# Patient Record
Sex: MALE | Race: BLACK OR AFRICAN AMERICAN | NOT HISPANIC OR LATINO | Employment: OTHER | URBAN - METROPOLITAN AREA
[De-identification: names, ages, dates, MRNs, and addresses within clinical notes are randomized per-mention and may not be internally consistent; named-entity substitution may affect disease eponyms.]

---

## 2017-04-13 ENCOUNTER — APPOINTMENT (EMERGENCY)
Dept: RADIOLOGY | Facility: HOSPITAL | Age: 32
End: 2017-04-13
Payer: COMMERCIAL

## 2017-04-13 ENCOUNTER — HOSPITAL ENCOUNTER (EMERGENCY)
Facility: HOSPITAL | Age: 32
Discharge: HOME | End: 2017-04-13
Attending: EMERGENCY MEDICINE
Payer: COMMERCIAL

## 2017-04-13 VITALS
BODY MASS INDEX: 28.25 KG/M2 | WEIGHT: 220 LBS | RESPIRATION RATE: 18 BRPM | SYSTOLIC BLOOD PRESSURE: 163 MMHG | TEMPERATURE: 98.3 F | DIASTOLIC BLOOD PRESSURE: 96 MMHG | OXYGEN SATURATION: 99 % | HEART RATE: 83 BPM

## 2017-04-13 DIAGNOSIS — M65.4 TENOSYNOVITIS, DE QUERVAIN: Primary | ICD-10-CM

## 2017-04-13 PROCEDURE — 99283 EMERGENCY DEPT VISIT LOW MDM: CPT

## 2017-04-13 PROCEDURE — 73110 X-RAY EXAM OF WRIST: CPT

## 2017-04-13 RX ORDER — FAMOTIDINE 20 MG
TABLET ORAL
COMMUNITY
End: 2018-08-20 | Stop reason: SDUPTHER

## 2017-04-13 RX ORDER — PREDNISONE 20 MG/1
40 TABLET ORAL ONCE
Status: COMPLETED | OUTPATIENT
Start: 2017-04-13 | End: 2017-04-13

## 2017-04-13 RX ORDER — PREDNISONE 20 MG/1
40 TABLET ORAL DAILY
Qty: 10 TABLET | Refills: 0 | Status: SHIPPED | OUTPATIENT
Start: 2017-04-13 | End: 2017-04-18

## 2017-04-13 RX ADMIN — PREDNISONE 40 MG: 20 TABLET ORAL at 22:38

## 2017-10-04 ENCOUNTER — HOSPITAL ENCOUNTER (EMERGENCY)
Facility: HOSPITAL | Age: 32
Discharge: HOME | End: 2017-10-04
Admitting: EMERGENCY MEDICINE
Payer: COMMERCIAL

## 2017-10-04 ENCOUNTER — APPOINTMENT (EMERGENCY)
Dept: RADIOLOGY | Facility: HOSPITAL | Age: 32
End: 2017-10-04
Payer: COMMERCIAL

## 2017-10-04 VITALS
HEART RATE: 91 BPM | RESPIRATION RATE: 18 BRPM | TEMPERATURE: 98.7 F | DIASTOLIC BLOOD PRESSURE: 91 MMHG | SYSTOLIC BLOOD PRESSURE: 177 MMHG | OXYGEN SATURATION: 99 %

## 2017-10-04 DIAGNOSIS — M54.50 CHRONIC LOW BACK PAIN: Primary | ICD-10-CM

## 2017-10-04 DIAGNOSIS — M25.562 CHRONIC PAIN OF LEFT KNEE: ICD-10-CM

## 2017-10-04 DIAGNOSIS — G89.29 CHRONIC LOW BACK PAIN: Primary | ICD-10-CM

## 2017-10-04 DIAGNOSIS — G89.29 CHRONIC PAIN OF LEFT KNEE: ICD-10-CM

## 2017-10-04 PROCEDURE — 96372 THER/PROPH/DIAG INJ SC/IM: CPT

## 2017-10-04 PROCEDURE — 72100 X-RAY EXAM L-S SPINE 2/3 VWS: CPT

## 2017-10-04 PROCEDURE — 73564 X-RAY EXAM KNEE 4 OR MORE: CPT

## 2017-10-04 PROCEDURE — 99283 EMERGENCY DEPT VISIT LOW MDM: CPT

## 2017-10-04 RX ORDER — METHOCARBAMOL 750 MG/1
750 TABLET, FILM COATED ORAL 3 TIMES DAILY
Qty: 15 TABLET | Refills: 0 | Status: SHIPPED | OUTPATIENT
Start: 2017-10-04 | End: 2019-02-11

## 2017-10-04 RX ORDER — NAPROXEN 500 MG/1
500 TABLET ORAL 2 TIMES DAILY WITH MEALS
Qty: 20 TABLET | Refills: 0 | Status: SHIPPED | OUTPATIENT
Start: 2017-10-04 | End: 2018-08-14

## 2017-10-04 RX ORDER — KETOROLAC TROMETHAMINE 30 MG/ML
15 INJECTION, SOLUTION INTRAMUSCULAR; INTRAVENOUS ONCE
Status: COMPLETED | OUTPATIENT
Start: 2017-10-04 | End: 2017-10-04

## 2017-10-04 RX ADMIN — KETOROLAC TROMETHAMINE 15 MG: 30 INJECTION, SOLUTION INTRAMUSCULAR at 21:53

## 2017-10-05 NOTE — DISCHARGE INSTRUCTIONS
Take naproxen daily with food  I recommend you take it at breakfast and dinner  Take Robaxin at night  It will make you drowsy  Do not take it during the day, especially if you need to drive anywhere  Do not drink on this medication  Apply heat to your back 20 minutes on an hour as often as possible for a goal of 5 times a day  Rest  If symptoms continue follow-up with her family doctor for severely worsened return to emergency department  Please contact a family doctor for further management for continuation may require MRIs and other medications that they will prescribe  Acute Low Back Pain   WHAT YOU NEED TO KNOW:   Acute low back pain is sudden discomfort in your lower back area that lasts for up to 6 weeks  The discomfort makes it difficult to tolerate activity  DISCHARGE INSTRUCTIONS:   Seek care immediately or call 911 if:   · You have severe pain  · You have sudden stiffness and heaviness on both buttocks down to both legs  · You have numbness or weakness in one leg, or pain in both legs  · You have numbness in your genital area or across your lower back  · You cannot control your urine or bowel movements  Contact your healthcare provider if:   · You have a fever  · You have pain at night or when you rest     · Your pain does not get better with treatment  · You have pain that worsens when you cough or sneeze  · You suddenly feel something pop or snap in your back  · You have questions or concerns about your condition or care  Medicines: The following medicines may be ordered by your healthcare provider:  · Acetaminophen  decreases pain  It is available without a doctor's order  Ask how much to take and how often to take it  Follow directions  Acetaminophen can cause liver damage if not taken correctly  · NSAIDs  help decrease swelling and pain  This medicine is available with or without a doctor's order   NSAIDs can cause stomach bleeding or kidney problems in certain people  If you take blood thinner medicine, always ask your healthcare provider if NSAIDs are safe for you  Always read the medicine label and follow directions  · Prescription pain medicine  may be given  Ask your healthcare provider how to take this medicine safely  · Muscle relaxers  decrease pain by relaxing the muscles in your lower spine  · Take your medicine as directed  Contact your healthcare provider if you think your medicine is not helping or if you have side effects  Tell him of her if you are allergic to any medicine  Keep a list of the medicines, vitamins, and herbs you take  Include the amounts, and when and why you take them  Bring the list or the pill bottles to follow-up visits  Carry your medicine list with you in case of an emergency  Self-care:   · Stay active  as much as you can without causing more pain  Bed rest could make your back pain worse  Start with some light exercises such as walking  Avoid heavy lifting until your pain is gone  Ask for more information about the activities or exercises that are right for you  · Ice  helps decrease swelling, pain, and muscle spams  Put crushed ice in a plastic bag  Cover it with a towel  Place it on your lower back for 20 to 30 minutes every 2 hours  Do this for about 2 to 3 days after your pain starts, or as directed  · Heat  helps decrease pain and muscle spasms  Start to use heat after treatment with ice has stopped  Use a small towel dampened with warm water or a heating pad, or sit in a warm bath  Apply heat on the area for 20 to 30 minutes every 2 hours for as many days as directed  Alternate heat and ice  Prevent acute low back pain:   · Use proper body mechanics  ¨ Bend at the hips and knees when you  objects  Do not bend from the waist  Use your leg muscles as you lift the load  Do not use your back  Keep the object close to your chest as you lift it   Try not to twist or lift anything above your waist     ¨ Change your position often when you stand for long periods of time  Rest one foot on a small box or footrest, and then switch to the other foot often  ¨ Try not to sit for long periods of time  When you do, sit in a straight-backed chair with your feet flat on the floor  Never reach, pull, or push while you are sitting  · Do exercises that strengthen your back muscles  Warm up before you exercise  Ask your healthcare provider the best exercises for you  · Maintain a healthy weight  Ask your healthcare provider how much you should weigh  Ask him to help you create a weight loss plan if you are overweight  Follow up with your healthcare provider as directed:  Return for a follow-up visit if you still have pain after 1 to 3 weeks of treatment  You may need to visit an orthopedist if your back pain lasts more than 6 to 12 weeks  Write down your questions so you remember to ask them during your visits  © 2017 [employer] Information is for End User's use only and may not be sold, redistributed or otherwise used for commercial purposes  All illustrations and images included in CareNotes® are the copyrighted property of [employer]  or [employer] Gunter  The above information is an  only  It is not intended as medical advice for individual conditions or treatments  Talk to your doctor, nurse or pharmacist before following any medical regimen to see if it is safe and effective for you
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
## 2017-10-05 NOTE — ED NOTES
Pt appeared to be in no acute distress upon discharge  Pt see, and assessed by Aly CHEN  Verbal understanding obtained from pt on d/c instructions as well as Rx and follow up care  Pt able to ambulate well without assistance upon exiting       Jin Myers RN  10/04/17 0453

## 2018-01-10 NOTE — PROGRESS NOTES
Patient Health Assessment    Date:            01/12/2016  Blood Pressure:  151/96  Pulse:           80  Age:             30  Weight:          225 lbs  Height/Length:   6' 2"  Body Mass Index: 28 8  Provider:        EMILE  Clinic:          [facility] 39: Diabetes    Jaw Pain    Frequent Headaches  Medications: Allergies:  Since Last Visit: Medical Alert: No Change    Medications: No Change    Allergies:        No Change  Pain Scale Type: Numeric Pain ScalePain Level: 7  Description:  Location: ()Tooth    ()Other  Quality:  ()Dull    ()Sharp    ()Throbbing    ()Unable to assess  Duration: ()< 24 hours    ()> 24 hours    ()1-3 days    ()4-6 days    ()7-14 days    ()14-21 days    ()21-30 days    ()Over 30 days     pt presents for limited oral exam due to pain on UL starting approx a month  ago  pt reports that he was seen in ED last Thursday and given penicillin and  hydrocodone for the pain  pt is 7/10     1 PA taken - large decay on #15 extending to pulp    clinical exam reveals large decay #15, no fistula or draining at this time  sens to perc and palp  pt would like to have tooth extracted  told pt that if  he does not have dental insurance that the cost is $180 and a 30% discount will   be applied and will be due day of exo  gave pt rx for percocet and  instructed to keep taking abx  pt also informed may have decay on #14 and  will need comp exam and fmx      dr stevens/dr Dana Huber    nv: exo #15 OS day preferred but regular day ok    ----- Signed on Tuesday, January 12, 2016 at 9:40:49 AM  -----  ----- Provider: [email or web address]_[email or web address] - [facility], Dentist -- Clinic: [facility]  -----

## 2018-01-13 NOTE — PROGRESS NOTES
Patient Health Assessment    Date:            01/14/2016  Blood Pressure:  139/96  Pulse:           98  Age:             30  Weight:          225 lbs  Height/Length:   6' 2"  Body Mass Index: 28 8  Provider:        [id number]  Clinic:          [facility] 39: Diabetes    Jaw Pain    Frequent Headaches  Medications: Percocet 5-325 MG    Metformin HCl  Allergies:  Since Last Visit: Medical Alert: No Change    Medications: No Change    Allergies:        No Change  Pain Scale Type: Numeric Pain ScalePain Level: 0  Description:  Ext #15    Patient presents for Ext #15  [address], patient denies any changes  Obtained consent, and Pre-Op BP WNL  The patient was given 2 00 total carpules of Septocaine, 4% with Epinephrine  1:100,000, to achieve satisfactory local anesthesia results  Adequate anesthesia obtained, reflected gingiva, elevated, and extracted #15    Socket irrigated, and   2 individual 3 0 chromic gut sutures placed  Upon dismissal, patient received POI, gauze  pt instructed to keep taking  percocet given on 1/12/16 for pain    rx for prevident given and pt instructed to return for comp exam    dr stevens/dr maguire    NV: comp exam, fmx    ----- Signed on Thursday, January 14, 2016 at 10:43:17 AM  -----  ----- Provider: [id number] - [facility], Dentist -- Clinic: [facility]  -----

## 2018-01-16 NOTE — PROGRESS NOTES
Patient Health Assessment    Date:            05/26/2016  Blood Pressure:  136/89  Pulse:           90  Age:             31  Weight:          225 lbs  Height/Length:   6' 2"  Body Mass Index: 28 8  Provider:        EMILE  Clinic:          [facility]: Diabetes    Jaw Pain    Frequent Headaches  Medications: PreviDent 5000 Booster 1 1 %    Metformin HCl    GLIPIZIDE ER 2 5 MG TABLET 2 5 MG    IMIPRAMINE HCL 25 MG TABLET 25 MG    RAMIPRIL 2 5 MG CAPSULE 2 5 MG  Allergies:  Since Last Visit: Medical Alert: No Change    Medications: No Change    Allergies:        No Change  Pain Scale Type: Numeric Pain ScalePain Level: 0  Description:  Resin #3-OL    Pt presents for restorative #3-OL    [facility],  Patient denies any changes    caries #3-OL    topical LA applied  Administered 2 carpules  of 2% Lidocaine with 1:100k epi  via infiltration  rubber dam placed  old amalgam removed  Caries excavated  left hard discolored dentin  lime lite placed  vivid bond II placed and light  cured  Restored with Beautifil flow + packable A2 composite  Occlusion  verified, and restoration polished with finishing burs  POI given  possibility of few days post op sensitivity  Pt satisfied and left in good  health      NV: more fillings    EMPERATRIZ/Dr SCHROEDER    ----- Signed on Thursday, May 26, 2016 at 2:31:16 PM  -----  ----- Provider: [id number] - [facility], Dentist -- Clinic: [facility] -----

## 2018-07-17 ENCOUNTER — OFFICE VISIT (OUTPATIENT)
Dept: FAMILY MEDICINE CLINIC | Facility: CLINIC | Age: 33
End: 2018-07-17
Payer: COMMERCIAL

## 2018-07-17 VITALS
SYSTOLIC BLOOD PRESSURE: 142 MMHG | HEIGHT: 73 IN | WEIGHT: 224 LBS | RESPIRATION RATE: 16 BRPM | HEART RATE: 84 BPM | DIASTOLIC BLOOD PRESSURE: 90 MMHG | TEMPERATURE: 96.5 F | BODY MASS INDEX: 29.69 KG/M2

## 2018-07-17 DIAGNOSIS — L65.9 HAIR LOSS: ICD-10-CM

## 2018-07-17 DIAGNOSIS — E08.40 DIABETES DUE TO UNDERLYING CONDITION W DIABETIC NEUROP, UNSP (HCC): ICD-10-CM

## 2018-07-17 DIAGNOSIS — Z23 NEED FOR PNEUMOCOCCAL VACCINATION: ICD-10-CM

## 2018-07-17 DIAGNOSIS — G89.29 CHRONIC BILATERAL THORACIC BACK PAIN: ICD-10-CM

## 2018-07-17 DIAGNOSIS — Z76.89 ENCOUNTER TO ESTABLISH CARE WITH NEW DOCTOR: Primary | ICD-10-CM

## 2018-07-17 DIAGNOSIS — Z00.00 HEALTH CARE MAINTENANCE: ICD-10-CM

## 2018-07-17 DIAGNOSIS — Z23 NEED FOR TDAP VACCINATION: ICD-10-CM

## 2018-07-17 DIAGNOSIS — M54.6 CHRONIC BILATERAL THORACIC BACK PAIN: ICD-10-CM

## 2018-07-17 DIAGNOSIS — N52.9 ERECTILE DYSFUNCTION, UNSPECIFIED ERECTILE DYSFUNCTION TYPE: ICD-10-CM

## 2018-07-17 DIAGNOSIS — E11.8 TYPE 2 DIABETES MELLITUS WITH COMPLICATION, WITHOUT LONG-TERM CURRENT USE OF INSULIN (HCC): ICD-10-CM

## 2018-07-17 PROBLEM — E11.9 TYPE 2 DIABETES MELLITUS, WITHOUT LONG-TERM CURRENT USE OF INSULIN (HCC): Status: ACTIVE | Noted: 2018-07-17

## 2018-07-17 PROCEDURE — 90732 PPSV23 VACC 2 YRS+ SUBQ/IM: CPT | Performed by: FAMILY MEDICINE

## 2018-07-17 PROCEDURE — 99213 OFFICE O/P EST LOW 20 MIN: CPT | Performed by: FAMILY MEDICINE

## 2018-07-17 PROCEDURE — 90471 IMMUNIZATION ADMIN: CPT | Performed by: FAMILY MEDICINE

## 2018-07-17 PROCEDURE — 90472 IMMUNIZATION ADMIN EACH ADD: CPT | Performed by: FAMILY MEDICINE

## 2018-07-17 PROCEDURE — 90715 TDAP VACCINE 7 YRS/> IM: CPT | Performed by: FAMILY MEDICINE

## 2018-07-17 RX ORDER — LISINOPRIL AND HYDROCHLOROTHIAZIDE 12.5; 1 MG/1; MG/1
1 TABLET ORAL DAILY
COMMUNITY
End: 2018-08-14

## 2018-07-17 RX ORDER — CYCLOBENZAPRINE HCL 5 MG
5 TABLET ORAL 3 TIMES DAILY PRN
COMMUNITY
End: 2019-04-16 | Stop reason: ALTCHOICE
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
## 2018-07-18 NOTE — ASSESSMENT & PLAN NOTE
Patient has a history of diabetes over 10 years  Would like to establish baseline values for  hemoglobing A1c, lipids, microlbumin and kidney function  Will order A1c, lipid panel, UA microalbumin and CMP  Patient demonstrated decreased sensation consistent diabetic peripheral neuropathy  with monofilament test  Will refer to podiatry

## 2018-07-18 NOTE — ASSESSMENT & PLAN NOTE
No results found for: HGBA1C    No results for input(s): POCGLU in the last 72 hours      Blood Sugar Average: Last 72 hrs:

## 2018-07-18 NOTE — ASSESSMENT & PLAN NOTE
Patient expressed he and his wife want to have children  ED has been a chronic problem for him   Will refer him to urology
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
## 2018-08-09 ENCOUNTER — APPOINTMENT (OUTPATIENT)
Dept: LAB | Facility: CLINIC | Age: 33
End: 2018-08-09
Payer: COMMERCIAL

## 2018-08-09 DIAGNOSIS — E08.40 DIABETES DUE TO UNDERLYING CONDITION W DIABETIC NEUROP, UNSP (HCC): ICD-10-CM

## 2018-08-09 DIAGNOSIS — Z00.00 HEALTH CARE MAINTENANCE: ICD-10-CM

## 2018-08-09 LAB
ALBUMIN SERPL BCP-MCNC: 3.1 G/DL (ref 3.5–5)
ALP SERPL-CCNC: 52 U/L (ref 46–116)
ALT SERPL W P-5'-P-CCNC: 20 U/L (ref 12–78)
ANION GAP SERPL CALCULATED.3IONS-SCNC: 4 MMOL/L (ref 4–13)
AST SERPL W P-5'-P-CCNC: 47 U/L (ref 5–45)
BACTERIA UR QL AUTO: ABNORMAL /HPF
BILIRUB SERPL-MCNC: 0.3 MG/DL (ref 0.2–1)
BILIRUB UR QL STRIP: NEGATIVE
BUN SERPL-MCNC: 16 MG/DL (ref 5–25)
CALCIUM SERPL-MCNC: 9.2 MG/DL (ref 8.3–10.1)
CHLORIDE SERPL-SCNC: 102 MMOL/L (ref 100–108)
CHOLEST SERPL-MCNC: 287 MG/DL (ref 50–200)
CLARITY UR: CLEAR
CO2 SERPL-SCNC: 30 MMOL/L (ref 21–32)
COLOR UR: YELLOW
CREAT SERPL-MCNC: 1.46 MG/DL (ref 0.6–1.3)
EST. AVERAGE GLUCOSE BLD GHB EST-MCNC: 283 MG/DL
GFR SERPL CREATININE-BSD FRML MDRD: 71 ML/MIN/1.73SQ M
GLUCOSE P FAST SERPL-MCNC: 272 MG/DL (ref 65–99)
GLUCOSE UR STRIP-MCNC: ABNORMAL MG/DL
HBA1C MFR BLD: 11.5 % (ref 4.2–6.3)
HDLC SERPL-MCNC: 49 MG/DL (ref 40–60)
HGB UR QL STRIP.AUTO: ABNORMAL
KETONES UR STRIP-MCNC: NEGATIVE MG/DL
LDLC SERPL CALC-MCNC: 209 MG/DL (ref 0–100)
LEUKOCYTE ESTERASE UR QL STRIP: ABNORMAL
NITRITE UR QL STRIP: NEGATIVE
NON-SQ EPI CELLS URNS QL MICRO: ABNORMAL /HPF
NONHDLC SERPL-MCNC: 238 MG/DL
PH UR STRIP.AUTO: 6 [PH] (ref 4.5–8)
POTASSIUM SERPL-SCNC: 5.1 MMOL/L (ref 3.5–5.3)
PROT SERPL-MCNC: 7.3 G/DL (ref 6.4–8.2)
PROT UR STRIP-MCNC: ABNORMAL MG/DL
RBC #/AREA URNS AUTO: ABNORMAL /HPF
SODIUM SERPL-SCNC: 136 MMOL/L (ref 136–145)
SP GR UR STRIP.AUTO: 1.02 (ref 1–1.03)
TRIGL SERPL-MCNC: 144 MG/DL
UROBILINOGEN UR QL STRIP.AUTO: 0.2 E.U./DL
WBC #/AREA URNS AUTO: ABNORMAL /HPF

## 2018-08-09 PROCEDURE — 83036 HEMOGLOBIN GLYCOSYLATED A1C: CPT

## 2018-08-09 PROCEDURE — 80053 COMPREHEN METABOLIC PANEL: CPT

## 2018-08-09 PROCEDURE — 80061 LIPID PANEL: CPT

## 2018-08-09 PROCEDURE — 81001 URINALYSIS AUTO W/SCOPE: CPT | Performed by: FAMILY MEDICINE

## 2018-08-09 PROCEDURE — 87389 HIV-1 AG W/HIV-1&-2 AB AG IA: CPT

## 2018-08-09 PROCEDURE — 36415 COLL VENOUS BLD VENIPUNCTURE: CPT

## 2018-08-10 LAB — HIV 1+2 AB+HIV1 P24 AG SERPL QL IA: NORMAL

## 2018-08-14 ENCOUNTER — HOSPITAL ENCOUNTER (EMERGENCY)
Facility: HOSPITAL | Age: 33
Discharge: HOME | End: 2018-08-14
Attending: EMERGENCY MEDICINE | Admitting: EMERGENCY MEDICINE
Payer: COMMERCIAL

## 2018-08-14 VITALS
OXYGEN SATURATION: 99 % | SYSTOLIC BLOOD PRESSURE: 188 MMHG | DIASTOLIC BLOOD PRESSURE: 91 MMHG | WEIGHT: 225 LBS | TEMPERATURE: 98.6 F | BODY MASS INDEX: 30.1 KG/M2 | HEART RATE: 93 BPM | RESPIRATION RATE: 18 BRPM

## 2018-08-14 DIAGNOSIS — R73.9 HYPERGLYCEMIA: Primary | ICD-10-CM

## 2018-08-14 LAB
ACETONE SERPL-MCNC: NEGATIVE MG/DL
ALBUMIN SERPL BCP-MCNC: 3.5 G/DL (ref 3.5–5)
ALP SERPL-CCNC: 72 U/L (ref 46–116)
ALT SERPL W P-5'-P-CCNC: 25 U/L (ref 12–78)
ANION GAP SERPL CALCULATED.3IONS-SCNC: 4 MMOL/L (ref 4–13)
AST SERPL W P-5'-P-CCNC: 54 U/L (ref 5–45)
BASOPHILS # BLD AUTO: 0.03 THOUSANDS/ΜL (ref 0–0.1)
BASOPHILS NFR BLD AUTO: 0 % (ref 0–1)
BILIRUB SERPL-MCNC: 0.3 MG/DL (ref 0.2–1)
BUN SERPL-MCNC: 21 MG/DL (ref 5–25)
CALCIUM SERPL-MCNC: 9.2 MG/DL (ref 8.3–10.1)
CHLORIDE SERPL-SCNC: 97 MMOL/L (ref 100–108)
CO2 SERPL-SCNC: 29 MMOL/L (ref 21–32)
CREAT SERPL-MCNC: 1.58 MG/DL (ref 0.6–1.3)
EOSINOPHIL # BLD AUTO: 0.14 THOUSAND/ΜL (ref 0–0.61)
EOSINOPHIL NFR BLD AUTO: 2 % (ref 0–6)
ERYTHROCYTE [DISTWIDTH] IN BLOOD BY AUTOMATED COUNT: 11.6 % (ref 11.6–15.1)
GFR SERPL CREATININE-BSD FRML MDRD: 65 ML/MIN/1.73SQ M
GLUCOSE SERPL-MCNC: 402 MG/DL (ref 65–140)
GLUCOSE SERPL-MCNC: 447 MG/DL (ref 65–140)
HCT VFR BLD AUTO: 39.5 % (ref 36.5–49.3)
HGB BLD-MCNC: 13.9 G/DL (ref 12–17)
IMM GRANULOCYTES # BLD AUTO: 0.01 THOUSAND/UL (ref 0–0.2)
IMM GRANULOCYTES NFR BLD AUTO: 0 % (ref 0–2)
LYMPHOCYTES # BLD AUTO: 2.7 THOUSANDS/ΜL (ref 0.6–4.47)
LYMPHOCYTES NFR BLD AUTO: 37 % (ref 14–44)
MCH RBC QN AUTO: 29.3 PG (ref 26.8–34.3)
MCHC RBC AUTO-ENTMCNC: 35.2 G/DL (ref 31.4–37.4)
MCV RBC AUTO: 83 FL (ref 82–98)
MONOCYTES # BLD AUTO: 0.41 THOUSAND/ΜL (ref 0.17–1.22)
MONOCYTES NFR BLD AUTO: 6 % (ref 4–12)
NEUTROPHILS # BLD AUTO: 3.94 THOUSANDS/ΜL (ref 1.85–7.62)
NEUTS SEG NFR BLD AUTO: 55 % (ref 43–75)
NRBC BLD AUTO-RTO: 0 /100 WBCS
PLATELET # BLD AUTO: 283 THOUSANDS/UL (ref 149–390)
PMV BLD AUTO: 10.9 FL (ref 8.9–12.7)
POTASSIUM SERPL-SCNC: 4.6 MMOL/L (ref 3.5–5.3)
PROT SERPL-MCNC: 7.8 G/DL (ref 6.4–8.2)
RBC # BLD AUTO: 4.75 MILLION/UL (ref 3.88–5.62)
SODIUM SERPL-SCNC: 130 MMOL/L (ref 136–145)
WBC # BLD AUTO: 7.23 THOUSAND/UL (ref 4.31–10.16)

## 2018-08-14 PROCEDURE — 96374 THER/PROPH/DIAG INJ IV PUSH: CPT

## 2018-08-14 PROCEDURE — 80053 COMPREHEN METABOLIC PANEL: CPT | Performed by: PHYSICIAN ASSISTANT

## 2018-08-14 PROCEDURE — 96361 HYDRATE IV INFUSION ADD-ON: CPT

## 2018-08-14 PROCEDURE — 85025 COMPLETE CBC W/AUTO DIFF WBC: CPT | Performed by: PHYSICIAN ASSISTANT

## 2018-08-14 PROCEDURE — 99283 EMERGENCY DEPT VISIT LOW MDM: CPT

## 2018-08-14 PROCEDURE — 82948 REAGENT STRIP/BLOOD GLUCOSE: CPT

## 2018-08-14 PROCEDURE — 82009 KETONE BODYS QUAL: CPT | Performed by: PHYSICIAN ASSISTANT

## 2018-08-14 PROCEDURE — 36415 COLL VENOUS BLD VENIPUNCTURE: CPT | Performed by: PHYSICIAN ASSISTANT

## 2018-08-14 RX ADMIN — SODIUM CHLORIDE 1000 ML: 0.9 INJECTION, SOLUTION INTRAVENOUS at 14:23

## 2018-08-14 RX ADMIN — INSULIN HUMAN 4 UNITS: 100 INJECTION, SOLUTION PARENTERAL at 15:43

## 2018-08-14 NOTE — DISCHARGE INSTRUCTIONS
Diabetic Hyperglycemia   WHAT YOU NEED TO KNOW:   Diabetic hyperglycemia is a blood glucose (sugar) level that is higher than your healthcare provider recommends  You may have increased thirst and urinate more often than usual  Over time, uncontrolled diabetes can damage your nerves, blood vessels, tissues, and organs  That is why it is important to manage diabetic hyperglycemia  Without treatment, diabetic hyperglycemia can lead to diabetic ketoacidosis (DKA) or hyperglycemic hyperosmolar state (HHS)  These are serious conditions that can become life-threatening  DISCHARGE INSTRUCTIONS:   Return to the emergency department if:   · You have shortness of breath  · Your breath smells fruity  · You have nausea and vomiting  · You have symptoms of dehydration, such as dark yellow urine, dry mouth and lips, and dry skin  Contact your healthcare provider if:   · You continue to have higher blood sugar levels than your healthcare provider recommends  · Your blood sugar level is over 240 mg/dl and  you have ketones in your urine  · You have questions or concerns about your condition or care  Medicines:   · Medicines  such as insulin and hypoglycemic medicine decrease blood sugar levels  · Take your medicine as directed  Contact your healthcare provider if you think your medicine is not helping or if you have side effects  Tell him or her if you are allergic to any medicine  Keep a list of the medicines, vitamins, and herbs you take  Include the amounts, and when and why you take them  Bring the list or the pill bottles to follow-up visits  Carry your medicine list with you in case of an emergency  Follow up with your healthcare provider or specialist as directed: Your healthcare provider may refer you to a dietitian or diabetes specialist  Write down your questions so you remember to ask them during your visits     Manage diabetic hyperglycemia:   · If you take diabetes medicine or insulin, take it as directed  Missed or wrong doses can cause your blood sugar to go up  · Tell your healthcare provider if you continue to have trouble managing your blood sugar  He may change the type, amount, or timing of your diabetes medicine or insulin  If you do not take diabetes medicine or insulin, you may need to start  · Work with your healthcare provider to develop a sick day plan  Illness can cause your blood sugar to rise  A sick day plan helps you control your blood sugar level when you are sick  Prevent diabetic hyperglycemia:   · Check your blood sugar levels regularly  Ask your healthcare provider how often to check your blood sugar and what your levels should be  · Follow your meal plan  Your blood sugar can go up if you eat a large meal or you eat more carbohydrates than recommended  Work with a dietitian to develop a meal plan that is right for you  · Exercise regularly  to help lower your blood sugar when it is high  It can also keep your blood sugar levels steady over time  Exercise for at least 30 minutes, 5 days a week  Include muscle strengthening activities 2 days each week  Do not sit for longer than 90 minutes at a time  Work with your healthcare provider to create an exercise plan  Children should get at least 60 minutes of physical activity each day  · Check your ketones before exercise  if your blood sugar level is above 240 mg/dl  Do not exercise if you have ketones in your urine,  because your blood sugar level may rise even more  Ask your healthcare provider how to lower your blood sugar when you have ketones  © 2017 [employer] Information is for End User's use only and may not be sold, redistributed or otherwise used for commercial purposes  All illustrations and images included in CareNotes® are the copyrighted property of [employer]  or [employer]ter  The above information is an  only   It is not intended as medical advice for individual conditions or treatments  Talk to your doctor, nurse or pharmacist before following any medical regimen to see if it is safe and effective for you

## 2018-08-14 NOTE — ED NOTES
Discharge instructions reviewed with patient  Patient able to ambulate out without difficulty  No questions or concerns at this time          Enoch Humphries RN  08/14/18 6800

## 2018-08-14 NOTE — ED PROVIDER NOTES
History  Chief Complaint   Patient presents with    Hyperglycemia - no symptoms     pt c/o high blood sugar, 365 at home  denies symptoms  no SOB or chest pain     68-year-old male presents to the emergency department with complaints of hyperglycemia  States that he lost his glucometer over the past several weeks and has not been testing his sugar regularly  States that prior to this he would check a sugar intermittently and usually ranged from 150-250  States that he is not fasting blood sugars because he was for work  Early in the morning  States that he is compliant with his medications which are metformin 500 twice a day and glipizide 10 mg daily  Notes that he recently changed to a new family doctor in had blood work done and is due to follow up with them in the next 2 days  Patient states that he went on a weekend trip and forgot to take his medications with him  Has not been taking either of his diabetic medications over the past 2 days  Found his glucometer at home today and checked his sugar  States that it was 365 at home  He denies any chest pain, shortness of breath, nausea, vomiting, or diarrhea  History provided by:  Patient   used: No        Prior to Admission Medications   Prescriptions Last Dose Informant Patient Reported? Taking? Vitamin D, Cholecalciferol, 1000 units CAPS  Self Yes Yes   Sig: Take by mouth   cyclobenzaprine (FLEXERIL) 5 mg tablet  Self Yes Yes   Sig: Take 5 mg by mouth 3 (three) times a day as needed for muscle spasms   glipiZIDE (GLUCOTROL) 10 mg tablet  Self Yes Yes   Sig: Take 10 mg by mouth 2 (two) times a day before meals  ibuprofen (MOTRIN) 600 mg tablet  Self No Yes   Sig: Take 1 tablet (600 mg total) by mouth every 8 (eight) hours as needed for moderate pain (pain)  imipramine (TOFRANIL) 25 mg tablet  Self Yes Yes   Sig: Take 25 mg by mouth daily at bedtime     metFORMIN (GLUCOPHAGE) 500 mg tablet  Self Yes Yes   Sig: Take 500 mg by mouth 2 (two) times a day with meals  methocarbamol (ROBAXIN) 750 mg tablet   No No   Sig: Take 1 tablet by mouth 3 (three) times a day for 5 days   ramipril (ALTACE) 1 25 mg capsule  Self Yes Yes   Sig: Take by mouth daily  Pt unsure of dose  Facility-Administered Medications: None       Past Medical History:   Diagnosis Date    Diabetes mellitus ([facility] [address] )        History reviewed  No pertinent surgical history  Family History   Problem Relation Age of Onset    Hypertension Mother     Multiple sclerosis Mother     Diabetes Father      I have reviewed and agree with the history as documented  Social History   Substance Use Topics    Smoking status: Never Smoker    Smokeless tobacco: Never Used    Alcohol use No      Comment: occasionally        Review of Systems   Constitutional: Negative for activity change, appetite change, chills and fever  HENT: Negative for congestion, dental problem, drooling, ear discharge, ear pain, mouth sores, nosebleeds, rhinorrhea, sore throat and trouble swallowing  Eyes: Negative for pain, discharge and itching  Respiratory: Negative for cough, chest tightness, shortness of breath and wheezing  Cardiovascular: Negative for chest pain and palpitations  Gastrointestinal: Negative for abdominal pain, blood in stool, constipation, diarrhea, nausea and vomiting  Endocrine: Negative for cold intolerance and heat intolerance  Genitourinary: Negative for difficulty urinating, dysuria, flank pain, frequency and urgency  Skin: Negative for rash and wound  Allergic/Immunologic: Negative for food allergies and immunocompromised state  Neurological: Negative for dizziness, seizures, syncope, weakness, numbness and headaches  Psychiatric/Behavioral: Negative for agitation, behavioral problems and confusion  Physical Exam  Physical Exam   Constitutional: He is oriented to person, place, and time  He appears well-nourished  No distress     HENT: Head: Normocephalic and atraumatic  Right Ear: External ear normal    Left Ear: External ear normal    Mouth/Throat: Oropharynx is clear and moist  No oropharyngeal exudate  Eyes: Conjunctivae are normal    Neck: No JVD present  No tracheal deviation present  Cardiovascular: Normal rate, regular rhythm and normal heart sounds  Exam reveals no gallop and no friction rub  No murmur heard  Pulmonary/Chest: Effort normal and breath sounds normal  No respiratory distress  He has no wheezes  He has no rales  He exhibits no tenderness  Abdominal: Soft  Bowel sounds are normal  He exhibits no distension  There is no tenderness  There is no guarding  Musculoskeletal: Normal range of motion  He exhibits no edema, tenderness or deformity  Lymphadenopathy:     He has no cervical adenopathy  Neurological: He is alert and oriented to person, place, and time  Skin: Skin is warm and dry  No rash noted  He is not diaphoretic  No erythema  Psychiatric: He has a normal mood and affect  His behavior is normal    Nursing note and vitals reviewed        Vital Signs  ED Triage Vitals   Temperature Pulse Respirations Blood Pressure SpO2   08/14/18 1326 08/14/18 1327 08/14/18 1327 08/14/18 1327 08/14/18 1327   98 6 °F (37 °C) 93 18 (!) 188/91 99 %      Temp Source Heart Rate Source Patient Position - Orthostatic VS BP Location FiO2 (%)   08/14/18 1326 08/14/18 1327 08/14/18 1327 08/14/18 1327 --   Oral Monitor Sitting Right arm       Pain Score       08/14/18 1327       No Pain           Vitals:    08/14/18 1327   BP: (!) 188/91   Pulse: 93   Patient Position - Orthostatic VS: Sitting       Visual Acuity      ED Medications  Medications   sodium chloride 0 9 % bolus 1,000 mL (0 mL Intravenous Stopped 8/14/18 1542)   insulin regular (HumuLIN R,NovoLIN R) injection 4 Units (4 Units Intravenous Given 8/14/18 1543)       Diagnostic Studies  Results Reviewed     Procedure Component Value Units Date/Time    Comprehensive metabolic panel [[id number]]  (Abnormal) Collected:  08/14/18 1429    Lab Status:  Final result Specimen:  Blood from Arm, Left Updated:  08/14/18 1451     Sodium 130 (L) mmol/L      Potassium 4 6 mmol/L      Chloride 97 (L) mmol/L      CO2 29 mmol/L      Anion Gap 4 mmol/L      BUN 21 mg/dL      Creatinine 1 58 (H) mg/dL      Glucose 402 (H) mg/dL      Calcium 9 2 mg/dL      AST 54 (H) U/L      ALT 25 U/L      Alkaline Phosphatase 72 U/L      Total Protein 7 8 g/dL      Albumin 3 5 g/dL      Total Bilirubin 0 30 mg/dL      eGFR 65 ml/min/1 73sq m     Narrative:         National Kidney Disease Education Program recommendations are as follows:  GFR calculation is accurate only with a steady state creatinine  Chronic Kidney disease less than 60 ml/min/1 73 sq  meters  Kidney failure less than 15 ml/min/1 73 sq  meters      Acetone [[id number]]  (Normal) Collected:  08/14/18 1429    Lab Status:  Final result Specimen:  Blood from Arm, Left Updated:  08/14/18 1446     Acetone, Bld Negative    CBC and differential [[id number]] Collected:  08/14/18 1429    Lab Status:  Final result Specimen:  Blood from Arm, Left Updated:  08/14/18 1440     WBC 7 23 Thousand/uL      RBC 4 75 Million/uL      Hemoglobin 13 9 g/dL      Hematocrit 39 5 %      MCV 83 fL      MCH 29 3 pg      MCHC 35 2 g/dL      RDW 11 6 %      MPV 10 9 fL      Platelets 423 Thousands/uL      nRBC 0 /100 WBCs      Neutrophils Relative 55 %      Immat GRANS % 0 %      Lymphocytes Relative 37 %      Monocytes Relative 6 %      Eosinophils Relative 2 %      Basophils Relative 0 %      Neutrophils Absolute 3 94 Thousands/µL      Immature Grans Absolute 0 01 Thousand/uL      Lymphocytes Absolute 2 70 Thousands/µL      Monocytes Absolute 0 41 Thousand/µL      Eosinophils Absolute 0 14 Thousand/µL      Basophils Absolute 0 03 Thousands/µL     Fingerstick Glucose (POCT) [[id number]]  (Abnormal) Collected:  08/14/18 1353    Lab Status:  Final result Updated:  08/14/18 5544 POC Glucose 447 (H) mg/dl                  No orders to display              Procedures  Procedures       Phone Contacts  ED Phone Contact    ED Course                               MDM  Number of Diagnoses or Management Options  Hyperglycemia:   Diagnosis management comments: Differential diagnosis includes but not limited to:  Hyperglycemia, HONK  Doubt DKA  Most recent hemoglobin A1c checked on 8/9/18 resulted at 11 5       Amount and/or Complexity of Data Reviewed  Clinical lab tests: ordered and reviewed  Review and summarize past medical records: yes      CritCare Time    Disposition  Final diagnoses:   Hyperglycemia     Time reflects when diagnosis was documented in both MDM as applicable and the Disposition within this note     Time User Action Codes Description Comment    8/14/2018  3:39 PM Hammad Gonzalez Add [R73 9] Hyperglycemia       ED Disposition     ED Disposition Condition Comment    Discharge  [address] Tran discharge to home/self care  Condition at discharge: Stable        Follow-up Information    None         Patient's Medications   Discharge Prescriptions    No medications on file     No discharge procedures on file      ED Provider  Electronically Signed by           Jasmina Sanchez PA-C  08/14/18 3022

## 2018-08-16 ENCOUNTER — OFFICE VISIT (OUTPATIENT)
Dept: FAMILY MEDICINE CLINIC | Facility: CLINIC | Age: 33
End: 2018-08-16
Payer: COMMERCIAL

## 2018-08-16 VITALS
SYSTOLIC BLOOD PRESSURE: 122 MMHG | RESPIRATION RATE: 18 BRPM | WEIGHT: 221 LBS | HEART RATE: 78 BPM | TEMPERATURE: 97.1 F | BODY MASS INDEX: 29.93 KG/M2 | HEIGHT: 72 IN | DIASTOLIC BLOOD PRESSURE: 80 MMHG

## 2018-08-16 DIAGNOSIS — E78.5 HYPERLIPIDEMIA, UNSPECIFIED HYPERLIPIDEMIA TYPE: ICD-10-CM

## 2018-08-16 DIAGNOSIS — E11.65 TYPE 2 DIABETES MELLITUS WITH HYPERGLYCEMIA, WITHOUT LONG-TERM CURRENT USE OF INSULIN (HCC): Primary | ICD-10-CM

## 2018-08-16 PROBLEM — E11.9 TYPE 2 DIABETES MELLITUS, WITHOUT LONG-TERM CURRENT USE OF INSULIN (HCC): Status: ACTIVE | Noted: 2018-07-17

## 2018-08-16 PROBLEM — Z79.4 TYPE 2 DIABETES MELLITUS WITH HYPERGLYCEMIA, WITH LONG-TERM CURRENT USE OF INSULIN (HCC): Status: ACTIVE | Noted: 2018-07-17

## 2018-08-16 PROCEDURE — 99213 OFFICE O/P EST LOW 20 MIN: CPT | Performed by: FAMILY MEDICINE

## 2018-08-16 RX ORDER — ATORVASTATIN CALCIUM 10 MG/1
20 TABLET, FILM COATED ORAL DAILY
Qty: 90 TABLET | Refills: 0 | Status: SHIPPED | OUTPATIENT
Start: 2018-08-16 | End: 2019-02-11

## 2018-08-16 NOTE — PROGRESS NOTES
Barry Cancer 1985 male MRN: [id number]    Family Medicine Follow-up Visit      SUBJECTIVE    CC: Follow-up (er visit and bloodwork)      HPI:  Barry Gonzalez is a 26 y o  male who presented for a follow-up of Lab results  He went to [facility] emergency room on Tuesday because he checked his blood sugar and it was 356  He was given 4 units of insulin and discharged  His blood glucose levels today at home were in the 240s  He denies chest pain, SOB, blurry vision, confusion and is in no apparent distress  Review of Systems   Constitutional: Negative for appetite change, chills and fever  HENT: Negative for ear discharge, ear pain, postnasal drip, rhinorrhea, sinus pain, sinus pressure and trouble swallowing  Eyes: Negative for pain and visual disturbance  Respiratory: Negative for cough, chest tightness and shortness of breath  Cardiovascular: Negative for chest pain and palpitations  Gastrointestinal: Positive for diarrhea  Negative for abdominal pain, blood in stool, constipation, nausea and vomiting  Neurological: Positive for dizziness  Negative for tremors, seizures, syncope, weakness, light-headedness and headaches  Psychiatric/Behavioral: Negative for confusion and decreased concentration  Historical Information     The patient history was reviewed as follows:    Past Medical History:   Diagnosis Date    [facility]      History reviewed  No pertinent surgical history    Family History   Problem Relation Age of Onset    Hypertension Mother     Multiple sclerosis Mother     Diabetes Father       Social History   History   Alcohol Use No     Comment: occasionally     History   Drug Use No     History   Smoking Status    Never Smoker   Smokeless Tobacco    Never Used       Medications:   Meds/Allergies     Current Outpatient Prescriptions:     cyclobenzaprine (FLEXERIL) 5 mg tablet, Take 5 mg by mouth 3 (three) times a day as needed for muscle spasms, Disp: , Rfl:     ibuprofen (MOTRIN) 600 mg tablet, Take 1 tablet (600 mg total) by mouth every 8 (eight) hours as needed for moderate pain (pain)  , Disp: 15 tablet, Rfl: 0    imipramine (TOFRANIL) 25 mg tablet, Take 25 mg by mouth daily at bedtime  , Disp: , Rfl:     metFORMIN (GLUCOPHAGE) 500 mg tablet, Take 500 mg by mouth 2 (two) times a day with meals  , Disp: , Rfl:     ramipril (ALTACE) 1 25 mg capsule, Take by mouth daily  Pt unsure of dose , Disp: , Rfl:     Vitamin D, Cholecalciferol, 1000 units CAPS, Take by mouth, Disp: , Rfl:     atorvastatin (LIPITOR) 10 mg tablet, Take 2 tablets (20 mg total) by mouth daily, Disp: 90 tablet, Rfl: 0    insulin glargine (LANTUS SOLOSTAR) 100 units/mL injection pen, Inject 20 Units under the skin daily at bedtime, Disp: 5 pen, Rfl: 0    methocarbamol (ROBAXIN) 750 mg tablet, Take 1 tablet by mouth 3 (three) times a day for 5 days (Patient not taking: Reported on 8/16/2018 ), Disp: 15 tablet, Rfl: 0  No Known Allergies    OBJECTIVE    Vitals:   Vitals:    08/16/18 1529   BP: 122/80   Pulse: 78   Resp: 18   Temp: (!) 97 1 °F (36 2 °C)   Weight: 100 kg (221 lb)   Height: 6' (1 829 m)     Wt Readings from Last 3 Encounters:   08/16/18 100 kg (221 lb)   08/14/18 102 kg (225 lb)   07/17/18 102 kg (224 lb)     Body mass index is 29 97 kg/m²  Temp Readings from Last 3 Encounters:   08/16/18 (!) 97 1 °F (36 2 °C)   08/14/18 98 6 °F (37 °C) (Oral)   07/17/18 (!) 96 5 °F (35 8 °C) (Tympanic)     BP Readings from Last 3 Encounters:   08/16/18 122/80   08/14/18 (!) 188/91   07/17/18 142/90     Pulse Readings from Last 3 Encounters:   08/16/18 78   08/14/18 93   07/17/18 84     No LMP for male patient  Physical Exam:    Physical Exam   Constitutional: He is oriented to person, place, and time  He appears well-developed and well-nourished  No distress  Cardiovascular: Normal rate, regular rhythm and normal heart sounds      Pulmonary/Chest: Effort normal and breath sounds normal  No respiratory distress  Abdominal: Soft  Bowel sounds are normal  There is no tenderness  Neurological: He is alert and oriented to person, place, and time  Skin: He is not diaphoretic  Psychiatric: He has a normal mood and affect  His behavior is normal    Nursing note and vitals reviewed  Labs: I have personally reviewed all pertinent results     Admission on 08/14/2018, Discharged on 08/14/2018   Component Date Value Ref Range Status    POC Glucose 08/14/2018 447* 65 - 140 mg/dl Final    WBC 08/14/2018 7 23  4 31 - 10 16 Thousand/uL Final    RBC 08/14/2018 4 75  3 88 - 5 62 Million/uL Final    Hemoglobin 08/14/2018 13 9  12 0 - 17 0 g/dL Final    Hematocrit 08/14/2018 39 5  36 5 - 49 3 % Final    MCV 08/14/2018 83  82 - 98 fL Final    MCH 08/14/2018 29 3  26 8 - 34 3 pg Final    MCHC 08/14/2018 35 2  31 4 - 37 4 g/dL Final    RDW 08/14/2018 11 6  11 6 - 15 1 % Final    MPV 08/14/2018 10 9  8 9 - 12 7 fL Final    Platelets 06/85/4372 283  149 - 390 Thousands/uL Final    nRBC 08/14/2018 0  /100 WBCs Final    Neutrophils Relative 08/14/2018 55  43 - 75 % Final    Immat GRANS % 08/14/2018 0  0 - 2 % Final    Lymphocytes Relative 08/14/2018 37  14 - 44 % Final    Monocytes Relative 08/14/2018 6  4 - 12 % Final    Eosinophils Relative 08/14/2018 2  0 - 6 % Final    Basophils Relative 08/14/2018 0  0 - 1 % Final    Neutrophils Absolute 08/14/2018 3 94  1 85 - 7 62 Thousands/µL Final    Immature Grans Absolute 08/14/2018 0 01  0 00 - 0 20 Thousand/uL Final    Lymphocytes Absolute 08/14/2018 2 70  0 60 - 4 47 Thousands/µL Final    Monocytes Absolute 08/14/2018 0 41  0 17 - 1 22 Thousand/µL Final    Eosinophils Absolute 08/14/2018 0 14  0 00 - 0 61 Thousand/µL Final    Basophils Absolute 08/14/2018 0 03  0 00 - 0 10 Thousands/µL Final    Sodium 08/14/2018 130* 136 - 145 mmol/L Final    Potassium 08/14/2018 4 6  3 5 - 5 3 mmol/L Final    Chloride 08/14/2018 97* 100 - 108 mmol/L Final  CO2 08/14/2018 29  21 - 32 mmol/L Final    Anion Gap 08/14/2018 4  4 - 13 mmol/L Final    BUN 08/14/2018 21  5 - 25 mg/dL Final    Creatinine 08/14/2018 1 58* 0 60 - 1 30 mg/dL Final    Standardized to IDMS reference method    Glucose 08/14/2018 402* 65 - 140 mg/dL Final      If the patient is fasting, the ADA then defines impaired fasting glucose as > 100 mg/dL and diabetes as > or equal to 123 mg/dL  Specimen collection should occur prior to Sulfasalazine administration due to the potential for falsely depressed results  Specimen collection should occur prior to Sulfapyridine administration due to the potential for falsely elevated results   Calcium 08/14/2018 9 2  8 3 - 10 1 mg/dL Final    AST 08/14/2018 54* 5 - 45 U/L Final      Specimen collection should occur prior to Sulfasalazine administration due to the potential for falsely depressed results   ALT 08/14/2018 25  12 - 78 U/L Final      Specimen collection should occur prior to Sulfasalazine administration due to the potential for falsely depressed results       Alkaline Phosphatase 08/14/2018 72  46 - 116 U/L Final    Total Protein 08/14/2018 7 8  6 4 - 8 2 g/dL Final    Albumin 08/14/2018 3 5  3 5 - 5 0 g/dL Final    Total Bilirubin 08/14/2018 0 30  0 20 - 1 00 mg/dL Final    eGFR 08/14/2018 65  ml/min/1 73sq m Final    Acetone, Bld 08/14/2018 Negative  Negative Final   Appointment on 08/09/2018   Component Date Value Ref Range Status    HIV-1/HIV-2 Ab 08/09/2018 Non-Reactive  Non-Reactive Final    Hemoglobin A1C 08/09/2018 11 5* 4 2 - 6 3 % Final    EAG 08/09/2018 283  mg/dl Final    Cholesterol 08/09/2018 287* 50 - 200 mg/dL Final      Cholesterol:       Desirable         <200 mg/dl       Borderline         200-239 mg/dl       High              >239           Triglycerides 08/09/2018 144  <=150 mg/dL Final      Triglyceride:     Normal          <150 mg/dl     Borderline High 150-199 mg/dl     High            200-499 mg/dl Very High       >499 mg/dl    Specimen collection should occur prior to N-Acetylcysteine or Metamizole administration due to the potential for falsely depressed results   HDL, Direct 08/09/2018 49  40 - 60 mg/dL Final      HDL Cholesterol:       High    >60 mg/dL       Low     <41 mg/dL  Specimen collection should occur prior to Metamizole administration due to the potential for falsley depressed results   LDL Calculated 08/09/2018 209* 0 - 100 mg/dL Final      LDL Cholesterol:     Optimal           <100 mg/dl     Near Optimal      100-129 mg/dl     Above Optimal       Borderline High 130-159 mg/dl       High            160-189 mg/dl       Very High       >189 mg/dl         This screening LDL is a calculated result  It does not have the accuracy of the Direct Measured LDL in the monitoring of patients with hyperlipidemia and/or statin therapy  Direct Measure LDL (BYL689) must be ordered separately in these patients   Non-HDL-Chol (CHOL-HDL) 08/09/2018 238  mg/dl Final    Sodium 08/09/2018 136  136 - 145 mmol/L Final    Potassium 08/09/2018 5 1  3 5 - 5 3 mmol/L Final    Chloride 08/09/2018 102  100 - 108 mmol/L Final    CO2 08/09/2018 30  21 - 32 mmol/L Final    Anion Gap 08/09/2018 4  4 - 13 mmol/L Final    BUN 08/09/2018 16  5 - 25 mg/dL Final    Creatinine 08/09/2018 1 46* 0 60 - 1 30 mg/dL Final    Standardized to IDMS reference method    Glucose, Fasting 08/09/2018 272* 65 - 99 mg/dL Final      Specimen collection should occur prior to Sulfasalazine administration due to the potential for falsely depressed results  Specimen collection should occur prior to Sulfapyridine administration due to the potential for falsely elevated results   Calcium 08/09/2018 9 2  8 3 - 10 1 mg/dL Final    AST 08/09/2018 47* 5 - 45 U/L Final      Specimen collection should occur prior to Sulfasalazine administration due to the potential for falsely depressed results       ALT 08/09/2018 20  12 - 78 U/L Final      Specimen collection should occur prior to Sulfasalazine administration due to the potential for falsely depressed results   Alkaline Phosphatase 08/09/2018 52  46 - 116 U/L Final    Total Protein 08/09/2018 7 3  6 4 - 8 2 g/dL Final    Albumin 08/09/2018 3 1* 3 5 - 5 0 g/dL Final    Total Bilirubin 08/09/2018 0 30  0 20 - 1 00 mg/dL Final    eGFR 08/09/2018 71  ml/min/1 73sq m Final   Office Visit on 07/17/2018   Component Date Value Ref Range Status    Color, UA 08/09/2018 Yellow   Final    Clarity, UA 08/09/2018 Clear   Final    Specific Gravity, UA 08/09/2018 1 025  1 003 - 1 030 Final    pH, UA 08/09/2018 6 0  4 5 - 8 0 Final    Leukocytes, UA 08/09/2018 Elevated glucose may cause decreased leukocyte values   See urine microscopic for [facility] result/* Negative Final    Nitrite, UA 08/09/2018 Negative  Negative Final    Protein, UA 08/09/2018 100 (2+)* Negative mg/dl Final    Glucose, UA 08/09/2018 >=1000 (1%)* Negative mg/dl Final    Ketones, UA 08/09/2018 Negative  Negative mg/dl Final    Urobilinogen, UA 08/09/2018 0 2  0 2, 1 0 E U /dl E U /dl Final    Bilirubin, UA 08/09/2018 Negative  Negative Final    Blood, UA 08/09/2018 Moderate* Negative Final    RBC, UA 08/09/2018 2-4* None Seen, 0-5 /hpf Final    WBC, UA 08/09/2018 0-1* None Seen, 0-5, 5-55, 5-65 /hpf Final    Epithelial Cells 08/09/2018 Occasional  None Seen, Occasional /hpf Final    Bacteria, UA 08/09/2018 Occasional  None Seen, Occasional /hpf Final       Admission on 08/14/2018, Discharged on 08/14/2018   Component Date Value Ref Range Status    POC Glucose 08/14/2018 447* 65 - 140 mg/dl Final    WBC 08/14/2018 7 23  4 31 - 10 16 Thousand/uL Final    RBC 08/14/2018 4 75  3 88 - 5 62 Million/uL Final    Hemoglobin 08/14/2018 13 9  12 0 - 17 0 g/dL Final    Hematocrit 08/14/2018 39 5  36 5 - 49 3 % Final    MCV 08/14/2018 83  82 - 98 fL Final    MCH 08/14/2018 29 3  26 8 - 34 3 pg Final    MCHC 08/14/2018 35 2 31 4 - 37 4 g/dL Final    RDW 08/14/2018 11 6  11 6 - 15 1 % Final    MPV 08/14/2018 10 9  8 9 - 12 7 fL Final    Platelets 31/18/8254 283  149 - 390 Thousands/uL Final    nRBC 08/14/2018 0  /100 WBCs Final    Neutrophils Relative 08/14/2018 55  43 - 75 % Final    Immat GRANS % 08/14/2018 0  0 - 2 % Final    Lymphocytes Relative 08/14/2018 37  14 - 44 % Final    Monocytes Relative 08/14/2018 6  4 - 12 % Final    Eosinophils Relative 08/14/2018 2  0 - 6 % Final    Basophils Relative 08/14/2018 0  0 - 1 % Final    Neutrophils Absolute 08/14/2018 3 94  1 85 - 7 62 Thousands/µL Final    Immature Grans Absolute 08/14/2018 0 01  0 00 - 0 20 Thousand/uL Final    Lymphocytes Absolute 08/14/2018 2 70  0 60 - 4 47 Thousands/µL Final    Monocytes Absolute 08/14/2018 0 41  0 17 - 1 22 Thousand/µL Final    Eosinophils Absolute 08/14/2018 0 14  0 00 - 0 61 Thousand/µL Final    Basophils Absolute 08/14/2018 0 03  0 00 - 0 10 Thousands/µL Final    Sodium 08/14/2018 130* 136 - 145 mmol/L Final    Potassium 08/14/2018 4 6  3 5 - 5 3 mmol/L Final    Chloride 08/14/2018 97* 100 - 108 mmol/L Final    CO2 08/14/2018 29  21 - 32 mmol/L Final    Anion Gap 08/14/2018 4  4 - 13 mmol/L Final    BUN 08/14/2018 21  5 - 25 mg/dL Final    Creatinine 08/14/2018 1 58* 0 60 - 1 30 mg/dL Final    Standardized to IDMS reference method    Glucose 08/14/2018 402* 65 - 140 mg/dL Final      If the patient is fasting, the ADA then defines impaired fasting glucose as > 100 mg/dL and diabetes as > or equal to 123 mg/dL  Specimen collection should occur prior to Sulfasalazine administration due to the potential for falsely depressed results  Specimen collection should occur prior to Sulfapyridine administration due to the potential for falsely elevated results      Calcium 08/14/2018 9 2  8 3 - 10 1 mg/dL Final    AST 08/14/2018 54* 5 - 45 U/L Final      Specimen collection should occur prior to Sulfasalazine administration due to the potential for falsely depressed results   ALT 08/14/2018 25  12 - 78 U/L Final      Specimen collection should occur prior to Sulfasalazine administration due to the potential for falsely depressed results   Alkaline Phosphatase 08/14/2018 72  46 - 116 U/L Final    Total Protein 08/14/2018 7 8  6 4 - 8 2 g/dL Final    Albumin 08/14/2018 3 5  3 5 - 5 0 g/dL Final    Total Bilirubin 08/14/2018 0 30  0 20 - 1 00 mg/dL Final    eGFR 08/14/2018 65  ml/min/1 73sq m Final    Acetone, Bld 08/14/2018 Negative  Negative Final       Imaging:  I have personally reviewed all pertinent results  Assessment/Plan   Type 2 diabetes mellitus, without long-term current use of insulin ([facility])    Patient HGBA1C 11 5 and has uncontrolled hyperglycemia  Will start Lantus 20 units at bed time and have patient adjust by 2 units every 2 days if glucose not at 130  Explained to patient and both he and his wife understand what to do  Taking patient OFF glipizide but keeping on Metformin to help with glycemic control  Will have patient return in 2 weeks   Instructed patient to bring back blood sugar log and medications so we can update his medications list      Will also refer patient to opthalmology for diabetic retinopathy exam  Will follow up   Patient needed new glucometer  Ordered Accu-check Asniya plus with strips for patient  Patient has appointment with podiatry in November  Referred on initial visit for diabetic neuropathy in left foot  Hyperlipidemia  Cholesterol 287 and   Given patients comorbidity of diabetes LDL less than 70 is goal  Will start patient on 20 mg atorvastatin with ultimate goal of increasing it to high intensity 80 mg atorvastatin  Also educated patient on healthy eating and exercise  Mariela Carlisle was seen today for follow-up      Diagnoses and all orders for this visit:    Type 2 diabetes mellitus with hyperglycemia, without long-term current use of insulin ([facility] )  -     Ambulatory referral to Ophthalmology; Future  -     insulin glargine (LANTUS SOLOSTAR) 100 units/mL injection pen; Inject 20 Units under the skin daily at bedtime  -     Glucometer  -     Glucometer test strips    Hyperlipidemia, unspecified hyperlipidemia type  -     atorvastatin (LIPITOR) 10 mg tablet; Take 2 tablets (20 mg total) by mouth daily    Other orders  -     Cancel: insulin regular (HumuLIN R,NovoLIN R) injection 20 Units; Inject 0 2 mL (20 Units total) under the skin once         - PCP: Aspen Mendoza MD  - Follow-up appointments:     Future Appointments  Date Time Provider Makenzie Curiel   8/17/2018 2:30 PM Eun Brunson PA-C [facility]   8/29/2018 2:00 PM Aspen Mendoza MD [facility]        _____________________________________________________________________   The attending physician, Dr Simeon Chris, agreed with the plan      Aspen Mendoza MD, PGY-1  [facility]   8/16/2018

## 2018-08-17 ENCOUNTER — OFFICE VISIT (OUTPATIENT)
Dept: UROLOGY | Facility: CLINIC | Age: 33
End: 2018-08-17
Payer: COMMERCIAL

## 2018-08-17 ENCOUNTER — TELEPHONE (OUTPATIENT)
Dept: FAMILY MEDICINE CLINIC | Facility: CLINIC | Age: 33
End: 2018-08-17

## 2018-08-17 VITALS
HEIGHT: 72 IN | DIASTOLIC BLOOD PRESSURE: 82 MMHG | SYSTOLIC BLOOD PRESSURE: 120 MMHG | HEART RATE: 103 BPM | BODY MASS INDEX: 29.53 KG/M2 | WEIGHT: 218 LBS

## 2018-08-17 DIAGNOSIS — E11.65 TYPE 2 DIABETES MELLITUS WITH HYPERGLYCEMIA, WITHOUT LONG-TERM CURRENT USE OF INSULIN (HCC): Primary | ICD-10-CM

## 2018-08-17 DIAGNOSIS — N52.9 ERECTILE DYSFUNCTION, UNSPECIFIED ERECTILE DYSFUNCTION TYPE: Primary | ICD-10-CM

## 2018-08-17 DIAGNOSIS — E11.8 TYPE 2 DIABETES MELLITUS WITH COMPLICATION, UNSPECIFIED WHETHER LONG TERM INSULIN USE: Primary | ICD-10-CM

## 2018-08-17 PROCEDURE — 99203 OFFICE O/P NEW LOW 30 MIN: CPT | Performed by: PHYSICIAN ASSISTANT

## 2018-08-17 RX ORDER — BLOOD-GLUCOSE METER
EACH MISCELLANEOUS 3 TIMES DAILY
Qty: 1 KIT | Refills: 0 | Status: CANCELLED | OUTPATIENT
Start: 2018-08-17

## 2018-08-17 RX ORDER — BLOOD-GLUCOSE METER
EACH MISCELLANEOUS 2 TIMES DAILY
Qty: 1 KIT | Refills: 0 | Status: SHIPPED | OUTPATIENT
Start: 2018-08-17 | End: 2018-09-20

## 2018-08-17 RX ORDER — SILDENAFIL 50 MG/1
50 TABLET, FILM COATED ORAL DAILY PRN
Qty: 6 TABLET | Refills: 4 | Status: SHIPPED | OUTPATIENT
Start: 2018-08-17 | End: 2018-09-11

## 2018-08-17 NOTE — ASSESSMENT & PLAN NOTE
Cholesterol 287 and   Given patients comorbidity of diabetes LDL less than 70 is goal  Will start patient on 20 mg atorvastatin with ultimate goal of increasing it to high intensity 80 mg atorvastatin  Also educated patient on healthy eating and exercise

## 2018-08-17 NOTE — PROGRESS NOTES
Received a triage message from pt regarding his insulin  Pt was prescribed insulin and pen but not the needle  I will order the needle for him and call pt back to inform him that the order is in  I called pt's phone and left a message informing that the order for the needles are placed and he can pick them up at [address] [place] [address]

## 2018-08-17 NOTE — ASSESSMENT & PLAN NOTE
Lab Results   Component Value Date    HGBA1C 11 5 (H) 08/09/2018       Recent Labs      08/14/18   1353   POCGLU  447*       Patient

## 2018-08-17 NOTE — ASSESSMENT & PLAN NOTE
Patient HGBA1C 11 5 and has uncontrolled hyperglycemia  Will start Lantus 20 units at bed time and have patient adjust by 2 units every 2 days if glucose not at 130  Explained to patient and both he and his wife understand what to do  Taking patient OFF glipizide but keeping on Metformin to help with glycemic control  Will have patient return in 2 weeks   Instructed patient to bring back blood sugar log and medications so we can update his medications list      Will also refer patient to opthalmology for diabetic retinopathy exam  Will follow up   Patient needed new glucometer  Ordered Accu-check Saniya plus with strips for patient  Patient has appointment with podiatry in November  Referred on initial visit for diabetic neuropathy in left foot

## 2018-08-17 NOTE — PROGRESS NOTES
UROLOGY  NEW PATIENT  NOTE     Patient Identifiers: Jacklyn Aguilar (MRN: [id number])    Service Providing Consultation:  Urology, Brionna Ortiz PA-C  Consults  Date of Service: 8/17/2018      History of Present Illness:     Jacklyn Aguilar is a 26 y o   male with long history poorly controlled diabetes mellitus  He was referred from primary care for evaluation of longstanding erectile dysfunction  He does not get a sufficient erection for penetration  He does not get erections during sleep  He is able to climax and also has retrograde ejaculation  His recent hemoglobin A1c was 11 5 with a blood sugar of over 300  He was started on insulin just yesterday  He has chronic kidney disease from diabetic nephropathy as well as numbness and tingling in his feet  He has chronic low back pain  He is  and would like to have children  He try daily Cialis in the past which gave him a bad headache  Past Medical, Past Surgical History:     Past Medical History:   Diagnosis Date    Diabetes mellitus ([facility] )    :    History reviewed  No pertinent surgical history :    Medications, Allergies:     Current Outpatient Prescriptions:     atorvastatin (LIPITOR) 10 mg tablet, Take 2 tablets (20 mg total) by mouth daily, Disp: 90 tablet, Rfl: 0    cyclobenzaprine (FLEXERIL) 5 mg tablet, Take 5 mg by mouth 3 (three) times a day as needed for muscle spasms, Disp: , Rfl:     ibuprofen (MOTRIN) 600 mg tablet, Take 1 tablet (600 mg total) by mouth every 8 (eight) hours as needed for moderate pain (pain)  , Disp: 15 tablet, Rfl: 0    imipramine (TOFRANIL) 25 mg tablet, Take 25 mg by mouth daily at bedtime  , Disp: , Rfl:     metFORMIN (GLUCOPHAGE) 500 mg tablet, Take 500 mg by mouth 2 (two) times a day with meals  , Disp: , Rfl:     ramipril (ALTACE) 1 25 mg capsule, Take by mouth daily   Pt unsure of dose , Disp: , Rfl:     Vitamin D, Cholecalciferol, 1000 units CAPS, Take by mouth, Disp: , Rfl:    insulin glargine (LANTUS SOLOSTAR) 100 units/mL injection pen, Inject 20 Units under the skin daily at bedtime (Patient not taking: Reported on 8/17/2018 ), Disp: 5 pen, Rfl: 0    methocarbamol (ROBAXIN) 750 mg tablet, Take 1 tablet by mouth 3 (three) times a day for 5 days (Patient not taking: Reported on 8/16/2018 ), Disp: 15 tablet, Rfl: 0    sildenafil (VIAGRA) 50 MG tablet, Take 1 tablet (50 mg total) by mouth daily as needed for erectile dysfunction, Disp: 6 tablet, Rfl: 4    Allergies:  No Known Allergies:    Social and Family History:   Social History:   Social History   Substance Use Topics    Smoking status: Never Smoker    Smokeless tobacco: Never Used    Alcohol use No      Comment: occasionally     History   Smoking Status    Never Smoker   Smokeless Tobacco    Never Used       Family History:  Family History   Problem Relation Age of Onset    Hypertension Mother     Multiple sclerosis Mother     Diabetes Father    :     Review of Systems:     General: Fever, chills, or night sweats: negative  Cardiac: Negative for chest pain  Pulmonary: Negative for shortness of breath  Gastrointestinal: Abdominal pain negative  Nausea, vomiting, or diarrhea negative,  Genitourinary: See HPI above  Patient does not have hematuria  All other systems queried were negative  Physical Exam:   General: Patient is pleasant and in NAD  Awake and alert  /82 (BP Location: Left arm, Patient Position: Sitting, Cuff Size: Adult)   Pulse 103   Ht 6' (1 829 m)   Wt 98 9 kg (218 lb)   BMI 29 57 kg/m²   Cardiac: Peripheral edema: negative  Pulmonary: Non-labored breathing  Abdomen: Soft, non-tender, non-distended  No surgical scars  No masses, tenderness, hernias noted  Genitourinary: Negative CVA tenderness, negative suprapubic tenderness  (Male): Penis circumcised, phallus normal, meatus patent  Testicles descended into scrotum and are atrophic bilaterally  No inguinal hernias bilaterally  Labs:     Lab Results   Component Value Date    HGB 13 9 08/14/2018    HCT 39 5 08/14/2018    WBC 7 23 08/14/2018     08/14/2018   ]    Lab Results   Component Value Date     (L) 08/14/2018    K 4 6 08/14/2018    CL 97 (L) 08/14/2018    CO2 29 08/14/2018    BUN 21 08/14/2018    CREATININE 1 58 (H) 08/14/2018    CALCIUM 9 2 08/14/2018    GLUCOSE 402 (H) 08/14/2018   ]    Imaging:   I personally reviewed the images and report of the following studies, and reviewed them with the patient:   none    ASSESSMENT:      1  Erectile dysfunction   2  Under told diabetes- hemoglobin A1c 11 5   3  Diabetic nephropathy/ chronic kidney disease   4  Diabetic neuropathy      PLAN:   - recommend tighter blood sugar control which he is aware of  -  Will check testosterone, FSH, LH and prolactin  -  Prescription for sildenafil 50 mg  -  Follow-up in 6 weeks      Thank you for allowing me to participate in this patients care  Please do not hesitate to call with any additional questions    Monse Lemon PA-C

## 2018-08-17 NOTE — TELEPHONE ENCOUNTER
Patient states,  [facility] told him Insulin was not covered the way it was ordered  So possibly a Prior Auth is necessary   He was not clear on the way it should be written but said something was sent to us from [facility]

## 2018-08-17 NOTE — TELEPHONE ENCOUNTER
Pt is in need of a new order for replacement of the lantus to be basaglar which is covered by insurance  Also the glucometer is ordered under meds and sent to the pharmacy and the test strips and lancets also need to be ordered thru the pharmacy and you need to include the frequency of testing please  If you have any questions let me know   Thank you

## 2018-08-20 DIAGNOSIS — I10 HYPERTENSION, UNSPECIFIED TYPE: Primary | ICD-10-CM

## 2018-08-20 DIAGNOSIS — E11.8 TYPE 2 DIABETES MELLITUS WITH COMPLICATION, WITHOUT LONG-TERM CURRENT USE OF INSULIN (HCC): ICD-10-CM

## 2018-08-20 DIAGNOSIS — E55.9 VITAMIN D DEFICIENCY: ICD-10-CM

## 2018-08-21 PROCEDURE — 4010F ACE/ARB THERAPY RXD/TAKEN: CPT | Performed by: FAMILY MEDICINE

## 2018-08-21 RX ORDER — FAMOTIDINE 20 MG
1 TABLET ORAL DAILY
Qty: 90 TABLET | Refills: 1 | Status: SHIPPED | OUTPATIENT
Start: 2018-08-21 | End: 2019-03-05 | Stop reason: SDUPTHER

## 2018-08-21 RX ORDER — RAMIPRIL 1.25 MG/1
1.25 CAPSULE ORAL DAILY
Qty: 90 CAPSULE | Refills: 0 | Status: SHIPPED | OUTPATIENT
Start: 2018-08-21 | End: 2019-02-06 | Stop reason: SDUPTHER

## 2018-08-21 RX ORDER — IMIPRAMINE HCL 25 MG
25 TABLET ORAL
Qty: 90 TABLET | Refills: 0 | Status: SHIPPED | OUTPATIENT
Start: 2018-08-21 | End: 2019-04-16 | Stop reason: ALTCHOICE

## 2018-08-26 ENCOUNTER — HOSPITAL ENCOUNTER (EMERGENCY)
Facility: HOSPITAL | Age: 33
Discharge: HOME | End: 2018-08-26
Attending: EMERGENCY MEDICINE
Payer: COMMERCIAL

## 2018-08-26 ENCOUNTER — APPOINTMENT (EMERGENCY)
Dept: RADIOLOGY | Facility: HOSPITAL | Age: 33
End: 2018-08-26
Payer: COMMERCIAL

## 2018-08-26 VITALS
DIASTOLIC BLOOD PRESSURE: 85 MMHG | RESPIRATION RATE: 16 BRPM | SYSTOLIC BLOOD PRESSURE: 135 MMHG | OXYGEN SATURATION: 99 % | TEMPERATURE: 98.6 F | HEART RATE: 98 BPM

## 2018-08-26 DIAGNOSIS — M25.561 ACUTE PAIN OF RIGHT KNEE: Primary | ICD-10-CM

## 2018-08-26 PROCEDURE — 99283 EMERGENCY DEPT VISIT LOW MDM: CPT

## 2018-08-26 PROCEDURE — 96372 THER/PROPH/DIAG INJ SC/IM: CPT

## 2018-08-26 PROCEDURE — 73564 X-RAY EXAM KNEE 4 OR MORE: CPT

## 2018-08-26 RX ORDER — KETOROLAC TROMETHAMINE 30 MG/ML
15 INJECTION, SOLUTION INTRAMUSCULAR; INTRAVENOUS ONCE
Status: COMPLETED | OUTPATIENT
Start: 2018-08-26 | End: 2018-08-26

## 2018-08-26 RX ADMIN — KETOROLAC TROMETHAMINE 15 MG: 30 INJECTION, SOLUTION INTRAMUSCULAR at 22:45

## 2018-08-27 NOTE — DISCHARGE INSTRUCTIONS
- Do not bear weight on your right knee for the next 3-4 days and then slowly ease into bearing weight ago  - get over the counter tylenol 500mg and take 2 every 6 hours for pain  - Follow up with orthopedics if pain persists    Knee Pain   WHAT YOU NEED TO KNOW:   Knee pain may start suddenly, or it may be a long-term problem  You may have pain on the side, front, or back of your knee  You may have knee stiffness and swelling  You may hear popping sounds or feel like your knee is giving way or locking up as you walk  You may feel pain when you sit, stand, walk, or climb up and down stairs  Knee pain can be caused by conditions such as obesity, inflammation, or strains or tears in ligaments or tendons  DISCHARGE INSTRUCTIONS:   Follow up with your healthcare provider within 24 hours or as directed: You may need follow-up treatments, such as steroid injections to decrease pain  Write down your questions so you remember to ask them during your visits  Self-care:   · Rest  your knee so it can heal  Limit activities that increase your pain  · Ice  can help reduce swelling  Wrap ice in a towel and put it on your knee for as long and as often as directed  · Compression  with a brace or bandage can help reduce swelling  Use a brace or bandage only as directed  · Elevation  helps decrease pain and swelling  Elevate your knee while you are sitting or lying down  Prop your leg on pillows to keep your knee above the level of your heart  Medicines:   · NSAIDs  help decrease swelling and pain or fever  This medicine is available with or without a doctor's order  NSAIDs can cause stomach bleeding or kidney problems in certain people  If you take blood thinner medicine, always ask your healthcare provider if NSAIDs are safe for you  Always read the medicine label and follow directions  · Acetaminophen  decreases pain and fever  It is available without a doctor's order  Ask how much to take and when to take it  Follow directions  Acetaminophen can cause liver damage if not taken correctly  · Take your medicine as directed  Contact your healthcare provider if you think your medicine is not helping or if you have side effects  Tell him or her if you are allergic to any medicine  Keep a list of the medicines, vitamins, and herbs you take  Include the amounts, and when and why you take them  Bring the list or the pill bottles to follow-up visits  Carry your medicine list with you in case of an emergency  Exercise as directed: You may need to see a physical therapist or do recommended exercises to improve movement and decrease your pain  You may be directed to walk, swim, or ride a bike  Follow your exercise plan exactly as directed to avoid further injury  Contact your healthcare provider if:   · You have questions or concerns about your condition or care  Return to the emergency department if:   · Your pain is worse, even after treatment  · You cannot bend or straighten your leg completely  · The swelling around your knee does not go down even with treatment  · Your knee is painful and hot to the touch  © 2017 [facility] Information is for End User's use only and may not be sold, redistributed or otherwise used for commercial purposes  All illustrations and images included in CareNotes® are the copyrighted property of A D A M , Inc  or Dennis Gunter  The above information is an  only  It is not intended as medical advice for individual conditions or treatments  Talk to your doctor, nurse or pharmacist before following any medical regimen to see if it is safe and effective for you

## 2018-08-27 NOTE — ED PROVIDER NOTES
History  Chief Complaint   Patient presents with    Knee Injury     Pt reports to ED with c/o R knee pain  Pt was at [address] [place] [address] and squatted down and heard a "pop" when standing up  Pt reports in R knee       History provided by:  Patient  Knee Pain   Location:  Knee  Time since incident:  3 hours  Injury: no    Knee location:  R knee  Pain details:     Quality:  Aching    Radiates to:  Does not radiate    Severity:  Moderate    Onset quality:  Gradual    Duration:  3 hours    Timing:  Constant    Progression:  Worsening  Chronicity:  New  Dislocation: no    Prior injury to area:  No  Relieved by:  Nothing  Worsened by:  Bearing weight and activity  Ineffective treatments:  None tried  Associated symptoms: decreased ROM    Associated symptoms: no back pain, no fatigue, no fever, no itching, no muscle weakness, no neck pain, no numbness, no stiffness, no swelling and no tingling        Prior to Admission Medications   Prescriptions Last Dose Informant Patient Reported? Taking? Blood Glucose Monitoring Suppl (ACCU-CHEK HARVEY PLUS) w/Device KIT   No No   Sig: by Does not apply route 2 (two) times a day   Insulin Pen Needle 31G X 5 MM MISC   No No   Sig: by Does not apply route as needed (as needed)   Lancets (ACCU-CHEK SOFT TOUCH) lancets   No No   Sig: Use as instructed   Vitamin D, Cholecalciferol, 1000 units CAPS   No No   Sig: Take 1 tablet (1,000 Units total) by mouth daily   atorvastatin (LIPITOR) 10 mg tablet  Self No No   Sig: Take 2 tablets (20 mg total) by mouth daily   cyclobenzaprine (FLEXERIL) 5 mg tablet  Self Yes No   Sig: Take 5 mg by mouth 3 (three) times a day as needed for muscle spasms   glucose blood (ACCU-CHEK HARVEY PLUS) test strip   No No   Sig: Use as instructed   ibuprofen (MOTRIN) 600 mg tablet  Self No No   Sig: Take 1 tablet (600 mg total) by mouth every 8 (eight) hours as needed for moderate pain (pain)     imipramine (TOFRANIL) 25 mg tablet   No No   Sig: Take 1 tablet (25 mg total) by mouth daily at bedtime   insulin glargine (BASAGLAR KWIKPEN) 100 units/mL injection pen   No No   Sig: Inject 20 Units under the skin daily at bedtime   metFORMIN (GLUCOPHAGE) 500 mg tablet   No No   Sig: Take 1 tablet (500 mg total) by mouth 2 (two) times a day with meals   methocarbamol (ROBAXIN) 750 mg tablet   No No   Sig: Take 1 tablet by mouth 3 (three) times a day for 5 days   Patient not taking: Reported on 8/16/2018    ramipril (ALTACE) 1 25 mg capsule   No No   Sig: Take 1 capsule (1 25 mg total) by mouth daily Pt unsure of dose  sildenafil (VIAGRA) 50 MG tablet   No No   Sig: Take 1 tablet (50 mg total) by mouth daily as needed for erectile dysfunction      Facility-Administered Medications: None       Past Medical History:   Diagnosis Date    [facility] )        History reviewed  No pertinent surgical history  Family History   Problem Relation Age of Onset    Hypertension Mother     Multiple sclerosis Mother     Diabetes Father      I have reviewed and agree with the history as documented  Social History   Substance Use Topics    Smoking status: Never Smoker    Smokeless tobacco: Never Used    Alcohol use No      Comment: occasionally        Review of Systems   Constitutional: Negative for activity change, appetite change, chills, fatigue and fever  Respiratory: Negative for cough and shortness of breath  Cardiovascular: Negative for chest pain and palpitations  Gastrointestinal: Negative for abdominal pain, blood in stool, constipation, diarrhea, nausea and vomiting  Genitourinary: Negative for dysuria and hematuria  Musculoskeletal: Positive for arthralgias and gait problem  Negative for back pain, neck pain, neck stiffness and stiffness  Skin: Negative for itching, rash and wound  Neurological: Negative for dizziness, weakness, light-headedness, numbness and headaches  All other systems reviewed and are negative        Physical Exam  Physical Exam Constitutional: He is oriented to person, place, and time  He appears well-developed and well-nourished  No distress  HENT:   Head: Normocephalic and atraumatic  Nose: Nose normal    Eyes: Conjunctivae and EOM are normal  Pupils are equal, round, and reactive to light  Neck: Normal range of motion  Neck supple  Cardiovascular: Normal rate, regular rhythm, normal heart sounds and intact distal pulses  Exam reveals no gallop and no friction rub  No murmur heard  Pulmonary/Chest: Effort normal and breath sounds normal  No respiratory distress  He has no wheezes  He has no rales  Abdominal: Soft  He exhibits no distension  There is no tenderness  Musculoskeletal: He exhibits tenderness  He exhibits no edema or deformity  Right hip: Normal         Left hip: Normal         Right knee: He exhibits decreased range of motion  He exhibits no swelling, no effusion, no ecchymosis, no deformity, no laceration, no erythema and normal patellar mobility  Tenderness found  Left knee: Normal         Right ankle: Normal         Left ankle: Normal    PT with tenderness to medial aspect of right knee over pes anserinus bursa areas as well as suprapatellar area  Limited ROM  No ligamentous laxity  No limited ROM of right ankle and hip  No calf tenderness  Neurological: He is alert and oriented to person, place, and time  Skin: Skin is warm and dry  Capillary refill takes less than 2 seconds  He is not diaphoretic  No erythema  No pallor  Nursing note and vitals reviewed        Vital Signs  ED Triage Vitals [08/26/18 2029]   Temperature Pulse Respirations Blood Pressure SpO2   98 6 °F (37 °C) 98 16 135/85 99 %      Temp Source Heart Rate Source Patient Position - Orthostatic VS BP Location FiO2 (%)   Oral Monitor Sitting Left arm --      Pain Score       9           Vitals:    08/26/18 2029   BP: 135/85   Pulse: 98   Patient Position - Orthostatic VS: Sitting       Visual Acuity      ED Medications  Medications   ketorolac (TORADOL) injection 15 mg (15 mg Intramuscular Given 8/26/18 2245)       Diagnostic Studies  Results Reviewed     None                 XR knee 4+ views RIGHT   ED Interpretation by Behzad Hassan PA-C (08/26 2232)   No acute fractures                 Procedures  Procedures       Phone Contacts  ED Phone Contact    ED Course                               MDM  Number of Diagnoses or Management Options  Acute pain of right knee: new and requires workup  Diagnosis management comments: Patient is a 57-year-old male with no significant past medical history presents to the emergency department for evaluation of knee injury  Patient states he was in [employer] bent down to  his guitar states that while he was standing up from a squatting position he felt a pop in his right knee  He states that since then he feels pain with trying to straighten his right knee  Difficulty bearing weight secondary to pain and pressure  No limited range of motion of right ankle or hip  On exam patient does have tenderness to palpation over the right medial pes anserine bursa as well as suprapatellar area  No ligamentous laxity  No bruising or swelling noted  Plan will be to get x-ray rule out fracture  Otherwise I do suspect possible meniscal injury  Plan will be to Ace wrap leg in flexion for symptomatic support and crutches to help with inflammation and pain  Patient is a diabetic with baseline elevated creatinine will not be discharged on NSAIDs but will give 1 time dose of Toradol here  his GFR is still over 60  Will have patient follow up with Orthopedics if pain persists  Nonweightbearing to then weight-bearing as tolerated  Tylenol for pain         Amount and/or Complexity of Data Reviewed  Tests in the radiology section of CPT®: ordered and reviewed  Independent visualization of images, tracings, or specimens: yes    Risk of Complications, Morbidity, and/or Mortality  Presenting problems: low  Diagnostic procedures: low  Management options: low    Patient Progress  Patient progress: stable    CritCare Time    Disposition  Final diagnoses:   Acute pain of right knee     Time reflects when diagnosis was documented in both MDM as applicable and the Disposition within this note     Time User Action Codes Description Comment    8/26/2018 10:44 PM Keiry Torres Add [M25 561] Acute pain of right knee       ED Disposition     ED Disposition Condition Comment    Discharge  [address] Tran discharge to home/self care  Condition at discharge: Stable        Follow-up Information     Follow up With Specialties Details Why Contact Info Additional [address] [facility] Schedule an appointment as soon as possible for a visit in 2 weeks if pain persists [address] [facility] [id number] [address] [facility] Emergency Medicine  If symptoms worsen [address]  [phone] [facility], [address], [place], [place], [address]          Discharge Medication List as of 8/26/2018 10:48 PM      CONTINUE these medications which have NOT CHANGED    Details   atorvastatin (LIPITOR) 10 mg tablet Take 2 tablets (20 mg total) by mouth daily, Starting Thu 8/16/2018, Normal      Blood Glucose Monitoring Suppl (ACCU-CHEK HARVEY PLUS) w/Device KIT by Does not apply route 2 (two) times a day, Starting Fri 8/17/2018, Normal      cyclobenzaprine (FLEXERIL) 5 mg tablet Take 5 mg by mouth 3 (three) times a day as needed for muscle spasms, Historical Med      glucose blood (ACCU-CHEK HARVEY PLUS) test strip Use as instructed, Normal      ibuprofen (MOTRIN) 600 mg tablet Take 1 tablet (600 mg total) by mouth every 8 (eight) hours as needed for moderate pain (pain)  , Starting Fri 5/27/2016, Print      imipramine (TOFRANIL) 25 mg tablet Take 1 tablet (25 mg total) by mouth daily at bedtime, Starting Tue 8/21/2018, Normal      insulin glargine (BASAGLAR KWIKPEN) 100 units/mL injection pen Inject 20 Units under the skin daily at bedtime, Starting Fri 8/17/2018, Normal      Insulin Pen Needle 31G X 5 MM MISC by Does not apply route as needed (as needed), Starting Fri 8/17/2018, Normal      Lancets (ACCU-CHEK SOFT TOUCH) lancets Use as instructed, Normal      metFORMIN (GLUCOPHAGE) 500 mg tablet Take 1 tablet (500 mg total) by mouth 2 (two) times a day with meals, Starting Tue 8/21/2018, Normal      methocarbamol (ROBAXIN) 750 mg tablet Take 1 tablet by mouth 3 (three) times a day for 5 days, Starting Wed 10/4/2017, Until Mon 10/9/2017, Print      ramipril (ALTACE) 1 25 mg capsule Take 1 capsule (1 25 mg total) by mouth daily Pt unsure of dose , Starting Tue 8/21/2018, Normal      sildenafil (VIAGRA) 50 MG tablet Take 1 tablet (50 mg total) by mouth daily as needed for erectile dysfunction, Starting Fri 8/17/2018, Print      Vitamin D, Cholecalciferol, 1000 units CAPS Take 1 tablet (1,000 Units total) by mouth daily, Starting Tue 8/21/2018, Normal           No discharge procedures on file      ED Provider  Electronically Signed by           Enma Morris PA-C  08/27/18 0011

## 2018-08-29 ENCOUNTER — OFFICE VISIT (OUTPATIENT)
Dept: OBGYN CLINIC | Facility: CLINIC | Age: 33
End: 2018-08-29
Payer: COMMERCIAL

## 2018-08-29 VITALS
BODY MASS INDEX: 30.48 KG/M2 | HEART RATE: 99 BPM | DIASTOLIC BLOOD PRESSURE: 96 MMHG | WEIGHT: 225 LBS | SYSTOLIC BLOOD PRESSURE: 147 MMHG | HEIGHT: 72 IN

## 2018-08-29 DIAGNOSIS — M25.561 ACUTE PAIN OF RIGHT KNEE: Primary | ICD-10-CM

## 2018-08-29 PROBLEM — M65.4 DE QUERVAIN'S DISEASE (RADIAL STYLOID TENOSYNOVITIS): Status: ACTIVE | Noted: 2017-10-19

## 2018-08-29 PROBLEM — I10 ESSENTIAL HYPERTENSION: Status: ACTIVE | Noted: 2017-10-31

## 2018-08-29 PROCEDURE — 99204 OFFICE O/P NEW MOD 45 MIN: CPT | Performed by: ORTHOPAEDIC SURGERY

## 2018-08-29 NOTE — PROGRESS NOTES
Patient Name:  Guicho Marroquin  MRN:  [id number]    Assessment & Plan    Right knee pain, possible medial meniscus tear  1  MRI right knee for further evaluation  2  Continue ice and anti-inflammatories as needed  3  Crutches as needed to ambulate  4  Work note provided  5  Follow-up after MRI      Chief Complaint    Right knee pain      History of the Present Illness    49-year-old male reports to the office today for evaluation of his right knee  Patient states he was kneeling down on 8/26/18  He went to stand up and felt a pop in his right knee  He noted an onset of severe pain and swelling  Initially pain was 10/10  He reported to the emergency department where x-rays were performed  Currently he notes significant pain localized to the medial aspect of the knee with associated swelling  Pain is worse with ambulating and bearing weight  He notes stiffness and weakness secondary to pain  He also notes instability  No numbness or tingling  He has been utilizing ice and has been taking Tylenol with minimal relief  No numbness or tingling  No fevers or chills      Physical Exam    /96   Pulse 99   Ht 6' (1 829 m)   Wt 102 kg (225 lb)   BMI 30 52 kg/m²     Right knee:   No gross deformity  Skin intact  No erythema ecchymosis or swelling  Moderate effusion  Significant tenderness to palpation medial joint line  No lateral joint line tenderness  Range of motion includes 90° of flexion  He lacks approximately 3° of extension  Range of motion limited by pain and guarding  Stable to varus and valgus stress  Positive Sherwin's test   Sensation intact right lower extremity  Constitutional:  Well-developed and well-nourished  Eyes:  Anicteric sclerae  Neck:  Supple  Lungs:  Unlabored breathing  Cardiovascular:  Capillary refill is less than 2 seconds  Skin:  Intact without erythema  Neurologic:  Sensation intact to light touch  Psychiatric:  Mood and affect are appropriate        Data Review    I have personally reviewed pertinent films in PACS, and my interpretation follows  X-rays performed previously of the right knee reveals no osseous abnormality  No fracture noted  Past Medical History:   Diagnosis Date    Diabetes mellitus ([facility] )        History reviewed  No pertinent surgical history  No Known Allergies    Current Outpatient Prescriptions on File Prior to Visit   Medication Sig Dispense Refill    Blood Glucose Monitoring Suppl (ACCU-CHEK HARVEY PLUS) w/Device KIT by Does not apply route 2 (two) times a day 1 kit 0    cyclobenzaprine (FLEXERIL) 5 mg tablet Take 5 mg by mouth 3 (three) times a day as needed for muscle spasms      glucose blood (ACCU-CHEK HARVEY PLUS) test strip Use as instructed 100 each 0    insulin glargine (BASAGLAR KWIKPEN) 100 units/mL injection pen Inject 20 Units under the skin daily at bedtime 5 pen 3    Insulin Pen Needle 31G X 5 MM MISC by Does not apply route as needed (as needed) 30 each 0    Lancets (ACCU-CHEK SOFT TOUCH) lancets Use as instructed 100 each 0    metFORMIN (GLUCOPHAGE) 500 mg tablet Take 1 tablet (500 mg total) by mouth 2 (two) times a day with meals 90 tablet 3    ramipril (ALTACE) 1 25 mg capsule Take 1 capsule (1 25 mg total) by mouth daily Pt unsure of dose  90 capsule 0    Vitamin D, Cholecalciferol, 1000 units CAPS Take 1 tablet (1,000 Units total) by mouth daily 90 tablet 1    atorvastatin (LIPITOR) 10 mg tablet Take 2 tablets (20 mg total) by mouth daily (Patient not taking: Reported on 8/29/2018 ) 90 tablet 0    ibuprofen (MOTRIN) 600 mg tablet Take 1 tablet (600 mg total) by mouth every 8 (eight) hours as needed for moderate pain (pain)   (Patient not taking: Reported on 8/29/2018 ) 15 tablet 0    imipramine (TOFRANIL) 25 mg tablet Take 1 tablet (25 mg total) by mouth daily at bedtime (Patient not taking: Reported on 8/29/2018 ) 90 tablet 0    methocarbamol (ROBAXIN) 750 mg tablet Take 1 tablet by mouth 3 (three) times a day for 5 days (Patient not taking: Reported on 8/16/2018 ) 15 tablet 0    sildenafil (VIAGRA) 50 MG tablet Take 1 tablet (50 mg total) by mouth daily as needed for erectile dysfunction (Patient not taking: Reported on 8/29/2018 ) 6 tablet 4     No current facility-administered medications on file prior to visit  Social History   Substance Use Topics    Smoking status: Never Smoker    Smokeless tobacco: Never Used    Alcohol use No      Comment: occasionally       Family History   Problem Relation Age of Onset    Hypertension Mother     Multiple sclerosis Mother     Diabetes Father          Review of Systems    General:  Negative for fever, lethargy/malaise, or night sweats  Eyes:  Negative for blurry vision or double vision  ENT:  Negative for hearing change, nasal discharge, or sore throat  Hematological:  Negative for bleeding problems or blood clots  Endocrine:  Negative for excessive thirst or temperature intolerance  Respiratory:  Negative for cough or wheezing  Cardiovascular:  Negative for chest pain, dyspnea on exertion, or palpitations  Gastrointestinal:  Negative for abdominal pain, diarrhea, or nausea/vomiting  Musculoskeletal:  As stated in the HPI and otherwise negative  Neurological:  Negative for confusion, headaches, or seizures  Psychological:  Negative for hallucinations or mood swings  Dermatological:  Negative for itching or rash        Scribe Attestation    I,:   Selma Hrisch PA-C am acting as a scribe while in the presence of the attending physician :        I,:   Linda Claros MD personally performed the services described in this documentation    as scribed in my presence :

## 2018-08-29 NOTE — LETTER
August 29, 2018     Patient: Ayse Meredith   YOB: 1985   Date of Visit: 8/29/2018       To Whom it May Concern:    Rudi Lennox is under my professional care  He was seen in my office on 8/29/2018  He is out of work until next evaluation  If you have any questions or concerns, please don't hesitate to call           Sincerely,          Aissatou Wolfe PA-C

## 2018-08-31 ENCOUNTER — APPOINTMENT (OUTPATIENT)
Dept: LAB | Facility: CLINIC | Age: 33
End: 2018-08-31
Payer: COMMERCIAL

## 2018-08-31 DIAGNOSIS — N52.9 ERECTILE DYSFUNCTION, UNSPECIFIED ERECTILE DYSFUNCTION TYPE: ICD-10-CM

## 2018-08-31 LAB
FSH SERPL-ACNC: 4 MIU/ML (ref 0.7–10.8)
LH SERPL-ACNC: 7.6 MIU/ML (ref 1.2–10.6)
PROLACTIN SERPL-MCNC: 7.1 NG/ML (ref 2.5–17.4)

## 2018-08-31 PROCEDURE — 36415 COLL VENOUS BLD VENIPUNCTURE: CPT

## 2018-08-31 PROCEDURE — 84403 ASSAY OF TOTAL TESTOSTERONE: CPT

## 2018-08-31 PROCEDURE — 83002 ASSAY OF GONADOTROPIN (LH): CPT

## 2018-08-31 PROCEDURE — 83001 ASSAY OF GONADOTROPIN (FSH): CPT

## 2018-08-31 PROCEDURE — 84402 ASSAY OF FREE TESTOSTERONE: CPT

## 2018-08-31 PROCEDURE — 84146 ASSAY OF PROLACTIN: CPT

## 2018-09-01 LAB
TESTOST FREE SERPL-MCNC: 12.8 PG/ML (ref 8.7–25.1)
TESTOST SERPL-MCNC: 406 NG/DL (ref 264–916)

## 2018-09-07 ENCOUNTER — HOSPITAL ENCOUNTER (OUTPATIENT)
Dept: RADIOLOGY | Age: 33
Discharge: HOME | End: 2018-09-07
Payer: COMMERCIAL

## 2018-09-07 ENCOUNTER — OFFICE VISIT (OUTPATIENT)
Dept: OBGYN CLINIC | Facility: CLINIC | Age: 33
End: 2018-09-07
Payer: COMMERCIAL

## 2018-09-07 VITALS
DIASTOLIC BLOOD PRESSURE: 101 MMHG | HEIGHT: 72 IN | HEART RATE: 101 BPM | BODY MASS INDEX: 30.48 KG/M2 | WEIGHT: 225 LBS | SYSTOLIC BLOOD PRESSURE: 154 MMHG

## 2018-09-07 DIAGNOSIS — S83.211A BUCKET-HANDLE TEAR OF MEDIAL MENISCUS OF RIGHT KNEE AS CURRENT INJURY, INITIAL ENCOUNTER: Primary | ICD-10-CM

## 2018-09-07 DIAGNOSIS — M25.561 ACUTE PAIN OF RIGHT KNEE: ICD-10-CM

## 2018-09-07 PROCEDURE — 73721 MRI JNT OF LWR EXTRE W/O DYE: CPT

## 2018-09-07 PROCEDURE — 99214 OFFICE O/P EST MOD 30 MIN: CPT | Performed by: ORTHOPAEDIC SURGERY

## 2018-09-07 RX ORDER — CHLORHEXIDINE GLUCONATE 4 G/100ML
SOLUTION TOPICAL DAILY PRN
Status: CANCELLED | OUTPATIENT
Start: 2018-09-07

## 2018-09-07 NOTE — PATIENT INSTRUCTIONS
Meniscus Tear   WHAT YOU NEED TO KNOW:   What is a meniscus tear? A meniscus tear is a tear in the cartilage of your knee  The meniscus is a piece of cartilage (strong tissue) between your thighbone and shinbone  The meniscus helps to cushion your knee joint and keep it stable  What causes a meniscus tear? A meniscus tear can occur if you twist your knee  A meniscus tear can happen during sports that involve squatting and twisting the knee, such as football or basketball  Weak and thinned meniscus cartilage in older people can increase the risk for a meniscus tear  What are the signs and symptoms of a meniscus tear? · A pop or tear when the injury happens    · Pain and swelling    · Tenderness    · Stiffness    · Popping, catching, or locking of your knee    · Not being able to extend your knee fully  How is a meniscus tear diagnosed? Your healthcare provider will examine your knee  He may bend your knee and then straighten and rotate it  He may also order x-rays and an MRI of your knee  An MRI takes pictures of your knee to show the meniscus tear  You may be given contrast liquid to help the tear show up better  Tell the healthcare provider if you have ever had an allergic reaction to contrast liquid  Do not enter the MRI room with anything metal  Metal can cause serious injury  Tell the healthcare provider if you have any metal in or on your body  How is a meniscus tear treated? Treatment depends on the type of tear you have  Some types of meniscus tears can heal on their own  You may need any of the following:  · NSAIDs , such as ibuprofen, help decrease swelling, pain, and fever  This medicine is available with or without a doctor's order  NSAIDs can cause stomach bleeding or kidney problems in certain people  If you take blood thinner medicine, always ask if NSAIDs are safe for you  Always read the medicine label and follow directions   Do not give these medicines to children under 10months of age without direction from your child's healthcare provider  · Rest  your knee  Avoid activities that make the swelling or pain worse  You may need to avoid putting weight on your leg while you have pain  Your healthcare provider may recommend that you use crutches  · Apply ice  on your knee for 15 to 20 minutes every hour or as directed  Use an ice pack, or put crushed ice in a plastic bag  Cover it with a towel  Ice helps prevent tissue damage and decreases swelling and pain  · Compress  your knee with an elastic bandage, air cast, medical boot, or splint to reduce swelling  Ask your healthcare provider which compression device to use, and how tight it should be  · Elevate  your knee above the level of your heart as often as you can  This will help decrease swelling and pain  Prop your knee on pillows or blankets to keep it elevated comfortably  · Surgery  may be needed if your symptoms do not improve  Your healthcare provider may trim away or repair damaged tissue  Call 911 for any of the following:   · Your arm or leg feels warm, tender, and painful  It may look swollen and red  When should I seek immediate care? · You cannot move your knee at all  When should I contact my healthcare provider? · Your symptoms do not improve with treatment  · You have questions or concerns about your condition or care  CARE AGREEMENT:   You have the right to help plan your care  Learn about your health condition and how it may be treated  Discuss treatment options with your caregivers to decide what care you want to receive  You always have the right to refuse treatment  The above information is an  only  It is not intended as medical advice for individual conditions or treatments  Talk to your doctor, nurse or pharmacist before following any medical regimen to see if it is safe and effective for you    © 2017 [employer] Information is for End User's use only and may not be sold, redistributed or otherwise used for commercial purposes  All illustrations and images included in CareNotes® are the copyrighted property of A D A M [employer] Inc  or [employer] Gunter

## 2018-09-07 NOTE — PROGRESS NOTES
Patient Name:  Dario Espino  MR#:  [id number]    Assessment & Plan    Right knee bucket-handle medial meniscus tear  1  Reviewed mri right knee results with patient  2  Discussed treatment options with patient conservative vs  Surgical intervention  3  Patient would like surgical intervention  Right knee arthroscopic partial medial meniscectomy  4  Discussed risk and benefits of surgery with patient  5  Will see patient in the office post operative  Subjective    Patient returns today to review the study results and discuss treatment options  He had mri of his right knee  He presents in the office in a wheelchair  His right knee is wrapped with an ace bandage  He states his pain is constant  Objective    BP (!) 154/101   Pulse 101   Ht 6' (1 829 m)   Wt 102 kg (225 lb)   BMI 30 52 kg/m²     Right knee:  · Moderate effusion  · Tenderness to palpation medial joint line  · Range of motion is limited by pain  · Stable with varus and valgus stress  · Sherwin's test is positive  · Patellar grind test is negative    Constitutional:  Well-developed and well-nourished  Eyes:  Anicteric sclerae  Lungs:  Unlabored breathing  Cardiovascular:  Capillary refill is less than 2 seconds  Skin:  Intact without erythema  Neurologic:  Sensation intact to light touch  Psychiatric:  Mood and affect are appropriate  Data Review    I have personally reviewed pertinent films in PACS, and my interpretation follows  MRI Right Knee: Bucket-handle tear of the medial meniscus with displaced fragment in the intercondylar notch             Scribe Attestation    I,:   Madonna Dougherty am acting as a scribe while in the presence of the attending physician :        I,:   Anamika Ferris MD personally performed the services described in this documentation    as scribed in my presence :

## 2018-09-08 ENCOUNTER — APPOINTMENT (OUTPATIENT)
Dept: LAB | Facility: CLINIC | Age: 33
End: 2018-09-08
Payer: COMMERCIAL

## 2018-09-08 DIAGNOSIS — S83.211A BUCKET-HANDLE TEAR OF MEDIAL MENISCUS OF RIGHT KNEE AS CURRENT INJURY, INITIAL ENCOUNTER: ICD-10-CM

## 2018-09-08 LAB
ANION GAP SERPL CALCULATED.3IONS-SCNC: 5 MMOL/L (ref 4–13)
BUN SERPL-MCNC: 22 MG/DL (ref 5–25)
CALCIUM SERPL-MCNC: 9.5 MG/DL (ref 8.3–10.1)
CHLORIDE SERPL-SCNC: 104 MMOL/L (ref 100–108)
CO2 SERPL-SCNC: 31 MMOL/L (ref 21–32)
CREAT SERPL-MCNC: 1.36 MG/DL (ref 0.6–1.3)
GFR SERPL CREATININE-BSD FRML MDRD: 77 ML/MIN/1.73SQ M
GLUCOSE P FAST SERPL-MCNC: 112 MG/DL (ref 65–99)
POTASSIUM SERPL-SCNC: 4.3 MMOL/L (ref 3.5–5.3)
SODIUM SERPL-SCNC: 140 MMOL/L (ref 136–145)

## 2018-09-08 PROCEDURE — 36415 COLL VENOUS BLD VENIPUNCTURE: CPT

## 2018-09-08 PROCEDURE — 80048 BASIC METABOLIC PNL TOTAL CA: CPT

## 2018-09-11 ENCOUNTER — ANESTHESIA EVENT (OUTPATIENT)
Dept: PERIOP | Facility: AMBULARY SURGERY CENTER | Age: 33
End: 2018-09-11
Payer: COMMERCIAL

## 2018-09-11 NOTE — PRE-PROCEDURE INSTRUCTIONS
Pre-Surgery Instructions:   Medication Instructions    atorvastatin (LIPITOR) 10 mg tablet Instructed patient per Anesthesia Guidelines   cyclobenzaprine (FLEXERIL) 5 mg tablet Instructed patient per Anesthesia Guidelines   ibuprofen (MOTRIN) 600 mg tablet Patient was instructed by Physician and understands   imipramine (TOFRANIL) 25 mg tablet Instructed patient per Anesthesia Guidelines   insulin glargine (BASAGLAR KWIKPEN) 100 units/mL injection pen Patient was instructed by Physician and understands   metFORMIN (GLUCOPHAGE) 500 mg tablet Patient was instructed by Physician and understands   methocarbamol (ROBAXIN) 750 mg tablet Instructed patient per Anesthesia Guidelines   ramipril (ALTACE) 1 25 mg capsule Instructed patient per Anesthesia Guidelines   Vitamin D, Cholecalciferol, 1000 units CAPS Patient was instructed by Physician and understands  Pre op and bathing instructions reviewed   Pt has hibiclens

## 2018-09-12 NOTE — ANESTHESIA PREPROCEDURE EVALUATION
Review of Systems/Medical History  Patient summary reviewed  Chart reviewed  No history of anesthetic complications     Cardiovascular  Hyperlipidemia, Hypertension controlled,    Pulmonary  Negative pulmonary ROS        GI/Hepatic  Negative GI/hepatic ROS          Negative  ROS        Endo/Other  Diabetes poorly controlled type 2 ,      GYN       Hematology  Negative hematology ROS      Musculoskeletal    Comment: Right knee meniscus tear      Neurology  Negative neurology ROS      Psychology   Negative psychology ROS              Physical Exam    Airway    Mallampati score: II  TM Distance: >3 FB  Neck ROM: full     Dental   No notable dental hx     Cardiovascular  Rhythm: regular, Rate: normal, Cardiovascular exam normal    Pulmonary  Pulmonary exam normal Breath sounds clear to auscultation,     Other Findings        Anesthesia Plan  ASA Score- 3     Anesthesia Type- general with ASA Monitors  Additional Monitors:   Airway Plan: LMA  Plan Factors-    Induction- intravenous  Postoperative Plan- Plan for postoperative opioid use  Informed Consent- Anesthetic plan and risks discussed with patient  I personally reviewed this patient with the CRNA  Discussed and agreed on the Anesthesia Plan with the CRNA  Pete Morris Recent labs personally reviewed:  Lab Results   Component Value Date    WBC 7 23 08/14/2018    HGB 13 9 08/14/2018     08/14/2018     Lab Results   Component Value Date     09/08/2018    K 4 3 09/08/2018    BUN 22 09/08/2018    CREATININE 1 36 (H) 09/08/2018     Lab Results   Component Value Date    HGBA1C 11 5 (H) 08/09/2018       I, Denia Rodriguez MD, have personally seen and evaluated the patient prior to anesthetic care  I have reviewed the pre-anesthetic record, and other medical records if appropriate to the anesthetic care  If a CRNA is involved in the case, I have reviewed the CRNA assessment, if present, and agree   Risks/benefits and alternatives discussed with patient including possible PONV, sore throat, and possibility of rare anesthetic and surgical emergencies

## 2018-09-13 ENCOUNTER — ANESTHESIA (OUTPATIENT)
Dept: PERIOP | Facility: AMBULARY SURGERY CENTER | Age: 33
End: 2018-09-13
Payer: COMMERCIAL

## 2018-09-13 ENCOUNTER — HOSPITAL ENCOUNTER (OUTPATIENT)
Facility: AMBULARY SURGERY CENTER | Age: 33
Setting detail: OUTPATIENT SURGERY
Discharge: HOME | End: 2018-09-13
Attending: ORTHOPAEDIC SURGERY | Admitting: ORTHOPAEDIC SURGERY
Payer: COMMERCIAL

## 2018-09-13 VITALS
RESPIRATION RATE: 18 BRPM | BODY MASS INDEX: 30.75 KG/M2 | HEART RATE: 84 BPM | SYSTOLIC BLOOD PRESSURE: 171 MMHG | HEIGHT: 72 IN | DIASTOLIC BLOOD PRESSURE: 90 MMHG | OXYGEN SATURATION: 96 % | TEMPERATURE: 97 F | WEIGHT: 227 LBS

## 2018-09-13 DIAGNOSIS — S83.211A BUCKET-HANDLE TEAR OF MEDIAL MENISCUS OF RIGHT KNEE AS CURRENT INJURY, INITIAL ENCOUNTER: Primary | ICD-10-CM

## 2018-09-13 LAB — GLUCOSE SERPL-MCNC: 127 MG/DL (ref 65–140)

## 2018-09-13 PROCEDURE — 29881 ARTHRS KNE SRG MNISECTMY M/L: CPT | Performed by: ORTHOPAEDIC SURGERY

## 2018-09-13 PROCEDURE — 82948 REAGENT STRIP/BLOOD GLUCOSE: CPT

## 2018-09-13 RX ORDER — ACETAMINOPHEN 325 MG/1
650 TABLET ORAL EVERY 4 HOURS PRN
Status: DISCONTINUED | OUTPATIENT
Start: 2018-09-13 | End: 2018-09-13 | Stop reason: HOSPADM

## 2018-09-13 RX ORDER — CHLORHEXIDINE GLUCONATE 4 G/100ML
SOLUTION TOPICAL DAILY PRN
Status: DISCONTINUED | OUTPATIENT
Start: 2018-09-13 | End: 2018-09-13 | Stop reason: HOSPADM

## 2018-09-13 RX ORDER — ONDANSETRON 2 MG/ML
INJECTION INTRAMUSCULAR; INTRAVENOUS AS NEEDED
Status: DISCONTINUED | OUTPATIENT
Start: 2018-09-13 | End: 2018-09-13 | Stop reason: SURG

## 2018-09-13 RX ORDER — MORPHINE SULFATE 2 MG/ML
2 INJECTION, SOLUTION INTRAMUSCULAR; INTRAVENOUS EVERY 2 HOUR PRN
Status: DISCONTINUED | OUTPATIENT
Start: 2018-09-13 | End: 2018-09-13 | Stop reason: HOSPADM

## 2018-09-13 RX ORDER — LIDOCAINE HYDROCHLORIDE 10 MG/ML
INJECTION, SOLUTION INFILTRATION; PERINEURAL AS NEEDED
Status: DISCONTINUED | OUTPATIENT
Start: 2018-09-13 | End: 2018-09-13 | Stop reason: SURG

## 2018-09-13 RX ORDER — FENTANYL CITRATE/PF 50 MCG/ML
25 SYRINGE (ML) INJECTION
Status: DISCONTINUED | OUTPATIENT
Start: 2018-09-13 | End: 2018-09-13 | Stop reason: HOSPADM

## 2018-09-13 RX ORDER — ONDANSETRON 2 MG/ML
4 INJECTION INTRAMUSCULAR; INTRAVENOUS EVERY 6 HOURS PRN
Status: DISCONTINUED | OUTPATIENT
Start: 2018-09-13 | End: 2018-09-13 | Stop reason: HOSPADM

## 2018-09-13 RX ORDER — FENTANYL CITRATE 50 UG/ML
INJECTION, SOLUTION INTRAMUSCULAR; INTRAVENOUS AS NEEDED
Status: DISCONTINUED | OUTPATIENT
Start: 2018-09-13 | End: 2018-09-13 | Stop reason: SURG

## 2018-09-13 RX ORDER — OXYCODONE HYDROCHLORIDE AND ACETAMINOPHEN 5; 325 MG/1; MG/1
2 TABLET ORAL EVERY 4 HOURS PRN
Status: DISCONTINUED | OUTPATIENT
Start: 2018-09-13 | End: 2018-09-13 | Stop reason: HOSPADM

## 2018-09-13 RX ORDER — PROPOFOL 10 MG/ML
INJECTION, EMULSION INTRAVENOUS AS NEEDED
Status: DISCONTINUED | OUTPATIENT
Start: 2018-09-13 | End: 2018-09-13 | Stop reason: SURG

## 2018-09-13 RX ORDER — ONDANSETRON 2 MG/ML
4 INJECTION INTRAMUSCULAR; INTRAVENOUS ONCE AS NEEDED
Status: DISCONTINUED | OUTPATIENT
Start: 2018-09-13 | End: 2018-09-13 | Stop reason: HOSPADM

## 2018-09-13 RX ORDER — SODIUM CHLORIDE 9 MG/ML
100 INJECTION, SOLUTION INTRAVENOUS CONTINUOUS
Status: DISCONTINUED | OUTPATIENT
Start: 2018-09-13 | End: 2018-09-13 | Stop reason: HOSPADM

## 2018-09-13 RX ORDER — SODIUM CHLORIDE 9 MG/ML
INJECTION, SOLUTION INTRAVENOUS CONTINUOUS PRN
Status: DISCONTINUED | OUTPATIENT
Start: 2018-09-13 | End: 2018-09-13

## 2018-09-13 RX ORDER — OXYCODONE HYDROCHLORIDE AND ACETAMINOPHEN 5; 325 MG/1; MG/1
TABLET ORAL
Qty: 30 TABLET | Refills: 0 | Status: SHIPPED | OUTPATIENT
Start: 2018-09-13 | End: 2018-10-23

## 2018-09-13 RX ORDER — MIDAZOLAM HYDROCHLORIDE 1 MG/ML
INJECTION INTRAMUSCULAR; INTRAVENOUS AS NEEDED
Status: DISCONTINUED | OUTPATIENT
Start: 2018-09-13 | End: 2018-09-13 | Stop reason: SURG

## 2018-09-13 RX ADMIN — PROPOFOL 200 MG: 10 INJECTION, EMULSION INTRAVENOUS at 07:30

## 2018-09-13 RX ADMIN — ONDANSETRON 4 MG: 2 INJECTION INTRAMUSCULAR; INTRAVENOUS at 07:53

## 2018-09-13 RX ADMIN — OXYCODONE AND ACETAMINOPHEN 2 TABLET: 5; 325 TABLET ORAL at 09:37

## 2018-09-13 RX ADMIN — FENTANYL CITRATE 25 MCG: 50 INJECTION INTRAMUSCULAR; INTRAVENOUS at 08:58

## 2018-09-13 RX ADMIN — SODIUM CHLORIDE: 0.9 INJECTION, SOLUTION INTRAVENOUS at 07:28

## 2018-09-13 RX ADMIN — FENTANYL CITRATE 25 MCG: 50 INJECTION INTRAMUSCULAR; INTRAVENOUS at 08:53

## 2018-09-13 RX ADMIN — DEXAMETHASONE SODIUM PHOSPHATE 10 MG: 10 INJECTION INTRAMUSCULAR; INTRAVENOUS at 07:30

## 2018-09-13 RX ADMIN — FENTANYL CITRATE 25 MCG: 50 INJECTION INTRAMUSCULAR; INTRAVENOUS at 09:10

## 2018-09-13 RX ADMIN — MIDAZOLAM HYDROCHLORIDE 2 MG: 1 INJECTION, SOLUTION INTRAMUSCULAR; INTRAVENOUS at 07:28

## 2018-09-13 RX ADMIN — FENTANYL CITRATE 50 MCG: 50 INJECTION, SOLUTION INTRAMUSCULAR; INTRAVENOUS at 07:30

## 2018-09-13 RX ADMIN — CEFAZOLIN SODIUM 2000 MG: 2 SOLUTION INTRAVENOUS at 07:33

## 2018-09-13 RX ADMIN — LIDOCAINE HYDROCHLORIDE 50 MG: 10 INJECTION, SOLUTION INFILTRATION; PERINEURAL at 07:30

## 2018-09-13 NOTE — OP NOTE
OPERATIVE REPORT  PATIENT NAME: Barrera Mcintyre    :  1985  MRN: [id number]  Pt Location: [facility]  OR ROOM 06    SURGERY DATE: 2018    Surgeon(s) and Role:     * Derick Jaramillo MD - Primary    Pre-Op Diagnosis Codes:     * Bucket-handle tear of medial meniscus of right knee as current injury [S83 211A]    Post-Op Diagnosis Codes:     * Bucket-handle tear of medial meniscus of right knee as current injury [S83 211A]    Procedure(s) (LRB):  RIGHT KNEE ARTHROSCOPIC PARTIAL MEDIAL MENISCECTOMY    Specimen(s):  * No specimens in log *    Estimated Blood Loss:   Minimal    Anesthesia Type:   General    Complications:   None    Procedure and Technique:  The patient was identified in the pre-operative holding area, and the surgical site was marked by the surgeon  The patient was transported to the operating room and placed in the supine position on the OR table  General anesthesia was administered  The right knee was prepped and draped in the usual sterile fashion  Prophylactic antibiotics were given within 1 hour of incision  Following a surgical timeout, the portal sites were injected with 10 mL of a 1:1 mixture of 0 25% bupivacaine and 1% lidocaine with epinephrine  An anterolateral portal was established with the 15-blade scalpel  The 4 mm 30-degree arthroscopic camera was placed in the medial compartment  An anteromedial portal was established under direct visualization  A large bucket-handle tear of the medial meniscus involving the white-white zone was present with displacement of the torn meniscus into the intercondylar notch  The articular surfaces of the medial compartment demonstrated minimal chondromalacia on the medial tibial plateau  Partial medial meniscectomy was performed with the basket forceps and arthroscopic shaver  The medial meniscus was debrided back to a stable margin  The anterior cruciate ligament was intact  The lateral meniscus was intact    The articular surfaces of the lateral compartment were intact  The articular surfaces of the patellofemoral compartment were intact  The knee was irrigated extensively with the arthroscopic irrigation fluid  The portal sites were closed with simple buried 4-0 Monocryl suture  Steri-Strips were placed to reinforce the closure  The knee was injected with 20 mL of the local anesthetic mixture  The wounds were dressed with sterile 4 x 4 gauze, Webril, and an Ace bandage  Anesthesia was reversed, and the patient was extubated      Patient Disposition:  PACU  and extubated and stable    SIGNATURE: Bela Rodriguez MD  DATE: September 13, 2018  TIME: 8:34 AM

## 2018-09-13 NOTE — ANESTHESIA POSTPROCEDURE EVALUATION
Post-Op Assessment Note      CV Status:  Stable    Mental Status:  Alert and awake    Hydration Status:  Euvolemic    PONV Controlled:  Controlled    Airway Patency:  Patent    Post Op Vitals Reviewed: Yes          Staff: Anesthesiologist, CRNA           BP   139/80   Temp   97 6   Pulse  86   Resp   16   SpO2   100

## 2018-09-13 NOTE — H&P (VIEW-ONLY)
Patient Name:  Kizzy Marroquin  MR#:  [id number]    Assessment & Plan    Right knee bucket-handle medial meniscus tear  1  Reviewed mri right knee results with patient  2  Discussed treatment options with patient conservative vs  Surgical intervention  3  Patient would like surgical intervention  Right knee arthroscopic partial medial meniscectomy  4  Discussed risk and benefits of surgery with patient  5  Will see patient in the office post operative  Subjective    Patient returns today to review the study results and discuss treatment options  He had mri of his right knee  He presents in the office in a wheelchair  His right knee is wrapped with an ace bandage  He states his pain is constant  Objective    BP (!) 154/101   Pulse 101   Ht 6' (1 829 m)   Wt 102 kg (225 lb)   BMI 30 52 kg/m²     Right knee:  · Moderate effusion  · Tenderness to palpation medial joint line  · Range of motion is limited by pain  · Stable with varus and valgus stress  · Sherwin's test is positive  · Patellar grind test is negative    Constitutional:  Well-developed and well-nourished  Eyes:  Anicteric sclerae  Lungs:  Unlabored breathing  Cardiovascular:  Capillary refill is less than 2 seconds  Skin:  Intact without erythema  Neurologic:  Sensation intact to light touch  Psychiatric:  Mood and affect are appropriate  Data Review    I have personally reviewed pertinent films in PACS, and my interpretation follows  MRI Right Knee: Bucket-handle tear of the medial meniscus with displaced fragment in the intercondylar notch             Scribe Attestation    I,:   Allyson Figueroa am acting as a scribe while in the presence of the attending physician :        I,:   Ramone Trinh MD personally performed the services described in this documentation    as scribed in my presence :

## 2018-09-19 ENCOUNTER — TELEPHONE (OUTPATIENT)
Dept: FAMILY MEDICINE CLINIC | Facility: CLINIC | Age: 33
End: 2018-09-19

## 2018-09-19 NOTE — TELEPHONE ENCOUNTER
PATIENT CALLED NEEDS DR Jose Harrison TO WRITE A NEW SCRIPT FOR A GLUCOSE MONITOR (FREESTYLE) FREESTYLE STRIPS AND FREESTYLE LANCETS BECAUSE HIS INSURANCE COVERS THAT BRAND

## 2018-09-20 DIAGNOSIS — E11.8 TYPE 2 DIABETES MELLITUS WITH COMPLICATION, WITHOUT LONG-TERM CURRENT USE OF INSULIN (HCC): Primary | ICD-10-CM

## 2018-09-20 RX ORDER — LANCETS 28 GAUGE
EACH MISCELLANEOUS
Qty: 100 EACH | Refills: 0 | Status: SHIPPED | OUTPATIENT
Start: 2018-09-20 | End: 2018-09-21 | Stop reason: SDUPTHER

## 2018-09-20 RX ORDER — BLOOD-GLUCOSE METER
1 KIT MISCELLANEOUS AS NEEDED
Qty: 1 EACH | Refills: 0 | Status: SHIPPED | OUTPATIENT
Start: 2018-09-20 | End: 2018-09-21 | Stop reason: SDUPTHER

## 2018-09-20 NOTE — TELEPHONE ENCOUNTER
There is no order for test strips  Also, please reorder lancets  Ins requires directions of how often to check, daily or BID

## 2018-09-21 DIAGNOSIS — E11.8 TYPE 2 DIABETES MELLITUS WITH COMPLICATION, WITHOUT LONG-TERM CURRENT USE OF INSULIN (HCC): ICD-10-CM

## 2018-09-21 DIAGNOSIS — E11.9 TYPE 2 DIABETES MELLITUS WITHOUT COMPLICATION, WITH LONG-TERM CURRENT USE OF INSULIN (HCC): Primary | ICD-10-CM

## 2018-09-21 DIAGNOSIS — Z79.4 TYPE 2 DIABETES MELLITUS WITHOUT COMPLICATION, WITH LONG-TERM CURRENT USE OF INSULIN (HCC): Primary | ICD-10-CM

## 2018-09-21 RX ORDER — LANCETS 28 GAUGE
EACH MISCELLANEOUS
Qty: 100 EACH | Refills: 3 | Status: SHIPPED | OUTPATIENT
Start: 2018-09-21 | End: 2019-05-21

## 2018-09-21 RX ORDER — BLOOD-GLUCOSE METER
1 KIT MISCELLANEOUS
Qty: 1 EACH | Refills: 1 | Status: SHIPPED | OUTPATIENT
Start: 2018-09-21 | End: 2019-05-21

## 2018-09-25 ENCOUNTER — OFFICE VISIT (OUTPATIENT)
Dept: OBGYN CLINIC | Facility: CLINIC | Age: 33
End: 2018-09-25

## 2018-09-25 VITALS — DIASTOLIC BLOOD PRESSURE: 95 MMHG | HEIGHT: 73 IN | SYSTOLIC BLOOD PRESSURE: 145 MMHG | HEART RATE: 105 BPM

## 2018-09-25 DIAGNOSIS — S83.211D BUCKET-HANDLE TEAR OF MEDIAL MENISCUS OF RIGHT KNEE AS CURRENT INJURY, SUBSEQUENT ENCOUNTER: Primary | ICD-10-CM

## 2018-09-25 PROCEDURE — 99024 POSTOP FOLLOW-UP VISIT: CPT | Performed by: ORTHOPAEDIC SURGERY

## 2018-09-25 NOTE — PROGRESS NOTES
Patient Name:  Kavitha Ceja  MRN:  [id number]    Assessment & Plan    Right knee arthroscopic partial medial meniscectomy 9/13/18  1  Gradually return to normal activities as tolerated  2  Work note provided  3  Follow-up in four weeks for repeat evaluation  Subjective    25-year-old male returns to the office today status post right knee arthroscopic partial medial meniscectomy 9/13/18  Today he notes overall improvement  He still notes generalized pain about the right knee with associated swelling and stiffness  He denies incisional erythema or drainage  He also notes weakness  No fevers or chills  No numbness or tingling  Objective    Ht 6' 1" (1 854 m)     Right knee:   No gross deformity  Skin intact  Well-healed incisions without erythema or drainage  Trace effusion  Tenderness to palpation medial aspect of the knee  No tenderness to palpation lateral joint line  Range of motion includes full extension and flexion to 120°  Stable to varus and valgus stress  Sensation intact right lower extremity        Scribe Attestation    I,:   Brigido Cage PA-C am acting as a scribe while in the presence of the attending physician :        I,:   Davey Castaneda MD personally performed the services described in this documentation    as scribed in my presence :

## 2018-09-25 NOTE — LETTER
September 25, 2018     Patient: Margarito Barbosa   YOB: 1985   Date of Visit: 9/25/2018       To Whom it May Concern:    Gordo Bailey is under my professional care  He was seen in my office on 9/25/2018  He is out of work until his next evaluation of a in four weeks  If you have any questions or concerns, please don't hesitate to call           Sincerely,        Sherwin Rodriguez PA-C

## 2018-09-28 ENCOUNTER — OFFICE VISIT (OUTPATIENT)
Dept: UROLOGY | Facility: CLINIC | Age: 33
End: 2018-09-28
Payer: COMMERCIAL

## 2018-09-28 VITALS
BODY MASS INDEX: 31.01 KG/M2 | WEIGHT: 234 LBS | SYSTOLIC BLOOD PRESSURE: 128 MMHG | HEIGHT: 73 IN | DIASTOLIC BLOOD PRESSURE: 88 MMHG

## 2018-09-28 DIAGNOSIS — N52.9 ERECTILE DYSFUNCTION, UNSPECIFIED ERECTILE DYSFUNCTION TYPE: Primary | ICD-10-CM

## 2018-09-28 PROCEDURE — 99213 OFFICE O/P EST LOW 20 MIN: CPT | Performed by: UROLOGY

## 2018-09-28 NOTE — LETTER
September 28, 2018     Faustina Lamb MD  [place] [address]    Patient: Magdalena Shukla   YOB: 1985   Date of Visit: 9/28/2018       Dear Dr Kathleen Rodriguez: Thank you for referring Myra Hernandez to me for evaluation  Below are my notes for this consultation  If you have questions, please do not hesitate to call me  I look forward to following your patient along with you  Sincerely,        Elias Hicks MD        CC: No Recipients  Elias Hicks MD  9/28/2018 12:49 PM  Sign at close encounter  Progress Note - Urology  Magdalena Shukla 26 y o  male MRN: [id number]  Encounter: [id number]      Chief Complaint:   Chief Complaint   Patient presents with    Erectile Dysfunction     6 Week FU       HPI:  75-year-old male with poorly controlled diabetes  Recent laboratory testing shows normal testosterone levels as well as FSH LH and prolactin  He is unable to attain an erection  When he does have the ability to ejaculate he does not have any significant ejaculate fluid  He has been  12 years  They have no children  His 2nd objective is to impregnate his wife  The primary objective is to have the ability to have sexual activity  He has tried various PD 5 inhibitors with no success  He does use a vacuum pump with limited success  We discussed the use of intracorporeal injections  He did try intraurethral Muse  We also briefly discussed a penile prosthesis  I did explain that any of these modalities would not provide for retrograde ejaculation  We did discuss the use of Sudafed  We did discuss the use of artificial insemination        MEDS:    Current Outpatient Prescriptions:     atorvastatin (LIPITOR) 10 mg tablet, Take 2 tablets (20 mg total) by mouth daily, Disp: 90 tablet, Rfl: 0    cyclobenzaprine (FLEXERIL) 5 mg tablet, Take 5 mg by mouth 3 (three) times a day as needed for muscle spasms, Disp: , Rfl:     glucose blood (ACCU-CHEK HARVEY PLUS) test strip, Use as instructed, Disp: 100 each, Rfl: 0    glucose blood test strip, 1 each by Other route 3 (three) times a day Dispense FreeStyle test strips, Disp: 100 each, Rfl: 5    glucose monitoring kit (FREESTYLE) monitoring kit, Inject 1 each as directed 3 (three) times a day before meals, Disp: 1 each, Rfl: 1    ibuprofen (MOTRIN) 600 mg tablet, Take 1 tablet (600 mg total) by mouth every 8 (eight) hours as needed for moderate pain (pain)  , Disp: 15 tablet, Rfl: 0    imipramine (TOFRANIL) 25 mg tablet, Take 1 tablet (25 mg total) by mouth daily at bedtime, Disp: 90 tablet, Rfl: 0    insulin glargine (BASAGLAR KWIKPEN) 100 units/mL injection pen, Inject 20 Units under the skin daily at bedtime, Disp: 5 pen, Rfl: 3    Insulin Pen Needle 31G X 5 MM MISC, by Does not apply route as needed (as needed), Disp: 30 each, Rfl: 0    Lancets (FREESTYLE) lancets, by Other route 3 (three) times a day before meals Use as instructed, Disp: 100 each, Rfl: 3    metFORMIN (GLUCOPHAGE) 500 mg tablet, Take 1 tablet (500 mg total) by mouth 2 (two) times a day with meals, Disp: 90 tablet, Rfl: 3    oxyCODONE-acetaminophen (PERCOCET) 5-325 mg per tablet, Earliest Fill Date: 9/13/18 Take 1-2 tablets by mouth every 4-6 hours as needed for pain , Disp: 30 tablet, Rfl: 0    ramipril (ALTACE) 1 25 mg capsule, Take 1 capsule (1 25 mg total) by mouth daily Pt unsure of dose , Disp: 90 capsule, Rfl: 0    Vitamin D, Cholecalciferol, 1000 units CAPS, Take 1 tablet (1,000 Units total) by mouth daily, Disp: 90 tablet, Rfl: 1    methocarbamol (ROBAXIN) 750 mg tablet, Take 1 tablet by mouth 3 (three) times a day for 5 days (Patient taking differently: Take 750 mg by mouth as needed  ), Disp: 15 tablet, Rfl: 0      PMH:  Past Medical History:   Diagnosis Date    Diabetes mellitus ([address] )     Essential hypertension 10/31/2017    Hyperlipidemia          PSH  Past Surgical History:   Procedure Laterality Date    ABCESS DRAINAGE      tooth  OH KNEE SCOPE,MED/LAT MENISECTOMY Right 9/13/2018    Procedure: RIGHT KNEE ARTHROSCOPIC PARTIAL MEDIAL MENISCECTOMY;  Surgeon: Anjel Delong MD;  Location: [facility];  Service: Orthopedics    WRIST ARTHROPLASTY Right 2017         ROS:  Review of Systems      Vitals:  Blood pressure 128/88, height 6' 1" (1 854 m), weight 106 kg (234 lb)  Physical Exam:     Exam shows a 15-year-old in no acute distress   genital structures show a circumcised penis which is unremarkable  Both testes are palpated with some diminished volume  The epididymis and vas are palpated bilaterally  Lab, Imaging and other studies:  Recent Results (from the past 672 hour(s))   Basic metabolic panel    Collection Time: 09/08/18  9:33 AM   Result Value Ref Range    Sodium 140 136 - 145 mmol/L    Potassium 4 3 3 5 - 5 3 mmol/L    Chloride 104 100 - 108 mmol/L    CO2 31 21 - 32 mmol/L    ANION GAP 5 4 - 13 mmol/L    BUN 22 5 - 25 mg/dL    Creatinine 1 36 (H) 0 60 - 1 30 mg/dL    Glucose, Fasting 112 (H) 65 - 99 mg/dL    Calcium 9 5 8 3 - 10 1 mg/dL    eGFR 77 ml/min/1 73sq m   Fingerstick Glucose (POCT)    Collection Time: 09/13/18  9:11 AM   Result Value Ref Range    POC Glucose 127 65 - 140 mg/dl           IMPRESSION:    1  Erectile dysfunction   2  Ejaculatory dysfunction   3  Poorly controlled diabetes with neuropathy    PLAN:   he will give a trial of 60 mcg sildenafil trudy   He will then return for further discussion

## 2018-09-28 NOTE — PROGRESS NOTES
Progress Note - Urology  Mar Sanchez 26 y o  male MRN: [id number]  Encounter: [id number]      Chief Complaint:   Chief Complaint   Patient presents with    Erectile Dysfunction     6 Week FU       HPI:  78-year-old male with poorly controlled diabetes  Recent laboratory testing shows normal testosterone levels as well as FSH LH and prolactin  He is unable to attain an erection  When he does have the ability to ejaculate he does not have any significant ejaculate fluid  He has been  12 years  They have no children  His 2nd objective is to impregnate his wife  The primary objective is to have the ability to have sexual activity  He has tried various PD 5 inhibitors with no success  He does use a vacuum pump with limited success  We discussed the use of intracorporeal injections  He did try intraurethral Muse  We also briefly discussed a penile prosthesis  I did explain that any of these modalities would not provide for retrograde ejaculation  We did discuss the use of Sudafed  We did discuss the use of artificial insemination  MEDS:    Current Outpatient Prescriptions:     atorvastatin (LIPITOR) 10 mg tablet, Take 2 tablets (20 mg total) by mouth daily, Disp: 90 tablet, Rfl: 0    cyclobenzaprine (FLEXERIL) 5 mg tablet, Take 5 mg by mouth 3 (three) times a day as needed for muscle spasms, Disp: , Rfl:     glucose blood (ACCU-CHEK HARVEY PLUS) test strip, Use as instructed, Disp: 100 each, Rfl: 0    glucose blood test strip, 1 each by Other route 3 (three) times a day Dispense FreeStyle test strips, Disp: 100 each, Rfl: 5    glucose monitoring kit (FREESTYLE) monitoring kit, Inject 1 each as directed 3 (three) times a day before meals, Disp: 1 each, Rfl: 1    ibuprofen (MOTRIN) 600 mg tablet, Take 1 tablet (600 mg total) by mouth every 8 (eight) hours as needed for moderate pain (pain)  , Disp: 15 tablet, Rfl: 0    imipramine (TOFRANIL) 25 mg tablet, Take 1 tablet (25 mg total) by mouth daily at bedtime, Disp: 90 tablet, Rfl: 0    insulin glargine (BASAGLAR KWIKPEN) 100 units/mL injection pen, Inject 20 Units under the skin daily at bedtime, Disp: 5 pen, Rfl: 3    Insulin Pen Needle 31G X 5 MM MISC, by Does not apply route as needed (as needed), Disp: 30 each, Rfl: 0    Lancets (FREESTYLE) lancets, by Other route 3 (three) times a day before meals Use as instructed, Disp: 100 each, Rfl: 3    metFORMIN (GLUCOPHAGE) 500 mg tablet, Take 1 tablet (500 mg total) by mouth 2 (two) times a day with meals, Disp: 90 tablet, Rfl: 3    oxyCODONE-acetaminophen (PERCOCET) 5-325 mg per tablet, Earliest Fill Date: 9/13/18 Take 1-2 tablets by mouth every 4-6 hours as needed for pain , Disp: 30 tablet, Rfl: 0    ramipril (ALTACE) 1 25 mg capsule, Take 1 capsule (1 25 mg total) by mouth daily Pt unsure of dose , Disp: 90 capsule, Rfl: 0    Vitamin D, Cholecalciferol, 1000 units CAPS, Take 1 tablet (1,000 Units total) by mouth daily, Disp: 90 tablet, Rfl: 1    methocarbamol (ROBAXIN) 750 mg tablet, Take 1 tablet by mouth 3 (three) times a day for 5 days (Patient taking differently: Take 750 mg by mouth as needed  ), Disp: 15 tablet, Rfl: 0      PMH:  Past Medical History:   Diagnosis Date    Diabetes mellitus ([facility] [address] )     Essential hypertension 10/31/2017    Hyperlipidemia          PSH  Past Surgical History:   Procedure Laterality Date    ABCESS DRAINAGE      tooth    RI KNEE SCOPE,MED/LAT MENISECTOMY Right 9/13/2018    Procedure: RIGHT KNEE ARTHROSCOPIC PARTIAL MEDIAL MENISCECTOMY;  Surgeon: Gregory Bosch MD;  [facility]: [facility];  Service: Orthopedics    WRIST ARTHROPLASTY Right 2017         ROS:  Review of Systems      Vitals:  Blood pressure 128/88, height 6' 1" (1 854 m), weight 106 kg (234 lb)  Physical Exam:     Exam shows a 49-year-old in no acute distress   genital structures show a circumcised penis which is unremarkable  Both testes are palpated with some diminished volume    The epididymis and vas are palpated bilaterally  Lab, Imaging and other studies:  Recent Results (from the past 672 hour(s))   Basic metabolic panel    Collection Time: 09/08/18  9:33 AM   Result Value Ref Range    Sodium 140 136 - 145 mmol/L    Potassium 4 3 3 5 - 5 3 mmol/L    Chloride 104 100 - 108 mmol/L    CO2 31 21 - 32 mmol/L    ANION GAP 5 4 - 13 mmol/L    BUN 22 5 - 25 mg/dL    Creatinine 1 36 (H) 0 60 - 1 30 mg/dL    Glucose, Fasting 112 (H) 65 - 99 mg/dL    Calcium 9 5 8 3 - 10 1 mg/dL    eGFR 77 ml/min/1 73sq m   Fingerstick Glucose (POCT)    Collection Time: 09/13/18  9:11 AM   Result Value Ref Range    POC Glucose 127 65 - 140 mg/dl           IMPRESSION:    1  Erectile dysfunction   2  Ejaculatory dysfunction   3  Poorly controlled diabetes with neuropathy    PLAN:   he will give a trial of 60 mcg sildenafil trudy   He will then return for further discussion

## 2018-10-02 ENCOUNTER — OFFICE VISIT (OUTPATIENT)
Dept: FAMILY MEDICINE CLINIC | Facility: CLINIC | Age: 33
End: 2018-10-02
Payer: COMMERCIAL

## 2018-10-02 VITALS
TEMPERATURE: 98.3 F | WEIGHT: 230 LBS | SYSTOLIC BLOOD PRESSURE: 132 MMHG | HEIGHT: 73 IN | BODY MASS INDEX: 30.48 KG/M2 | RESPIRATION RATE: 18 BRPM | DIASTOLIC BLOOD PRESSURE: 80 MMHG | HEART RATE: 78 BPM

## 2018-10-02 DIAGNOSIS — E11.8 TYPE 2 DIABETES MELLITUS WITH COMPLICATION, WITHOUT LONG-TERM CURRENT USE OF INSULIN (HCC): Primary | ICD-10-CM

## 2018-10-02 PROBLEM — Z00.00 HEALTHCARE MAINTENANCE: Status: ACTIVE | Noted: 2018-07-17

## 2018-10-02 PROBLEM — S83.249A ACUTE MEDIAL MENISCUS TEAR: Status: ACTIVE | Noted: 2018-10-02

## 2018-10-02 PROCEDURE — 99214 OFFICE O/P EST MOD 30 MIN: CPT | Performed by: FAMILY MEDICINE

## 2018-10-02 NOTE — PROGRESS NOTES
Assessment/Plan:    Type 2 diabetes mellitus, without long-term current use of insulin ([facility])  -Currently on Insulin 24 units at bedtime and metformin 500 mg BID  Daily blood sugars between 110-125  Will keep current regime for now but will consider increasing metformin to 1000 mg bid based on next Hemoglobin A1C  The lab order for A1c is active  Patient knows to get it done before next visit  - Has appointment with Podiatry in November   -Patient has not made an appointment with ophthalmologist for diabetic retinopathy screening  encouraged to do so  - Healthy eating habits and exercise were stressed and the patient Is aware of the importance of them given his uncontrolled diabetes  Hyperlipidemia  Patient did not tolerate Atorvastatin well and has stopped taking it  Will consider switching to pravastatin based on its low side effect profile  I have discussed this with patient and asked him to think about it for next visit  Healthcare maintenance  Already received pneumovax at last visit  Patient encouraged to get flu shot at pharmacy since we don't have vaccine currently in office  Acute medial meniscus tear  S/P surgery on meniscus tear  Ambulating well  Follows up with orthopedist at [facility]  Problem List Items Addressed This Visit     Type 2 diabetes mellitus, without long-term current use of insulin ([facility] [address] ) - Primary     -Currently on Insulin 24 units at bedtime and metformin 500 mg BID  Daily blood sugars between 110-125  Will keep current regime for now but will consider increasing metformin to 1000 mg bid based on next Hemoglobin A1C  The lab order for A1c is active  Patient knows to get it done before next visit  - Has appointment with Podiatry in November   -Patient has not made an appointment with ophthalmologist for diabetic retinopathy screening  encouraged to do so     - Healthy eating habits and exercise were stressed and the patient Is aware of the importance of them given his uncontrolled diabetes  Relevant Orders    HEMOGLOBIN A1C W/ EAG ESTIMATION            Subjective:      Patient ID: Lauren Anderson is a 26 y o  male  24 units    change to prava  Alc   weitiffanieh- healthy eating    Davon Abdi is a 26year old male who presented to the clinic for a follow up after being started on insulin  He was started on 20 units at bedtime and instructed to titrate it up by 2 units every 2 days until blood sugars under control  He has fund a stable dose of 24 units of insulin  His blood sugars in the are between 110-125  He has been eating healthier  He has cut out soda and only drinks water  He notes feeling fatigued and having trouble with his vision after starting atorvastatin  He has stopped taking it  He tore his mensicus 2 weeks ago and had surgery  The following portions of the patient's history were reviewed and updated as appropriate: allergies, current medications, past family history, past medical history, past social history, past surgical history and problem list     Review of Systems   Constitutional: Negative for appetite change, chills, fatigue and fever  Respiratory: Negative for cough and shortness of breath  Cardiovascular: Negative for chest pain, palpitations and leg swelling  Gastrointestinal: Negative for abdominal pain, constipation, diarrhea, nausea and vomiting  Endocrine: Negative for polydipsia, polyphagia and polyuria  Genitourinary: Negative for difficulty urinating, dysuria, frequency and urgency  Neurological: Negative for dizziness, tremors, seizures, syncope, weakness, numbness and headaches  Objective:      /80   Pulse 78   Temp 98 3 °F (36 8 °C)   Resp 18   Ht 6' 1" (1 854 m)   Wt 104 kg (230 lb)   BMI 30 34 kg/m²          Physical Exam   Constitutional: He is oriented to person, place, and time  He appears well-developed and well-nourished  No distress  HENT:   Head: Normocephalic and atraumatic  Mouth/Throat: Oropharynx is clear and moist    Eyes: Conjunctivae and EOM are normal    Cardiovascular: Normal rate, regular rhythm and normal heart sounds  Pulmonary/Chest: Effort normal and breath sounds normal    Abdominal: Soft  There is no tenderness  Musculoskeletal: Normal range of motion  Recent mensicus surgery  Ambulating well but still not 100%   Neurological: He is alert and oriented to person, place, and time  Skin: Skin is warm and dry  Psychiatric: He has a normal mood and affect   His behavior is normal

## 2018-10-03 NOTE — ASSESSMENT & PLAN NOTE
Patient did not tolerate Atorvastatin well and has stopped taking it  Will consider switching to pravastatin based on its low side effect profile  I have discussed this with patient and asked him to think about it for next visit

## 2018-10-03 NOTE — ASSESSMENT & PLAN NOTE
S/P surgery on meniscus tear  Ambulating well  [facility]

## 2018-10-03 NOTE — ASSESSMENT & PLAN NOTE
-Currently on Insulin 24 units at bedtime and metformin 500 mg BID  Daily blood sugars between 110-125  Will keep current regime for now but will consider increasing metformin to 1000 mg bid based on next Hemoglobin A1C  The lab order for A1c is active  Patient knows to get it done before next visit  - Has appointment with Podiatry in November   -Patient has not made an appointment with ophthalmologist for diabetic retinopathy screening  encouraged to do so  - Healthy eating habits and exercise were stressed and the patient Is aware of the importance of them given his uncontrolled diabetes

## 2018-10-03 NOTE — ASSESSMENT & PLAN NOTE
Already received pneumovax at last visit  Patient encouraged to get flu shot at pharmacy since we don't have vaccine currently in office

## 2018-10-08 ENCOUNTER — TELEPHONE (OUTPATIENT)
Dept: FAMILY MEDICINE CLINIC | Facility: CLINIC | Age: 33
End: 2018-10-08

## 2018-10-08 DIAGNOSIS — E11.65 TYPE 2 DIABETES MELLITUS WITH HYPERGLYCEMIA, WITHOUT LONG-TERM CURRENT USE OF INSULIN (HCC): ICD-10-CM

## 2018-10-08 NOTE — TELEPHONE ENCOUNTER
Patient calling again about diabetic medication directions changes are the same  Insurance is saying it's to soon with old directions  Apparently Dr Gibson Gonzalez gave new orders are restricting it from going through  Patient ststes he was to increase by 2 units until better and he's currently at 28 units    Please clarify

## 2018-10-08 NOTE — TELEPHONE ENCOUNTER
Pt is running out of [facility] he has increase his dose 2 units as instructed he is currently on 28 units so he is running short  Can you please enter new orders if this is correct and you are agreeable as pt is going out of town and would like to pick his meds up today if possible   Thank you

## 2018-10-09 ENCOUNTER — TELEPHONE (OUTPATIENT)
Dept: FAMILY MEDICINE CLINIC | Facility: CLINIC | Age: 33
End: 2018-10-09

## 2018-10-10 NOTE — TELEPHONE ENCOUNTER
Pt states he is taking 26-28 units per day due to the increasing titration please call pharmacy to verify this is correct dosing or send a new script to reflect this   Thank you

## 2018-10-11 DIAGNOSIS — E11.65 TYPE 2 DIABETES MELLITUS WITH HYPERGLYCEMIA, WITHOUT LONG-TERM CURRENT USE OF INSULIN (HCC): ICD-10-CM

## 2018-10-23 ENCOUNTER — OFFICE VISIT (OUTPATIENT)
Dept: OBGYN CLINIC | Facility: CLINIC | Age: 33
End: 2018-10-23

## 2018-10-23 VITALS
DIASTOLIC BLOOD PRESSURE: 100 MMHG | HEART RATE: 101 BPM | HEIGHT: 73 IN | SYSTOLIC BLOOD PRESSURE: 152 MMHG | WEIGHT: 230 LBS | BODY MASS INDEX: 30.48 KG/M2

## 2018-10-23 DIAGNOSIS — S83.211D BUCKET-HANDLE TEAR OF MEDIAL MENISCUS OF RIGHT KNEE AS CURRENT INJURY, SUBSEQUENT ENCOUNTER: Primary | ICD-10-CM

## 2018-10-23 PROCEDURE — 99024 POSTOP FOLLOW-UP VISIT: CPT | Performed by: ORTHOPAEDIC SURGERY

## 2018-10-23 NOTE — PROGRESS NOTES
Assessment:  1  Bucket-handle tear of medial meniscus of right knee as current injury, subsequent encounter  Ambulatory referral to Physical Therapy       Plan:  6 weeks status post right knee arthroscopic partial medial meniscectomy performed on 09/13/2018  Patient continues to make improvement  He may gradually return to normal activities as tolerated  He will return to work on 1st or 5th of November without restrictions  Provided patient with prescription for formal physical therapy for continued strengthening  He may transition into a home exercise program as tolerated  To do next visit:  Return if symptoms worsen or fail to improve  Scribe Attestation    I,:   Veronica Guzman am acting as a scribe while in the presence of the attending physician :        I,:   Abdias Bruce MD personally performed the services described in this documentation    as scribed in my presence :              Subjective:   Dickie Bence is a 26 y o  male who presents today for a post operative visit for his RIGHT knee  Patient is status post right knee arthroscopic partial medial meniscectomy performed on 09/13/2018  Patient continues to note overall improvement since surgery  He continues with a home exercise program as instructed  He continues to note a generalized pain globally in the right knee associated with stiffness  But feels that he is gradually making improvements with this as well  He stated that he would like a prescription for continued formal physical therapy for strengthening of bilateral lower extremities  Denies numbness and tingling          Review of systems negative unless otherwise specified in HPI    Past Medical History:   Diagnosis Date    Diabetes mellitus ([address] )     Essential hypertension 10/31/2017    Hyperlipidemia        Past Surgical History:   Procedure Laterality Date    ABCESS DRAINAGE      tooth    WI KNEE SCOPE,MED/LAT MENISECTOMY Right 9/13/2018    Procedure: RIGHT KNEE ARTHROSCOPIC PARTIAL MEDIAL MENISCECTOMY;  Surgeon: Nahid Salgado MD;  [facility];  Service: Orthopedics    WRIST ARTHROPLASTY Right 2017       Family History   Problem Relation Age of Onset    Hypertension Mother     Multiple sclerosis Mother     Diabetes Father        Social History     Occupational History    Not on file  Social History Main Topics    Smoking status: Never Smoker    Smokeless tobacco: Never Used    Alcohol use Yes      Comment: occasionally    Drug use: No    Sexual activity: Not on file         Current Outpatient Prescriptions:     atorvastatin (LIPITOR) 10 mg tablet, Take 2 tablets (20 mg total) by mouth daily, Disp: 90 tablet, Rfl: 0    cyclobenzaprine (FLEXERIL) 5 mg tablet, Take 5 mg by mouth 3 (three) times a day as needed for muscle spasms, Disp: , Rfl:     glucose blood (ACCU-CHEK HARVEY PLUS) test strip, Use as instructed, Disp: 100 each, Rfl: 0    glucose blood test strip, 1 each by Other route 3 (three) times a day Dispense FreeStyle test strips, Disp: 100 each, Rfl: 5    glucose monitoring kit (FREESTYLE) monitoring kit, Inject 1 each as directed 3 (three) times a day before meals, Disp: 1 each, Rfl: 1    ibuprofen (MOTRIN) 600 mg tablet, Take 1 tablet (600 mg total) by mouth every 8 (eight) hours as needed for moderate pain (pain)  , Disp: 15 tablet, Rfl: 0    imipramine (TOFRANIL) 25 mg tablet, Take 1 tablet (25 mg total) by mouth daily at bedtime, Disp: 90 tablet, Rfl: 0    insulin glargine (BASAGLAR KWIKPEN) 100 units/mL injection pen, Inject 26-28 Units under the skin daily at bedtime, Disp: 5 pen, Rfl: 0    Insulin Pen Needle 31G X 5 MM MISC, by Does not apply route as needed (as needed), Disp: 30 each, Rfl: 0    Lancets (FREESTYLE) lancets, by Other route 3 (three) times a day before meals Use as instructed, Disp: 100 each, Rfl: 3    metFORMIN (GLUCOPHAGE) 500 mg tablet, Take 1 tablet (500 mg total) by mouth 2 (two) times a day with meals, Disp: 90 tablet, Rfl: 3    methocarbamol (ROBAXIN) 750 mg tablet, Take 1 tablet by mouth 3 (three) times a day for 5 days (Patient taking differently: Take 750 mg by mouth as needed  ), Disp: 15 tablet, Rfl: 0    ramipril (ALTACE) 1 25 mg capsule, Take 1 capsule (1 25 mg total) by mouth daily Pt unsure of dose , Disp: 90 capsule, Rfl: 0    Vitamin D, Cholecalciferol, 1000 units CAPS, Take 1 tablet (1,000 Units total) by mouth daily, Disp: 90 tablet, Rfl: 1    No Known Allergies         Vitals:    10/23/18 1540   BP: 152/100   Pulse: 101       Objective:          Physical Exam                    Right Knee Exam     Tenderness   The patient is experiencing no tenderness  Range of Motion   Extension: 0   Flexion: 130     Tests   Sherwin:  Medial - negative Lateral - negative    Other   Erythema: absent  Sensation: normal  Pulse: present  Swelling: none  Other tests: effusion (Trace) present    Comments:  Patient is neurovascular intact distally  Portal sites remain C/D/I, no signs of infection            Diagnostics, reviewed and taken today if performed as documented:    None performed      Procedures, if performed today:  Procedures    None performed      Portions of the record may have been created with voice recognition software   Occasional wrong word or "sound a like" substitutions may have occurred due to the inherent limitations of voice recognition software   Read the chart carefully and recognize, using context, where substitutions have occurred

## 2018-10-23 NOTE — LETTER
October 23, 2018     Patient: Fede Noel   YOB: 1985   Date of Visit: 10/23/2018       To Whom it May Concern:    Sue Damon is under my professional care  He was seen in my office on 10/23/2018  He may return to work on 11/1/2018 without restrictions  If you have any questions or concerns, please don't hesitate to call           Sincerely,          Anita Lynch MD        CC: No Recipients

## 2018-10-30 ENCOUNTER — EVALUATION (OUTPATIENT)
Dept: PHYSICAL THERAPY | Facility: CLINIC | Age: 33
End: 2018-10-30
Payer: COMMERCIAL

## 2018-10-30 DIAGNOSIS — S83.211D BUCKET-HANDLE TEAR OF MEDIAL MENISCUS OF RIGHT KNEE AS CURRENT INJURY, SUBSEQUENT ENCOUNTER: ICD-10-CM

## 2018-10-30 PROCEDURE — 97162 PT EVAL MOD COMPLEX 30 MIN: CPT | Performed by: PHYSICAL THERAPIST

## 2018-10-30 PROCEDURE — G8990 OTHER PT/OT CURRENT STATUS: HCPCS | Performed by: PHYSICAL THERAPIST

## 2018-10-30 PROCEDURE — G8991 OTHER PT/OT GOAL STATUS: HCPCS | Performed by: PHYSICAL THERAPIST

## 2018-10-30 NOTE — PROGRESS NOTES
PT Evaluation     Today's date: 10/30/2018  Patient name: Sasha Bell  : 1985  MRN: [id number]  Referring provider: Clover Paredes MD  Dx:   Encounter Diagnosis     ICD-10-CM    1  Bucket-handle tear of medial meniscus of right knee as current injury, subsequent encounter [id number] [id number] Ambulatory referral to Physical Therapy                  Assessment  Impairments: abnormal coordination, abnormal gait, abnormal muscle firing, abnormal muscle tone, abnormal or restricted ROM, abnormal movement, activity intolerance, impaired balance, impaired physical strength, lacks appropriate home exercise program, pain with function and weight-bearing intolerance    Assessment details: Sasha Bell is a 26 y o  male presents with signs and symptoms consistent with:   Bucket-handle tear of medial meniscus of right knee as current injury, subsequent encounter    Dwayne Last has the above listed impairments and will benefit from skilled PT to address deficits to return to prior level of function       Understanding of Dx/Px/POC: good   Prognosis: good    Goals  Impairment Goals 4-6 weeks  - Decrease pain to <5/10  - Improve knee AROM to equal to the unaffected lower extremity  - Increase knee strength to 5/5 throughout  - Increase hip strength to 4/5 throughout    Functional Goals 6-8 weeks  - Return to Prior Level of Function  - Increase Functional Status Measure (FOTO) to: 58  - Patient will be independent with HEP  - Patient will be able to squat without increased pain/compensation/difficulty  - Patient will be able to perform sit to stand without increased pain/compensation/difficulty   - Patient will be able to ascend and descend stairs without increased pain/compensation/difficulty   - Patient will be able to walk for >30 mins without increased pain    Plan  Patient would benefit from: skilled PT  Referral necessary: No  Planned modality interventions: cryotherapy, electrical stimulation/Russian stimulation, H-Wave, TENS, thermotherapy: hydrocollator packs and unattended electrical stimulation  Planned therapy interventions: abdominal trunk stabilization, activity modification, ADL retraining, balance/weight bearing training, breathing training, body mechanics training, coordination, flexibility, functional ROM exercises, graded exercise, home exercise program, work reintegration, therapeutic training, therapeutic exercise, therapeutic activities, stretching, strengthening, self care, postural training, patient education, neuromuscular re-education, muscle pump exercises, motor coordination training, Connelly taping, manual therapy, joint mobilization, IADL retraining, balance and gait training  Frequency: 2x week  Duration in visits: 16  Duration in weeks: 8  Treatment plan discussed with: patient, PTA and referring physician        Subjective Evaluation    Pain  Current pain ratin  At best pain ratin  At worst pain ratin    Patient Goals  Patient goal: He wants to be as mobile as he used to be  WORK:  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), 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  HOME LIFE:  He lives with wife and son  His son is 13 y o  He does help with everything except doing dishes  Cooking/cleaning/trash/etc      HOBBIES/EXERCISE:  He reports that he likes going to the gym  He typically works out on the treadmill, and does lifting of arms/chest/legs    PAIN LOCATION/DESCRIPTORS:  Medial knee pain, pain is dull, sharp, intermittent, varies, deep and surface  AGGRAVATING FACTORS:  If walking too much or not walking enough  Pain with stairs, reciprocal up, step to down  Avoids: squatting, kneeling  Watching football games from the stands    EASES:  Moving knee, has been avoiding taking the meds,   LATENCY: 6-7 steps  DAY PATTERN:  No day pattern noted    IMAGING:  MRI- torn meniscus and then flipped in between the joint     PLOF:  Has had knee pain prior to surgery, but since, has been slightly more pain  HISTORY OF CURRENT INJURY:  Patient reports he was at [employer] playing with the band, bent down to grab something, and felt a pop and pain  He sat right down after the pop  He went home and then went to the ER  He has been out of work since  He reports surgery was 9/13/18, Had a menisectomy  He reports that he needed to rest because he is a diabetic  He reports that the MD recommended PT  Objective     Observations     Right Knee   Negative for edema and effusion  Additional Observation Details  Incisions healed well  Tenderness     Additional Tenderness Details  Medial joint line: 4/4  Pes bursa: 0/4  Femoral condyle: 2/4    Neurological Testing     Additional Neurological Details  Not necessary to test    Active Range of Motion   Left Knee   Flexion: 135 degrees   Extension: -1 degrees     Right Knee   Flexion: 126 degrees   Extension: 4 degrees with pain    Mobility   Patellar Mobility:     Right Knee   Hypomobile: medial, lateral, superior and inferior     Additional Mobility Details  Painful mobility with patella    Patellar Static Positioning   Left Knee: WFL  Right Knee: baja    Strength/Myotome Testing     Additional Strength Details  NT secondary to painful motion      Tests     Additional Tests Details  Not necessary to test             Diagnosis: Right knee menisectomy DOS 9/13/18   Precautions: Type 2 diabetes   Manual Therapy 10/30/18       Post tib mobs        Medial knee STM        Prone quad stretch                        Exercise Diary         QS        Heel slides        Wobble board balance        TKE        Mini squats        Sit to stands        Tandem walking        [facility]                                                                                                Modalities        CP PRN

## 2018-11-01 ENCOUNTER — OFFICE VISIT (OUTPATIENT)
Dept: PHYSICAL THERAPY | Facility: CLINIC | Age: 33
End: 2018-11-01
Payer: COMMERCIAL

## 2018-11-01 DIAGNOSIS — S83.211D BUCKET-HANDLE TEAR OF MEDIAL MENISCUS OF RIGHT KNEE AS CURRENT INJURY, SUBSEQUENT ENCOUNTER: Primary | ICD-10-CM

## 2018-11-01 PROCEDURE — 97112 NEUROMUSCULAR REEDUCATION: CPT

## 2018-11-01 PROCEDURE — 97140 MANUAL THERAPY 1/> REGIONS: CPT

## 2018-11-01 NOTE — PROGRESS NOTES
Daily Note     Today's date: 2018  Patient name: Ilana Olivas  : 1985  MRN: [id number]  Referring provider: Joshua Lopez MD  Dx:   Encounter Diagnosis     ICD-10-CM    1  Bucket-handle tear of medial meniscus of right knee as current injury, subsequent encounter Y66 [id number]                   Subjective: Patient reports that his right knee pain is a "6-7/10"  He reports that he walked to the store, which aggravated his pain  Objective: See treatment diary below  Diagnosis: Right knee menisectomy DOS 18   Precautions: Type 2 diabetes   Manual Therapy 10/30/18 11/1/18       Post tib mobs        Medial knee STM  RO, PTA       Prone quad stretch  RO, PTA                       Exercise Diary         QS  20x 3 sec hold       Heel slides  20x 3 sec hold       Wobble board balance  2 mins ea      TKE  NV      Mini squats  15x       Sit to stands  NV      Tandem walking  NV       Bridges  20x       Clams  20x ea                                                                                              Modalities        CP PRN  Defer                           Assessment: Tolerated treatment well  Patient exhibited good technique with therapeutic exercises  Initiated outlined program today without increased pain  Decreased knee pain with manuals; pain rated as "5/10" when leaving the clinic  Cuing was utilized throughout the session to maintain proper form with exercises  Plan: Continue per plan of care

## 2018-11-05 ENCOUNTER — APPOINTMENT (OUTPATIENT)
Dept: LAB | Facility: CLINIC | Age: 33
End: 2018-11-05
Payer: COMMERCIAL

## 2018-11-05 DIAGNOSIS — E11.8 TYPE 2 DIABETES MELLITUS WITH COMPLICATION, WITHOUT LONG-TERM CURRENT USE OF INSULIN (HCC): ICD-10-CM

## 2018-11-05 LAB
EST. AVERAGE GLUCOSE BLD GHB EST-MCNC: 183 MG/DL
HBA1C MFR BLD: 8 % (ref 4.2–6.3)

## 2018-11-05 PROCEDURE — 36415 COLL VENOUS BLD VENIPUNCTURE: CPT

## 2018-11-05 PROCEDURE — 83036 HEMOGLOBIN GLYCOSYLATED A1C: CPT

## 2018-11-06 ENCOUNTER — APPOINTMENT (OUTPATIENT)
Dept: PHYSICAL THERAPY | Facility: CLINIC | Age: 33
End: 2018-11-06
Payer: COMMERCIAL

## 2018-11-07 ENCOUNTER — OFFICE VISIT (OUTPATIENT)
Dept: FAMILY MEDICINE CLINIC | Facility: CLINIC | Age: 33
End: 2018-11-07
Payer: COMMERCIAL

## 2018-11-07 VITALS
TEMPERATURE: 96.9 F | DIASTOLIC BLOOD PRESSURE: 78 MMHG | HEART RATE: 78 BPM | SYSTOLIC BLOOD PRESSURE: 128 MMHG | WEIGHT: 229.4 LBS | HEIGHT: 73 IN | BODY MASS INDEX: 30.4 KG/M2 | RESPIRATION RATE: 16 BRPM

## 2018-11-07 DIAGNOSIS — Z79.4 TYPE 2 DIABETES MELLITUS WITHOUT COMPLICATION, WITH LONG-TERM CURRENT USE OF INSULIN (HCC): Primary | ICD-10-CM

## 2018-11-07 DIAGNOSIS — E11.9 TYPE 2 DIABETES MELLITUS WITHOUT COMPLICATION, WITH LONG-TERM CURRENT USE OF INSULIN (HCC): Primary | ICD-10-CM

## 2018-11-07 PROCEDURE — 99213 OFFICE O/P EST LOW 20 MIN: CPT | Performed by: FAMILY MEDICINE

## 2018-11-07 PROCEDURE — 3008F BODY MASS INDEX DOCD: CPT | Performed by: FAMILY MEDICINE

## 2018-11-07 NOTE — ASSESSMENT & PLAN NOTE
Lab Results   Component Value Date    HGBA1C 8 0 (H) 11/05/2018     -Continue basaglar 24 units at bed time  HGB A1C and microalbumin/creatine in 3 months before next appointment  If Microalbumin/cr elevated will think of starting ACEI

## 2018-11-07 NOTE — ASSESSMENT & PLAN NOTE
-Declined Flu vaccine    -Still not willing  to going back on statin  Will discuss again on next visit

## 2018-11-07 NOTE — PROGRESS NOTES
Tyler Willson 1985 male MRN: [id number]    Family Medicine Follow-up Visit      SUBJECTIVE    CC: Follow-up      HPI:  Tyler Willson is a 26 y o  male who presented for a follow-up of Bushra Salinas a 64-year-old with diabetes  He is here today for his 3 month follow-up  His hemoglobin A1c has decreased from 11 5 to 8  He is currently at 24 units of basaglar  insulin every day  He has made strides in lifestyle modification by decreasing the amount of soda he drinks  He says he feels well  He denies visual disturbances, chest pain, tingling or numbness in his feet  Review of Systems   Constitutional: Negative for chills, fatigue and fever  Eyes: Negative for visual disturbance  Respiratory: Negative for cough and shortness of breath  Cardiovascular: Negative for chest pain, palpitations and leg swelling  Gastrointestinal: Negative for abdominal pain, constipation, diarrhea, nausea and vomiting  Endocrine: Negative for polydipsia, polyphagia and polyuria  Genitourinary: Negative for decreased urine volume, difficulty urinating and dysuria  Skin: Negative for color change  Neurological: Negative for dizziness, light-headedness, numbness and headaches  All other systems reviewed and are negative        Historical Information     The patient history was reviewed as follows:    Past Medical History:   Diagnosis Date    Diabetes mellitus ([facility] [address] )     Essential hypertension 10/31/2017    Hyperlipidemia      Past Surgical History:   Procedure Laterality Date    ABCESS DRAINAGE      tooth    MD KNEE SCOPE,MED/LAT MENISECTOMY Right 9/13/2018    Procedure: RIGHT KNEE ARTHROSCOPIC PARTIAL MEDIAL MENISCECTOMY;  Surgeon: Cesar Shen MD;  [facility]: [facility];  Service: Orthopedics    WRIST ARTHROPLASTY Right 2017     Family History   Problem Relation Age of Onset    Hypertension Mother     Multiple sclerosis Mother     Diabetes Father       Social History   History   Alcohol Use    Yes Comment: occasionally     History   Drug Use No     History   Smoking Status    Never Smoker   Smokeless Tobacco    Never Used       Medications:   Meds/Allergies     Current Outpatient Prescriptions:     atorvastatin (LIPITOR) 10 mg tablet, Take 2 tablets (20 mg total) by mouth daily, Disp: 90 tablet, Rfl: 0    cyclobenzaprine (FLEXERIL) 5 mg tablet, Take 5 mg by mouth 3 (three) times a day as needed for muscle spasms, Disp: , Rfl:     glucose blood (ACCU-CHEK HARVEY PLUS) test strip, Use as instructed, Disp: 100 each, Rfl: 0    glucose blood test strip, 1 each by Other route 3 (three) times a day Dispense FreeStyle test strips, Disp: 100 each, Rfl: 5    glucose monitoring kit (FREESTYLE) monitoring kit, Inject 1 each as directed 3 (three) times a day before meals, Disp: 1 each, Rfl: 1    ibuprofen (MOTRIN) 600 mg tablet, Take 1 tablet (600 mg total) by mouth every 8 (eight) hours as needed for moderate pain (pain)  , Disp: 15 tablet, Rfl: 0    imipramine (TOFRANIL) 25 mg tablet, Take 1 tablet (25 mg total) by mouth daily at bedtime, Disp: 90 tablet, Rfl: 0    insulin glargine (BASAGLAR KWIKPEN) 100 units/mL injection pen, Inject 26-28 Units under the skin daily at bedtime, Disp: 5 pen, Rfl: 0    Insulin Pen Needle 31G X 5 MM MISC, by Does not apply route as needed (as needed), Disp: 30 each, Rfl: 0    Lancets (FREESTYLE) lancets, by Other route 3 (three) times a day before meals Use as instructed, Disp: 100 each, Rfl: 3    metFORMIN (GLUCOPHAGE) 500 mg tablet, Take 1 tablet (500 mg total) by mouth 2 (two) times a day with meals, Disp: 90 tablet, Rfl: 3    ramipril (ALTACE) 1 25 mg capsule, Take 1 capsule (1 25 mg total) by mouth daily Pt unsure of dose , Disp: 90 capsule, Rfl: 0    Vitamin D, Cholecalciferol, 1000 units CAPS, Take 1 tablet (1,000 Units total) by mouth daily, Disp: 90 tablet, Rfl: 1    methocarbamol (ROBAXIN) 750 mg tablet, Take 1 tablet by mouth 3 (three) times a day for 5 days (Patient taking differently: Take 750 mg by mouth as needed  ), Disp: 15 tablet, Rfl: 0  No Known Allergies    OBJECTIVE    Vitals:   Vitals:    11/07/18 1424   BP: 128/78   BP Location: Left arm   Patient Position: Sitting   Cuff Size: Large   Pulse: 78   Resp: 16   Temp: (!) 96 9 °F (36 1 °C)   TempSrc: Tympanic   Weight: 104 kg (229 lb 6 4 oz)   Height: 6' 1" (1 854 m)     Wt Readings from Last 3 Encounters:   11/07/18 104 kg (229 lb 6 4 oz)   10/23/18 104 kg (230 lb)   10/02/18 104 kg (230 lb)     Body mass index is 30 27 kg/m²  Temp Readings from Last 3 Encounters:   11/07/18 (!) 96 9 °F (36 1 °C) (Tympanic)   10/02/18 98 3 °F (36 8 °C)   09/13/18 (!) 97 °F (36 1 °C) (Temporal)     BP Readings from Last 3 Encounters:   11/07/18 128/78   10/23/18 152/100   10/02/18 132/80     Pulse Readings from Last 3 Encounters:   11/07/18 78   10/23/18 101   10/02/18 78     No LMP for male patient  Physical Exam:    Physical Exam   Constitutional: He is oriented to person, place, and time  He appears well-developed and well-nourished  No distress  Eyes: Conjunctivae are normal    Neck: Neck supple  Cardiovascular: Normal rate, regular rhythm and normal heart sounds  Pulmonary/Chest: Effort normal and breath sounds normal    Abdominal: Soft  Bowel sounds are normal  There is no tenderness  Neurological: He is alert and oriented to person, place, and time  Skin: Skin is warm and dry  He is not diaphoretic  Labs: I have personally reviewed all pertinent results     Appointment on 11/05/2018   Component Date Value Ref Range Status    Hemoglobin A1C 11/05/2018 8 0* 4 2 - 6 3 % Final    EAG 11/05/2018 183  mg/dl Final   Admission on 09/13/2018, Discharged on 09/13/2018   Component Date Value Ref Range Status    POC Glucose 09/13/2018 127  65 - 140 mg/dl Final   Appointment on 09/08/2018   Component Date Value Ref Range Status    Sodium 09/08/2018 140  136 - 145 mmol/L Final    Potassium 09/08/2018 4 3 3 5 - 5 3 mmol/L Final    Chloride 09/08/2018 104  100 - 108 mmol/L Final    CO2 09/08/2018 31  21 - 32 mmol/L Final    ANION GAP 09/08/2018 5  4 - 13 mmol/L Final    BUN 09/08/2018 22  5 - 25 mg/dL Final    Creatinine 09/08/2018 1 36* 0 60 - 1 30 mg/dL Final    Standardized to IDMS reference method    Glucose, Fasting 09/08/2018 112* 65 - 99 mg/dL Final      Specimen collection should occur prior to Sulfasalazine administration due to the potential for falsely depressed results  Specimen collection should occur prior to Sulfapyridine administration due to the potential for falsely elevated results   Calcium 09/08/2018 9 5  8 3 - 10 1 mg/dL Final    eGFR 09/08/2018 77  ml/min/1 73sq m Final   Appointment on 08/31/2018   Component Date Value Ref Range Status    Testosterone, Free 08/31/2018 12 8  8 7 - 25 1 pg/mL Final    TESTOSTERONE TOTAL 08/31/2018 406  264 - 916 ng/dL Final    Adult male reference interval is based on a population of  healthy nonobese males (BMI <30) between 23and 44years old  [address] [place] [address], [address] [place] [address] [phone]  PMID: [id number]      LH 08/31/2018 7 6  1 2 - 10 6 mIU/mL Final    [facility] 08/31/2018 4 0  0 7 - 10 8 mIU/mL Final    Prolactin 08/31/2018 7 1  2 5 - 17 4 ng/mL Final   Admission on 08/14/2018, Discharged on 08/14/2018   Component Date Value Ref Range Status    POC Glucose 08/14/2018 447* 65 - 140 mg/dl Final    WBC 08/14/2018 7 23  4 31 - 10 16 Thousand/uL Final    RBC 08/14/2018 4 75  3 88 - 5 62 Million/uL Final    Hemoglobin 08/14/2018 13 9  12 0 - 17 0 g/dL Final    Hematocrit 08/14/2018 39 5  36 5 - 49 3 % Final    MCV 08/14/2018 83  82 - 98 fL Final    MCH 08/14/2018 29 3  26 8 - 34 3 pg Final    MCHC 08/14/2018 35 2  31 4 - 37 4 g/dL Final    RDW 08/14/2018 11 6  11 6 - 15 1 % Final    MPV 08/14/2018 10 9  8 9 - 12 7 fL Final    Platelets 84/07/0686 283  149 - 390 Thousands/uL Final    nRBC 08/14/2018 0  /100 WBCs Final    Neutrophils Relative 08/14/2018 55  43 - 75 % Final    Immat GRANS % 08/14/2018 0  0 - 2 % Final    Lymphocytes Relative 08/14/2018 37  14 - 44 % Final    Monocytes Relative 08/14/2018 6  4 - 12 % Final    Eosinophils Relative 08/14/2018 2  0 - 6 % Final    Basophils Relative 08/14/2018 0  0 - 1 % Final    Neutrophils Absolute 08/14/2018 3 94  1 85 - 7 62 Thousands/µL Final    Immature Grans Absolute 08/14/2018 0 01  0 00 - 0 20 Thousand/uL Final    Lymphocytes Absolute 08/14/2018 2 70  0 60 - 4 47 Thousands/µL Final    Monocytes Absolute 08/14/2018 0 41  0 17 - 1 22 Thousand/µL Final    Eosinophils Absolute 08/14/2018 0 14  0 00 - 0 61 Thousand/µL Final    Basophils Absolute 08/14/2018 0 03  0 00 - 0 10 Thousands/µL Final    Sodium 08/14/2018 130* 136 - 145 mmol/L Final    Potassium 08/14/2018 4 6  3 5 - 5 3 mmol/L Final    Chloride 08/14/2018 97* 100 - 108 mmol/L Final    CO2 08/14/2018 29  21 - 32 mmol/L Final    ANION GAP 08/14/2018 4  4 - 13 mmol/L Final    BUN 08/14/2018 21  5 - 25 mg/dL Final    Creatinine 08/14/2018 1 58* 0 60 - 1 30 mg/dL Final    Standardized to IDMS reference method    Glucose 08/14/2018 402* 65 - 140 mg/dL Final      If the patient is fasting, the ADA then defines impaired fasting glucose as > 100 mg/dL and diabetes as > or equal to 123 mg/dL  Specimen collection should occur prior to Sulfasalazine administration due to the potential for falsely depressed results  Specimen collection should occur prior to Sulfapyridine administration due to the potential for falsely elevated results   Calcium 08/14/2018 9 2  8 3 - 10 1 mg/dL Final    AST 08/14/2018 54* 5 - 45 U/L Final      Specimen collection should occur prior to Sulfasalazine administration due to the potential for falsely depressed results   ALT 08/14/2018 25  12 - 78 U/L Final      Specimen collection should occur prior to Sulfasalazine administration due to the potential for falsely depressed results       Alkaline Phosphatase 08/14/2018 72  46 - 116 U/L Final    Total Protein 08/14/2018 7 8  6 4 - 8 2 g/dL Final    Albumin 08/14/2018 3 5  3 5 - 5 0 g/dL Final    Total Bilirubin 08/14/2018 0 30  0 20 - 1 00 mg/dL Final    eGFR 08/14/2018 65  ml/min/1 73sq m Final    Acetone, Bld 08/14/2018 Negative  Negative Final   Appointment on 08/09/2018   Component Date Value Ref Range Status    HIV-1/HIV-2 Ab 08/09/2018 Non-Reactive  Non-Reactive Final    Hemoglobin A1C 08/09/2018 11 5* 4 2 - 6 3 % Final    EAG 08/09/2018 283  mg/dl Final    Cholesterol 08/09/2018 287* 50 - 200 mg/dL Final      Cholesterol:       Desirable         <200 mg/dl       Borderline         200-239 mg/dl       High              >239           Triglycerides 08/09/2018 144  <=150 mg/dL Final      Triglyceride:     Normal          <150 mg/dl     Borderline High 150-199 mg/dl     High            200-499 mg/dl        Very High       >499 mg/dl    Specimen collection should occur prior to N-Acetylcysteine or Metamizole administration due to the potential for falsely depressed results   HDL, Direct 08/09/2018 49  40 - 60 mg/dL Final      HDL Cholesterol:       High    >60 mg/dL       Low     <41 mg/dL  Specimen collection should occur prior to Metamizole administration due to the potential for falsley depressed results   LDL Calculated 08/09/2018 209* 0 - 100 mg/dL Final      LDL Cholesterol:     Optimal           <100 mg/dl     Near Optimal      100-129 mg/dl     Above Optimal       Borderline High 130-159 mg/dl       High            160-189 mg/dl       Very High       >189 mg/dl         This screening LDL is a calculated result  It does not have the accuracy of the Direct Measured LDL in the monitoring of patients with hyperlipidemia and/or statin therapy  Direct Measure LDL (TDS297) must be ordered separately in these patients      Non-HDL-Chol (CHOL-HDL) 08/09/2018 238  mg/dl Final    Sodium 08/09/2018 136  136 - 145 mmol/L Final    Potassium 08/09/2018 5 1  3 5 - 5 3 mmol/L Final    Chloride 08/09/2018 102  100 - 108 mmol/L Final    CO2 08/09/2018 30  21 - 32 mmol/L Final    ANION GAP 08/09/2018 4  4 - 13 mmol/L Final    BUN 08/09/2018 16  5 - 25 mg/dL Final    Creatinine 08/09/2018 1 46* 0 60 - 1 30 mg/dL Final    Standardized to IDMS reference method    Glucose, Fasting 08/09/2018 272* 65 - 99 mg/dL Final      Specimen collection should occur prior to Sulfasalazine administration due to the potential for falsely depressed results  Specimen collection should occur prior to Sulfapyridine administration due to the potential for falsely elevated results   Calcium 08/09/2018 9 2  8 3 - 10 1 mg/dL Final    AST 08/09/2018 47* 5 - 45 U/L Final      Specimen collection should occur prior to Sulfasalazine administration due to the potential for falsely depressed results   ALT 08/09/2018 20  12 - 78 U/L Final      Specimen collection should occur prior to Sulfasalazine administration due to the potential for falsely depressed results   Alkaline Phosphatase 08/09/2018 52  46 - 116 U/L Final    Total Protein 08/09/2018 7 3  6 4 - 8 2 g/dL Final    Albumin 08/09/2018 3 1* 3 5 - 5 0 g/dL Final    Total Bilirubin 08/09/2018 0 30  0 20 - 1 00 mg/dL Final    eGFR 08/09/2018 71  ml/min/1 73sq m Final       Appointment on 11/05/2018   Component Date Value Ref Range Status    Hemoglobin A1C 11/05/2018 8 0* 4 2 - 6 3 % Final    EAG 11/05/2018 183  mg/dl Final       Imaging:  I have personally reviewed all pertinent results  Assessment/Plan   Type 2 diabetes mellitus, without long-term current use of insulin (HCC)  Lab Results   Component Value Date    HGBA1C 8 0 (H) 11/05/2018     -Continue basaglar 24 units at bed time  HGB A1C and microalbumin/creatine in 3 months before next appointment  If Microalbumin/cr elevated will think of starting ACEI  Healthcare maintenance  -Declined Flu vaccine    -Still not willing  to going back on statin  Will discuss again on next visit  Hannah Valentin was seen today for follow-up  Diagnoses and all orders for this visit:    Type 2 diabetes mellitus without complication, with long-term current use of insulin (HCC)  -     HEMOGLOBIN A1C W/ EAG ESTIMATION; Future  -     Microalbumin / creatinine urine ratio        - PCP: Olivier Douglas MD  - Follow-up appointments:     Future Appointments  Date Time Provider Makenzie Mayfieldi   11/8/2018 4:30 PM Chary Copping, PT [facility]   11/13/2018 3:30 PM Chary Copping, PT [facility]   11/15/2018 3:00 PM Chary Copping, PT [facility] [address]   11/19/2018 3:30 PM Chary Copping, PT [facility]   11/21/2018 3:00 PM Chary Copping, PT [facility]   11/27/2018 3:30 PM Chary Copping, PT [facility]   11/29/2018 3:00 PM Chary Copping, PT [facility]   1/3/2019 3:30 PM Kaya Curtis PA-C [facility]   2/6/2019 3:00 PM Olivier Douglas MD [facility]        _____________________________________________________________________   The attending physician, Dr Rickie Escobedo, agreed with the plan      Olivier Douglas MD, PGY-1  [address] Medicine   11/8/2018

## 2018-11-08 ENCOUNTER — OFFICE VISIT (OUTPATIENT)
Dept: PHYSICAL THERAPY | Facility: CLINIC | Age: 33
End: 2018-11-08
Payer: COMMERCIAL

## 2018-11-08 DIAGNOSIS — S83.211D BUCKET-HANDLE TEAR OF MEDIAL MENISCUS OF RIGHT KNEE AS CURRENT INJURY, SUBSEQUENT ENCOUNTER: Primary | ICD-10-CM

## 2018-11-08 PROCEDURE — 97112 NEUROMUSCULAR REEDUCATION: CPT

## 2018-11-08 PROCEDURE — 97110 THERAPEUTIC EXERCISES: CPT

## 2018-11-08 PROCEDURE — 97140 MANUAL THERAPY 1/> REGIONS: CPT

## 2018-11-08 NOTE — PROGRESS NOTES
Daily Note     Today's date: 2018  Patient name: Nicolasa Grullon  : 1985  MRN: [id number]  Referring provider: Kyle Magallon MD  Dx:   Encounter Diagnosis     ICD-10-CM    1  Bucket-handle tear of medial meniscus of right knee as current injury, subsequent encounter [id number]                   Subjective: Patient reports knee pain as "4/10"  He reports that he recently returned to work  Objective: See treatment diary below  Diagnosis: Right knee menisectomy DOS 18   Precautions: Type 2 diabetes   Manual Therapy 10/30/18 11/1/18  11/8/18      Post tib mobs        Medial knee STM  RO, PTA  HJS, PT      Prone quad stretch  RO, PTA       Knee flex/ ext   HJS, PT              Exercise Diary         QS  20x 3 sec hold  20x 3 sec hold      Heel slides  20x 3 sec hold  20x 3 sec hold      Wobble board balance  2 mins ea 2 mins ea     TKE  NV [place], 15x      Mini squats  15x  20x      Sit to stands  NV      Tandem walking  NV  2 laps      Bridges  20x  20x      Clams  20x ea 20x ea                                                                                             Modalities        CP PRN  Defer  Defer                          Assessment: Tolerated treatment well  Patient exhibited good technique with therapeutic exercises  Patient performed all progressions today without increased pain  Pain was decreased with manual therapy  He was challenged by tandem walking  Plan: Continue per plan of care

## 2018-11-13 ENCOUNTER — APPOINTMENT (OUTPATIENT)
Dept: PHYSICAL THERAPY | Facility: CLINIC | Age: 33
End: 2018-11-13
Payer: COMMERCIAL

## 2018-11-15 ENCOUNTER — APPOINTMENT (OUTPATIENT)
Dept: PHYSICAL THERAPY | Facility: CLINIC | Age: 33
End: 2018-11-15
Payer: COMMERCIAL

## 2018-11-16 ENCOUNTER — APPOINTMENT (OUTPATIENT)
Dept: PHYSICAL THERAPY | Facility: CLINIC | Age: 33
End: 2018-11-16
Payer: COMMERCIAL

## 2018-11-19 ENCOUNTER — OFFICE VISIT (OUTPATIENT)
Dept: PHYSICAL THERAPY | Facility: CLINIC | Age: 33
End: 2018-11-19
Payer: COMMERCIAL

## 2018-11-19 DIAGNOSIS — S83.211D BUCKET-HANDLE TEAR OF MEDIAL MENISCUS OF RIGHT KNEE AS CURRENT INJURY, SUBSEQUENT ENCOUNTER: Primary | ICD-10-CM

## 2018-11-19 PROCEDURE — 97110 THERAPEUTIC EXERCISES: CPT | Performed by: PHYSICAL THERAPIST

## 2018-11-19 PROCEDURE — 97112 NEUROMUSCULAR REEDUCATION: CPT | Performed by: PHYSICAL THERAPIST

## 2018-11-19 PROCEDURE — 97140 MANUAL THERAPY 1/> REGIONS: CPT | Performed by: PHYSICAL THERAPIST

## 2018-11-19 NOTE — PROGRESS NOTES
Daily Note     Today's date: 2018  Patient name: Carine Terrell  : 1985  MRN: [id number]  Referring provider: Crystal Bui MD  Dx:   Encounter Diagnosis     ICD-10-CM    1  Bucket-handle tear of medial meniscus of right knee as current injury, subsequent encounter S82 [id number]                 Patient was 15 mins late and still accommodated  Subjective: patient reports pain is "achy" about a 5/10 pain level with walking especially  Objective: See treatment diary below    Diagnosis: Right knee menisectomy DOS 18   Precautions: Type 2 diabetes   Manual Therapy 10/30/18 11/1/18  11/8/18  11/19/18    Post tib mobs        Medial knee STM  RO, PTA  HJS, PT  Quad tendon STM: HJS, PT    Prone quad stretch  RO, PTA       Knee flex/ ext   HJS, PT              Exercise Diary         QS  20x 3 sec hold  20x 3 sec hold  [id number] Quad ladders:    Heel slides  20x 3 sec hold  20x 3 sec hold  Prone quad stretch: [id number]    Wobble board balance  2 mins ea 2 mins ea Hold today secondary to time constraints    TKE  NV [place], 15x  RTB 20x     Mini squats  15x  20x  20x     Sit to stands  NV  20x    Tandem walking  NV  2 laps  2 laps    Bridges  20x  20x  SB Bridges: 20x     Clams  20x ea 20x ea 20x ea X-walks:    Lunges static    15x ea                                                                                    Modalities        CP PRN  Defer  Defer                        Assessment: Tolerated treatment well  Patient exhibited good technique with therapeutic exercises  Patient was able to perform all exercises today without increased pain  He is progressing slowly toward long term goals  Plan: Continue per plan of care

## 2018-11-21 ENCOUNTER — APPOINTMENT (OUTPATIENT)
Dept: PHYSICAL THERAPY | Facility: CLINIC | Age: 33
End: 2018-11-21
Payer: COMMERCIAL

## 2018-11-27 ENCOUNTER — OFFICE VISIT (OUTPATIENT)
Dept: PHYSICAL THERAPY | Facility: CLINIC | Age: 33
End: 2018-11-27
Payer: COMMERCIAL

## 2018-11-27 DIAGNOSIS — S83.211D BUCKET-HANDLE TEAR OF MEDIAL MENISCUS OF RIGHT KNEE AS CURRENT INJURY, SUBSEQUENT ENCOUNTER: Primary | ICD-10-CM

## 2018-11-27 PROCEDURE — 97112 NEUROMUSCULAR REEDUCATION: CPT | Performed by: PHYSICAL THERAPIST

## 2018-11-27 PROCEDURE — 97110 THERAPEUTIC EXERCISES: CPT | Performed by: PHYSICAL THERAPIST

## 2018-11-27 NOTE — PROGRESS NOTES
Daily Note     Today's date: 2018  Patient name: Fede Noel  : 1985  MRN: [id number]  Referring provider: Myla Che MD  Dx:   Encounter Diagnosis     ICD-10-CM    1  Bucket-handle tear of medial meniscus of right knee as current injury, subsequent encounter S83 220Q                   Subjective: Patient reports that he is only having trouble with squatting down low (deep squats)  He reports that he only has pain every now and then  Objective: See treatment diary below    Diagnosis: Right knee menisectomy DOS 18   Precautions: Type 2 diabetes   Manual Therapy 10/30/18 11/1/18  11/8/18  11/19/18 11/27/18   Post tib mobs        Medial knee STM  RO, PTA  HJS, PT  Quad tendon STM: HJS, PT Hold secondary to no pain   Prone quad stretch  RO, PTA       Knee flex/ ext   HJS, PT              Exercise Diary         QS  20x 3 sec hold  20x 3 sec hold  [id number] Quad ladders: 10x   Heel slides  20x 3 sec hold  20x 3 sec hold  Prone quad stretch: [id number] Standing quad stretch: 3x30 sec   Wobble board balance  2 mins ea 2 mins ea Hold today secondary to time constraints D/c    TKE  NV [place], 15x  RTB 20x  Cone taps in chair:  5x      Mini squats  15x  20x  20x  Leg Press:  90# 20x    Sit to stands  NV  20x 20x   Tandem walking  NV  2 laps  2 laps 2 laps   Bridges  20x  20x  SB Bridges: 20x  20x   Clams  20x ea 20x ea 20x ea X-walks: YTB 1x    Lunges static    15x ea 15x ea                                                                                   Modalities        CP PRN  Defer  Defer                          Assessment: Tolerated treatment well  Patient exhibited good technique with therapeutic exercises  He was able to tolerate all progressions this visit  He is progressing well toward long term goals  Will RE next visit to gauge for further need of PT  Plan: Continue per plan of care

## 2018-11-29 ENCOUNTER — EVALUATION (OUTPATIENT)
Dept: PHYSICAL THERAPY | Facility: CLINIC | Age: 33
End: 2018-11-29
Payer: COMMERCIAL

## 2018-11-29 DIAGNOSIS — S83.211D BUCKET-HANDLE TEAR OF MEDIAL MENISCUS OF RIGHT KNEE AS CURRENT INJURY, SUBSEQUENT ENCOUNTER: Primary | ICD-10-CM

## 2018-11-29 PROCEDURE — 97112 NEUROMUSCULAR REEDUCATION: CPT | Performed by: PHYSICAL THERAPIST

## 2018-11-29 PROCEDURE — G8991 OTHER PT/OT GOAL STATUS: HCPCS | Performed by: PHYSICAL THERAPIST

## 2018-11-29 PROCEDURE — G8992 OTHER PT/OT  D/C STATUS: HCPCS | Performed by: PHYSICAL THERAPIST

## 2018-11-29 NOTE — PROGRESS NOTES
PT Discharge    Today's date: 2018  Patient name: Grant Isbell  : 1985  MRN: [id number]  Referring provider: Christine Knight MD  Dx:   Encounter Diagnosis     ICD-10-CM    1  Bucket-handle tear of medial meniscus of right knee as current injury, subsequent encounter S83 211D                   Assessment  Assessment details: Grant Isbell has been partially compliant with attending PT and home exercise program since initial eval   Diandra Woodward  has made improvements in objective data since initial evalulation and has achieved all goals  Patient reports having returned to their prior level of function  Patient provided with updated Home Exercise Program, all questions answered, and verbalized understanding, agreeing to plan of care   Thus it was mutually decided to discontinue this episode of care and transition to Home Exercise Program       Understanding of Dx/Px/POC: good   Prognosis: good    Goals  Impairment Goals 4-6 weeks  - Decrease pain to <5/10 -MET  - Improve knee AROM to equal to the unaffected lower extremity -MET  - Increase knee strength to 5/5 throughout -MET  - Increase hip strength to 4/5 throughout -MET    Functional Goals 6-8 weeks  - Return to Prior Level of Function -MET  - Increase Functional Status Measure (FOTO) to: 62 -MET  - Patient will be independent with HEP -MET  - Patient will be able to squat without increased pain/compensation/difficulty -MET  - Patient will be able to perform sit to stand without increased pain/compensation/difficulty -MET  - Patient will be able to ascend and descend stairs without increased pain/compensation/difficulty -MET  - Patient will be able to walk for >30 mins without increased pain -MET    Plan  Referral necessary: No  Planned therapy interventions: home exercise program and neuromuscular re-education  Treatment plan discussed with: patient        Subjective Evaluation    Pain  Current pain ratin  At best pain ratin  At worst pain ratin    Patient Goals  Patient goal: He wants to be as mobile as he used to be  -MET      Patient reports that he is about 90% improved since starting PT  PAIN LOCATION/DESCRIPTORS:  Medial knee pain, achy, intermittent, non-varies, deep and surface  AGGRAVATING FACTORS:     Patient reports he no longer has issues with the following: If walking too much or not walking enough  Stairs  No longer avoids: squatting, kneeling  And no longer has issues with watching football games from the stands  Objective     Observations     Right Knee   Negative for edema and effusion  Additional Observation Details  Incisions healed well        Tenderness     Additional Tenderness Details  Medial joint line: 0/4  Pes bursa: 0/4  Femoral condyle: 0/4    Neurological Testing     Additional Neurological Details  Not necessary to test    Active Range of Motion   Left Knee   Flexion: 135 degrees   Extension: -1 degrees     Right Knee   Flexion: 130 degrees   Extension: -2 degrees     Mobility   Patellar Mobility:     Right Knee   WFL: medial, lateral, superior and inferior    Patellar Static Positioning   Left Knee: WFL  Right Knee: [facility]    Strength/Myotome Testing     Left Knee   Prone flexion: 4+  Extension: 5    Right Knee   Prone flexion: 4+  Extension: 5    Additional Strength Details  Hip MMT:   SLR: 4/5 bilaterally  Glut Med: 4/5 bilaterally  Glut Max: 4/5 bilaterally    Tests     Additional Tests Details  Not necessary to test       Diagnosis: Right knee menisectomy DOS 18   Precautions: Type 2 diabetes   Manual Therapy 18   Post tib mobs hold       Medial knee STM   HJS, PT  Quad tendon STM: HJS, PT Hold secondary to no pain   Prone quad stretch        Knee flex/ ext   HJS, PT              Exercise Diary         QS   20x 3 sec hold  [id number] Quad ladders: 10x   Heel slides   20x 3 sec hold  Prone quad stretch: [id number] Standing quad stretch: 3x30 sec           TKE Camelia, 15x  RTB 20x  Cone taps in chair:  5x      Mini squats   20x  20x  Leg Press:  90# 20x    Sit to stands    20x 20x   Tandem walking   2 laps  2 laps 2 laps   Bridges   20x  SB Bridges: 20x  20x   Clams   20x ea 20x ea X-walks: YTB 1x    Lunges static    15x ea 15x ea                                                   Pt education HEP discussed at length                                   Modalities        CP PRN  Defer  Defer

## 2018-12-07 LAB
LEFT EYE DIABETIC RETINOPATHY: NORMAL
RIGHT EYE DIABETIC RETINOPATHY: NORMAL
SEVERITY (EYE EXAM): NORMAL

## 2018-12-07 PROCEDURE — 3072F LOW RISK FOR RETINOPATHY: CPT | Performed by: FAMILY MEDICINE

## 2019-01-07 ENCOUNTER — OFFICE VISIT (OUTPATIENT)
Dept: UROLOGY | Facility: CLINIC | Age: 34
End: 2019-01-07
Payer: COMMERCIAL

## 2019-01-07 VITALS
BODY MASS INDEX: 31.28 KG/M2 | DIASTOLIC BLOOD PRESSURE: 70 MMHG | HEART RATE: 88 BPM | SYSTOLIC BLOOD PRESSURE: 160 MMHG | WEIGHT: 236 LBS | HEIGHT: 73 IN

## 2019-01-07 DIAGNOSIS — E11.65 TYPE 2 DIABETES MELLITUS WITH HYPERGLYCEMIA, WITHOUT LONG-TERM CURRENT USE OF INSULIN (HCC): ICD-10-CM

## 2019-01-07 DIAGNOSIS — N52.9 ERECTILE DYSFUNCTION, UNSPECIFIED ERECTILE DYSFUNCTION TYPE: Primary | ICD-10-CM

## 2019-01-07 PROCEDURE — 99213 OFFICE O/P EST LOW 20 MIN: CPT | Performed by: PHYSICIAN ASSISTANT

## 2019-01-07 RX ORDER — SILDENAFIL CITRATE 20 MG/1
TABLET ORAL
Qty: 30 TABLET | Refills: 3 | Status: SHIPPED | OUTPATIENT
Start: 2019-01-07 | End: 2019-09-20

## 2019-01-07 NOTE — PROGRESS NOTES
UROLOGY PROGRESS NOTE   Patient Identifiers: Devendra Mckeon (MRN [id number])  Date of Service: 1/7/2019    Subjective:    25-year-old man with diabetes history of erectile dysfunction  His hormone levels were all normal   He is having some success using a penile ring  He also has retrograde ejaculation and been using imipramine  He was unable to afford sildenafil  Hemoglobin A1c has come down from 11 to 8      Patient has   see above      Objective:     VITALS:    Vitals:    01/07/19 1454   BP: 160/70   Pulse: 88           LABS:  Lab Results   Component Value Date    HGB 13 9 08/14/2018    HCT 39 5 08/14/2018    WBC 7 23 08/14/2018     08/14/2018   ]    Lab Results   Component Value Date    K 4 3 09/08/2018     09/08/2018    CO2 31 09/08/2018    BUN 22 09/08/2018    CREATININE 1 36 (H) 09/08/2018    CALCIUM 9 5 09/08/2018   ]        INPATIENT MEDS:    Current Outpatient Prescriptions:     cyclobenzaprine (FLEXERIL) 5 mg tablet, Take 5 mg by mouth 3 (three) times a day as needed for muscle spasms, Disp: , Rfl:     glucose blood (ACCU-CHEK HARVEY PLUS) test strip, Use as instructed, Disp: 100 each, Rfl: 0    glucose blood test strip, 1 each by Other route 3 (three) times a day Dispense FreeStyle test strips, Disp: 100 each, Rfl: 5    glucose monitoring kit (FREESTYLE) monitoring kit, Inject 1 each as directed 3 (three) times a day before meals, Disp: 1 each, Rfl: 1    imipramine (TOFRANIL) 25 mg tablet, Take 1 tablet (25 mg total) by mouth daily at bedtime, Disp: 90 tablet, Rfl: 0    insulin glargine (BASAGLAR KWIKPEN) 100 units/mL injection pen, Inject 26-28 Units under the skin daily at bedtime, Disp: 5 pen, Rfl: 0    Insulin Pen Needle 31G X 5 MM MISC, by Does not apply route as needed (as needed), Disp: 30 each, Rfl: 0    Lancets (FREESTYLE) lancets, by Other route 3 (three) times a day before meals Use as instructed, Disp: 100 each, Rfl: 3    metFORMIN (GLUCOPHAGE) 500 mg tablet, Take 1 tablet (500 mg total) by mouth 2 (two) times a day with meals, Disp: 90 tablet, Rfl: 3    ramipril (ALTACE) 1 25 mg capsule, Take 1 capsule (1 25 mg total) by mouth daily Pt unsure of dose , Disp: 90 capsule, Rfl: 0    Vitamin D, Cholecalciferol, 1000 units CAPS, Take 1 tablet (1,000 Units total) by mouth daily, Disp: 90 tablet, Rfl: 1    atorvastatin (LIPITOR) 10 mg tablet, Take 2 tablets (20 mg total) by mouth daily (Patient not taking: Reported on 1/7/2019 ), Disp: 90 tablet, Rfl: 0    ibuprofen (MOTRIN) 600 mg tablet, Take 1 tablet (600 mg total) by mouth every 8 (eight) hours as needed for moderate pain (pain)  (Patient not taking: Reported on 1/7/2019 ), Disp: 15 tablet, Rfl: 0    methocarbamol (ROBAXIN) 750 mg tablet, Take 1 tablet by mouth 3 (three) times a day for 5 days (Patient taking differently: Take 750 mg by mouth as needed  ), Disp: 15 tablet, Rfl: 0    sildenafil (REVATIO) 20 mg tablet, 2-5 tablets as needed for sexual activity, Disp: 30 tablet, Rfl: 3      Physical Exam:   /70 (BP Location: Left arm, Patient Position: Sitting, Cuff Size: Adult)   Pulse 88   Ht 6' 1" (1 854 m)   Wt 107 kg (236 lb)   BMI 31 14 kg/m²   GEN: no acute distress    RESP: breathing comfortably with no accessory muscle use    ABD: soft, non-tender, non-distended   INCISION:    EXT: no significant peripheral edema       RADIOLOGY:    none     Assessment:    1  Erectile dysfunction   2    Retrograde ejaculation     Plan:   - he will try imipramine alternating with Allegra + D to see which is more effective  - I asked him to call his insurance see if he had any coverage for sildenafil or any other PDE 5 medication-  - he will call if he needs a prescription  - if he is able to manage without a prescription then he may follow up on as-needed basis

## 2019-01-08 DIAGNOSIS — E11.65 TYPE 2 DIABETES MELLITUS WITH HYPERGLYCEMIA, WITHOUT LONG-TERM CURRENT USE OF INSULIN (HCC): ICD-10-CM

## 2019-01-08 NOTE — TELEPHONE ENCOUNTER
Patient requesting refill on the needles for his insulin pens  Please refill appropriately  Thank you Bonita Kemp

## 2019-01-08 NOTE — TELEPHONE ENCOUNTER
Please refill the pt basaglar and if the front office will schedule pt an appt in February   Thank you

## 2019-01-13 ENCOUNTER — HOSPITAL ENCOUNTER (EMERGENCY)
Facility: HOSPITAL | Age: 34
Discharge: HOME | End: 2019-01-13
Attending: EMERGENCY MEDICINE
Payer: COMMERCIAL

## 2019-01-13 ENCOUNTER — APPOINTMENT (EMERGENCY)
Dept: CT IMAGING | Facility: HOSPITAL | Age: 34
End: 2019-01-13
Payer: COMMERCIAL

## 2019-01-13 VITALS
RESPIRATION RATE: 18 BRPM | HEART RATE: 95 BPM | TEMPERATURE: 98.4 F | DIASTOLIC BLOOD PRESSURE: 95 MMHG | BODY MASS INDEX: 30.08 KG/M2 | WEIGHT: 227.96 LBS | OXYGEN SATURATION: 99 % | SYSTOLIC BLOOD PRESSURE: 180 MMHG

## 2019-01-13 DIAGNOSIS — M54.9 CHRONIC BACK PAIN: ICD-10-CM

## 2019-01-13 DIAGNOSIS — G44.209 MUSCLE TENSION HEADACHE: Primary | ICD-10-CM

## 2019-01-13 DIAGNOSIS — G89.29 CHRONIC BACK PAIN: ICD-10-CM

## 2019-01-13 DIAGNOSIS — M50.20 CERVICAL DISC HERNIATION: ICD-10-CM

## 2019-01-13 DIAGNOSIS — M54.2 CHRONIC NECK PAIN: ICD-10-CM

## 2019-01-13 DIAGNOSIS — G89.29 CHRONIC NECK PAIN: ICD-10-CM

## 2019-01-13 LAB
ANION GAP SERPL CALCULATED.3IONS-SCNC: 5 MMOL/L (ref 4–13)
BASOPHILS # BLD AUTO: 0.03 THOUSANDS/ΜL (ref 0–0.1)
BASOPHILS NFR BLD AUTO: 0 % (ref 0–1)
BUN SERPL-MCNC: 19 MG/DL (ref 5–25)
CALCIUM SERPL-MCNC: 8.7 MG/DL (ref 8.3–10.1)
CHLORIDE SERPL-SCNC: 107 MMOL/L (ref 100–108)
CO2 SERPL-SCNC: 29 MMOL/L (ref 21–32)
CREAT SERPL-MCNC: 1.68 MG/DL (ref 0.6–1.3)
EOSINOPHIL # BLD AUTO: 0.2 THOUSAND/ΜL (ref 0–0.61)
EOSINOPHIL NFR BLD AUTO: 2 % (ref 0–6)
ERYTHROCYTE [DISTWIDTH] IN BLOOD BY AUTOMATED COUNT: 12.5 % (ref 11.6–15.1)
GFR SERPL CREATININE-BSD FRML MDRD: 60 ML/MIN/1.73SQ M
GLUCOSE SERPL-MCNC: 149 MG/DL (ref 65–140)
HCT VFR BLD AUTO: 38.1 % (ref 36.5–49.3)
HGB BLD-MCNC: 12.8 G/DL (ref 12–17)
IMM GRANULOCYTES # BLD AUTO: 0.02 THOUSAND/UL (ref 0–0.2)
IMM GRANULOCYTES NFR BLD AUTO: 0 % (ref 0–2)
LYMPHOCYTES # BLD AUTO: 2.14 THOUSANDS/ΜL (ref 0.6–4.47)
LYMPHOCYTES NFR BLD AUTO: 22 % (ref 14–44)
MCH RBC QN AUTO: 28.6 PG (ref 26.8–34.3)
MCHC RBC AUTO-ENTMCNC: 33.6 G/DL (ref 31.4–37.4)
MCV RBC AUTO: 85 FL (ref 82–98)
MONOCYTES # BLD AUTO: 0.49 THOUSAND/ΜL (ref 0.17–1.22)
MONOCYTES NFR BLD AUTO: 5 % (ref 4–12)
NEUTROPHILS # BLD AUTO: 6.77 THOUSANDS/ΜL (ref 1.85–7.62)
NEUTS SEG NFR BLD AUTO: 71 % (ref 43–75)
NRBC BLD AUTO-RTO: 0 /100 WBCS
PLATELET # BLD AUTO: 306 THOUSANDS/UL (ref 149–390)
PMV BLD AUTO: 10.2 FL (ref 8.9–12.7)
POTASSIUM SERPL-SCNC: 4.2 MMOL/L (ref 3.5–5.3)
RBC # BLD AUTO: 4.47 MILLION/UL (ref 3.88–5.62)
SODIUM SERPL-SCNC: 141 MMOL/L (ref 136–145)
WBC # BLD AUTO: 9.65 THOUSAND/UL (ref 4.31–10.16)

## 2019-01-13 PROCEDURE — 96374 THER/PROPH/DIAG INJ IV PUSH: CPT

## 2019-01-13 PROCEDURE — 96361 HYDRATE IV INFUSION ADD-ON: CPT

## 2019-01-13 PROCEDURE — 70450 CT HEAD/BRAIN W/O DYE: CPT

## 2019-01-13 PROCEDURE — 80048 BASIC METABOLIC PNL TOTAL CA: CPT | Performed by: EMERGENCY MEDICINE

## 2019-01-13 PROCEDURE — 99284 EMERGENCY DEPT VISIT MOD MDM: CPT

## 2019-01-13 PROCEDURE — 72125 CT NECK SPINE W/O DYE: CPT

## 2019-01-13 PROCEDURE — 85025 COMPLETE CBC W/AUTO DIFF WBC: CPT | Performed by: EMERGENCY MEDICINE

## 2019-01-13 PROCEDURE — 96375 TX/PRO/DX INJ NEW DRUG ADDON: CPT

## 2019-01-13 PROCEDURE — 36415 COLL VENOUS BLD VENIPUNCTURE: CPT | Performed by: EMERGENCY MEDICINE

## 2019-01-13 RX ORDER — TRAMADOL HYDROCHLORIDE 50 MG/1
50 TABLET ORAL EVERY 6 HOURS PRN
Qty: 15 TABLET | Refills: 0 | Status: SHIPPED | OUTPATIENT
Start: 2019-01-13 | End: 2019-01-23

## 2019-01-13 RX ORDER — CYCLOBENZAPRINE HCL 10 MG
10 TABLET ORAL 3 TIMES DAILY PRN
Qty: 20 TABLET | Refills: 0 | Status: SHIPPED | OUTPATIENT
Start: 2019-01-13 | End: 2019-02-11

## 2019-01-13 RX ORDER — DIPHENHYDRAMINE HYDROCHLORIDE 50 MG/ML
25 INJECTION INTRAMUSCULAR; INTRAVENOUS ONCE
Status: COMPLETED | OUTPATIENT
Start: 2019-01-13 | End: 2019-01-13

## 2019-01-13 RX ORDER — METOCLOPRAMIDE HYDROCHLORIDE 5 MG/ML
10 INJECTION INTRAMUSCULAR; INTRAVENOUS ONCE
Status: COMPLETED | OUTPATIENT
Start: 2019-01-13 | End: 2019-01-13

## 2019-01-13 RX ORDER — KETOROLAC TROMETHAMINE 30 MG/ML
10 INJECTION, SOLUTION INTRAMUSCULAR; INTRAVENOUS ONCE
Status: COMPLETED | OUTPATIENT
Start: 2019-01-13 | End: 2019-01-13

## 2019-01-13 RX ORDER — ONDANSETRON 2 MG/ML
4 INJECTION INTRAMUSCULAR; INTRAVENOUS ONCE
Status: DISCONTINUED | OUTPATIENT
Start: 2019-01-13 | End: 2019-01-13

## 2019-01-13 RX ADMIN — SODIUM CHLORIDE 1000 ML: 0.9 INJECTION, SOLUTION INTRAVENOUS at 09:54

## 2019-01-13 RX ADMIN — KETOROLAC TROMETHAMINE 9.9 MG: 30 INJECTION, SOLUTION INTRAMUSCULAR at 09:47

## 2019-01-13 RX ADMIN — METOCLOPRAMIDE 10 MG: 5 INJECTION, SOLUTION INTRAMUSCULAR; INTRAVENOUS at 09:50

## 2019-01-13 RX ADMIN — DIPHENHYDRAMINE HYDROCHLORIDE 25 MG: 50 INJECTION, SOLUTION INTRAMUSCULAR; INTRAVENOUS at 09:46
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
## 2019-01-13 NOTE — DISCHARGE INSTRUCTIONS
Tension Headache, Ambulatory Care   GENERAL INFORMATION:   A tension headache  is often caused by tense muscles in your head or neck and can last anywhere from 30 minutes to several days  Although they are uncomfortable, tension headaches usually do not cause any serious problems  The following can cause muscle tension and trigger a tension headache:  · Eye strain or poor posture    · Jaw or dental problems such as temporomandibular joint (TMJ), clenching your jaw, or grinding your teeth    · Activities that cause your head to be held in one position for too long    · Skipping a meal    · Not enough sleep or sleep apnea (brief periods of not breathing during sleep)    · Food sensitivities, such as to gluten  Common symptoms include the following:   · Dull, constant pain above your eyes and across the back of your head    · Head pain that gets worse as the day goes on    · Pain that may spread over your entire head and to your neck and shoulders    · Tight neck or shoulder muscles    · Head pain that is made worse by bright lights or loud noises  Seek immediate care for the following symptoms:   · A sudden headache that seems different or much worse    · Trouble seeing, speaking, or moving    · Confusion or feeling faint    · A headache, fever, and a stiff neck  Treatment for a tension headache  depends on what is causing your headache  You may need medicines to decrease pain  Take them as directed  You may also need to see healthcare providers that specialize in back, muscle, or jaw problems  Manage my symptoms:   · Keep a record of your headaches  Write down when your headaches start and stop  Include your symptoms and what you were doing when the headache began  Record what you ate or drank for 24 hours before the headache started  Describe the pain and where it hurts  Keep track of what you did to treat your headache and whether it worked       · Apply heat  on your head for 20 to 30 minutes every 2 hours for as many days as directed  Heat helps decrease pain and muscle spasms  You may alternate heat and ice  · Apply ice  on your head for 15 to 20 minutes every hour or as directed  Use an ice pack, or put crushed ice in a plastic bag  Cover it with a towel  Ice helps decrease pain  Prevent a tension headache:   · Avoid muscle tension  Do not stay in one position for long periods of time  Use a different pillow if you wake up with sore neck and shoulder muscles  Find ways to relax your muscles, such as massage or resting in a quiet, dark room  · Avoid eye strain  Make sure you have good lighting when you read, sew, or other similar activities  Get yearly eye exams and wear glasses as directed  · Eat a variety of healthy foods  Healthy foods include fruits, vegetables, whole-grain breads, low-fat dairy products, beans, lean meats, and fish  Avoid foods that trigger your headaches  · Exercise regularly  Exercise helps decrease stress and headaches  Ask about the best exercise plan for you  · Do not drink alcohol  Alcohol can trigger a headache  It can also interfere with the medicines used to treat your headache  · Do not smoke  If you smoke, it is never too late to quit  Tobacco smoke can trigger a headache  Ask for information if you need help quitting  Follow up with your healthcare provider as directed:  Bring your headache log with you when you see your healthcare provider  Write down your questions so you remember to ask them during your visits  CARE AGREEMENT:   You have the right to help plan your care  Learn about your health condition and how it may be treated  Discuss treatment options with your caregivers to decide what care you want to receive  You always have the right to refuse treatment  The above information is an  only  It is not intended as medical advice for individual conditions or treatments   Talk to your doctor, nurse or pharmacist before following any medical regimen to see if it is safe and effective for you  © 2014 [address] is for End User's use only and may not be sold, redistributed or otherwise used for commercial purposes  All illustrations and images included in CareNotes® are the copyrighted property of A D A M , Inc  or Dennis Gunter

## 2019-01-21 ENCOUNTER — TELEPHONE (OUTPATIENT)
Dept: OBGYN CLINIC | Facility: HOSPITAL | Age: 34
End: 2019-01-21

## 2019-01-21 NOTE — TELEPHONE ENCOUNTER
Patient called to find out if Dr Gretta Prajapati reviewed his records yet  He said they were dropped off  Intake was completed on 1/16/19  Please advise

## 2019-01-21 NOTE — TELEPHONE ENCOUNTER
Good afternoon just want to verf that you got these records? Pt stated they were dropped off    Also are we accepting them since they were dropped off? Please advise  Than you

## 2019-01-21 NOTE — TELEPHONE ENCOUNTER
S/w pt would like a status of record review, pt believes [facility] faxed records to correct # for Dr Purnima Cohen   Pt also states that he can't take the medication from the ED because he can not function, nothing else helps the pain yet he cant sit, stand, walk during the day; pain is severe    Per pt, in referral 1/16/19  Have you seen a pain specialist in the past? yes  Who : [facility] - pt does not recall doctor's name  When : 2018    Pt will have faxed to [phone] for review w/Dr Purnima Cohen (pt was given wrong fax # yesterday 1/15/19 [phone])

## 2019-01-22 NOTE — TELEPHONE ENCOUNTER
lmom for pt to cb  Please ask why patients is switching per below message from Dr Talamantes Favors  Route info to Dr Talamantes Favors so he can move fwd decision

## 2019-01-23 NOTE — TELEPHONE ENCOUNTER
Called patient he stated that the [facility] is closer for him  Please let me know if you need any other info  Not sure if you have all info you need  Thank you

## 2019-02-06 ENCOUNTER — OFFICE VISIT (OUTPATIENT)
Dept: FAMILY MEDICINE CLINIC | Facility: CLINIC | Age: 34
End: 2019-02-06

## 2019-02-06 VITALS
DIASTOLIC BLOOD PRESSURE: 100 MMHG | HEART RATE: 82 BPM | WEIGHT: 240.8 LBS | RESPIRATION RATE: 16 BRPM | BODY MASS INDEX: 31.91 KG/M2 | TEMPERATURE: 97.8 F | SYSTOLIC BLOOD PRESSURE: 190 MMHG | HEIGHT: 73 IN

## 2019-02-06 DIAGNOSIS — G44.89 OTHER HEADACHE SYNDROME: Primary | ICD-10-CM

## 2019-02-06 DIAGNOSIS — I10 HYPERTENSION, UNSPECIFIED TYPE: ICD-10-CM

## 2019-02-06 PROCEDURE — 99213 OFFICE O/P EST LOW 20 MIN: CPT | Performed by: FAMILY MEDICINE

## 2019-02-06 RX ORDER — RAMIPRIL 1.25 MG/1
2.5 CAPSULE ORAL DAILY
Qty: 90 CAPSULE | Refills: 0 | Status: SHIPPED | OUTPATIENT
Start: 2019-02-06 | End: 2019-02-21 | Stop reason: SDUPTHER

## 2019-02-06 NOTE — ASSESSMENT & PLAN NOTE
Status:   Worsening  - Patient now presents with headaches  - Increased dose of ramipril to 2 5 mg daily  - Counseled patient to go to [facility] for daily blood pressure checks and keep a journal   - Follow-up with PCP in 1 week  - if blood pressure does not respond and remains above 428 systolic consider increasing dose to 5 mg   - patient does have kidney dysfunction with most recent creatinine 1 6

## 2019-02-06 NOTE — PATIENT INSTRUCTIONS
Please check blood pressures daily at the same time each day at [facility] aid  Start taking the ramapril 2 5mg daily  If your Blood pressure remains above 150/90, give us a call and we will increase your dose further  Acute Headache   AMBULATORY CARE:   An acute headache  is pain or discomfort that starts suddenly and gets worse quickly  You may have an acute headache only when you feel stress or eat certain foods  Other acute headache pain can happen every day, and sometimes several times a day  The cause of an acute headache may not be known  It may be triggered by stress, fatigue, hormones, food, or trauma  Common types of acute headache:   · Tension headache  is the most common type of headache  These headaches typically occur in the late afternoon and go away by evening  The pain is usually mild or moderate  You may have problems tolerating bright light or loud noise  The pain is usually across the forehead or in the back of the head, often only on one side  These headaches may occur every day  · Migraine headaches  cause moderate or severe pain  The headache generally lasts from 1 to 3 days and tends to come back  Pain is usually on only one side, but it may change sides  Migraines often occur in the temple, the back of the head, or behind the eye  The pain may throb or be sharp and steady  · A migraine with aura  means you see or feel something before a migraine  You may see a small spot surrounded by bright zigzag lines  Other signs or symptoms may follow the aura  · Cluster headache  pain is usually only on one side  It often causes severe pain, and can last for 30 minutes to 2 hours  These headaches may occur 1 or 2 times each day, more often at night  The pain may wake you  Seek care immediately if:   · You have severe pain  · You have numbness or weakness on one side of your face or body  · You have a headache that occurs after a blow to the head, a fall, or other trauma       · You have a headache, are forgetful or confused, or have trouble speaking  · You have a headache, stiff neck, and a fever  Contact your healthcare provider if:   · You have a constant headache and are vomiting  · You have a headache each day that does not get better, even after treatment  · You have changes in your headaches, or new symptoms that occur when you have a headache  · You have questions or concerns about your condition or care  Treatment:   · Medicine  may be given to decrease pain  The medicine your healthcare provider recommends will depend on the kind of headaches you have  You will need to take prescription headache medicines as directed to prevent a problem called rebound headache  These headaches happen with regular use of pain relievers for headache disorders  NSAIDs or acetaminophen may help some kinds of headaches  · Biofeedback  may help you learn how to change stress reactions  For example, you learn to slow your heart rate when you become upset  You may also learn to prevent certain headaches by combining heat with relaxation  · Cognitive behavior therapy,  or stress management, may be used with other therapies to prevent headaches  Manage your symptoms:   · Apply heat or ice  on the headache area  Use a heat or ice pack  For an ice pack, you can also put crushed ice in a plastic bag  Cover the pack or bag with a towel before you apply it to your skin  Ice and heat both help decrease pain, and heat helps decrease muscle spasms  Apply heat for 20 to 30 minutes every 2 hours  Apply ice for 15 to 20 minutes every hour  Apply heat or ice for as long and for as many days as directed  You may alternate heat and ice  · Relax your muscles  Lie down in a comfortable position and close your eyes  Relax your muscles slowly  Start at your toes and work your way up your body  · Keep a record of your headaches  Write down when your headaches start and stop   Include your symptoms and what you were doing when the headache began  Record what you ate or drank for 24 hours before the headache started  Describe the pain and where it hurts  Keep track of what you did to treat your headache and if it worked  Prevent an acute headache:   · Avoid anything that triggers an acute headache  Examples include exposure to chemicals, going to high altitude, or not getting enough sleep  Create a regular sleep routine  Go to sleep at the same time and wake up at the same time each day  Do not use electronic devices before bedtime  These may trigger a headache or prevent you from sleeping well  · Do not smoke  Nicotine and other chemicals in cigarettes and cigars can trigger an acute headache or make it worse  Ask your healthcare provider for information if you currently smoke and need help to quit  E-cigarettes or smokeless tobacco still contain nicotine  Talk to your healthcare provider before you use these products  · Limit alcohol as directed  Alcohol can trigger an acute headache or make it worse  If you have cluster headaches, do not drink alcohol during an episode  For other types of headaches, ask your healthcare provider if it is safe for you to drink alcohol  Ask how much is safe for you to drink, and how often  · Exercise as directed  Exercise can reduce tension and help with headache pain  Aim for 30 minutes of physical activity on most days of the week  Your healthcare provider can help you create an exercise plan  · Eat a variety of healthy foods  Healthy foods include fruits, vegetables, low-fat dairy products, lean meats, fish, whole grains, and cooked beans  Your healthcare provider or dietitian can help you create meals plans if you need to avoid foods that trigger headaches  Follow up with your healthcare provider as directed:  Bring your headache record with you when you see your healthcare provider   Write down your questions so you remember to ask them during your visits  © 2017 [address] [facility] Information is for End User's use only and may not be sold, redistributed or otherwise used for commercial purposes  All illustrations and images included in CareNotes® are the copyrighted property of A D A M , Inc  or Dennis Gunter  The above information is an  only  It is not intended as medical advice for individual conditions or treatments  Talk to your doctor, nurse or pharmacist before following any medical regimen to see if it is safe and effective for you

## 2019-02-06 NOTE — ASSESSMENT & PLAN NOTE
Worsening  2/2 migraine versus hypertensive urgency  - Started 1 month ago  - Throbbing in nature with photophobia, nausea, and vomiting  - relieved by Excedrin  - occur at most twice a day, in getting more frequent  - blood pressure in clinic:  190/100  - will treat hypertensive urgency today  - if symptoms fail to improve consider treatment for migraines  - Counseled patient on appropriate emergency department return precautions  Handout given    Follow-up with PCP in 1 week
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
## 2019-02-10 DIAGNOSIS — E11.8 TYPE 2 DIABETES MELLITUS WITH COMPLICATION, WITHOUT LONG-TERM CURRENT USE OF INSULIN (HCC): ICD-10-CM

## 2019-02-11 ENCOUNTER — CONSULT (OUTPATIENT)
Dept: PAIN MEDICINE | Facility: CLINIC | Age: 34
End: 2019-02-11
Payer: COMMERCIAL

## 2019-02-11 VITALS
WEIGHT: 242 LBS | DIASTOLIC BLOOD PRESSURE: 90 MMHG | BODY MASS INDEX: 32.07 KG/M2 | SYSTOLIC BLOOD PRESSURE: 154 MMHG | HEIGHT: 73 IN | RESPIRATION RATE: 18 BRPM | HEART RATE: 91 BPM | TEMPERATURE: 98.5 F

## 2019-02-11 DIAGNOSIS — M79.18 MYOFASCIAL PAIN SYNDROME: Primary | ICD-10-CM

## 2019-02-11 PROCEDURE — 99244 OFF/OP CNSLTJ NEW/EST MOD 40: CPT | Performed by: ANESTHESIOLOGY

## 2019-02-11 RX ORDER — TIZANIDINE 4 MG/1
4 TABLET ORAL
Qty: 30 TABLET | Refills: 0 | Status: SHIPPED | OUTPATIENT
Start: 2019-02-11 | End: 2019-04-16 | Stop reason: ALTCHOICE
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
## 2019-02-13 ENCOUNTER — APPOINTMENT (OUTPATIENT)
Dept: LAB | Facility: CLINIC | Age: 34
End: 2019-02-13
Payer: COMMERCIAL

## 2019-02-13 ENCOUNTER — OFFICE VISIT (OUTPATIENT)
Dept: FAMILY MEDICINE CLINIC | Facility: CLINIC | Age: 34
End: 2019-02-13

## 2019-02-13 DIAGNOSIS — E11.8 TYPE 2 DIABETES MELLITUS WITH COMPLICATION, WITHOUT LONG-TERM CURRENT USE OF INSULIN (HCC): ICD-10-CM

## 2019-02-13 DIAGNOSIS — Z79.4 TYPE 2 DIABETES MELLITUS WITHOUT COMPLICATION, WITH LONG-TERM CURRENT USE OF INSULIN (HCC): ICD-10-CM

## 2019-02-13 DIAGNOSIS — Z11.1 PPD SCREENING TEST: Primary | ICD-10-CM

## 2019-02-13 DIAGNOSIS — E11.9 TYPE 2 DIABETES MELLITUS WITHOUT COMPLICATION, WITH LONG-TERM CURRENT USE OF INSULIN (HCC): ICD-10-CM

## 2019-02-13 DIAGNOSIS — I10 ESSENTIAL HYPERTENSION: ICD-10-CM

## 2019-02-13 LAB
CREAT UR-MCNC: 228 MG/DL
EST. AVERAGE GLUCOSE BLD GHB EST-MCNC: 169 MG/DL
HBA1C MFR BLD: 7.5 % (ref 4.2–6.3)
MICROALBUMIN UR-MCNC: 4530 MG/L (ref 0–20)
MICROALBUMIN/CREAT 24H UR: 1987 MG/G CREATININE (ref 0–30)

## 2019-02-13 PROCEDURE — 99213 OFFICE O/P EST LOW 20 MIN: CPT | Performed by: FAMILY MEDICINE

## 2019-02-13 PROCEDURE — 3062F POS MACROALBUMINURIA REV: CPT | Performed by: FAMILY MEDICINE

## 2019-02-13 PROCEDURE — 82043 UR ALBUMIN QUANTITATIVE: CPT | Performed by: FAMILY MEDICINE

## 2019-02-13 PROCEDURE — 83036 HEMOGLOBIN GLYCOSYLATED A1C: CPT

## 2019-02-13 PROCEDURE — 82570 ASSAY OF URINE CREATININE: CPT | Performed by: FAMILY MEDICINE

## 2019-02-13 PROCEDURE — 86580 TB INTRADERMAL TEST: CPT | Performed by: FAMILY MEDICINE

## 2019-02-13 PROCEDURE — 36415 COLL VENOUS BLD VENIPUNCTURE: CPT

## 2019-02-13 RX ORDER — AMILORIDE HYDROCHLORIDE 5 MG/1
10 TABLET ORAL DAILY
Qty: 90 TABLET | Refills: 3 | Status: SHIPPED | OUTPATIENT
Start: 2019-02-13 | End: 2019-04-21 | Stop reason: HOSPADM

## 2019-02-15 ENCOUNTER — TELEPHONE (OUTPATIENT)
Dept: FAMILY MEDICINE CLINIC | Facility: CLINIC | Age: 34
End: 2019-02-15

## 2019-02-15 ENCOUNTER — CLINICAL SUPPORT (OUTPATIENT)
Dept: FAMILY MEDICINE CLINIC | Facility: CLINIC | Age: 34
End: 2019-02-15

## 2019-02-15 DIAGNOSIS — Z11.1 PPD SCREENING TEST: Primary | ICD-10-CM

## 2019-02-15 LAB
INDURATION: 0 MM
TB SKIN TEST: NEGATIVE

## 2019-02-15 NOTE — ASSESSMENT & PLAN NOTE
Lab Results   Component Value Date    HGBA1C 7 5 (H) 02/13/2019       Diabetes improving  A1C down to 7 5 from 8 three months ago  Continue current medications  Microalbumin/creatine ratio is 1,987  Patient already on ACEi  Will refer to nephrology

## 2019-02-15 NOTE — ASSESSMENT & PLAN NOTE
Uncontrolled  Patient currently on 2 5 mg ramipril  BP still elevated today  160/84  Will start amlodipine 10 mg daily  Follow up in 1 week

## 2019-02-15 NOTE — PROGRESS NOTES
Assessment/Plan:    Type 2 diabetes mellitus, without long-term current use of insulin ([facility])  Lab Results   Component Value Date    HGBA1C 7 5 (H) 02/13/2019       Diabetes improving  A1C down to 7 5 from 8 three months ago  Continue current medications  Microalbumin/creatine ratio is 1,987  Patient already on ACEi  Will refer to nephrology  Hypertension  Uncontrolled  Patient currently on 2 5 mg ramipril  BP still elevated today  160/84  Will start amlodipine 10 mg daily  Follow up in 1 week   Problem List Items Addressed This Visit        Endocrine    Type 2 diabetes mellitus, without long-term current use of insulin ([facility] [address] )     Lab Results   Component Value Date    HGBA1C 7 5 (H) 02/13/2019       Diabetes improving  A1C down to 7 5 from 8 three months ago  Continue current medications  Microalbumin/creatine ratio is 1,987  Patient already on ACEi  Will refer to nephrology  Relevant Orders    Ambulatory referral to Nephrology       Cardiovascular and Mediastinum    Hypertension     Uncontrolled  Patient currently on 2 5 mg ramipril  BP still elevated today  160/84  Will start amlodipine 10 mg daily  Follow up in 1 week   Relevant Medications    AMILoride 5 mg tablet      Other Visit Diagnoses     PPD screening test    -  Primary    Relevant Orders    TB Skin Test (Completed)            Subjective:      Patient ID: Michelle Patrick is a 26 y o  male  Don Gonzales is a 26year old who presents to the clinic for 3 month diabetic follow up  Today his hemoglobin A1C is 7 5  Microalbumin/creatine ratio is 1,987  He currently takes basaglar 24 unitsw at bedtime   The following portions of the patient's history were reviewed and updated as appropriate: allergies, current medications, past family history, past medical history, past social history, past surgical history and problem list     Review of Systems   Constitutional: Negative for appetite change and unexpected weight change  Respiratory: Negative for cough and shortness of breath  Cardiovascular: Negative for chest pain and palpitations  Gastrointestinal: Negative for abdominal pain, constipation, diarrhea, nausea and vomiting  Endocrine: Negative for polydipsia, polyphagia and polyuria  Skin: Negative for wound  Neurological: Negative for dizziness and headaches  Objective: There were no vitals taken for this visit  Physical Exam   Constitutional: He is oriented to person, place, and time  No distress  Eyes: Conjunctivae and EOM are normal    Cardiovascular: Normal rate, regular rhythm and normal heart sounds  Pulmonary/Chest: Effort normal and breath sounds normal    Abdominal: Soft  There is no tenderness  Neurological: He is alert and oriented to person, place, and time  Skin: Skin is warm and dry  He is not diaphoretic

## 2019-02-18 ENCOUNTER — EVALUATION (OUTPATIENT)
Dept: PHYSICAL THERAPY | Facility: REHABILITATION | Age: 34
End: 2019-02-18
Payer: COMMERCIAL

## 2019-02-18 VITALS — SYSTOLIC BLOOD PRESSURE: 170 MMHG | HEART RATE: 80 BPM | DIASTOLIC BLOOD PRESSURE: 110 MMHG

## 2019-02-18 DIAGNOSIS — M79.18 MYOFASCIAL PAIN SYNDROME: ICD-10-CM

## 2019-02-18 DIAGNOSIS — M54.2 NECK PAIN: Primary | ICD-10-CM

## 2019-02-18 PROCEDURE — 97161 PT EVAL LOW COMPLEX 20 MIN: CPT | Performed by: PHYSICAL THERAPIST

## 2019-02-18 NOTE — PROGRESS NOTES
PT Evaluation     Today's date: 2019  Patient name: Aron Fraga  : 1985  MRN: [id number]  Referring provider: CHARLOTTE Guerra  Dx:   Encounter Diagnosis     ICD-10-CM    1  Neck pain M54 2    2  Myofascial pain syndrome M79 18 Ambulatory referral to Physical Therapy       Start Time: 75  Stop Time: 1535  Total time in clinic (min): 40 minutes    Assessment  Assessment details: Aron Fraga is a 26 y o  male present with:   Neck pain  (primary encounter diagnosis)  Myofascial pain syndrome    Restricted range of motion evident due to soft tissue restirction within suboccipital musculature resulting in pressure upon suboccipital nerves and consequential headaches  Tiffany Foster has the above listed impairments and will benefit from skilled PT to improve deficits to return to prior level of function     Impairments: abnormal muscle firing, abnormal or restricted ROM, activity intolerance, impaired physical strength, lacks appropriate home exercise program and pain with function  Understanding of Dx/Px/POC: good   Prognosis: good    Goals  Impairment Goals  - Decrease pain 0/10  - Improve ROM to 70 degrees flexion, 50* extension of cervical spine   - Increase strength to 5/5 throughout    Functional Goals  - Return to Prior Level of Function  - Increase Functional Status Measure to: 59  - Patient will be independent with HEP    Plan  Patient would benefit from: skilled PT  Planned therapy interventions: joint mobilization, manual therapy, patient education, postural training, activity modification, abdominal trunk stabilization, body mechanics training, flexibility, functional ROM exercises, graded exercise, home exercise program, neuromuscular re-education, strengthening, stretching, therapeutic activities and therapeutic exercise  Frequency: 2x week  Duration in weeks: 8  Plan of Care beginning date: 2019  Plan of Care expiration date: 4/15/2019  Treatment plan discussed with: patient        Subjective Evaluation    History of Present Illness  Mechanism of injury: Lyudmila Aceves reports his cervical spine pain started about a month and a half a go, no LINDSAY  Reports cervicogenic headache, making him unable to remain in one position  Reports light sensitivity  Reports having difficulty with cervical spine extension and prolonged flexion  Reports pain into upper trap and spine  Pain  Current pain ratin  At best pain ratin  At worst pain rating: 10  Location: posterior CS, Head  Quality: dull ache, pressure, squeezing and radiating  Relieving factors: medications and change in position  Aggravating factors: lifting and nothing  Progression: no change    Social Support    Employment status: working ([employer])  Patient Goals  Patient goals for therapy: decreased pain, increased motion, increased strength, return to work and return to [employer] activities  Patient goal: Returning to being able to sleep, playing electric base        Objective     Concurrent Complaints  Positive for headaches  Active Range of Motion   Cervical/Thoracic Spine       Cervical    Flexion: 20 degrees  with pain  Extension: 30 degrees     with pain  Left rotation: 35 degrees with pain  Right rotation: 45 degrees    with pain    Additional Active Range of Motion Details  Extension range of motion from C7-T1 junction    Joint Play     Hypomobile: T1, T2, T3, T4, T5, T6 and T7     Strength/Myotome Testing     Left Shoulder     Planes of Motion   Extension: WFL   Abduction: WFL   External rotation at 0°: WFL     Right Shoulder     Planes of Motion   Extension: WFL   Abduction: WFL   External [facility] at 0°: [facility]     Tests   Cervical   Positive lumbar distraction test   Negative alar ligament test, Sharp-Briseida test and transverse ligament test    Neuro Exam:     Headaches   Patient reports headaches: Yes          Precautions: HTN** ASSESS BP Each Visit    Daily Treatment Diary       Manuals 02/18/19             SUB OCC release 15'            STM UT don            CS passive Rom done                         Exercise Diary                           Chin tucks* 5x5"x2            Prone bilateral I's* 5x5"x2            Prone ^'s  nv                         CS rotations nv                                                                                                                    Modalities

## 2019-02-21 ENCOUNTER — OFFICE VISIT (OUTPATIENT)
Dept: PHYSICAL THERAPY | Facility: REHABILITATION | Age: 34
End: 2019-02-21
Payer: COMMERCIAL

## 2019-02-21 ENCOUNTER — CONSULT (OUTPATIENT)
Dept: NEPHROLOGY | Facility: CLINIC | Age: 34
End: 2019-02-21
Payer: COMMERCIAL

## 2019-02-21 VITALS
BODY MASS INDEX: 31.68 KG/M2 | WEIGHT: 239 LBS | HEIGHT: 73 IN | HEART RATE: 80 BPM | SYSTOLIC BLOOD PRESSURE: 142 MMHG | DIASTOLIC BLOOD PRESSURE: 90 MMHG

## 2019-02-21 DIAGNOSIS — Z00.00 HEALTHCARE MAINTENANCE: ICD-10-CM

## 2019-02-21 DIAGNOSIS — N18.9 CHRONIC KIDNEY DISEASE, UNSPECIFIED CKD STAGE: Primary | ICD-10-CM

## 2019-02-21 DIAGNOSIS — M54.2 NECK PAIN: Primary | ICD-10-CM

## 2019-02-21 DIAGNOSIS — I10 HYPERTENSION, UNSPECIFIED TYPE: ICD-10-CM

## 2019-02-21 DIAGNOSIS — I10 ESSENTIAL HYPERTENSION: ICD-10-CM

## 2019-02-21 DIAGNOSIS — M79.18 MYOFASCIAL PAIN SYNDROME: ICD-10-CM

## 2019-02-21 DIAGNOSIS — R80.8 OTHER PROTEINURIA: ICD-10-CM

## 2019-02-21 DIAGNOSIS — E11.8 TYPE 2 DIABETES MELLITUS WITH COMPLICATION, WITHOUT LONG-TERM CURRENT USE OF INSULIN (HCC): ICD-10-CM

## 2019-02-21 DIAGNOSIS — R80.9 PROTEINURIA, UNSPECIFIED TYPE: Primary | ICD-10-CM

## 2019-02-21 DIAGNOSIS — N18.9 CHRONIC KIDNEY DISEASE, UNSPECIFIED CKD STAGE: ICD-10-CM

## 2019-02-21 PROBLEM — E11.9 TYPE 2 DIABETES MELLITUS, WITHOUT LONG-TERM CURRENT USE OF INSULIN (HCC): Status: RESOLVED | Noted: 2018-07-17 | Resolved: 2019-02-21

## 2019-02-21 LAB
SL AMB  POCT GLUCOSE, UA: ABNORMAL
SL AMB LEUKOCYTE ESTERASE,UA: ABNORMAL
SL AMB POCT BILIRUBIN,UA: ABNORMAL
SL AMB POCT BLOOD,UA: ABNORMAL
SL AMB POCT KETONES,UA: ABNORMAL
SL AMB POCT NITRITE,UA: ABNORMAL
SL AMB POCT PH,UA: 5
SL AMB POCT SPECIFIC GRAVITY,UA: 1.03
SL AMB POCT URINE PROTEIN: 2000>
SL AMB POCT UROBILINOGEN: 0.2

## 2019-02-21 PROCEDURE — 99244 OFF/OP CNSLTJ NEW/EST MOD 40: CPT | Performed by: INTERNAL MEDICINE

## 2019-02-21 PROCEDURE — 97110 THERAPEUTIC EXERCISES: CPT | Performed by: PHYSICAL THERAPIST

## 2019-02-21 PROCEDURE — 97112 NEUROMUSCULAR REEDUCATION: CPT | Performed by: PHYSICAL THERAPIST

## 2019-02-21 PROCEDURE — 97140 MANUAL THERAPY 1/> REGIONS: CPT | Performed by: PHYSICAL THERAPIST

## 2019-02-21 PROCEDURE — 81002 URINALYSIS NONAUTO W/O SCOPE: CPT | Performed by: INTERNAL MEDICINE

## 2019-02-21 RX ORDER — RAMIPRIL 5 MG/1
5 CAPSULE ORAL DAILY
Qty: 90 CAPSULE | Refills: 3 | Status: SHIPPED | OUTPATIENT
Start: 2019-02-21 | End: 2019-04-21 | Stop reason: HOSPADM

## 2019-02-21 NOTE — LETTER
February 21, 2019     Israel Camacho MD  [place] [id number]    Patient: Candis Anderson   YOB: 1985   Date of Visit: 2/21/2019       Dear Dr Richie Manuel: Thank you for referring Skylar Mcmahon to me for evaluation  Below are my notes for this consultation  If you have questions, please do not hesitate to call me  I look forward to following your patient along with you  Sincerely,        Myriam Medina MD        CC: No Recipients  Myriam Medina MD  2/21/2019  2:29 PM  Sign at close encounter  Consultation - Nephrology   Candis Anderson 26 y o  male MRN: [id number]  Unit/Bed#:  Encounter: [id number]      Assessment/Plan     Assessment / Plan:  1  Renal  Patient is chronic kidney disease likely due to diabetic nephropathy  He has been a diabetic for about 16 years and he really did not doctor much at all and had very poorly controlled blood sugars  He was found to have protein in the urine and he is here for evaluation  Historically it fits with diabetic nephropathy as he has had it for a long enough period of time his sugars were not well controlled in the past   Protein estimation is about 2 g creatinine is 1 4-1 6 which is likely his baseline  At this point his blood sugars have improved dramatically now these taking more seriously his blood pressures come down with the medication and there is about 2 g of protein in the urine  I am going to increase his ramipril to 5 mg a day we must monitor the potassium carefully given his diuretic and ACE-inhibitor  If there is a problem with increased potassium we will stop his diuretic and change to another antihypertensive  In the meantime I have given him slip to increase the medication and check labs in 2 weeks I will contact her with those results then repeat urine protein estimation BMP prior to visit in 3 months      Also I will give him a renal ultrasound slip for him to check this and he was scheduled himself  History of Present Illness   Physician Requesting Consult: No att  providers found  Reason for Consult / Principal Problem:  Chronic kidney disease and proteinuria  Hx and PE limited by:   HPI: Franco Raymond is a 26y o  year old male who presents for evaluation of proteinuria  The patient has a longstanding diabetic as he states he was diagnosed at the age of 21 prior to this past August he really was not taking care of himself treating his sugars are seeing a physician  He was found to have proteinuria and hypertension and was started on some medical therapy and is now here for evaluation and ongoing treatment  History obtained from chart review and the patient  Consults    Review of Systems   Constitutional: Negative  HENT: Negative  Eyes: Negative  Respiratory: Negative  Cardiovascular: Negative  Negative for chest pain and leg swelling  Gastrointestinal: Negative  Negative for abdominal distention, abdominal pain, diarrhea and vomiting  Genitourinary: Negative  Negative for difficulty urinating, dysuria and hematuria  Musculoskeletal: Negative  Skin: Negative for rash  Neurological: Negative  Historical Information   Patient Active Problem List   Diagnosis    Hair loss    Erectile dysfunction    Chronic bilateral thoracic back pain    Hyperlipidemia    De Quervain's disease (radial styloid tenosynovitis)    Essential hypertension    Bucket-handle tear of medial meniscus of right knee as current injury    Other headache syndrome    Other proteinuria    CKD (chronic kidney disease)     Past Medical History:   Diagnosis Date    CKD (chronic kidney disease) 2/21/2019    [facility] ([facility] [address] [facility]     Essential hypertension 10/31/2017    Hyperlipidemia     No history of heart disease, cancer, stroke, liver disease, lung disease, hepatitis  He did have 1 kidney stone in his life      Past Surgical History:   Procedure Laterality Date    ABCESS DRAINAGE      tooth    MT KNEE SCOPE,MED/LAT MENISECTOMY Right 9/13/2018    Procedure: RIGHT KNEE ARTHROSCOPIC PARTIAL MEDIAL MENISCECTOMY;  Surgeon: Abdias Bruce MD;  [facility];  Service: Orthopedics    WRIST ARTHROPLASTY Right 2017     Social History   Social History     Substance and Sexual Activity   Alcohol Use Yes    Comment: occasionally     Social History     Substance and Sexual Activity   Drug Use No     Social History     Tobacco Use   Smoking Status Never Smoker   Smokeless Tobacco Never Used     Family History   Problem Relation Age of Onset    Hypertension Mother     Multiple sclerosis Mother     Diabetes Father        Meds/Allergies   current meds:   No current facility-administered medications for this visit  No Known Allergies    Objective   [unfilled]  Body mass index is 31 53 kg/m²  Invasive Devices:        PHYSICAL EXAM:  /90 (BP Location: Right arm, Patient Position: Sitting, Cuff Size: Standard)   Pulse 80   Ht 6' 1" (1 854 m)   Wt 108 kg (239 lb)   BMI 31 53 kg/m²      Physical Exam   Constitutional: He is oriented to person, place, and time  He appears well-nourished  No distress  HENT:   Head: Atraumatic  Mouth/Throat: No oropharyngeal exudate  Eyes: No scleral icterus  Neck: Neck supple  No JVD present  Cardiovascular: Normal rate and regular rhythm  Exam reveals no friction rub  Pulmonary/Chest: Effort normal and breath sounds normal  No respiratory distress  He has no wheezes  He has no rales  Abdominal: Soft  Bowel sounds are normal  He exhibits no distension  There is no tenderness  Neurological: He is alert and oriented to person, place, and time           Current Weight: Weight - Scale: 108 kg (239 lb)  First Weight: Weight - Scale: 108 kg (239 lb)    Lab Results:              Invalid input(s): LABGLOM        Invalid input(s): LABALBU

## 2019-02-21 NOTE — PATIENT INSTRUCTIONS
You are here for your initial visit to evaluate protein in the urine  You are diabetic for many years and recently you been doing very well with her blood sugars in taking medications and her blood pressures down  More than likely the protein in the urine is due to diabetic involvement of the kidney  The creatinine which is the blood test for the kidney function ranges from around 1 4-1 6 over the few labs I have seen  This is still pretty well preserved kidney function but what our goals will be will be to delay damage with blood pressure control, sugar control, cholesterol control and trying to lower protein in the urine  We will do this by controlling her blood pressure and also increase your ramipril to 5 mg once a day  Hopefully this medication will lower the blood pressure little bit more but more importantly reduce protein in the urine  Increase ramipril to 5 mg a day  Obtain lab work 2 weeks after the higher dose  I will call you with these results    Office follow up with labs as scheduled

## 2019-02-21 NOTE — PROGRESS NOTES
Consultation - Nephrology   Dickie Bence 26 y o  male MRN: [id number]  Unit/Bed#:  Encounter: [id number]      Assessment/Plan     Assessment / Plan:  1  Renal  Patient is chronic kidney disease likely due to diabetic nephropathy  He has been a diabetic for about 16 years and he really did not doctor much at all and had very poorly controlled blood sugars  He was found to have protein in the urine and he is here for evaluation  Historically it fits with diabetic nephropathy as he has had it for a long enough period of time his sugars were not well controlled in the past   Protein estimation is about 2 g creatinine is 1 4-1 6 which is likely his baseline  At this point his blood sugars have improved dramatically now these taking more seriously his blood pressures come down with the medication and there is about 2 g of protein in the urine  I am going to increase his ramipril to 5 mg a day we must monitor the potassium carefully given his diuretic and ACE-inhibitor  If there is a problem with increased potassium we will stop his diuretic and change to another antihypertensive  In the meantime I have given him slip to increase the medication and check labs in 2 weeks I will contact her with those results then repeat urine protein estimation BMP prior to visit in 3 months  Also I will give him a renal ultrasound slip for him to check this and he was scheduled himself  History of Present Illness   Physician Requesting Consult: No att  providers found  Reason for Consult / Principal Problem:  Chronic kidney disease and proteinuria  Hx and PE limited by:   HPI: Dickie Bence is a 26y o  year old male who presents for evaluation of proteinuria  The patient has a longstanding diabetic as he states he was diagnosed at the age of 21 prior to this past August he really was not taking care of himself treating his sugars are seeing a physician    He was found to have proteinuria and hypertension and was started on some medical therapy and is now here for evaluation and ongoing treatment  History obtained from chart review and the patient  Consults    Review of Systems   Constitutional: Negative  HENT: Negative  Eyes: Negative  Respiratory: Negative  Cardiovascular: Negative  Negative for chest pain and leg swelling  Gastrointestinal: Negative  Negative for abdominal distention, abdominal pain, diarrhea and vomiting  Genitourinary: Negative  Negative for difficulty urinating, dysuria and hematuria  Musculoskeletal: Negative  Skin: Negative for rash  Neurological: Negative  Historical Information   Patient Active Problem List   Diagnosis    Hair loss    Erectile dysfunction    Chronic bilateral thoracic back pain    Hyperlipidemia    De Quervain's disease (radial styloid tenosynovitis)    Essential hypertension    Bucket-handle tear of medial meniscus of right knee as current injury    Other headache syndrome    Other proteinuria    CKD (chronic kidney disease)     Past Medical History:   Diagnosis Date    CKD (chronic kidney disease) 2/21/2019    [facility] mellitus ([facility] [address] )     Essential hypertension 10/31/2017    Hyperlipidemia     No history of heart disease, cancer, stroke, liver disease, lung disease, hepatitis  He did have 1 kidney stone in his life      Past Surgical History:   Procedure Laterality Date    ABCESS DRAINAGE      tooth    PA KNEE SCOPE,MED/LAT MENISECTOMY Right 9/13/2018    Procedure: RIGHT KNEE ARTHROSCOPIC PARTIAL MEDIAL MENISCECTOMY;  Surgeon: Cesar Shen MD;  [facility];  Service: Orthopedics    WRIST ARTHROPLASTY Right 2017     Social History   Social History     Substance and Sexual Activity   Alcohol Use Yes    Comment: occasionally     Social History     Substance and Sexual Activity   Drug Use No     Social History     Tobacco Use   Smoking Status Never Smoker   Smokeless Tobacco Never Used     Family History   Problem Relation Age of Onset    Hypertension Mother     Multiple sclerosis Mother     Diabetes Father        Meds/Allergies   current meds:   No current facility-administered medications for this visit  No Known Allergies    Objective   [unfilled]  Body mass index is 31 53 kg/m²  Invasive Devices:        PHYSICAL EXAM:  /90 (BP Location: Right arm, Patient Position: Sitting, Cuff Size: Standard)   Pulse 80   Ht 6' 1" (1 854 m)   Wt 108 kg (239 lb)   BMI 31 53 kg/m²     Physical Exam   Constitutional: He is oriented to person, place, and time  He appears well-nourished  No distress  HENT:   Head: Atraumatic  Mouth/Throat: No oropharyngeal exudate  Eyes: No scleral icterus  Neck: Neck supple  No JVD present  Cardiovascular: Normal rate and regular rhythm  Exam reveals no friction rub  Pulmonary/Chest: Effort normal and breath sounds normal  No respiratory distress  He has no wheezes  He has no rales  Abdominal: Soft  Bowel sounds are normal  He exhibits no distension  There is no tenderness  Neurological: He is alert and oriented to person, place, and time           Current Weight: Weight - Scale: 108 kg (239 lb)  First Weight: Weight - Scale: 108 kg (239 lb)    Lab Results:              Invalid input(s): LABGLOM        Invalid input(s): LABALBU

## 2019-02-21 NOTE — PROGRESS NOTES
Daily Note     Today's date: 2019  Patient name: Kayley Triplett  : 1985  MRN: [id number]  Referring provider: CHARLOTTE Helms  Dx:   Encounter Diagnosis     ICD-10-CM    1  Neck pain M54 2    2  Myofascial pain syndrome M79 18        Start Time: 1135  Stop Time: 1205  Total time in clinic (min): 30 minutes    Subjective: Serena Pimentel reports that his neck feels about the same  Continued photosensitivity, headaches  Objective: See treatment diary below      Assessment: Tolerated treatment well  Patient demonstrated fatigue post treatment, exhibited good technique with therapeutic exercises and would benefit from continued PT  Patient experienced relief at end of session  BP seated 160/100mmhg    Plan: Continue per plan of care             Precautions: HTN** ASSESS BP Each Visit    Daily Treatment Diary       Manuals 19           SUB OCC release 15' 10'           STM UT don done           CS passive Rom done done                        Exercise Diary                           Chin tucks* 5x5"x2 5x5"x2           Prone bilateral I's* 5x5"x2 5x5"           Prone ^'s  nv 5x5"x2                        CS rotations nv 5x5"x2 ea           Seated TB low row  nv                                                                                                      Modalities

## 2019-02-24 ENCOUNTER — APPOINTMENT (EMERGENCY)
Dept: CT IMAGING | Facility: HOSPITAL | Age: 34
End: 2019-02-24
Payer: COMMERCIAL

## 2019-02-24 ENCOUNTER — HOSPITAL ENCOUNTER (EMERGENCY)
Facility: HOSPITAL | Age: 34
Discharge: HOME | End: 2019-02-24
Attending: EMERGENCY MEDICINE | Admitting: EMERGENCY MEDICINE
Payer: COMMERCIAL

## 2019-02-24 VITALS
OXYGEN SATURATION: 98 % | DIASTOLIC BLOOD PRESSURE: 93 MMHG | TEMPERATURE: 98.4 F | HEIGHT: 72 IN | WEIGHT: 239.86 LBS | SYSTOLIC BLOOD PRESSURE: 169 MMHG | HEART RATE: 87 BPM | BODY MASS INDEX: 32.49 KG/M2 | RESPIRATION RATE: 18 BRPM

## 2019-02-24 DIAGNOSIS — R51.9 HEADACHE: Primary | ICD-10-CM

## 2019-02-24 LAB
ANION GAP BLD CALC-SCNC: 16 MMOL/L (ref 4–13)
BUN BLD-MCNC: 32 MG/DL (ref 5–25)
CA-I BLD-SCNC: 1.28 MMOL/L (ref 1.12–1.32)
CHLORIDE BLD-SCNC: 108 MMOL/L (ref 100–108)
CREAT BLD-MCNC: 1.9 MG/DL (ref 0.6–1.3)
GFR SERPL CREATININE-BSD FRML MDRD: 52 ML/MIN/1.73SQ M
GLUCOSE SERPL-MCNC: 182 MG/DL (ref 65–140)
HCT VFR BLD CALC: 36 % (ref 36.5–49.3)
HGB BLDA-MCNC: 12.2 G/DL (ref 12–17)
PCO2 BLD: 23 MMOL/L (ref 21–32)
POTASSIUM BLD-SCNC: 4.8 MMOL/L (ref 3.5–5.3)
SODIUM BLD-SCNC: 141 MMOL/L (ref 136–145)
SPECIMEN SOURCE: ABNORMAL

## 2019-02-24 PROCEDURE — 99284 EMERGENCY DEPT VISIT MOD MDM: CPT

## 2019-02-24 PROCEDURE — 70496 CT ANGIOGRAPHY HEAD: CPT

## 2019-02-24 PROCEDURE — 96372 THER/PROPH/DIAG INJ SC/IM: CPT

## 2019-02-24 PROCEDURE — 96375 TX/PRO/DX INJ NEW DRUG ADDON: CPT

## 2019-02-24 PROCEDURE — 85014 HEMATOCRIT: CPT

## 2019-02-24 PROCEDURE — 80047 BASIC METABLC PNL IONIZED CA: CPT

## 2019-02-24 PROCEDURE — 96361 HYDRATE IV INFUSION ADD-ON: CPT

## 2019-02-24 PROCEDURE — 70498 CT ANGIOGRAPHY NECK: CPT

## 2019-02-24 PROCEDURE — 96374 THER/PROPH/DIAG INJ IV PUSH: CPT

## 2019-02-24 RX ORDER — KETOROLAC TROMETHAMINE 30 MG/ML
15 INJECTION, SOLUTION INTRAMUSCULAR; INTRAVENOUS ONCE
Status: COMPLETED | OUTPATIENT
Start: 2019-02-24 | End: 2019-02-24

## 2019-02-24 RX ORDER — METOCLOPRAMIDE HYDROCHLORIDE 5 MG/ML
10 INJECTION INTRAMUSCULAR; INTRAVENOUS ONCE
Status: COMPLETED | OUTPATIENT
Start: 2019-02-24 | End: 2019-02-24

## 2019-02-24 RX ORDER — OLANZAPINE 10 MG/1
5 INJECTION, POWDER, LYOPHILIZED, FOR SOLUTION INTRAMUSCULAR ONCE
Status: COMPLETED | OUTPATIENT
Start: 2019-02-24 | End: 2019-02-24

## 2019-02-24 RX ORDER — ACETAMINOPHEN 325 MG/1
650 TABLET ORAL ONCE
Status: COMPLETED | OUTPATIENT
Start: 2019-02-24 | End: 2019-02-24

## 2019-02-24 RX ADMIN — ACETAMINOPHEN 650 MG: 325 TABLET, FILM COATED ORAL at 03:08

## 2019-02-24 RX ADMIN — KETOROLAC TROMETHAMINE 15 MG: 30 INJECTION, SOLUTION INTRAMUSCULAR at 05:08

## 2019-02-24 RX ADMIN — METOCLOPRAMIDE 10 MG: 5 INJECTION, SOLUTION INTRAMUSCULAR; INTRAVENOUS at 03:08

## 2019-02-24 RX ADMIN — OLANZAPINE 5 MG: 10 INJECTION, POWDER, FOR SOLUTION INTRAMUSCULAR at 03:07

## 2019-02-24 RX ADMIN — WATER 10 ML: 1 INJECTION INTRAMUSCULAR; INTRAVENOUS; SUBCUTANEOUS at 03:10

## 2019-02-24 RX ADMIN — SODIUM CHLORIDE 1000 ML: 0.9 INJECTION, SOLUTION INTRAVENOUS at 05:11

## 2019-02-24 RX ADMIN — IOHEXOL 85 ML: 350 INJECTION, SOLUTION INTRAVENOUS at 03:52

## 2019-02-26 ENCOUNTER — APPOINTMENT (OUTPATIENT)
Dept: PHYSICAL THERAPY | Facility: REHABILITATION | Age: 34
End: 2019-02-26
Payer: COMMERCIAL

## 2019-02-28 ENCOUNTER — OFFICE VISIT (OUTPATIENT)
Dept: PHYSICAL THERAPY | Facility: REHABILITATION | Age: 34
End: 2019-02-28
Payer: COMMERCIAL

## 2019-02-28 DIAGNOSIS — M79.18 MYOFASCIAL PAIN SYNDROME: ICD-10-CM

## 2019-02-28 DIAGNOSIS — M54.2 NECK PAIN: Primary | ICD-10-CM

## 2019-02-28 PROCEDURE — 97110 THERAPEUTIC EXERCISES: CPT

## 2019-02-28 PROCEDURE — 97140 MANUAL THERAPY 1/> REGIONS: CPT

## 2019-02-28 PROCEDURE — 97112 NEUROMUSCULAR REEDUCATION: CPT

## 2019-02-28 NOTE — PROGRESS NOTES
Daily Note     Today's date: 2019  Patient name: Dickie Bence  : 1985  MRN: [id number]  Referring provider: CHARLOTTE Eugene  Dx:   Encounter Diagnosis     ICD-10-CM    1  Neck pain M54 2    2  Myofascial pain syndrome M79 18                   Subjective: Patient reports moderate neck discomfort/soreness prior to session today stating "I just need to take a steady flow of pain medicine and I can usually get by, but it still hurts " He continues to report headaches  He reports compliance with HEP and no complaints after previous session  Objective: See treatment diary below    Precautions: HTN** ASSESS BP Each Visit    Daily Treatment Diary       Manuals 19          SUB OCC release 15' 10' 10'          STM UT don done done          CS passive Rom done done done                       Exercise Diary                           Chin tucks* 5x5"x2 5x5"x2 5x5''x2          Prone bilateral I's* 5x5"x2 5x5" 10x5''          Prone ^'s  nv 5x5"x2 5x5''x2                       CS rotations nv 5x5"x2 ea 5x5''x2 ea          Seated TB low row  nv PTB 10                                                                                                      Modalities                                             Assessment: Tolerated treatment fair  Patient demonstrated fatigue post treatment, exhibited good technique with therapeutic exercises and would benefit from continued PT Patient reports some relief at end of session today  BP taken at end of session, 152/90  Plan: Continue per plan of care  Progress treatment as tolerated

## 2019-02-28 NOTE — ED PROVIDER NOTES
History  Chief Complaint   Patient presents with    Headache     Pt presents to ED for evaluation and treatment of severe frontal headache  Headaches began in January  Diagnosed with herniated disc  [facility] for pain meds and muscle relaxers  Pt reports not taking meds as prescribed because he doesn't like how he feels when taking     HPI     Patient is a 26year old male who presents with frontal headache  He has a history of prior similar episodes  No exac or releaving factors  He notes achi left sided pain in the neck   HA was gradual onset  No f/c/s  No neck stiffness  No focal neurological symptoms  No temporal artery pain/tenderness  No vision changes  Headaches are not increasing in severity or frequency  Not worse in the AM  No head trauma  MDM 26year old male, given neck pain I suspect that this is msk pain or a dissection, will rule out dissection with ct head  Otherwise treat as headache  Prior to Admission Medications   Prescriptions Last Dose Informant Patient Reported? Taking?    AMILoride 5 mg tablet  Self No No   Sig: Take 2 tablets (10 mg total) by mouth daily   Insulin Pen Needle 31G X 5 MM MISC  Self No No   Sig: by Does not apply route as needed (as needed)   Lancets (FREESTYLE) lancets  Self No No   Sig: by Other route 3 (three) times a day before meals Use as instructed   Vitamin D, Cholecalciferol, 1000 units CAPS  Self No No   Sig: Take 1 tablet (1,000 Units total) by mouth daily   cyclobenzaprine (FLEXERIL) 5 mg tablet  Self Yes No   Sig: Take 5 mg by mouth 3 (three) times a day as needed for muscle spasms   glucose blood (ACCU-CHEK HARVEY PLUS) test strip  Self No No   Sig: Use as instructed   glucose blood test strip  Self No No   Si each by Other route 3 (three) times a day Dispense FreeStyle test strips   glucose monitoring kit (FREESTYLE) monitoring kit  Self No No   Sig: Inject 1 each as directed 3 (three) times a day before meals   imipramine (TOFRANIL) 25 mg tablet  Self No No   Sig: Take 1 tablet (25 mg total) by mouth daily at bedtime   Patient not taking: Reported on 2019    insulin glargine (BASAGLAR KWIKPEN) 100 units/mL injection pen  Self No No   Sig: Inject 26-28 Units under the skin daily at bedtime   metFORMIN (GLUCOPHAGE) 500 mg tablet   No No   Sig: TAKE 1 TABLET BY MOUTH TWICE A DAY WITH MEALS   ramipril (ALTACE) 5 mg capsule   No No   Sig: Take 1 capsule (5 mg total) by mouth daily Pt unsure of dose  sildenafil (REVATIO) 20 mg tablet  Self No No   Si-5 tablets as needed for sexual activity   tiZANidine (ZANAFLEX) 4 mg tablet  Self No No   Sig: Take 1 tablet (4 mg total) by mouth daily at bedtime   Patient not taking: Reported on 2019      Facility-Administered Medications: None       Past Medical History:   Diagnosis Date    CKD (chronic kidney disease) 2019    Diabetes mellitus ([facility] [address] )     Essential hypertension 10/31/2017    Hyperlipidemia        Past Surgical History:   Procedure Laterality Date    ABCESS DRAINAGE      tooth    RI KNEE SCOPE,MED/LAT MENISECTOMY Right 2018    Procedure: RIGHT KNEE ARTHROSCOPIC PARTIAL MEDIAL MENISCECTOMY;  Surgeon: Milton Ricci MD;  Location: [facility];  Service: Orthopedics    WRIST ARTHROPLASTY Right 2017       Family History   Problem Relation Age of Onset    Hypertension Mother     Multiple sclerosis Mother     Diabetes Father      I have reviewed and agree with the history as documented  Social History     Tobacco Use    Smoking status: Never Smoker    Smokeless tobacco: Never Used   Substance Use Topics    Alcohol use: Yes     Comment: occasionally    Drug use: No        Review of Systems   Neurological: Positive for headaches  All other systems reviewed and are negative  Physical Exam  Physical Exam   Constitutional: He is oriented to person, place, and time  He appears well-developed and well-nourished     HENT: Head: Normocephalic and atraumatic  Eyes: Pupils are equal, round, and reactive to light  EOM are normal    Neck: Normal range of motion  Neck supple  Cardiovascular: Normal rate, regular rhythm and normal heart sounds  No murmur heard  Pulmonary/Chest: Effort normal and breath sounds normal  No respiratory distress  He has no wheezes  Abdominal: Soft  Bowel sounds are normal  He exhibits no distension  There is no tenderness  Musculoskeletal: Normal range of motion  He exhibits no edema or tenderness  Neurological: He is alert and oriented to person, place, and time  No cranial nerve deficit  Coordination normal    Skin: Skin is warm and dry  He is not diaphoretic  No erythema  Psychiatric: He has a normal mood and affect  His behavior is normal    Nursing note and vitals reviewed        Vital Signs  ED Triage Vitals [02/24/19 0230]   Temperature Pulse Respirations Blood Pressure SpO2   98 4 °F (36 9 °C) 99 18 (!) 187/110 99 %      Temp Source Heart Rate Source Patient Position - Orthostatic VS BP Location FiO2 (%)   Oral Monitor Lying Left arm --      Pain Score       Worst Possible Pain           Vitals:    02/24/19 0230 02/24/19 0318 02/24/19 0459   BP: (!) 187/110 164/90 169/93   Pulse: 99  87   Patient Position - Orthostatic VS: Lying Lying Lying       Visual Acuity      ED Medications  Medications   acetaminophen (TYLENOL) tablet 650 mg (650 mg Oral Given 2/24/19 0308)   metoclopramide (REGLAN) injection 10 mg (10 mg Intravenous Given 2/24/19 0308)   OLANZapine (ZyPREXA) IM injection 5 mg (5 mg Intramuscular Given 2/24/19 0307)   sterile water injection **ADS Override Pull** (10 mL  Given 2/24/19 0310)   iohexol (OMNIPAQUE) 350 MG/ML injection (MULTI-DOSE) 85 mL (85 mL Intravenous Given 2/24/19 0352)   ketorolac (TORADOL) injection 15 mg (15 mg Intravenous Given 2/24/19 0508)   sodium chloride 0 9 % bolus 1,000 mL (0 mL Intravenous Stopped 2/24/19 2672)       Diagnostic Studies  Results Reviewed     Procedure Component Value Units Date/Time    POCT Chem 8+ [[id number]]  (Abnormal) Collected:  02/24/19 0313    Lab Status:  Final result Specimen:  Venous Updated:  02/24/19 0317     SODIUM, I-STAT 141 mmol/l      Potassium, i-STAT 4 8 mmol/L      Chloride, istat 108 mmol/L      CO2, i-STAT 23 mmol/L      Anion Gap, i-STAT 16 mmol/L      Calcium, Ionized i-STAT 1 28 mmol/L      BUN, I-STAT 32 mg/dl      Creatinine, i-STAT 1 9 mg/dl      eGFR 52 ml/min/1 73sq m      Glucose, i-STAT 182 mg/dl      Hct, i-STAT 36 %      Hgb, i-STAT 12 2 g/dl      Specimen Type VENOUS                 CTA head and neck with and without contrast   Final Result by Daniel Hairston MD (02/24 3716)      No evidence of acute intracranial abnormality  No evidence of acute vascular abnormality  No evidence of carotid artery dissection or vertebral artery dissection  No evidence of vessel cut off sign  Workstation performed: [id number]                    Procedures  Procedures       Phone Contacts  ED Phone Contact    ED Course                               MDM    Disposition  Final diagnoses:   Headache     Time reflects when diagnosis was documented in both MDM as applicable and the Disposition within this note     Time User Action Codes Description Comment    2/24/2019  5:13 AM Alejandro Corea Add [R51] Headache       ED Disposition     ED Disposition Condition Date/Time Comment    Discharge Stable Sun Feb 24, 2019  5:12 AM 2360 Lizbet Tran discharge to home/self care              Follow-up Information     Follow up With Specialties Details Why [address] [facility] [address] Headache Specialist    Address: [address] [place] [address] [facility] [address] [place] [address]  Phone: [phone]          Discharge Medication List as of 2/24/2019  5:13 AM      CONTINUE these medications which have NOT CHANGED    Details   AMILoride 5 mg tablet Take 2 tablets (10 mg total) by mouth daily, Starting Wed 2/13/2019, Normal      cyclobenzaprine (FLEXERIL) 5 mg tablet Take 5 mg by mouth 3 (three) times a day as needed for muscle spasms, Historical Med      !! glucose blood (ACCU-CHEK HARVEY PLUS) test strip Use as instructed, Normal      !! glucose blood test strip 1 each by Other route 3 (three) times a day Dispense FreeStyle test strips, Starting Fri 9/21/2018, Normal      glucose monitoring kit (FREESTYLE) monitoring kit Inject 1 each as directed 3 (three) times a day before meals, Starting Fri 9/21/2018, Normal      imipramine (TOFRANIL) 25 mg tablet Take 1 tablet (25 mg total) by mouth daily at bedtime, Starting Tue 8/21/2018, Normal      insulin glargine (BASAGLAR KWIKPEN) 100 units/mL injection pen Inject 26-28 Units under the skin daily at bedtime, Starting Tue 1/8/2019, Normal      Insulin Pen Needle 31G X 5 MM MISC by Does not apply route as needed (as needed), Starting Wed 1/9/2019, Normal      Lancets (FREESTYLE) lancets by Other route 3 (three) times a day before meals Use as instructed, Starting Fri 9/21/2018, Normal      metFORMIN (GLUCOPHAGE) 500 mg tablet TAKE 1 TABLET BY MOUTH TWICE A DAY WITH MEALS, Normal      ramipril (ALTACE) 5 mg capsule Take 1 capsule (5 mg total) by mouth daily Pt unsure of dose , Starting Thu 2/21/2019, Normal      sildenafil (REVATIO) 20 mg tablet 2-5 tablets as needed for sexual activity, Print      tiZANidine (ZANAFLEX) 4 mg tablet Take 1 tablet (4 mg total) by mouth daily at bedtime, Starting Mon 2/11/2019, Normal      Vitamin D, Cholecalciferol, 1000 units CAPS Take 1 tablet (1,000 Units total) by mouth daily, Starting Tue 8/21/2018, Normal       !! - Potential duplicate medications found  Please discuss with provider  No discharge procedures on file      ED Provider  Electronically Signed by           Spnecer Duran MD  02/28/19 8517

## 2019-03-05 ENCOUNTER — OFFICE VISIT (OUTPATIENT)
Dept: PHYSICAL THERAPY | Facility: REHABILITATION | Age: 34
End: 2019-03-05
Payer: COMMERCIAL

## 2019-03-05 DIAGNOSIS — M54.2 NECK PAIN: Primary | ICD-10-CM

## 2019-03-05 DIAGNOSIS — E11.9 TYPE 2 DIABETES MELLITUS WITHOUT COMPLICATION, WITH LONG-TERM CURRENT USE OF INSULIN (HCC): ICD-10-CM

## 2019-03-05 DIAGNOSIS — M79.18 MYOFASCIAL PAIN SYNDROME: ICD-10-CM

## 2019-03-05 DIAGNOSIS — Z79.4 TYPE 2 DIABETES MELLITUS WITHOUT COMPLICATION, WITH LONG-TERM CURRENT USE OF INSULIN (HCC): ICD-10-CM

## 2019-03-05 DIAGNOSIS — E55.9 VITAMIN D DEFICIENCY: ICD-10-CM

## 2019-03-05 DIAGNOSIS — E11.65 TYPE 2 DIABETES MELLITUS WITH HYPERGLYCEMIA, WITHOUT LONG-TERM CURRENT USE OF INSULIN (HCC): ICD-10-CM

## 2019-03-05 PROCEDURE — 97112 NEUROMUSCULAR REEDUCATION: CPT

## 2019-03-05 PROCEDURE — 97140 MANUAL THERAPY 1/> REGIONS: CPT

## 2019-03-05 PROCEDURE — 97110 THERAPEUTIC EXERCISES: CPT

## 2019-03-05 RX ORDER — AMPICILLIN TRIHYDRATE 500 MG
CAPSULE ORAL
Qty: 90 CAPSULE | Refills: 1 | Status: SHIPPED | OUTPATIENT
Start: 2019-03-05 | End: 2020-09-30

## 2019-03-05 RX ORDER — INSULIN GLARGINE 100 [IU]/ML
INJECTION, SOLUTION SUBCUTANEOUS
Qty: 5 PEN | Refills: 1 | Status: ON HOLD | OUTPATIENT
Start: 2019-03-05 | End: 2019-04-21 | Stop reason: SDUPTHER

## 2019-03-05 NOTE — PROGRESS NOTES
Daily Note     Today's date: 3/5/2019  Patient name: Aron Fraga  : 1985  MRN: [id number]  Referring provider: CHALROTTE Guerra  Dx:   Encounter Diagnosis     ICD-10-CM    1  Neck pain M54 2    2  Myofascial pain syndrome M79 18                   Subjective: Patient reports neck pain prior to session as "the same" and continues to report headaches stating "if I don't take the medicine, it's bad " Patient denies any complaints after previous session  He reports semi-compliance with HEP  Objective: See treatment diary below  Precautions: HTN** ASSESS BP Each Visit    Daily Treatment Diary       Manuals 02/18/19  2/21/19 2/28 3/5         SUB OCC release 15' 10' 10' 10'         STM UT don done done Done         CS passive Rom done done done Done                       Exercise Diary                           Chin tucks* 5x5"x2 5x5"x2 5x5''x2 10x5''x2         Prone bilateral I's* 5x5"x2 5x5" 10x5'' 10x5''x2         Prone ^'s  nv 5x5"x2 5x5''x2 10x5''x2                      CS rotations nv 5x5"x2 ea 5x5''x2 ea 10x5''x2         Seated TB low row  nv PTB 10  OTB 2x10          Seated TB horizontal abduction    OTB 2x10                                                                                       Modalities                                         Assessment: Tolerated treatment fair  Patient demonstrated fatigue post treatment, exhibited good technique with therapeutic exercises and would benefit from continued PT Significant fatigue noted with completion of prone TE, cues required for UT compensation  Trialed TB horizontal abduction with fair tolerance  BP taken at end of session 160/90  Plan: Continue per plan of care  Progress treatment as tolerated

## 2019-03-07 ENCOUNTER — APPOINTMENT (OUTPATIENT)
Dept: PHYSICAL THERAPY | Facility: REHABILITATION | Age: 34
End: 2019-03-07
Payer: COMMERCIAL

## 2019-03-07 RX ORDER — BLOOD-GLUCOSE METER
KIT MISCELLANEOUS
Qty: 100 EACH | Refills: 5 | Status: SHIPPED | OUTPATIENT
Start: 2019-03-07 | End: 2019-09-13 | Stop reason: SDUPTHER

## 2019-03-12 ENCOUNTER — OFFICE VISIT (OUTPATIENT)
Dept: PHYSICAL THERAPY | Facility: REHABILITATION | Age: 34
End: 2019-03-12
Payer: COMMERCIAL

## 2019-03-12 DIAGNOSIS — M79.18 MYOFASCIAL PAIN SYNDROME: ICD-10-CM

## 2019-03-12 DIAGNOSIS — M54.2 NECK PAIN: Primary | ICD-10-CM

## 2019-03-12 PROCEDURE — 97110 THERAPEUTIC EXERCISES: CPT

## 2019-03-12 PROCEDURE — 97140 MANUAL THERAPY 1/> REGIONS: CPT

## 2019-03-12 PROCEDURE — 97112 NEUROMUSCULAR REEDUCATION: CPT

## 2019-03-12 RX ORDER — TIZANIDINE 4 MG/1
4 TABLET ORAL
Qty: 30 TABLET | Refills: 0 | OUTPATIENT
Start: 2019-03-12

## 2019-03-12 NOTE — PROGRESS NOTES
Daily Note     Today's date: 3/12/2019  Patient name: Garrett Ruiz  : 1985  MRN: [id number]  Referring provider: CHARLOTTE Butler  Dx:   Encounter Diagnosis     ICD-10-CM    1  Neck pain M54 2    2  Myofascial pain syndrome M79 18        Start Time: 1507  Stop Time: 6201  Total time in clinic (min): 48 minutes    Subjective: Pt reports no pain in c/s prior to start of session today  Pt states he hasn't had any HA's recently, but has been taking Excedrin on a regular basis to avoid them  Objective: See treatment diary below  Precautions: HTN** ASSESS BP Each Visit    Daily Treatment Diary       Manuals 02/18/19  2/21/19 2/28 3/5 3/7        SUB OCC release 15' 10' 10' 10' 10'        STM UT don done done Done Done        CS passive Rom done done done Done  Done                     Exercise Diary                           Chin tucks* 5x5"x2 5x5"x2 5x5''x2 10x5''x2 10x5''x2        Prone bilateral I's* 5x5"x2 5x5" 10x5'' 10x5''x2 10x5''x2        Prone ^'s  nv 5x5"x2 5x5''x2 10x5''x2 10x5''x2                     CS rotations nv 5x5"x2 ea 5x5''x2 ea 10x5''x2 10x5''x2        Seated TB low row  nv PTB 10  OTB 2x10  OTB 2x10        Seated TB horizontal abduction    OTB 2x10 OTB 2x10                                                                                      Modalities                                           Assessment: Tolerated treatment well  Was able to perform all exercise with no increase in pain  Pt did not slight tension felt in B c/s at end of session, but no pain  Pt reports slight compliance with HEP, stating he has not been doing them everyday  Moderate tension/increased tone noted in B UT, R > L during STM performed today  Patient demonstrated fatigue post treatment and would benefit from continued PT for additional strength and endurance, with decreased pain of the c/s  Seated BP of L arm = 156/96 mmHg prior to start of session  Plan: Continue per plan of care  Progress treatment as tolerated

## 2019-03-13 ENCOUNTER — HOSPITAL ENCOUNTER (OUTPATIENT)
Dept: ULTRASOUND IMAGING | Facility: HOSPITAL | Age: 34
Discharge: HOME | End: 2019-03-13
Attending: INTERNAL MEDICINE
Payer: COMMERCIAL

## 2019-03-13 DIAGNOSIS — N18.9 CHRONIC KIDNEY DISEASE, UNSPECIFIED CKD STAGE: ICD-10-CM

## 2019-03-13 PROCEDURE — 76770 US EXAM ABDO BACK WALL COMP: CPT

## 2019-03-14 ENCOUNTER — OFFICE VISIT (OUTPATIENT)
Dept: PHYSICAL THERAPY | Facility: REHABILITATION | Age: 34
End: 2019-03-14
Payer: COMMERCIAL

## 2019-03-14 DIAGNOSIS — M79.18 MYOFASCIAL PAIN SYNDROME: ICD-10-CM

## 2019-03-14 DIAGNOSIS — M54.2 NECK PAIN: Primary | ICD-10-CM

## 2019-03-14 PROCEDURE — 97112 NEUROMUSCULAR REEDUCATION: CPT

## 2019-03-14 PROCEDURE — 97140 MANUAL THERAPY 1/> REGIONS: CPT

## 2019-03-14 PROCEDURE — 97110 THERAPEUTIC EXERCISES: CPT

## 2019-03-14 NOTE — PROGRESS NOTES
Daily Note     Today's date: 3/14/2019  Patient name: Aneesh Ceballos  : 1985  MRN: [id number]  Referring provider: CHARLOTTE Acosta  Dx:   Encounter Diagnosis     ICD-10-CM    1  Neck pain M54 2    2  Myofascial pain syndrome M79 18                   Subjective: Patient reported at times he has headaches in frontal and occipital part of head  6/10 pain today in neck  Pt reported the pain is getting better it used to be worse          Objective: See treatment diary below  Precautions: HTN** ASSESS BP Each Visit    Daily Treatment Diary       Manuals 02/18/19  2/21/19 2/28 3/5 3/7 3/14       SUB OCC release 15' 10' 10' 10' 10' 10'       STM UT don done done Done Done done       CS passive Rom done done done Done  Done done                    Exercise Diary                           Chin tucks* 5x5"x2 5x5"x2 5x5''x2 10x5''x2 10x5''x2 2x10 5"       Prone bilateral I's* 5x5"x2 5x5" 10x5'' 10x5''x2 10x5''x2 2x10 5"       Prone ^'s  nv 5x5"x2 5x5''x2 10x5''x2 10x5''x2 2x10 5"                    CS rotations nv 5x5"x2 ea 5x5''x2 ea 10x5''x2 10x5''x2 2x 10 5"       Seated TB low row  nv PTB 10  OTB 2x10  OTB 2x10 resume NV       Seated TB horizontal abduction    OTB 2x10 OTB 2x10 resume NV                                                                                     Modalities                                           Assessment:  Continued with treatment session  Pt felt relief after manuals performed, no increase in pain or tension in headache  Tolerated treatment well  demonstrated fatigue post treatment and would benefit from continued PT for additional strength  Post treatment session pt reported 4/10 post treatment  Plan: Continue per plan of care  Progress treatment as tolerated

## 2019-03-16 DIAGNOSIS — M79.18 MYOFASCIAL PAIN SYNDROME: ICD-10-CM

## 2019-03-19 RX ORDER — TIZANIDINE 4 MG/1
4 TABLET ORAL
Qty: 30 TABLET | Refills: 0 | OUTPATIENT
Start: 2019-03-19

## 2019-03-20 ENCOUNTER — TELEPHONE (OUTPATIENT)
Dept: NEPHROLOGY | Facility: CLINIC | Age: 34
End: 2019-03-20

## 2019-03-20 NOTE — TELEPHONE ENCOUNTER
----- Message from Elba Chavarria MD sent at 3/19/2019  4:47 PM EDT -----  Call and let him know renal ultrasound was good  ----- Message -----  From: Interface, Radiology Results In  Sent: 3/18/2019   3:14 PM  To:  Elab Chavarria MD

## 2019-03-26 ENCOUNTER — EVALUATION (OUTPATIENT)
Dept: PHYSICAL THERAPY | Facility: REHABILITATION | Age: 34
End: 2019-03-26
Payer: COMMERCIAL

## 2019-03-26 VITALS — DIASTOLIC BLOOD PRESSURE: 90 MMHG | SYSTOLIC BLOOD PRESSURE: 168 MMHG

## 2019-03-26 DIAGNOSIS — M54.2 NECK PAIN: Primary | ICD-10-CM

## 2019-03-26 DIAGNOSIS — M79.18 MYOFASCIAL PAIN SYNDROME: ICD-10-CM

## 2019-03-26 PROCEDURE — 97110 THERAPEUTIC EXERCISES: CPT | Performed by: PHYSICAL THERAPIST

## 2019-03-26 PROCEDURE — 97112 NEUROMUSCULAR REEDUCATION: CPT | Performed by: PHYSICAL THERAPIST

## 2019-03-26 PROCEDURE — 97140 MANUAL THERAPY 1/> REGIONS: CPT | Performed by: PHYSICAL THERAPIST

## 2019-03-26 NOTE — PROGRESS NOTES
PT Re-Evaluation     Today's date: 3/26/2019  Patient name: Юлия Sanford  : 1985  MRN: [id number]  Referring provider: CHARLOTTE Seay  Dx:   Encounter Diagnosis     ICD-10-CM    1  Neck pain M54 2    2  Myofascial pain syndrome M79 18        Start Time: 1205  Stop Time: 1300  Total time in clinic (min): 55 minutes    Assessment  Assessment details: Юлия Sanford has been compliant with attending PT and home exercise program since initial eval   Cynthia Bruce  has made improvements in objective data since initial eval but is still limited compared to prior level of function  Of significant concern is patient's continued HTN despite being on medications for this as this could be contributing to his cervical spine pain and headaches  Cynthia Bruce continues with above listed impairments and would benefit from additional skilled PT to address these deficits to return to prior level of function     Impairments: abnormal muscle firing, abnormal or restricted ROM, activity intolerance, impaired physical strength and pain with function  Understanding of Dx/Px/POC: good   Prognosis: good    Goals  Impairment Goals  - Decrease pain 0/10 progresing  - Improve ROM to 70 degrees flexion, 50* extension of cervical spine  progressing  - Increase strength to 5/5 throughout progressing    Functional Goals  - Return to Prior Level of Function  - Increase Functional Status Measure to: 64 progressing  - Patient will be independent with HEP progressing    Plan  Patient would benefit from: skilled PT  Planned therapy interventions: joint mobilization, manual therapy, patient education, postural training, activity modification, abdominal trunk stabilization, body mechanics training, flexibility, functional ROM exercises, graded exercise, home exercise program, neuromuscular re-education, strengthening, stretching, therapeutic activities and therapeutic exercise  Frequency: 2x week  Duration in weeks: 4  Plan of Care beginning date: 2019  Plan of Care expiration date: 2019  Treatment plan discussed with: patient        Subjective Evaluation    History of Present Illness  Mechanism of injury: Bushra Salinas reports his cervical spine pain started about a month and a half a go, no LINDSAY  Reports cervicogenic headache, making him unable to remain in one position  Reports light sensitivity  Reports having difficulty with cervical spine extension and prolonged flexion  Reports pain into upper trap and spine  Emily Freidman has been seen for total of 6 visits for OP PT for [unfilled]   Patient rates overall improvement since beginning PT 60%  Patient's global rating of change is " Moderately better (4) " Patient reports improvements with driving, "normally sitting too long would really get me sore "  "I've been using a lighter base more frequently and that helps "  Patient reports most difficulty with "the pain and the headaches, when I have to look up or even turning my neck "  Continues to report difficulty with sleeping  Pain  Current pain ratin  At best pain ratin  At worst pain rating: 10  Location: posterior CS, Head  Quality: dull ache, pressure, squeezing and radiating  Relieving factors: medications and change in position  Aggravating factors: lifting and nothing  Progression: no change    Social Support    Employment status: working ([employer], [employer])  Patient Goals  Patient goals for therapy: decreased pain, increased motion, increased strength, return to work and return to [employer] activities  Patient goal: Returning to being able to sleep, playing electric base        Objective     Concurrent Complaints  Positive for headaches       Active Range of Motion   Cervical/Thoracic Spine       Cervical    Flexion: 35 degrees  with pain  Extension: 30 degrees     with pain  Left rotation: 55 degrees with pain  Right rotation: 55 degrees    with pain    Additional Active Range of Motion Details  Extension range of motion from C7-T1 junction    Pain at end range of motion     Joint Play     Hypomobile: T1 and T2     Pain: T1 and T2     Strength/Myotome Testing     Left Shoulder     Planes of Motion   Extension: WFL   Abduction: WFL   External rotation at 0°: WFL     Right Shoulder     Planes of Motion   Extension: WFL   Abduction: WFL   [facility] 0°: [facility]     Tests   Cervical   Positive lumbar distraction test   Negative alar ligament test, Sharp-Briseida test and transverse ligament test    Neuro Exam:     Headaches   Patient reports headaches: Yes           Precautions: HTN** ASSESS BP Each Visit    Daily Treatment Diary       Manuals 02/18/19  2/21/19 2/28 3/5 3/7 3/14 3/26      SUB OCC release 15' 10' 10' 10' 10' 10' 25'      STM UT don done done Done Done done done      CS passive Rom done done done Done  Done done       TS PA T1-T8       grade 3      Exercise Diary                           Chin tucks* 5x5"x2 5x5"x2 5x5''x2 10x5''x2 10x5''x2 2x10 5" 5x5"      Chin tucks with lift*       5x5"x2      Prone bilateral I's* 5x5"x2 5x5" 10x5'' 10x5''x2 10x5''x2 2x10 5" 2x10x5"      Prone ^'s  nv 5x5"x2 5x5''x2 10x5''x2 10x5''x2 2x10 5" dc      Prone T's        NV      CS rotations nv 5x5"x2 ea 5x5''x2 ea 10x5''x2 10x5''x2 2x 10 5"       Seated TB low row  nv PTB 10  OTB 2x10  OTB 2x10 resume NV stand GTB 3x10      Seated TB horizontal abduction    OTB 2x10 OTB 2x10 resume NV standing 3x10 OTB                                                                                    Modalities

## 2019-03-28 ENCOUNTER — OFFICE VISIT (OUTPATIENT)
Dept: PHYSICAL THERAPY | Facility: REHABILITATION | Age: 34
End: 2019-03-28
Payer: COMMERCIAL

## 2019-03-28 DIAGNOSIS — M54.2 NECK PAIN: Primary | ICD-10-CM

## 2019-03-28 DIAGNOSIS — M79.18 MYOFASCIAL PAIN SYNDROME: ICD-10-CM

## 2019-03-28 PROCEDURE — 97110 THERAPEUTIC EXERCISES: CPT

## 2019-03-28 PROCEDURE — 97140 MANUAL THERAPY 1/> REGIONS: CPT

## 2019-03-28 PROCEDURE — 97112 NEUROMUSCULAR REEDUCATION: CPT

## 2019-03-28 NOTE — PROGRESS NOTES
Daily Note     Today's date: 3/28/2019  Patient name: Amena Mccormick  : 1985  MRN: [id number]  Referring provider: CHARLOTTE Parikh  Dx:   Encounter Diagnosis     ICD-10-CM    1  Neck pain M54 2    2  Myofascial pain syndrome M79 18                   Subjective: Patient reports feeling "alright" prior to session today stating "had a headache attack last night, it was pretty bad " Patient reports no complaints after previous session stating "I always feel really good for about 30 min after and then it just comes back "       Objective: See treatment diary below  Precautions: HTN** ASSESS BP Each Visit    Daily Treatment Diary       Manuals 02/18/19  2/21/19 2/28 3/5 3/7 3/14 3/26 3/28     SUB OCC release 15' 10' 10' 10' 10' 10' 25' 15' total     STM UT don done done Done Done done done Done      CS passive Rom done done done Done  Done done  Done      TS PA T1-T8       grade 3      Exercise Diary                           Chin tucks* 5x5"x2 5x5"x2 5x5''x2 10x5''x2 10x5''x2 2x10 5" 5x5" 5x10''     Chin tucks with lift*       5x5"x2 5x5''x2      Prone bilateral I's* 5x5"x2 5x5" 10x5'' 10x5''x2 10x5''x2 2x10 5" 2x10x5" 2x10x5''     Prone ^'s  nv 5x5"x2 5x5''x2 10x5''x2 10x5''x2 2x10 5" dc      Prone T's        NV 2x10x5''     CS rotations nv 5x5"x2 ea 5x5''x2 ea 10x5''x2 10x5''x2 2x 10 5"       Seated TB low row  nv PTB 10  OTB 2x10  OTB 2x10 resume NV stand GTB 3x10 standing GTB 3x10     Seated TB horizontal abduction    OTB 2x10 OTB 2x10 resume NV standing 3x10 OTB standing 3x10 GTB                                                                                   Modalities                                               Assessment: Tolerated treatment fair   Patient demonstrated fatigue post treatment, exhibited good technique with therapeutic exercises and would benefit from continued PT Trialed prone T, no increased discomfort noted, evident fatigue noted with cues required for UT compensation with most TE, patient able to self correct  Plan: Continue per plan of care  Progress treatment as tolerated

## 2019-04-02 ENCOUNTER — OFFICE VISIT (OUTPATIENT)
Dept: PHYSICAL THERAPY | Facility: REHABILITATION | Age: 34
End: 2019-04-02
Payer: COMMERCIAL

## 2019-04-02 DIAGNOSIS — M54.2 NECK PAIN: Primary | ICD-10-CM

## 2019-04-02 DIAGNOSIS — M79.18 MYOFASCIAL PAIN SYNDROME: ICD-10-CM

## 2019-04-02 PROCEDURE — 97140 MANUAL THERAPY 1/> REGIONS: CPT

## 2019-04-02 PROCEDURE — 97112 NEUROMUSCULAR REEDUCATION: CPT

## 2019-04-02 PROCEDURE — 97110 THERAPEUTIC EXERCISES: CPT

## 2019-04-04 ENCOUNTER — APPOINTMENT (OUTPATIENT)
Dept: PHYSICAL THERAPY | Facility: REHABILITATION | Age: 34
End: 2019-04-04
Payer: COMMERCIAL

## 2019-04-08 ENCOUNTER — TELEPHONE (OUTPATIENT)
Dept: NEUROLOGY | Facility: CLINIC | Age: 34
End: 2019-04-08

## 2019-04-08 ENCOUNTER — OFFICE VISIT (OUTPATIENT)
Dept: PHYSICAL THERAPY | Facility: REHABILITATION | Age: 34
End: 2019-04-08
Payer: COMMERCIAL

## 2019-04-08 DIAGNOSIS — M79.18 MYOFASCIAL PAIN SYNDROME: ICD-10-CM

## 2019-04-08 DIAGNOSIS — M54.2 NECK PAIN: Primary | ICD-10-CM

## 2019-04-08 PROCEDURE — 97112 NEUROMUSCULAR REEDUCATION: CPT | Performed by: PHYSICAL THERAPIST

## 2019-04-08 PROCEDURE — 97140 MANUAL THERAPY 1/> REGIONS: CPT | Performed by: PHYSICAL THERAPIST

## 2019-04-08 PROCEDURE — 97110 THERAPEUTIC EXERCISES: CPT | Performed by: PHYSICAL THERAPIST

## 2019-04-09 ENCOUNTER — APPOINTMENT (OUTPATIENT)
Dept: PHYSICAL THERAPY | Facility: REHABILITATION | Age: 34
End: 2019-04-09
Payer: COMMERCIAL

## 2019-04-10 ENCOUNTER — OFFICE VISIT (OUTPATIENT)
Dept: PHYSICAL THERAPY | Facility: REHABILITATION | Age: 34
End: 2019-04-10
Payer: COMMERCIAL

## 2019-04-10 DIAGNOSIS — M79.18 MYOFASCIAL PAIN SYNDROME: ICD-10-CM

## 2019-04-10 DIAGNOSIS — M54.2 NECK PAIN: Primary | ICD-10-CM

## 2019-04-10 PROCEDURE — 97112 NEUROMUSCULAR REEDUCATION: CPT

## 2019-04-10 PROCEDURE — 97140 MANUAL THERAPY 1/> REGIONS: CPT

## 2019-04-10 PROCEDURE — 97110 THERAPEUTIC EXERCISES: CPT

## 2019-04-11 ENCOUNTER — APPOINTMENT (OUTPATIENT)
Dept: PHYSICAL THERAPY | Facility: REHABILITATION | Age: 34
End: 2019-04-11
Payer: COMMERCIAL

## 2019-04-16 ENCOUNTER — APPOINTMENT (OUTPATIENT)
Dept: PHYSICAL THERAPY | Facility: REHABILITATION | Age: 34
End: 2019-04-16
Payer: COMMERCIAL

## 2019-04-16 ENCOUNTER — HOSPITAL ENCOUNTER (INPATIENT)
Facility: HOSPITAL | Age: 34
LOS: 4 days | Discharge: HOME | DRG: 469 | End: 2019-04-21
Attending: EMERGENCY MEDICINE | Admitting: INTERNAL MEDICINE
Payer: COMMERCIAL

## 2019-04-16 ENCOUNTER — OFFICE VISIT (OUTPATIENT)
Dept: PHYSICAL THERAPY | Facility: REHABILITATION | Age: 34
End: 2019-04-16
Payer: COMMERCIAL

## 2019-04-16 ENCOUNTER — APPOINTMENT (EMERGENCY)
Dept: CT IMAGING | Facility: HOSPITAL | Age: 34
DRG: 469 | End: 2019-04-16
Payer: COMMERCIAL

## 2019-04-16 DIAGNOSIS — R51.9 HEADACHE: ICD-10-CM

## 2019-04-16 DIAGNOSIS — I10 HYPERTENSION: ICD-10-CM

## 2019-04-16 DIAGNOSIS — R74.01 TRANSAMINITIS: ICD-10-CM

## 2019-04-16 DIAGNOSIS — M54.2 NECK PAIN: Primary | ICD-10-CM

## 2019-04-16 DIAGNOSIS — R42 DIZZINESS: ICD-10-CM

## 2019-04-16 DIAGNOSIS — N17.9 ACUTE KIDNEY INJURY (HCC): ICD-10-CM

## 2019-04-16 DIAGNOSIS — N17.9 ACUTE RENAL FAILURE SUPERIMPOSED ON STAGE 2 CHRONIC KIDNEY DISEASE (HCC): ICD-10-CM

## 2019-04-16 DIAGNOSIS — R42 VERTIGO: Primary | ICD-10-CM

## 2019-04-16 DIAGNOSIS — E11.65 TYPE 2 DIABETES MELLITUS WITH HYPERGLYCEMIA, WITHOUT LONG-TERM CURRENT USE OF INSULIN (HCC): ICD-10-CM

## 2019-04-16 DIAGNOSIS — M79.18 MYOFASCIAL PAIN SYNDROME: ICD-10-CM

## 2019-04-16 DIAGNOSIS — N18.2 ACUTE RENAL FAILURE SUPERIMPOSED ON STAGE 2 CHRONIC KIDNEY DISEASE (HCC): ICD-10-CM

## 2019-04-16 PROBLEM — E87.5 HYPERKALEMIA: Status: ACTIVE | Noted: 2019-04-16

## 2019-04-16 LAB
ALBUMIN SERPL BCP-MCNC: 3.1 G/DL (ref 3.5–5)
ALP SERPL-CCNC: 63 U/L (ref 46–116)
ALT SERPL W P-5'-P-CCNC: 23 U/L (ref 12–78)
ANION GAP SERPL CALCULATED.3IONS-SCNC: 7 MMOL/L (ref 4–13)
APAP SERPL-MCNC: <2 UG/ML (ref 10–20)
AST SERPL W P-5'-P-CCNC: 94 U/L (ref 5–45)
BASOPHILS # BLD AUTO: 0.05 THOUSANDS/ΜL (ref 0–0.1)
BASOPHILS NFR BLD AUTO: 1 % (ref 0–1)
BILIRUB SERPL-MCNC: <0.1 MG/DL (ref 0.2–1)
BUN SERPL-MCNC: 39 MG/DL (ref 5–25)
CALCIUM SERPL-MCNC: 8.9 MG/DL (ref 8.3–10.1)
CHLORIDE SERPL-SCNC: 106 MMOL/L (ref 100–108)
CO2 SERPL-SCNC: 24 MMOL/L (ref 21–32)
CREAT SERPL-MCNC: 2.77 MG/DL (ref 0.6–1.3)
EOSINOPHIL # BLD AUTO: 0.31 THOUSAND/ΜL (ref 0–0.61)
EOSINOPHIL NFR BLD AUTO: 3 % (ref 0–6)
ERYTHROCYTE [DISTWIDTH] IN BLOOD BY AUTOMATED COUNT: 13.2 % (ref 11.6–15.1)
ETHANOL SERPL-MCNC: <3 MG/DL (ref 0–3)
GFR SERPL CREATININE-BSD FRML MDRD: 33 ML/MIN/1.73SQ M
GLUCOSE SERPL-MCNC: 169 MG/DL (ref 65–140)
GLUCOSE SERPL-MCNC: 172 MG/DL (ref 65–140)
HCT VFR BLD AUTO: 36 % (ref 36.5–49.3)
HGB BLD-MCNC: 12.1 G/DL (ref 12–17)
IMM GRANULOCYTES # BLD AUTO: 0.03 THOUSAND/UL (ref 0–0.2)
IMM GRANULOCYTES NFR BLD AUTO: 0 % (ref 0–2)
LYMPHOCYTES # BLD AUTO: 3.37 THOUSANDS/ΜL (ref 0.6–4.47)
LYMPHOCYTES NFR BLD AUTO: 37 % (ref 14–44)
MCH RBC QN AUTO: 29.2 PG (ref 26.8–34.3)
MCHC RBC AUTO-ENTMCNC: 33.6 G/DL (ref 31.4–37.4)
MCV RBC AUTO: 87 FL (ref 82–98)
MONOCYTES # BLD AUTO: 0.49 THOUSAND/ΜL (ref 0.17–1.22)
MONOCYTES NFR BLD AUTO: 5 % (ref 4–12)
NEUTROPHILS # BLD AUTO: 4.95 THOUSANDS/ΜL (ref 1.85–7.62)
NEUTS SEG NFR BLD AUTO: 54 % (ref 43–75)
NRBC BLD AUTO-RTO: 0 /100 WBCS
PLATELET # BLD AUTO: 340 THOUSANDS/UL (ref 149–390)
PMV BLD AUTO: 10.1 FL (ref 8.9–12.7)
POTASSIUM SERPL-SCNC: 5.6 MMOL/L (ref 3.5–5.3)
PROT SERPL-MCNC: 6.8 G/DL (ref 6.4–8.2)
RBC # BLD AUTO: 4.14 MILLION/UL (ref 3.88–5.62)
SALICYLATES SERPL-MCNC: <3 MG/DL (ref 3–20)
SODIUM SERPL-SCNC: 137 MMOL/L (ref 136–145)
WBC # BLD AUTO: 9.2 THOUSAND/UL (ref 4.31–10.16)

## 2019-04-16 PROCEDURE — 70450 CT HEAD/BRAIN W/O DYE: CPT

## 2019-04-16 PROCEDURE — 80320 DRUG SCREEN QUANTALCOHOLS: CPT | Performed by: EMERGENCY MEDICINE

## 2019-04-16 PROCEDURE — 99220 PR INITIAL OBSERVATION CARE/DAY 70 MINUTES: CPT | Performed by: PHYSICIAN ASSISTANT

## 2019-04-16 PROCEDURE — 99285 EMERGENCY DEPT VISIT HI MDM: CPT

## 2019-04-16 PROCEDURE — 36415 COLL VENOUS BLD VENIPUNCTURE: CPT | Performed by: EMERGENCY MEDICINE

## 2019-04-16 PROCEDURE — 80329 ANALGESICS NON-OPIOID 1 OR 2: CPT | Performed by: EMERGENCY MEDICINE

## 2019-04-16 PROCEDURE — 80053 COMPREHEN METABOLIC PANEL: CPT | Performed by: EMERGENCY MEDICINE

## 2019-04-16 PROCEDURE — 82948 REAGENT STRIP/BLOOD GLUCOSE: CPT

## 2019-04-16 PROCEDURE — 85025 COMPLETE CBC W/AUTO DIFF WBC: CPT | Performed by: EMERGENCY MEDICINE

## 2019-04-16 PROCEDURE — 99284 EMERGENCY DEPT VISIT MOD MDM: CPT | Performed by: EMERGENCY MEDICINE

## 2019-04-16 PROCEDURE — 97140 MANUAL THERAPY 1/> REGIONS: CPT

## 2019-04-16 RX ORDER — METOPROLOL TARTRATE 5 MG/5ML
5 INJECTION INTRAVENOUS ONCE
Status: COMPLETED | OUTPATIENT
Start: 2019-04-16 | End: 2019-04-16

## 2019-04-16 RX ORDER — MELATONIN
1000 DAILY
Status: DISCONTINUED | OUTPATIENT
Start: 2019-04-17 | End: 2019-04-21 | Stop reason: HOSPADM

## 2019-04-16 RX ORDER — ONDANSETRON 2 MG/ML
4 INJECTION INTRAMUSCULAR; INTRAVENOUS EVERY 6 HOURS PRN
Status: DISCONTINUED | OUTPATIENT
Start: 2019-04-16 | End: 2019-04-21 | Stop reason: HOSPADM

## 2019-04-16 RX ORDER — METHOCARBAMOL 500 MG/1
500 TABLET, FILM COATED ORAL EVERY 6 HOURS PRN
Status: DISCONTINUED | OUTPATIENT
Start: 2019-04-16 | End: 2019-04-21 | Stop reason: HOSPADM

## 2019-04-16 RX ORDER — SODIUM CHLORIDE 9 MG/ML
100 INJECTION, SOLUTION INTRAVENOUS CONTINUOUS
Status: DISCONTINUED | OUTPATIENT
Start: 2019-04-16 | End: 2019-04-18

## 2019-04-16 RX ORDER — INSULIN GLARGINE 100 [IU]/ML
24 INJECTION, SOLUTION SUBCUTANEOUS
Status: DISCONTINUED | OUTPATIENT
Start: 2019-04-17 | End: 2019-04-21 | Stop reason: HOSPADM

## 2019-04-16 RX ORDER — INSULIN GLARGINE 100 [IU]/ML
24 INJECTION, SOLUTION SUBCUTANEOUS ONCE
Status: COMPLETED | OUTPATIENT
Start: 2019-04-16 | End: 2019-04-16

## 2019-04-16 RX ORDER — HYDRALAZINE HYDROCHLORIDE 20 MG/ML
5 INJECTION INTRAMUSCULAR; INTRAVENOUS ONCE
Status: COMPLETED | OUTPATIENT
Start: 2019-04-16 | End: 2019-04-16

## 2019-04-16 RX ORDER — MECLIZINE HCL 12.5 MG/1
25 TABLET ORAL ONCE
Status: COMPLETED | OUTPATIENT
Start: 2019-04-16 | End: 2019-04-16

## 2019-04-16 RX ORDER — ACETAMINOPHEN 325 MG/1
650 TABLET ORAL EVERY 6 HOURS PRN
Status: DISCONTINUED | OUTPATIENT
Start: 2019-04-16 | End: 2019-04-21 | Stop reason: HOSPADM

## 2019-04-16 RX ORDER — METOPROLOL TARTRATE 5 MG/5ML
5 INJECTION INTRAVENOUS EVERY 6 HOURS PRN
Status: DISCONTINUED | OUTPATIENT
Start: 2019-04-16 | End: 2019-04-19

## 2019-04-16 RX ORDER — ACETAMINOPHEN 325 MG/1
650 TABLET ORAL ONCE
Status: COMPLETED | OUTPATIENT
Start: 2019-04-16 | End: 2019-04-16

## 2019-04-16 RX ORDER — MECLIZINE HYDROCHLORIDE 25 MG/1
25 TABLET ORAL EVERY 8 HOURS PRN
Status: DISCONTINUED | OUTPATIENT
Start: 2019-04-16 | End: 2019-04-21 | Stop reason: HOSPADM

## 2019-04-16 RX ADMIN — MECLIZINE 25 MG: 12.5 TABLET ORAL at 19:49

## 2019-04-16 RX ADMIN — ACETAMINOPHEN 650 MG: 325 TABLET, FILM COATED ORAL at 22:38

## 2019-04-16 RX ADMIN — SODIUM CHLORIDE 125 ML/HR: 0.9 INJECTION, SOLUTION INTRAVENOUS at 22:40

## 2019-04-16 RX ADMIN — SODIUM CHLORIDE 1000 ML: 0.9 INJECTION, SOLUTION INTRAVENOUS at 21:19

## 2019-04-16 RX ADMIN — INSULIN GLARGINE 24 UNITS: 100 INJECTION, SOLUTION SUBCUTANEOUS at 22:38

## 2019-04-16 RX ADMIN — METHOCARBAMOL TABLETS 500 MG: 500 TABLET, COATED ORAL at 22:39

## 2019-04-16 RX ADMIN — HYDRALAZINE HYDROCHLORIDE 5 MG: 20 INJECTION INTRAMUSCULAR; INTRAVENOUS at 22:40

## 2019-04-16 RX ADMIN — METOPROLOL TARTRATE 5 MG: 1 INJECTION, SOLUTION INTRAVENOUS at 21:23

## 2019-04-17 LAB
ALBUMIN SERPL BCP-MCNC: 2.5 G/DL (ref 3.5–5)
ALP SERPL-CCNC: 51 U/L (ref 46–116)
ALT SERPL W P-5'-P-CCNC: 18 U/L (ref 12–78)
ANION GAP SERPL CALCULATED.3IONS-SCNC: 9 MMOL/L (ref 4–13)
AST SERPL W P-5'-P-CCNC: 69 U/L (ref 5–45)
BASOPHILS # BLD AUTO: 0.05 THOUSANDS/ΜL (ref 0–0.1)
BASOPHILS NFR BLD AUTO: 1 % (ref 0–1)
BILIRUB SERPL-MCNC: 0.2 MG/DL (ref 0.2–1)
BUN SERPL-MCNC: 36 MG/DL (ref 5–25)
CALCIUM SERPL-MCNC: 8.6 MG/DL (ref 8.3–10.1)
CHLORIDE SERPL-SCNC: 110 MMOL/L (ref 100–108)
CO2 SERPL-SCNC: 20 MMOL/L (ref 21–32)
CREAT SERPL-MCNC: 2.38 MG/DL (ref 0.6–1.3)
EOSINOPHIL # BLD AUTO: 0.28 THOUSAND/ΜL (ref 0–0.61)
EOSINOPHIL NFR BLD AUTO: 4 % (ref 0–6)
ERYTHROCYTE [DISTWIDTH] IN BLOOD BY AUTOMATED COUNT: 13.1 % (ref 11.6–15.1)
GFR SERPL CREATININE-BSD FRML MDRD: 39 ML/MIN/1.73SQ M
GLUCOSE P FAST SERPL-MCNC: 133 MG/DL (ref 65–99)
GLUCOSE SERPL-MCNC: 113 MG/DL (ref 65–140)
GLUCOSE SERPL-MCNC: 133 MG/DL (ref 65–140)
GLUCOSE SERPL-MCNC: 146 MG/DL (ref 65–140)
GLUCOSE SERPL-MCNC: 149 MG/DL (ref 65–140)
GLUCOSE SERPL-MCNC: 166 MG/DL (ref 65–140)
HCT VFR BLD AUTO: 34.5 % (ref 36.5–49.3)
HGB BLD-MCNC: 11.3 G/DL (ref 12–17)
IMM GRANULOCYTES # BLD AUTO: 0.02 THOUSAND/UL (ref 0–0.2)
IMM GRANULOCYTES NFR BLD AUTO: 0 % (ref 0–2)
LYMPHOCYTES # BLD AUTO: 3.2 THOUSANDS/ΜL (ref 0.6–4.47)
LYMPHOCYTES NFR BLD AUTO: 40 % (ref 14–44)
MCH RBC QN AUTO: 28.8 PG (ref 26.8–34.3)
MCHC RBC AUTO-ENTMCNC: 32.8 G/DL (ref 31.4–37.4)
MCV RBC AUTO: 88 FL (ref 82–98)
MONOCYTES # BLD AUTO: 0.51 THOUSAND/ΜL (ref 0.17–1.22)
MONOCYTES NFR BLD AUTO: 6 % (ref 4–12)
NEUTROPHILS # BLD AUTO: 4.01 THOUSANDS/ΜL (ref 1.85–7.62)
NEUTS SEG NFR BLD AUTO: 49 % (ref 43–75)
NRBC BLD AUTO-RTO: 0 /100 WBCS
PLATELET # BLD AUTO: 299 THOUSANDS/UL (ref 149–390)
PMV BLD AUTO: 10.4 FL (ref 8.9–12.7)
POTASSIUM SERPL-SCNC: 5 MMOL/L (ref 3.5–5.3)
PROT SERPL-MCNC: 6.2 G/DL (ref 6.4–8.2)
RBC # BLD AUTO: 3.93 MILLION/UL (ref 3.88–5.62)
SODIUM SERPL-SCNC: 139 MMOL/L (ref 136–145)
WBC # BLD AUTO: 8.07 THOUSAND/UL (ref 4.31–10.16)

## 2019-04-17 PROCEDURE — 80053 COMPREHEN METABOLIC PANEL: CPT | Performed by: PHYSICIAN ASSISTANT

## 2019-04-17 PROCEDURE — 99225 PR SBSQ OBSERVATION CARE/DAY 25 MINUTES: CPT | Performed by: INTERNAL MEDICINE

## 2019-04-17 PROCEDURE — 85025 COMPLETE CBC W/AUTO DIFF WBC: CPT | Performed by: PHYSICIAN ASSISTANT

## 2019-04-17 PROCEDURE — 82948 REAGENT STRIP/BLOOD GLUCOSE: CPT

## 2019-04-17 PROCEDURE — 99254 IP/OBS CNSLTJ NEW/EST MOD 60: CPT | Performed by: INTERNAL MEDICINE

## 2019-04-17 RX ORDER — NIFEDIPINE 30 MG/1
30 TABLET, EXTENDED RELEASE ORAL
Status: DISCONTINUED | OUTPATIENT
Start: 2019-04-17 | End: 2019-04-18

## 2019-04-17 RX ORDER — TRISODIUM CITRATE DIHYDRATE AND CITRIC ACID MONOHYDRATE 500; 334 MG/5ML; MG/5ML
15 SOLUTION ORAL 2 TIMES DAILY
Status: DISCONTINUED | OUTPATIENT
Start: 2019-04-17 | End: 2019-04-21 | Stop reason: HOSPADM

## 2019-04-17 RX ORDER — POLYVINYL ALCOHOL 14 MG/ML
1 SOLUTION/ DROPS OPHTHALMIC AS NEEDED
Status: DISCONTINUED | OUTPATIENT
Start: 2019-04-17 | End: 2019-04-21 | Stop reason: HOSPADM

## 2019-04-17 RX ADMIN — METOPROLOL TARTRATE 5 MG: 5 INJECTION, SOLUTION INTRAVENOUS at 08:55

## 2019-04-17 RX ADMIN — SODIUM CITRATE AND CITRIC ACID MONOHYDRATE 15 ML: 500; 334 SOLUTION ORAL at 18:05

## 2019-04-17 RX ADMIN — NIFEDIPINE 30 MG: 30 TABLET, EXTENDED RELEASE ORAL at 21:16

## 2019-04-17 RX ADMIN — POLYVINYL ALCOHOL 1 DROP: 14 SOLUTION/ DROPS OPHTHALMIC at 21:17

## 2019-04-17 RX ADMIN — INSULIN LISPRO 1 UNITS: 100 INJECTION, SOLUTION INTRAVENOUS; SUBCUTANEOUS at 21:17

## 2019-04-17 RX ADMIN — INSULIN GLARGINE 24 UNITS: 100 INJECTION, SOLUTION SUBCUTANEOUS at 21:16

## 2019-04-17 RX ADMIN — SODIUM CHLORIDE 125 ML/HR: 0.9 INJECTION, SOLUTION INTRAVENOUS at 23:06

## 2019-04-17 RX ADMIN — ACETAMINOPHEN 650 MG: 325 TABLET, FILM COATED ORAL at 23:09

## 2019-04-17 RX ADMIN — METHOCARBAMOL TABLETS 500 MG: 500 TABLET, COATED ORAL at 23:09

## 2019-04-17 RX ADMIN — ACETAMINOPHEN 650 MG: 325 TABLET, FILM COATED ORAL at 08:54

## 2019-04-17 RX ADMIN — VITAMIN D, TAB 1000IU (100/BT) 1000 UNITS: 25 TAB at 08:53

## 2019-04-17 RX ADMIN — SODIUM CHLORIDE 125 ML/HR: 0.9 INJECTION, SOLUTION INTRAVENOUS at 14:16

## 2019-04-17 RX ADMIN — INSULIN LISPRO 1 UNITS: 100 INJECTION, SOLUTION INTRAVENOUS; SUBCUTANEOUS at 18:04

## 2019-04-18 ENCOUNTER — APPOINTMENT (OUTPATIENT)
Dept: PHYSICAL THERAPY | Facility: REHABILITATION | Age: 34
End: 2019-04-18
Payer: COMMERCIAL

## 2019-04-18 PROBLEM — G43.719 CHRONIC MIGRAINE WITHOUT AURA, INTRACTABLE, WITHOUT STATUS MIGRAINOSUS: Status: ACTIVE | Noted: 2019-02-06

## 2019-04-18 LAB
ANION GAP SERPL CALCULATED.3IONS-SCNC: 9 MMOL/L (ref 4–13)
BUN SERPL-MCNC: 30 MG/DL (ref 5–25)
CALCIUM SERPL-MCNC: 8.9 MG/DL (ref 8.3–10.1)
CHLORIDE SERPL-SCNC: 112 MMOL/L (ref 100–108)
CO2 SERPL-SCNC: 20 MMOL/L (ref 21–32)
CREAT SERPL-MCNC: 2.13 MG/DL (ref 0.6–1.3)
GFR SERPL CREATININE-BSD FRML MDRD: 45 ML/MIN/1.73SQ M
GLUCOSE SERPL-MCNC: 103 MG/DL (ref 65–140)
GLUCOSE SERPL-MCNC: 113 MG/DL (ref 65–140)
GLUCOSE SERPL-MCNC: 192 MG/DL (ref 65–140)
GLUCOSE SERPL-MCNC: 313 MG/DL (ref 65–140)
GLUCOSE SERPL-MCNC: 98 MG/DL (ref 65–140)
MAGNESIUM SERPL-MCNC: 1.6 MG/DL (ref 1.6–2.6)
POTASSIUM SERPL-SCNC: 4.9 MMOL/L (ref 3.5–5.3)
SODIUM SERPL-SCNC: 141 MMOL/L (ref 136–145)

## 2019-04-18 PROCEDURE — 99254 IP/OBS CNSLTJ NEW/EST MOD 60: CPT | Performed by: PSYCHIATRY & NEUROLOGY

## 2019-04-18 PROCEDURE — G8989 SELF CARE D/C STATUS: HCPCS

## 2019-04-18 PROCEDURE — 97165 OT EVAL LOW COMPLEX 30 MIN: CPT

## 2019-04-18 PROCEDURE — 82948 REAGENT STRIP/BLOOD GLUCOSE: CPT

## 2019-04-18 PROCEDURE — 99232 SBSQ HOSP IP/OBS MODERATE 35: CPT | Performed by: INTERNAL MEDICINE

## 2019-04-18 PROCEDURE — G8987 SELF CARE CURRENT STATUS: HCPCS

## 2019-04-18 PROCEDURE — 83735 ASSAY OF MAGNESIUM: CPT | Performed by: PHYSICIAN ASSISTANT

## 2019-04-18 PROCEDURE — 80048 BASIC METABOLIC PNL TOTAL CA: CPT | Performed by: PHYSICIAN ASSISTANT

## 2019-04-18 PROCEDURE — G8988 SELF CARE GOAL STATUS: HCPCS

## 2019-04-18 RX ORDER — DIPHENHYDRAMINE HYDROCHLORIDE 50 MG/ML
25 INJECTION INTRAMUSCULAR; INTRAVENOUS EVERY 8 HOURS
Status: DISCONTINUED | OUTPATIENT
Start: 2019-04-18 | End: 2019-04-21 | Stop reason: HOSPADM

## 2019-04-18 RX ORDER — METHYLPREDNISOLONE 4 MG/1
TABLET ORAL
Qty: 21 TABLET | Refills: 0 | Status: SHIPPED | OUTPATIENT
Start: 2019-04-19 | End: 2019-05-21

## 2019-04-18 RX ORDER — SODIUM CHLORIDE 9 MG/ML
75 INJECTION, SOLUTION INTRAVENOUS CONTINUOUS
Status: DISCONTINUED | OUTPATIENT
Start: 2019-04-18 | End: 2019-04-21 | Stop reason: HOSPADM

## 2019-04-18 RX ORDER — MAGNESIUM SULFATE 1 G/100ML
1 INJECTION INTRAVENOUS DAILY
Status: DISCONTINUED | OUTPATIENT
Start: 2019-04-18 | End: 2019-04-18

## 2019-04-18 RX ORDER — NIFEDIPINE 30 MG/1
60 TABLET, EXTENDED RELEASE ORAL
Status: DISCONTINUED | OUTPATIENT
Start: 2019-04-18 | End: 2019-04-21 | Stop reason: HOSPADM

## 2019-04-18 RX ORDER — MAGNESIUM SULFATE 1 G/100ML
1 INJECTION INTRAVENOUS ONCE
Status: DISCONTINUED | OUTPATIENT
Start: 2019-04-18 | End: 2019-04-18

## 2019-04-18 RX ORDER — METOCLOPRAMIDE HYDROCHLORIDE 5 MG/ML
10 INJECTION INTRAMUSCULAR; INTRAVENOUS 3 TIMES DAILY
Status: DISPENSED | OUTPATIENT
Start: 2019-04-18 | End: 2019-04-20

## 2019-04-18 RX ORDER — MAGNESIUM SULFATE 1 G/100ML
1 INJECTION INTRAVENOUS DAILY
Status: DISCONTINUED | OUTPATIENT
Start: 2019-04-18 | End: 2019-04-19

## 2019-04-18 RX ADMIN — DIPHENHYDRAMINE HYDROCHLORIDE 25 MG: 50 INJECTION, SOLUTION INTRAMUSCULAR; INTRAVENOUS at 21:11

## 2019-04-18 RX ADMIN — INSULIN LISPRO 5 UNITS: 100 INJECTION, SOLUTION INTRAVENOUS; SUBCUTANEOUS at 21:17

## 2019-04-18 RX ADMIN — MAGNESIUM SULFATE HEPTAHYDRATE 1 G: 1 INJECTION, SOLUTION INTRAVENOUS at 16:17

## 2019-04-18 RX ADMIN — SODIUM CITRATE AND CITRIC ACID MONOHYDRATE 15 ML: 500; 334 SOLUTION ORAL at 17:30

## 2019-04-18 RX ADMIN — METOCLOPRAMIDE 10 MG: 5 INJECTION, SOLUTION INTRAMUSCULAR; INTRAVENOUS at 13:51

## 2019-04-18 RX ADMIN — SODIUM CHLORIDE 50 ML/HR: 0.9 INJECTION, SOLUTION INTRAVENOUS at 15:12

## 2019-04-18 RX ADMIN — INSULIN GLARGINE 24 UNITS: 100 INJECTION, SOLUTION SUBCUTANEOUS at 21:11

## 2019-04-18 RX ADMIN — VALPROATE SODIUM 500 MG: 100 INJECTION, SOLUTION INTRAVENOUS at 15:06

## 2019-04-18 RX ADMIN — SODIUM CHLORIDE 250 MG: 0.9 INJECTION, SOLUTION INTRAVENOUS at 19:03

## 2019-04-18 RX ADMIN — DIPHENHYDRAMINE HYDROCHLORIDE 25 MG: 50 INJECTION, SOLUTION INTRAMUSCULAR; INTRAVENOUS at 13:51

## 2019-04-18 RX ADMIN — INSULIN LISPRO 2 UNITS: 100 INJECTION, SOLUTION INTRAVENOUS; SUBCUTANEOUS at 17:30

## 2019-04-18 RX ADMIN — ACETAMINOPHEN 650 MG: 325 TABLET, FILM COATED ORAL at 09:21

## 2019-04-18 RX ADMIN — VITAMIN D, TAB 1000IU (100/BT) 1000 UNITS: 25 TAB at 09:21

## 2019-04-18 RX ADMIN — SODIUM CHLORIDE 250 MG: 0.9 INJECTION, SOLUTION INTRAVENOUS at 13:51

## 2019-04-18 RX ADMIN — NIFEDIPINE 60 MG: 30 TABLET, EXTENDED RELEASE ORAL at 21:11

## 2019-04-18 RX ADMIN — SODIUM CITRATE AND CITRIC ACID MONOHYDRATE 15 ML: 500; 334 SOLUTION ORAL at 09:22

## 2019-04-19 ENCOUNTER — APPOINTMENT (INPATIENT)
Dept: ULTRASOUND IMAGING | Facility: HOSPITAL | Age: 34
DRG: 469 | End: 2019-04-19
Payer: COMMERCIAL

## 2019-04-19 LAB
ANION GAP SERPL CALCULATED.3IONS-SCNC: 10 MMOL/L (ref 4–13)
ANION GAP SERPL CALCULATED.3IONS-SCNC: 8 MMOL/L (ref 4–13)
ANION GAP SERPL CALCULATED.3IONS-SCNC: 9 MMOL/L (ref 4–13)
BACTERIA UR QL AUTO: ABNORMAL /HPF
BILIRUB UR QL STRIP: NEGATIVE
BUN SERPL-MCNC: 39 MG/DL (ref 5–25)
BUN SERPL-MCNC: 40 MG/DL (ref 5–25)
BUN SERPL-MCNC: 44 MG/DL (ref 5–25)
CALCIUM SERPL-MCNC: 8.8 MG/DL (ref 8.3–10.1)
CALCIUM SERPL-MCNC: 8.8 MG/DL (ref 8.3–10.1)
CALCIUM SERPL-MCNC: 9 MG/DL (ref 8.3–10.1)
CHLORIDE SERPL-SCNC: 105 MMOL/L (ref 100–108)
CHLORIDE SERPL-SCNC: 107 MMOL/L (ref 100–108)
CHLORIDE SERPL-SCNC: 108 MMOL/L (ref 100–108)
CK MB SERPL-MCNC: 4.5 % (ref 0–2.5)
CK MB SERPL-MCNC: 8.7 NG/ML (ref 0–5)
CK SERPL-CCNC: 192 U/L (ref 39–308)
CLARITY UR: CLEAR
CO2 SERPL-SCNC: 19 MMOL/L (ref 21–32)
CO2 SERPL-SCNC: 20 MMOL/L (ref 21–32)
CO2 SERPL-SCNC: 21 MMOL/L (ref 21–32)
COLOR UR: YELLOW
CREAT SERPL-MCNC: 2.56 MG/DL (ref 0.6–1.3)
CREAT SERPL-MCNC: 2.6 MG/DL (ref 0.6–1.3)
CREAT SERPL-MCNC: 2.62 MG/DL (ref 0.6–1.3)
CREAT UR-MCNC: 119 MG/DL
CREAT UR-MCNC: 60.3 MG/DL
GFR SERPL CREATININE-BSD FRML MDRD: 35 ML/MIN/1.73SQ M
GFR SERPL CREATININE-BSD FRML MDRD: 35 ML/MIN/1.73SQ M
GFR SERPL CREATININE-BSD FRML MDRD: 36 ML/MIN/1.73SQ M
GLUCOSE SERPL-MCNC: 229 MG/DL (ref 65–140)
GLUCOSE SERPL-MCNC: 238 MG/DL (ref 65–140)
GLUCOSE SERPL-MCNC: 242 MG/DL (ref 65–140)
GLUCOSE SERPL-MCNC: 252 MG/DL (ref 65–140)
GLUCOSE SERPL-MCNC: 276 MG/DL (ref 65–140)
GLUCOSE SERPL-MCNC: 288 MG/DL (ref 65–140)
GLUCOSE SERPL-MCNC: 312 MG/DL (ref 65–140)
GLUCOSE UR STRIP-MCNC: ABNORMAL MG/DL
HGB UR QL STRIP.AUTO: ABNORMAL
KETONES UR STRIP-MCNC: NEGATIVE MG/DL
LEUKOCYTE ESTERASE UR QL STRIP: NEGATIVE
NITRITE UR QL STRIP: NEGATIVE
NON-SQ EPI CELLS URNS QL MICRO: ABNORMAL /HPF
PH UR STRIP.AUTO: 5.5 [PH]
POTASSIUM SERPL-SCNC: 4.7 MMOL/L (ref 3.5–5.3)
POTASSIUM SERPL-SCNC: 5.7 MMOL/L (ref 3.5–5.3)
POTASSIUM SERPL-SCNC: 5.9 MMOL/L (ref 3.5–5.3)
PROT UR STRIP-MCNC: ABNORMAL MG/DL
PROT UR-MCNC: 120 MG/DL
PROT UR-MCNC: 249 MG/DL
PROT/CREAT UR: 1.99 MG/G{CREAT} (ref 0–0.1)
PROT/CREAT UR: 2.09 MG/G{CREAT} (ref 0–0.1)
RBC #/AREA URNS AUTO: ABNORMAL /HPF
SODIUM SERPL-SCNC: 134 MMOL/L (ref 136–145)
SODIUM SERPL-SCNC: 136 MMOL/L (ref 136–145)
SODIUM SERPL-SCNC: 137 MMOL/L (ref 136–145)
SP GR UR STRIP.AUTO: >=1.03 (ref 1–1.03)
UROBILINOGEN UR QL STRIP.AUTO: 0.2 E.U./DL
WBC #/AREA URNS AUTO: ABNORMAL /HPF

## 2019-04-19 PROCEDURE — 99232 SBSQ HOSP IP/OBS MODERATE 35: CPT | Performed by: INTERNAL MEDICINE

## 2019-04-19 PROCEDURE — 84156 ASSAY OF PROTEIN URINE: CPT | Performed by: INTERNAL MEDICINE

## 2019-04-19 PROCEDURE — 80048 BASIC METABOLIC PNL TOTAL CA: CPT | Performed by: INTERNAL MEDICINE

## 2019-04-19 PROCEDURE — 99232 SBSQ HOSP IP/OBS MODERATE 35: CPT | Performed by: PSYCHIATRY & NEUROLOGY

## 2019-04-19 PROCEDURE — 84156 ASSAY OF PROTEIN URINE: CPT | Performed by: PHYSICIAN ASSISTANT

## 2019-04-19 PROCEDURE — 82553 CREATINE MB FRACTION: CPT | Performed by: INTERNAL MEDICINE

## 2019-04-19 PROCEDURE — 82550 ASSAY OF CK (CPK): CPT | Performed by: INTERNAL MEDICINE

## 2019-04-19 PROCEDURE — 82570 ASSAY OF URINE CREATININE: CPT | Performed by: PHYSICIAN ASSISTANT

## 2019-04-19 PROCEDURE — 80048 BASIC METABOLIC PNL TOTAL CA: CPT | Performed by: PHYSICIAN ASSISTANT

## 2019-04-19 PROCEDURE — 81001 URINALYSIS AUTO W/SCOPE: CPT | Performed by: PHYSICIAN ASSISTANT

## 2019-04-19 PROCEDURE — 82570 ASSAY OF URINE CREATININE: CPT | Performed by: INTERNAL MEDICINE

## 2019-04-19 PROCEDURE — 82948 REAGENT STRIP/BLOOD GLUCOSE: CPT

## 2019-04-19 PROCEDURE — 76770 US EXAM ABDO BACK WALL COMP: CPT

## 2019-04-19 RX ORDER — CARVEDILOL 6.25 MG/1
6.25 TABLET ORAL 2 TIMES DAILY WITH MEALS
Status: DISCONTINUED | OUTPATIENT
Start: 2019-04-19 | End: 2019-04-21 | Stop reason: HOSPADM

## 2019-04-19 RX ORDER — DEXTROSE MONOHYDRATE 25 G/50ML
25 INJECTION, SOLUTION INTRAVENOUS ONCE
Status: COMPLETED | OUTPATIENT
Start: 2019-04-19 | End: 2019-04-19

## 2019-04-19 RX ORDER — HYDRALAZINE HYDROCHLORIDE 20 MG/ML
10 INJECTION INTRAMUSCULAR; INTRAVENOUS EVERY 6 HOURS PRN
Status: DISCONTINUED | OUTPATIENT
Start: 2019-04-19 | End: 2019-04-21 | Stop reason: HOSPADM

## 2019-04-19 RX ORDER — METOPROLOL TARTRATE 5 MG/5ML
10 INJECTION INTRAVENOUS EVERY 6 HOURS PRN
Status: DISCONTINUED | OUTPATIENT
Start: 2019-04-19 | End: 2019-04-21 | Stop reason: HOSPADM

## 2019-04-19 RX ORDER — SODIUM POLYSTYRENE SULFONATE 4.1 MEQ/G
15 POWDER, FOR SUSPENSION ORAL; RECTAL ONCE
Status: COMPLETED | OUTPATIENT
Start: 2019-04-19 | End: 2019-04-19

## 2019-04-19 RX ADMIN — CARVEDILOL 6.25 MG: 6.25 TABLET, FILM COATED ORAL at 17:07

## 2019-04-19 RX ADMIN — HYDRALAZINE HYDROCHLORIDE 10 MG: 20 INJECTION INTRAMUSCULAR; INTRAVENOUS at 23:23

## 2019-04-19 RX ADMIN — MAGNESIUM SULFATE HEPTAHYDRATE 1 G: 1 INJECTION, SOLUTION INTRAVENOUS at 08:44

## 2019-04-19 RX ADMIN — VALPROATE SODIUM 500 MG: 100 INJECTION, SOLUTION INTRAVENOUS at 01:10

## 2019-04-19 RX ADMIN — SODIUM CHLORIDE 250 MG: 0.9 INJECTION, SOLUTION INTRAVENOUS at 08:45

## 2019-04-19 RX ADMIN — INSULIN LISPRO 3 UNITS: 100 INJECTION, SOLUTION INTRAVENOUS; SUBCUTANEOUS at 12:27

## 2019-04-19 RX ADMIN — VITAMIN D, TAB 1000IU (100/BT) 1000 UNITS: 25 TAB at 08:45

## 2019-04-19 RX ADMIN — INSULIN LISPRO 6 UNITS: 100 INJECTION, SOLUTION INTRAVENOUS; SUBCUTANEOUS at 17:33

## 2019-04-19 RX ADMIN — DIPHENHYDRAMINE HYDROCHLORIDE 25 MG: 50 INJECTION, SOLUTION INTRAMUSCULAR; INTRAVENOUS at 06:00

## 2019-04-19 RX ADMIN — INSULIN LISPRO 3 UNITS: 100 INJECTION, SOLUTION INTRAVENOUS; SUBCUTANEOUS at 17:32

## 2019-04-19 RX ADMIN — INSULIN LISPRO 3 UNITS: 100 INJECTION, SOLUTION INTRAVENOUS; SUBCUTANEOUS at 08:45

## 2019-04-19 RX ADMIN — INSULIN LISPRO 4 UNITS: 100 INJECTION, SOLUTION INTRAVENOUS; SUBCUTANEOUS at 21:42

## 2019-04-19 RX ADMIN — METOPROLOL TARTRATE 5 MG: 5 INJECTION, SOLUTION INTRAVENOUS at 22:19

## 2019-04-19 RX ADMIN — DIPHENHYDRAMINE HYDROCHLORIDE 25 MG: 50 INJECTION, SOLUTION INTRAMUSCULAR; INTRAVENOUS at 13:22

## 2019-04-19 RX ADMIN — SODIUM CHLORIDE 100 ML/HR: 0.9 INJECTION, SOLUTION INTRAVENOUS at 20:41

## 2019-04-19 RX ADMIN — INSULIN GLARGINE 24 UNITS: 100 INJECTION, SOLUTION SUBCUTANEOUS at 21:11

## 2019-04-19 RX ADMIN — VALPROATE SODIUM 500 MG: 100 INJECTION, SOLUTION INTRAVENOUS at 14:28

## 2019-04-19 RX ADMIN — DIPHENHYDRAMINE HYDROCHLORIDE 25 MG: 50 INJECTION, SOLUTION INTRAMUSCULAR; INTRAVENOUS at 21:10

## 2019-04-19 RX ADMIN — METOPROLOL TARTRATE 5 MG: 5 INJECTION, SOLUTION INTRAVENOUS at 15:17

## 2019-04-19 RX ADMIN — SODIUM CHLORIDE 50 ML/HR: 0.9 INJECTION, SOLUTION INTRAVENOUS at 06:01

## 2019-04-19 RX ADMIN — INSULIN HUMAN 10 UNITS: 100 INJECTION, SOLUTION PARENTERAL at 13:41

## 2019-04-19 RX ADMIN — SODIUM CITRATE AND CITRIC ACID MONOHYDRATE 15 ML: 500; 334 SOLUTION ORAL at 19:55

## 2019-04-19 RX ADMIN — SODIUM CITRATE AND CITRIC ACID MONOHYDRATE 15 ML: 500; 334 SOLUTION ORAL at 08:46

## 2019-04-19 RX ADMIN — DEXTROSE MONOHYDRATE 25 ML: 25 INJECTION, SOLUTION INTRAVENOUS at 13:16

## 2019-04-19 RX ADMIN — SODIUM POLYSTYRENE SULFONATE 15 G: 1 POWDER ORAL; RECTAL at 13:15

## 2019-04-19 RX ADMIN — NIFEDIPINE 60 MG: 30 TABLET, EXTENDED RELEASE ORAL at 21:10

## 2019-04-20 PROBLEM — R42 DIZZINESS: Status: RESOLVED | Noted: 2019-04-16 | Resolved: 2019-04-20

## 2019-04-20 LAB
ANION GAP SERPL CALCULATED.3IONS-SCNC: 9 MMOL/L (ref 4–13)
ANION GAP SERPL CALCULATED.3IONS-SCNC: 9 MMOL/L (ref 4–13)
BUN SERPL-MCNC: 42 MG/DL (ref 5–25)
BUN SERPL-MCNC: 45 MG/DL (ref 5–25)
CALCIUM SERPL-MCNC: 8.5 MG/DL (ref 8.3–10.1)
CALCIUM SERPL-MCNC: 8.6 MG/DL (ref 8.3–10.1)
CHLORIDE SERPL-SCNC: 109 MMOL/L (ref 100–108)
CHLORIDE SERPL-SCNC: 110 MMOL/L (ref 100–108)
CO2 SERPL-SCNC: 18 MMOL/L (ref 21–32)
CO2 SERPL-SCNC: 22 MMOL/L (ref 21–32)
CREAT SERPL-MCNC: 2.25 MG/DL (ref 0.6–1.3)
CREAT SERPL-MCNC: 2.42 MG/DL (ref 0.6–1.3)
GFR SERPL CREATININE-BSD FRML MDRD: 39 ML/MIN/1.73SQ M
GFR SERPL CREATININE-BSD FRML MDRD: 42 ML/MIN/1.73SQ M
GLUCOSE SERPL-MCNC: 102 MG/DL (ref 65–140)
GLUCOSE SERPL-MCNC: 103 MG/DL (ref 65–140)
GLUCOSE SERPL-MCNC: 136 MG/DL (ref 65–140)
GLUCOSE SERPL-MCNC: 79 MG/DL (ref 65–140)
GLUCOSE SERPL-MCNC: 85 MG/DL (ref 65–140)
GLUCOSE SERPL-MCNC: 99 MG/DL (ref 65–140)
POTASSIUM SERPL-SCNC: 4.1 MMOL/L (ref 3.5–5.3)
POTASSIUM SERPL-SCNC: 5.5 MMOL/L (ref 3.5–5.3)
SODIUM SERPL-SCNC: 136 MMOL/L (ref 136–145)
SODIUM SERPL-SCNC: 141 MMOL/L (ref 136–145)

## 2019-04-20 PROCEDURE — 80048 BASIC METABOLIC PNL TOTAL CA: CPT | Performed by: INTERNAL MEDICINE

## 2019-04-20 PROCEDURE — 99232 SBSQ HOSP IP/OBS MODERATE 35: CPT | Performed by: INTERNAL MEDICINE

## 2019-04-20 PROCEDURE — 80048 BASIC METABOLIC PNL TOTAL CA: CPT | Performed by: PHYSICIAN ASSISTANT

## 2019-04-20 PROCEDURE — 82948 REAGENT STRIP/BLOOD GLUCOSE: CPT

## 2019-04-20 RX ADMIN — HYDRALAZINE HYDROCHLORIDE 10 MG: 20 INJECTION INTRAMUSCULAR; INTRAVENOUS at 21:12

## 2019-04-20 RX ADMIN — SODIUM CITRATE AND CITRIC ACID MONOHYDRATE 15 ML: 500; 334 SOLUTION ORAL at 17:24

## 2019-04-20 RX ADMIN — INSULIN LISPRO 6 UNITS: 100 INJECTION, SOLUTION INTRAVENOUS; SUBCUTANEOUS at 17:23

## 2019-04-20 RX ADMIN — ACETAMINOPHEN 650 MG: 325 TABLET, FILM COATED ORAL at 16:14

## 2019-04-20 RX ADMIN — DIPHENHYDRAMINE HYDROCHLORIDE 25 MG: 50 INJECTION, SOLUTION INTRAMUSCULAR; INTRAVENOUS at 05:58

## 2019-04-20 RX ADMIN — INSULIN LISPRO 6 UNITS: 100 INJECTION, SOLUTION INTRAVENOUS; SUBCUTANEOUS at 10:03

## 2019-04-20 RX ADMIN — DIPHENHYDRAMINE HYDROCHLORIDE 25 MG: 50 INJECTION, SOLUTION INTRAMUSCULAR; INTRAVENOUS at 21:01

## 2019-04-20 RX ADMIN — CARVEDILOL 6.25 MG: 6.25 TABLET, FILM COATED ORAL at 17:24

## 2019-04-20 RX ADMIN — INSULIN GLARGINE 24 UNITS: 100 INJECTION, SOLUTION SUBCUTANEOUS at 21:08

## 2019-04-20 RX ADMIN — NIFEDIPINE 60 MG: 30 TABLET, EXTENDED RELEASE ORAL at 21:02

## 2019-04-20 RX ADMIN — VALPROATE SODIUM 500 MG: 100 INJECTION, SOLUTION INTRAVENOUS at 02:01

## 2019-04-20 RX ADMIN — SODIUM CITRATE AND CITRIC ACID MONOHYDRATE 15 ML: 500; 334 SOLUTION ORAL at 10:03

## 2019-04-20 RX ADMIN — SODIUM CHLORIDE 100 ML/HR: 0.9 INJECTION, SOLUTION INTRAVENOUS at 09:07

## 2019-04-20 RX ADMIN — SODIUM CHLORIDE 75 ML/HR: 0.9 INJECTION, SOLUTION INTRAVENOUS at 22:00

## 2019-04-20 RX ADMIN — VITAMIN D, TAB 1000IU (100/BT) 1000 UNITS: 25 TAB at 09:05

## 2019-04-20 RX ADMIN — CARVEDILOL 6.25 MG: 6.25 TABLET, FILM COATED ORAL at 09:05

## 2019-04-20 RX ADMIN — INSULIN LISPRO 6 UNITS: 100 INJECTION, SOLUTION INTRAVENOUS; SUBCUTANEOUS at 13:10

## 2019-04-21 VITALS
WEIGHT: 242.51 LBS | HEIGHT: 73 IN | SYSTOLIC BLOOD PRESSURE: 138 MMHG | TEMPERATURE: 98.2 F | OXYGEN SATURATION: 97 % | DIASTOLIC BLOOD PRESSURE: 69 MMHG | BODY MASS INDEX: 32.14 KG/M2 | RESPIRATION RATE: 18 BRPM | HEART RATE: 88 BPM

## 2019-04-21 PROBLEM — G43.719 CHRONIC MIGRAINE WITHOUT AURA, INTRACTABLE, WITHOUT STATUS MIGRAINOSUS: Status: RESOLVED | Noted: 2019-02-06 | Resolved: 2019-04-21

## 2019-04-21 PROBLEM — I10 ESSENTIAL HYPERTENSION: Status: RESOLVED | Noted: 2017-10-31 | Resolved: 2019-04-21

## 2019-04-21 LAB
ANION GAP SERPL CALCULATED.3IONS-SCNC: 8 MMOL/L (ref 4–13)
BUN SERPL-MCNC: 41 MG/DL (ref 5–25)
CALCIUM SERPL-MCNC: 8.3 MG/DL (ref 8.3–10.1)
CHLORIDE SERPL-SCNC: 109 MMOL/L (ref 100–108)
CO2 SERPL-SCNC: 24 MMOL/L (ref 21–32)
CREAT SERPL-MCNC: 2.38 MG/DL (ref 0.6–1.3)
GFR SERPL CREATININE-BSD FRML MDRD: 39 ML/MIN/1.73SQ M
GLUCOSE SERPL-MCNC: 110 MG/DL (ref 65–140)
GLUCOSE SERPL-MCNC: 60 MG/DL (ref 65–140)
GLUCOSE SERPL-MCNC: 63 MG/DL (ref 65–140)
POTASSIUM SERPL-SCNC: 4 MMOL/L (ref 3.5–5.3)
SODIUM SERPL-SCNC: 141 MMOL/L (ref 136–145)

## 2019-04-21 PROCEDURE — 99239 HOSP IP/OBS DSCHRG MGMT >30: CPT | Performed by: INTERNAL MEDICINE

## 2019-04-21 PROCEDURE — 82948 REAGENT STRIP/BLOOD GLUCOSE: CPT

## 2019-04-21 PROCEDURE — 80048 BASIC METABOLIC PNL TOTAL CA: CPT | Performed by: INTERNAL MEDICINE

## 2019-04-21 PROCEDURE — 99232 SBSQ HOSP IP/OBS MODERATE 35: CPT | Performed by: INTERNAL MEDICINE

## 2019-04-21 RX ORDER — NIFEDIPINE 60 MG/1
60 TABLET, FILM COATED, EXTENDED RELEASE ORAL
Qty: 30 TABLET | Refills: 0 | Status: SHIPPED | OUTPATIENT
Start: 2019-04-21 | End: 2019-05-03

## 2019-04-21 RX ORDER — TRISODIUM CITRATE DIHYDRATE AND CITRIC ACID MONOHYDRATE 500; 334 MG/5ML; MG/5ML
15 SOLUTION ORAL 2 TIMES DAILY
Qty: 1800 ML | Refills: 0 | Status: SHIPPED | OUTPATIENT
Start: 2019-04-21 | End: 2019-07-09 | Stop reason: CLARIF

## 2019-04-21 RX ORDER — CARVEDILOL 6.25 MG/1
6.25 TABLET ORAL 2 TIMES DAILY WITH MEALS
Qty: 60 TABLET | Refills: 0 | Status: SHIPPED | OUTPATIENT
Start: 2019-04-21 | End: 2019-05-03 | Stop reason: SDUPTHER

## 2019-04-21 RX ADMIN — INSULIN LISPRO 6 UNITS: 100 INJECTION, SOLUTION INTRAVENOUS; SUBCUTANEOUS at 09:40

## 2019-04-21 RX ADMIN — SODIUM CHLORIDE 75 ML/HR: 0.9 INJECTION, SOLUTION INTRAVENOUS at 12:02

## 2019-04-21 RX ADMIN — VITAMIN D, TAB 1000IU (100/BT) 1000 UNITS: 25 TAB at 08:17

## 2019-04-21 RX ADMIN — SODIUM CITRATE AND CITRIC ACID MONOHYDRATE 15 ML: 500; 334 SOLUTION ORAL at 08:19

## 2019-04-21 RX ADMIN — DIPHENHYDRAMINE HYDROCHLORIDE 25 MG: 50 INJECTION, SOLUTION INTRAMUSCULAR; INTRAVENOUS at 05:45

## 2019-04-21 RX ADMIN — CARVEDILOL 6.25 MG: 6.25 TABLET, FILM COATED ORAL at 08:17

## 2019-04-21 RX ADMIN — INSULIN LISPRO 6 UNITS: 100 INJECTION, SOLUTION INTRAVENOUS; SUBCUTANEOUS at 12:52

## 2019-04-22 ENCOUNTER — TRANSITIONAL CARE MANAGEMENT (OUTPATIENT)
Dept: FAMILY MEDICINE CLINIC | Facility: CLINIC | Age: 34
End: 2019-04-22

## 2019-04-22 ENCOUNTER — TELEPHONE (OUTPATIENT)
Dept: NEPHROLOGY | Facility: CLINIC | Age: 34
End: 2019-04-22

## 2019-04-22 ENCOUNTER — TELEPHONE (OUTPATIENT)
Dept: FAMILY MEDICINE CLINIC | Facility: CLINIC | Age: 34
End: 2019-04-22

## 2019-04-25 ENCOUNTER — APPOINTMENT (OUTPATIENT)
Dept: LAB | Facility: CLINIC | Age: 34
End: 2019-04-25
Payer: COMMERCIAL

## 2019-04-25 DIAGNOSIS — N17.9 ACUTE RENAL FAILURE SUPERIMPOSED ON STAGE 2 CHRONIC KIDNEY DISEASE (HCC): ICD-10-CM

## 2019-04-25 DIAGNOSIS — N18.2 ACUTE RENAL FAILURE SUPERIMPOSED ON STAGE 2 CHRONIC KIDNEY DISEASE (HCC): ICD-10-CM

## 2019-04-25 LAB
ANION GAP SERPL CALCULATED.3IONS-SCNC: 10 MMOL/L (ref 4–13)
BUN SERPL-MCNC: 33 MG/DL (ref 5–25)
CALCIUM SERPL-MCNC: 8.8 MG/DL (ref 8.3–10.1)
CHLORIDE SERPL-SCNC: 107 MMOL/L (ref 100–108)
CO2 SERPL-SCNC: 25 MMOL/L (ref 21–32)
CREAT SERPL-MCNC: 2.25 MG/DL (ref 0.6–1.3)
GFR SERPL CREATININE-BSD FRML MDRD: 42 ML/MIN/1.73SQ M
GLUCOSE P FAST SERPL-MCNC: 89 MG/DL (ref 65–99)
POTASSIUM SERPL-SCNC: 4.2 MMOL/L (ref 3.5–5.3)
SODIUM SERPL-SCNC: 142 MMOL/L (ref 136–145)

## 2019-04-25 PROCEDURE — 36415 COLL VENOUS BLD VENIPUNCTURE: CPT

## 2019-04-25 PROCEDURE — 80048 BASIC METABOLIC PNL TOTAL CA: CPT

## 2019-04-26 ENCOUNTER — OFFICE VISIT (OUTPATIENT)
Dept: FAMILY MEDICINE CLINIC | Facility: CLINIC | Age: 34
End: 2019-04-26

## 2019-04-26 VITALS
WEIGHT: 249.8 LBS | RESPIRATION RATE: 16 BRPM | HEART RATE: 84 BPM | TEMPERATURE: 97.8 F | HEIGHT: 73 IN | SYSTOLIC BLOOD PRESSURE: 200 MMHG | DIASTOLIC BLOOD PRESSURE: 100 MMHG | BODY MASS INDEX: 33.11 KG/M2

## 2019-04-26 DIAGNOSIS — I10 HYPERTENSION, UNSPECIFIED TYPE: Primary | ICD-10-CM

## 2019-04-26 DIAGNOSIS — Z76.89 ENCOUNTER FOR SUPPORT AND COORDINATION OF TRANSITION OF CARE: ICD-10-CM

## 2019-04-26 DIAGNOSIS — N18.30 CKD (CHRONIC KIDNEY DISEASE) STAGE 3, GFR 30-59 ML/MIN (HCC): ICD-10-CM

## 2019-04-26 PROBLEM — N17.9 ACUTE RENAL FAILURE SUPERIMPOSED ON STAGE 2 CHRONIC KIDNEY DISEASE (HCC): Status: RESOLVED | Noted: 2019-02-21 | Resolved: 2019-04-26

## 2019-04-26 PROBLEM — N18.2 ACUTE RENAL FAILURE SUPERIMPOSED ON STAGE 2 CHRONIC KIDNEY DISEASE (HCC): Status: RESOLVED | Noted: 2019-02-21 | Resolved: 2019-04-26

## 2019-04-26 PROBLEM — L65.9 HAIR LOSS: Status: RESOLVED | Noted: 2018-07-17 | Resolved: 2019-04-26

## 2019-04-26 PROBLEM — E87.5 HYPERKALEMIA: Status: RESOLVED | Noted: 2019-04-16 | Resolved: 2019-04-26

## 2019-04-26 PROCEDURE — 99496 TRANSJ CARE MGMT HIGH F2F 7D: CPT | Performed by: FAMILY MEDICINE

## 2019-04-26 RX ORDER — CARVEDILOL 12.5 MG/1
12.5 TABLET ORAL 2 TIMES DAILY WITH MEALS
Qty: 90 TABLET | Refills: 3 | Status: SHIPPED | OUTPATIENT
Start: 2019-04-26 | End: 2019-05-25 | Stop reason: HOSPADM

## 2019-05-03 ENCOUNTER — OFFICE VISIT (OUTPATIENT)
Dept: NEPHROLOGY | Facility: CLINIC | Age: 34
End: 2019-05-03
Payer: COMMERCIAL

## 2019-05-03 VITALS
BODY MASS INDEX: 33.13 KG/M2 | DIASTOLIC BLOOD PRESSURE: 100 MMHG | SYSTOLIC BLOOD PRESSURE: 152 MMHG | HEIGHT: 73 IN | HEART RATE: 80 BPM | WEIGHT: 250 LBS

## 2019-05-03 DIAGNOSIS — R80.8 OTHER PROTEINURIA: ICD-10-CM

## 2019-05-03 DIAGNOSIS — I10 HYPERTENSION: ICD-10-CM

## 2019-05-03 DIAGNOSIS — I10 HYPERTENSION, UNSPECIFIED TYPE: ICD-10-CM

## 2019-05-03 DIAGNOSIS — N18.30 CKD (CHRONIC KIDNEY DISEASE) STAGE 3, GFR 30-59 ML/MIN (HCC): Primary | ICD-10-CM

## 2019-05-03 PROCEDURE — 99214 OFFICE O/P EST MOD 30 MIN: CPT | Performed by: NURSE PRACTITIONER

## 2019-05-03 RX ORDER — TORSEMIDE 20 MG/1
20 TABLET ORAL DAILY
Qty: 15 TABLET | Refills: 5 | Status: SHIPPED | OUTPATIENT
Start: 2019-05-03 | End: 2019-07-03 | Stop reason: SINTOL

## 2019-05-03 RX ORDER — NIFEDIPINE 60 MG/1
60 TABLET, FILM COATED, EXTENDED RELEASE ORAL 2 TIMES DAILY
Qty: 30 TABLET | Refills: 0 | Status: SHIPPED | OUTPATIENT
Start: 2019-05-03 | End: 2019-06-26 | Stop reason: SDUPTHER

## 2019-05-03 RX ORDER — TORSEMIDE 20 MG/1
20 TABLET ORAL DAILY
Qty: 15 TABLET | Refills: 5 | Status: SHIPPED | OUTPATIENT
Start: 2019-05-03 | End: 2019-05-03

## 2019-05-21 ENCOUNTER — HOSPITAL ENCOUNTER (INPATIENT)
Facility: HOSPITAL | Age: 34
LOS: 4 days | Discharge: HOME | DRG: 207 | End: 2019-05-25
Attending: EMERGENCY MEDICINE | Admitting: INTERNAL MEDICINE
Payer: COMMERCIAL

## 2019-05-21 ENCOUNTER — APPOINTMENT (EMERGENCY)
Dept: RADIOLOGY | Facility: HOSPITAL | Age: 34
DRG: 207 | End: 2019-05-21
Payer: COMMERCIAL

## 2019-05-21 DIAGNOSIS — R07.89 CHEST HEAVINESS: ICD-10-CM

## 2019-05-21 DIAGNOSIS — N17.9 ACUTE KIDNEY INJURY (HCC): ICD-10-CM

## 2019-05-21 DIAGNOSIS — I21.4 NSTEMI (NON-ST ELEVATED MYOCARDIAL INFARCTION) (HCC): Primary | ICD-10-CM

## 2019-05-21 DIAGNOSIS — R05.9 COUGH: ICD-10-CM

## 2019-05-21 DIAGNOSIS — N18.30 CKD (CHRONIC KIDNEY DISEASE) STAGE 3, GFR 30-59 ML/MIN (HCC): ICD-10-CM

## 2019-05-21 PROBLEM — E11.29 TYPE 2 DIABETES MELLITUS WITH KIDNEY COMPLICATION, WITH LONG-TERM CURRENT USE OF INSULIN (HCC): Status: ACTIVE | Noted: 2018-07-17

## 2019-05-21 LAB
ALBUMIN SERPL BCP-MCNC: 2.5 G/DL (ref 3.5–5)
ALP SERPL-CCNC: 65 U/L (ref 46–116)
ALT SERPL W P-5'-P-CCNC: 9 U/L (ref 12–78)
ANION GAP SERPL CALCULATED.3IONS-SCNC: 8 MMOL/L (ref 4–13)
APTT PPP: 34 SECONDS (ref 26–38)
APTT PPP: 54 SECONDS (ref 26–38)
AST SERPL W P-5'-P-CCNC: 47 U/L (ref 5–45)
ATRIAL RATE: 94 BPM
BASOPHILS # BLD AUTO: 0.04 THOUSANDS/ΜL (ref 0–0.1)
BASOPHILS NFR BLD AUTO: 1 % (ref 0–1)
BILIRUB SERPL-MCNC: 0.1 MG/DL (ref 0.2–1)
BUN SERPL-MCNC: 31 MG/DL (ref 5–25)
CALCIUM SERPL-MCNC: 8.6 MG/DL (ref 8.3–10.1)
CHLORIDE SERPL-SCNC: 106 MMOL/L (ref 100–108)
CO2 SERPL-SCNC: 25 MMOL/L (ref 21–32)
CREAT SERPL-MCNC: 2.76 MG/DL (ref 0.6–1.3)
EOSINOPHIL # BLD AUTO: 0.26 THOUSAND/ΜL (ref 0–0.61)
EOSINOPHIL NFR BLD AUTO: 3 % (ref 0–6)
ERYTHROCYTE [DISTWIDTH] IN BLOOD BY AUTOMATED COUNT: 12.1 % (ref 11.6–15.1)
EST. AVERAGE GLUCOSE BLD GHB EST-MCNC: 157 MG/DL
GFR SERPL CREATININE-BSD FRML MDRD: 33 ML/MIN/1.73SQ M
GLUCOSE SERPL-MCNC: 118 MG/DL (ref 65–140)
GLUCOSE SERPL-MCNC: 163 MG/DL (ref 65–140)
GLUCOSE SERPL-MCNC: 195 MG/DL (ref 65–140)
HBA1C MFR BLD: 7.1 % (ref 4.2–6.3)
HCT VFR BLD AUTO: 32.6 % (ref 36.5–49.3)
HGB BLD-MCNC: 11 G/DL (ref 12–17)
IMM GRANULOCYTES # BLD AUTO: 0.03 THOUSAND/UL (ref 0–0.2)
IMM GRANULOCYTES NFR BLD AUTO: 0 % (ref 0–2)
INR PPP: 0.98 (ref 0.86–1.17)
LYMPHOCYTES # BLD AUTO: 2.35 THOUSANDS/ΜL (ref 0.6–4.47)
LYMPHOCYTES NFR BLD AUTO: 27 % (ref 14–44)
MAGNESIUM SERPL-MCNC: 1.6 MG/DL (ref 1.6–2.6)
MCH RBC QN AUTO: 29 PG (ref 26.8–34.3)
MCHC RBC AUTO-ENTMCNC: 33.7 G/DL (ref 31.4–37.4)
MCV RBC AUTO: 86 FL (ref 82–98)
MONOCYTES # BLD AUTO: 0.55 THOUSAND/ΜL (ref 0.17–1.22)
MONOCYTES NFR BLD AUTO: 6 % (ref 4–12)
NEUTROPHILS # BLD AUTO: 5.35 THOUSANDS/ΜL (ref 1.85–7.62)
NEUTS SEG NFR BLD AUTO: 63 % (ref 43–75)
NRBC BLD AUTO-RTO: 0 /100 WBCS
NT-PROBNP SERPL-MCNC: 1059 PG/ML
P AXIS: 56 DEGREES
PLATELET # BLD AUTO: 306 THOUSANDS/UL (ref 149–390)
PMV BLD AUTO: 9.8 FL (ref 8.9–12.7)
POTASSIUM SERPL-SCNC: 4.3 MMOL/L (ref 3.5–5.3)
PR INTERVAL: 130 MS
PROT SERPL-MCNC: 6.4 G/DL (ref 6.4–8.2)
PROTHROMBIN TIME: 12.7 SECONDS (ref 11.8–14.2)
QRS AXIS: 31 DEGREES
QRSD INTERVAL: 70 MS
QT INTERVAL: 338 MS
QTC INTERVAL: 416 MS
RBC # BLD AUTO: 3.79 MILLION/UL (ref 3.88–5.62)
SODIUM SERPL-SCNC: 139 MMOL/L (ref 136–145)
T WAVE AXIS: 7 DEGREES
TROPONIN I SERPL-MCNC: 0.22 NG/ML
TROPONIN I SERPL-MCNC: 0.23 NG/ML
TROPONIN I SERPL-MCNC: 0.27 NG/ML
VENTRICULAR RATE: 91 BPM
WBC # BLD AUTO: 8.58 THOUSAND/UL (ref 4.31–10.16)

## 2019-05-21 PROCEDURE — 93010 ELECTROCARDIOGRAM REPORT: CPT | Performed by: INTERNAL MEDICINE

## 2019-05-21 PROCEDURE — 71046 X-RAY EXAM CHEST 2 VIEWS: CPT

## 2019-05-21 PROCEDURE — 83880 ASSAY OF NATRIURETIC PEPTIDE: CPT | Performed by: EMERGENCY MEDICINE

## 2019-05-21 PROCEDURE — 84484 ASSAY OF TROPONIN QUANT: CPT | Performed by: EMERGENCY MEDICINE

## 2019-05-21 PROCEDURE — 82948 REAGENT STRIP/BLOOD GLUCOSE: CPT

## 2019-05-21 PROCEDURE — 85025 COMPLETE CBC W/AUTO DIFF WBC: CPT | Performed by: EMERGENCY MEDICINE

## 2019-05-21 PROCEDURE — 84484 ASSAY OF TROPONIN QUANT: CPT | Performed by: INTERNAL MEDICINE

## 2019-05-21 PROCEDURE — 94640 AIRWAY INHALATION TREATMENT: CPT

## 2019-05-21 PROCEDURE — 80053 COMPREHEN METABOLIC PANEL: CPT | Performed by: EMERGENCY MEDICINE

## 2019-05-21 PROCEDURE — 85610 PROTHROMBIN TIME: CPT | Performed by: EMERGENCY MEDICINE

## 2019-05-21 PROCEDURE — 99284 EMERGENCY DEPT VISIT MOD MDM: CPT | Performed by: EMERGENCY MEDICINE

## 2019-05-21 PROCEDURE — 99223 1ST HOSP IP/OBS HIGH 75: CPT | Performed by: INTERNAL MEDICINE

## 2019-05-21 PROCEDURE — 36415 COLL VENOUS BLD VENIPUNCTURE: CPT | Performed by: EMERGENCY MEDICINE

## 2019-05-21 PROCEDURE — 83036 HEMOGLOBIN GLYCOSYLATED A1C: CPT | Performed by: INTERNAL MEDICINE

## 2019-05-21 PROCEDURE — 85730 THROMBOPLASTIN TIME PARTIAL: CPT | Performed by: INTERNAL MEDICINE

## 2019-05-21 PROCEDURE — 93005 ELECTROCARDIOGRAM TRACING: CPT

## 2019-05-21 PROCEDURE — 85730 THROMBOPLASTIN TIME PARTIAL: CPT | Performed by: EMERGENCY MEDICINE

## 2019-05-21 PROCEDURE — 83735 ASSAY OF MAGNESIUM: CPT | Performed by: EMERGENCY MEDICINE

## 2019-05-21 PROCEDURE — 99285 EMERGENCY DEPT VISIT HI MDM: CPT

## 2019-05-21 RX ORDER — INSULIN GLARGINE 100 [IU]/ML
20 INJECTION, SOLUTION SUBCUTANEOUS
Status: DISCONTINUED | OUTPATIENT
Start: 2019-05-21 | End: 2019-05-23

## 2019-05-21 RX ORDER — ONDANSETRON 2 MG/ML
4 INJECTION INTRAMUSCULAR; INTRAVENOUS EVERY 6 HOURS PRN
Status: DISCONTINUED | OUTPATIENT
Start: 2019-05-21 | End: 2019-05-25 | Stop reason: HOSPADM

## 2019-05-21 RX ORDER — HEPARIN SODIUM 10000 [USP'U]/100ML
3-20 INJECTION, SOLUTION INTRAVENOUS
Status: DISCONTINUED | OUTPATIENT
Start: 2019-05-21 | End: 2019-05-22

## 2019-05-21 RX ORDER — ASPIRIN 325 MG
325 TABLET ORAL ONCE
Status: COMPLETED | OUTPATIENT
Start: 2019-05-21 | End: 2019-05-21

## 2019-05-21 RX ORDER — FUROSEMIDE 10 MG/ML
40 INJECTION INTRAMUSCULAR; INTRAVENOUS ONCE
Status: COMPLETED | OUTPATIENT
Start: 2019-05-21 | End: 2019-05-21

## 2019-05-21 RX ORDER — CALCIUM CARBONATE 200(500)MG
1000 TABLET,CHEWABLE ORAL DAILY PRN
Status: DISCONTINUED | OUTPATIENT
Start: 2019-05-21 | End: 2019-05-25 | Stop reason: HOSPADM

## 2019-05-21 RX ORDER — TRISODIUM CITRATE DIHYDRATE AND CITRIC ACID MONOHYDRATE 500; 334 MG/5ML; MG/5ML
15 SOLUTION ORAL 2 TIMES DAILY
Status: DISCONTINUED | OUTPATIENT
Start: 2019-05-21 | End: 2019-05-22

## 2019-05-21 RX ORDER — NIFEDIPINE 30 MG/1
60 TABLET, EXTENDED RELEASE ORAL 2 TIMES DAILY
Status: DISCONTINUED | OUTPATIENT
Start: 2019-05-21 | End: 2019-05-25 | Stop reason: HOSPADM

## 2019-05-21 RX ORDER — TORSEMIDE 20 MG/1
20 TABLET ORAL DAILY
Status: DISCONTINUED | OUTPATIENT
Start: 2019-05-22 | End: 2019-05-22

## 2019-05-21 RX ORDER — HEPARIN SODIUM 1000 [USP'U]/ML
4000 INJECTION, SOLUTION INTRAVENOUS; SUBCUTANEOUS AS NEEDED
Status: DISCONTINUED | OUTPATIENT
Start: 2019-05-21 | End: 2019-05-22

## 2019-05-21 RX ORDER — DOCUSATE SODIUM 100 MG/1
100 CAPSULE, LIQUID FILLED ORAL 2 TIMES DAILY PRN
Status: DISCONTINUED | OUTPATIENT
Start: 2019-05-21 | End: 2019-05-25 | Stop reason: HOSPADM

## 2019-05-21 RX ORDER — CARVEDILOL 12.5 MG/1
25 TABLET ORAL 2 TIMES DAILY WITH MEALS
Status: DISCONTINUED | OUTPATIENT
Start: 2019-05-22 | End: 2019-05-25 | Stop reason: HOSPADM

## 2019-05-21 RX ORDER — HEPARIN SODIUM 1000 [USP'U]/ML
4000 INJECTION, SOLUTION INTRAVENOUS; SUBCUTANEOUS ONCE
Status: COMPLETED | OUTPATIENT
Start: 2019-05-21 | End: 2019-05-21

## 2019-05-21 RX ORDER — HEPARIN SODIUM 1000 [USP'U]/ML
2000 INJECTION, SOLUTION INTRAVENOUS; SUBCUTANEOUS AS NEEDED
Status: DISCONTINUED | OUTPATIENT
Start: 2019-05-21 | End: 2019-05-22

## 2019-05-21 RX ORDER — ACETAMINOPHEN 325 MG/1
650 TABLET ORAL EVERY 6 HOURS PRN
Status: DISCONTINUED | OUTPATIENT
Start: 2019-05-21 | End: 2019-05-25 | Stop reason: HOSPADM

## 2019-05-21 RX ADMIN — ASPIRIN 325 MG ORAL TABLET 325 MG: 325 PILL ORAL at 17:19

## 2019-05-21 RX ADMIN — SODIUM CITRATE AND CITRIC ACID MONOHYDRATE 15 ML: 500; 334 SOLUTION ORAL at 20:11

## 2019-05-21 RX ADMIN — HEPARIN SODIUM AND DEXTROSE 11.1 UNITS/KG/HR: 10000; 5 INJECTION INTRAVENOUS at 17:46

## 2019-05-21 RX ADMIN — INSULIN LISPRO 4 UNITS: 100 INJECTION, SOLUTION INTRAVENOUS; SUBCUTANEOUS at 20:11

## 2019-05-21 RX ADMIN — ALBUTEROL SULFATE 5 MG: 2.5 SOLUTION RESPIRATORY (INHALATION) at 16:49

## 2019-05-21 RX ADMIN — NIFEDIPINE 60 MG: 30 TABLET, EXTENDED RELEASE ORAL at 20:11

## 2019-05-21 RX ADMIN — IPRATROPIUM BROMIDE 0.5 MG: 0.5 SOLUTION RESPIRATORY (INHALATION) at 16:49

## 2019-05-21 RX ADMIN — HEPARIN SODIUM 4000 UNITS: 1000 INJECTION, SOLUTION INTRAVENOUS; SUBCUTANEOUS at 17:46

## 2019-05-21 RX ADMIN — FUROSEMIDE 40 MG: 10 INJECTION, SOLUTION INTRAMUSCULAR; INTRAVENOUS at 20:11

## 2019-05-21 NOTE — ASSESSMENT & PLAN NOTE
· Patient presents with chest tightness  Troponins elevated at 0 22  Suspect likely type 1 NSTEMI  · Started on heparin drip in emergency department-will continue    · Aspirin  · Statin  · Continue beta-blockers  · Cardiology evaluation  · Check 2D echocardiogram  · Check A1c and fasting lipids

## 2019-05-21 NOTE — ASSESSMENT & PLAN NOTE
Lab Results   Component Value Date    HGBA1C 7 5 (H) 02/13/2019       No results for input(s): POCGLU in the last 72 hours      Blood Sugar Average: Last 72 hrs:   resume insulin regimen monitor  Check blood sugars before meals bedtime

## 2019-05-21 NOTE — PLAN OF CARE
Problem: Potential for Falls  Goal: Patient will remain free of falls  Description  INTERVENTIONS:  - Assess patient frequently for physical needs  -  Identify cognitive and physical deficits and behaviors that affect risk of falls  -  [place] fall precautions as indicated by assessment   - Educate patient/family on patient safety including physical limitations  - Instruct patient to call for assistance with activity based on assessment  - Modify environment to reduce risk of injury  - Consider OT/PT consult to assist with strengthening/mobility  Outcome: Progressing     Problem: CARDIOVASCULAR - ADULT  Goal: Maintains optimal cardiac output and hemodynamic stability  Description  INTERVENTIONS:  - Monitor I/O, vital signs and rhythm  - Monitor for S/S and trends of decreased cardiac output i e  bleeding, hypotension  - Administer and titrate ordered vasoactive medications to optimize hemodynamic stability  - Assess quality of pulses, skin color and temperature  - Assess for signs of decreased coronary artery perfusion - ex   Angina  - Instruct patient to report change in severity of symptoms  Outcome: Progressing  Goal: Absence of cardiac dysrhythmias or at baseline rhythm  Description  INTERVENTIONS:  - Continuous cardiac monitoring, monitor vital signs, obtain 12 lead EKG if indicated  - Administer antiarrhythmic and heart rate control medications as ordered  - Monitor electrolytes and administer replacement therapy as ordered  Outcome: Progressing     Problem: METABOLIC, FLUID AND ELECTROLYTES - ADULT  Goal: Electrolytes maintained within normal limits  Description  INTERVENTIONS:  - Monitor labs and assess patient for signs and symptoms of electrolyte imbalances  - Administer electrolyte replacement as ordered  - Monitor response to electrolyte replacements, including repeat lab results as appropriate  - Instruct patient on fluid and nutrition as appropriate  Outcome: Progressing  Goal: Fluid balance maintained  Description  INTERVENTIONS:  - Monitor labs and assess for signs and symptoms of volume excess or deficit  - Monitor I/O and WT  - Instruct patient on fluid and nutrition as appropriate  Outcome: Progressing  Goal: Glucose maintained within target range  Description  INTERVENTIONS:  - Monitor Blood Glucose as ordered  - Assess for signs and symptoms of hyperglycemia and hypoglycemia  - Administer ordered medications to maintain glucose within target range  - Assess nutritional intake and initiate nutrition service referral as needed  Outcome: Progressing

## 2019-05-21 NOTE — H&P
H&P- Dashawn Cheung 1985, 26 y o  male MRN: [id number]    Unit/Bed#: ED 07 Encounter: [id number]    Primary Care Provider: Whit Mayorga MD   Date and time admitted to hospital: 5/21/2019  3:56 PM      * NSTEMI (non-ST elevated myocardial infarction) [facility])  Assessment & Plan  · Patient presents with chest tightness  Troponins elevated at 0 22  Suspect likely type 1 NSTEMI  · Started on heparin drip in emergency department-will continue  · Aspirin  · Statin  · Continue beta-blockers  · Cardiology evaluation  · Check 2D echocardiogram  · Check A1c and fasting lipids    CKD (chronic kidney disease) stage 3, GFR 30-59 ml/min ([facility])  Assessment & Plan  · Baseline creatinine 1 4-1 7; recently elevated to 2 25  · Today creatinine elevated 2 76  · Appears volume overloaded with lower extremity edema, dyspnea and some orthopnea  · IV Lasix 40 mg x1  · Increase torsemide to 20 mg  · Nephrology evaluation    Cough  Assessment & Plan  · Chest x-ray clear  · Supportive care    Hyperlipidemia  Assessment & Plan  · Continue statin  · Check fasting lipids    Hypertension  Assessment & Plan  · Resume outpatient regimen monitor    Other proteinuria  Assessment & Plan  · Being followed by Nephrology  Last urine protein creatinine ratio 1 9    Type 2 diabetes mellitus with kidney complication, with long-term current use of insulin ([facility])  Assessment & Plan  Lab Results   Component Value Date    HGBA1C 7 5 (H) 02/13/2019       No results for input(s): POCGLU in the last 72 hours  Blood Sugar Average: Last 72 hrs:   resume insulin regimen monitor  Check blood sugars before meals bedtime      VTE Prophylaxis: Heparin  / sequential compression device   Code Status:  Full code  POLST: There is no POLST form on file for this patient (pre-hospital)  Discussion with family:  Family at bedside    Anticipated Length of Stay:  Patient will be admitted on an Inpatient basis with an anticipated length of stay of  > 2 midnights  Justification for Hospital Stay:  NSTEMI    Total Time for Visit, including Counseling / Coordination of Care: 70 minutes  Greater than 50% of this total time spent on direct patient counseling and coordination of care  Chief Complaint:   Chest tightness    History of Present Illness:    Catherne Favre is a 26 y o  male with past medical significant for hypertension, chronic kidney disease, proteinuria and hyperlipidemia who presents with chest tightness, dyspnea, cough and leg edema  Patient has been experiencing these symptoms since Friday  He is getting out of breath walking, which gets better with taking rest   Also experiencing chest tightness, which also improves with rest   Reports increased leg swelling  Unable to sleep, usually wakes up due to feeling congested and coughing  Denies fever chills or rigors  No sick contacts or travel  Being followed by Nephrology for chronic kidney disease  Denies smoking  No family history of CAD  Never had cardiac workup himself    Review of Systems:    Review of Systems  Twelve point review systems negative except noted above  Past Medical and Surgical History:     Past Medical History:   Diagnosis Date    CKD (chronic kidney disease) 2/21/2019    Diabetes mellitus ([facility] [address] )     Essential hypertension 10/31/2017    Hyperlipidemia     NSTEMI (non-ST elevated myocardial infarction) ([facility] [address] ) 5/21/2019       Past Surgical History:   Procedure Laterality Date    ABCESS DRAINAGE      tooth    OH KNEE SCOPE,MED/LAT MENISECTOMY Right 9/13/2018    Procedure: RIGHT KNEE ARTHROSCOPIC PARTIAL MEDIAL MENISCECTOMY;  Surgeon: Ascencion Leung MD;  Location: [facility];  Service: Orthopedics    WRIST ARTHROPLASTY Right 2017       Meds/Allergies:    Prior to Admission medications    Medication Sig Start Date End Date Taking?  Authorizing Provider   carvedilol (COREG) 12 5 mg tablet Take 1 tablet (12 5 mg total) by mouth 2 (two) times a day with meals 4/26/19  Yes Osmel Berman MD Feroz   D 1000 1000 units capsule TAKE 1 CAPSULE BY MOUTH EVERY DAY 3/5/19  Yes Albert Mcgarry MD   insulin aspart (NOVOLOG FLEXPEN) 100 Units/mL injection pen Inject 4 Units under the skin 3 (three) times a day with meals 4/21/19  Yes Cem Seaman MD   insulin glargine (BASAGLAR KWIKPEN) 100 units/mL injection pen Inject 24 Units under the skin daily at bedtime 4/21/19  Yes Cem Seaman MD   NIFEdipine ER (ADALAT CC) 60 MG 24 hr tablet Take 1 tablet (60 mg total) by mouth 2 (two) times a day 5/3/19  Yes CHARLOTTE Garcia   sod citrate-citric acid [facility]) 500-334 MG/5ML Take 15 mL by mouth 2 (two) times a day 4/21/19  Yes Cem Seaman MD   torsemide (DEMADEX) 20 mg tablet Take 1 tablet (20 mg total) by mouth daily Take half a tablet daily 5/3/19  Yes CHARLOTTE Garcia   FREESTYLE LITE test strip 1 EACH BY OTHER ROUTE 3 (THREE) TIMES A DAY DISPENSE FREESTYLE TEST STRIPS 3/7/19   Albert Mcgarry MD   glucose blood (ACCU-CHEK HARVEY PLUS) test strip Use as instructed 8/17/18   Jason Mills MD   sildenafil (REVATIO) 20 mg tablet 2-5 tablets as needed for sexual activity 1/7/19   Shelly Ansari PA-C   glucose monitoring kit (FREESTYLE) monitoring kit Inject 1 each as directed 3 (three) times a day before meals 9/21/18 5/21/19  Albert Mcgarry MD   Insulin Pen Needle 31G X 5 MM MISC by Does not apply route as needed (as needed) 1/9/19 5/21/19  Albert Mcgarry MD   Lancets (FREESTYLE) lancets by Other route 3 (three) times a day before meals Use as instructed 9/21/18 5/21/19  Albert Mcgarry MD   methylPREDNISolone 4 MG tablet therapy pack Use as directed on package 4/19/19 5/21/19  CHARLOTTE Castano     I have reviewed home medications with patient personally      Allergies: No Known Allergies    Social History:     Marital Status: /Civil Union   Occupation:   Patient Pre-hospital Living Situation:  Lives with family  Patient Pre-hospital Level of Mobility:  Independent  Patient Pre-hospital Diet Restrictions:  None  Substance Use History:   Social History     Substance and Sexual Activity   Alcohol Use Yes    Comment: occasionally     Social History     Tobacco Use   Smoking Status Never Smoker   Smokeless Tobacco Never Used     Social History     Substance and Sexual Activity   Drug Use No       Family History:    non-contributory    Physical Exam:     Vitals:   Blood Pressure: 148/76 (05/21/19 1730)  Pulse: 92 (05/21/19 1730)  Temperature: 98 3 °F (36 8 °C) (05/21/19 1543)  Temp Source: Oral (05/21/19 1543)  Respirations: 18 (05/21/19 1730)  SpO2: 98 % (05/21/19 1730)    Physical Exam    Gen -Patient comfortable in bed  Neck- Supple  No thyromegaly or lymphadenopathy  Lungs-decreased breath sounds without any wheeze or rales  Heart S1-S2, regular rate and rhythm, no murmurs  Abdomen-soft nontender, no organomegaly  Bowel sounds present  Extremities-no cyanosi,  Clubbing; pitting edema both legs  Skin- no rash  Neuro-nonfocal     Additional Data:     Lab Results: I have personally reviewed pertinent reports  Results from last 7 days   Lab Units 05/21/19  1639   WBC Thousand/uL 8 58   HEMOGLOBIN g/dL 11 0*   HEMATOCRIT % 32 6*   PLATELETS Thousands/uL 306   NEUTROS PCT % 63   LYMPHS PCT % 27   MONOS PCT % 6   EOS PCT % 3     Results from last 7 days   Lab Units 05/21/19  1639   SODIUM mmol/L 139   POTASSIUM mmol/L 4 3   CHLORIDE mmol/L 106   CO2 mmol/L 25   BUN mg/dL 31*   CREATININE mg/dL 2 76*   ANION GAP mmol/L 8   CALCIUM mg/dL 8 6   ALBUMIN g/dL 2 5*   TOTAL BILIRUBIN mg/dL 0 10*   ALK PHOS U/L 65   ALT U/L 9*   AST U/L 47*   GLUCOSE RANDOM mg/dL 195*     Results from last 7 days   Lab Units 05/21/19  1716   INR  0 98                   Imaging: I have personally reviewed pertinent reports  XR chest 2 views   ED Interpretation by Aldair Glover MD (05/21 1632)   Normal       Final Result by Cameron Rose MD (05/21 1635)      No acute cardiopulmonary disease        Findings concur with the preliminary report by the referring clinician already in PACS and/or our electronic record EPIC  Workstation performed: [id number]             EKG, Pathology, and Other Studies Reviewed on Admission:   · EKG:  Normal sinus rhythm nonspecific T-waves    Allscripts / Epic Records Reviewed: Yes     ** Please Note: This note has been constructed using a voice recognition system   **

## 2019-05-21 NOTE — ED PROVIDER NOTES
History  Chief Complaint   Patient presents with    Chest Pain     pt  comes in c/o a cough that started saturday night with chest tightness  Denies radiation to the neck, jaw, or arms  Pt  Denies n/v/d  Pt  also states he has noticed swelling in his legs    Cough       History provided by:  Patient   used: No    Chest Pain   Associated symptoms: cough and shortness of breath    Associated symptoms: no abdominal pain, no dizziness, no fatigue, no fever, no headache, no nausea, no palpitations, not vomiting and no weakness    Cough   Associated symptoms: shortness of breath    Associated symptoms: no chest pain, no fever, no headaches and no rash     25-year-old male with a history of hypertension, diabetes, chronic kidney disease presented with about 3 days of slightly productive cough, shortness of breath, chest tightness/heaviness especially when lying flat or with exertion  He also notes some mild peripheral edema which is on and off  May be related to his amlodipine  No fever, chills, recent travel or sick contacts  He was recently hospitalized for accelerated hypertension and acute kidney injury  Patient reports using some cough suppressant without much relief  On exam here he is hypertensive with otherwise normal vitals  Has trace peripheral edema  Heart lung sounds are normal   Normal pulses  Differential diagnosis includes viral respiratory infection, pneumonia, bronchitis, acute kidney injury with volume overload, heart failure  Will check labs, EKG, chest x-ray, give neb treatment and will re-evaluate  Prior to Admission Medications   Prescriptions Last Dose Informant Patient Reported? Taking?    D 1000 1000 units capsule  Self No No   Sig: TAKE 1 CAPSULE BY MOUTH EVERY DAY   FREESTYLE LITE test strip  Self No No   Si EACH BY OTHER ROUTE 3 (THREE) TIMES A DAY DISPENSE FREESTYLE TEST STRIPS   Insulin Pen Needle 31G X 5 MM MISC  Self No No   Sig: by Does not apply route as needed (as needed)   Lancets (FREESTYLE) lancets  Self No No   Sig: by Other route 3 (three) times a day before meals Use as instructed   NIFEdipine ER (ADALAT CC) 60 MG 24 hr tablet   No No   Sig: Take 1 tablet (60 mg total) by mouth 2 (two) times a day   carvedilol (COREG) 12 5 mg tablet  Self No No   Sig: Take 1 tablet (12 5 mg total) by mouth 2 (two) times a day with meals   glucose blood (ACCU-CHEK HARVEY PLUS) test strip  Self No No   Sig: Use as instructed   glucose monitoring kit (FREESTYLE) monitoring kit  Self No No   Sig: Inject 1 each as directed 3 (three) times a day before meals   insulin aspart (NOVOLOG FLEXPEN) 100 Units/mL injection pen  Self No No   Sig: Inject 4 Units under the skin 3 (three) times a day with meals   insulin glargine (BASAGLAR KWIKPEN) 100 units/mL injection pen  Self No No   Sig: Inject 24 Units under the skin daily at bedtime   methylPREDNISolone 4 MG tablet therapy pack  Self No No   Sig: Use as directed on package   sildenafil (REVATIO) 20 mg tablet  Self No No   Si-5 tablets as needed for sexual activity   sod citrate-citric acid (BICITRA) 500-334 MG/5ML  Self No No   Sig: Take 15 mL by mouth 2 (two) times a day   torsemide (DEMADEX) 20 mg tablet   No No   Sig: Take 1 tablet (20 mg total) by mouth daily Take half a tablet daily      Facility-Administered Medications: None       Past Medical History:   Diagnosis Date    CKD (chronic kidney disease) 2019    Diabetes mellitus ([facility] [address] )     Essential hypertension 10/31/2017    Hyperlipidemia        Past Surgical History:   Procedure Laterality Date    ABCESS DRAINAGE      tooth    [place] KNEE SCOPE,MED/LAT MENISECTOMY Right 2018    Procedure: RIGHT KNEE ARTHROSCOPIC PARTIAL MEDIAL MENISCECTOMY;  Surgeon: Linda Claros MD;  [facility]: [facility];  Service: Orthopedics    WRIST ARTHROPLASTY Right 2017       Family History   Problem Relation Age of Onset    Hypertension Mother     Multiple sclerosis Mother  Diabetes Father      I have reviewed and agree with the history as documented  Social History     Tobacco Use    Smoking status: Never Smoker    Smokeless tobacco: Never Used   Substance Use Topics    Alcohol use: Yes     Comment: occasionally    Drug use: No        Review of Systems   Constitutional: Negative for activity change, appetite change, fatigue and fever  Respiratory: Positive for cough, chest tightness and shortness of breath  Cardiovascular: Positive for leg swelling  Negative for chest pain and palpitations  Gastrointestinal: Negative for abdominal pain, nausea and vomiting  Skin: Negative for color change and rash  Neurological: Negative for dizziness, weakness and headaches  All other systems reviewed and are negative  Physical Exam  Physical Exam   Constitutional: He is oriented to person, place, and time  He appears well-developed and well-nourished  He does not appear ill  HENT:   Head: Normocephalic  Neck: Normal range of motion  No JVD present  Cardiovascular: Normal rate, regular rhythm and intact distal pulses  No murmur heard  Pulmonary/Chest: Effort normal and breath sounds normal    Musculoskeletal: Normal range of motion  Right lower leg: He exhibits no tenderness  Left lower leg: He exhibits no tenderness  Trace peripheral edema bilaterally  Neurological: He is alert and oriented to person, place, and time  Skin: Skin is warm and dry  No rash noted  Psychiatric: He has a normal mood and affect  His behavior is normal    Nursing note and vitals reviewed        Vital Signs  ED Triage Vitals [05/21/19 1543]   Temperature Pulse Respirations Blood Pressure SpO2   98 3 °F (36 8 °C) 87 16 169/94 100 %      Temp Source Heart Rate Source Patient Position - Orthostatic VS BP Location FiO2 (%)   Oral Monitor Sitting Left arm --      Pain Score       --           Vitals:    05/21/19 1543   BP: 169/94   Pulse: 87   Patient Position - Orthostatic VS: Sitting         Visual Acuity      ED Medications  Medications   heparin (porcine) injection 4,000 Units (has no administration in time range)   heparin (porcine) 25,000 units in 250 mL infusion (premix) (has no administration in time range)   heparin (porcine) injection 4,000 Units (has no administration in time range)   heparin (porcine) injection 2,000 Units (has no administration in time range)   albuterol inhalation solution 5 mg (5 mg Nebulization Given 5/21/19 1649)   ipratropium (ATROVENT) 0 02 % inhalation solution 0 5 mg (0 5 mg Nebulization Given 5/21/19 1649)   aspirin tablet 325 mg (325 mg Oral Given 5/21/19 1719)       Diagnostic Studies  Results Reviewed     Procedure Component Value Units Date/Time    APTT six (6) hours after Heparin bolus/drip initiation or dosing change [[id number]] Collected:  05/21/19 1716    Lab Status: In process Specimen:  Blood from Arm, Right Updated:  05/21/19 [address] [facility] [[id number]] Collected:  05/21/19 1716    Lab Status:   In process Specimen:  Blood from Arm, Right Updated:  05/21/19 1721    Magnesium [[id number]]  (Normal) Collected:  05/21/19 1639    Lab Status:  Final result Specimen:  Blood from Arm, Right Updated:  05/21/19 1711     Magnesium 1 6 mg/dL     B-type natriuretic peptide [[id number]]  (Abnormal) Collected:  05/21/19 1639    Lab Status:  Final result Specimen:  Blood from Arm, Right Updated:  05/21/19 1711     NT-proBNP 1,059 pg/mL     Troponin I [[id number]]  (Abnormal) Collected:  05/21/19 1639    Lab Status:  Final result Specimen:  Blood from Arm, Right Updated:  05/21/19 1706     Troponin I 0 22 ng/mL     Comprehensive metabolic panel [[id number]]  (Abnormal) Collected:  05/21/19 1639    Lab Status:  Final result Specimen:  Blood from Arm, Right Updated:  05/21/19 1705     Sodium 139 mmol/L      Potassium 4 3 mmol/L      Chloride 106 mmol/L      CO2 25 mmol/L      ANION GAP 8 mmol/L      BUN 31 mg/dL      Creatinine 2 76 mg/dL Glucose 195 mg/dL      Calcium 8 6 mg/dL      AST 47 U/L      ALT 9 U/L      Alkaline Phosphatase 65 U/L      Total Protein 6 4 g/dL      Albumin 2 5 g/dL      Total Bilirubin 0 10 mg/dL      eGFR 33 ml/min/1 73sq m     Narrative:       [place] guidelines for Chronic Kidney Disease (CKD):     Stage 1 with normal or high GFR (GFR > 90 mL/min/1 73 square meters)    Stage 2 Mild CKD (GFR = 60-89 mL/min/1 73 square meters)    Stage 3A Moderate CKD (GFR = 45-59 mL/min/1 73 square meters)    Stage 3B Moderate CKD (GFR = 30-44 mL/min/1 73 square meters)    Stage 4 Severe CKD (GFR = 15-29 mL/min/1 73 square meters)    Stage 5 End Stage CKD (GFR <15 mL/min/1 73 square meters)  Note: GFR calculation is accurate only with a steady state creatinine    CBC and differential [[id number]]  (Abnormal) Collected:  05/21/19 1639    Lab Status:  Final result Specimen:  Blood from Arm, Right Updated:  05/21/19 1646     WBC 8 58 Thousand/uL      RBC 3 79 Million/uL      Hemoglobin 11 0 g/dL      Hematocrit 32 6 %      MCV 86 fL      MCH 29 0 pg      MCHC 33 7 g/dL      RDW 12 1 %      MPV 9 8 fL      Platelets 817 Thousands/uL      nRBC 0 /100 WBCs      Neutrophils Relative 63 %      Immat GRANS % 0 %      Lymphocytes Relative 27 %      Monocytes Relative 6 %      Eosinophils Relative 3 %      Basophils Relative 1 %      Neutrophils Absolute 5 35 Thousands/µL      Immature Grans Absolute 0 03 Thousand/uL      Lymphocytes Absolute 2 35 Thousands/µL      Monocytes Absolute 0 55 Thousand/µL      Eosinophils Absolute 0 26 Thousand/µL      Basophils Absolute 0 04 Thousands/µL                  XR chest 2 views   ED Interpretation by Amador Bassett MD (05/21 1632)   Normal       Final Result by Jonathan Bojorquez MD (05/21 1635)      No acute cardiopulmonary disease  Findings concur with the preliminary report by the referring clinician already in PACS and/or our electronic record EPIC        Workstation performed: [id number]                    Procedures  ECG 12 Lead Documentation  Date/Time: 5/21/2019 5:00 PM  Performed by: Bert Del Angel MD  Authorized by: Bert Del Angel MD     Indications / Diagnosis:  Chest tightness  ECG reviewed by me, the ED Provider: yes    Patient location:  ED  Previous ECG:     Previous ECG:  Unavailable  Rate:     ECG rate:  91  Rhythm:     Rhythm: sinus rhythm    Ectopy:     Ectopy: none    QRS:     QRS axis:  Normal  Conduction:     Conduction: normal    ST segments:     ST segments:  Normal  T waves:     T waves: non-specific             Phone Contacts  ED Phone Contact    ED Course         HEART Risk Score      Most Recent Value   History  1 Filed at: 05/21/2019 1709   ECG  1 Filed at: 05/21/2019 1709   Age  0 Filed at: 05/21/2019 1709   Risk Factors  1 Filed at: 05/21/2019 1709   Troponin  2 Filed at: 05/21/2019 1709   Heart Score Risk Calculator   History  1 Filed at: 05/21/2019 1709   ECG  1 Filed at: 05/21/2019 1709   Age  0 Filed at: 05/21/2019 1709   Risk Factors  1 Filed at: 05/21/2019 1709   Troponin  2 Filed at: 05/21/2019 1709   HEART Score  5 Filed at: 05/21/2019 1709   HEART Score  5 Filed at: 05/21/2019 1709                            MDM  Number of Diagnoses or Management Options  Acute kidney injury [facility]): new and requires workup  Chest heaviness: new and requires workup  Cough: new and requires workup  NSTEMI (non-ST elevated myocardial infarction) [facility]): new and requires workup  Diagnosis management comments: 15-year-old male with a history of hypertension, diabetes, chronic kidney disease presented with few days of mild cough but also chest tightness/heaviness worse with exertion also when lying flat  Had some peripheral edema  EKG with some nonspecific lateral T-wave changes  Troponin elevated at 0 22  No prior  Chest x-ray unremarkable  Creatinine elevated from recent baseline  Patient given aspirin, started on heparin for NSTEMI    He did not feeling a little better after nebulizer treatment  Will admit on telemetry for further workup and continued treatment, monitoring  Amount and/or Complexity of Data Reviewed  Clinical lab tests: ordered and reviewed  Tests in the radiology section of CPT®: ordered and reviewed  Discuss the patient with other providers: yes  Independent visualization of images, tracings, or specimens: yes    Patient Progress  Patient progress: improved      Disposition  Final diagnoses:   Cough   Chest heaviness   NSTEMI (non-ST elevated myocardial infarction) ([facility] [address] )   Acute kidney injury ([facility] [address] )     Time reflects when diagnosis was documented in both MDM as applicable and the Disposition within this note     Time User Action Codes Description Comment    5/21/2019  5:17 PM Dulce CASTORENA Add [R05] Cough     5/21/2019  5:17 PM Dulce CASTORENA Add [R07 89] Chest heaviness     5/21/2019  5:17 PM Henrietta Baugh [address]  [I21 4] NSTEMI (non-ST elevated myocardial infarction) ([facility] [address] )     5/21/2019  5:18 PM Dulce CASTORENA Add [N17 9] Acute kidney injury [facility])       ED Disposition     ED Disposition Condition Date/Time Comment    Admit Stable Tue May 21, 2019  5:26 PM Case was discussed with Dr Bernarda Oliver and the patient's admission status was agreed to be Admission Status: inpatient status to the service of Dr Bernarda Oliver  Follow-up Information    None         Patient's Medications   Discharge Prescriptions    No medications on file     No discharge procedures on file      ED Provider  Electronically Signed by           Amador Bassett MD  05/21/19 8366

## 2019-05-21 NOTE — ASSESSMENT & PLAN NOTE
· Baseline creatinine 1 4-1 7; recently elevated to 2 25  · Today creatinine elevated 2 76  · Appears volume overloaded with lower extremity edema, dyspnea and some orthopnea  · IV Lasix 40 mg x1  · Increase torsemide to 20 mg  · Nephrology evaluation

## 2019-05-22 LAB
ANION GAP SERPL CALCULATED.3IONS-SCNC: 9 MMOL/L (ref 4–13)
APTT PPP: 73 SECONDS (ref 26–38)
BACTERIA UR QL AUTO: ABNORMAL /HPF
BILIRUB UR QL STRIP: NEGATIVE
BUN SERPL-MCNC: 34 MG/DL (ref 5–25)
CALCIUM SERPL-MCNC: 8.4 MG/DL (ref 8.3–10.1)
CHLORIDE SERPL-SCNC: 106 MMOL/L (ref 100–108)
CHOLEST SERPL-MCNC: 228 MG/DL (ref 50–200)
CLARITY UR: CLEAR
CO2 SERPL-SCNC: 23 MMOL/L (ref 21–32)
COLOR UR: YELLOW
CREAT SERPL-MCNC: 2.85 MG/DL (ref 0.6–1.3)
CREAT UR-MCNC: 94.4 MG/DL
CRP SERPL HS-MCNC: 5.1 MG/L
ERYTHROCYTE [DISTWIDTH] IN BLOOD BY AUTOMATED COUNT: 12.4 % (ref 11.6–15.1)
ERYTHROCYTE [SEDIMENTATION RATE] IN BLOOD: 58 MM/HOUR (ref 0–10)
GFR SERPL CREATININE-BSD FRML MDRD: 31 ML/MIN/1.73SQ M
GLUCOSE SERPL-MCNC: 140 MG/DL (ref 65–140)
GLUCOSE SERPL-MCNC: 140 MG/DL (ref 65–140)
GLUCOSE SERPL-MCNC: 157 MG/DL (ref 65–140)
GLUCOSE SERPL-MCNC: 158 MG/DL (ref 65–140)
GLUCOSE SERPL-MCNC: 169 MG/DL (ref 65–140)
GLUCOSE UR STRIP-MCNC: NEGATIVE MG/DL
HCT VFR BLD AUTO: 30.6 % (ref 36.5–49.3)
HDLC SERPL-MCNC: 42 MG/DL (ref 40–60)
HGB BLD-MCNC: 10.2 G/DL (ref 12–17)
HGB UR QL STRIP.AUTO: ABNORMAL
KETONES UR STRIP-MCNC: NEGATIVE MG/DL
LDLC SERPL CALC-MCNC: 162 MG/DL (ref 0–100)
LEUKOCYTE ESTERASE UR QL STRIP: NEGATIVE
MAGNESIUM SERPL-MCNC: 1.6 MG/DL (ref 1.6–2.6)
MCH RBC QN AUTO: 28.6 PG (ref 26.8–34.3)
MCHC RBC AUTO-ENTMCNC: 33.3 G/DL (ref 31.4–37.4)
MCV RBC AUTO: 86 FL (ref 82–98)
NITRITE UR QL STRIP: NEGATIVE
NON-SQ EPI CELLS URNS QL MICRO: ABNORMAL /HPF
NONHDLC SERPL-MCNC: 186 MG/DL
PH UR STRIP.AUTO: 5.5 [PH]
PLATELET # BLD AUTO: 288 THOUSANDS/UL (ref 149–390)
PMV BLD AUTO: 10 FL (ref 8.9–12.7)
POTASSIUM SERPL-SCNC: 3.9 MMOL/L (ref 3.5–5.3)
PROT UR STRIP-MCNC: ABNORMAL MG/DL
PROT UR-MCNC: 87 MG/DL
PROT/CREAT UR: 0.92 MG/G{CREAT} (ref 0–0.1)
RBC # BLD AUTO: 3.57 MILLION/UL (ref 3.88–5.62)
RBC #/AREA URNS AUTO: ABNORMAL /HPF
SODIUM SERPL-SCNC: 138 MMOL/L (ref 136–145)
SP GR UR STRIP.AUTO: 1.02 (ref 1–1.03)
TRIGL SERPL-MCNC: 122 MG/DL
UROBILINOGEN UR QL STRIP.AUTO: 0.2 E.U./DL
WBC # BLD AUTO: 10.57 THOUSAND/UL (ref 4.31–10.16)
WBC #/AREA URNS AUTO: ABNORMAL /HPF

## 2019-05-22 PROCEDURE — 84156 ASSAY OF PROTEIN URINE: CPT | Performed by: NURSE PRACTITIONER

## 2019-05-22 PROCEDURE — 82570 ASSAY OF URINE CREATININE: CPT | Performed by: NURSE PRACTITIONER

## 2019-05-22 PROCEDURE — 94760 N-INVAS EAR/PLS OXIMETRY 1: CPT

## 2019-05-22 PROCEDURE — 85652 RBC SED RATE AUTOMATED: CPT | Performed by: INTERNAL MEDICINE

## 2019-05-22 PROCEDURE — 85027 COMPLETE CBC AUTOMATED: CPT | Performed by: INTERNAL MEDICINE

## 2019-05-22 PROCEDURE — 99254 IP/OBS CNSLTJ NEW/EST MOD 60: CPT | Performed by: INTERNAL MEDICINE

## 2019-05-22 PROCEDURE — 83735 ASSAY OF MAGNESIUM: CPT | Performed by: INTERNAL MEDICINE

## 2019-05-22 PROCEDURE — 93005 ELECTROCARDIOGRAM TRACING: CPT

## 2019-05-22 PROCEDURE — 80061 LIPID PANEL: CPT | Performed by: INTERNAL MEDICINE

## 2019-05-22 PROCEDURE — 82948 REAGENT STRIP/BLOOD GLUCOSE: CPT

## 2019-05-22 PROCEDURE — 85730 THROMBOPLASTIN TIME PARTIAL: CPT | Performed by: INTERNAL MEDICINE

## 2019-05-22 PROCEDURE — 81001 URINALYSIS AUTO W/SCOPE: CPT | Performed by: NURSE PRACTITIONER

## 2019-05-22 PROCEDURE — 80048 BASIC METABOLIC PNL TOTAL CA: CPT | Performed by: INTERNAL MEDICINE

## 2019-05-22 PROCEDURE — 99221 1ST HOSP IP/OBS SF/LOW 40: CPT | Performed by: INTERNAL MEDICINE

## 2019-05-22 PROCEDURE — 94640 AIRWAY INHALATION TREATMENT: CPT

## 2019-05-22 PROCEDURE — 86141 C-REACTIVE PROTEIN HS: CPT | Performed by: INTERNAL MEDICINE

## 2019-05-22 PROCEDURE — 99232 SBSQ HOSP IP/OBS MODERATE 35: CPT | Performed by: INTERNAL MEDICINE

## 2019-05-22 PROCEDURE — 94664 DEMO&/EVAL PT USE INHALER: CPT

## 2019-05-22 RX ORDER — TRISODIUM CITRATE DIHYDRATE AND CITRIC ACID MONOHYDRATE 500; 334 MG/5ML; MG/5ML
15 SOLUTION ORAL DAILY
Status: DISCONTINUED | OUTPATIENT
Start: 2019-05-23 | End: 2019-05-25 | Stop reason: HOSPADM

## 2019-05-22 RX ORDER — ASPIRIN 81 MG/1
81 TABLET ORAL DAILY
Status: DISCONTINUED | OUTPATIENT
Start: 2019-05-23 | End: 2019-05-25 | Stop reason: HOSPADM

## 2019-05-22 RX ORDER — NITROGLYCERIN 0.4 MG/1
0.4 TABLET SUBLINGUAL
Status: DISCONTINUED | OUTPATIENT
Start: 2019-05-22 | End: 2019-05-25 | Stop reason: HOSPADM

## 2019-05-22 RX ORDER — COLCHICINE 0.6 MG/1
0.6 TABLET ORAL DAILY
Status: DISCONTINUED | OUTPATIENT
Start: 2019-05-22 | End: 2019-05-23

## 2019-05-22 RX ORDER — MAGNESIUM SULFATE HEPTAHYDRATE 40 MG/ML
2 INJECTION, SOLUTION INTRAVENOUS ONCE
Status: COMPLETED | OUTPATIENT
Start: 2019-05-22 | End: 2019-05-22

## 2019-05-22 RX ORDER — ALBUTEROL SULFATE 2.5 MG/3ML
2.5 SOLUTION RESPIRATORY (INHALATION) EVERY 6 HOURS PRN
Status: DISCONTINUED | OUTPATIENT
Start: 2019-05-22 | End: 2019-05-23

## 2019-05-22 RX ORDER — HEPARIN SODIUM 5000 [USP'U]/ML
5000 INJECTION, SOLUTION INTRAVENOUS; SUBCUTANEOUS EVERY 8 HOURS SCHEDULED
Status: DISCONTINUED | OUTPATIENT
Start: 2019-05-22 | End: 2019-05-25 | Stop reason: HOSPADM

## 2019-05-22 RX ORDER — ATORVASTATIN CALCIUM 40 MG/1
40 TABLET, FILM COATED ORAL
Status: DISCONTINUED | OUTPATIENT
Start: 2019-05-22 | End: 2019-05-25 | Stop reason: HOSPADM

## 2019-05-22 RX ADMIN — ALBUTEROL SULFATE 2.5 MG: 2.5 SOLUTION RESPIRATORY (INHALATION) at 17:32

## 2019-05-22 RX ADMIN — INSULIN LISPRO 4 UNITS: 100 INJECTION, SOLUTION INTRAVENOUS; SUBCUTANEOUS at 17:57

## 2019-05-22 RX ADMIN — HEPARIN SODIUM 5000 UNITS: 5000 INJECTION INTRAVENOUS; SUBCUTANEOUS at 22:17

## 2019-05-22 RX ADMIN — PREDNISONE 50 MG: 20 TABLET ORAL at 18:00

## 2019-05-22 RX ADMIN — CARVEDILOL 25 MG: 12.5 TABLET, FILM COATED ORAL at 17:56

## 2019-05-22 RX ADMIN — HEPARIN SODIUM 2000 UNITS: 1000 INJECTION INTRAVENOUS; SUBCUTANEOUS at 00:18

## 2019-05-22 RX ADMIN — NITROGLYCERIN 1 INCH: 20 OINTMENT TOPICAL at 02:35

## 2019-05-22 RX ADMIN — NIFEDIPINE 60 MG: 30 TABLET, EXTENDED RELEASE ORAL at 17:56

## 2019-05-22 RX ADMIN — INSULIN LISPRO 4 UNITS: 100 INJECTION, SOLUTION INTRAVENOUS; SUBCUTANEOUS at 11:25

## 2019-05-22 RX ADMIN — NITROGLYCERIN 0.4 MG: 0.4 TABLET SUBLINGUAL at 00:31

## 2019-05-22 RX ADMIN — CARVEDILOL 25 MG: 12.5 TABLET, FILM COATED ORAL at 08:58

## 2019-05-22 RX ADMIN — NIFEDIPINE 60 MG: 30 TABLET, EXTENDED RELEASE ORAL at 08:58

## 2019-05-22 RX ADMIN — ATORVASTATIN CALCIUM 40 MG: 40 TABLET, FILM COATED ORAL at 17:57

## 2019-05-22 RX ADMIN — ALBUTEROL SULFATE 2.5 MG: 2.5 SOLUTION RESPIRATORY (INHALATION) at 02:46

## 2019-05-22 RX ADMIN — COLCHICINE 0.6 MG: 0.6 TABLET, FILM COATED ORAL at 18:06

## 2019-05-22 RX ADMIN — INSULIN GLARGINE 20 UNITS: 100 INJECTION, SOLUTION SUBCUTANEOUS at 22:16

## 2019-05-22 RX ADMIN — TORSEMIDE 20 MG: 20 TABLET ORAL at 08:58

## 2019-05-22 RX ADMIN — HEPARIN SODIUM 5000 UNITS: 5000 INJECTION INTRAVENOUS; SUBCUTANEOUS at 15:02

## 2019-05-22 RX ADMIN — MAGNESIUM SULFATE HEPTAHYDRATE 2 G: 40 INJECTION, SOLUTION INTRAVENOUS at 15:14

## 2019-05-22 RX ADMIN — SODIUM CITRATE AND CITRIC ACID MONOHYDRATE 15 ML: 500; 334 SOLUTION ORAL at 08:59

## 2019-05-22 RX ADMIN — MORPHINE SULFATE 2 MG: 2 INJECTION, SOLUTION INTRAMUSCULAR; INTRAVENOUS at 01:04

## 2019-05-22 RX ADMIN — ALBUTEROL SULFATE 2.5 MG: 2.5 SOLUTION RESPIRATORY (INHALATION) at 11:16

## 2019-05-22 NOTE — ASSESSMENT & PLAN NOTE
· High ASCVD risk score, he is on high-intensity statin  · Continue dietary and lifestyle modifications  ·  total cholesterol 228, , HDL 42,

## 2019-05-22 NOTE — PLAN OF CARE
Problem: Potential for Falls  Goal: Patient will remain free of falls  Description  INTERVENTIONS:  - Assess patient frequently for physical needs  -  Identify cognitive and physical deficits and behaviors that affect risk of falls  -  [place] fall precautions as indicated by assessment   - Educate patient/family on patient safety including physical limitations  - Instruct patient to call for assistance with activity based on assessment  - Modify environment to reduce risk of injury  - Consider OT/PT consult to assist with strengthening/mobility  Outcome: Progressing     Problem: CARDIOVASCULAR - ADULT  Goal: Maintains optimal cardiac output and hemodynamic stability  Description  INTERVENTIONS:  - Monitor I/O, vital signs and rhythm  - Monitor for S/S and trends of decreased cardiac output i e  bleeding, hypotension  - Administer and titrate ordered vasoactive medications to optimize hemodynamic stability  - Assess quality of pulses, skin color and temperature  - Assess for signs of decreased coronary artery perfusion - ex   Angina  - Instruct patient to report change in severity of symptoms  Outcome: Progressing  Goal: Absence of cardiac dysrhythmias or at baseline rhythm  Description  INTERVENTIONS:  - Continuous cardiac monitoring, monitor vital signs, obtain 12 lead EKG if indicated  - Administer antiarrhythmic and heart rate control medications as ordered  - Monitor electrolytes and administer replacement therapy as ordered  Outcome: Progressing     Problem: METABOLIC, FLUID AND ELECTROLYTES - ADULT  Goal: Electrolytes maintained within normal limits  Description  INTERVENTIONS:  - Monitor labs and assess patient for signs and symptoms of electrolyte imbalances  - Administer electrolyte replacement as ordered  - Monitor response to electrolyte replacements, including repeat lab results as appropriate  - Instruct patient on fluid and nutrition as appropriate  Outcome: Progressing  Goal: Fluid balance maintained  Description  INTERVENTIONS:  - Monitor labs and assess for signs and symptoms of volume excess or deficit  - Monitor I/O and WT  - Instruct patient on fluid and nutrition as appropriate  Outcome: Progressing  Goal: Glucose maintained within target range  Description  INTERVENTIONS:  - Monitor Blood Glucose as ordered  - Assess for signs and symptoms of hyperglycemia and hypoglycemia  - Administer ordered medications to maintain glucose within target range  - Assess nutritional intake and initiate nutrition service referral as needed  Outcome: Progressing

## 2019-05-22 NOTE — CONSULTS
Consultation - Cardiology Team One  Cathy Bob 37 y o  male MRN: [id number]  Unit/Bed#: -01 Encounter: [id number]    Inpatient consult to Cardiology  Consult performed by: CHARLOTTE Verma  Consult ordered by: Mali Ward MD      Physician Requesting Consult: Mali Ward MD  Reason for Consult / Principal Problem: Elevated troponin       Assessment/ Plan    1  Elevated troponin: concerning for myocarditis   Troponin 0 22/0 27/0 23  Reviewed ECG showing normal sinus rhythm nonspecific T-wave abnormality  Will check echocardiogram  Discontinue heparin gtt     2  MAGNOLIA on CKD-   Nephrology following   Creatinine 2 85   Baseline appears 2 2-2 4    3  Hyperlipidemia-   ASCVD risk score 69%  Recommend high-intensity statin    4  Hypertension- BP stable: 153/84  On coreg and nifedipine   Will continue to monitor     5  Type II diabetes- hemoglobin A1C 7 1      History of Present Illness   HPI: Cathy Bob is a 37y o  year old male who has a history of hypertension, type 2 diabetes, hyperlipidemia and chronic kidney disease  He presents to [facility] ER [facility]/21/19 with complaint of cough and chest tightness  Patient reports symptoms of cough with chest heaviness started over the weekend  Patient reports chills with symptoms but no fever  Chest discomfort has been constant since Saturday  He reports symptoms worsen with deep inhalation  He denies chest pain or shortness of breath with exertion  Cardiology consulted for elevated troponin  Patient reports no history of known CAD, heart failure or dysrhythmias  ECG showed normal sinus with nonspecific T-wave abnormalities  Patient lives at home with his wife independently  He denies tobacco abuse and only social alcohol use  He works full time as a   He denies exertional symptoms of chest pain shortness of breath  He reports no known family history of cardiac disease      EKG reviewed personally:  Normal sinus rhythm with nonspecific T-wave abnormalities  Ventricular rate 91 beats per minute  PRN interval 130 milliseconds  QRS D interval 70 milliseconds  QT interval 338 milliseconds  QTC interval 416 milliseconds    Telemetry reviewed personally:   NSR HR  bpm    Review of Systems   Constitution: Positive for chills and malaise/fatigue  Negative for fever  Cardiovascular: Positive for chest pain and dyspnea on exertion  Negative for leg swelling, orthopnea and palpitations  Respiratory: Positive for shortness of breath  Musculoskeletal: Negative for falls  Gastrointestinal: Negative for nausea and vomiting  Neurological: Positive for light-headedness  Negative for dizziness  Psychiatric/Behavioral: Negative for altered mental status       Historical Information   Past Medical History:   Diagnosis Date    CKD (chronic kidney disease) 2/21/2019    Diabetes mellitus ([facility] )     Essential hypertension 10/31/2017    Hyperlipidemia     NSTEMI (non-ST elevated myocardial infarction) ([facility] ) 5/21/2019     Past Surgical History:   Procedure Laterality Date    ABCESS DRAINAGE      tooth    OH KNEE SCOPE,MED/LAT MENISECTOMY Right 9/13/2018    Procedure: RIGHT KNEE ARTHROSCOPIC PARTIAL MEDIAL MENISCECTOMY;  Surgeon: Asuncion Carney MD;  Location: [facility];  Service: Orthopedics    WRIST ARTHROPLASTY Right 2017     Social History     Substance and Sexual Activity   Alcohol Use Yes    Comment: occasionally     Social History     Substance and Sexual Activity   Drug Use No     Social History     Tobacco Use   Smoking Status Never Smoker   Smokeless Tobacco Never Used     Family History:   Family History   Problem Relation Age of Onset    Hypertension Mother     Multiple sclerosis Mother     Diabetes Father        Meds/Allergies   all current active meds have been reviewed and current meds:   Current Facility-Administered Medications   Medication Dose Route Frequency    acetaminophen (TYLENOL) tablet 650 mg  650 mg Oral Q6H PRN    albuterol inhalation solution 2 5 mg  2 5 mg Nebulization Q6H PRN    calcium carbonate (TUMS) chewable tablet 1,000 mg  1,000 mg Oral Daily PRN    carvedilol (COREG) tablet 25 mg  25 mg Oral BID With Meals    docusate sodium (COLACE) capsule 100 mg  100 mg Oral BID PRN    heparin (porcine) 25,000 units in 250 mL infusion (premix)  3-20 Units/kg/hr (Order-Specific) Intravenous Titrated    heparin (porcine) injection 2,000 Units  2,000 Units Intravenous PRN    heparin (porcine) injection 4,000 Units  4,000 Units Intravenous PRN    insulin glargine (LANTUS) subcutaneous injection 20 Units 0 2 mL  20 Units Subcutaneous HS    insulin lispro (HumaLOG) 100 units/mL subcutaneous injection 4 Units  4 Units Subcutaneous TID With Meals    morphine injection 2 mg  2 mg Intravenous Q4H PRN    NIFEdipine (PROCARDIA XL) 24 hr tablet 60 mg  60 mg Oral BID    nitroglycerin (NITROSTAT) SL tablet 0 4 mg  0 4 mg Sublingual Q5 Min PRN    ondansetron (ZOFRAN) injection 4 mg  4 mg Intravenous Q6H PRN    sod citrate-citric acid (BICITRA) oral solution 15 mL  15 mL Oral BID    torsemide (DEMADEX) tablet 20 mg  20 mg Oral Daily       heparin (porcine) 3-20 Units/kg/hr (Order-Specific) Last Rate: 13 1 Units/kg/hr (05/22/19 0019)       No Known Allergies    Objective   Vitals: Blood pressure 142/82, pulse 95, temperature 98 7 °F (37 1 °C), temperature source Oral, resp  rate 18, height 6' 1" (1 854 m), weight 108 kg (237 lb 10 5 oz), SpO2 97 %  ,     Body mass index is 31 35 kg/m²  ,     Systolic (18LLR), CSM:900 , Min:134 , AWC:762     Diastolic (54EIT), AYT:68, Min:64, Max:105      Intake/Output Summary (Last 24 hours) at 5/22/2019 1004  Last data filed at 5/22/2019 1003  Gross per 24 hour   Intake 960 ml   Output 1100 ml   Net -140 ml     Weight (last 2 days)     Date/Time   Weight    05/22/19 0600   108 (237 66)    05/21/19 1847   108 (237 8)            Invasive Devices Peripheral Intravenous Line            Peripheral IV 05/21/19 Right Antecubital less than 1 day                  Physical Exam   Constitutional: He is oriented to person, place, and time  No distress  Neck: Neck supple  Cardiovascular: Normal rate, regular rhythm, normal heart sounds and intact distal pulses  Pulmonary/Chest: Effort normal and breath sounds normal  No stridor  No respiratory distress  He exhibits no tenderness  RA  No crackles    Abdominal: Soft  Bowel sounds are normal    Musculoskeletal: He exhibits no edema  Neurological: He is alert and oriented to person, place, and time  Skin: Skin is warm and dry  He is not diaphoretic  Psychiatric: He has a normal mood and affect   His behavior is normal        LABORATORY RESULTS:  Results from last 7 days   Lab Units 05/21/19  2325 05/21/19  1946 05/21/19  1639   TROPONIN I ng/mL 0 23* 0 27* 0 22*     CBC with diff: Results from last 7 days   Lab Units 05/22/19  0559 05/21/19  1639   WBC Thousand/uL 10 57* 8 58   HEMOGLOBIN g/dL 10 2* 11 0*   HEMATOCRIT % 30 6* 32 6*   MCV fL 86 86   PLATELETS Thousands/uL 288 306   MCH pg 28 6 29 0   MCHC g/dL 33 3 33 7   RDW % 12 4 12 1   MPV fL 10 0 9 8   NRBC AUTO /100 WBCs  --  0       CMP:  Results from last 7 days   Lab Units 05/22/19  0559 05/21/19  1639   POTASSIUM mmol/L 3 9 4 3   CHLORIDE mmol/L 106 106   CO2 mmol/L 23 25   BUN mg/dL 34* 31*   CREATININE mg/dL 2 85* 2 76*   CALCIUM mg/dL 8 4 8 6   AST U/L  --  47*   ALT U/L  --  9*   ALK PHOS U/L  --  65   EGFR ml/min/1 73sq m 31 33       BMP:  Results from last 7 days   Lab Units 05/22/19  0559 05/21/19  1639   POTASSIUM mmol/L 3 9 4 3   CHLORIDE mmol/L 106 106   CO2 mmol/L 23 25   BUN mg/dL 34* 31*   CREATININE mg/dL 2 85* 2 76*   CALCIUM mg/dL 8 4 8 6          Lab Results   Component Value Date    NTBNP 1,059 (H) 05/21/2019            Results from last 7 days   Lab Units 05/22/19  0559 05/21/19  1639   MAGNESIUM mg/dL 1 6 1 6          Results from last 7 days   Lab Units 05/21/19  1946   HEMOGLOBIN A1C % 7 1*              Results from last 7 days   Lab Units 05/21/19  1716   INR  0 98     Lipid Profile:   No results found for: CHOL  Lab Results   Component Value Date    HDL 42 05/22/2019    HDL 49 08/09/2018     Lab Results   Component Value Date    LDLCALC 162 (H) 05/22/2019    LDLCALC 209 (H) 08/09/2018     Lab Results   Component Value Date    TRIG 122 05/22/2019    TRIG 144 08/09/2018         Cardiac testing:   No results found for this or any previous visit  No results found for this or any previous visit  No procedure found  No results found for this or any previous visit  Imaging:   Xr Chest 2 Views    Result Date: 5/21/2019  Narrative: CHEST INDICATION:   dyspnea/cough  COMPARISON:  5/4/2015 EXAM PERFORMED/VIEWS:  XR CHEST PA & LATERAL FINDINGS: Cardiomediastinal silhouette appears unremarkable  The lungs are clear  No pneumothorax or pleural effusion  Osseous structures appear within normal limits for patient age  Impression: No acute cardiopulmonary disease  Findings concur with the preliminary report by the referring clinician already in PACS and/or our electronic record EPIC  Workstation performed: [id number]     Thank you for allowing us to participate in this patient's care  Counseling / Coordination of Care  Total floor / unit time spent today 45 minutes  Greater than 50% of total time was spent with the patient and / or family counseling and / or coordination of care  A description of the counseling / coordination of care: Review of history, current assessment, development of a plan  Code Status: Level 1 - Full Code    ** Please Note: Dragon 360 Dictation voice to text software may have been used in the creation of this document   **

## 2019-05-22 NOTE — ASSESSMENT & PLAN NOTE
Lab Results   Component Value Date    HGBA1C 7 1 (H) 05/21/2019       Recent Labs     05/21/19  2319 05/22/19  0711 05/22/19  1059 05/22/19  1522   POCGLU 118 157* 140 140       Blood Sugar Average: Last 72 hrs:  (P) 143 6   · Does not consistently take his insulins at home, he adjusts based on BG values  · ACHS BG checks, initiated SSI due to hyperglycemia this morning in the setting of steroid use  · Increase glargine to 26u, continue standing Humalog 4u with meals  CCO diet

## 2019-05-22 NOTE — ASSESSMENT & PLAN NOTE
· Baseline creatinine 1 4-1 7; recently elevated to 2 25  · Today creatinine elevated 2 76  · Appears volume overloaded with lower extremity edema, dyspnea and some orthopnea  · Status post IV Lasix 40 mg x1  · Nephrology following

## 2019-05-22 NOTE — ASSESSMENT & PLAN NOTE
· Patient presents with chest tightness    Troponins elevated at 0 22    · Off heparin drip  · Aspirin  · Statin  · Continue beta-blockers  · Cardiology evaluation  · Follow 2D echocardiogram  · A1c 7 1

## 2019-05-22 NOTE — PROGRESS NOTES
reports of chest pressure and feelings of not being able to breath  Assessed and vital signs taken are WNL  SLIM made aware  nitro given and EKG obtained

## 2019-05-22 NOTE — ASSESSMENT & PLAN NOTE
· Baseline creatinine 1 4-1 7; recently elevated to 2 25, further elevated to current value 3 05   · Nephrology following, appreciate additional workup/management

## 2019-05-22 NOTE — CONSULTS
[address] [place] [address] 37 y o  male MRN: [id number]  Unit/Bed#: -01 Encounter: [id number]    ASSESSMENT and PLAN:  1  Acute kidney injury:  · Creatinine 2 76 on admission, currently 2 85  · Recent acute kidney injury April 2019  Creatinine 2 25 on follow-up labs  · MAGNOLIA likely pre renal   Concern for worsening renal function over the last 4 months  Assess post renal  · Recent renal ultrasound on 04/19/2019, right kidney 5 9 cm, left kidney 7 1 cm, diffuse echogenicity consistent with medical renal disease  · Plan:    · Check urinalysis with microscopic evaluation  · Hold diuretic at this time  · Check PVR  · Check labs in a m  · Avoid hypotension  · Avoid nephrotoxic agents  · Monitor I&O  · Daily weight  2  Chronic kidney disease, stage III:  Follows with Dr Maycol Causey  · Baseline creatinine near 1 7 as of January 2019  · Following acute kidney injury in April creatinine plateaued at 2 2  · Etiology consistent with diabetic nephropathy and hypertensive nephrosclerosis  3  Proteinuria:    · Last urine protein creatinine ratio 1 9 g  · No ACEi or ARB at this time due to acute kidney injury  · Repeat urine protein creatinine ratio  4  Non ST elevation MI:  · Intermittent episodes of chest heaviness and shortness of breath which has not been responsive to sublingual nitroglycerin  · Patient feels that breathing treatments are helping more than anything at this point  · Echo pending  5  Volume status:    · Chest x-ray shows no acute cardiopulmonary disease  · Status post 1 dose of IV Lasix  · Although patient has lower extremity edema there is no evidence of volume overload  · Lower extremity edema may be partially related to CCB  6  Hypertension:    · When patient was recently seen in the nephrology clinic nifedipine was increased to twice a day dosing and torsemide was added  · Blood pressure intermittently elevated, acceptable at this time    · Continue Coreg, nifedipine  7  Acid-base:    · During hospitalization in April patient started on Bicitra due to low bicarbonate which improved  He was also intermittently hyperkalemic  Follow-up blood work bicarbonate level 25  · Currently bicarbonate level normal   · Decrease to 1 dose daily  8  Anemia:    · Hemoglobin 10 2  · Anemia worsened in April during hospitalization  · Likely related to progressive renal disease  9  Electrolytes:    · Borderline low magnesium will replete  · Potassium 3 9  10  Diabetes mellitus 2:  Hemoglobin A1c 7 1  11  Hyperlipidemia:  On Lipitor    HISTORY OF PRESENT ILLNESS:  Requesting Physician: Kamar Torres MD  Reason for Consult: CKD    Skyler Ricci is a 37 y o  male with a history of diabetes mellitus, chronic kidney disease, hypertension, persistent proteinuria who was admitted to [facility]  after presenting with chest tightness, shortness of breath for approximately 4 days  He was having difficulty sleeping woke up due to coughing and congestion  He received a dose of Lasix 40 mg IV on admission  He states he feels better after having updraft treatments  He was hospitalized in April due to headaches and dizziness and was found to have uncontrolled hypertension and acute kidney injury  Creatinine was down to 2 38 at discharge with a follow-up creatinine of 2 25  He was seen following hospitalization at the [facility]  Due to elevated blood pressure nifedipine was increased to b i d  And torsemide was added  Patient reports that lower extremity edema improved  At this time he states that the swelling in his legs is better than it was a month ago when he was hospitalized  His weight has been declining  On admission creatinine 2 76  A renal consultation is requested today for assistance in the management of acute kidney injury on CKD      PAST MEDICAL HISTORY:  Past Medical History:   Diagnosis Date    CKD (chronic kidney disease) 2/21/2019    Diabetes mellitus [facility] )  Essential hypertension 10/31/2017    Hyperlipidemia     NSTEMI (non-ST elevated myocardial infarction) ([facility] ) 5/21/2019       PAST SURGICAL HISTORY:  Past Surgical History:   Procedure Laterality Date    ABCESS DRAINAGE      tooth    ID KNEE SCOPE,MED/LAT MENISECTOMY Right 9/13/2018    Procedure: RIGHT KNEE ARTHROSCOPIC PARTIAL MEDIAL MENISCECTOMY;  Surgeon: Teresa Guzman MD;  Location: [facility];  Service: Orthopedics    WRIST ARTHROPLASTY Right 2017       ALLERGIES:  No Known Allergies    SOCIAL HISTORY:  Social History     Substance and Sexual Activity   Alcohol Use Yes    Comment: occasionally     Social History     Substance and Sexual Activity   Drug Use No     Social History     Tobacco Use   Smoking Status Never Smoker   Smokeless Tobacco Never Used       FAMILY HISTORY:  Family History   Problem Relation Age of Onset    Hypertension Mother     Multiple sclerosis Mother     Diabetes Father        MEDICATIONS:    Current Facility-Administered Medications:     acetaminophen (TYLENOL) tablet 650 mg, 650 mg, Oral, Q6H PRN, Shaka Heath MD    albuterol inhalation solution 2 5 mg, 2 5 mg, Nebulization, Q6H PRN, Shaka Heath MD, 2 5 mg at 05/22/19 0246    calcium carbonate (TUMS) chewable tablet 1,000 mg, 1,000 mg, Oral, Daily PRN, Shaka Heath MD    carvedilol (COREG) tablet 25 mg, 25 mg, Oral, BID With Meals, Shaka Heath MD    docusate sodium (COLACE) capsule 100 mg, 100 mg, Oral, BID PRN, Shaka Heath MD    heparin (porcine) 25,000 units in 250 mL infusion (premix), 3-20 Units/kg/hr (Order-Specific), Intravenous, Titrated, Shaka Heath MD, Last Rate: 11 8 mL/hr at 05/22/19 0019, 13 1 Units/kg/hr at 05/22/19 0019    heparin (porcine) injection 2,000 Units, 2,000 Units, Intravenous, PRN, Shaka Heath MD, 2,000 Units at 05/22/19 0018    heparin (porcine) injection 4,000 Units, 4,000 Units, Intravenous, PRN, MD Hortencia Mcnally insulin glargine (LANTUS) subcutaneous injection 20 Units 0 2 mL, 20 Units, Subcutaneous, HS, Shaka Heath MD    insulin lispro (HumaLOG) 100 units/mL subcutaneous injection 4 Units, 4 Units, Subcutaneous, TID With Meals, Shaka Heath MD, 4 Units at 05/21/19 2011    morphine injection 2 mg, 2 mg, Intravenous, Q4H PRN, Rahat Grant PA-C, 2 mg at 05/22/19 0104    NIFEdipine (PROCARDIA XL) 24 hr tablet 60 mg, 60 mg, Oral, BID, Shaka Heath MD, 60 mg at 05/21/19 2011    nitroglycerin (NITROSTAT) SL tablet 0 4 mg, 0 4 mg, Sublingual, Q5 Min PRN, Rahat Grant PA-C, 0 4 mg at 05/22/19 0031    ondansetron (ZOFRAN) injection 4 mg, 4 mg, Intravenous, Q6H PRN, Shaka Heath MD    sod citrate-citric acid (BICITRA) oral solution 15 mL, 15 mL, Oral, BID, Shaka Heath MD, 15 mL at 05/21/19 2011    torsemide (DEMADEX) tablet 20 mg, 20 mg, Oral, Daily, Shaka Heath MD    REVIEW OF SYSTEMS:  Constitutional: Negative for fatigue, anorexia, fever, chills, diaphoresis  HENT: Negative for postnasal drip  Eyes: Negative for visual disturbance  Respiratory:  Positive for cough, shortness of breath  Cardiovascular:  Complains of chest heaviness  Gastrointestinal: Negative for abdominal pain, constipation, diarrhea, nausea and vomiting  Genitourinary: No dysuria, hematuria  Musculoskeletal: Negative for arthralgias, back pain and joint swelling  Skin: Negative for rash  Neurological: Negative for focal weakness, headaches, dizziness  Hematological: Negative for easy bruising or bleeding  Psychiatric/Behavioral: Negative for confusion  All the systems were reviewed and were negative except as documented on the HPI      PHYSICAL EXAM:  Current Weight: Weight - Scale: 108 kg (237 lb 10 5 oz)  First Weight: Weight - Scale: 108 kg (237 lb 12 8 oz)  Vitals:    05/22/19 0246 05/22/19 0249 05/22/19 0600 05/22/19 0709   BP:    142/82   BP Location:    Left arm   Pulse:    95   Resp: 18   Temp:    98 7 °F (37 1 °C)   TempSrc:    Oral   SpO2: 95% 95%  97%   Weight:   108 kg (237 lb 10 5 oz)    Height:           Intake/Output Summary (Last 24 hours) at 5/22/2019 0736  Last data filed at 5/21/2019 2246  Gross per 24 hour   Intake 480 ml   Output    Net 480 ml     Physical Exam   Constitutional: He is oriented to person, place, and time  He appears well-developed and well-nourished  No distress  HENT:   Head: Normocephalic and atraumatic  Mouth/Throat: Oropharynx is clear and moist    Eyes: Pupils are equal, round, and reactive to light  Conjunctivae and EOM are normal  Right eye exhibits no discharge  Left eye exhibits no discharge  No scleral icterus  Neck: Neck supple  No JVD present  Cardiovascular: Normal rate, regular rhythm and normal heart sounds  Exam reveals no gallop and no friction rub  No murmur heard  Pulmonary/Chest: Effort normal  No stridor  No respiratory distress  He has no wheezes  He has no rales  Decreased breath sounds throughout   Abdominal: Soft  Bowel sounds are normal  He exhibits no distension and no mass  There is no tenderness  There is no rebound and no guarding  Musculoskeletal: He exhibits edema (Lower extremity edema bilaterally primarily pedal and ankle edema  Slightly worse on the left as compared to the right  Ceci Lightning )  He exhibits no tenderness or deformity  Neurological: He is alert and oriented to person, place, and time  Skin: Skin is warm and dry  Capillary refill takes less than 2 seconds  No rash noted  He is not diaphoretic  No erythema  No pallor  Psychiatric: He has a normal mood and affect   His behavior is normal  Judgment and thought content normal          Invasive Devices:      Lab Results:   Results from last 7 days   Lab Units 05/22/19  0559 05/21/19  1639   WBC Thousand/uL 10 57* 8 58   HEMOGLOBIN g/dL 10 2* 11 0*   HEMATOCRIT % 30 6* 32 6*   PLATELETS Thousands/uL 288 306   POTASSIUM mmol/L 3 9 4 3   CHLORIDE mmol/L 106 106 CO2 mmol/L 23 25   BUN mg/dL 34* 31*   CREATININE mg/dL 2 85* 2 76*   CALCIUM mg/dL 8 4 8 6   MAGNESIUM mg/dL 1 6 1 6   ALK PHOS U/L  --  65   ALT U/L  --  9*   AST U/L  --  47*     Other Studies:

## 2019-05-22 NOTE — UTILIZATION REVIEW
Initial Clinical Review    Admission: Date/Time/Statement: Inpatient 5/21/19 @ 1727   Orders Placed This Encounter   Procedures    Inpatient Admission (expected length of stay for this patient Order details is greater than two midnights)     Standing Status:   Standing     Number of Occurrences:   1     Order Specific Question:   Admitting Physician     Answer:   Temitope Cosme     Order Specific Question:   Level of Care     Answer:   Med Surg [[id number]]     Order Specific Question:   Estimated length of stay     Answer:   More than 2 Midnights     Order Specific Question:   Certification     Answer:   I certify that inpatient services are medically necessary for this patient for a duration of greater than two midnights  See H&P and MD Progress Notes for additional information about the patient's course of treatment  ED: Date/Time/Mode of Arrival:   ED Arrival Information     Expected Arrival Acuity Means of Arrival Escorted By Service Admission Type    - 5/21/2019 15:24 Urgent Walk-In Spouse General Medicine Urgent    Arrival Complaint    -        Chief Complaint:   Chief Complaint   Patient presents with    Chest Pain     pt  comes in c/o a cough that started saturday night with chest tightness  Denies radiation to the neck, jaw, or arms  Pt  Denies n/v/d  Pt  also states he has noticed swelling in his legs    Cough     Assessment/Plan: 37 y o  Male presents to ED from home with chest tightness  Patient shared symptoms started about 5 days prior with dyspnea, cough & leg edema  He becomes short of breath with walking & improves with rest  Waking at night with feeling congested & coughing  ED provider notes trace peripheral edema bilaterally  Inpatient admission labs reveal chronic kidney disease with baseline creatinine 1 4-1 7; recently elevated to 2 25; presented with creatine=2 76; IV Lasix x1 as appears overloaded with lower extremity edema with dyspnea & some orthopnea   Appreciate Nephrology input  In ED, ASA given with breathing treatments & heparin drip started with workup for likely NSTEMI-appreciate Cardiology input  Started on Heparin drip, ASA, Statin, cont beta blockers, check 2-D echo check labs  Patient being followed by Nephrology for proteinuria-last creatinine ratio 1 9      5/22/2019 Nephrology note:   ASSESSMENT and PLAN:  1  Acute kidney injury:  · Creatinine 2 76 on admission, currently 2 85  · Recent acute kidney injury April 2019  Creatinine 2 25 on follow-up labs  · MAGNOLIA likely pre renal   Concern for worsening renal function over the last 4 months  Assess post renal  · Recent renal ultrasound on 04/19/2019, right kidney 5 9 cm, left kidney 7 1 cm, diffuse echogenicity consistent with medical renal disease  · Plan:    ? Check urinalysis with microscopic evaluation  ? Hold diuretic at this time  ? Check PVR  ? Check labs in a m   ? Avoid hypotension  ? Avoid nephrotoxic agents  ? Monitor I&O  ? Daily weight  2  Chronic kidney disease, stage III:  Follows with Dr John Mccormick  · Baseline creatinine near 1 7 as of January 2019  · Following acute kidney injury in April creatinine plateaued at 2 2  · Etiology consistent with diabetic nephropathy and hypertensive nephrosclerosis  3  Proteinuria:    · Last urine protein creatinine ratio 1 9 g  · No ACEi or ARB at this time due to acute kidney injury  · Repeat urine protein creatinine ratio  4  Non ST elevation MI:  · Intermittent episodes of chest heaviness and shortness of breath which has not been responsive to sublingual nitroglycerin  · Patient feels that breathing treatments are helping more than anything at this point  · Echo pending  5  Volume status:    · Chest x-ray shows no acute cardiopulmonary disease  · Status post 1 dose of IV Lasix  · Although patient has lower extremity edema there is no evidence of volume overload  · Lower extremity edema may be partially related to CCB  6   Hypertension:    · When patient was recently seen in the nephrology clinic nifedipine was increased to twice a day dosing and torsemide was added  · Blood pressure intermittently elevated, acceptable at this time  · Continue Coreg, nifedipine  7  Acid-base:    · During hospitalization in April patient started on Bicitra due to low bicarbonate which improved  He was also intermittently hyperkalemic  Follow-up blood work bicarbonate level 25  · Currently bicarbonate level normal   · Decrease to 1 dose daily  8  Anemia:    · Hemoglobin 10 2  · Anemia worsened in April during hospitalization  · Likely related to progressive renal disease  9  Electrolytes:    · Borderline low magnesium will replete  · Potassium 3 9  ·   5/22 Cardiology note:   Assessment/ Plan  1  Elevated troponin: concerning for myocarditis   Troponin 0 22/0 27/0 23  Reviewed ECG showing normal sinus rhythm nonspecific T-wave abnormality  Will check echocardiogram  Discontinue heparin gtt   2   MAGNOLIA on CKD-   Nephrology following   Creatinine 2 85   Baseline appears 2 2-2 4  ·       ED Vital Signs:   ED Triage Vitals   Temperature Pulse Respirations Blood Pressure SpO2   05/21/19 1543 05/21/19 1543 05/21/19 1543 05/21/19 1543 05/21/19 1543   98 3 °F (36 8 °C) 87 16 169/94 100 %      Temp Source Heart Rate Source Patient Position - Orthostatic VS BP Location FiO2 (%)   05/21/19 1543 05/21/19 1543 05/21/19 1543 05/21/19 1543 --   Oral Monitor Sitting Left arm       Pain Score       05/21/19 1730       No Pain        Wt Readings from Last 1 Encounters:   05/22/19 108 kg (237 lb 10 5 oz)     Vital Signs (abnormal):   05/22/19 0709  98 7 °F (37 1 °C)  95  18  142/82  105  97 %  None (Room air)  Lying   05/22/19 0249            95 %  None (Room air)     05/22/19 0246            95 %       05/22/19 0219        139/76    95 %  None (Room air)  Lying   05/22/19 0128    90    139/83    100 %  Nasal cannula   Lying   O2 Device: 2L at 05/22/19 0128   05/22/19 0002    101  16  134/64    97 % None (Room air)  Sitting   05/21/19 2246  97 9 °F (36 6 °C)  95  16  142/79  104  95 %  None (Room air)  Sitting   05/21/19 1847  98 °F (36 7 °C)  94  16  182/105Abnormal     94 %  None (Room air)  Sitting   05/21/19 1829    89  18  178/97Abnormal     99 %  None (Room air)  Lying   05/21/19 1730    92  18  148/76    98 %  None (Room air)     05/21/19 1543  98 3 °F (36 8 °C)  87  16  169/94    100 %  None (Room air       05/22/19 0600  108 kg (237 lb 10 5 oz)     05/21/19 1847  108 kg (237 lb 12 8 oz)  6' 1" (1 854 m       Pertinent Labs/Diagnostic Test Results:   5/21/2019  · EKG:  Normal sinus rhythm nonspecific T-waves  · 5/21/2019     Ref  Range 5/21/2019 16:39   Sodium Latest Ref Range: 136 - 145 mmol/L 139   Potassium Latest Ref Range: 3 5 - 5 3 mmol/L 4 3   Chloride Latest Ref Range: 100 - 108 mmol/L 106   CO2 Latest Ref Range: 21 - 32 mmol/L 25   Anion Gap Latest Ref Range: 4 - 13 mmol/L 8   BUN Latest Ref Range: 5 - 25 mg/dL 31 (H)   Creatinine Latest Ref Range: 0 60 - 1 30 mg/dL 2 76 (H)   Glucose, Random Latest Ref Range: 65 - 140 mg/dL 195 (H)   Calcium Latest Ref Range: 8 3 - 10 1 mg/dL 8 6   AST Latest Ref Range: 5 - 45 U/L 47 (H)   ALT Latest Ref Range: 12 - 78 U/L 9 (L)   Alkaline Phosphatase Latest Ref Range: 46 - 116 U/L 65   Total Protein Latest Ref Range: 6 4 - 8 2 g/dL 6 4   Albumin Latest Ref Range: 3 5 - 5 0 g/dL 2 5 (L)   TOTAL BILIRUBIN Latest Ref Range: 0 20 - 1 00 mg/dL 0 10 (L)   eGFR Latest Units: ml/min/1 73sq m 33   Magnesium Latest Ref Range: 1 6 - 2 6 mg/dL 1 6   Troponin I Latest Ref Range: <=0 04 ng/mL 0 22 (H)   NT-proBNP Latest Ref Range: <125 pg/mL 1,059 (H)   36   Ref  Range 5/21/2019 19:46 5/21/2019 23:25   Troponin I Latest Ref Range: <=0 04 ng/mL 0 27 (H) 0 23 (H)   Results for Mable Liao (MRN [id number]) as of 5/22/2019 12:36   Ref   Range 5/21/2019 16:39   WBC Latest Ref Range: 4 31 - 10 16 Thousand/uL 8 58   Red Blood Cell Count Latest Ref Range: 3 88 - 5 62 Million/uL 3 79 (L)   Hemoglobin Latest Ref Range: 12 0 - 17 0 g/dL 11 0 (L)   HCT Latest Ref Range: 36 5 - 49 3 % 32 6 (L)   MCV Latest Ref Range: 82 - 98 fL 86   Platelet Count Latest Ref Range: 149 - 390 Thousands/uL 306        Ref  Range 5/21/2019 17:16   Protime Latest Ref Range: 11 8 - 14 2 seconds 12 7   INR Latest Ref Range: 0 86 - 1 17  0 98   PTT Latest Ref Range: 26 - 38 seconds 34      Ref  Range 5/21/2019 19:46   Hemoglobin A1C Latest Ref Range: 4 2 - 6 3 % 7 1 (H)   EAG Latest Units: mg/dl 157     5/21 CXR: No acute cardiopulmonary disease      5/22/2019     Ref  Range 5/22/2019 05:59   Sodium Latest Ref Range: 136 - 145 mmol/L 138   Potassium Latest Ref Range: 3 5 - 5 3 mmol/L 3 9   Chloride Latest Ref Range: 100 - 108 mmol/L 106   CO2 Latest Ref Range: 21 - 32 mmol/L 23   Anion Gap Latest Ref Range: 4 - 13 mmol/L 9   BUN Latest Ref Range: 5 - 25 mg/dL 34 (H)   Creatinine Latest Ref Range: 0 60 - 1 30 mg/dL 2 85 (H)   Glucose, Random Latest Ref Range: 65 - 140 mg/dL 158 (H)   Calcium Latest Ref Range: 8 3 - 10 1 mg/dL 8 4   eGFR Latest Units: ml/min/1 73sq m 31   Magnesium Latest Ref Range: 1 6 - 2 6 mg/dL 1 6   Results for Justus Alcantara (MRN [id number]) as of 5/22/2019 12:36   Ref  Range 5/22/2019 05:59   Cholesterol Latest Ref Range: 50 - 200 mg/dL 228 (H)   Triglycerides Latest Ref Range: <=150 mg/dL 122   HDL Latest Ref Range: 40 - 60 mg/dL 42   Non-HDL Cholesterol Latest Units: mg/dl 186   LDL Direct Latest Ref Range: 0 - 100 mg/dL 162 (H)   WBC Latest Ref Range: 4 31 - 10 16 Thousand/uL 10 57 (H)   Red Blood Cell Count Latest Ref Range: 3 88 - 5 62 Million/uL 3 57 (L)   Hemoglobin Latest Ref Range: 12 0 - 17 0 g/dL 10 2 (L)   HCT Latest Ref Range: 36 5 - 49 3 % 30 6 (L)   Platelet Count Latest Ref Range: 149 - 390 Thousands/uL 288      Ref   Range 5/21/2019 23:25 5/22/2019 05:59   PTT Latest Ref Range: 26 - 38 seconds 54 (H) 73 (H)     5/22 ECHO pending    ED Treatment:   Medication Administration from 05/21/2019 1524 to 05/21/2019 1840       Date/Time Order Dose Route Action     05/21/2019 1649 albuterol inhalation solution 5 mg 5 mg Nebulization Given     05/21/2019 1649 ipratropium (ATROVENT) 0 02 % inhalation solution 0 5 mg 0 5 mg Nebulization Given     05/21/2019 1719 aspirin tablet 325 mg 325 mg Oral Given     05/21/2019 1746 heparin (porcine) injection 4,000 Units 4,000 Units Intravenous Given     05/21/2019 1746 heparin (porcine) 25,000 units in 250 mL infusion (premix) 11 1 Units/kg/hr Intravenous New Bag        Past Medical/Surgical History:    Active Ambulatory Problems     Diagnosis Date Noted    Erectile dysfunction 07/17/2018    Chronic bilateral thoracic back pain 07/17/2018    Type 2 diabetes mellitus with kidney complication, with long-term current use [facility] insulin [facility] ) 07/17/2018    Hyperlipidemia 08/16/2018    De Quervain's disease (radial styloid tenosynovitis) 10/19/2017    Hypertension 10/31/2017    Bucket-handle tear of medial meniscus of right knee as current injury 09/07/2018    Other proteinuria 02/21/2019    CKD (chronic kidney disease) stage 3, GFR 30-59 ml/min ([facility] ) 04/26/2019       Past Medical History:   Diagnosis Date    CKD (chronic kidney disease) 2/21/2019    Diabetes mellitus ([facility] )     Essential hypertension 10/31/2017    NSTEMI (non-ST elevated myocardial infarction) ([facility] ) 5/21/2019     Admitting Diagnosis: Cough [R05]  Chest heaviness [R07 89]  CKD (chronic kidney disease) stage 3, GFR 30-59 ml/min ([facility]) [N18 3]  NSTEMI (non-ST elevated myocardial infarction) ([facility] ) [I21 4]  Acute kidney injury ([facility] ) [N17 9]  Age/Sex: 37 y o  male  Admission Orders:  48 hr telemetry  Peripheral IV  Echo  Inpatient to Cardiology/Nephrology  Spot pulse oximetry  vitals routine  Daily wt  I&O  Ambulate patient  Up w assist  Diet romulo/cho controlledd    Scheduled Meds:   Current Facility-Administered Medications:  acetaminophen 650 mg Oral Q6H PRN   albuterol 2 5 mg Nebulization Q6H PRN   [START ON 5/23/2019] aspirin 81 mg Oral Daily   atorvastatin 40 mg Oral Daily With Dinner   calcium carbonate 1,000 mg Oral Daily PRN   carvedilol 25 mg Oral BID With Meals   docusate sodium 100 mg Oral BID PRN   insulin glargine 20 Units Subcutaneous HS   insulin lispro 4 Units Subcutaneous TID With Meals   morphine injection 2 mg Intravenous Q4H PRN   NIFEdipine ER 60 mg Oral BID   nitroglycerin 0 4 mg Sublingual Q5 Min PRN   ondansetron 4 mg Intravenous Q6H PRN   sod citrate-citric acid 15 mL Oral BID       Network Utilization Review Department  Phone: [phone]; Fax [phone]  [email or web address]  ATTENTION: Please call with any questions or concerns to [phone]  and carefully listen to the prompts so that you are directed to the right person  Send all requests for admission clinical reviews, approved or denied determinations and any other requests to fax [phone]   All voicemails are confidential

## 2019-05-22 NOTE — ASSESSMENT & PLAN NOTE
· Resumed outpatient regimen monitor, BPs still suboptimal despite recently increased nifedipine outpatient  · Continue nifedipine 60 mg BID; carvedilol increased to 25 mg BID  · Torsemide on hold in setting of worsening renal functions  · Continue to monitor for improvement

## 2019-05-22 NOTE — ASSESSMENT & PLAN NOTE
Lab Results   Component Value Date    HGBA1C 7 1 (H) 05/21/2019       Recent Labs     05/21/19  2016 05/21/19  2319 05/22/19  0711 05/22/19  1059   POCGLU 163* 118 157* 140       Blood Sugar Average: Last 72 hrs:  (P) 144 5 resume insulin regimen monitor  Check blood sugars before meals bedtime

## 2019-05-22 NOTE — PROGRESS NOTES
Provider on call notified that patient has chest heaviness and shortness of breath  Patient evaluated at bedside and he appears to be in distress, anxious and dyspneic  Lungs are clear to auscultation bilaterally, heart is regular rhythm however tachycardic and appears to be sinus tach on telemetry  He is not diaphoretic  Vital signs are stable  States symptoms are similar to what prompted admission  Will obtain repeat EKG  Continue heparin drip  No improvement with nitro sublingual, patient was hesitant to try morphine at first but now agreeable to 2 mg  Will continue to monitor closely  Patient reexamined however he was sleeping  Patient continued to complain of dyspnea  Requested breathing treatment as this helped in the ER  Will also apply 1 in nitro paste

## 2019-05-22 NOTE — PROGRESS NOTES
Progress Note - Michele Vazquez 1985, 37 y o  male MRN: [id number]    Unit/Bed#: -01 Encounter: [id number]    Primary Care Provider: Bruce Fabian MD   Date and time admitted to hospital: 5/21/2019  3:56 PM        * NSTEMI (non-ST elevated myocardial infarction) [facility])  Assessment & Plan  · Patient presents with chest tightness  Troponins elevated at 0 22    · Off heparin drip  · Aspirin  · Statin  · Continue beta-blockers  · Cardiology evaluation  · Follow 2D echocardiogram  · A1c 7 1    CKD (chronic kidney disease) stage 3, GFR 30-59 ml/min ([facility])  Assessment & Plan  · Baseline creatinine 1 4-1 7; recently elevated to 2 25  · Today creatinine elevated 2 76  · Appears volume overloaded with lower extremity edema, dyspnea and some orthopnea  · Status post IV Lasix 40 mg x1  · Nephrology following    Cough  Assessment & Plan  · Chest x-ray clear  · Supportive care    Hyperlipidemia  Assessment & Plan  · Continue statin  · LDL elevated at 162    Hypertension  Assessment & Plan  · Resume outpatient regimen monitor    Other proteinuria  Assessment & Plan  · Being followed by Nephrology  Last urine protein creatinine ratio 1 9    Type 2 diabetes mellitus with kidney complication, with long-term current use of insulin [facility])  Assessment & Plan  Lab Results   Component Value Date    HGBA1C 7 1 (H) 05/21/2019       Recent Labs     05/21/19 2016 05/21/19  2319 05/22/19  0711 05/22/19  1059   POCGLU 163* 118 157* 140       Blood Sugar Average: Last 72 hrs:  (P) 144 5 resume insulin regimen monitor  Check blood sugars before meals bedtime      VTE Pharmacologic Prophylaxis:   Pharmacologic: Heparin  Mechanical VTE Prophylaxis in Place: Yes    Patient Centered Rounds: I have performed bedside rounds with nursing staff today  Discussions with Specialists or Other Care Team Provider:  Nursing    Education and Discussions with Family / Patient:  Patient    Time Spent for Care: 30 minutes    More than 50% of total time spent on counseling and coordination of care as described above  Current Length of Stay: 1 day(s)    Current Patient Status: Inpatient   Certification Statement: The patient will continue to require additional inpatient hospital stay due to Shortness of breath    Discharge Plan:  Pending clinical improvement    Code Status: Level 1 - Full Code      Subjective:   No events reported overnight  Patient still feeling short of breath  Had chest pain last night which improved with morphine    Objective:     Vitals:   Temp (24hrs), Av 2 °F (36 8 °C), Min:97 9 °F (36 6 °C), Max:98 7 °F (37 1 °C)    Temp:  [97 9 °F (36 6 °C)-98 7 °F (37 1 °C)] 98 7 °F (37 1 °C)  HR:  [] 95  Resp:  [16-18] 18  BP: (134-182)/() 142/82  SpO2:  [94 %-100 %] 96 %  Body mass index is 31 35 kg/m²  Input and Output Summary (last 24 hours): Intake/Output Summary (Last 24 hours) at 2019 1342  Last data filed at 2019 1003  Gross per 24 hour   Intake 960 ml   Output 1100 ml   Net -140 ml       Physical Exam:     Physical Exam    Gen -Patient comfortable in bed  Neck- Supple  No thyromegaly or lymphadenopathy  Lungs-Clear bilaterally without any wheeze or rales   Heart S1-S2, regular rate and rhythm, no murmurs  Abdomen-soft nontender, no organomegaly   Bowel sounds present  Extremities-no cyanosi,  Clubbing  Pitting edema of legs  Skin- no rash  Neuro-nonfocal       Additional Data:     Labs:    Results from last 7 days   Lab Units 19  0559 19  1639   WBC Thousand/uL 10 57* 8 58   HEMOGLOBIN g/dL 10 2* 11 0*   HEMATOCRIT % 30 6* 32 6*   PLATELETS Thousands/uL 288 306   NEUTROS PCT %  --  63   LYMPHS PCT %  --  27   MONOS PCT %  --  6   EOS PCT %  --  3     Results from last 7 days   Lab Units 19  0559 19  1639   SODIUM mmol/L 138 139   POTASSIUM mmol/L 3 9 4 3   CHLORIDE mmol/L 106 106   CO2 mmol/L 23 25   BUN mg/dL 34* 31*   CREATININE mg/dL 2 85* 2 76*   ANION GAP mmol/L 9 8 CALCIUM mg/dL 8 4 8 6   ALBUMIN g/dL  --  2 5*   TOTAL BILIRUBIN mg/dL  --  0 10*   ALK PHOS U/L  --  65   ALT U/L  --  9*   AST U/L  --  47*   GLUCOSE RANDOM mg/dL 158* 195*     Results from last 7 days   Lab Units 05/21/19  1716   INR  0 98     Results from last 7 days   Lab Units 05/22/19  1059 05/22/19  0711 05/21/19  2319 05/21/19 2016   POC GLUCOSE mg/dl 140 157* 118 163*     Results from last 7 days   Lab Units 05/21/19  1946   HEMOGLOBIN A1C % 7 1*               * I Have Reviewed All Lab Data Listed Above  * Additional Pertinent Lab Tests Reviewed:     Imaging:    Imaging Reports Reviewed Today Include:   Imaging Personally Reviewed by Myself Includes:      Recent Cultures (last 7 days):           Last 24 Hours Medication List:     Current Facility-Administered Medications:  acetaminophen 650 mg Oral Q6H PRN Shaka Heath MD   albuterol 2 5 mg Nebulization Q6H PRN Shaka Heath MD   [START ON 5/23/2019] aspirin 81 mg Oral Daily CHARLOTTE Pandey   atorvastatin 40 mg Oral Daily With Dinner CHARLOTTE Pandey   calcium carbonate 1,000 mg Oral Daily PRN Shaka Heath MD   carvedilol 25 mg Oral BID With Meals Shaka Heath MD   docusate sodium 100 mg Oral BID PRN Shaka Heath MD   insulin glargine 20 Units Subcutaneous HS Shaka Heath MD   insulin lispro 4 Units Subcutaneous TID With Meals Shaka Heath MD   magnesium sulfate 2 g Intravenous Once CHARLOTTE Godoy   morphine injection 2 mg Intravenous Q4H PRN Rahat Grant PA-C   NIFEdipine ER 60 mg Oral BID Shaka Heath MD   nitroglycerin 0 4 mg Sublingual Q5 Min PRN Rahat Grant PA-C   ondansetron 4 mg Intravenous Q6H PRN Shaka Heath MD   [START ON 5/23/2019] sod citrate-citric acid 15 mL Oral Daily CHARLOTTE Godoy        Today, Patient Was Seen By: Emily Billings MD    ** Please Note: Dictation voice to text software may have been used in the creation of this document  **

## 2019-05-22 NOTE — ASSESSMENT & PLAN NOTE
· Patient presented with chest tightness  Troponins flat 0 22/0 27/0  23  Suspected non-MI troponinemia due to    · Heparin stopped, he remains on aspirin/statin for risk factor modification  · Continue beta-blockers  · Cardiology evaluation appreciated, echo today  Suspected myopericarditis with recent cold symptoms  ESR and hs-CRP are elevated  · A1c 7 1%

## 2019-05-22 NOTE — RESPIRATORY THERAPY NOTE
RT Protocol Note  Claudell Mom 37 y o  male MRN: [id number]  Unit/Bed#: -01 Encounter: [id number]    Assessment    Principal Problem:    NSTEMI (non-ST elevated myocardial infarction) [facility])  Active Problems:    Type 2 diabetes mellitus with kidney complication, with long-term current use of insulin ([facility])    Hyperlipidemia    Hypertension    Other proteinuria    CKD (chronic kidney disease) stage 3, GFR 30-59 ml/min ([facility])    Cough      Home Pulmonary Medications:  None       Past Medical History:   Diagnosis Date    CKD (chronic kidney disease) 2/21/2019    Diabetes mellitus ([place] [address] )     Essential hypertension 10/31/2017    Hyperlipidemia     NSTEMI (non-ST elevated myocardial infarction) ([place] [address] ) 5/21/2019     Social History     Socioeconomic History    Marital status: /Civil Union     Spouse name: None    Number of children: None    Years of education: None    Highest education level: None   Occupational History    None   Social Needs    Financial resource strain: None    Food insecurity:     Worry: None     Inability: None    Transportation needs:     Medical: None     Non-medical: None   Tobacco Use    Smoking status: Never Smoker    Smokeless tobacco: Never Used   Substance and Sexual Activity    Alcohol use: Yes     Comment: occasionally    Drug use: No    Sexual activity: None   Lifestyle    Physical activity:     Days per week: None     Minutes per session: None    Stress: None   Relationships    Social connections:     Talks on phone: None     Gets together: None     Attends [employer] service: None     Active member of club or organization: None     Attends meetings of clubs or organizations: None     Relationship status: None    Intimate partner violence:     Fear of current or ex partner: None     Emotionally abused: None     Physically abused: None     Forced sexual activity: None   Other Topics Concern    None   Social History Narrative    None       Subjective Objective    Physical Exam:   Assessment Type: Assess only  General Appearance: Alert, Awake  Respiratory Pattern: Dyspnea at rest  Chest Assessment: Chest expansion symmetrical  Bilateral Breath Sounds: Clear    Vitals:  Blood pressure 139/76, pulse 90, temperature 97 9 °F (36 6 °C), temperature source Oral, resp  rate 16, height 6' 1" (1 854 m), weight 108 kg (237 lb 12 8 oz), SpO2 95 %  Imaging and other studies: I have personally reviewed pertinent reports  Plan    Respiratory Plan: Mild Distress pathway        Resp Comments: Patient examined per RT protocol  Called by SLIM, patient complaining of shortness of breath over prolonged period of time  Patient is admitted for NSTEMI and has no pulmonary history  Does not take any pulmonary medications at home  Patient was placed on 2L NC for comfort prior to my arrival to see patient  Breath sounds clear bilaterally and SPO2 97% on 2L NC  Patient requesting breathing treatment at this time stating that he received one in ED and symptoms were improved  PRN ordered and given

## 2019-05-23 ENCOUNTER — APPOINTMENT (INPATIENT)
Dept: NON INVASIVE DIAGNOSTICS | Facility: HOSPITAL | Age: 34
DRG: 207 | End: 2019-05-23
Payer: COMMERCIAL

## 2019-05-23 LAB
ANION GAP SERPL CALCULATED.3IONS-SCNC: 8 MMOL/L (ref 4–13)
ATRIAL RATE: 91 BPM
BUN SERPL-MCNC: 39 MG/DL (ref 5–25)
CALCIUM SERPL-MCNC: 9 MG/DL (ref 8.3–10.1)
CHLORIDE SERPL-SCNC: 104 MMOL/L (ref 100–108)
CO2 SERPL-SCNC: 24 MMOL/L (ref 21–32)
CREAT SERPL-MCNC: 3.05 MG/DL (ref 0.6–1.3)
ERYTHROCYTE [DISTWIDTH] IN BLOOD BY AUTOMATED COUNT: 12.2 % (ref 11.6–15.1)
GFR SERPL CREATININE-BSD FRML MDRD: 29 ML/MIN/1.73SQ M
GLUCOSE SERPL-MCNC: 223 MG/DL (ref 65–140)
GLUCOSE SERPL-MCNC: 273 MG/DL (ref 65–140)
GLUCOSE SERPL-MCNC: 276 MG/DL (ref 65–140)
GLUCOSE SERPL-MCNC: 278 MG/DL (ref 65–140)
GLUCOSE SERPL-MCNC: 284 MG/DL (ref 65–140)
HCT VFR BLD AUTO: 32.3 % (ref 36.5–49.3)
HGB BLD-MCNC: 11 G/DL (ref 12–17)
MCH RBC QN AUTO: 28.6 PG (ref 26.8–34.3)
MCHC RBC AUTO-ENTMCNC: 34.1 G/DL (ref 31.4–37.4)
MCV RBC AUTO: 84 FL (ref 82–98)
P AXIS: 42 DEGREES
PLATELET # BLD AUTO: 317 THOUSANDS/UL (ref 149–390)
PMV BLD AUTO: 10.3 FL (ref 8.9–12.7)
POTASSIUM SERPL-SCNC: 4.9 MMOL/L (ref 3.5–5.3)
PR INTERVAL: 152 MS
QRS AXIS: 0 DEGREES
QRSD INTERVAL: 78 MS
QT INTERVAL: 358 MS
QTC INTERVAL: 440 MS
RBC # BLD AUTO: 3.84 MILLION/UL (ref 3.88–5.62)
SODIUM SERPL-SCNC: 136 MMOL/L (ref 136–145)
T WAVE AXIS: 18 DEGREES
VENTRICULAR RATE: 91 BPM
WBC # BLD AUTO: 9.9 THOUSAND/UL (ref 4.31–10.16)

## 2019-05-23 PROCEDURE — 99232 SBSQ HOSP IP/OBS MODERATE 35: CPT | Performed by: INTERNAL MEDICINE

## 2019-05-23 PROCEDURE — 80048 BASIC METABOLIC PNL TOTAL CA: CPT | Performed by: INTERNAL MEDICINE

## 2019-05-23 PROCEDURE — 85027 COMPLETE CBC AUTOMATED: CPT | Performed by: INTERNAL MEDICINE

## 2019-05-23 PROCEDURE — 93306 TTE W/DOPPLER COMPLETE: CPT | Performed by: INTERNAL MEDICINE

## 2019-05-23 PROCEDURE — 94760 N-INVAS EAR/PLS OXIMETRY 1: CPT

## 2019-05-23 PROCEDURE — 93010 ELECTROCARDIOGRAM REPORT: CPT | Performed by: INTERNAL MEDICINE

## 2019-05-23 PROCEDURE — 82948 REAGENT STRIP/BLOOD GLUCOSE: CPT

## 2019-05-23 PROCEDURE — 94640 AIRWAY INHALATION TREATMENT: CPT

## 2019-05-23 PROCEDURE — 93306 TTE W/DOPPLER COMPLETE: CPT

## 2019-05-23 PROCEDURE — 99232 SBSQ HOSP IP/OBS MODERATE 35: CPT | Performed by: PHYSICIAN ASSISTANT

## 2019-05-23 RX ORDER — ALBUTEROL SULFATE 90 UG/1
2 AEROSOL, METERED RESPIRATORY (INHALATION) EVERY 4 HOURS PRN
Status: DISCONTINUED | OUTPATIENT
Start: 2019-05-23 | End: 2019-05-25 | Stop reason: HOSPADM

## 2019-05-23 RX ORDER — INSULIN GLARGINE 100 [IU]/ML
26 INJECTION, SOLUTION SUBCUTANEOUS
Status: DISCONTINUED | OUTPATIENT
Start: 2019-05-23 | End: 2019-05-25 | Stop reason: HOSPADM

## 2019-05-23 RX ORDER — SODIUM CHLORIDE 9 MG/ML
50 INJECTION, SOLUTION INTRAVENOUS CONTINUOUS
Status: DISCONTINUED | OUTPATIENT
Start: 2019-05-23 | End: 2019-05-24

## 2019-05-23 RX ORDER — COLCHICINE 0.6 MG/1
0.6 TABLET ORAL EVERY OTHER DAY
Status: DISCONTINUED | OUTPATIENT
Start: 2019-05-25 | End: 2019-05-25 | Stop reason: HOSPADM

## 2019-05-23 RX ADMIN — INSULIN LISPRO 4 UNITS: 100 INJECTION, SOLUTION INTRAVENOUS; SUBCUTANEOUS at 22:17

## 2019-05-23 RX ADMIN — HEPARIN SODIUM 5000 UNITS: 5000 INJECTION INTRAVENOUS; SUBCUTANEOUS at 06:42

## 2019-05-23 RX ADMIN — CARVEDILOL 25 MG: 12.5 TABLET, FILM COATED ORAL at 08:59

## 2019-05-23 RX ADMIN — HEPARIN SODIUM 5000 UNITS: 5000 INJECTION INTRAVENOUS; SUBCUTANEOUS at 22:17

## 2019-05-23 RX ADMIN — NIFEDIPINE 60 MG: 30 TABLET, EXTENDED RELEASE ORAL at 08:59

## 2019-05-23 RX ADMIN — NIFEDIPINE 60 MG: 30 TABLET, EXTENDED RELEASE ORAL at 17:36

## 2019-05-23 RX ADMIN — ALBUTEROL SULFATE 2 PUFF: 90 AEROSOL, METERED RESPIRATORY (INHALATION) at 23:59

## 2019-05-23 RX ADMIN — INSULIN LISPRO 4 UNITS: 100 INJECTION, SOLUTION INTRAVENOUS; SUBCUTANEOUS at 17:43

## 2019-05-23 RX ADMIN — INSULIN LISPRO 4 UNITS: 100 INJECTION, SOLUTION INTRAVENOUS; SUBCUTANEOUS at 09:01

## 2019-05-23 RX ADMIN — INSULIN LISPRO 4 UNITS: 100 INJECTION, SOLUTION INTRAVENOUS; SUBCUTANEOUS at 17:44

## 2019-05-23 RX ADMIN — ALBUTEROL SULFATE 2.5 MG: 2.5 SOLUTION RESPIRATORY (INHALATION) at 01:25

## 2019-05-23 RX ADMIN — INSULIN LISPRO 4 UNITS: 100 INJECTION, SOLUTION INTRAVENOUS; SUBCUTANEOUS at 12:19

## 2019-05-23 RX ADMIN — SODIUM CITRATE AND CITRIC ACID MONOHYDRATE 15 ML: 500; 334 SOLUTION ORAL at 09:01

## 2019-05-23 RX ADMIN — ATORVASTATIN CALCIUM 40 MG: 40 TABLET, FILM COATED ORAL at 17:36

## 2019-05-23 RX ADMIN — ALBUTEROL SULFATE 2.5 MG: 2.5 SOLUTION RESPIRATORY (INHALATION) at 15:25

## 2019-05-23 RX ADMIN — PREDNISONE 50 MG: 20 TABLET ORAL at 08:59

## 2019-05-23 RX ADMIN — SODIUM CHLORIDE 50 ML/HR: 0.9 INJECTION, SOLUTION INTRAVENOUS at 15:35

## 2019-05-23 RX ADMIN — ALBUTEROL SULFATE 2.5 MG: 2.5 SOLUTION RESPIRATORY (INHALATION) at 08:25

## 2019-05-23 RX ADMIN — ASPIRIN 81 MG: 81 TABLET, COATED ORAL at 08:59

## 2019-05-23 RX ADMIN — HEPARIN SODIUM 5000 UNITS: 5000 INJECTION INTRAVENOUS; SUBCUTANEOUS at 14:21

## 2019-05-23 RX ADMIN — CARVEDILOL 25 MG: 12.5 TABLET, FILM COATED ORAL at 17:36

## 2019-05-23 RX ADMIN — INSULIN GLARGINE 26 UNITS: 100 INJECTION, SOLUTION SUBCUTANEOUS at 22:16

## 2019-05-23 RX ADMIN — COLCHICINE 0.6 MG: 0.6 TABLET, FILM COATED ORAL at 08:59

## 2019-05-23 NOTE — PLAN OF CARE
Problem: Potential for Falls  Goal: Patient will remain free of falls  Description  INTERVENTIONS:  - Assess patient frequently for physical needs  -  Identify cognitive and physical deficits and behaviors that affect risk of falls  -  [place] fall precautions as indicated by assessment   - Educate patient/family on patient safety including physical limitations  - Instruct patient to call for assistance with activity based on assessment  - Modify environment to reduce risk of injury  - Consider OT/PT consult to assist with strengthening/mobility  Outcome: Progressing     Problem: CARDIOVASCULAR - ADULT  Goal: Maintains optimal cardiac output and hemodynamic stability  Description  INTERVENTIONS:  - Monitor I/O, vital signs and rhythm  - Monitor for S/S and trends of decreased cardiac output i e  bleeding, hypotension  - Administer and titrate ordered vasoactive medications to optimize hemodynamic stability  - Assess quality of pulses, skin color and temperature  - Assess for signs of decreased coronary artery perfusion - ex   Angina  - Instruct patient to report change in severity of symptoms  Outcome: Progressing  Goal: Absence of cardiac dysrhythmias or at baseline rhythm  Description  INTERVENTIONS:  - Continuous cardiac monitoring, monitor vital signs, obtain 12 lead EKG if indicated  - Administer antiarrhythmic and heart rate control medications as ordered  - Monitor electrolytes and administer replacement therapy as ordered  Outcome: Progressing     Problem: METABOLIC, FLUID AND ELECTROLYTES - ADULT  Goal: Electrolytes maintained within normal limits  Description  INTERVENTIONS:  - Monitor labs and assess patient for signs and symptoms of electrolyte imbalances  - Administer electrolyte replacement as ordered  - Monitor response to electrolyte replacements, including repeat lab results as appropriate  - Instruct patient on fluid and nutrition as appropriate  Outcome: Progressing  Goal: Fluid balance maintained  Description  INTERVENTIONS:  - Monitor labs and assess for signs and symptoms of volume excess or deficit  - Monitor I/O and WT  - Instruct patient on fluid and nutrition as appropriate  Outcome: Progressing  Goal: Glucose maintained within target range  Description  INTERVENTIONS:  - Monitor Blood Glucose as ordered  - Assess for signs and symptoms of hyperglycemia and hypoglycemia  - Administer ordered medications to maintain glucose within target range  - Assess nutritional intake and initiate nutrition service referral as needed  Outcome: Progressing

## 2019-05-23 NOTE — PROGRESS NOTES
Cardiology Progress Note - Barry Cancer 37 y o  male MRN: [id number]    Unit/Bed#: -01 Encounter: [id number]      Assessment:  1  Non MI troponin elevation  2  Myopericarditis  3  URI symptoms  4  MAGNOLIA with CKD III  5  Benign essential hypertension   6  Dyslipidemia  7  DM 2    Plan:  1  ESR and hsCRP high - initiated colchicine only daily given renal function  2  Renal function precludes use of NSAIDs - initiated on steroids  3  Symptomatically improved - continue  4  Echo in process - will review  5  Renal function noted - renal on board  6  Blood pressure elevated - will defer management to renal     Subjective:   Patient seen and examined  No significant events overnight  Chest pain improved  Objective:     Vitals: Blood pressure 163/85, pulse 96, temperature 98 1 °F (36 7 °C), temperature source Oral, resp  rate 18, height 6' 1" (1 854 m), weight 109 kg (239 lb 12 8 oz), SpO2 96 %  , Body mass index is 31 64 kg/m² ,   Orthostatic Blood Pressures      Most Recent Value   Blood Pressure  163/85 filed at 05/23/2019 1411   Patient Position - Orthostatic VS  Lying filed at 05/23/2019 0425            Intake/Output Summary (Last 24 hours) at 5/23/2019 0940  Last data filed at 5/23/2019 0453  Gross per 24 hour   Intake 660 ml   Output 180 ml   Net 480 ml     Physical Exam:    GEN: Barry Cancer appears well, alert and oriented x 3, pleasant and cooperative   HEENT: pupils equal, round, and reactive to light; extraocular muscles intact  NECK: supple, no carotid bruits   HEART: regular rhythm, normal S1 and S2, no murmurs, clicks, gallops or rubs   LUNGS: coarse breath sounds bilaterally   ABDOMEN: normal bowel sounds, soft, no tenderness, no distention  EXTREMITIES: + pedal edema   NEURO: no focal findings   SKIN: normal without suspicious lesions on exposed skin    Medications:      Current Facility-Administered Medications:     acetaminophen (TYLENOL) tablet 650 mg, 650 mg, Oral, Q6H PRN, Shaka Heath MD    albuterol inhalation solution 2 5 mg, 2 5 mg, Nebulization, Q6H PRN, Shaka Heath MD, 2 5 mg at 05/23/19 0825    aspirin (ECOTRIN LOW STRENGTH) EC tablet 81 mg, 81 mg, Oral, Daily, CHARLOTTE Christine, 81 mg at 05/23/19 0859    atorvastatin (LIPITOR) tablet 40 mg, 40 mg, Oral, Daily With Dinner, CHARLOTTE Christine, 40 mg at 05/22/19 1757    calcium carbonate (TUMS) chewable tablet 1,000 mg, 1,000 mg, Oral, Daily PRN, Shaka Heath MD    carvedilol (COREG) tablet 25 mg, 25 mg, Oral, BID With Meals, Shaka Heath MD, 25 mg at 05/23/19 0859    colchicine (COLCRYS) tablet 0 6 mg, 0 6 mg, Oral, Daily, Theo Huber DO, 0 6 mg at 05/23/19 0859    docusate sodium (COLACE) capsule 100 mg, 100 mg, Oral, BID PRN, Shaka Heath MD    heparin (porcine) subcutaneous injection 5,000 Units, 5,000 Units, Subcutaneous, Q8H [facility], Shaka Heath MD, 5,000 Units at 05/23/19 0642    insulin glargine (LANTUS) subcutaneous injection 26 Units 0 26 mL, 26 Units, Subcutaneous, HS, Janet Barfield PA-C    insulin lispro (HumaLOG) 100 units/mL subcutaneous injection 1-5 Units, 1-5 Units, Subcutaneous, 4x Daily (AC & HS) **AND** Fingerstick Glucose (POCT), , , 4x Daily AC and at bedtime, Janet Barfield PA-C    insulin lispro (HumaLOG) 100 units/mL subcutaneous injection 1-6 Units, 1-6 Units, Subcutaneous, TID AC **AND** Fingerstick Glucose (POCT), , , TID AC, Janet Barfield PA-C    insulin lispro (HumaLOG) 100 units/mL subcutaneous injection 4 Units, 4 Units, Subcutaneous, TID With Meals, Shaka Heath MD, 4 Units at 05/23/19 0901    morphine injection 2 mg, 2 mg, Intravenous, Q4H PRN, Rahat Grant PA-C, 2 mg at 05/22/19 0104    NIFEdipine (PROCARDIA XL) 24 hr tablet 60 mg, 60 mg, Oral, BID, Shaka Heath MD, 60 mg at 05/23/19 0859    nitroglycerin (NITROSTAT) SL tablet 0 4 mg, 0 4 mg, Sublingual, Q5 Min PRN, Rahat Grant PA-C, 0 4 mg at 05/22/19 0031    ondansetron (ZOFRAN) injection 4 mg, 4 mg, Intravenous, Q6H PRN, Shaka Heath MD    predniSONE tablet 50 mg, 50 mg, Oral, Daily, Sage Enriquez DO, 50 mg at 05/23/19 0859    sod citrate-citric acid (BICITRA) oral solution 15 mL, 15 mL, Oral, Daily, CHARLOTTE Godoy, 15 mL at 05/23/19 0901     Labs & Results:    Results from last 7 days   Lab Units 05/21/19 2325 05/21/19 1946 05/21/19  1639   TROPONIN I ng/mL 0 23* 0 27* 0 22*     Results from last 7 days   Lab Units 05/23/19 0451 05/22/19 0559 05/21/19  1639   WBC Thousand/uL 9 90 10 57* 8 58   HEMOGLOBIN g/dL 11 0* 10 2* 11 0*   HEMATOCRIT % 32 3* 30 6* 32 6*   PLATELETS Thousands/uL 317 288 306     Results from last 7 days   Lab Units 05/22/19 0559   TRIGLYCERIDES mg/dL 122   HDL mg/dL 42     Results from last 7 days   Lab Units 05/23/19 0451 05/22/19  0559 05/21/19  1639   POTASSIUM mmol/L 4 9 3 9 4 3   CHLORIDE mmol/L 104 106 106   CO2 mmol/L 24 23 25   BUN mg/dL 39* 34* 31*   CREATININE mg/dL 3 05* 2 85* 2 76*   CALCIUM mg/dL 9 0 8 4 8 6   ALK PHOS U/L  --   --  65   ALT U/L  --   --  9*   AST U/L  --   --  47*     Results from last 7 days   Lab Units 05/22/19 0559 05/21/19 2325 05/21/19  1716   INR   --   --  0 98   PTT seconds 73* 54* 34     Results from last 7 days   Lab Units 05/22/19 0559 05/21/19  1639   MAGNESIUM mg/dL 1 6 1 6         Counseling / Coordination of Care  Total floor / unit time spent today 25 minutes  Greater than 50% of total time was spent with the patient and / or family counseling and / or coordination of care

## 2019-05-23 NOTE — PROGRESS NOTES
Progress Note - Sonia Muñoz 1985, 37 y o  male MRN: [id number]    Unit/Bed#: -01 Encounter: [id number]    Primary Care Provider: Diana Song MD   Date and time admitted to hospital: 5/21/2019  3:56 PM      * NSTEMI (non-ST elevated myocardial infarction) [facility])  Assessment & Plan  · Patient presented with chest tightness  Troponins flat 0 22/0 27/0  23   · Heparin stopped, he remains on aspirin/statin for risk factor modification  · Continue beta-blockers  · Cardiology evaluation appreciated, echo today  Suspected myopericarditis with recent cold symptoms  ESR and hs-CRP are elevated  · A1c 7 1%  CKD (chronic kidney disease) stage 3, GFR 30-59 ml/min ([facility])  Assessment & Plan  · Baseline creatinine 1 4-1 7; recently elevated to 2 25, further elevated to current value 3 05   · Nephrology following, appreciate additional workup/management  Cough  Assessment & Plan  · Chest x-ray clear, likely viral URI which lead to #1  · Supportive care  Hypertension  Assessment & Plan  · Resumed outpatient regimen monitor, BPs still suboptimal despite recently increased nifedipine outpatient  · Continue nifedipine 60 mg BID; carvedilol increased to 25 mg BID  · Torsemide on hold in setting of worsening renal functions  · Continue to monitor for improvement  Other proteinuria  Assessment & Plan  · Being followed by Nephrology  Urine protein/creatinine ratio has actually improved to 0 92 from 1 9 previously  Hyperlipidemia  Assessment & Plan  · High ASCVD risk score, he is on high-intensity statin  · Continue dietary and lifestyle modifications  ·  total cholesterol 228, , HDL 42,       Type 2 diabetes mellitus with kidney complication, with long-term current use of insulin [facility])  Assessment & Plan  Lab Results   Component Value Date    HGBA1C 7 1 (H) 05/21/2019       Recent Labs     05/21/19  2319 05/22/19  0711 05/22/19  1059 05/22/19  1522   POCGLU 118 157* 140 140 Blood Sugar Average: Last 72 hrs:  (P) 143 6   · Does not consistently take his insulins at home, he adjusts based on BG values  · ACHS BG checks, initiated SSI due to hyperglycemia this morning in the setting of steroid use  · Increase glargine to 26u, continue standing Humalog 4u with meals  CCO diet  VTE Pharmacologic Prophylaxis:   Pharmacologic: Heparin  Mechanical VTE Prophylaxis in Place: No    Patient Centered Rounds: I have performed bedside rounds with nursing staff today  Discussions with Specialists or Other Care Team Provider: reviewed cardiology, nephrology notes     Education and Discussions with Family / Patient: patient, significant other at bedside     Time Spent for Care: 20 minutes  More than 50% of total time spent on counseling and coordination of care as described above  Current Length of Stay: 2 day(s)    Current Patient Status: Inpatient   Certification Statement: The patient will continue to require additional inpatient hospital stay due to MAGNOLIA on CKD, await echo    Discharge Plan: pending clearance from cards/nephro    Code Status: Level 1 - Full Code      Subjective:   Patient seen, he is eager to go home  Denies CP today and cough is better  No fevers or chills  His BG was high this AM, but he remains asymptomatic  Tolerated addition of steroids well  Objective:     Vitals:   Temp (24hrs), Av 7 °F (37 1 °C), Min:98 1 °F (36 7 °C), Max:99 3 °F (37 4 °C)    Temp:  [98 1 °F (36 7 °C)-99 3 °F (37 4 °C)] 98 1 °F (36 7 °C)  HR:  [88-96] 96  Resp:  [18] 18  BP: (122-174)/(68-91) 163/85  SpO2:  [95 %-98 %] 96 %  Body mass index is 31 64 kg/m²  Input and Output Summary (last 24 hours):        Intake/Output Summary (Last 24 hours) at 2019 1158  Last data filed at 2019 0453  Gross per 24 hour   Intake 180 ml   Output 180 ml   Net 0 ml       Physical Exam:     Physical Exam   Constitutional: Vital signs are normal  He appears well-developed and well-nourished  No distress  Cardiovascular: Normal rate, regular rhythm, S1 normal, S2 normal and normal heart sounds  Exam reveals no friction rub  No murmur heard  Pulmonary/Chest: Effort normal and breath sounds normal  No respiratory distress  He has no wheezes  He has no rhonchi  He has no rales  Nursing note and vitals reviewed  Additional Data:     Labs:    Results from last 7 days   Lab Units 05/23/19  0451  05/21/19  1639   WBC Thousand/uL 9 90   < > 8 58   HEMOGLOBIN g/dL 11 0*   < > 11 0*   HEMATOCRIT % 32 3*   < > 32 6*   PLATELETS Thousands/uL 317   < > 306   NEUTROS PCT %  --   --  63   LYMPHS PCT %  --   --  27   MONOS PCT %  --   --  6   EOS PCT %  --   --  3    < > = values in this interval not displayed  Results from last 7 days   Lab Units 05/23/19  0451  05/21/19  1639   SODIUM mmol/L 136   < > 139   POTASSIUM mmol/L 4 9   < > 4 3   CHLORIDE mmol/L 104   < > 106   CO2 mmol/L 24   < > 25   BUN mg/dL 39*   < > 31*   CREATININE mg/dL 3 05*   < > 2 76*   ANION GAP mmol/L 8   < > 8   CALCIUM mg/dL 9 0   < > 8 6   ALBUMIN g/dL  --   --  2 5*   TOTAL BILIRUBIN mg/dL  --   --  0 10*   ALK PHOS U/L  --   --  65   ALT U/L  --   --  9*   AST U/L  --   --  47*   GLUCOSE RANDOM mg/dL 284*   < > 195*    < > = values in this interval not displayed  Results from last 7 days   Lab Units 05/21/19  1716   INR  0 98     Results from last 7 days   Lab Units 05/23/19  1057 05/22/19  2055 05/22/19  1522 05/22/19  1059 05/22/19  0711 05/21/19  2319 05/21/19  2016   POC GLUCOSE mg/dl 223* 169* 140 140 157* 118 163*     Results from last 7 days   Lab Units 05/21/19  1946   HEMOGLOBIN A1C % 7 1*               * I Have Reviewed All Lab Data Listed Above  * Additional Pertinent Lab Tests Reviewed:  All Labs Within Last 24 Hours Reviewed    Imaging:    Imaging Reports Reviewed Today Include: echo pending   Imaging Personally Reviewed by Myself Includes:      Recent Cultures (last 7 days):           Last 24 Hours Medication List:     Current Facility-Administered Medications:  acetaminophen 650 mg Oral Q6H PRN Shaka Heath MD   albuterol 2 5 mg Nebulization Q6H PRN Shaka Heath MD   aspirin 81 mg Oral Daily CHARLOTTE Pena   atorvastatin 40 mg Oral Daily With Dinner CHARLOTTE Pena   calcium carbonate 1,000 mg Oral Daily PRN Shaka Heath MD   carvedilol 25 mg Oral BID With Meals Shaka Heath MD   [START ON 5/25/2019] colchicine 0 6 mg Oral Every Other Day CHARLOTTE Garcia   docusate sodium 100 mg Oral BID PRN Shaka Heath MD   heparin (porcine) 5,000 Units Subcutaneous Q8H [address] Shaka Heath MD   insulin glargine 26 Units Subcutaneous HS Janet Barfield PA-C   insulin lispro 1-5 Units Subcutaneous 4x Daily (AC & HS) Janet Barfield PA-C   insulin lispro 1-6 Units Subcutaneous TID AC Janet Barfield PA-C   insulin lispro 4 Units Subcutaneous TID With Meals Shaka Heath MD   morphine injection 2 mg Intravenous Q4H PRN Rahat Grant PA-C   NIFEdipine ER 60 mg Oral BID Shaka Heath MD   nitroglycerin 0 4 mg Sublingual Q5 Min PRN Rahat Grant PA-C   ondansetron 4 mg Intravenous Q6H PRN Shaka Heath MD   predniSONE 50 mg Oral Daily Aimee Pacheco DO   sod citrate-citric acid 15 mL Oral Daily CHARLOTTE Garcia        Today, Patient Was Seen By: Samanta Singleton PA-C    ** Please Note: Dictation voice to text software may have been used in the creation of this document   **

## 2019-05-23 NOTE — PROGRESS NOTES
[address] [facility] NOTE   Catherne Favre 37 y o  male MRN: [id number]  Unit/Bed#: -01 Encounter: [id number]  Reason for Consult:  Acute kidney injury on CKD       ASSESSMENT and PLAN:  1  Acute kidney injury:  · Creatinine 2 76 on admission, continues to increase, currently 3 05  · Recent acute kidney injury April 2019  Creatinine 2 25 on follow-up labs  · MAGNOLIA likely pre renal, +/- cardiorenal   Recent MAGNOLIA contributing  · Concern for worsening renal function over the last 4 months  · No evidence of post renal, PVR minimal  · Recent renal ultrasound on 04/19/2019, right kidney 5 9 cm, left kidney 7 1 cm, diffuse echogenicity consistent with medical renal disease  · Urinalysis:  Small blood, 0-1 RBCs, 0-1 WBCs, 2+ protein, occasional bacteria, specific gravity 1 020  · Plan:    ? Continue colchicine-dose every other day due to renal function  ? Avoid hypotension  ? Avoid nephrotoxic agents  ? Monitor I&O  ? Daily weight  2  Chronic kidney disease, stage III:  Follows with Dr Tania Sun  · Baseline creatinine near 1 7 as of January 2019  · Following acute kidney injury in April creatinine plateaued at 2 2  · Etiology consistent with diabetic nephropathy and hypertensive nephrosclerosis  3  Proteinuria:    · Urine protein creatinine ratio 0 92 which is actually improved with previous urine protein creatinine ratio near 2 0 in April  · No ACEi or ARB at this time due to acute kidney injury  4  Non ST elevation MI:  · Intermittent episodes of chest heaviness and shortness of breath which has not been responsive to sublingual nitroglycerin  · Positional element to chest discomfort  · High markers indicating inflammatory process/myopericarditis suspected per Cardiology  Started on colchicine yesterday and steroids  · Echo pending  5  Volume status:    · Chest x-ray shows no acute cardiopulmonary disease      · Status post 1 dose of IV Lasix  · Mild ankle edema, pedal edema noted  · Lower extremity edema may be partially related to CCB  6  Hypertension:    · Recent outpatient adjustment in blood pressure medications, up titrated due to uncontrolled hypertension  · Blood pressure labile  · Continue Coreg, nifedipine  7  Acid-base:    · During hospitalization in April patient started on Bicitra due to low bicarbonate which improved  He was also intermittently hyperkalemic  Follow-up blood work bicarbonate level 25  · Currently bicarbonate level normal   · Decrease to 1 dose daily  8  Anemia:    · Hemoglobin 10 2  · Anemia worsened in April during hospitalization  · Likely related to progressive renal disease  9  Electrolytes:    · Magnesium 1 6 yesterday received repletion  · Potassium 4 9  10  Diabetes mellitus 2:  Hemoglobin A1c 7 1  11  Hyperlipidemia:  On Lipitor     DISPOSITION:  Awaiting results of echo  Continue to closely monitor renal function    SUBJECTIVE / INTERVAL HISTORY:  Feeling much better today  Patient looks and sounds much better  Hoarseness has resolved  Speech is clear, stronger    OBJECTIVE:  Current Weight: Weight - Scale: 109 kg (239 lb 12 8 oz)  Vitals:    05/23/19 0554 05/23/19 0714 05/23/19 0825 05/23/19 0827   BP:  163/85     BP Location:  Left arm     Pulse:  96     Resp:  18     Temp:  98 1 °F (36 7 °C)     TempSrc:  Oral     SpO2:  95% 96% 96%   Weight: 109 kg (239 lb 12 8 oz)      Height:           Intake/Output Summary (Last 24 hours) at 5/23/2019 0939  Last data filed at 5/23/2019 0453  Gross per 24 hour   Intake 660 ml   Output 180 ml   Net 480 ml   General:  No acute distress  Comfortably lying in bed  Skin:  Warm and dry  Eyes:  Sclera clear  ENT:  Oropharynx moist  Neck:  Supple no JVD noted  Chest:  Regular rate and rhythm, S1-S2  No rub noted  No murmur  CVS:  Slightly coarse breath sounds    No wheezes or rhonchi  Abdomen:  Rounded, soft, nontender, nondistended, normoactive bowel sounds  Extremities:  Mild ankle edema, pedal edema noted bilaterally  :  No Joycelyn  Neuro:  Alert and oriented  Psych:  Appropriate    Medications:    Current Facility-Administered Medications:     acetaminophen (TYLENOL) tablet 650 mg, 650 mg, Oral, Q6H PRN, Shaka Heath MD    albuterol inhalation solution 2 5 mg, 2 5 mg, Nebulization, Q6H PRN, Shaka Heath MD, 2 5 mg at 05/23/19 0825    aspirin (ECOTRIN LOW STRENGTH) EC tablet 81 mg, 81 mg, Oral, Daily, Antwon Mealy, CRNP, 81 mg at 05/23/19 0859    atorvastatin (LIPITOR) tablet 40 mg, 40 mg, Oral, Daily With Dinner, Antwon Mealy, CRNP, 40 mg at 05/22/19 1757    calcium carbonate (TUMS) chewable tablet 1,000 mg, 1,000 mg, Oral, Daily PRN, Shaka Heath MD    carvedilol (COREG) tablet 25 mg, 25 mg, Oral, BID With Meals, Shaka Heath MD, 25 mg at 05/23/19 0859    colchicine (COLCRYS) tablet 0 6 mg, 0 6 mg, Oral, Daily, Norma Tyson DO, 0 6 mg at 05/23/19 0859    docusate sodium (COLACE) capsule 100 mg, 100 mg, Oral, BID PRN, Shaka Heath MD    heparin (porcine) subcutaneous injection 5,000 Units, 5,000 Units, Subcutaneous, Q8H [address], Shaka Heath MD, 5,000 Units at 05/23/19 0642    insulin glargine (LANTUS) subcutaneous injection 26 Units 0 26 mL, 26 Units, Subcutaneous, HS, Janet Barfield PA-C    insulin lispro (HumaLOG) 100 units/mL subcutaneous injection 1-5 Units, 1-5 Units, Subcutaneous, 4x Daily (AC & HS) **AND** Fingerstick Glucose (POCT), , , 4x Daily AC and at bedtime, Janet Barfield PA-C    insulin lispro (HumaLOG) 100 units/mL subcutaneous injection 1-6 Units, 1-6 Units, Subcutaneous, TID AC **AND** Fingerstick Glucose (POCT), , , TID AC, Janet Barfield PA-C    insulin lispro (HumaLOG) 100 units/mL subcutaneous injection 4 Units, 4 Units, Subcutaneous, TID With Meals, Shaka Heath MD, 4 Units at 05/23/19 0901    morphine injection 2 mg, 2 mg, Intravenous, Q4H PRN, Rahat Grant PA-C, 2 mg at 05/22/19 0104    NIFEdipine (PROCARDIA XL) 24 hr tablet 60 mg, 60 mg, Oral, BID, Shaka Heath MD, 60 mg at 05/23/19 0859    nitroglycerin (NITROSTAT) SL tablet 0 4 mg, 0 4 mg, Sublingual, Q5 Min PRN, Rahat Grant PA-C, 0 4 mg at 05/22/19 0031    ondansetron (ZOFRAN) injection 4 mg, 4 mg, Intravenous, Q6H PRN, Shaka Heath MD    predniSONE tablet 50 mg, 50 mg, Oral, Daily, Boubacar Reddy DO, 50 mg at 05/23/19 0859    sod citrate-citric acid (BICITRA) oral solution 15 mL, 15 mL, Oral, Daily, CHARLOTET Aleman, 15 mL at 05/23/19 0901    Laboratory Results:  Results from last 7 days   Lab Units 05/23/19  0451 05/22/19  0559 05/21/19  1639   WBC Thousand/uL 9 90 10 57* 8 58   HEMOGLOBIN g/dL 11 0* 10 2* 11 0*   HEMATOCRIT % 32 3* 30 6* 32 6*   PLATELETS Thousands/uL 317 288 306   POTASSIUM mmol/L 4 9 3 9 4 3   CHLORIDE mmol/L 104 106 106   CO2 mmol/L 24 23 25   BUN mg/dL 39* 34* 31*   CREATININE mg/dL 3 05* 2 85* 2 76*   CALCIUM mg/dL 9 0 8 4 8 6   MAGNESIUM mg/dL  --  1 6 1 6     Work up:

## 2019-05-24 LAB
ANION GAP SERPL CALCULATED.3IONS-SCNC: 7 MMOL/L (ref 4–13)
BUN SERPL-MCNC: 47 MG/DL (ref 5–25)
CALCIUM SERPL-MCNC: 8.6 MG/DL (ref 8.3–10.1)
CHLORIDE SERPL-SCNC: 108 MMOL/L (ref 100–108)
CO2 SERPL-SCNC: 24 MMOL/L (ref 21–32)
CREAT SERPL-MCNC: 2.68 MG/DL (ref 0.6–1.3)
GFR SERPL CREATININE-BSD FRML MDRD: 34 ML/MIN/1.73SQ M
GLUCOSE SERPL-MCNC: 120 MG/DL (ref 65–140)
GLUCOSE SERPL-MCNC: 129 MG/DL (ref 65–140)
GLUCOSE SERPL-MCNC: 297 MG/DL (ref 65–140)
GLUCOSE SERPL-MCNC: 324 MG/DL (ref 65–140)
GLUCOSE SERPL-MCNC: 93 MG/DL (ref 65–140)
POTASSIUM SERPL-SCNC: 4.1 MMOL/L (ref 3.5–5.3)
SODIUM SERPL-SCNC: 139 MMOL/L (ref 136–145)

## 2019-05-24 PROCEDURE — 80048 BASIC METABOLIC PNL TOTAL CA: CPT | Performed by: NURSE PRACTITIONER

## 2019-05-24 PROCEDURE — 82948 REAGENT STRIP/BLOOD GLUCOSE: CPT

## 2019-05-24 PROCEDURE — 99231 SBSQ HOSP IP/OBS SF/LOW 25: CPT | Performed by: INTERNAL MEDICINE

## 2019-05-24 PROCEDURE — 99232 SBSQ HOSP IP/OBS MODERATE 35: CPT | Performed by: NURSE PRACTITIONER

## 2019-05-24 PROCEDURE — 99232 SBSQ HOSP IP/OBS MODERATE 35: CPT | Performed by: INTERNAL MEDICINE

## 2019-05-24 RX ORDER — PANTOPRAZOLE SODIUM 40 MG/1
40 TABLET, DELAYED RELEASE ORAL
Status: DISCONTINUED | OUTPATIENT
Start: 2019-05-24 | End: 2019-05-25 | Stop reason: HOSPADM

## 2019-05-24 RX ORDER — TORSEMIDE 20 MG/1
20 TABLET ORAL DAILY
Status: DISCONTINUED | OUTPATIENT
Start: 2019-05-24 | End: 2019-05-25 | Stop reason: HOSPADM

## 2019-05-24 RX ADMIN — PANTOPRAZOLE SODIUM 40 MG: 40 TABLET, DELAYED RELEASE ORAL at 10:06

## 2019-05-24 RX ADMIN — HEPARIN SODIUM 5000 UNITS: 5000 INJECTION INTRAVENOUS; SUBCUTANEOUS at 21:19

## 2019-05-24 RX ADMIN — INSULIN GLARGINE 26 UNITS: 100 INJECTION, SOLUTION SUBCUTANEOUS at 21:20

## 2019-05-24 RX ADMIN — PREDNISONE 50 MG: 20 TABLET ORAL at 10:00

## 2019-05-24 RX ADMIN — CARVEDILOL 25 MG: 12.5 TABLET, FILM COATED ORAL at 17:47

## 2019-05-24 RX ADMIN — TORSEMIDE 20 MG: 20 TABLET ORAL at 15:37

## 2019-05-24 RX ADMIN — SODIUM CITRATE AND CITRIC ACID MONOHYDRATE 15 ML: 500; 334 SOLUTION ORAL at 10:00

## 2019-05-24 RX ADMIN — INSULIN LISPRO 4 UNITS: 100 INJECTION, SOLUTION INTRAVENOUS; SUBCUTANEOUS at 17:47

## 2019-05-24 RX ADMIN — INSULIN LISPRO 5 UNITS: 100 INJECTION, SOLUTION INTRAVENOUS; SUBCUTANEOUS at 21:20

## 2019-05-24 RX ADMIN — NIFEDIPINE 60 MG: 30 TABLET, EXTENDED RELEASE ORAL at 10:00

## 2019-05-24 RX ADMIN — ATORVASTATIN CALCIUM 40 MG: 40 TABLET, FILM COATED ORAL at 17:46

## 2019-05-24 RX ADMIN — CARVEDILOL 25 MG: 12.5 TABLET, FILM COATED ORAL at 10:04

## 2019-05-24 RX ADMIN — NIFEDIPINE 60 MG: 30 TABLET, EXTENDED RELEASE ORAL at 17:47

## 2019-05-24 RX ADMIN — HEPARIN SODIUM 5000 UNITS: 5000 INJECTION INTRAVENOUS; SUBCUTANEOUS at 05:24

## 2019-05-24 RX ADMIN — ASPIRIN 81 MG: 81 TABLET, COATED ORAL at 10:00

## 2019-05-24 RX ADMIN — HEPARIN SODIUM 5000 UNITS: 5000 INJECTION INTRAVENOUS; SUBCUTANEOUS at 15:37

## 2019-05-24 NOTE — ASSESSMENT & PLAN NOTE
Lab Results   Component Value Date    HGBA1C 7 1 (H) 05/21/2019       Recent Labs     05/23/19  1057 05/23/19  1529 05/23/19  2110 05/24/19  0729   POCGLU 223* 278* 273* 120       Blood Sugar Average: Last 72 hrs:  (P) 187   · Does not consistently take his insulins at home, he adjusts based on BG values  · Continue with Lantus 26 units HS along with 4 units humalog with meals plus sliding scale  Fasting blood glucose 120 this morning, improved  · ACHS BG checks, adjust regimen as needed    · Diabetic diet

## 2019-05-24 NOTE — PROGRESS NOTES
Cardiology Progress Note - Mar Sanchez 37 y o  male MRN: [id number]    Unit/Bed#: -01 Encounter: [id number]      Assessment:  1  Non MI troponin elevation  2  Myopericarditis  3  URI symptoms  4  MAGNOLIA with CKD III  5  Benign essential hypertension   6  Dyslipidemia  7  DM 2    Plan:  1  ESR and hsCRP high - initiated colchicine only daily given renal function  2  Renal function precludes use of NSAIDs - initiated on steroids, continue at 50 mg daily until symptoms resolved then taper  3  Echo with small pericardial effusion   4  Renal function noted - renal on board  5  Blood pressure elevated - will defer management to renal   6  Follow-up scheduled with advanced practitioner in early June    Subjective:   Patient seen and examined  No significant events overnight  Chest pain improved  Objective:     Vitals: Blood pressure 159/77, pulse 87, temperature 97 9 °F (36 6 °C), temperature source Oral, resp  rate 18, height 6' 1" (1 854 m), weight 109 kg (240 lb 4 8 oz), SpO2 98 %  , Body mass index is 31 7 kg/m² ,   Orthostatic Blood Pressures      Most Recent Value   Blood Pressure  159/77 filed at 05/24/2019 6316   Patient Position - Orthostatic VS  Lying filed at 05/24/2019 9508            Intake/Output Summary (Last 24 hours) at 5/24/2019 1047  Last data filed at 5/23/2019 1900  Gross per 24 hour   Intake 180 ml   Output 475 ml   Net -295 ml     Physical Exam:    GEN: Mar Sanchez appears well, alert and oriented x 3, pleasant and cooperative   HEENT: pupils equal, round, and reactive to light; extraocular muscles intact  NECK: supple, no carotid bruits   HEART: regular rhythm, normal S1 and S2, no murmurs, clicks, gallops or rubs   LUNGS: coarse breath sounds bilaterally   ABDOMEN: normal bowel sounds, soft, no tenderness, no distention  EXTREMITIES: + pedal edema   NEURO: no focal findings   SKIN: normal without suspicious lesions on exposed skin    Medications:      Current Facility-Administered Medications:     acetaminophen (TYLENOL) tablet 650 mg, 650 mg, Oral, Q6H PRN, Shaka Heath MD    albuterol (PROVENTIL HFA,VENTOLIN HFA) inhaler 2 puff, 2 puff, Inhalation, Q4H PRN, Shaka Heath MD, 2 puff at 05/23/19 2359    aspirin (ECOTRIN LOW STRENGTH) EC tablet 81 mg, 81 mg, Oral, Daily, CHARLOTTE Verma, 81 mg at 05/24/19 1000    atorvastatin (LIPITOR) tablet 40 mg, 40 mg, Oral, Daily With Dinner, CHARLOTTE Verma, 40 mg at 05/23/19 1736    calcium carbonate (TUMS) chewable tablet 1,000 mg, 1,000 mg, Oral, Daily PRN, Shaka Heath MD    carvedilol (COREG) tablet 25 mg, 25 mg, Oral, BID With Meals, Shaka Heath MD, 25 mg at 05/24/19 1004    [START ON 5/25/2019] colchicine (COLCRYS) tablet 0 6 mg, 0 6 mg, Oral, Every Other Day, CHARLOTTE Whiting    docusate sodium (COLACE) capsule 100 mg, 100 mg, Oral, BID PRN, Shaka Heath MD    heparin (porcine) subcutaneous injection 5,000 Units, 5,000 Units, Subcutaneous, Q8H [address], Shaka Heath MD, 5,000 Units at 05/24/19 0524    insulin glargine (LANTUS) subcutaneous injection 26 Units 0 26 mL, 26 Units, Subcutaneous, HS, Janet Barfield PA-C, 26 Units at 05/23/19 2216    insulin lispro (HumaLOG) 100 units/mL subcutaneous injection 1-6 Units, 1-6 Units, Subcutaneous, 4x Daily (AC & HS), 4 Units at 05/23/19 2217 **AND** Fingerstick Glucose (POCT), , , 4x Daily AC and at bedtime, Janet Barfield PA-C    insulin lispro (HumaLOG) 100 units/mL subcutaneous injection 4 Units, 4 Units, Subcutaneous, TID With Meals, Shaka Heath MD, 4 Units at 05/23/19 1744    morphine injection 2 mg, 2 mg, Intravenous, Q4H PRN, Rahat Grant PA-C, 2 mg at 05/22/19 0104    NIFEdipine (PROCARDIA XL) 24 hr tablet 60 mg, 60 mg, Oral, BID, Shaka Heath MD, 60 mg at 05/24/19 1000    nitroglycerin (NITROSTAT) SL tablet 0 4 mg, 0 4 mg, Sublingual, Q5 Min PRN, Rahat Grant PA-C, 0 4 mg at 05/22/19 0031    ondansetron (ZOFRAN) injection 4 mg, 4 mg, Intravenous, Q6H PRN, Shaka Heath MD    pantoprazole (PROTONIX) EC tablet 40 mg, 40 mg, Oral, Early Morning, Mer M CHARLOTTE Ramsay, 40 mg at 05/24/19 1006    predniSONE tablet 50 mg, 50 mg, Oral, Daily, Trinda Valeria, DO, 50 mg at 05/24/19 1000    sod citrate-citric acid (BICITRA) oral solution 15 mL, 15 mL, Oral, Daily, CHARLOTTE Trejo, 15 mL at 05/24/19 1000    sodium chloride 0 9 % infusion, 50 mL/hr, Intravenous, Continuous, CHARLOTTE Trejo, Last Rate: 50 mL/hr at 05/23/19 1535, 50 mL/hr at 05/23/19 1535     Labs & Results:    Results from last 7 days   Lab Units 05/21/19 2325 05/21/19  1946 05/21/19  1639   TROPONIN I ng/mL 0 23* 0 27* 0 22*     Results from last 7 days   Lab Units 05/23/19  0451 05/22/19  0559 05/21/19  1639   WBC Thousand/uL 9 90 10 57* 8 58   HEMOGLOBIN g/dL 11 0* 10 2* 11 0*   HEMATOCRIT % 32 3* 30 6* 32 6*   PLATELETS Thousands/uL 317 288 306     Results from last 7 days   Lab Units 05/22/19  0559   TRIGLYCERIDES mg/dL 122   HDL mg/dL 42     Results from last 7 days   Lab Units 05/24/19  0651 05/23/19  0451 05/22/19  0559 05/21/19  1639   POTASSIUM mmol/L 4 1 4 9 3 9 4 3   CHLORIDE mmol/L 108 104 106 106   CO2 mmol/L 24 24 23 25   BUN mg/dL 47* 39* 34* 31*   CREATININE mg/dL 2 68* 3 05* 2 85* 2 76*   CALCIUM mg/dL 8 6 9 0 8 4 8 6   ALK PHOS U/L  --   --   --  65   ALT U/L  --   --   --  9*   AST U/L  --   --   --  47*     Results from last 7 days   Lab Units 05/22/19  0559 05/21/19  2325 05/21/19  1716   INR   --   --  0 98   PTT seconds 73* 54* 34     Results from last 7 days   Lab Units 05/22/19  0559 05/21/19  1639   MAGNESIUM mg/dL 1 6 1 6         Counseling / Coordination of Care  Total floor / unit time spent today 25 minutes  Greater than 50% of total time was spent with the patient and / or family counseling and / or coordination of care

## 2019-05-24 NOTE — PROGRESS NOTES
NEPHROLOGY PROGRESS NOTE   Sushil Can 37 y o  male MRN: [id number]  Unit/Bed#: -01 Encounter: [id number]  Reason for Consult: MAGNOLIA/CKD    ASSESSMENT/PLAN:  1  Acute Kidney Injury, POA- secondary to prerenal azotemia with cardiorenal syndrome  - creatinine improved overnight with gentle IVF (to be stopped after this bag runs out)  - no evidence of post renal causes with minimal PVR  - Recent renal ultrasound on 04/19/2019, right kidney 5 9 cm, left kidney 7 1 cm, diffuse echogenicity consistent with medical renal disease  - Urinalysis:  Small blood, 0-1 RBCs, 0-1 WBCs, 2+ protein, occasional bacteria  - avoid hypotension, avoid nephrotoxins, avoid contrast  - monitor daily weights and urine output  2  Chronic Kidney Disease stage III- Baseline creatinine in January 2019 was 1 7 but after last MAGNOLIA episode outpatient blood work showed a creatinine of 2 2  Follows with CHARLOTTE Haynes and Dr Kiarra Cárdenas  Etiology is due to diabetic nephropathy and hypertensive nephrosclerosis  3  Proteinuria- UPC ratio 0 9  - not on an ACEI/ARB due to MAGNOLIA  4  NSTEMI- cardiology on board  - started on colchicine for possible myopericarditis/inflammatory process  5  Volume Status- appears euvolemic  - holding torsemide  - received gentle IVF x1L overnight  6  Hypertension- BP above goal but no medication changes today as I do not want him to become hypotensive  - continue to trend BP  - currently on carvedilol 25mg BID and nifedipine 60mg BID  - stop IVF  7  Anemia- secondary to kidney disease  8  Acid/Base- on daily bicitra    Disposition:  Follow up MAGNOLIA office appointment made for patient on 5/31/19    SUBJECTIVE:  Patient denies SOB currently  States he is eating well  Denies N/V/D  Patient wants to go home and states he doesn't want to be in the hospital for another weekend  He states he was here for his anniversary and birthday      OBJECTIVE:  Current Weight: Weight - Scale: 109 kg (240 lb 4 8 oz)  Vitals:    05/23/19 1527 05/23/19 2200 05/24/19 0600 05/24/19 0728   BP:  166/87  159/77   BP Location:  Left arm  Right arm   Pulse:  92  87   Resp:  18  18   Temp:  98 9 °F (37 2 °C)  97 9 °F (36 6 °C)   TempSrc:  Oral  Oral   SpO2: 96% 96%  98%   Weight:   109 kg (240 lb 4 8 oz)    Height:           Intake/Output Summary (Last 24 hours) at 5/24/2019 1039  Last data filed at 5/23/2019 1900  Gross per 24 hour   Intake 180 ml   Output 475 ml   Net -295 ml     General: NAD  Skin: no rash  HEENT: normocephalic  Neck: supple  Chest: CTAB  Heart: RRR  Abdomen: soft nt nd  Extremities: trace pedal edema  Neuro: alert awake  Psych: mood and affect appropriate    Medications:    Current Facility-Administered Medications:     acetaminophen (TYLENOL) tablet 650 mg, 650 mg, Oral, Q6H PRN, Shaka Heath MD    albuterol (PROVENTIL HFA,VENTOLIN HFA) inhaler 2 puff, 2 puff, Inhalation, Q4H PRN, Shaka Heath MD, 2 puff at 05/23/19 0409    aspirin (ECOTRIN LOW STRENGTH) EC tablet 81 mg, 81 mg, Oral, Daily, JENNIFER ShoreNP, 81 mg at 05/24/19 1000    atorvastatin (LIPITOR) tablet 40 mg, 40 mg, Oral, Daily With Dinner, JENNIFER ShoreNP, 40 mg at 05/23/19 1736    calcium carbonate (TUMS) chewable tablet 1,000 mg, 1,000 mg, Oral, Daily PRN, Shaka Heath MD    carvedilol (COREG) tablet 25 mg, 25 mg, Oral, BID With Meals, Shaka Heath MD, 25 mg at 05/24/19 1004    [START ON 5/25/2019] colchicine (COLCRYS) tablet 0 6 mg, 0 6 mg, Oral, Every Other Day, [facility] CHARLOTTE [place]    docusate sodium (COLACE) capsule 100 mg, 100 mg, Oral, BID PRN, Shaka Heath MD    heparin (porcine) subcutaneous injection 5,000 Units, 5,000 Units, Subcutaneous, Q8H [address], Shaka Heath MD, 5,000 Units at 05/24/19 0524    insulin glargine (LANTUS) subcutaneous injection 26 Units 0 26 mL, 26 Units, Subcutaneous, HS, Janet Barfield PA-C, 26 Units at 05/23/19 2216    insulin lispro (HumaLOG) 100 units/mL subcutaneous injection 1-6 Units, 1-6 Units, Subcutaneous, 4x Daily (AC & HS), 4 Units at 05/23/19 2217 **AND** Fingerstick Glucose (POCT), , , 4x Daily AC and at bedtime, Janet Barfield PA-C    insulin lispro (HumaLOG) 100 units/mL subcutaneous injection 4 Units, 4 Units, Subcutaneous, TID With Meals, Shaka Heath MD, 4 Units at 05/23/19 1744    morphine injection 2 mg, 2 mg, Intravenous, Q4H PRN, Rahta Grant PA-C, 2 mg at 05/22/19 0104    NIFEdipine (PROCARDIA XL) 24 hr tablet 60 mg, 60 mg, Oral, BID, Shaka Heath MD, 60 mg at 05/24/19 1000    nitroglycerin (NITROSTAT) SL tablet 0 4 mg, 0 4 mg, Sublingual, Q5 Min PRN, Rahat Grant PA-C, 0 4 mg at 05/22/19 0031    ondansetron (ZOFRAN) injection 4 mg, 4 mg, Intravenous, Q6H PRN, Shaka Heath MD    pantoprazole (PROTONIX) EC tablet 40 mg, 40 mg, Oral, Early Morning, CHARLOTTE Andrade, 40 mg at 05/24/19 1006    predniSONE tablet 50 mg, 50 mg, Oral, Daily, Kishore Gutierrez DO, 50 mg at 05/24/19 1000    sod citrate-citric acid (BICITRA) oral solution 15 mL, 15 mL, Oral, Daily, Ramyaelenaleksandr Hawkins CRNP, 15 mL at 05/24/19 1000    sodium chloride 0 9 % infusion, 50 mL/hr, Intravenous, Continuous, Evelena Ross, CRNP, Last Rate: 50 mL/hr at 05/23/19 1535, 50 mL/hr at 05/23/19 1535    Laboratory Results:  Results from last 7 days   Lab Units 05/24/19  0651 05/23/19  0451 05/22/19  0559 05/21/19  1639   WBC Thousand/uL  --  9 90 10 57* 8 58   HEMOGLOBIN g/dL  --  11 0* 10 2* 11 0*   HEMATOCRIT %  --  32 3* 30 6* 32 6*   PLATELETS Thousands/uL  --  317 288 306   POTASSIUM mmol/L 4 1 4 9 3 9 4 3   CHLORIDE mmol/L 108 104 106 106   CO2 mmol/L 24 24 23 25   BUN mg/dL 47* 39* 34* 31*   CREATININE mg/dL 2 68* 3 05* 2 85* 2 76*   CALCIUM mg/dL 8 6 9 0 8 4 8 6   MAGNESIUM mg/dL  --   --  1 6 1 6

## 2019-05-24 NOTE — ASSESSMENT & PLAN NOTE
· Patient presented with chest tightness  Troponins flat 0 22/0 27/0  23  Suspected NSTEMI type 2 due to  Myopericarditis  · Heparin stopped, he remains on aspirin/statin for risk factor modification  · Continue beta-blockers  · Cardiology is following  Patient underwent echocardiogram which showed ejection fraction 65%  No regional wall motion abnormalities  Grade 1 diastolic dysfunction  Small pericardial effusion without hemodynamic compromise  · Suspected myopericarditis with recent cold symptoms  ESR and hs-CRP are elevated  · Started on colchicine every other day given renal function as well as prednisone 50 mg daily  Longevity of steroids unclear, await further cardiology recommendations  Will add protonix for GI ppx     · Repeat ESR/CRP in AM  · Will likely need outpatient follow-up with Cardiology

## 2019-05-24 NOTE — PROGRESS NOTES
[facility]    Progress Note - Jose Franciscoeil Frame 1985, 37 y o  male MRN: [id number]    Unit/Bed#: -01 Encounter: [id number]    Primary Care Provider: Soraida Smallwood MD   Date and time admitted to hospital: 5/21/2019  3:56 PM    * NSTEMI (non-ST elevated myocardial infarction) [facility])  Assessment & Plan  · Patient presented with chest tightness  Troponins flat 0 22/0 27/0  23  Suspected NSTEMI type 2 due to  Myopericarditis  · Heparin stopped, he remains on aspirin/statin for risk factor modification  · Continue beta-blockers  · Cardiology is following  Patient underwent echocardiogram which showed ejection fraction 65%  No regional wall motion abnormalities  Grade 1 diastolic dysfunction  Small pericardial effusion without hemodynamic compromise  · Suspected myopericarditis with recent cold symptoms  ESR and hs-CRP are elevated  · Started on colchicine every other day given renal function as well as prednisone 50 mg daily  Longevity of steroids unclear, await further cardiology recommendations  Will add protonix for GI ppx  · Repeat ESR/CRP in AM  · Will likely need outpatient follow-up with Cardiology    CKD (chronic kidney disease) stage 3, GFR 30-59 ml/min ([facility])  Assessment & Plan  · MAGNOLIA on CKD in the setting of diabetic nephropathy and hypertensive nephrosclerosis  Baseline creatinine around 1 7; peak creatinine this admission 3 05  Improved today to 2 68  · Nephrology following:  · Pre renal versus cardiorenal   · Renal ultrasound without hydronephrosis  Very low PVR  Not on Ace or Arb  · He did get some intravenous fluids  · Diuretics on hold  · Avoid nephrotoxins and hypotension  · BMP in a m  Cough  Assessment & Plan  · Chest x-ray clear, likely viral URI which lead to #1  · Supportive care  Hyperlipidemia  Assessment & Plan  · High ASCVD risk score, he is on high-intensity statin  · Continue dietary and lifestyle modifications    ·  total cholesterol 228, , HDL 42,   Hypertension  Assessment & Plan  · Resumed outpatient regimen monitor, BPs still suboptimal despite recently increased nifedipine outpatient  · Continue nifedipine 60 mg BID; carvedilol increased to 25 mg BID  · Torsemide on hold in setting of worsening renal functions  · Continue to monitor for improvement  Type 2 diabetes mellitus with kidney complication, with long-term current use of insulin [facility])  Assessment & Plan  Lab Results   Component Value Date    HGBA1C 7 1 (H) 05/21/2019       Recent Labs     05/23/19  1057 05/23/19  1529 05/23/19  2110 05/24/19  0729   POCGLU 223* 278* 273* 120       Blood Sugar Average: Last 72 hrs:  (P) 187   · Does not consistently take his insulins at home, he adjusts based on BG values  · Continue with Lantus 26 units HS along with 4 units humalog with meals plus sliding scale  Fasting blood glucose 120 this morning, improved  · ACHS BG checks, adjust regimen as needed  · Diabetic diet    Pharmacologic: Heparin     Mechanical VTE Prophylaxis in Place: Yes    Patient Centered Rounds: I have performed bedside rounds with nursing staff today  Discussions with Specialists or Other Care Team Provider: nursing, case management, cardiology     Education and Discussions with Family / Patient: patient and wife over the phone while in room    Time Spent for Care: 30 minutes  More than 50% of total time spent on counseling and coordination of care as described above  Current Length of Stay: 3 day(s)    Current Patient Status: Inpatient   Certification Statement: The patient will continue to require additional inpatient hospital stay due to Monitor renal function closely    Discharge Plan / Estimated Discharge Date:  Not medically stable, await further improvement of renal function  Code Status: Level 1 - Full Code      Subjective:   Patient offers no acute complaints  Feels better today  Less congested and cough is improving    No trouble urinating  Objective:     Vitals:   Temp (24hrs), Av 5 °F (36 9 °C), Min:97 9 °F (36 6 °C), Max:98 9 °F (37 2 °C)    Temp:  [97 9 °F (36 6 °C)-98 9 °F (37 2 °C)] 97 9 °F (36 6 °C)  HR:  [87-99] 87  Resp:  [18] 18  BP: (153-166)/(77-92) 159/77  SpO2:  [96 %-98 %] 98 %  Body mass index is 31 7 kg/m²  Input and Output Summary (last 24 hours): Intake/Output Summary (Last 24 hours) at 2019 0929  Last data filed at 2019 1900  Gross per 24 hour   Intake 180 ml   Output 475 ml   Net -295 ml       Physical Exam:     Physical Exam   Constitutional: He is oriented to person, place, and time  No distress  HENT:   Head: Normocephalic  Eyes: Conjunctivae are normal    Neck: Normal range of motion  Cardiovascular: Normal rate  Exam reveals no friction rub  No murmur heard  Pulmonary/Chest: Breath sounds normal    Abdominal: Bowel sounds are normal    Musculoskeletal: Normal range of motion  He exhibits no edema  Neurological: He is alert and oriented to person, place, and time  Skin: Skin is warm and dry  Psychiatric: He has a normal mood and affect  Nursing note and vitals reviewed  Additional Data:     Labs:    Results from last 7 days   Lab Units 19  0451  19  1639   WBC Thousand/uL 9 90   < > 8 58   HEMOGLOBIN g/dL 11 0*   < > 11 0*   HEMATOCRIT % 32 3*   < > 32 6*   PLATELETS Thousands/uL 317   < > 306   NEUTROS PCT %  --   --  63   LYMPHS PCT %  --   --  27   MONOS PCT %  --   --  6   EOS PCT %  --   --  3    < > = values in this interval not displayed  Results from last 7 days   Lab Units 19  0651  19  1639   POTASSIUM mmol/L 4 1   < > 4 3   CHLORIDE mmol/L 108   < > 106   CO2 mmol/L 24   < > 25   BUN mg/dL 47*   < > 31*   CREATININE mg/dL 2 68*   < > 2 76*   CALCIUM mg/dL 8 6   < > 8 6   ALK PHOS U/L  --   --  65   ALT U/L  --   --  9*   AST U/L  --   --  47*    < > = values in this interval not displayed       Results from last 7 days Lab Units 05/21/19  1716   INR  0 98         Recent Cultures (last 7 days):           Last 24 Hours Medication List:     Current Facility-Administered Medications:  acetaminophen 650 mg Oral Q6H PRN Shaka Heath MD    albuterol 2 puff Inhalation Q4H PRN Shaka Heath MD    aspirin 81 mg Oral Daily CHARLOTTE Anthony    atorvastatin 40 mg Oral Daily With Dinner CHARLOTTE Anthony    calcium carbonate 1,000 mg Oral Daily PRN Shaka Heath MD    carvedilol 25 mg Oral BID With Meals Shaka Heath MD    [START ON 5/25/2019] colchicine 0 6 mg Oral Every Other Day CHARLOTTE Stubbs    docusate sodium 100 mg Oral BID PRN Shaka Heath MD    heparin (porcine) 5,000 Units Subcutaneous Q8H [facility] Shaka Heath MD    insulin glargine 26 Units Subcutaneous HS Janet Barfield PA-C    insulin lispro 1-6 Units Subcutaneous 4x Daily (AC & HS) Janet Barfield PA-C    insulin lispro 4 Units Subcutaneous TID With Meals Shaka Heath MD    morphine injection 2 mg Intravenous Q4H PRN Rahat Grant PA-C    NIFEdipine ER 60 mg Oral BID Shaka Heath MD    nitroglycerin 0 4 mg Sublingual Q5 Min PRN Rahat Grant PA-C    ondansetron 4 mg Intravenous Q6H PRN Shaka Heath MD    pantoprazole 40 mg Oral Early Morning CHARLOTTE Andrade    predniSONE 50 mg Oral Daily Justus Awan DO    sod citrate-citric acid 15 mL Oral Daily CHARLOTTE Stubbs    sodium chloride 50 mL/hr Intravenous Continuous CHARLOTTE Stubbs Last Rate: 50 mL/hr (05/23/19 1535)        Today, Patient Was Seen By: CHARLOTTE Nguyen    ** Please Note: Dragon 360 Dictation voice to text software may have been used in the creation of this document   **

## 2019-05-24 NOTE — ASSESSMENT & PLAN NOTE
· MAGNOLIA on CKD in the setting of diabetic nephropathy and hypertensive nephrosclerosis  Baseline creatinine around 1 7; peak creatinine this admission 3 05  Improved today to 2 68  · Nephrology following:  · Pre renal versus cardiorenal   · Renal ultrasound without hydronephrosis  Very low PVR  Not on Ace or Arb  · He did get some intravenous fluids  · Diuretics on hold  · Avoid nephrotoxins and hypotension  · BMP in a m

## 2019-05-25 VITALS
SYSTOLIC BLOOD PRESSURE: 180 MMHG | RESPIRATION RATE: 18 BRPM | HEART RATE: 89 BPM | WEIGHT: 239.6 LBS | TEMPERATURE: 98.8 F | HEIGHT: 73 IN | DIASTOLIC BLOOD PRESSURE: 86 MMHG | OXYGEN SATURATION: 98 % | BODY MASS INDEX: 31.75 KG/M2

## 2019-05-25 PROBLEM — I31.9 MYOPERICARDITIS: Status: ACTIVE | Noted: 2019-05-25

## 2019-05-25 PROBLEM — E66.811 CLASS 1 OBESITY DUE TO EXCESS CALORIES WITH SERIOUS COMORBIDITY AND BODY MASS INDEX (BMI) OF 31.0 TO 31.9 IN ADULT: Status: ACTIVE | Noted: 2019-05-25

## 2019-05-25 PROBLEM — I47.2 NSVT (NONSUSTAINED VENTRICULAR TACHYCARDIA): Status: ACTIVE | Noted: 2019-05-25

## 2019-05-25 PROBLEM — I47.29 NSVT (NONSUSTAINED VENTRICULAR TACHYCARDIA): Status: ACTIVE | Noted: 2019-05-25

## 2019-05-25 PROBLEM — E66.09 CLASS 1 OBESITY DUE TO EXCESS CALORIES WITH SERIOUS COMORBIDITY AND BODY MASS INDEX (BMI) OF 31.0 TO 31.9 IN ADULT: Status: ACTIVE | Noted: 2019-05-25

## 2019-05-25 PROBLEM — N17.9 ACUTE RENAL FAILURE SUPERIMPOSED ON STAGE 3 CHRONIC KIDNEY DISEASE (HCC): Status: ACTIVE | Noted: 2019-04-26

## 2019-05-25 LAB
ANION GAP SERPL CALCULATED.3IONS-SCNC: 9 MMOL/L (ref 4–13)
BUN SERPL-MCNC: 55 MG/DL (ref 5–25)
CALCIUM SERPL-MCNC: 8.7 MG/DL (ref 8.3–10.1)
CHLORIDE SERPL-SCNC: 103 MMOL/L (ref 100–108)
CO2 SERPL-SCNC: 25 MMOL/L (ref 21–32)
CREAT SERPL-MCNC: 2.67 MG/DL (ref 0.6–1.3)
CRP SERPL HS-MCNC: 1.69 MG/L
ERYTHROCYTE [SEDIMENTATION RATE] IN BLOOD: 55 MM/HOUR (ref 0–10)
GFR SERPL CREATININE-BSD FRML MDRD: 34 ML/MIN/1.73SQ M
GLUCOSE SERPL-MCNC: 141 MG/DL (ref 65–140)
GLUCOSE SERPL-MCNC: 222 MG/DL (ref 65–140)
GLUCOSE SERPL-MCNC: 262 MG/DL (ref 65–140)
POTASSIUM SERPL-SCNC: 4.6 MMOL/L (ref 3.5–5.3)
SODIUM SERPL-SCNC: 137 MMOL/L (ref 136–145)

## 2019-05-25 PROCEDURE — 82948 REAGENT STRIP/BLOOD GLUCOSE: CPT

## 2019-05-25 PROCEDURE — 86141 C-REACTIVE PROTEIN HS: CPT | Performed by: INTERNAL MEDICINE

## 2019-05-25 PROCEDURE — 99232 SBSQ HOSP IP/OBS MODERATE 35: CPT | Performed by: INTERNAL MEDICINE

## 2019-05-25 PROCEDURE — 80048 BASIC METABOLIC PNL TOTAL CA: CPT | Performed by: NURSE PRACTITIONER

## 2019-05-25 PROCEDURE — 85652 RBC SED RATE AUTOMATED: CPT | Performed by: NURSE PRACTITIONER

## 2019-05-25 PROCEDURE — 99239 HOSP IP/OBS DSCHRG MGMT >30: CPT | Performed by: NURSE PRACTITIONER

## 2019-05-25 RX ORDER — PANTOPRAZOLE SODIUM 40 MG/1
40 TABLET, DELAYED RELEASE ORAL
Qty: 30 TABLET | Refills: 0 | Status: SHIPPED | OUTPATIENT
Start: 2019-05-26 | End: 2019-07-03 | Stop reason: SDDI

## 2019-05-25 RX ORDER — ASPIRIN 81 MG/1
81 TABLET ORAL DAILY
Refills: 0 | COMMUNITY
Start: 2019-05-26

## 2019-05-25 RX ORDER — CARVEDILOL 25 MG/1
50 TABLET ORAL 2 TIMES DAILY WITH MEALS
Qty: 60 TABLET | Refills: 0 | Status: SHIPPED | OUTPATIENT
Start: 2019-05-25 | End: 2019-06-26 | Stop reason: SDUPTHER

## 2019-05-25 RX ORDER — PREDNISONE 10 MG/1
50 TABLET ORAL DAILY
Qty: 50 TABLET | Refills: 0 | Status: SHIPPED | OUTPATIENT
Start: 2019-05-26 | End: 2019-07-03 | Stop reason: ALTCHOICE

## 2019-05-25 RX ORDER — COLCHICINE 0.6 MG/1
0.6 TABLET ORAL EVERY OTHER DAY
Qty: 5 TABLET | Refills: 0 | Status: SHIPPED | OUTPATIENT
Start: 2019-05-27 | End: 2019-07-03 | Stop reason: SDUPTHER

## 2019-05-25 RX ORDER — ALBUTEROL SULFATE 90 UG/1
2 AEROSOL, METERED RESPIRATORY (INHALATION) EVERY 4 HOURS PRN
Qty: 1 INHALER | Refills: 0 | Status: SHIPPED | OUTPATIENT
Start: 2019-05-25

## 2019-05-25 RX ORDER — ATORVASTATIN CALCIUM 40 MG/1
40 TABLET, FILM COATED ORAL
Qty: 30 TABLET | Refills: 0 | Status: SHIPPED | OUTPATIENT
Start: 2019-05-25 | End: 2019-07-03 | Stop reason: SDDI

## 2019-05-25 RX ADMIN — TORSEMIDE 20 MG: 20 TABLET ORAL at 10:20

## 2019-05-25 RX ADMIN — INSULIN LISPRO 4 UNITS: 100 INJECTION, SOLUTION INTRAVENOUS; SUBCUTANEOUS at 11:31

## 2019-05-25 RX ADMIN — INSULIN LISPRO 4 UNITS: 100 INJECTION, SOLUTION INTRAVENOUS; SUBCUTANEOUS at 09:14

## 2019-05-25 RX ADMIN — ASPIRIN 81 MG: 81 TABLET, COATED ORAL at 10:20

## 2019-05-25 RX ADMIN — PREDNISONE 50 MG: 20 TABLET ORAL at 10:20

## 2019-05-25 RX ADMIN — NIFEDIPINE 60 MG: 30 TABLET, EXTENDED RELEASE ORAL at 10:21

## 2019-05-25 RX ADMIN — PANTOPRAZOLE SODIUM 40 MG: 40 TABLET, DELAYED RELEASE ORAL at 05:48

## 2019-05-25 RX ADMIN — HEPARIN SODIUM 5000 UNITS: 5000 INJECTION INTRAVENOUS; SUBCUTANEOUS at 05:48

## 2019-05-25 RX ADMIN — SODIUM CITRATE AND CITRIC ACID MONOHYDRATE 15 ML: 500; 334 SOLUTION ORAL at 10:21

## 2019-05-25 RX ADMIN — INSULIN LISPRO 2 UNITS: 100 INJECTION, SOLUTION INTRAVENOUS; SUBCUTANEOUS at 09:13

## 2019-05-25 RX ADMIN — COLCHICINE 0.6 MG: 0.6 TABLET, FILM COATED ORAL at 10:19

## 2019-05-25 RX ADMIN — CARVEDILOL 25 MG: 12.5 TABLET, FILM COATED ORAL at 10:21

## 2019-05-25 NOTE — DISCHARGE SUMMARY
Discharge- Dario Espino 1985, 37 y o  male MRN: [id number]    Unit/Bed#: -01 Encounter: [id number]    Primary Care Provider: Levi Gutierrez MD   Date and time admitted to hospital: 5/21/2019  3:56 PM        Myopericarditis  Assessment & Plan  · Cardiology following  · D/w Dr Osman Betts- recommend continuing with prednisone 50mg daily until seen in the office on June 5th   · C/w colchicine- renally dosed  · Repeat CRP improved to 1 69 from 5 10  · Repeat ESR with slight improvement to 55 from 58  · Add PPI while on steroids     * NSTEMI (non-ST elevated myocardial infarction) [facility])  Assessment & Plan  · Patient presented with chest tightness  Troponins flat 0 22/0 27/0  23  Suspected NSTEMI type 2 due to  Myopericarditis  · aspirin/statin for risk factor modification  · Continue beta-blockers  · Cardiology is following  · echocardiogram which showed ejection fraction 65%  No regional wall motion abnormalities  Grade 1 diastolic dysfunction  Small pericardial effusion without hemodynamic compromise  · Suspected myopericarditis with recent cold symptoms  ESR and hs-CRP are elevated with improvement noted on repeat labs today of ESR 55; crp 1 69  · Started on colchicine every other day given renal function as well as prednisone 50 mg daily  · outpatient follow-up with Cardiology    NSVT (nonsustained ventricular tachycardia) ([facility] [place] [address] )  Assessment & Plan  · Per cardiology increase coreg to 50mg BID     Acute renal failure superimposed on stage 3 chronic kidney disease ([facility] [place] [address] )  Assessment & Plan  · MAGNOLIA on CKD in the setting of diabetic nephropathy and hypertensive nephrosclerosis  · Baseline creatinine 2 2-2 5  · Nephrology following:  · Renal ultrasound without hydronephrosis  Very low PVR  Not on Ace or Arb    · Diuretics resumed: demadex 20mg daily   · Pt was advised to weigh himself daily and call with a weight gain or loss of 3lbs in one day or 5lbs in one week   · Avoid nephrotoxins and hypotension  · BMP in one week   · Outpatient f/u with Dr Michael Prescott after discharge     Type 2 diabetes mellitus with kidney complication, with long-term current use of insulin [facility])  Assessment & Plan  Lab Results   Component Value Date    HGBA1C 7 1 (H) 05/21/2019       Recent Labs     05/24/19  1613 05/24/19  2107 05/25/19  0715 05/25/19  1059   POCGLU 297* 324* 222* 141*       Blood Sugar Average: Last 72 hrs:  (P) 766 [id number]   · Does not consistently take his insulins at home, he adjusts based on BG values  · Continue with Lantus 26 units HS along with 4 units humalog with meals plus sliding scale  Fasting blood glucose 120 this morning, improved  · ACHS BG checks, adjust regimen as needed  · Diabetic diet  · Educated on importance of medication adherence and the impact of steroids on his blood sugars     Class 1 obesity due to excess calories with serious comorbidity and body mass index (BMI) of 31 0 to 31 9 in adult  Assessment & Plan  · BMI 31 61- noted  · Encourage diet and exercise     Cough  Assessment & Plan  · Chest x-ray clear, likely viral URI which lead to #1  · Improved- provide albuterol inhaler PRN at discharge     Hypertension  Assessment & Plan  · Continue nifedipine 60 mg BID; carvedilol increased to 50 mg BID  · Torsemide 20mg daily     Hyperlipidemia  Assessment & Plan  · High ASCVD risk score, he is on high-intensity statin  · Continue dietary and lifestyle modifications  ·  total cholesterol 228, , HDL 42,             Discharging Physician / Practitioner: CHARLOTTE Moralez  PCP: Kassandra Garay MD  Admission Date:   Admission Orders (From admission, onward)    Ordered        05/21/19 1727  Inpatient Admission (expected length of stay for this patient Order details is greater than two midnights)  Once             Discharge Date: 05/25/19    Resolved Problems  Date Reviewed: 5/25/2019    None          Consultations During [facility] Stay:  · Cardiology  · Nephrology     Procedures Performed:   · Chest xray- no acute cardiopulmonary disease  · Echo- LEFT VENTRICLE: Systolic function was normal  Ejection fraction was estimated to be 65 %  There were no regional wall motion abnormalities  Wall thickness was moderately increased  Concentric hypertrophy was present  Doppler parameters were consistent with abnormal left ventricular relaxation (grade 1 diastolic dysfunction)  RIGHT VENTRICLE:The size was normal Systolic function was normal  PERICARDIUM:A small, free-flowing pericardial effusion was identified circumferential to the heart  There was no evidence of hemodynamic compromise  Significant Findings / Test Results:   · See above     Incidental Findings:   · None      Test Results Pending at Discharge (will require follow up): · None      Outpatient Tests Requested:  · Bmp and cbc in one week     Complications:  None     Reason for Admission: chest tightness     Hospital Course:     Barry Gonzalez is a 37 y o  male patient who originally presented to the hospital on 5/21/2019 due to chest tightness and cough  Patient was noted to have an elevation in troponin which was considered to flat troponin which represented a non MI troponin elevation possibly secondary to myopericarditis  An echocardiogram was performed  Colchicine was started along with prednisone  Cardiology recommended that the prednisone be continued at 50 mg daily until seen in the office on June 5th  The patient had an elevated ESR and CRP were which improved with the initiation of steroids during his stay  Patient was also noted to have acute kidney injury present on admission with a creatinine of 2 76  Was followed by Nephrology during his stay and his creatinine improved  His new baseline creatinine is predicted to be 2 2-2 5  He will need follow up outpatient Nephrology after discharge    Patient had persistent elevation in his blood pressure and his medications were adjusted  He will be discharged on Coreg 50 mg b i d  Also to help with nonsustained V-tach episode he had on the telemetry monitor  Patient was educated on adherence to his insulin regimen at discharge especially in the setting of being on steroids  Please see above list of diagnoses and related plan for additional information  Condition at Discharge: good     Discharge Day Visit / Exam:     Subjective:  Pt denies any complaints  Pt is requesting discharge   Vitals: Blood Pressure: (!) 180/86 (05/25/19 0717)  Pulse: 89 (05/25/19 0712)  Temperature: 98 8 °F (37 1 °C) (05/25/19 0712)  Temp Source: Oral (05/25/19 6064)  Respirations: 18 (05/25/19 0712)  Height: 6' 1" (185 4 cm) (05/21/19 1847)  Weight - Scale: 109 kg (239 lb 9 6 oz) (05/25/19 0600)  SpO2: 98 % (05/25/19 0998)  Exam:   Physical Exam   Constitutional: He is oriented to person, place, and time  No distress  Obese   Cardiovascular: Normal rate, regular rhythm, normal heart sounds and intact distal pulses  No murmur heard  Pulmonary/Chest: Effort normal and breath sounds normal  No respiratory distress  He has no wheezes  He has no rales  Abdominal: Soft  Bowel sounds are normal  He exhibits no distension  There is no tenderness  There is no rebound  Musculoskeletal: He exhibits edema (moderate b/l lower extremity)  He exhibits no tenderness  Neurological: He is alert and oriented to person, place, and time  No cranial nerve deficit  Skin: Skin is warm and dry  No rash noted  He is not diaphoretic  No erythema  Psychiatric: He has a normal mood and affect  Discussion with Family: d/w wife at bedside     Discharge instructions/Information to patient and family:   See after visit summary for information provided to patient and family  Provisions for Follow-Up Care:  See after visit summary for information related to follow-up care and any pertinent home health orders        Disposition:     Home    For Discharges to [facility]  [facility]:   · Not Applicable to this Patient - Not Applicable to this Patient    Planned Readmission: no     Discharge Statement:  I spent 35 minutes discharging the patient  This time was spent on the day of discharge  I had direct contact with the patient on the day of discharge  Greater than 50% of the total time was spent examining patient, answering all patient questions, arranging and discussing plan of care with patient as well as directly providing post-discharge instructions  Additional time then spent on discharge activities  Discharge Medications:  See after visit summary for reconciled discharge medications provided to patient and family        ** Please Note: This note has been constructed using a voice recognition system **

## 2019-05-25 NOTE — ASSESSMENT & PLAN NOTE
Lab Results   Component Value Date    HGBA1C 7 1 (H) 05/21/2019       Recent Labs     05/24/19  1613 05/24/19  2107 05/25/19  0715 05/25/19  1059   POCGLU 297* 324* 222* 141*       Blood Sugar Average: Last 72 hrs:  (P) 003 [id number]   · Does not consistently take his insulins at home, he adjusts based on BG values  · Continue with Lantus 26 units HS along with 4 units humalog with meals plus sliding scale  Fasting blood glucose 120 this morning, improved  · ACHS BG checks, adjust regimen as needed    · Diabetic diet  · Educated on importance of medication adherence and the impact of steroids on his blood sugars

## 2019-05-25 NOTE — ASSESSMENT & PLAN NOTE
· Chest x-ray clear, likely viral URI which lead to #1    · Improved- provide albuterol inhaler PRN at discharge

## 2019-05-25 NOTE — ASSESSMENT & PLAN NOTE
· Cardiology following  · D/w Dr Weber Side- recommend continuing with prednisone 50mg daily until seen in the office on June 5th   · C/w colchicine- renally dosed  · Repeat CRP improved to 1 69 from 5 10  · Repeat ESR with slight improvement to 55 from 58  · Add PPI while on steroids

## 2019-05-25 NOTE — DISCHARGE INSTRUCTIONS
Acute Kidney Injury   WHAT YOU NEED TO KNOW:   Acute kidney injury (MAGNOLIA) is also called acute kidney failure, or acute renal failure  MAGNOLIA happens when your kidneys suddenly stop working correctly  Normally, the kidneys remove fluid, chemicals, and waste from your blood  These wastes are turned into urine by your kidneys  MAGNOLIA usually happens over hours or days  When you have MAGNOLIA, your kidneys do not remove the waste, chemicals, or extra fluid from your body  A normal amount of urine is not produced  MAGNOLIA is usually temporary, it can take days to months to recover  MAGNOLIA can also become a chronic kidney condition  DISCHARGE INSTRUCTIONS:   Call 911 if:   · You have sudden chest pain or trouble breathing  Seek care immediately if:   · Your symptoms get worse  Contact your healthcare provider if:   · Your symptoms return  · Your blood sugar or blood pressure level is not within the range your healthcare provider recommends  · You have questions or concerns about your condition or care  Nutrition:  Your healthcare provider may tell you to eat food low in sodium (salt), potassium, phosphorus, or protein  A dietitian can help you plan your meals  Drink liquids as directed: Your healthcare provider may recommend that you drink a certain amount of liquids  This will help your kidneys work better and decrease your risk for dehydration  Ask how much liquid to drink each day and which liquids are best for you  Prevent acute kidney injury:   · Manage other health conditions  such as diabetes, high blood pressure, or heart disease  These conditions increase your risk for acute kidney injury  Take your medicines for these conditions as directed  Also, monitor your blood sugar and blood pressure levels as directed  Contact your healthcare provider if your levels are not in the range he or she says it should be  · Talk to your healthcare provider before you take over-the-counter-medicine    NSAIDs, stomach medicine, or laxatives may harm your kidneys and increase your risk for acute kidney injury  If it is okay to take the medicine, follow the directions on the package  Do not take more than directed  · Tell healthcare providers you have had acute kidney injury  before you get contrast liquid for an x-ray or CT scan  Your healthcare provider may give you medicine to prevent kidney problems caused by the liquid  Follow up with your healthcare provider as directed: You will need to return for more tests to make sure your kidneys are working properly  You may also be referred to a kidney specialist  Write down your questions so you remember to ask them during your visits  © 2017 [employer]  Information is for End User's use only and may not be sold, redistributed or otherwise used for commercial purposes  All illustrations and images included in CareNotes® are the copyrighted property of [employer]  or [employer]  The above information is an  only  It is not intended as medical advice for individual conditions or treatments  Talk to your doctor, nurse or pharmacist before following any medical regimen to see if it is safe and effective for you  Acute Pericarditis   WHAT YOU NEED TO KNOW:   Acute pericarditis is inflammation of the pericardium  The pericardium is the thin sac that surrounds your heart  A small amount of clear fluid between the heart and the sac allows the heart to beat easily  With acute pericarditis, the amount of fluid increases and may contain pus  This can cause problems with the way that your heart beats  DISCHARGE INSTRUCTIONS:   Medicines:   · NSAIDs , such as ibuprofen, help decrease swelling, pain, and fever  This medicine is available with or without a doctor's order  NSAIDs can cause stomach bleeding or kidney problems in certain people  If you take blood thinner medicine, always ask your healthcare provider if NSAIDs are safe for you  Always read the medicine label and follow directions  · Antibiotics: This medicine is given to fight or prevent an infection caused by bacteria  Always take your antibiotics exactly as ordered by your healthcare provider  Do not stop taking your medicine unless directed by your healthcare provider  Never save antibiotics or take leftover antibiotics that were given to you for another illness  · Steroids: This medicine may be given to decrease inflammation  · Take your medicine as directed  Contact your healthcare provider if you think your medicine is not helping or if you have side effects  Tell him or her if you are allergic to any medicine  Keep a list of the medicines, vitamins, and herbs you take  Include the amounts, and when and why you take them  Bring the list or the pill bottles to follow-up visits  Carry your medicine list with you in case of an emergency  Follow up with your healthcare provider as directed:  Write down your questions so you remember to ask them during your visits  Wellness tips:   · Eat a variety of healthy foods: This may help you have more energy and heal faster  Healthy foods include fruit, vegetables, whole-grain breads, low-fat dairy products, beans, lean meat, and fish  Ask if you need to be on a special diet  · Drink liquids as directed: Adults should drink between 9 and 13 eight-ounce cups of liquid every day  Ask what amount is best for you  For most people, good liquids to drink are water, juice, and milk  · Get plenty of exercise:  Talk to your caregiver about the best exercise plan for you  Exercise can decrease your blood pressure and improve your health  · Do not smoke: If you smoke, it is never too late to quit  You are more likely to have heart disease, lung disease, cancer, and other health problems if you smoke  Quitting smoking will improve your health and the health of those around you   If you smoke, ask for information about how to stop     · Manage stress:  Stress may slow healing and cause illness  Learn new ways to relax, such as deep breathing  Contact your healthcare provider if:   · You have a fever  · You have questions or concerns about your condition or care  Seek care immediately or call 911 if:   · You have shortness of breath, which is worse when you lie down  · Your chest pain gets worse or does not get better  © 2017 [address] [employer] Information is for End User's use only and may not be sold, redistributed or otherwise used for commercial purposes  All illustrations and images included in CareNotes® are the copyrighted property of [employer]  or [employer]  The above information is an  only  It is not intended as medical advice for individual conditions or treatments  Talk to your doctor, nurse or pharmacist before following any medical regimen to see if it is safe and effective for you  Chronic Kidney Disease   WHAT YOU NEED TO KNOW:   Chronic kidney disease (CKD) is the gradual and permanent loss of kidney function  It is also called chronic kidney failure, or chronic renal insufficiency  Normally, the kidneys remove fluid, chemicals, and waste from your blood  These wastes are turned into urine by your kidneys  CKD may worsen over time and lead to kidney failure  DISCHARGE INSTRUCTIONS:   Return to the emergency department if:   · You are confused and very drowsy  · You have a seizure  · You have shortness of breath  Contact your healthcare provider if:   · You suddenly gain or lose more weight than your healthcare provider has told you is okay  · You have itchy skin or a rash  · You urinate more or less than you normally do  · You have blood in your urine  · You have nausea and repeated vomiting  · You have fatigue or muscle weakness  · You have hiccups that will not stop      · You have questions or concerns about your condition or care   Medicines:   · Medicines  may be given to decrease blood pressure and get rid of extra fluid  You may also receive medicine to manage health conditions that may occur with CKD, such as anemia, diabetes, and heart disease  · Take your medicine as directed  Contact your healthcare provider if you think your medicine is not helping or if you have side effects  Tell him or her if you are allergic to any medicine  Keep a list of the medicines, vitamins, and herbs you take  Include the amounts, and when and why you take them  Bring the list or the pill bottles to follow-up visits  Carry your medicine list with you in case of an emergency  Follow up with your healthcare provider as directed: You will need to return for tests to monitor your kidney function  You may also be referred to a kidney specialist  Write down your questions so you remember to ask them during your visits  Manage other health conditions: Follow your healthcare provider's directions on how to manage diabetes, high blood pressure, and heart disease  These conditions can make CKD worse  Talk to your healthcare provider before you take over-the-counter medicine  Medicines such as NSAIDs, stomach medicine, or laxatives may harm your kidneys  Weigh yourself daily:  Ask your healthcare provider what your weight should be  Ask how much liquid you should drink each day  CKD may cause you to gain or lose weight rapidly  Weigh yourself every day  Write down your weight, how much liquid you drink or eat, and how much you urinate each day  Contact your healthcare provider if your weight is higher or lower than it should be  Manage CKD:   · Maintain a healthy weight  Ask your healthcare provider how much you should weigh  Ask him to help you create a weight loss plan if you are overweight  · Exercise 30 to 60 minutes a day, 4 to 7 times a week, or as directed  Ask about the best exercise plan for you   Regular exercise can help you manage CKD, high blood pressure, and diabetes  · Follow your healthcare provider's advice about what to eat and drink  He may tell you to eat food low in sodium (salt), potassium, phosphorus, or protein  You may need to see a dietitian if you need help planning meals  Ask how much liquid to drink each day and which liquids are best for you  · Limit alcohol  Ask how much alcohol is safe for you to drink  A drink of alcohol is 12 ounces of beer, 5 ounces of wine, or 1½ ounces of liquor  · Do not smoke  Nicotine and other chemicals in cigarettes and cigars can cause lung and kidney damage  Ask your healthcare provider for information if you currently smoke and need help to quit  E-cigarettes or smokeless tobacco still contain nicotine  Talk to your healthcare provider before you use these products  · Ask your healthcare provider if you need vaccines  Infections such as pneumonia, influenza, and hepatitis can be more harmful or more likely to occur in a person who has CKD  Vaccines reduce your risk of infection with these viruses  © 2017 [address] [facility] Information is for End User's use only and may not be sold, redistributed or otherwise used for commercial purposes  All illustrations and images included in CareNotes® are the copyrighted property of [employer]  or [employer]  The above information is an  only  It is not intended as medical advice for individual conditions or treatments  Talk to your doctor, nurse or pharmacist before following any medical regimen to see if it is safe and effective for you  Myocardial Infarction   WHAT YOU NEED TO KNOW:   A myocardial infarction (MI) is a heart attack  A heart attack happens when the blood vessels that supply blood to your heart (coronary arteries) are blocked  This can damage your heart  It can lead to an abnormal heart rhythm, heart failure, or may become life-threatening          DISCHARGE INSTRUCTIONS: Medicines: You may  need any of the following:  · Heart medicines  help decrease blood pressure, control your heart rate, and help your heart function better  · Nitroglycerin  opens the arteries to your heart, increases oxygen levels, and can decrease chest pain  You may get your nitroglycerin as a pill, a patch, or a paste  Ask your healthcare provider or cardiologist how to safely take this medicine  · Aspirin  helps prevent clots from forming and causing blood flow problems  If healthcare providers want you to take aspirin daily, do not take acetaminophen or ibuprofen instead  Do not take more or less aspirin than healthcare providers say to take  If you are on another blood thinner medicine, ask your healthcare provider or cardiologist before you take aspirin for any reason  · Blood thinners    help prevent blood clots  Examples of blood thinners include heparin and warfarin  Clots can cause strokes, heart attacks, and death  The following are general safety guidelines to follow while you are taking a blood thinner:    ¨ Watch for bleeding and bruising while you take blood thinners  Watch for bleeding from your gums or nose  Watch for blood in your urine and bowel movements  Use a soft washcloth on your skin, and a soft toothbrush to brush your teeth  This can keep your skin and gums from bleeding  If you shave, use an electric shaver  Do not play contact sports  ¨ Tell your dentist and other healthcare providers that you take anticoagulants  Wear a bracelet or necklace that says you take this medicine  ¨ Do not start or stop any medicines unless your healthcare provider tells you to  Many medicines cannot be used with blood thinners  ¨ Tell your healthcare provider right away if you forget to take the medicine, or if you take too much  ¨ Warfarin  is a blood thinner that you may need to take  The following are things you should be aware of if you take warfarin      § Foods and medicines can affect the amount of warfarin in your blood  Do not make major changes to your diet while you take warfarin  Warfarin works best when you eat about the same amount of vitamin K every day  Vitamin K is found in green leafy vegetables and certain other foods  Ask for more information about what to eat when you are taking warfarin  § You will need to see your healthcare provider for follow-up visits when you are on warfarin  You will need regular blood tests  These tests are used to decide how much medicine you need  · Cholesterol medicine  decreases cholesterol and the amount of plaque in your blood  · Do not take certain medicines without asking your healthcare provider first   These include NSAIDs, herbal or vitamin supplements, or hormones (estrogen or progestin)  · Take your medicine as directed  Contact your healthcare provider if you think your medicine is not helping or if you have side effects  Tell him or her if you are allergic to any medicine  Keep a list of the medicines, vitamins, and herbs you take  Include the amounts, and when and why you take them  Bring the list or the pill bottles to follow-up visits  Carry your medicine list with you in case of an emergency  Cardiac rehabilitation (rehab)  is a program run by specialists who will help you safely strengthen your heart and prevent more heart disease  The plan includes exercise, relaxation, stress management, and heart-healthy nutrition  Healthcare providers will also check to make sure any medicines you take are working  The plan may also include instructions for when you can drive, return to work, and do other normal daily activities  Follow up with your healthcare provider or cardiologist within 14 days or as directed:  Ask for information about continuing care, treatments, and home services  Write down your questions so you remember to ask them during your visits    Lifestyle changes:   · Go to cardiac rehabilitation as directed  This is a program run by specialists who will help you safely strengthen your heart and prevent more heart disease  This plan includes exercise, relaxation, stress management, and heart-healthy nutrition  Healthcare providers will also check to make sure any medicines you are taking are working  The plan may also include instructions for when you can drive, return to work, and do other normal daily activities  · Eat a heart healthy diet  Get enough calories, protein, vitamins, and minerals to help prevent poor nutrition and promote muscle strength  You may be told to eat foods low in cholesterol or sodium (salt)  You also may be told to limit saturated and trans fats  Eat foods that contain healthy fats, such as walnuts, salmon, and canola and soybean oils  Eat foods that help protect the heart, including plenty of fruits and vegetables, nuts, and sources of fiber  · Do not smoke  If you smoke, it is never too late to quit  Smoking increases your risk of another MI      · Exercise  Ask your healthcare provider or cardiologist about the best exercise plan for you  Exercise makes your heart stronger, lowers blood pressure, and helps prevent an MI  The goal is 30 to 60 minutes a day, 5 to 7 days a week  Ask your healthcare provider or cardiologist how often and how long to exercise  · Maintain a healthy weight  Ask your healthcare provider or cardiologist how much you should weigh  Ask him to help you create a weight loss plan if you are overweight  · Manage your stress  Stress may slow healing and lead to illness  Learn ways to control stress, such as relaxation, deep breathing, and music  Talk to someone about things that upset you  · Get a flu vaccine  every year as soon as it is available  The vaccine will help prevent the flu  Ask about other vaccinations you may need  Contact your healthcare provider or cardiologist if:   · You have trouble taking your heart medicine  · You have questions or concerns about your condition or care  Seek care immediately or call 911 if:   · You have any of the following signs of a heart attack:      ¨ Squeezing, pressure, or pain in your chest that lasts longer than 5 minutes or returns    ¨ Discomfort or pain in your back, neck, jaw, stomach, or arm     ¨ Trouble breathing    ¨ Nausea or vomiting    ¨ Lightheadedness or a sudden cold sweat, especially with chest pain or trouble breathing    · You are tired and cannot think clearly  · Your heart is beating faster than usual      · You are bleeding from your gums or nose  · You see blood in your urine or bowel movements  · You urinate less than usual or not at all  · You have new or increased swelling in your feet or ankles  © 2017 [address] [employer] Information is for End User's use only and may not be sold, redistributed or otherwise used for commercial purposes  All illustrations and images included in CareNotes® are the copyrighted property of [employer]  or [employer]  The above information is an  only  It is not intended as medical advice for individual conditions or treatments  Talk to your doctor, nurse or pharmacist before following any medical regimen to see if it is safe and effective for you  Type 2 Diabetes in Adults   WHAT YOU NEED TO KNOW:   Type 2 diabetes is a disease that affects how your body uses glucose (sugar)  Normally, when the blood sugar level increases, the pancreas makes more insulin  Insulin helps move sugar out of the blood so it can be used for energy  Type 2 diabetes develops because either the body cannot make enough insulin, or it cannot use the insulin correctly  After many years, your pancreas may stop making insulin     DISCHARGE INSTRUCTIONS:   Call 911 for any of the following:   · You have any of the following signs of a stroke:      ¨ Numbness or drooping on one side of your face     ¨ Weakness in an arm or leg    ¨ Confusion or difficulty speaking    ¨ Dizziness, a severe headache, or vision loss    · You have any of the following signs of a heart attack:      ¨ Squeezing, pressure, or pain in your chest that lasts longer than 5 minutes or returns    ¨ Discomfort or pain in your back, neck, jaw, stomach, or arm     ¨ Trouble breathing    ¨ Nausea or vomiting    ¨ Lightheadedness or a sudden cold sweat, especially with chest pain or trouble breathing  Return to the emergency department if:   · You have severe abdominal pain, or the pain spreads to your back  You may also be vomiting  · You have trouble staying awake or focusing  · You are shaking or sweating  · You have blurred or double vision  · Your breath has a fruity, sweet smell  · Your breathing is deep and labored, or rapid and shallow  · Your heartbeat is fast and weak  Contact your healthcare provider if:   · You are vomiting or have diarrhea  · You have an upset stomach and cannot eat the foods on your meal plan  · You feel weak or more tired than usual      · You feel dizzy, have headaches, or are easily irritated  · Your skin is red, warm, dry, or swollen  · You have a wound that does not heal      · You have numbness in your arms or legs  · You have trouble coping with your illness, or you feel anxious or depressed  · You have questions or concerns about your condition or care  Medicines: You may  need any of the following:  · Hypoglycemic medicines or insulin  may be given to decrease the amount of sugar in your blood  · Blood pressure medicine  may be given to lower your blood pressure  Your blood pressure should be less than 140/90  · Cholesterol lowering medicine  may be given to prevent heart disease  · Antiplatelets , such as aspirin, help prevent blood clots  Take your antiplatelet medicine exactly as directed  These medicines make it more likely for you to bleed or bruise  If you are told to take aspirin, do not take acetaminophen or ibuprofen instead  · Take your medicine as directed  Contact your healthcare provider if you think your medicine is not helping or if you have side effects  Tell him or her if you are allergic to any medicine  Keep a list of the medicines, vitamins, and herbs you take  Include the amounts, and when and why you take them  Bring the list or the pill bottles to follow-up visits  Carry your medicine list with you in case of an emergency  Check your blood sugar level as directed: You will be taught how to use a glucose monitor  Ask your healthcare provider when and how often to check during the day  You will need to check your blood sugar level at least 3 times each day if you are on insulin  If you check your blood sugar level before a meal , it should be between 80 and 130 mg/dL  If you check your blood sugar level 1 to 2 hours after a meal , it should be less than 180 mg/dL  Ask your healthcare provider if these are good goals for you  Write down your results, and show them to your healthcare provider  He may use the results to make changes to your medicine, food, or exercise schedules  If your blood sugar level is too low: Your blood sugar level is too low if it goes below 70 mg/dL  If the level is too low, eat or drink 15 grams of fast-acting carbohydrate  These are found naturally in fruits  Fast-acting carbohydrates will raise your blood sugar level quickly  Examples of 15 grams of fast-acting carbohydrate are 4 ounces (½ cup) of fruit juice or 4 ounces of regular soda  Other examples are 2 tablespoons of raisins or 3 to 4 glucose tablets  Check your blood sugar level 15 minutes later  If the level is still low (less than 100 mg/dL), eat another 15 grams of carbohydrate  When the level returns to 100 mg/dL, eat a snack or meal that contains carbohydrates  This will help prevent another drop in blood sugar   Always carefully follow your healthcare provider's instructions on how to treat low blood sugar levels  Wear medical alert identification:  Wear medical alert jewelry or carry a card that says you have diabetes  Ask your healthcare provider where to get these items  Check your feet each day for sores:  Wear shoes and socks that fit correctly  Do not trim your toenails  Ask your healthcare provider for more information about foot care  Maintain a healthy weight:  Ask your healthcare provider how much you should weigh  A healthy weight can help you control your diabetes  Ask your provider to help you create a weight loss plan if you are overweight  Together you can set manageable weight loss goals  Follow your meal plan:  A dietitian will help you make a meal plan to keep your blood sugar level steady  Do not skip meals  Your blood sugar level may drop too low if you have taken diabetes medicine and do not eat  · Keep track of carbohydrates (sugar and starchy foods)  Your blood sugar level can get too high if you eat too many carbohydrates  Your dietitian will help you plan meals and snacks that have the right amount of carbohydrates  · Eat low-fat foods , such as skinless chicken and low-fat milk  · Eat less sodium (salt)  Limit high-sodium foods, such as soy sauce, potato chips, and soup  Do not add salt to food you cook  Limit your use of table salt  You should have less than 2,300 mg of sodium per day  · Eat high-fiber foods , such as vegetables, whole grain breads, and beans  · Limit alcohol  Alcohol affects your blood sugar level and can make it harder to manage your diabetes  Limit alcohol to 1 drink a day if you are a woman  Limit alcohol to 2 drinks a day if you are a man  A drink of alcohol is 12 ounces of beer, 5 ounces of wine, or 1½ ounces of liquor  Exercise as directed:  Exercise can help keep your blood sugar level steady, decrease your risk of heart disease, and help you lose weight  Stretch before and after you exercise  Exercise for at least 150 minutes every week  Spread this amount of exercise over at least 3 days a week  Do not skip exercise more than 2 days in a row  Include muscle strengthening activities 2 to 3 days each week  Older adults should include balance training 2 to 3 times each week  Activities that help increase balance include yoga and qamar chi  Work with your healthcare provider to create an exercise plan  · Check your blood sugar level before and after exercise  Healthcare providers may tell you to change the amount of insulin you take or food you eat  If your blood sugar level is high, check your blood or urine for ketones before you exercise  Do not exercise if your blood sugar level is high and you have ketones  · If your blood sugar level is less than 100 mg/dL, have a carbohydrate snack before you exercise  Examples are 4 to 6 crackers, ½ banana, 8 ounces (1 cup) of milk, or 4 ounces (½ cup) of juice  Drink water or liquids that do not contain sugar before, during, and after exercise  Ask your dietitian or healthcare provider which liquids you should drink when you exercise  · Do not sit for longer than 30 minutes  If you cannot walk around, at least stand up  This will help you stay active and keep your blood circulating  Do not smoke:  Nicotine and other chemicals in cigarettes and cigars can cause lung damage and make it more difficult to manage your diabetes  Ask your healthcare provider for information if you currently smoke and need help to quit  Do not use e-cigarettes or smokeless tobacco in place of cigarettes or to help you quit  They still contain nicotine  Check your blood pressure as directed:  Ask your healthcare provider what your blood pressure should be  Most adults with diabetes and high blood pressure should have a systolic blood pressure (first number) less than 140  Your diastolic blood pressure (second number) should be less than 90  Ask about vaccines: You have a higher risk for serious illness if you get the flu, pneumonia, or hepatitis  Ask your healthcare provider if you should get a flu, pneumonia, or hepatitis B vaccine, and when to get the vaccine  Follow up with your healthcare provider as directed: You may need to return to have your A1c checked every 3 months  You will need to return at least once each year to have your feet checked  You will need an eye exam once a year to check for retinopathy  You will also need urine tests every year to check for kidney problems  You may need tests to monitor for heart disease such as an EKG, stress test, blood pressure monitoring, and blood tests  Write down your questions so you remember to ask them during your visits  © 2017 [facility] Information is for End User's use only and may not be sold, redistributed or otherwise used for commercial purposes  All illustrations and images included in CareNotes® are the copyrighted property of A D A M [employer]  or Dennis Gunter  The above information is an  only  It is not intended as medical advice for individual conditions or treatments  Talk to your doctor, nurse or pharmacist before following any medical regimen to see if it is safe and effective for you

## 2019-05-25 NOTE — PROGRESS NOTES
Progress Note - Nephrology   Lonnie Romp 37 y o  male MRN: [id number]  Unit/Bed#: -James Encounter: [id number]    Assessment:  1  Acute renal failure/acute kidney injury present on admission:  Likely secondary to decreased effective arterial blood volume plus or minus cardiorenal syndrome  Patient on recent renal ultrasound from April 2019 shows right kidney 5 9 cm left 7 1 cm with diffuse echogenicity  Urinalysis shows 0 1 red blood cells 2+ protein  His acute kidney injury seems to have resolved  It was as high as 3 08 has settled in the mid 2 range  As of today May 25th was 2 6 the same as it was on May 24th    2  Stage 3 chronic kidney disease:  His new baseline kidney function seems to be anywhere from 2 2-2 5  He is creatinine is currently 2 6 today  Etiology may be secondary to diabetic nephropathy plus or minus hypertensive nephrosclerosis plus or minus cardiorenal syndrome  He follows in our office with Dr Morris  3  Myopericarditis:  Patient is on prednisone and colchicine which is renally dosed  He denies any diarrhea or myopathy symptoms with regards to the colchicine  His chest pressure shortness of breath or a lot better  4  Bilateral leg edema:  Restarted 20 mg daily  For the patient when he goes home, I spoke with him about weight himself daily and if he gains 2 lb in 2 consecutive days to take a 2nd dose of Demadex in the afternoon  This is a check an balance to prevent weight gain but also to prevent acute kidney injury due to volume depletion  He would need to check his weights on a daily basis which we discussed this morning  Plan:  As above continue with oral demadex  Subjective:   Patient seen in follow-up with regards to chronic kidney disease acute kidney injury and fluid management  We had restarted diuretics yesterday  He states he is diuresing a little bit more, he feels his chest pressure and shortness of breath have dramatically improved      Objective: Vitals: Blood pressure (!) 180/86, pulse 89, temperature 98 8 °F (37 1 °C), temperature source Oral, resp  rate 18, height 6' 1" (1 854 m), weight 109 kg (239 lb 9 6 oz), SpO2 98 %  ,Body mass index is 31 61 kg/m²  Weight (last 2 days)     Date/Time   Weight    05/25/19 0600   109 (239 6)    05/24/19 0600   109 (240 3)    05/23/19 0554   109 (239 8)                Intake/Output Summary (Last 24 hours) at 5/25/2019 0819  Last data filed at 5/24/2019 2300  Gross per 24 hour   Intake 1090 ml   Output 600 ml   Net 490 ml            Physical Exam: General: patient is in NAD  Skin:  No new rash  Eyes:  No scleral icterus  Neck:  Supple no adenopathy  Chest:  Coarse breath sounds  CVS:  S1-S2  Abdomen:  Soft positive bowel sounds  Extremities:   There is decreased leg edema compared to yesterday  Neuro:  Nonfocal                Medications Prior to Admission   Medication    carvedilol (COREG) 12 5 mg tablet    D 1000 1000 units capsule    insulin aspart (NOVOLOG FLEXPEN) 100 Units/mL injection pen    insulin glargine (BASAGLAR KWIKPEN) 100 units/mL injection pen    NIFEdipine ER (ADALAT CC) 60 MG 24 hr tablet    sod citrate-citric acid (BICITRA) 500-334 MG/5ML    torsemide (DEMADEX) 20 mg tablet    FREESTYLE LITE test strip    glucose blood (ACCU-CHEK HARVEY PLUS) test strip    sildenafil (REVATIO) 20 mg tablet       Current Facility-Administered Medications   Medication Dose Route Frequency    acetaminophen (TYLENOL) tablet 650 mg  650 mg Oral Q6H PRN    albuterol (PROVENTIL HFA,VENTOLIN HFA) inhaler 2 puff  2 puff Inhalation Q4H PRN    aspirin (ECOTRIN LOW STRENGTH) EC tablet 81 mg  81 mg Oral Daily    atorvastatin (LIPITOR) tablet 40 mg  40 mg Oral Daily With Dinner    calcium carbonate (TUMS) chewable tablet 1,000 mg  1,000 mg Oral Daily PRN    carvedilol (COREG) tablet 25 mg  25 mg Oral BID With Meals    colchicine (COLCRYS) tablet 0 6 mg  0 6 mg Oral Every Other Day    docusate sodium (COLACE) capsule 100 mg  100 mg Oral BID PRN    heparin (porcine) subcutaneous injection 5,000 Units  5,000 Units Subcutaneous Q8H [address]    insulin glargine (LANTUS) subcutaneous injection 26 Units 0 26 mL  26 Units Subcutaneous HS    insulin lispro (HumaLOG) 100 units/mL subcutaneous injection 1-6 Units  1-6 Units Subcutaneous 4x Daily (AC & HS)    insulin lispro (HumaLOG) 100 units/mL subcutaneous injection 4 Units  4 Units Subcutaneous TID With Meals    morphine injection 2 mg  2 mg Intravenous Q4H PRN    NIFEdipine (PROCARDIA XL) 24 hr tablet 60 mg  60 mg Oral BID    nitroglycerin (NITROSTAT) SL tablet 0 4 mg  0 4 mg Sublingual Q5 Min PRN    ondansetron (ZOFRAN) injection 4 mg  4 mg Intravenous Q6H PRN    pantoprazole (PROTONIX) EC tablet 40 mg  40 mg Oral Early Morning    predniSONE tablet 50 mg  50 mg Oral Daily    sod citrate-citric acid (BICITRA) oral solution 15 mL  15 mL Oral Daily    torsemide (DEMADEX) tablet 20 mg  20 mg Oral Daily        Lab, Imaging and other studies: I have personally reviewed pertinent labs    CBC:   Lab Results   Component Value Date    WBC 9 90 05/23/2019    RBC 3 84 (L) 05/23/2019     CMP:   Lab Results   Component Value Date     05/25/2019    CO2 25 05/25/2019    CO2 23 02/24/2019    BUN 55 (H) 05/25/2019    CREATININE 2 67 (H) 05/25/2019    GLUCOSE 182 (H) 02/24/2019    CALCIUM 8 7 05/25/2019    AST 47 (H) 05/21/2019    ALT 9 (L) 05/21/2019    ALKPHOS 65 05/21/2019    EGFR 34 05/25/2019    EGFR 52 02/24/2019     Phosphorus: No results found for: PHOS  Magnesium:   Lab Results   Component Value Date    MG 1 6 05/22/2019     Urinalysis:   Lab Results   Component Value Date    COLORU Yellow 05/22/2019    CLARITYU Clear 05/22/2019    SPECGRAV 1 020 05/22/2019    PHUR 5 5 05/22/2019    PHUR 6 0 08/09/2018    LEUKOCYTESUR Negative 05/22/2019    NITRITE Negative 05/22/2019    GLUCOSEU Negative 05/22/2019    KETONESU Negative 05/22/2019    BILIRUBINUR Negative 05/22/2019 BLOODU Small (A) 05/22/2019     BMP:   Lab Results   Component Value Date    GLUCOSE 182 (H) 02/24/2019    SODIUM 137 05/25/2019    CHLORIDE 108 02/24/2019    CO2 25 05/25/2019    CO2 23 02/24/2019    BUN 55 (H) 05/25/2019    CREATININE 2 67 (H) 05/25/2019    CALCIUM 8 7 05/25/2019

## 2019-05-25 NOTE — PROGRESS NOTES
Progress Note - Cardiology   Skyler Ricci 37 y o  male MRN: [id number]  Unit/Bed#: -01 Encounter: [id number]  05/25/19  11:11 AM          Impression and Plan:    57-year-old with hypertension, diabetes, dyslipidemia, CKD with baseline creatinine up to 2 6, admitted with chest pains secondary to myopericarditis peak troponin at 0 2 and flat pattern-not suggestive of ACS  Because of CKD, initiated on steroids and colchicine      Plan:    Myopericarditis:  On prednisone 50 mg daily and colchicine  Symptoms have significantly improved, echo with preserved LV systolic function  Steroids are Also causing hyperglycemia, decrease prednisone to 40 mg daily for the next 1 week, will see him in the office and taper further if possible  CRP has normalized    Hypertension:  Still Elevated, partly secondary to steroids, on home dose of night [place] and double dose of carvedilol  Increase carvedilol to 50 twice daily    Nonsustained VT on telemetry:  Increased dose of carvedilol will help    Diabetes:  Last A1c around 7    CKD:  Admitted with a creatinine of 3 0 but now at baseline      ===================================================================    Chief Complaint:   Chief Complaint   Patient presents with    Chest Pain     pt  comes in c/o a cough that started saturday night with chest tightness  Denies radiation to the neck, jaw, or arms  Pt  Denies n/v/d  Pt  also states he has noticed swelling in his legs       Cough         Subjective/Objective     Subjective:  No complaints, chest pains have almost resolved    Objective:  No distress    Patient Active Problem List   Diagnosis    Erectile dysfunction    Chronic bilateral thoracic back pain    Type 2 diabetes mellitus with kidney complication, with long-term current use of insulin (HCC)    Hyperlipidemia    De Quervain's disease (radial styloid tenosynovitis)    Hypertension    Bucket-handle tear of medial meniscus of right knee as current injury    Other proteinuria    CKD (chronic kidney disease) stage 3, GFR 30-59 ml/min (HCC)    Cough    NSTEMI (non-ST elevated myocardial infarction) ([facility])    Acute kidney injury ([facility])       Vitals: BP (!) 180/86 (BP Location: Right arm)   Pulse 89   Temp 98 8 °F (37 1 °C) (Oral)   Resp 18   Ht 6' 1" (1 854 m)   Wt 109 kg (239 lb 9 6 oz)   SpO2 98%   BMI 31 61 kg/m²     No intake/output data recorded  Wt Readings from Last 3 Encounters:   05/25/19 109 kg (239 lb 9 6 oz)   05/03/19 113 kg (250 lb)   04/26/19 113 kg (249 lb 12 8 oz)       Intake/Output Summary (Last 24 hours) at 5/25/2019 1111  Last data filed at 5/24/2019 2300  Gross per 24 hour   Intake 1090 ml   Output 600 ml   Net 490 ml     I/O last 3 completed shifts: In: 1103 [P O :240;  I V :850]  Out: 600 [Urine:600]    Invasive Devices     Peripheral Intravenous Line            Peripheral IV 05/23/19 Left;Ventral (anterior) Forearm 1 day                  Physical Exam:  GEN: Digna Cervantes appears well, alert and oriented x 3, pleasant and cooperative   HEENT: pupils equal, round, and reactive to light; extraocular muscles intact  NECK: supple, no carotid bruits or JVD  HEART: regular rhythm, normal S1 and S2, no murmur, no clicks, gallops or rubs   LUNGS: clear to auscultation bilaterally; no wheezes or rhonchi, no rales  ABDOMEN/GI: normal bowel sounds, soft, no tenderness, no distention  EXTREMITIES/Musculoskeltal: peripheral pulses normal; no clubbing, cyanosis, minimal bilateral lower extremity edema  NEURO: no focal motor findings   SKIN: normal without suspicious lesions on exposed skin              Lab Results:   Results from last 7 days   Lab Units 05/21/19  2325 05/21/19  1946 05/21/19  1639   TROPONIN I ng/mL 0 23* 0 27* 0 22*     Results from last 7 days   Lab Units 05/23/19  0451 05/22/19  0559 05/21/19  1639   WBC Thousand/uL 9 90 10 57* 8 58   HEMOGLOBIN g/dL 11 0* 10 2* 11 0*   HEMATOCRIT % 32 3* 30 6* 32 6*   PLATELETS Thousands/uL 317 288 306         Results from last 7 days   Lab Units 05/25/19  0518 05/24/19  0651 05/23/19  0451  05/21/19  1639   POTASSIUM mmol/L 4 6 4 1 4 9   < > 4 3   CHLORIDE mmol/L 103 108 104   < > 106   CO2 mmol/L 25 24 24   < > 25   BUN mg/dL 55* 47* 39*   < > 31*   CREATININE mg/dL 2 67* 2 68* 3 05*   < > 2 76*   CALCIUM mg/dL 8 7 8 6 9 0   < > 8 6   ALK PHOS U/L  --   --   --   --  65   ALT U/L  --   --   --   --  9*   AST U/L  --   --   --   --  47*    < > = values in this interval not displayed  Results from last 7 days   Lab Units 05/21/19  1716   INR  0 98       Imaging: I have personally reviewed pertinent reports      EKG/Telemtry:  Short bursts of NSVT    Scheduled Meds:    Current Facility-Administered Medications:  acetaminophen 650 mg Oral Q6H PRN Shaka Heath MD   albuterol 2 puff Inhalation Q4H PRN Shaka Heath MD   aspirin 81 mg Oral Daily CHARLOTTE Bergeron   atorvastatin 40 mg Oral Daily With Dinner CHARLOTTE Bergeron   calcium carbonate 1,000 mg Oral Daily PRN Shaka Heath MD   carvedilol 25 mg Oral BID With Meals Shaka Heath MD   colchicine 0 6 mg Oral Every Other Day CHARLOTTE Haynes   docusate sodium 100 mg Oral BID PRN Shaka Heath MD   heparin (porcine) 5,000 Units Subcutaneous Q8H [address] Shaka Heath MD   insulin glargine 26 Units Subcutaneous HS Janet Barfield PA-C   insulin lispro 1-6 Units Subcutaneous 4x Daily (AC & HS) Janet Barfield PA-C   insulin lispro 4 Units Subcutaneous TID With Meals Shaka Heath MD   morphine injection 2 mg Intravenous Q4H PRN Rahat Grant PA-C   NIFEdipine ER 60 mg Oral BID Shaka Heath MD   nitroglycerin 0 4 mg Sublingual Q5 Min PRN Rahat Grant PA-C   ondansetron 4 mg Intravenous Q6H PRN Shaka Heath MD   pantoprazole 40 mg Oral Early Morning Mer M CHARLOTTE Ramsay   predniSONE 50 mg Oral Daily Shaji Morales DO   sod citrate-citric acid 15 mL Oral Daily CHARLOTTE Haynes torsemide 20 mg Oral Daily Kecia Chiang DO     Continuous Infusions:       VTE Pharmacologic Prophylaxis: Heparin  VTE Mechanical Prophylaxis: sequential compression device      Counseling / Coordination of Care  Total time spent 15 minutes including teaching and family updates  More than 50% was spent counseling pt and family

## 2019-05-25 NOTE — ASSESSMENT & PLAN NOTE
· Patient presented with chest tightness  Troponins flat 0 22/0 27/0  23  Suspected NSTEMI type 2 due to  Myopericarditis  · aspirin/statin for risk factor modification  · Continue beta-blockers  · Cardiology is following  · echocardiogram which showed ejection fraction 65%  No regional wall motion abnormalities  Grade 1 diastolic dysfunction  Small pericardial effusion without hemodynamic compromise  · Suspected myopericarditis with recent cold symptoms  ESR and hs-CRP are elevated with improvement noted on repeat labs today of ESR 55; crp 1 69  · Started on colchicine every other day given renal function as well as prednisone 50 mg daily     · outpatient follow-up with Cardiology

## 2019-05-28 ENCOUNTER — TRANSITIONAL CARE MANAGEMENT (OUTPATIENT)
Dept: FAMILY MEDICINE CLINIC | Facility: CLINIC | Age: 34
End: 2019-05-28

## 2019-05-29 ENCOUNTER — OFFICE VISIT (OUTPATIENT)
Dept: FAMILY MEDICINE CLINIC | Facility: CLINIC | Age: 34
End: 2019-05-29

## 2019-05-29 VITALS
DIASTOLIC BLOOD PRESSURE: 100 MMHG | HEART RATE: 86 BPM | SYSTOLIC BLOOD PRESSURE: 180 MMHG | WEIGHT: 254.8 LBS | RESPIRATION RATE: 16 BRPM | HEIGHT: 73 IN | BODY MASS INDEX: 33.77 KG/M2 | TEMPERATURE: 98.1 F

## 2019-05-29 DIAGNOSIS — I10 HYPERTENSION, UNSPECIFIED TYPE: Primary | ICD-10-CM

## 2019-05-29 DIAGNOSIS — G47.30 SLEEP APNEA, UNSPECIFIED TYPE: ICD-10-CM

## 2019-05-29 PROCEDURE — 99213 OFFICE O/P EST LOW 20 MIN: CPT | Performed by: FAMILY MEDICINE

## 2019-05-29 RX ORDER — SPIRONOLACTONE 50 MG/1
50 TABLET, FILM COATED ORAL DAILY
Qty: 90 TABLET | Refills: 3 | Status: SHIPPED | OUTPATIENT
Start: 2019-05-29 | End: 2019-07-03 | Stop reason: SINTOL

## 2019-05-30 ENCOUNTER — APPOINTMENT (OUTPATIENT)
Dept: LAB | Facility: CLINIC | Age: 34
End: 2019-05-30
Payer: COMMERCIAL

## 2019-05-30 ENCOUNTER — TRANSCRIBE ORDERS (OUTPATIENT)
Dept: LAB | Facility: CLINIC | Age: 34
End: 2019-05-30

## 2019-05-30 DIAGNOSIS — I10 HYPERTENSION, UNSPECIFIED TYPE: Primary | ICD-10-CM

## 2019-05-30 DIAGNOSIS — N18.9 CHRONIC KIDNEY DISEASE, UNSPECIFIED CKD STAGE: ICD-10-CM

## 2019-05-30 DIAGNOSIS — I21.4 NSTEMI (NON-ST ELEVATED MYOCARDIAL INFARCTION) (HCC): ICD-10-CM

## 2019-05-30 DIAGNOSIS — R80.9 PROTEINURIA, UNSPECIFIED TYPE: ICD-10-CM

## 2019-05-30 LAB
ANION GAP SERPL CALCULATED.3IONS-SCNC: 7 MMOL/L (ref 4–13)
BUN SERPL-MCNC: 51 MG/DL (ref 5–25)
CALCIUM SERPL-MCNC: 8.5 MG/DL (ref 8.3–10.1)
CHLORIDE SERPL-SCNC: 106 MMOL/L (ref 100–108)
CO2 SERPL-SCNC: 26 MMOL/L (ref 21–32)
CREAT SERPL-MCNC: 2.79 MG/DL (ref 0.6–1.3)
CREAT UR-MCNC: 118 MG/DL
GFR SERPL CREATININE-BSD FRML MDRD: 32 ML/MIN/1.73SQ M
GLUCOSE P FAST SERPL-MCNC: 157 MG/DL (ref 65–99)
POTASSIUM SERPL-SCNC: 4.1 MMOL/L (ref 3.5–5.3)
PROT UR-MCNC: 196 MG/DL
PROT/CREAT UR: 1.66 MG/G{CREAT} (ref 0–0.1)
SODIUM SERPL-SCNC: 139 MMOL/L (ref 136–145)
TSH SERPL DL<=0.05 MIU/L-ACNC: 0.85 UIU/ML (ref 0.36–3.74)

## 2019-05-30 PROCEDURE — 82088 ASSAY OF ALDOSTERONE: CPT

## 2019-05-30 PROCEDURE — 80048 BASIC METABOLIC PNL TOTAL CA: CPT

## 2019-05-30 PROCEDURE — 84443 ASSAY THYROID STIM HORMONE: CPT

## 2019-05-30 PROCEDURE — 84244 ASSAY OF RENIN: CPT

## 2019-05-30 PROCEDURE — 84156 ASSAY OF PROTEIN URINE: CPT

## 2019-05-30 PROCEDURE — 36415 COLL VENOUS BLD VENIPUNCTURE: CPT

## 2019-05-30 PROCEDURE — 82570 ASSAY OF URINE CREATININE: CPT

## 2019-05-31 ENCOUNTER — OFFICE VISIT (OUTPATIENT)
Dept: NEPHROLOGY | Facility: CLINIC | Age: 34
End: 2019-05-31
Payer: COMMERCIAL

## 2019-05-31 VITALS
HEIGHT: 73 IN | DIASTOLIC BLOOD PRESSURE: 74 MMHG | HEART RATE: 82 BPM | WEIGHT: 266.4 LBS | SYSTOLIC BLOOD PRESSURE: 154 MMHG | BODY MASS INDEX: 35.31 KG/M2

## 2019-05-31 DIAGNOSIS — N18.3 ACUTE RENAL FAILURE SUPERIMPOSED ON STAGE 3 CHRONIC KIDNEY DISEASE, UNSPECIFIED ACUTE RENAL FAILURE TYPE: ICD-10-CM

## 2019-05-31 DIAGNOSIS — N17.9 ACUTE KIDNEY INJURY (HCC): ICD-10-CM

## 2019-05-31 DIAGNOSIS — N18.4 CKD (CHRONIC KIDNEY DISEASE) STAGE 4, GFR 15-29 ML/MIN (HCC): Primary | ICD-10-CM

## 2019-05-31 DIAGNOSIS — I10 HYPERTENSION, UNSPECIFIED TYPE: ICD-10-CM

## 2019-05-31 DIAGNOSIS — R60.1 GENERALIZED EDEMA: ICD-10-CM

## 2019-05-31 DIAGNOSIS — N17.9 ACUTE RENAL FAILURE SUPERIMPOSED ON STAGE 3 CHRONIC KIDNEY DISEASE, UNSPECIFIED ACUTE RENAL FAILURE TYPE: ICD-10-CM

## 2019-05-31 DIAGNOSIS — R80.8 OTHER PROTEINURIA: ICD-10-CM

## 2019-05-31 PROCEDURE — 99214 OFFICE O/P EST MOD 30 MIN: CPT | Performed by: NURSE PRACTITIONER

## 2019-06-02 LAB — ALDOST SERPL-MCNC: 4.2 NG/DL (ref 0–30)

## 2019-06-05 LAB — RENIN PLAS-CCNC: 2.42 NG/ML/HR (ref 0.17–5.38)

## 2019-06-10 ENCOUNTER — DOCUMENTATION (OUTPATIENT)
Dept: NEPHROLOGY | Facility: CLINIC | Age: 34
End: 2019-06-10

## 2019-06-26 DIAGNOSIS — E11.22 TYPE 2 DIABETES MELLITUS WITH STAGE 3 CHRONIC KIDNEY DISEASE, WITH LONG-TERM CURRENT USE OF INSULIN (HCC): Primary | ICD-10-CM

## 2019-06-26 DIAGNOSIS — Z79.4 TYPE 2 DIABETES MELLITUS WITH STAGE 3 CHRONIC KIDNEY DISEASE, WITH LONG-TERM CURRENT USE OF INSULIN (HCC): Primary | ICD-10-CM

## 2019-06-26 DIAGNOSIS — N18.30 TYPE 2 DIABETES MELLITUS WITH STAGE 3 CHRONIC KIDNEY DISEASE, WITH LONG-TERM CURRENT USE OF INSULIN (HCC): Primary | ICD-10-CM

## 2019-06-26 DIAGNOSIS — I10 HYPERTENSION: ICD-10-CM

## 2019-06-26 DIAGNOSIS — I21.4 NSTEMI (NON-ST ELEVATED MYOCARDIAL INFARCTION) (HCC): ICD-10-CM

## 2019-06-27 ENCOUNTER — APPOINTMENT (OUTPATIENT)
Dept: LAB | Facility: CLINIC | Age: 34
End: 2019-06-27
Payer: COMMERCIAL

## 2019-06-27 DIAGNOSIS — I10 HYPERTENSION: ICD-10-CM

## 2019-06-27 DIAGNOSIS — N18.4 CKD (CHRONIC KIDNEY DISEASE) STAGE 4, GFR 15-29 ML/MIN (HCC): ICD-10-CM

## 2019-06-27 DIAGNOSIS — R80.8 OTHER PROTEINURIA: ICD-10-CM

## 2019-06-27 DIAGNOSIS — N18.30 CKD (CHRONIC KIDNEY DISEASE) STAGE 3, GFR 30-59 ML/MIN (HCC): ICD-10-CM

## 2019-06-27 DIAGNOSIS — I10 HYPERTENSION, UNSPECIFIED TYPE: ICD-10-CM

## 2019-06-27 DIAGNOSIS — N18.9 CHRONIC KIDNEY DISEASE, UNSPECIFIED CKD STAGE: ICD-10-CM

## 2019-06-27 DIAGNOSIS — R60.1 GENERALIZED EDEMA: ICD-10-CM

## 2019-06-27 LAB
ALBUMIN SERPL BCP-MCNC: 2.7 G/DL (ref 3.5–5)
ALP SERPL-CCNC: 73 U/L (ref 46–116)
ALT SERPL W P-5'-P-CCNC: 15 U/L (ref 12–78)
ANION GAP SERPL CALCULATED.3IONS-SCNC: 7 MMOL/L (ref 4–13)
AST SERPL W P-5'-P-CCNC: 48 U/L (ref 5–45)
BILIRUB SERPL-MCNC: 0.2 MG/DL (ref 0.2–1)
BUN SERPL-MCNC: 31 MG/DL (ref 5–25)
CALCIUM SERPL-MCNC: 8.8 MG/DL (ref 8.3–10.1)
CHLORIDE SERPL-SCNC: 105 MMOL/L (ref 100–108)
CO2 SERPL-SCNC: 28 MMOL/L (ref 21–32)
CREAT SERPL-MCNC: 2.74 MG/DL (ref 0.6–1.3)
GFR SERPL CREATININE-BSD FRML MDRD: 33 ML/MIN/1.73SQ M
GLUCOSE P FAST SERPL-MCNC: 165 MG/DL (ref 65–99)
MAGNESIUM SERPL-MCNC: 1.5 MG/DL (ref 1.6–2.6)
POTASSIUM SERPL-SCNC: 5.1 MMOL/L (ref 3.5–5.3)
PROT SERPL-MCNC: 7.1 G/DL (ref 6.4–8.2)
SODIUM SERPL-SCNC: 140 MMOL/L (ref 136–145)

## 2019-06-27 PROCEDURE — 36415 COLL VENOUS BLD VENIPUNCTURE: CPT

## 2019-06-27 PROCEDURE — 80053 COMPREHEN METABOLIC PANEL: CPT

## 2019-06-27 PROCEDURE — 83735 ASSAY OF MAGNESIUM: CPT

## 2019-06-28 RX ORDER — CARVEDILOL 25 MG/1
50 TABLET ORAL 2 TIMES DAILY WITH MEALS
Qty: 60 TABLET | Refills: 2 | Status: SHIPPED | OUTPATIENT
Start: 2019-06-28 | End: 2019-09-20 | Stop reason: SDUPTHER

## 2019-06-28 RX ORDER — NIFEDIPINE 60 MG/1
60 TABLET, FILM COATED, EXTENDED RELEASE ORAL 2 TIMES DAILY
Qty: 30 TABLET | Refills: 2 | Status: SHIPPED | OUTPATIENT
Start: 2019-06-28 | End: 2019-08-19 | Stop reason: SDUPTHER

## 2019-06-30 NOTE — PROGRESS NOTES
Hospital Follow Up Office Visit Note  Shannan Skill   37 y o    male   MRN: [id number]  [address]  [address]  [place] [address] [facility] [place] [address] [address]  [phone]    PCP: Hamlet Diaz MD  Cardiologist: Dr Weber Side        Assessment/plan   Orthostatic hypotension  --for now, hold spironolactone and hold torsemide  --nonfasting CMP this morning  myopericarditis, recent admission 5/21---5/25/19, on prednisone 50 mg     -- restart colchicine every other day and low-dose prednisone 20 mg  --will need a follow-up limited echocardiogram  Iatrogenic Volume overload secondary to steroids and pts increase fluid intake to 3-4 L , with weight gain of up to 30 lb, noted May 31st  --discharge weight May 25 --  239 lb 9 6   Today, 236lb  --started on spironolactone 50 mg daily by PCP 5/29/19  --discharged on torsemide 20 daily, increased to 20 b i d  May 31st per renal     --low-sodium diet  --daily weights  If wt increases 3 lb in 1 day or 5 lb in 5 days advised to call us  Chest heaviness secondary to myopericarditis  RESOLVED  NSVT, noted on telemetry in hospital    carvedilol up titrated  Hypertension  /76  on carvedilol 50 mg b i d , nifedipine 60 mg b i d , Aldactone 50 mg daily and torsemide 10 mg BID for his blood pressure  --secondary workup:  Sleep study pending,  renin adosterone level pending, TSH normal, he may benefit from renal artery duplex  Will leave this to the discretion of Nephrology with only is following her closely  Hyperlipidemia, familial- on atorvastatin 40 mg daily, with suboptimum response  --fasting lipid profile 5/21/19:  Direct , non , ,   --fasting lipid profile 8/9/18:  Direct , non , triglyceride 144, total cholesterol 287  --given his other acute issues, will defer adjusting treatment at this time  However, he may be candidate for rosuvastatin 40 mg daily and a probable PCSK9 inhibitor    Ezetimibe should be avoided given his advanced kidney disease   --goal LDL under 100, non   If found to have CV disease, goal LDL will be less than 70  DM, 2  Last hemoglobin A1c  7 1  5/21/19  Possible Obstructive sleep apnea-sleep study pending  Chronic kidney disease, stage III, baseline creatinine appears to be 2 2-2 5, per Nephrology  Follows with Nephrology as an outpatient  Cardiac testing  --TTE 5/23/19   EF 65%  No regional motion abnormalities  There was a small free-flowing pericardial effusion was identified circumferential to the heart  There was no evidence of hemodynamic compromise  Summary of recommendations  Sleep study pending 7/11  Low-sodium diet  This is critical  Follow-up limited echocardiogram  Follow-up wit Nephrology July 9  An ischemic evaluation may be of benefit in the future; he has significant risk factors  Need to address his lipids in the near future  Stat CMP today  If it is abnormal he might need to be rehospitalized  Otherwise, follow up with Dr Bijan Lei in a month  With me if he is unavailable                HPI  Mimi Schroeder is a 38 yo  with a history of hypertension, diabetes, dyslipidemia, CKD with baseline creatinine up to 2 6, who was recenlty admitted with chest pain due to myopericarditis  He does not have known coronary artery disease  An echocardiogram showed a small free-flowing fluid circumferential to the heart without hemodynamic compromise; ejection fraction normal 5/23/19     Given his advanced CKD, he was initiated on steroids and colchicine  He has had significant hypertension despite up titration of several of his medications  A secondary workup has been initiated  He is following closely with Nephrology  As well as PCP  Recently he gained 30 lb since his discharge  He was also noted he was drinking up to 4 L of fluid daily; this was modified by Nephrology  His family physician added spironolactone 50 mg daily    Nephrology uptitrated his diuretics  Labs are being followed very closely by Nephrology    6/5/19 NS       7/3/19 He presents for cardiology follow-up, accompanied by his wife  He reports previously there was an insurance issue which did not allow him to come  Therefore, he stopped the steroids and colchicine as he had no more refills  He was on colchicine for a total of 5 pills as it was ordered every other day  He denies any chest pain  He reports it was chest heaviness before, and that has resolved  Sometimes when he takes a deep breath it somewhat limited though  Unfortunately, since his torsemide was increased and spironolactone was added, he has been dizzy  He has been lightheaded  Today in the office he was near syncopal when he got up off the exam table  His blood pressure today is 104/76 at baseline, in the chair  I asked him to get some stat blood work this morning  I did suggest that he might need to be rehospitalized if it is abnormal     If for some reason his labs are okay, I restarted him on colchicine every other day and low-dose prednisone  I stopped torsemide as well as spironolactone  I ordered a repeat limited echocardiogram as follow-up  He has an appointment next week with his nephrologist     Further, he has familial hyperlipidemia  In the past he was on atorvastatin  He stopped because he had some visual symptoms  When he was hospitalized, this was restarted  The patient has since again stopped it  On Monday he has an appointment with an ophthalmologist   His lipids need to be addressed in the future  His LDL was greater than 200        Assessment  Diagnoses and all orders for this visit:    Myopericarditis  -     predniSONE 20 mg tablet; Take 1 tablet (20 mg total) by mouth daily  -     Echo follow up/limited;  Future    Hyperlipidemia, unspecified hyperlipidemia type    Type 2 diabetes mellitus with stage 3 chronic kidney disease, with long-term current use of insulin ([place] [address] )    Hypertension, unspecified type  -     Comprehensive metabolic panel; Future    Sleep apnea, unspecified type    Orthostatic hypotension  -     Comprehensive metabolic panel; Future    NSTEMI (non-ST elevated myocardial infarction) ([facility])  -     colchicine (COLCRYS) 0 6 mg tablet; Take 1 tablet (0 6 mg total) by mouth every other day          Past Medical History:   Diagnosis Date    CKD (chronic kidney disease) 2/21/2019    Class 1 obesity due to excess calories with serious comorbidity and body mass index (BMI) of 31 0 to 31 9 in adult 5/25/2019    Diabetes mellitus ([place] [address] )     Essential hypertension 10/31/2017    Hyperlipidemia     NSTEMI (non-ST elevated myocardial infarction) ([place] [address] ) 5/21/2019       Review of Systems   Constitution: Positive for weight loss  Negative for chills  Cardiovascular: Negative for chest pain, claudication, cyanosis, dyspnea on exertion, irregular heartbeat, leg swelling, near-syncope, orthopnea, palpitations, paroxysmal nocturnal dyspnea and syncope  Respiratory: Negative for cough and shortness of breath  Gastrointestinal: Negative for heartburn and nausea  Neurological: Positive for dizziness and light-headedness  Negative for focal weakness, headaches and weakness  All other systems reviewed and are negative  No Known Allergies        Current Outpatient Medications:     albuterol (PROVENTIL HFA,VENTOLIN HFA) 90 mcg/act inhaler, Inhale 2 puffs every 4 (four) hours as needed for wheezing, Disp: 1 Inhaler, Rfl: 0    aspirin (ECOTRIN LOW STRENGTH) 81 mg EC tablet, Take 1 tablet (81 mg total) by mouth daily, Disp: , Rfl: 0    carvedilol (COREG) 25 mg tablet, Take 2 tablets (50 mg total) by mouth 2 (two) times a day with meals, Disp: 60 tablet, Rfl: 2    D 1000 1000 units capsule, TAKE 1 CAPSULE BY MOUTH EVERY DAY, Disp: 90 capsule, Rfl: 1    FREESTYLE LITE test strip, 1 EACH BY OTHER ROUTE 3 (THREE) TIMES A DAY DISPENSE FREESTYLE TEST STRIPS, Disp: 100 each, Rfl: 5    glucose blood (ACCU-CHEK HARVEY PLUS) test strip, Use as instructed, Disp: 100 each, Rfl: 0    insulin aspart (NOVOLOG FLEXPEN) 100 Units/mL injection pen, Inject 4 Units under the skin 3 (three) times a day with meals, Disp: 5 pen, Rfl: 0    insulin glargine (BASAGLAR KWIKPEN) 100 units/mL injection pen, Inject 24 Units under the skin daily at bedtime, Disp: 5 pen, Rfl: 0    Insulin Pen Needle (INSUPEN PEN NEEDLES) 31G X 5 MM MISC, by Does not apply route 4 (four) times a day, Disp: 100 each, Rfl: 5    NIFEdipine ER (ADALAT CC) 60 MG 24 hr tablet, Take 1 tablet (60 mg total) by mouth 2 (two) times a day, Disp: 30 tablet, Rfl: 2    sod citrate-citric acid (BICITRA) 500-334 MG/5ML, Take 15 mL by mouth 2 (two) times a day, Disp: 1800 mL, Rfl: 0    colchicine (COLCRYS) 0 6 mg tablet, Take 1 tablet (0 6 mg total) by mouth every other day, Disp: 30 tablet, Rfl: 1    predniSONE 20 mg tablet, Take 1 tablet (20 mg total) by mouth daily, Disp: 30 tablet, Rfl: 1    sildenafil (REVATIO) 20 mg tablet, 2-5 tablets as needed for sexual activity (Patient not taking: Reported on 7/3/2019), Disp: 30 tablet, Rfl: 3        Social History     Socioeconomic History    Marital status: /Civil Union     Spouse name: Not on file    Number of children: Not on file    Years of education: Not on file    Highest education level: Not on file   Occupational History    Not on file   Social Needs    Financial resource strain: Not on file    Food insecurity:     Worry: Not on file     Inability: Not on file    Transportation needs:     Medical: Not on file     Non-medical: Not on file   Tobacco Use    Smoking status: Never Smoker    Smokeless tobacco: Never Used   Substance and Sexual Activity    Alcohol use: Yes     Comment: occasionally    Drug use: No    Sexual activity: Not on file   Lifestyle    Physical activity:     Days per week: Not on file     Minutes per session: Not on file    Stress: Not on file Relationships    Social connections:     Talks on phone: Not on file     Gets together: Not on file     Attends L[employer] service: Not on file     Active member of club or organization: Not on file     Attends meetings of clubs or organizations: Not on file     Relationship status: Not on file    Intimate partner violence:     Fear of current or ex partner: Not on file     Emotionally abused: Not on file     Physically abused: Not on file     Forced sexual activity: Not on file   Other Topics Concern    Not on file   Social History Narrative    Not on file       Family History   Problem Relation Age of Onset    Hypertension Mother     Multiple sclerosis Mother     Diabetes Father        Physical Exam   Constitutional: He is oriented to person, place, and time  No distress  HENT:   Head: Normocephalic and atraumatic  Eyes: Conjunctivae and EOM are normal    Neck: Normal range of motion  Neck supple  Cardiovascular: Normal rate, regular rhythm, normal heart sounds and intact distal pulses  orthostatic   Pulmonary/Chest: Effort normal and breath sounds normal    Abdominal: Soft  Bowel sounds are normal    Musculoskeletal: Normal range of motion  Neurological: He is oriented to person, place, and time  Skin: Skin is warm and dry  Psychiatric: He has a normal mood and affect  Nursing note and vitals reviewed  Vitals: Blood pressure 104/76, pulse 99, height 6' 1" (1 854 m), weight 107 kg (236 lb), SpO2 98 %     Wt Readings from Last 3 Encounters:   07/03/19 107 kg (236 lb)   07/01/19 109 kg (240 lb 12 8 oz)   05/31/19 121 kg (266 lb 6 4 oz)         Labs & Results:  Lab Results   Component Value Date    WBC 9 90 05/23/2019    HGB 11 0 (L) 05/23/2019    HCT 32 3 (L) 05/23/2019    MCV 84 05/23/2019     05/23/2019     No results found for: BNP  No components found for: CHEM  Total CK   Date Value Ref Range Status   04/19/2019 192 39 - 308 U/L Final     Troponin I   Date Value Ref Range Status   05/21/2019 0 23 (H) <=0 04 ng/mL Final     Comment:       Siemens Chemistry analyzer 99% cutoff is > 0 04 ng/mL in network labs     o cTnI 99% cutoff is useful only when applied to patients in the clinical setting of myocardial ischemia   o cTnI 99% cutoff should be interpreted in the context of clinical history, ECG findings and possibly cardiac imaging to establish correct diagnosis  o cTnI 99% cutoff may be suggestive but clearly not indicative of a coronary event without the clinical setting of myocardial ischemia  Results indicate test should be repeated on new specimen collected within 4-6 hours of the original   05/21/2019 0 27 (H) <=0 04 ng/mL Final     Comment:       Siemens Chemistry analyzer 99% cutoff is > 0 04 ng/mL in network labs     o cTnI 99% cutoff is useful only when applied to patients in the clinical setting of myocardial ischemia   o cTnI 99% cutoff should be interpreted in the context of clinical history, ECG findings and possibly cardiac imaging to establish correct diagnosis  o cTnI 99% cutoff may be suggestive but clearly not indicative of a coronary event without the clinical setting of myocardial ischemia  Results indicate test should be repeated on new specimen collected within 4-6 hours of the original   05/21/2019 0 22 (H) <=0 04 ng/mL Final     Comment:       Siemens Chemistry analyzer 99% cutoff is > 0 04 ng/mL in network labs     o cTnI 99% cutoff is useful only when applied to patients in the clinical setting of myocardial ischemia   o cTnI 99% cutoff should be interpreted in the context of clinical history, ECG findings and possibly cardiac imaging to establish correct diagnosis  o cTnI 99% cutoff may be suggestive but clearly not indicative of a coronary event without the clinical setting of myocardial ischemia      Results indicate test should be repeated on new specimen collected within 4-6 hours of the original     CK-MB Index   Date Value Ref Range Status 2019 4 5 (H) 0 0 - 2 5 % Final     Results for orders placed during the hospital encounter of 19   Echo complete with contrast if indicated    Narrative [facility] [address] [facility]  [phone]    Transthoracic Echocardiogram  2D, M-mode, Doppler, and Color Doppler    Study date:  23-May-2019    Patient: Shayna Anaya  MR number: [id number]  Account number: [de-identified]  : 1985  Age: 37 years  Gender: Male  Status: Inpatient  Location: Bedside  Height: 73 in  Weight: 237 lb  BP: 142/ 82 mmHg    Indications: Renal Hypertensive Disease    Diagnoses: I15 1 - Hypertension secondary to other renal disorders    Sonographer:  NATALIE Stern  Referring Physician:  CHARLOTTE Urbano  Group:  [facility]  Interpreting Physician:  Tae Ambrosio DO    SUMMARY    LEFT VENTRICLE:  Systolic function was normal  Ejection fraction was estimated to be 65 %  There were no regional wall motion abnormalities  Wall thickness was moderately increased  Concentric hypertrophy was present  Doppler parameters were consistent with abnormal left ventricular relaxation (grade 1 diastolic dysfunction)  RIGHT VENTRICLE:  The size was normal   Systolic function was normal     PERICARDIUM:  A small, free-flowing pericardial effusion was identified circumferential to the heart  There was no evidence of hemodynamic compromise  HISTORY: PRIOR HISTORY: DM2; HLD; HTN; CAD; NSTEMI  PROCEDURE: The procedure was performed at the bedside  This was a routine study  The transthoracic approach was used  The study included complete 2D imaging, M-mode, complete spectral Doppler, and color Doppler  The heart rate was 96 bpm,  at the start of the study  Images were obtained from the parasternal, apical, subcostal, and suprasternal notch acoustic windows  Image quality was good      LEFT VENTRICLE: Size was normal  Systolic function was normal  Ejection fraction was estimated to be 65 %  There were no regional wall motion abnormalities  Wall thickness was moderately increased  Concentric hypertrophy was present  DOPPLER: Doppler parameters were consistent with abnormal left ventricular relaxation (grade 1 diastolic dysfunction)  There was no evidence of elevated ventricular filling pressure by Doppler parameters  RIGHT VENTRICLE: The size was normal  Systolic function was normal  Wall thickness was normal     LEFT ATRIUM: Size was normal     RIGHT ATRIUM: Size was normal     MITRAL VALVE: Valve structure was normal  There was normal leaflet separation  DOPPLER: The transmitral velocity was within the normal range  There was no evidence for stenosis  There was trace regurgitation  AORTIC VALVE: The valve was trileaflet  Leaflets exhibited normal thickness and normal cuspal separation  DOPPLER: Transaortic velocity was within the normal range  There was no evidence for stenosis  There was no regurgitation  TRICUSPID VALVE: The valve structure was normal  There was normal leaflet separation  DOPPLER: The transtricuspid velocity was within the normal range  There was no evidence for stenosis  There was trace regurgitation  The tricuspid jet  envelope definition was inadequate for estimation of RV systolic pressure  There are no indirect findings (abnormal RV volume or geometry, altered pulmonary flow velocity profile, or leftward septal displacement) which would suggest  moderate or severe pulmonary hypertension  PULMONIC VALVE: Leaflets exhibited normal thickness, no calcification, and normal cuspal separation  DOPPLER: The transpulmonic velocity was within the normal range  There was trace regurgitation  PERICARDIUM: A small, free-flowing pericardial effusion was identified circumferential to the heart  There was no evidence of hemodynamic compromise  AORTA: The root exhibited normal size      SYSTEMIC VEINS: IVC: The inferior vena cava was normal in size and course  Respirophasic changes were normal     SYSTEM MEASUREMENT TABLES    2D  %FS: 35 04 %  Ao Diam: 3 48 cm  EDV(Teich): 104 05 ml  EF(Teich): 64 28 %  ESV(Teich): 37 16 ml  IVSd: 1 47 cm  LA Area: 11 98 cm2  LA Diam: 3 95 cm  LVEDV MOD A4C: 95 08 ml  LVEF MOD A4C: 81 53 %  LVESV MOD A4C: 17 56 ml  LVIDd: 4 73 cm  LVIDs: 3 07 cm  LVLd A4C: 8 96 cm  LVLs A4C: 7 27 cm  LVPWd: 1 51 cm  RA Area: 12 57 cm2  RVIDd: 2 95 cm  SV MOD A4C: 77 52 ml  SV(Teich): 66 89 ml    MM  TAPSE: 1 94 cm    PW  E': 0 09 m/s  MV A Maxx: 0 78 m/s  MV Dec [place]: 3 32 m/s2  MV DecT: 254 9 ms  MV E Maxx: 0 85 m/s  MV PHT: 73 92 ms  MVA By PHT: 2 98 cm2    [facility] Accredited Echocardiography Laboratory    Prepared and electronically signed by    Naomy Cornejo DO  Signed 23-May-2019 13:55:50       No results found for this or any previous visit  This note was completed in part utilizing m-modal fluency direct voice recognition software  Grammatical errors, random word insertion, spelling mistakes, and incomplete sentences may be an occasional consequence of the system secondary to software limitations, ambient noise and hardware issues  At the time of dictation, efforts were made to edit, clarify and /or correct errors  Please read the chart carefully and recognize, using context, where substitutions have occurred    If you have any questions or concerns about the context, text or information contained within the body of this dictation, please contact myself, the provider, for further clarification

## 2019-07-01 ENCOUNTER — OFFICE VISIT (OUTPATIENT)
Dept: FAMILY MEDICINE CLINIC | Facility: CLINIC | Age: 34
End: 2019-07-01

## 2019-07-01 VITALS
RESPIRATION RATE: 16 BRPM | WEIGHT: 240.8 LBS | TEMPERATURE: 98.8 F | HEART RATE: 82 BPM | SYSTOLIC BLOOD PRESSURE: 158 MMHG | HEIGHT: 73 IN | BODY MASS INDEX: 31.91 KG/M2 | DIASTOLIC BLOOD PRESSURE: 82 MMHG

## 2019-07-01 DIAGNOSIS — I10 HYPERTENSION, UNSPECIFIED TYPE: Primary | ICD-10-CM

## 2019-07-01 PROCEDURE — 99213 OFFICE O/P EST LOW 20 MIN: CPT | Performed by: FAMILY MEDICINE

## 2019-07-01 NOTE — PROGRESS NOTES
Assessment/Plan:    Hypertension  Improving but still uncontrolled and not at goal  He endorses fatigue and tiredness  He is  On carvedilol , nifedipine, spironolactone and torsemide  Polypharmacy may be contributing to fatigue with carvedilol most likely the cause  However given patients hx of NSTEMI some of the medications have  A mortality benefit other  than just an antihypertensive benefit  I will defer management of these meds to cardiology since he was started on them by them and he has an sallie this week  Still waiting on creatine to normalize to restart lisinopril  Follows with nephrology and has sallie next week,          Problem List Items Addressed This Visit        Cardiovascular and Mediastinum    Hypertension - Primary     Improving but still uncontrolled and not at goal  He endorses fatigue and tiredness  He is  On carvedilol , nifedipine, spironolactone and torsemide  Polypharmacy may be contributing to fatigue with carvedilol most likely the cause  However given patients hx of NSTEMI some of the medications have  A mortality benefit other  than just an antihypertensive benefit  I will defer management of these meds to cardiology since he was started on them by them and he has an sallie this week  Still waiting on creatine to normalize to restart lisinopril  Follows with nephrology and has sallie next week,                  Subjective:      Patient ID: Catherne Favre is a 37 y o  male  Majo Fabian is a 37year old male who presents to the clinic for HTN follow up  He is currently on Carvedilol, spironolactone, nifedipine and torsemide  He says he feels tired and fatigued most days  He feels like he has to sleep a lot more  He denies chest pain, sob, orthopnea, leg swelling  He denies cp and sob with activity as well  No dizziness or palpitations  He follows with cardiology and has an appointment later this week         The following portions of the patient's history were reviewed and updated as appropriate: allergies, current medications, past family history, past medical history, past social history, past surgical history and problem list     Review of Systems   Constitutional: Positive for fatigue and fever  Negative for chills and unexpected weight change  Eyes: Negative for visual disturbance  Respiratory: Negative for cough and shortness of breath  Cardiovascular: Negative for chest pain and palpitations  Gastrointestinal: Negative for nausea and vomiting  Neurological: Positive for headaches  Negative for dizziness and light-headedness  Objective:      /82 (BP Location: Left arm, Patient Position: Sitting, Cuff Size: Large)   Pulse 82   Temp 98 8 °F (37 1 °C) (Tympanic)   Resp 16   Ht 6' 1" (1 854 m)   Wt 109 kg (240 lb 12 8 oz)   BMI 31 77 kg/m²          Physical Exam   Constitutional: He is oriented to person, place, and time  No distress  Cardiovascular: Normal rate, regular rhythm and normal heart sounds  Pulmonary/Chest: Effort normal and breath sounds normal    Neurological: He is alert and oriented to person, place, and time  Skin: He is not diaphoretic

## 2019-07-03 ENCOUNTER — OFFICE VISIT (OUTPATIENT)
Dept: CARDIOLOGY CLINIC | Facility: CLINIC | Age: 34
End: 2019-07-03
Payer: COMMERCIAL

## 2019-07-03 ENCOUNTER — LAB (OUTPATIENT)
Dept: LAB | Facility: CLINIC | Age: 34
End: 2019-07-03
Payer: COMMERCIAL

## 2019-07-03 VITALS
DIASTOLIC BLOOD PRESSURE: 76 MMHG | WEIGHT: 236 LBS | OXYGEN SATURATION: 98 % | BODY MASS INDEX: 31.28 KG/M2 | SYSTOLIC BLOOD PRESSURE: 104 MMHG | HEART RATE: 99 BPM | HEIGHT: 73 IN

## 2019-07-03 DIAGNOSIS — E78.5 HYPERLIPIDEMIA, UNSPECIFIED HYPERLIPIDEMIA TYPE: ICD-10-CM

## 2019-07-03 DIAGNOSIS — I10 HYPERTENSION, UNSPECIFIED TYPE: ICD-10-CM

## 2019-07-03 DIAGNOSIS — N18.30 TYPE 2 DIABETES MELLITUS WITH STAGE 3 CHRONIC KIDNEY DISEASE, WITH LONG-TERM CURRENT USE OF INSULIN (HCC): ICD-10-CM

## 2019-07-03 DIAGNOSIS — I95.1 ORTHOSTATIC HYPOTENSION: ICD-10-CM

## 2019-07-03 DIAGNOSIS — I21.4 NSTEMI (NON-ST ELEVATED MYOCARDIAL INFARCTION) (HCC): ICD-10-CM

## 2019-07-03 DIAGNOSIS — G47.30 SLEEP APNEA, UNSPECIFIED TYPE: ICD-10-CM

## 2019-07-03 DIAGNOSIS — E11.22 TYPE 2 DIABETES MELLITUS WITH STAGE 3 CHRONIC KIDNEY DISEASE, WITH LONG-TERM CURRENT USE OF INSULIN (HCC): ICD-10-CM

## 2019-07-03 DIAGNOSIS — I95.1 ORTHOSTATIC HYPOTENSION: Primary | ICD-10-CM

## 2019-07-03 DIAGNOSIS — Z79.4 TYPE 2 DIABETES MELLITUS WITH STAGE 3 CHRONIC KIDNEY DISEASE, WITH LONG-TERM CURRENT USE OF INSULIN (HCC): ICD-10-CM

## 2019-07-03 DIAGNOSIS — I31.9 MYOPERICARDITIS: Primary | ICD-10-CM

## 2019-07-03 LAB
ALBUMIN SERPL BCP-MCNC: 2.9 G/DL (ref 3.5–5)
ALP SERPL-CCNC: 68 U/L (ref 46–116)
ALT SERPL W P-5'-P-CCNC: 17 U/L (ref 12–78)
ANION GAP SERPL CALCULATED.3IONS-SCNC: 9 MMOL/L (ref 4–13)
AST SERPL W P-5'-P-CCNC: 57 U/L (ref 5–45)
BILIRUB SERPL-MCNC: 0.2 MG/DL (ref 0.2–1)
BUN SERPL-MCNC: 45 MG/DL (ref 5–25)
CALCIUM SERPL-MCNC: 8.7 MG/DL (ref 8.3–10.1)
CHLORIDE SERPL-SCNC: 103 MMOL/L (ref 100–108)
CO2 SERPL-SCNC: 25 MMOL/L (ref 21–32)
CREAT SERPL-MCNC: 3.62 MG/DL (ref 0.6–1.3)
GFR SERPL CREATININE-BSD FRML MDRD: 24 ML/MIN/1.73SQ M
GLUCOSE SERPL-MCNC: 182 MG/DL (ref 65–140)
POTASSIUM SERPL-SCNC: 4.6 MMOL/L (ref 3.5–5.3)
PROT SERPL-MCNC: 7.5 G/DL (ref 6.4–8.2)
SODIUM SERPL-SCNC: 137 MMOL/L (ref 136–145)

## 2019-07-03 PROCEDURE — 99214 OFFICE O/P EST MOD 30 MIN: CPT | Performed by: NURSE PRACTITIONER

## 2019-07-03 PROCEDURE — 36415 COLL VENOUS BLD VENIPUNCTURE: CPT

## 2019-07-03 PROCEDURE — 80053 COMPREHEN METABOLIC PANEL: CPT

## 2019-07-03 RX ORDER — COLCHICINE 0.6 MG/1
0.6 TABLET ORAL EVERY OTHER DAY
Qty: 30 TABLET | Refills: 1 | Status: SHIPPED | OUTPATIENT
Start: 2019-07-03 | End: 2019-08-21 | Stop reason: ALTCHOICE

## 2019-07-03 RX ORDER — PREDNISONE 20 MG/1
20 TABLET ORAL DAILY
Qty: 30 TABLET | Refills: 1 | Status: SHIPPED | OUTPATIENT
Start: 2019-07-03 | End: 2019-08-07 | Stop reason: SDUPTHER

## 2019-07-03 NOTE — LETTER
July 3, 2019     Owen Salter, [address]  [address] [address]    Patient: Candelaria Feliz   YOB: 1985   Date of Visit: 7/3/2019       Dear Dr Otto Payne: Thank you for referring Lacymilka Perrin to me for evaluation  Below are my notes for this consultation  If you have questions, please do not hesitate to call me  I look forward to following your patient along with you  Sincerely,        CHARLOTTE Beavers        CC: MD Sita Mckinnon CRNP  7/3/2019  9:02 AM  Sign at close encounter  Hospital Follow Up Office Visit Note  Candelaria Feliz   37 y o    male   MRN: [id number]  [address]  [address] [place] [address] Eloy Bains[address] [address]  [phone]    PCP: Owen Saletr MD  Cardiologist: Dr Keith Hoffman        Assessment/plan   Orthostatic hypotension  --for now, hold spironolactone and hold torsemide  --nonfasting CMP this morning  myopericarditis, recent admission 5/21---5/25/19, on prednisone 50 mg     -- restart colchicine every other day and low-dose prednisone 20 mg  --will need a follow-up limited echocardiogram  Iatrogenic Volume overload secondary to steroids and pts increase fluid intake to 3-4 L , with weight gain of up to 30 lb, noted May 31st  --discharge weight May 25 --  239 lb 9 6   Today, 236lb  --started on spironolactone 50 mg daily by PCP 5/29/19  --discharged on torsemide 20 daily, increased to 20 b i d  May 31st per renal     --low-sodium diet  --daily weights  If wt increases 3 lb in 1 day or 5 lb in 5 days advised to call us  Chest heaviness secondary to myopericarditis  RESOLVED  NSVT, noted on telemetry in hospital    carvedilol up titrated  Hypertension  /76  on carvedilol 50 mg b i d , nifedipine 60 mg b i d , Aldactone 50 mg daily and torsemide 10 mg BID for his blood pressure  --secondary workup:  Sleep study pending,  renin adosterone level pending, TSH normal, he may benefit from renal artery duplex  Will leave this to the discretion of Nephrology with only is following her closely  Hyperlipidemia, familial- on atorvastatin 40 mg daily, with suboptimum response  --fasting lipid profile 5/21/19:  Direct , non , ,   --fasting lipid profile 8/9/18:  Direct , non , triglyceride 144, total cholesterol 287  --given his other acute issues, will defer adjusting treatment at this time  However, he may be candidate for rosuvastatin 40 mg daily and a probable PCSK9 inhibitor  Ezetimibe should be avoided given his advanced kidney disease   --goal LDL under 100, non   If found to have CV disease, goal LDL will be less than 70  DM, 2  Last hemoglobin A1c  7 1  5/21/19  Possible Obstructive sleep apnea-sleep study pending  Chronic kidney disease, stage III, baseline creatinine appears to be 2 2-2 5, per Nephrology  Follows with Nephrology as an outpatient  Cardiac testing  --TTE 5/23/19   EF 65%  No regional motion abnormalities  There was a small free-flowing pericardial effusion was identified circumferential to the heart  There was no evidence of hemodynamic compromise  Summary of recommendations  Sleep study pending 7/11  Low-sodium diet  This is critical  Follow-up limited echocardiogram  Follow-up wit Nephrology July 9  An ischemic evaluation may be of benefit in the future; he has significant risk factors  Need to address his lipids in the near future  Stat CMP today  If it is abnormal he might need to be rehospitalized  Otherwise, follow up with Dr Keith Hoffman in a month  With me if he is unavailable                HPI  Aashish Perrin is a 36 yo  with a history of hypertension, diabetes, dyslipidemia, CKD with baseline creatinine up to 2 6, who was recenlty admitted with chest pain due to myopericarditis  He does not have known coronary artery disease    An echocardiogram showed a small free-flowing fluid circumferential to the heart without hemodynamic compromise; ejection fraction normal 5/23/19     Given his advanced CKD, he was initiated on steroids and colchicine  He has had significant hypertension despite up titration of several of his medications  A secondary workup has been initiated  He is following closely with Nephrology  As well as PCP  Recently he gained 30 lb since his discharge  He was also noted he was drinking up to 4 L of fluid daily; this was modified by Nephrology  His family physician added spironolactone 50 mg daily  Nephrology uptitrated his diuretics  Labs are being followed very closely by Nephrology    6/5/19 NS       7/3/19 He presents for cardiology follow-up, accompanied by his wife  He reports previously there was an insurance issue which did not allow him to come  Therefore, he stopped the steroids and colchicine as he had no more refills  He was on colchicine for a total of 5 pills as it was ordered every other day  He denies any chest pain  He reports it was chest heaviness before, and that has resolved  Sometimes when he takes a deep breath it somewhat limited though  Unfortunately, since his torsemide was increased and spironolactone was added, he has been dizzy  He has been lightheaded  Today in the office he was near syncopal when he got up off the exam table  His blood pressure today is 104/76 at baseline, in the chair  I asked him to get some stat blood work this morning  I did suggest that he might need to be rehospitalized if it is abnormal     If for some reason his labs are okay, I restarted him on colchicine every other day and low-dose prednisone  I stopped torsemide as well as spironolactone  I ordered a repeat limited echocardiogram as follow-up  He has an appointment next week with his nephrologist     Further, he has familial hyperlipidemia  In the past he was on atorvastatin  He stopped because he had some visual symptoms    When he was hospitalized, this was restarted  The patient has since again stopped it  On Monday he has an appointment with an ophthalmologist   His lipids need to be addressed in the future  His LDL was greater than 200        Assessment  Diagnoses and all orders for this visit:    Myopericarditis  -     predniSONE 20 mg tablet; Take 1 tablet (20 mg total) by mouth daily  -     Echo follow up/limited; Future    Hyperlipidemia, unspecified hyperlipidemia type    Type 2 diabetes mellitus with stage 3 chronic kidney disease, with long-term current use of insulin ([facility])    Hypertension, unspecified type  -     Comprehensive metabolic panel; Future    Sleep apnea, unspecified type    Orthostatic hypotension  -     Comprehensive metabolic panel; Future    NSTEMI (non-ST elevated myocardial infarction) ([facility])  -     colchicine (COLCRYS) 0 6 mg tablet; Take 1 tablet (0 6 mg total) by mouth every other day          Past Medical History:   Diagnosis Date    CKD (chronic kidney disease) 2/21/2019    Class 1 obesity due to excess calories with serious comorbidity and body mass index (BMI) of 31 0 to 31 9 in adult 5/25/2019    Diabetes mellitus ([facility] )     Essential hypertension 10/31/2017    Hyperlipidemia     NSTEMI (non-ST elevated myocardial infarction) ([facility] ) 5/21/2019       Review of Systems   Constitution: Positive for weight loss  Negative for chills  Cardiovascular: Negative for chest pain, claudication, cyanosis, dyspnea on exertion, irregular heartbeat, leg swelling, near-syncope, orthopnea, palpitations, paroxysmal nocturnal dyspnea and syncope  Respiratory: Negative for cough and shortness of breath  Gastrointestinal: Negative for heartburn and nausea  Neurological: Positive for dizziness and light-headedness  Negative for focal weakness, headaches and weakness  All other systems reviewed and are negative  No Known Allergies        Current Outpatient Medications:     albuterol (PROVENTIL HFA,VENTOLIN HFA) 90 mcg/act inhaler, Inhale 2 puffs every 4 (four) hours as needed for wheezing, Disp: 1 Inhaler, Rfl: 0    aspirin (ECOTRIN LOW STRENGTH) 81 mg EC tablet, Take 1 tablet (81 mg total) by mouth daily, Disp: , Rfl: 0    carvedilol (COREG) 25 mg tablet, Take 2 tablets (50 mg total) by mouth 2 (two) times a day with meals, Disp: 60 tablet, Rfl: 2    D 1000 1000 units capsule, TAKE 1 CAPSULE BY MOUTH EVERY DAY, Disp: 90 capsule, Rfl: 1    FREESTYLE LITE test strip, 1 EACH BY OTHER ROUTE 3 (THREE) TIMES A DAY DISPENSE FREESTYLE TEST STRIPS, Disp: 100 each, Rfl: 5    glucose blood (ACCU-CHEK HARVEY PLUS) test strip, Use as instructed, Disp: 100 each, Rfl: 0    insulin aspart (NOVOLOG FLEXPEN) 100 Units/mL injection pen, Inject 4 Units under the skin 3 (three) times a day with meals, Disp: 5 pen, Rfl: 0    insulin glargine (BASAGLAR KWIKPEN) 100 units/mL injection pen, Inject 24 Units under the skin daily at bedtime, Disp: 5 pen, Rfl: 0    Insulin Pen Needle (INSUPEN PEN NEEDLES) 31G X 5 MM MISC, by Does not apply route 4 (four) times a day, Disp: 100 each, Rfl: 5    NIFEdipine ER (ADALAT CC) 60 MG 24 hr tablet, Take 1 tablet (60 mg total) by mouth 2 (two) times a day, Disp: 30 tablet, Rfl: 2    sod citrate-citric acid (BICITRA) 500-334 MG/5ML, Take 15 mL by mouth 2 (two) times a day, Disp: 1800 mL, Rfl: 0    colchicine (COLCRYS) 0 6 mg tablet, Take 1 tablet (0 6 mg total) by mouth every other day, Disp: 30 tablet, Rfl: 1    predniSONE 20 mg tablet, Take 1 tablet (20 mg total) by mouth daily, Disp: 30 tablet, Rfl: 1    sildenafil (REVATIO) 20 mg tablet, 2-5 tablets as needed for sexual activity (Patient not taking: Reported on 7/3/2019), Disp: 30 tablet, Rfl: 3        Social History     Socioeconomic History    Marital status: /Civil Union     Spouse name: Not on file    Number of children: Not on file    Years of education: Not on file    Highest education level: Not on file   Occupational History    Not on file Social Needs    Financial resource strain: Not on file    Food insecurity:     Worry: Not on file     Inability: Not on file    Transportation needs:     Medical: Not on file     Non-medical: Not on file   Tobacco Use    Smoking status: Never Smoker    Smokeless tobacco: Never Used   Substance and Sexual Activity    Alcohol use: Yes     Comment: occasionally    Drug use: No    Sexual activity: Not on file   Lifestyle    Physical activity:     Days per week: Not on file     Minutes per session: Not on file    Stress: Not on file   Relationships    Social connections:     Talks on phone: Not on file     Gets together: Not on file     Attends Mandaeism service: Not on file     Active member of club or organization: Not on file     Attends meetings of clubs or organizations: Not on file     Relationship status: Not on file    Intimate partner violence:     Fear of current or ex partner: Not on file     Emotionally abused: Not on file     Physically abused: Not on file     Forced sexual activity: Not on file   Other Topics Concern    Not on file   Social History Narrative    Not on file       Family History   Problem Relation Age of Onset    Hypertension Mother     Multiple sclerosis Mother     Diabetes Father        Physical Exam   Constitutional: He is oriented to person, place, and time  No distress  HENT:   Head: Normocephalic and atraumatic  Eyes: Conjunctivae and EOM are normal    Neck: Normal range of motion  Neck supple  Cardiovascular: Normal rate, regular rhythm, normal heart sounds and intact distal pulses  orthostatic   Pulmonary/Chest: Effort normal and breath sounds normal    Abdominal: Soft  Bowel sounds are normal    Musculoskeletal: Normal range of motion  Neurological: He is oriented to person, place, and time  Skin: Skin is warm and dry  Psychiatric: He has a normal mood and affect  Nursing note and vitals reviewed        Vitals: Blood pressure 104/76, pulse 99, height 6' 1" (1 854 m), weight 107 kg (236 lb), SpO2 98 %  Wt Readings from Last 3 Encounters:   07/03/19 107 kg (236 lb)   07/01/19 109 kg (240 lb 12 8 oz)   05/31/19 121 kg (266 lb 6 4 oz)         Labs & Results:  Lab Results   Component Value Date    WBC 9 90 05/23/2019    HGB 11 0 (L) 05/23/2019    HCT 32 3 (L) 05/23/2019    MCV 84 05/23/2019     05/23/2019     No results found for: BNP  No components found for: CHEM  Total CK   Date Value Ref Range Status   04/19/2019 192 39 - 308 U/L Final     Troponin I   Date Value Ref Range Status   05/21/2019 0 23 (H) <=0 04 ng/mL Final     Comment:       Siemens Chemistry analyzer 99% cutoff is > 0 04 ng/mL in network labs     o cTnI 99% cutoff is useful only when applied to patients in the clinical setting of myocardial ischemia   o cTnI 99% cutoff should be interpreted in the context of clinical history, ECG findings and possibly cardiac imaging to establish correct diagnosis  o cTnI 99% cutoff may be suggestive but clearly not indicative of a coronary event without the clinical setting of myocardial ischemia  Results indicate test should be repeated on new specimen collected within 4-6 hours of the original   05/21/2019 0 27 (H) <=0 04 ng/mL Final     Comment:       Siemens Chemistry analyzer 99% cutoff is > 0 04 ng/mL in network labs     o cTnI 99% cutoff is useful only when applied to patients in the clinical setting of myocardial ischemia   o cTnI 99% cutoff should be interpreted in the context of clinical history, ECG findings and possibly cardiac imaging to establish correct diagnosis  o cTnI 99% cutoff may be suggestive but clearly not indicative of a coronary event without the clinical setting of myocardial ischemia      Results indicate test should be repeated on new specimen collected within 4-6 hours of the original   05/21/2019 0 22 (H) <=0 04 ng/mL Final     Comment:       Siemens Chemistry analyzer 99% cutoff is > 0 04 ng/mL in network labs     o cTnI 99% cutoff is useful only when applied to patients in the clinical setting of myocardial ischemia   o cTnI 99% cutoff should be interpreted in the context of clinical history, ECG findings and possibly cardiac imaging to establish correct diagnosis  o cTnI 99% cutoff may be suggestive but clearly not indicative of a coronary event without the clinical setting of myocardial ischemia  Results indicate test should be repeated on new specimen collected within 4-6 hours of the original     CK-MB Index   Date Value Ref Range Status   2019 4 5 (H) 0 0 - 2 5 % Final     Results for orders placed during the hospital encounter of 19   Echo complete with contrast if indicated    Narrative [facility], [address] [facility]  [phone]    Transthoracic Echocardiogram  2D, M-mode, Doppler, and Color Doppler    Study date:  23-May-2019    Patient: Shayna Anaya  MR number: [id number]  Account number: [de-identified]  : 1985  Age: 37 years  Gender: Male  Status: Inpatient  Location: Bedside  Height: 73 in  Weight: 237 lb  BP: 142/ 82 mmHg    Indications: Renal Hypertensive Disease    Diagnoses: I15 1 - Hypertension secondary to other renal disorders    Sonographer:  NATALIE Stern  Referring Physician:  CHARLOTTE Urbano  Group:  [facility]  Interpreting Physician:  DO ADAIR [place]    LEFT VENTRICLE:  Systolic function was normal  Ejection fraction was estimated to be 65 %  There were no regional wall motion abnormalities  Wall thickness was moderately increased  Concentric hypertrophy was present  Doppler parameters were consistent with abnormal left ventricular relaxation (grade 1 diastolic dysfunction)  RIGHT VENTRICLE:  The size was normal   Systolic function was normal     PERICARDIUM:  A small, free-flowing pericardial effusion was identified circumferential to the heart   There was no evidence of hemodynamic compromise  HISTORY: PRIOR HISTORY: DM2; HLD; HTN; CAD; NSTEMI  PROCEDURE: The procedure was performed at the bedside  This was a routine study  The transthoracic approach was used  The study included complete 2D imaging, M-mode, complete spectral Doppler, and color Doppler  The heart rate was 96 bpm,  at the start of the study  Images were obtained from the parasternal, apical, subcostal, and suprasternal notch acoustic windows  Image quality was good  LEFT VENTRICLE: Size was normal  Systolic function was normal  Ejection fraction was estimated to be 65 %  There were no regional wall motion abnormalities  Wall thickness was moderately increased  Concentric hypertrophy was present  DOPPLER: Doppler parameters were consistent with abnormal left ventricular relaxation (grade 1 diastolic dysfunction)  There was no evidence of elevated ventricular filling pressure by Doppler parameters  RIGHT VENTRICLE: The size was normal  Systolic function was normal  Wall thickness was normal     LEFT ATRIUM: Size was normal     RIGHT ATRIUM: Size was normal     MITRAL VALVE: Valve structure was normal  There was normal leaflet separation  DOPPLER: The transmitral velocity was within the normal range  There was no evidence for stenosis  There was trace regurgitation  AORTIC VALVE: The valve was trileaflet  Leaflets exhibited normal thickness and normal cuspal separation  DOPPLER: Transaortic velocity was within the normal range  There was no evidence for stenosis  There was no regurgitation  TRICUSPID VALVE: The valve structure was normal  There was normal leaflet separation  DOPPLER: The transtricuspid velocity was within the normal range  There was no evidence for stenosis  There was trace regurgitation  The tricuspid jet  envelope definition was inadequate for estimation of RV systolic pressure   There are no indirect findings (abnormal RV volume or geometry, altered pulmonary flow velocity profile, or leftward septal displacement) which would suggest  moderate or severe pulmonary hypertension  PULMONIC VALVE: Leaflets exhibited normal thickness, no calcification, and normal cuspal separation  DOPPLER: The transpulmonic velocity was within the normal range  There was trace regurgitation  PERICARDIUM: A small, free-flowing pericardial effusion was identified circumferential to the heart  There was no evidence of hemodynamic compromise  AORTA: The root exhibited normal size  SYSTEMIC VEINS: IVC: The inferior vena cava was normal in size and course  Respirophasic changes were normal     SYSTEM MEASUREMENT TABLES    2D  %FS: 35 04 %  Ao Diam: 3 48 cm  EDV(Teich): 104 05 ml  EF(Teich): 64 28 %  ESV(Teich): 37 16 ml  IVSd: 1 47 cm  LA Area: 11 98 cm2  LA Diam: 3 95 cm  LVEDV MOD A4C: 95 08 ml  LVEF MOD A4C: 81 53 %  LVESV MOD A4C: 17 56 ml  LVIDd: 4 73 cm  LVIDs: 3 07 cm  LVLd A4C: 8 96 cm  LVLs A4C: 7 27 cm  LVPWd: 1 51 cm  RA Area: 12 57 cm2  RVIDd: 2 95 cm  SV MOD A4C: 77 52 ml  SV(Teich): 66 89 ml    MM  TAPSE: 1 94 cm    PW  E': 0 09 m/s  MV A Maxx: 0 78 m/s  MV Dec [place]: 3 32 m/s2  MV DecT: 254 9 ms  MV E Maxx: 0 85 m/s  MV PHT: 73 92 ms  MVA By PHT: 2 98 cm2    [facility] Accredited Echocardiography Laboratory    Prepared and electronically signed by    Lorena Cummins DO  Signed 23-May-2019 13:55:50       No results found for this or any previous visit  This note was completed in part utilizing mL[employer] direct voice recognition software  Grammatical errors, random word insertion, spelling mistakes, and incomplete sentences may be an occasional consequence of the system secondary to software limitations, ambient noise and hardware issues  At the time of dictation, efforts were made to edit, clarify and /or correct errors  Please read the chart carefully and recognize, using context, where substitutions have occurred    If you have any questions or concerns about the context, text or information contained within the body of this dictation, please contact myself, the provider, for further clarification

## 2019-07-03 NOTE — RESULT ENCOUNTER NOTE
Spoke with nephrology NP Katarzyna Canada, regarding his labs from this morning creatinine 3 6, BUN 45  AST is 57 ALT is 17  Spoke with patient  For now, discontinue spironolactone completely  For now, hold torsemide  Daily weights  Daily blood pressures    Repeat BMP Friday July 5th  1800 mL fluid per day  If he feels worse, I suggested he go to the emergency room

## 2019-07-04 NOTE — ASSESSMENT & PLAN NOTE
Improving but still uncontrolled and not at goal  He endorses fatigue and tiredness  He is  On carvedilol , nifedipine, spironolactone and torsemide  Polypharmacy may be contributing to fatigue with carvedilol most likely the cause  However given patients hx of NSTEMI some of the medications have  A mortality benefit other  than just an antihypertensive benefit  I will defer management of these meds to cardiology since he was started on them by them and he has an sallie this week  Still waiting on creatine to normalize to restart lisinopril   Follows with nephrology and has sallie next week,
The Caprini score indicates that this patient is at high risk for a VTE event (score = 6).    Ssurgical patients in this group will benefit from both pharmacologic prophylaxis and intermittent compression devices.    The surgical team will determine the balance between VTE risk and bleeding risk, and other clinical considerations.

## 2019-07-05 ENCOUNTER — TELEPHONE (OUTPATIENT)
Dept: OTHER | Facility: HOSPITAL | Age: 34
End: 2019-07-05

## 2019-07-05 ENCOUNTER — TELEPHONE (OUTPATIENT)
Dept: CARDIOLOGY CLINIC | Facility: CLINIC | Age: 34
End: 2019-07-05

## 2019-07-05 ENCOUNTER — APPOINTMENT (OUTPATIENT)
Dept: LAB | Facility: CLINIC | Age: 34
End: 2019-07-05
Payer: COMMERCIAL

## 2019-07-05 DIAGNOSIS — N18.3 ACUTE RENAL FAILURE SUPERIMPOSED ON STAGE 3 CHRONIC KIDNEY DISEASE, UNSPECIFIED ACUTE RENAL FAILURE TYPE: Primary | ICD-10-CM

## 2019-07-05 DIAGNOSIS — I95.1 ORTHOSTATIC HYPOTENSION: ICD-10-CM

## 2019-07-05 DIAGNOSIS — N17.9 ACUTE RENAL FAILURE SUPERIMPOSED ON STAGE 3 CHRONIC KIDNEY DISEASE, UNSPECIFIED ACUTE RENAL FAILURE TYPE: Primary | ICD-10-CM

## 2019-07-05 LAB
ANION GAP SERPL CALCULATED.3IONS-SCNC: 12 MMOL/L (ref 4–13)
BUN SERPL-MCNC: 62 MG/DL (ref 5–25)
CALCIUM SERPL-MCNC: 8.8 MG/DL (ref 8.3–10.1)
CHLORIDE SERPL-SCNC: 102 MMOL/L (ref 100–108)
CO2 SERPL-SCNC: 23 MMOL/L (ref 21–32)
CREAT SERPL-MCNC: 3.93 MG/DL (ref 0.6–1.3)
GFR SERPL CREATININE-BSD FRML MDRD: 21 ML/MIN/1.73SQ M
GLUCOSE P FAST SERPL-MCNC: 188 MG/DL (ref 65–99)
POTASSIUM SERPL-SCNC: 5.4 MMOL/L (ref 3.5–5.3)
SODIUM SERPL-SCNC: 137 MMOL/L (ref 136–145)

## 2019-07-05 PROCEDURE — 36415 COLL VENOUS BLD VENIPUNCTURE: CPT

## 2019-07-05 PROCEDURE — 80048 BASIC METABOLIC PNL TOTAL CA: CPT

## 2019-07-05 NOTE — TELEPHONE ENCOUNTER
Left message with instructions for the patient    I have also asked him to call us back once he receives the message to ensure that he has gotten

## 2019-07-05 NOTE — TELEPHONE ENCOUNTER
----- Message from [id number] sent at 7/5/2019  2:26 PM EDT -----    Please let him know his renal function is worse-Cr 3 9  His potassium is elevated as well  Make sure he is taking NO spironolactone or torsemide  He should be on a low potassium diet ( review with him foods high in potassium)  He should taking in about 60-70 oz of fluid a day  If he feels poorly, advise him to go to the ED    Otherwise, please get a BMP on Monday morning    ----- Message -----  From: Lab, Background User  Sent: 7/5/2019  11:47 AM EDT  To: CHARLOTTE Goodson

## 2019-07-05 NOTE — TELEPHONE ENCOUNTER
I spoke with Zeeshan Pappas regarding latest blood work  I was notified by Cardiology that labs were abnormal   On July 3rd creatinine was elevated to 3 6  He was seen by Cardiology and diuretics were placed on hold which include spirolactone and torsemide  At that visit was also noted that he was running blood pressures near 805 systolic  Diuretics were held 1 day and blood work was repeated today  Patient reports that blood pressure is higher but is still somewhat variable  He states he feels fine  He is eating well  He continues to urinate  His weight is stable  Unfortunately creatinine is up to 3 9  Since diuretics were only held 1 day will continue with holding diuretics over the weekend  Check blood work on Monday  I will notify the office that patient needs to be seen as soon as possible  I also spoke with him in detail regarding low-potassium diet  I carefully explained foods to avoid  Elevation in creatinine appears to be related to hypotension/? Volume depletion- spirolactone recently started  Patient was instructed to have a very low threshold for coming to the emergency room if he experiences any changes such as decreasing urine output, worsening fatigue, dizziness particularly upon standing  Loss of appetite, bad taste in his mouth

## 2019-07-08 ENCOUNTER — DOCUMENTATION (OUTPATIENT)
Dept: CARDIOLOGY CLINIC | Facility: CLINIC | Age: 34
End: 2019-07-08

## 2019-07-08 ENCOUNTER — TRANSCRIBE ORDERS (OUTPATIENT)
Dept: LAB | Facility: CLINIC | Age: 34
End: 2019-07-08

## 2019-07-08 ENCOUNTER — APPOINTMENT (OUTPATIENT)
Dept: LAB | Facility: CLINIC | Age: 34
End: 2019-07-08
Payer: COMMERCIAL

## 2019-07-08 ENCOUNTER — TELEPHONE (OUTPATIENT)
Dept: CARDIOLOGY CLINIC | Facility: CLINIC | Age: 34
End: 2019-07-08

## 2019-07-08 DIAGNOSIS — N17.9 ACUTE RENAL FAILURE SUPERIMPOSED ON STAGE 3 CHRONIC KIDNEY DISEASE, UNSPECIFIED ACUTE RENAL FAILURE TYPE: ICD-10-CM

## 2019-07-08 DIAGNOSIS — N18.3 ACUTE RENAL FAILURE SUPERIMPOSED ON STAGE 3 CHRONIC KIDNEY DISEASE, UNSPECIFIED ACUTE RENAL FAILURE TYPE: ICD-10-CM

## 2019-07-08 LAB
ANION GAP SERPL CALCULATED.3IONS-SCNC: 8 MMOL/L (ref 4–13)
BUN SERPL-MCNC: 54 MG/DL (ref 5–25)
CALCIUM SERPL-MCNC: 8.9 MG/DL (ref 8.3–10.1)
CHLORIDE SERPL-SCNC: 105 MMOL/L (ref 100–108)
CO2 SERPL-SCNC: 26 MMOL/L (ref 21–32)
CREAT SERPL-MCNC: 3.28 MG/DL (ref 0.6–1.3)
GFR SERPL CREATININE-BSD FRML MDRD: 27 ML/MIN/1.73SQ M
GLUCOSE P FAST SERPL-MCNC: 141 MG/DL (ref 65–99)
LEFT EYE DIABETIC RETINOPATHY: NORMAL
POTASSIUM SERPL-SCNC: 5.4 MMOL/L (ref 3.5–5.3)
RIGHT EYE DIABETIC RETINOPATHY: NORMAL
SODIUM SERPL-SCNC: 139 MMOL/L (ref 136–145)

## 2019-07-08 PROCEDURE — 36415 COLL VENOUS BLD VENIPUNCTURE: CPT

## 2019-07-08 PROCEDURE — 80048 BASIC METABOLIC PNL TOTAL CA: CPT

## 2019-07-08 PROCEDURE — 2022F DILAT RTA XM EVC RTNOPTHY: CPT | Performed by: FAMILY MEDICINE

## 2019-07-08 NOTE — TELEPHONE ENCOUNTER
----- Message from Giuliana Dawn, [address] sent at 7/8/2019 12:30 PM EDT -----  Regarding: Labs  Please let him know his kidney function has improved, to 3 2  please restart torsemide 20 mg daily   BMP 1 week

## 2019-07-09 ENCOUNTER — OFFICE VISIT (OUTPATIENT)
Dept: NEPHROLOGY | Facility: CLINIC | Age: 34
End: 2019-07-09
Payer: COMMERCIAL

## 2019-07-09 VITALS
HEIGHT: 74 IN | BODY MASS INDEX: 31.37 KG/M2 | DIASTOLIC BLOOD PRESSURE: 100 MMHG | WEIGHT: 244.4 LBS | HEART RATE: 70 BPM | SYSTOLIC BLOOD PRESSURE: 170 MMHG

## 2019-07-09 DIAGNOSIS — I10 HYPERTENSION, UNSPECIFIED TYPE: ICD-10-CM

## 2019-07-09 DIAGNOSIS — N18.30 CKD (CHRONIC KIDNEY DISEASE) STAGE 3, GFR 30-59 ML/MIN (HCC): ICD-10-CM

## 2019-07-09 DIAGNOSIS — R80.8 OTHER PROTEINURIA: Primary | ICD-10-CM

## 2019-07-09 PROBLEM — N17.9 ACUTE RENAL FAILURE SUPERIMPOSED ON STAGE 3 CHRONIC KIDNEY DISEASE (HCC): Status: RESOLVED | Noted: 2019-04-26 | Resolved: 2019-07-09

## 2019-07-09 PROCEDURE — 99214 OFFICE O/P EST MOD 30 MIN: CPT | Performed by: INTERNAL MEDICINE

## 2019-07-09 PROCEDURE — 3066F NEPHROPATHY DOC TX: CPT | Performed by: INTERNAL MEDICINE

## 2019-07-09 RX ORDER — DOXAZOSIN 2 MG/1
2 TABLET ORAL
Qty: 30 TABLET | Refills: 5 | Status: SHIPPED | OUTPATIENT
Start: 2019-07-09 | End: 2019-08-01 | Stop reason: SDUPTHER

## 2019-07-09 RX ORDER — TORSEMIDE 20 MG/1
20 TABLET ORAL
COMMUNITY
End: 2019-08-21 | Stop reason: SDUPTHER

## 2019-07-09 RX ORDER — SPIRONOLACTONE 50 MG/1
50 TABLET, FILM COATED ORAL DAILY
COMMUNITY
End: 2019-07-09 | Stop reason: CLARIF

## 2019-07-09 NOTE — PATIENT INSTRUCTIONS
You are here for follow-up in since I last saw you you had a couple hospitalizations  Your creatinine which is the blood test for the kidney function seemed to be running around 2 7 after her 1st hospitalization but then you went back in and you left it was stable but since leaving it has gone up into the threes  I suspect it is due to the diuretic use that you are on in your likely dehydrated  You actually held the torsemide and spironolactone repeated labs and it was slightly improved  Stop torsemide and spironolactone  If you notice that your weight is creeping up then you could take it torsemide as needed to see if it improved swelling but if everything remains stable at your normal weight you can hold the medication  Stop the sodium citrate as your blood bicarbonate is normalized and we will follow  Start doxazosin 2 mg at bed  If you tolerate this after couple days in her blood pressure still is elevated you can increase it to 4 mg at bedtime and then we will follow-up in the office to see how her blood pressure is doing  Labs and follow-up as scheduled

## 2019-07-09 NOTE — PROGRESS NOTES
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  He was placed on prednisone and colchicine and is feeling well today except is little tired  ASSESSMENT/PLAN:  1  Renal  Patient's chronic kidney disease felt due to diabetic nephropathy because he has had diabetes for a long duration with very poor control and prior to this year he really was not doctoring that much  Unfortunately his creatinine has increased significantly since February when his running around 1 8  It seems to have peaked at around 3 9  When he was in the hospital the 2nd time his creatinine was 2 7 on discharge and they described to me that he was diuresed aggressively cause he had gained a lot of fluids after hydration from his prior hospitalization  His cardiac echocardiogram shows normal left ventricular function with good valvular function so at this point I am going to have him hold his torsemide and spironolactone  He will be weighing himself and I told him if he gains weight a couple lb and gets above his normal weight that he can take it torsemide periodically  Of course I told him he can call me for any instructions and guidance in between our next visit  Hopefully will see his creatinine decline as he did hold the diuretics a couple days and already went down to 3 2  Protein estimation is around 1-2 g  He is not on an ACE-inhibitor due to high potassium so that was discontinued  Long-term to help delay progression is glycemic control which is improved and hopefully will be even better once he is off prednisone  Blood pressure control and see comments below  Lipid control and for now our holding on an ACE-inhibitor  2  Hypertension  The patient currently is on carvedilol and nifedipine blood pressure is still elevated and he gets level similar to what I got today  I am going to hold an ACE-inhibitor or ARB as stated above for now and I will place him on doxazosin 2 mg at bed and after few days if his blood pressure is elevated he is tolerating the medication to increased to 4 mg at bedtime  I will be seeing him back in 4 weeks in the office with labs and blood pressure evaluation  I told him to call me if there is any problems or questions before the visit  SUBJECTIVE:  Review of Systems   Constitution: Negative for chills and fever  HENT: Negative  Eyes: Negative  Cardiovascular: Negative for chest pain, dyspnea on exertion, orthopnea and palpitations  Minimal swelling in his legs but much improved  Respiratory: Negative  Negative for cough, shortness of breath and wheezing  Gastrointestinal: Negative  Negative for abdominal pain, diarrhea, nausea and vomiting  Genitourinary: Negative  Negative for dysuria, hematuria and incomplete emptying  Neurological: Negative  Negative for dizziness and focal weakness  No dizziness  Psychiatric/Behavioral: Negative for altered mental status  OBJECTIVE:  Current Weight: Weight - Scale: 111 kg (244 lb 6 4 oz)  [email or web address]:     Blood pressure 170/100, pulse 70, height 6' 2" (1 88 m), weight 111 kg (244 lb 6 4 oz)  , Body mass index is 31 38 kg/m²  [unfilled]    Physical Exam: /100 (BP Location: Right arm, Patient Position: Sitting, Cuff Size: Standard)   Pulse 70   Ht 6' 2" (1 88 m)   Wt 111 kg (244 lb 6 4 oz)   BMI 31 38 kg/m²   Physical Exam   Constitutional: He is oriented to person, place, and time  He appears well-nourished  No distress  Eyes: No scleral icterus  Neck: Neck supple  No JVD present  Cardiovascular: Normal rate and regular rhythm  Exam reveals no friction rub  Trace edema   Pulmonary/Chest: Effort normal and breath sounds normal  No respiratory distress  He has no wheezes  He has no rales  Abdominal: Soft  Bowel sounds are normal  He exhibits no distension  There is no tenderness  Neurological: He is alert and oriented to person, place, and time  Psychiatric: He has a normal mood and affect         Medications:    Current Outpatient Medications:     albuterol (PROVENTIL HFA,VENTOLIN HFA) 90 mcg/act inhaler, Inhale 2 puffs every 4 (four) hours as needed for wheezing, Disp: 1 Inhaler, Rfl: 0    aspirin (ECOTRIN LOW STRENGTH) 81 mg EC tablet, Take 1 tablet (81 mg total) by mouth daily, Disp: , Rfl: 0    carvedilol (COREG) 25 mg tablet, Take 2 tablets (50 mg total) by mouth 2 (two) times a day with meals, Disp: 60 tablet, Rfl: 2    colchicine (COLCRYS) 0 6 mg tablet, Take 1 tablet (0 6 mg total) by mouth every other day, Disp: 30 tablet, Rfl: 1    D 1000 1000 units capsule, TAKE 1 CAPSULE BY MOUTH EVERY DAY, Disp: 90 capsule, Rfl: 1    FREESTYLE LITE test strip, 1 EACH BY OTHER ROUTE 3 (THREE) TIMES A DAY DISPENSE FREESTYLE TEST STRIPS, Disp: 100 each, Rfl: 5    glucose blood (ACCU-CHEK HARVEY PLUS) test strip, Use as instructed, Disp: 100 each, Rfl: 0    insulin aspart (NOVOLOG FLEXPEN) 100 Units/mL injection pen, Inject 4 Units under the skin 3 (three) times a day with meals, Disp: 5 pen, Rfl: 0    insulin glargine (BASAGLAR KWIKPEN) 100 units/mL injection pen, Inject 24 Units under the skin daily at bedtime, Disp: 5 pen, Rfl: 0    Insulin Pen Needle (INSUPEN PEN NEEDLES) 31G X 5 MM MISC, by Does not apply route 4 (four) times a day, Disp: 100 each, Rfl: 5    NIFEdipine ER (ADALAT CC) 60 MG 24 hr tablet, Take 1 tablet (60 mg total) by mouth 2 (two) times a day, Disp: 30 tablet, Rfl: 2    predniSONE 20 mg tablet, Take 1 tablet (20 mg total) by mouth daily, Disp: 30 tablet, Rfl: 1    torsemide (DEMADEX) 20 mg tablet, Take 20 mg by mouth 2 (two) times a day, Disp: , Rfl:     doxazosin (CARDURA) 2 mg tablet, Take 1 tablet (2 mg total) by mouth daily at bedtime, Disp: 30 tablet, Rfl: 5    sildenafil (REVATIO) 20 mg tablet, 2-5 tablets as needed for sexual activity (Patient not taking: Reported on 7/3/2019), Disp: 30 tablet, Rfl: 3    Laboratory Results:  Lab Results   Component Value Date    WBC 9 90 05/23/2019    HGB 11 0 (L) 05/23/2019    HCT 32 3 (L) 05/23/2019    MCV 84 05/23/2019     05/23/2019     Lab Results   Component Value Date    SODIUM 139 07/08/2019    K 5 4 (H) 07/08/2019     07/08/2019    CO2 26 07/08/2019    BUN 54 (H) 07/08/2019    CREATININE 3 28 (H) 07/08/2019    GLUC 182 (H) 07/03/2019    CALCIUM 8 9 07/08/2019     Lab Results   Component Value Date    CALCIUM 8 9 07/08/2019     No results found for: LABPROT

## 2019-07-09 NOTE — LETTER
July 9, 2019     Owen Salter, [address] [facility]  [address] [place] [id number]    Patient: Candelaria Feliz   YOB: 1985   Date of Visit: 7/9/2019       Dear Dr Otto Payne: Thank you for referring Aashish Perrin to me for evaluation  Below are my notes for this consultation  If you have questions, please do not hesitate to call me  I look forward to following your patient along with you  Sincerely,        Jordin Cisneros MD        CC: No Recipients  Jordin Cisneros MD  7/9/2019  9:20 AM  Sign at close encounter  NEPHROLOGY PROGRESS NOTE    Candelaria Feliz 37 y o  male MRN: [id number]  Unit/Bed#:  Encounter: [id number]  Reason for Consult:  Chronic kidney disease and hypertension    The patient is here for follow-up  I initially met him in February and since then unfortunately he has had couple hospitalizations for blood pressure elevation as well as dizziness and 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  He was placed on prednisone and colchicine and is feeling well today except is little tired  ASSESSMENT/PLAN:  1  Renal  Patient's chronic kidney disease felt due to diabetic nephropathy because he has had diabetes for a long duration with very poor control and prior to this year he really was not doctoring that much  Unfortunately his creatinine has increased significantly since February when his running around 1 8  It seems to have peaked at around 3 9  When he was in the hospital the 2nd time his creatinine was 2 7 on discharge and they described to me that he was diuresed aggressively cause he had gained a lot of fluids after hydration from his prior hospitalization  His cardiac echocardiogram shows normal left ventricular function with good valvular function so at this point I am going to have him hold his torsemide and spironolactone    He will be weighing himself and I told him if he gains weight a couple lb and gets above his normal weight that he can take it torsemide periodically  Of course I told him he can call me for any instructions and guidance in between our next visit  Hopefully will see his creatinine decline as he did hold the diuretics a couple days and already went down to 3 2  Protein estimation is around 1-2 g  He is not on an ACE-inhibitor due to high potassium so that was discontinued  Long-term to help delay progression is glycemic control which is improved and hopefully will be even better once he is off prednisone  Blood pressure control and see comments below  Lipid control and for now our holding on an ACE-inhibitor  2  Hypertension  The patient currently is on carvedilol and nifedipine blood pressure is still elevated and he gets level similar to what I got today  I am going to hold an ACE-inhibitor or ARB as stated above for now and I will place him on doxazosin 2 mg at bed and after few days if his blood pressure is elevated he is tolerating the medication to increased to 4 mg at bedtime  I will be seeing him back in 4 weeks in the office with labs and blood pressure evaluation  I told him to call me if there is any problems or questions before the visit  SUBJECTIVE:  Review of Systems   Constitution: Negative for chills and fever  HENT: Negative  Eyes: Negative  Cardiovascular: Negative for chest pain, dyspnea on exertion, orthopnea and palpitations  Minimal swelling in his legs but much improved  Respiratory: Negative  Negative for cough, shortness of breath and wheezing  Gastrointestinal: Negative  Negative for abdominal pain, diarrhea, nausea and vomiting  Genitourinary: Negative  Negative for dysuria, hematuria and incomplete emptying  Neurological: Negative  Negative for dizziness and focal weakness  No dizziness  Psychiatric/Behavioral: Negative for altered mental status         OBJECTIVE:  Current Weight: Weight - Scale: 111 kg (244 lb 6 4 oz)  [email or web address]:     Blood pressure 170/100, pulse 70, height 6' 2" (1 88 m), weight 111 kg (244 lb 6 4 oz)  , Body mass index is 31 38 kg/m²  [unfilled]    Physical Exam: /100 (BP Location: Right arm, Patient Position: Sitting, Cuff Size: Standard)   Pulse 70   Ht 6' 2" (1 88 m)   Wt 111 kg (244 lb 6 4 oz)   BMI 31 38 kg/m²    Physical Exam   Constitutional: He is oriented to person, place, and time  He appears well-nourished  No distress  Eyes: No scleral icterus  Neck: Neck supple  No JVD present  Cardiovascular: Normal rate and regular rhythm  Exam reveals no friction rub  Trace edema   Pulmonary/Chest: Effort normal and breath sounds normal  No respiratory distress  He has no wheezes  He has no rales  Abdominal: Soft  Bowel sounds are normal  He exhibits no distension  There is no tenderness  Neurological: He is alert and oriented to person, place, and time  Psychiatric: He has a normal mood and affect         Medications:    Current Outpatient Medications:     albuterol (PROVENTIL HFA,VENTOLIN HFA) 90 mcg/act inhaler, Inhale 2 puffs every 4 (four) hours as needed for wheezing, Disp: 1 Inhaler, Rfl: 0    aspirin (ECOTRIN LOW STRENGTH) 81 mg EC tablet, Take 1 tablet (81 mg total) by mouth daily, Disp: , Rfl: 0    carvedilol (COREG) 25 mg tablet, Take 2 tablets (50 mg total) by mouth 2 (two) times a day with meals, Disp: 60 tablet, Rfl: 2    colchicine (COLCRYS) 0 6 mg tablet, Take 1 tablet (0 6 mg total) by mouth every other day, Disp: 30 tablet, Rfl: 1    D 1000 1000 units capsule, TAKE 1 CAPSULE BY MOUTH EVERY DAY, Disp: 90 capsule, Rfl: 1    FREESTYLE LITE test strip, 1 EACH BY OTHER ROUTE 3 (THREE) TIMES A DAY DISPENSE FREESTYLE TEST STRIPS, Disp: 100 each, Rfl: 5    glucose blood (ACCU-CHEK HARVEY PLUS) test strip, Use as instructed, Disp: 100 each, Rfl: 0    insulin aspart (NOVOLOG FLEXPEN) 100 Units/mL injection pen, Inject 4 Units under the skin 3 (three) times a day with meals, Disp: 5 pen, Rfl: 0    insulin glargine (BASAGLAR KWIKPEN) 100 units/mL injection pen, Inject 24 Units under the skin daily at bedtime, Disp: 5 pen, Rfl: 0    Insulin Pen Needle (INSUPEN PEN NEEDLES) 31G X 5 MM MISC, by Does not apply route 4 (four) times a day, Disp: 100 each, Rfl: 5    NIFEdipine ER (ADALAT CC) 60 MG 24 hr tablet, Take 1 tablet (60 mg total) by mouth 2 (two) times a day, Disp: 30 tablet, Rfl: 2    predniSONE 20 mg tablet, Take 1 tablet (20 mg total) by mouth daily, Disp: 30 tablet, Rfl: 1    torsemide (DEMADEX) 20 mg tablet, Take 20 mg by mouth 2 (two) times a day, Disp: , Rfl:     doxazosin (CARDURA) 2 mg tablet, Take 1 tablet (2 mg total) by mouth daily at bedtime, Disp: 30 tablet, Rfl: 5    sildenafil (REVATIO) 20 mg tablet, 2-5 tablets as needed for sexual activity (Patient not taking: Reported on 7/3/2019), Disp: 30 tablet, Rfl: 3    Laboratory Results:  Lab Results   Component Value Date    WBC 9 90 05/23/2019    HGB 11 0 (L) 05/23/2019    HCT 32 3 (L) 05/23/2019    MCV 84 05/23/2019     05/23/2019     Lab Results   Component Value Date    SODIUM 139 07/08/2019    K 5 4 (H) 07/08/2019     07/08/2019    CO2 26 07/08/2019    BUN 54 (H) 07/08/2019    CREATININE 3 28 (H) 07/08/2019    GLUC 182 (H) 07/03/2019    CALCIUM 8 9 07/08/2019     Lab Results   Component Value Date    CALCIUM 8 9 07/08/2019     No results found for: LABPROT

## 2019-07-11 ENCOUNTER — OFFICE VISIT (OUTPATIENT)
Dept: SLEEP CENTER | Facility: CLINIC | Age: 34
End: 2019-07-11
Payer: COMMERCIAL

## 2019-07-11 VITALS
HEIGHT: 73 IN | DIASTOLIC BLOOD PRESSURE: 88 MMHG | BODY MASS INDEX: 32.87 KG/M2 | SYSTOLIC BLOOD PRESSURE: 146 MMHG | HEART RATE: 84 BPM | WEIGHT: 248 LBS

## 2019-07-11 DIAGNOSIS — G47.30 SLEEP APNEA, UNSPECIFIED TYPE: ICD-10-CM

## 2019-07-11 DIAGNOSIS — R06.89 GASPING FOR BREATH: ICD-10-CM

## 2019-07-11 DIAGNOSIS — G47.52 DREAM ENACTMENT BEHAVIOR: ICD-10-CM

## 2019-07-11 DIAGNOSIS — G47.00 FREQUENT NOCTURNAL AWAKENING: ICD-10-CM

## 2019-07-11 DIAGNOSIS — R06.83 SNORING: Primary | ICD-10-CM

## 2019-07-11 DIAGNOSIS — G47.19 EXCESSIVE DAYTIME SLEEPINESS: ICD-10-CM

## 2019-07-11 DIAGNOSIS — G47.00 INSOMNIA, UNSPECIFIED TYPE: ICD-10-CM

## 2019-07-11 PROCEDURE — 99244 OFF/OP CNSLTJ NEW/EST MOD 40: CPT | Performed by: PSYCHIATRY & NEUROLOGY
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
## 2019-07-11 NOTE — PATIENT INSTRUCTIONS
Recommendations:  1) In lab diagnostic Polysomnography   2) Driving safety was reviewed with patient  If the patient feels too sleepy to drive he knows not to drive  If he becomes sleepy while driving he will pull over and nap  3) Follow-up after initiating treatment  4) Call with any questions or concerns  Dream enactment recommendations:  --Lowering mattress to the floor  --Padding corners of furniture  --Window protection  --Remove potentially dangerous objects, such as guns or sharp objects, from the bedroom  Weapons should be stored and locked away safely outside the bedroom with the key entrusted to another person  --Use of pillows as barricades between bed partners  It may be prudent for the bed partner to sleep in a separate bed or a different room until the symptoms are under better controlled  --Padded waterbeds have also been recommended  Insomnia recommendations:  1) Get up at the same time seven days out of the week  2) Only go to bed when feeling sleepy  3) Wind down in the evening without electronics  4) Stimulus Control: If lying in bed for 15-20 minutes (estimated because the clock is turned away so you cannot see it) and you are not asleep get up and do something relaxing in a different room (reading a magazine article, solitaire with a deck of cards)  Do this in the middle of the night as well if awake  Avoid doing work or getting on the computer  5) Bedroom for sleep only  No watching TV or using electronics (computer, phone, tablet etc )  in bed  6) Turn clock away so you cannot see it in bed  7) Exercise regularly but try to avoid exercise within 4 hours of bedtime  Morning exercise is best     8) Avoid caffeine in the afternoon  Considering tapering down on caffeine by decreasing by one beverage with caffeine every 3 days until off  9) Avoid smoking near bedtime    10) Avoid alcohol before bed    If you consume one alcoholic beverage allow 3 hours between that drink and bedtime  If you consume two alcoholic beverages allow 5 hours  Between those drinks and bedtime  Alcohol may lead to waking at night  11) Avoid napping except for driving safety  If you feel to sleepy to drive do not drive  If you get sleepy while driving pull over and nap  You may resume driving once you feel alert  12) Read "No More Sleepless Nights" by Swetha Zarate PhD     13) There are some on-line resources that do require a fee that can be of help  Two credible websites are as follows:  http://[employer]/cbt-online-insomnia-treatment html  [employer] si  An sallie used by the [place] is as follows:  CBT-I   Go! To Sleep by the [facility]

## 2019-07-18 DIAGNOSIS — E11.9 TYPE 2 DIABETES MELLITUS WITHOUT COMPLICATION, WITHOUT LONG-TERM CURRENT USE OF INSULIN (HCC): Primary | ICD-10-CM

## 2019-07-21 ENCOUNTER — HOSPITAL ENCOUNTER (OUTPATIENT)
Dept: SLEEP CENTER | Facility: CLINIC | Age: 34
Discharge: HOME | End: 2019-07-21
Payer: COMMERCIAL

## 2019-07-21 DIAGNOSIS — G47.19 EXCESSIVE DAYTIME SLEEPINESS: ICD-10-CM

## 2019-07-21 DIAGNOSIS — R06.83 SNORING: ICD-10-CM

## 2019-07-21 DIAGNOSIS — G47.52 DREAM ENACTMENT BEHAVIOR: ICD-10-CM

## 2019-07-21 DIAGNOSIS — G47.00 FREQUENT NOCTURNAL AWAKENING: ICD-10-CM

## 2019-07-21 DIAGNOSIS — G47.00 INSOMNIA, UNSPECIFIED TYPE: ICD-10-CM

## 2019-07-21 DIAGNOSIS — R06.89 GASPING FOR BREATH: ICD-10-CM

## 2019-07-21 PROCEDURE — 95810 POLYSOM 6/> YRS 4/> PARAM: CPT

## 2019-07-21 PROCEDURE — 95810 POLYSOM 6/> YRS 4/> PARAM: CPT | Performed by: PSYCHIATRY & NEUROLOGY

## 2019-07-22 LAB
LEFT EYE DIABETIC RETINOPATHY: NORMAL
RIGHT EYE DIABETIC RETINOPATHY: NORMAL

## 2019-07-22 PROCEDURE — 2022F DILAT RTA XM EVC RTNOPTHY: CPT | Performed by: FAMILY MEDICINE

## 2019-07-22 RX ORDER — INSULIN LISPRO 100 U/ML
INJECTION, SOLUTION SUBCUTANEOUS
Qty: 5 PEN | Refills: 5 | Status: SHIPPED | OUTPATIENT
Start: 2019-07-22 | End: 2020-02-15 | Stop reason: HOSPADM

## 2019-07-22 NOTE — PROGRESS NOTES
Sleep Study Documentation    Pre-Sleep Study       Sleep testing procedure explained to patient:YES    Patient napped prior to study:NO    Caffeine:Dayshift worker after 12PM   Caffeine use:NO    Alcohol:Dayshift workers after 5PM: Alcohol use:NO    Typical day for patient:YES       Study Documentation    Sleep Study Indications: sleep apnea, hypertension    Sleep Study: Extended Montage        EKG abnormalities: no     EEG abnormalities: no    Sleep Study Recorded < 2 hours: N/A    Sleep Study Recorded > 2 hours but incomplete study: N/A    Sleep Study Recorded 6 hours but no sleep obtained: NO    Patient classification: employed       Post-Sleep Study    Medication used at bedtime or during sleep study:YES other prescription medications    Patient reports time it took to fall asleep:less than 20 minutes    Patient reports waking up during study:1 to 2 times  Patient reports returning to sleep in 10 to 30 minutes  Patient reports sleeping 2 to 4 hours without dreaming  Patient reports sleep during study:typical    Patient rated sleepiness: Very sleepy or tired    PAP treatment:no

## 2019-07-23 DIAGNOSIS — G47.33 OSA (OBSTRUCTIVE SLEEP APNEA): Primary | ICD-10-CM

## 2019-07-25 ENCOUNTER — TELEPHONE (OUTPATIENT)
Dept: SLEEP CENTER | Facility: CLINIC | Age: 34
End: 2019-07-25

## 2019-07-25 NOTE — TELEPHONE ENCOUNTER
I spoke with patient, advised results of sleep study, scheduled titration study for 8/2/19 at [facility], instructions provided, patient verbalizes understanding

## 2019-07-25 NOTE — TELEPHONE ENCOUNTER
----- Message from Marcus Sarkar MD sent at 7/23/2019  2:33 PM EDT -----  PSG read  Needs CPAP titration

## 2019-07-26 ENCOUNTER — TELEPHONE (OUTPATIENT)
Dept: NEPHROLOGY | Facility: CLINIC | Age: 34
End: 2019-07-26

## 2019-07-26 DIAGNOSIS — I10 HYPERTENSION, UNSPECIFIED TYPE: ICD-10-CM

## 2019-07-26 NOTE — TELEPHONE ENCOUNTER
Message left on patient's voicemail    Lab order in Epic       ----- Message from [facility] sent at 7/26/2019 12:04 PM EDT -----  Please make sure MR Sal Valdez has a BMP done before appointment with Dr Cb Peacock  Thank you

## 2019-07-29 ENCOUNTER — APPOINTMENT (OUTPATIENT)
Dept: LAB | Facility: CLINIC | Age: 34
End: 2019-07-29
Payer: COMMERCIAL

## 2019-07-29 ENCOUNTER — OFFICE VISIT (OUTPATIENT)
Dept: FAMILY MEDICINE CLINIC | Facility: CLINIC | Age: 34
End: 2019-07-29

## 2019-07-29 ENCOUNTER — TELEPHONE (OUTPATIENT)
Dept: NEPHROLOGY | Facility: CLINIC | Age: 34
End: 2019-07-29

## 2019-07-29 VITALS
WEIGHT: 250 LBS | HEIGHT: 73 IN | SYSTOLIC BLOOD PRESSURE: 152 MMHG | RESPIRATION RATE: 16 BRPM | DIASTOLIC BLOOD PRESSURE: 80 MMHG | HEART RATE: 88 BPM | TEMPERATURE: 99 F | BODY MASS INDEX: 33.13 KG/M2

## 2019-07-29 DIAGNOSIS — N18.30 CKD (CHRONIC KIDNEY DISEASE) STAGE 3, GFR 30-59 ML/MIN (HCC): ICD-10-CM

## 2019-07-29 DIAGNOSIS — I10 HYPERTENSION, UNSPECIFIED TYPE: Primary | ICD-10-CM

## 2019-07-29 LAB
ANION GAP SERPL CALCULATED.3IONS-SCNC: 7 MMOL/L (ref 4–13)
BUN SERPL-MCNC: 44 MG/DL (ref 5–25)
CALCIUM SERPL-MCNC: 8.6 MG/DL (ref 8.3–10.1)
CHLORIDE SERPL-SCNC: 110 MMOL/L (ref 100–108)
CO2 SERPL-SCNC: 26 MMOL/L (ref 21–32)
CREAT SERPL-MCNC: 3.64 MG/DL (ref 0.6–1.3)
GFR SERPL CREATININE-BSD FRML MDRD: 23 ML/MIN/1.73SQ M
GLUCOSE P FAST SERPL-MCNC: 197 MG/DL (ref 65–99)
POTASSIUM SERPL-SCNC: 4.7 MMOL/L (ref 3.5–5.3)
SODIUM SERPL-SCNC: 143 MMOL/L (ref 136–145)

## 2019-07-29 PROCEDURE — 80048 BASIC METABOLIC PNL TOTAL CA: CPT

## 2019-07-29 PROCEDURE — 99213 OFFICE O/P EST LOW 20 MIN: CPT | Performed by: FAMILY MEDICINE

## 2019-07-29 PROCEDURE — 36415 COLL VENOUS BLD VENIPUNCTURE: CPT

## 2019-07-29 NOTE — ASSESSMENT & PLAN NOTE
Improving but still not at goal   Blood pressure 152/80 today  Currently on nifedipine 60 mg b i d  And carvedilol 25 mg b i d  Spironolactone has been held by Cardiology due to lightheadedness and dizziness  Torsemide as needed for leg swelling  Will continue to monitor blood pressures  recommended home bp  Monitoring and bringing log with next visit  Will reassess after prednisone complete  Patient currently on prednisone which may be contributing to elevated pressure  Counseled on the effects of healthy diet and exercise and improve blood pressure  He is in agreement he will try harder to cut out process foods and have more healthy lifestyle

## 2019-07-29 NOTE — TELEPHONE ENCOUNTER
The patient had called and wanted to let you know that on Friday he had a bp reading of 180/110 and a heart rate of 91 and that he wanted to just keep an eye on it  He wants to let you know that today at his PCP office his BP was 153/78 with a heart rate of 88  Please advise if any changes needs to be made regarding his elevated blood pressure readings        Shaheed Pedraza MA 115

## 2019-07-29 NOTE — PROGRESS NOTES
Assessment/Plan:    Hypertension  Improving but still not at goal   Blood pressure 152/80 today  Currently on nifedipine 60 mg b i d  And carvedilol 25 mg b i d  Spironolactone has been held by Cardiology due to lightheadedness and dizziness  Torsemide as needed for leg swelling  Will continue to monitor blood pressures  recommended home bp  Monitoring and bringing log with next visit  Will reassess after prednisone complete  Patient currently on prednisone which may be contributing to elevated pressure  Counseled on the effects of healthy diet and exercise and improve blood pressure  He is in agreement he will try harder to cut out process foods and have more healthy lifestyle  Problem List Items Addressed This Visit        Cardiovascular and Mediastinum    Hypertension - Primary     Improving but still not at goal   Blood pressure 152/80 today  Currently on nifedipine 60 mg b i d  And carvedilol 25 mg b i d  Spironolactone has been held by Cardiology due to lightheadedness and dizziness  Torsemide as needed for leg swelling  Will continue to monitor blood pressures  recommended home bp  Monitoring and bringing log with next visit  Will reassess after prednisone complete  Patient currently on prednisone which may be contributing to elevated pressure  Counseled on the effects of healthy diet and exercise and improve blood pressure  He is in agreement he will try harder to cut out process foods and have more healthy lifestyle  Subjective:      Patient ID: Jak Singh is a 37 y o  male  Ozzie Uriostegui presents to the clinic for hypertension follow-up  Since the last time he was seen here he was taken off spironolactone and torsemide was made p r n  By the cardiologist   He still admits leg swelling and takes torsemide few times a week  Since the changes to these medications his dizziness and lightheadedness have resolved  He is currently on carvedilol 25 mg b i d   And nifedipine 60 mg b i d  He denies any chest pain shortness of breath nausea vomiting headache visual disturbances and syncope  He was also seen by sleep medicine found to have likely sleep apnea  He is in the process of being further worked up  He will continue follow-up with them  He has an appointment on August 13th  The following portions of the patient's history were reviewed and updated as appropriate: allergies, current medications, past family history, past medical history, past social history, past surgical history and problem list     Review of Systems   Eyes: Negative for visual disturbance  Respiratory: Negative for cough and shortness of breath  Cardiovascular: Positive for leg swelling  Negative for chest pain and palpitations  Gastrointestinal: Negative for nausea and vomiting  Endocrine: Negative for polydipsia, polyphagia and polyuria  Neurological: Negative for dizziness, syncope, light-headedness and headaches  Objective:      /80 (BP Location: Left arm, Patient Position: Sitting, Cuff Size: Large)   Pulse 88   Temp 99 °F (37 2 °C) (Tympanic)   Resp 16   Ht 6' 1" (1 854 m)   Wt 113 kg (250 lb)   BMI 32 98 kg/m²          Physical Exam   Constitutional: No distress  Cardiovascular: Normal rate, regular rhythm and normal heart sounds  Pulmonary/Chest: Effort normal and breath sounds normal  He has no rales  Musculoskeletal: He exhibits edema (1+)  Skin: He is not diaphoretic  Nursing note and vitals reviewed

## 2019-07-30 NOTE — TELEPHONE ENCOUNTER
Spoke with the patient and he is aware to not make any changes at this time and he is aware of his upcoming appointment with Dr Hilda Delaney MA

## 2019-08-01 DIAGNOSIS — I10 HYPERTENSION, UNSPECIFIED TYPE: ICD-10-CM

## 2019-08-01 RX ORDER — DOXAZOSIN 2 MG/1
4 TABLET ORAL
Qty: 30 TABLET | Refills: 5 | Status: SHIPPED | OUTPATIENT
Start: 2019-08-01 | End: 2019-10-10 | Stop reason: SDUPTHER

## 2019-08-07 DIAGNOSIS — I31.9 MYOPERICARDITIS: ICD-10-CM

## 2019-08-07 RX ORDER — PREDNISONE 10 MG/1
10 TABLET ORAL DAILY
Qty: 60 TABLET | Refills: 0 | Status: SHIPPED | OUTPATIENT
Start: 2019-08-07 | End: 2019-08-21 | Stop reason: ALTCHOICE

## 2019-08-07 NOTE — TELEPHONE ENCOUNTER
Please tell the pt we are reducing this to 10 mg daily  Have him schedule an OV with Haylee De La Torre in the new months or two   thanks

## 2019-08-09 ENCOUNTER — OFFICE VISIT (OUTPATIENT)
Dept: NEPHROLOGY | Facility: CLINIC | Age: 34
End: 2019-08-09
Payer: COMMERCIAL

## 2019-08-09 ENCOUNTER — TRANSCRIBE ORDERS (OUTPATIENT)
Dept: LAB | Facility: CLINIC | Age: 34
End: 2019-08-09

## 2019-08-09 VITALS
HEART RATE: 80 BPM | BODY MASS INDEX: 33.4 KG/M2 | WEIGHT: 252 LBS | HEIGHT: 73 IN | DIASTOLIC BLOOD PRESSURE: 90 MMHG | SYSTOLIC BLOOD PRESSURE: 158 MMHG

## 2019-08-09 DIAGNOSIS — I10 HYPERTENSION, UNSPECIFIED TYPE: ICD-10-CM

## 2019-08-09 DIAGNOSIS — N18.30 CKD (CHRONIC KIDNEY DISEASE) STAGE 3, GFR 30-59 ML/MIN (HCC): Primary | ICD-10-CM

## 2019-08-09 PROCEDURE — 99214 OFFICE O/P EST MOD 30 MIN: CPT | Performed by: INTERNAL MEDICINE

## 2019-08-09 NOTE — PROGRESS NOTES
NEPHROLOGY PROGRESS NOTE    Ema Meraz 37 y o  male MRN: [id number]  Unit/Bed#:  Encounter: [id number]  Reason for Consult:  Chronic kidney disease and hypertension    The patient is here for follow-up I saw him last month  He started his doxazosin and is on 4 mg at bed along with his other antihypertensives  He states that they do notice the blood pressures been down some in the morning compared to before but still goes up and down throughout the day says he feels a little tired at times and when his blood pressures lower feels a little foggy in the head  Other than that is doing relatively well with no hospitalizations  ASSESSMENT/PLAN:  1  Renal    Patient's chronic kidney disease due to diabetic nephropathy creatinine used to be in the high 2s now is running in the mid 3 range  I suspect this is due to diuresis has he does have some edema due to his kidney disease as well as his nifedipine  Creatinine will fluctuate slightly in his diuretic was initiated after the hospitalizations that is likely what is little bit higher  We will continue to monitor to make sure remained stable if it would increase we may need to back down on the diuretic although it does appear required  Glycemic control is rather acceptable and were trying to lower his blood pressure see below  At this point I do not have him on an ACE-inhibitor because protein estimation was under 2 g and given the fluctuation changing his renal function I prefer not to use an ACE-inhibitor at this time at some point we likely will try and implement that  Monitor protein concentrations in the urine  2  Hypertension    The patient has history of severe hypertension with very severely elevated levels in the past and difficult to control  I added doxazosin and he is up to 4 mg at bed and blood pressures at home will sometimes be as low as 120 but tend to be around 150-160 in the morning    Given he is having a little bit of issues with tolerating medicine although they do not sound bad enough to stop and I am just going to continue him on all his medicines at the same dosages and hopeful that over time his blood pressure will become a little bit more controllable as it is starting to reduce and hopefully that will help resolve some of the microvascular changes that can occur related to chronic hypertension  I told to call me if anything which change of the blood pressure comes to lower as any other concerns and will just see him back in a couple months on the same medications  I did inform the patient that at his age with the advanced nature of his kidney disease at some point he is likely to end up with end-stage renal disease so when the times available I told him we could refer for transplant and he does sound interested saw continue to monitor for this  SUBJECTIVE:  Review of Systems   Constitution: Negative for chills and fever  HENT: Negative  Eyes: Negative  Cardiovascular: Positive for leg swelling  Negative for chest pain, dyspnea on exertion and orthopnea  Respiratory: Negative  Negative for cough, shortness of breath and wheezing  Gastrointestinal: Negative  Negative for bloating, abdominal pain, diarrhea, nausea and vomiting  Genitourinary: Negative  Negative for dysuria, hematuria and incomplete emptying  Neurological: Negative  Psychiatric/Behavioral: Negative for altered mental status  OBJECTIVE:  Current Weight: Weight - Scale: 114 kg (252 lb)  [email or web address]     Blood pressure 158/90, pulse 80, height 6' 1" (1 854 m), weight 114 kg (252 lb)  , Body mass index is 33 25 kg/m²  [unfilled]    Physical Exam: /90 (Patient Position: Other (Comment), Cuff Size: Standard) Comment (Patient Position): Sitting and standing  Pulse 80   Ht 6' 1" (1 854 m)   Wt 114 kg (252 lb)   BMI 33 25 kg/m²   Physical Exam   Constitutional: He is oriented to person, place, and time  No distress  Eyes: No scleral icterus  Neck: Neck supple  No JVD present  Cardiovascular: Normal rate and regular rhythm  Exam reveals no friction rub  Positive edema  Pulmonary/Chest: Effort normal and breath sounds normal  No respiratory distress  He has no wheezes  He has no rales  Abdominal: Soft  Bowel sounds are normal  He exhibits no distension  There is no tenderness  Neurological: He is alert and oriented to person, place, and time  Psychiatric: He has a normal mood and affect         Medications:    Current Outpatient Medications:     ADMELOG SOLOSTAR 100 units/mL injection pen, INJECT 4 UNITS 3 TIMES A DAY WITH MEALS, Disp: 5 pen, Rfl: 5    albuterol (PROVENTIL HFA,VENTOLIN HFA) 90 mcg/act inhaler, Inhale 2 puffs every 4 (four) hours as needed for wheezing, Disp: 1 Inhaler, Rfl: 0    aspirin (ECOTRIN LOW STRENGTH) 81 mg EC tablet, Take 1 tablet (81 mg total) by mouth daily, Disp: , Rfl: 0    carvedilol (COREG) 25 mg tablet, Take 2 tablets (50 mg total) by mouth 2 (two) times a day with meals, Disp: 60 tablet, Rfl: 2    colchicine (COLCRYS) 0 6 mg tablet, Take 1 tablet (0 6 mg total) by mouth every other day, Disp: 30 tablet, Rfl: 1    D 1000 1000 units capsule, TAKE 1 CAPSULE BY MOUTH EVERY DAY, Disp: 90 capsule, Rfl: 1    doxazosin (CARDURA) 2 mg tablet, Take 2 tablets (4 mg total) by mouth daily at bedtime, Disp: 30 tablet, Rfl: 5    FREESTYLE LITE test strip, 1 EACH BY OTHER ROUTE 3 (THREE) TIMES A DAY DISPENSE FREESTYLE TEST STRIPS, Disp: 100 each, Rfl: 5    glucose blood (ACCU-CHEK HARVEY PLUS) test strip, Use as instructed, Disp: 100 each, Rfl: 0    insulin aspart (NOVOLOG FLEXPEN) 100 Units/mL injection pen, Inject 4 Units under the skin 3 (three) times a day with meals, Disp: 5 pen, Rfl: 0    insulin glargine (BASAGLAR KWIKPEN) 100 units/mL injection pen, Inject 24 Units under the skin daily at bedtime, Disp: 5 pen, Rfl: 0    Insulin Pen Needle (INSUPEN PEN NEEDLES) 31G X 5 MM MISC, by Does not apply route 4 (four) times a day, Disp: 100 each, Rfl: 5    NIFEdipine ER (ADALAT CC) 60 MG 24 hr tablet, Take 1 tablet (60 mg total) by mouth 2 (two) times a day, Disp: 30 tablet, Rfl: 2    predniSONE 10 mg tablet, Take 1 tablet (10 mg total) by mouth daily, Disp: 60 tablet, Rfl: 0    torsemide (DEMADEX) 20 mg tablet, Take 20 mg by mouth 2 (two) times a day, Disp: , Rfl:     sildenafil (REVATIO) 20 mg tablet, 2-5 tablets as needed for sexual activity (Patient not taking: Reported on 7/3/2019), Disp: 30 tablet, Rfl: 3    Laboratory Results:  Lab Results   Component Value Date    WBC 9 90 05/23/2019    HGB 11 0 (L) 05/23/2019    HCT 32 3 (L) 05/23/2019    MCV 84 05/23/2019     05/23/2019     Lab Results   Component Value Date    SODIUM 143 07/29/2019    K 4 7 07/29/2019     (H) 07/29/2019    CO2 26 07/29/2019    BUN 44 (H) 07/29/2019    CREATININE 3 64 (H) 07/29/2019    GLUC 182 (H) 07/03/2019    CALCIUM 8 6 07/29/2019     Lab Results   Component Value Date    CALCIUM 8 6 07/29/2019     No results found for: LABPROT

## 2019-08-09 NOTE — LETTER
August 9, 2019     Claude Bess, [address]  [address] [id number]    Patient: Rose Wilson   YOB: 1985   Date of Visit: 8/9/2019       Dear Dr Sánchez Batch: Thank you for referring Eva Vasquez to me for evaluation  Below are my notes for this consultation  If you have questions, please do not hesitate to call me  I look forward to following your patient along with you  Sincerely,        Anne-Marie Villanueva MD        CC: No Recipients  Anne-Marie Villanueva MD  8/9/2019 10:22 AM  Sign at close encounter  [address] 37 y o  male MRN: [id number]  Unit/Bed#:  Encounter: [id number]  Reason for Consult:  Chronic kidney disease and hypertension    The patient is here for follow-up I saw him last month  He started his doxazosin and is on 4 mg at bed along with his other antihypertensives  He states that they do notice the blood pressures been down some in the morning compared to before but still goes up and down throughout the day says he feels a little tired at times and when his blood pressures lower feels a little foggy in the head  Other than that is doing relatively well with no hospitalizations  ASSESSMENT/PLAN:  1  Renal    Patient's chronic kidney disease due to diabetic nephropathy creatinine used to be in the high 2s now is running in the mid 3 range  I suspect this is due to diuresis has he does have some edema due to his kidney disease as well as his nifedipine  Creatinine will fluctuate slightly in his diuretic was initiated after the hospitalizations that is likely what is little bit higher  We will continue to monitor to make sure remained stable if it would increase we may need to back down on the diuretic although it does appear required  Glycemic control is rather acceptable and were trying to lower his blood pressure see below    At this point I do not have him on an ACE-inhibitor because protein estimation was under 2 g and given the fluctuation changing his renal function I prefer not to use an ACE-inhibitor at this time at some point we likely will try and implement that  Monitor protein concentrations in the urine  2  Hypertension    The patient has history of severe hypertension with very severely elevated levels in the past and difficult to control  I added doxazosin and he is up to 4 mg at bed and blood pressures at home will sometimes be as low as 120 but tend to be around 150-160 in the morning  Given he is having a little bit of issues with tolerating medicine although they do not sound bad enough to stop and I am just going to continue him on all his medicines at the same dosages and hopeful that over time his blood pressure will become a little bit more controllable as it is starting to reduce and hopefully that will help resolve some of the microvascular changes that can occur related to chronic hypertension  I told to call me if anything which change of the blood pressure comes to lower as any other concerns and will just see him back in a couple months on the same medications  I did inform the patient that at his age with the advanced nature of his kidney disease at some point he is likely to end up with end-stage renal disease so when the times available I told him we could refer for transplant and he does sound interested saw continue to monitor for this  SUBJECTIVE:  Review of Systems   Constitution: Negative for chills and fever  HENT: Negative  Eyes: Negative  Cardiovascular: Positive for leg swelling  Negative for chest pain, dyspnea on exertion and orthopnea  Respiratory: Negative  Negative for cough, shortness of breath and wheezing  Gastrointestinal: Negative  Negative for bloating, abdominal pain, diarrhea, nausea and vomiting  Genitourinary: Negative  Negative for dysuria, hematuria and incomplete emptying  Neurological: Negative      Psychiatric/Behavioral: Negative for altered mental status  OBJECTIVE:  Current Weight: Weight - Scale: 114 kg (252 lb)  [email or web address]:     Blood pressure 158/90, pulse 80, height 6' 1" (1 854 m), weight 114 kg (252 lb)  , Body mass index is 33 25 kg/m²  [unfilled]    Physical Exam: /90 (Patient Position: Other (Comment), Cuff Size: Standard) Comment (Patient Position): Sitting and standing  Pulse 80   Ht 6' 1" (1 854 m)   Wt 114 kg (252 lb)   BMI 33 25 kg/m²    Physical Exam   Constitutional: He is oriented to person, place, and time  No distress  Eyes: No scleral icterus  Neck: Neck supple  No JVD present  Cardiovascular: Normal rate and regular rhythm  Exam reveals no friction rub  Positive edema  Pulmonary/Chest: Effort normal and breath sounds normal  No respiratory distress  He has no wheezes  He has no rales  Abdominal: Soft  Bowel sounds are normal  He exhibits no distension  There is no tenderness  Neurological: He is alert and oriented to person, place, and time  Psychiatric: He has a normal mood and affect         Medications:    Current Outpatient Medications:     ADMELOG SOLOSTAR 100 units/mL injection pen, INJECT 4 UNITS 3 TIMES A DAY WITH MEALS, Disp: 5 pen, Rfl: 5    albuterol (PROVENTIL HFA,VENTOLIN HFA) 90 mcg/act inhaler, Inhale 2 puffs every 4 (four) hours as needed for wheezing, Disp: 1 Inhaler, Rfl: 0    aspirin (ECOTRIN LOW STRENGTH) 81 mg EC tablet, Take 1 tablet (81 mg total) by mouth daily, Disp: , Rfl: 0    carvedilol (COREG) 25 mg tablet, Take 2 tablets (50 mg total) by mouth 2 (two) times a day with meals, Disp: 60 tablet, Rfl: 2    colchicine (COLCRYS) 0 6 mg tablet, Take 1 tablet (0 6 mg total) by mouth every other day, Disp: 30 tablet, Rfl: 1    D 1000 1000 units capsule, TAKE 1 CAPSULE BY MOUTH EVERY DAY, Disp: 90 capsule, Rfl: 1    doxazosin (CARDURA) 2 mg tablet, Take 2 tablets (4 mg total) by mouth daily at bedtime, Disp: 30 tablet, Rfl: 5    FREESTYLE LITE test strip, 1 EACH BY OTHER ROUTE 3 (THREE) TIMES A DAY DISPENSE FREESTYLE TEST STRIPS, Disp: 100 each, Rfl: 5    glucose blood (ACCU-CHEK HARVEY PLUS) test strip, Use as instructed, Disp: 100 each, Rfl: 0    insulin aspart (NOVOLOG FLEXPEN) 100 Units/mL injection pen, Inject 4 Units under the skin 3 (three) times a day with meals, Disp: 5 pen, Rfl: 0    insulin glargine (BASAGLAR KWIKPEN) 100 units/mL injection pen, Inject 24 Units under the skin daily at bedtime, Disp: 5 pen, Rfl: 0    Insulin Pen Needle (INSUPEN PEN NEEDLES) 31G X 5 MM MISC, by Does not apply route 4 (four) times a day, Disp: 100 each, Rfl: 5    NIFEdipine ER (ADALAT CC) 60 MG 24 hr tablet, Take 1 tablet (60 mg total) by mouth 2 (two) times a day, Disp: 30 tablet, Rfl: 2    predniSONE 10 mg tablet, Take 1 tablet (10 mg total) by mouth daily, Disp: 60 tablet, Rfl: 0    torsemide (DEMADEX) 20 mg tablet, Take 20 mg by mouth 2 (two) times a day, Disp: , Rfl:     sildenafil (REVATIO) 20 mg tablet, 2-5 tablets as needed for sexual activity (Patient not taking: Reported on 7/3/2019), Disp: 30 tablet, Rfl: 3    Laboratory Results:  Lab Results   Component Value Date    WBC 9 90 05/23/2019    HGB 11 0 (L) 05/23/2019    HCT 32 3 (L) 05/23/2019    MCV 84 05/23/2019     05/23/2019     Lab Results   Component Value Date    SODIUM 143 07/29/2019    K 4 7 07/29/2019     (H) 07/29/2019    CO2 26 07/29/2019    BUN 44 (H) 07/29/2019    CREATININE 3 64 (H) 07/29/2019    GLUC 182 (H) 07/03/2019    CALCIUM 8 6 07/29/2019     Lab Results   Component Value Date    CALCIUM 8 6 07/29/2019     No results found for: LABPROT

## 2019-08-09 NOTE — PATIENT INSTRUCTIONS
With respect your kidney function your creatinine has gone up since you initiated the diuretic but appears to be relatively stable it does fluctuate a little bit and that may be due to the water pill usage in change in blood pressure  We will continue to monitor this but underneath it all diabetes has affected the kidneys  We did discuss today that in the future if he becomes to a certain point you would be interested in a transplant evaluation referral and I will help guide you to that when it is necessary  With respect your blood pressure it sounds like we have made some progress in reducing the blood pressure but your still feeling a little foggy in the head dizzy at times fatigued and I think that when patients have chronic uncontrolled high blood pressure and he gets a little bit lower there body has to get use to that so were not going to add medications even though your blood pressure is not ideal and at this point I do not want to take any away even though your having some side effects like swelling and will monitor your blood pressure of next couple months see if it improves and if you start to tolerate the side effects better  Certainly call me if there is any questions or concerns before you next visit

## 2019-08-13 ENCOUNTER — HOSPITAL ENCOUNTER (OUTPATIENT)
Dept: SLEEP CENTER | Facility: CLINIC | Age: 34
Discharge: HOME | End: 2019-08-13
Payer: COMMERCIAL

## 2019-08-13 DIAGNOSIS — G47.33 OSA (OBSTRUCTIVE SLEEP APNEA): ICD-10-CM

## 2019-08-13 PROCEDURE — 95811 POLYSOM 6/>YRS CPAP 4/> PARM: CPT | Performed by: PSYCHIATRY & NEUROLOGY

## 2019-08-13 PROCEDURE — 95811 POLYSOM 6/>YRS CPAP 4/> PARM: CPT

## 2019-08-14 NOTE — PROGRESS NOTES
Sleep Study Documentation    Pre-Sleep Study       Sleep testing procedure explained to patient:YES    Patient napped prior to study:YES- less than 30 minutes  Napped after 2PM: no    Caffeine:Dayshift worker after 12PM   Caffeine use:NO    Alcohol:Dayshift workers after 5PM: Alcohol use:NO    Typical day for patient:YES       Study Documentation    Sleep Study Indications: The patient experienced hypopneas, central apnea, and soft to moderate snoring with arousals  All stages of sleep were achieved  Occasional PVC's were noted on the EKG  CPAP was tolerated well with a standard ResMed Ledesma FX nasal pillow mask at 9cm, with heated humidification and a chin strap  Snoring was eliminated at 7cm on CPAP  Sleep Study: Treatment   Optimal PAP pressure: 9cm  Leak:None  Snore:Eliminated  REM Obtained:yes  Supplemental O2: no    Minimum SaO2 87  Baseline SaO2 93  PAP mask choice standard ResMed Ledesma FX with chin strap  PAP mask type:pillows  PAP pressure at which snoring was eliminated 7cm  Mode of Therapy:CPAP  ETCO2:No  CPAP changed to BiPAP:No      EKG abnormalities: yes: Occasional PVC's noted     EEG abnormalities: no    Sleep Study Recorded < 2 hours: N/A    Sleep Study Recorded > 2 hours but incomplete study: N/A    Sleep Study Recorded 6 hours but no sleep obtained: NO    Patient classification: employed       Post-Sleep Study    Medication used at bedtime or during sleep study:NO    Patient reports time it took to fall asleep:20 to 30 minutes    Patient reports waking up during study:1 to 2 times  Patient reports returning to sleep in 10 to 30 minutes  Patient reports sleeping 6 to 8 hours without dreaming  Patient reports sleep during study:better than usual    Patient rated sleepiness: Somewhat sleepy or tired    PAP treatment:yes: Post PAP treatment patient reports feeling better and  would wear PAP mask at home

## 2019-08-15 DIAGNOSIS — G47.33 OSA (OBSTRUCTIVE SLEEP APNEA): Primary | ICD-10-CM

## 2019-08-16 ENCOUNTER — TELEPHONE (OUTPATIENT)
Dept: SLEEP CENTER | Facility: CLINIC | Age: 34
End: 2019-08-16

## 2019-08-16 NOTE — TELEPHONE ENCOUNTER
Discussed study results- scheduled for DME set-up & compliance follow-up- paperwork to [address]

## 2019-08-19 ENCOUNTER — HOSPITAL ENCOUNTER (OUTPATIENT)
Dept: NON INVASIVE DIAGNOSTICS | Facility: CLINIC | Age: 34
Discharge: HOME | End: 2019-08-19
Payer: COMMERCIAL

## 2019-08-19 DIAGNOSIS — I31.9 MYOPERICARDITIS: ICD-10-CM

## 2019-08-19 DIAGNOSIS — I10 HYPERTENSION: ICD-10-CM

## 2019-08-19 PROCEDURE — 93308 TTE F-UP OR LMTD: CPT | Performed by: INTERNAL MEDICINE

## 2019-08-19 PROCEDURE — 93321 DOPPLER ECHO F-UP/LMTD STD: CPT | Performed by: INTERNAL MEDICINE

## 2019-08-19 PROCEDURE — 93308 TTE F-UP OR LMTD: CPT

## 2019-08-19 PROCEDURE — 93325 DOPPLER ECHO COLOR FLOW MAPG: CPT | Performed by: INTERNAL MEDICINE

## 2019-08-19 RX ORDER — NIFEDIPINE 60 MG/1
60 TABLET, FILM COATED, EXTENDED RELEASE ORAL 2 TIMES DAILY
Qty: 90 TABLET | Refills: 3 | Status: SHIPPED | OUTPATIENT
Start: 2019-08-19 | End: 2020-02-24

## 2019-08-19 NOTE — PROGRESS NOTES
Follow Up Office Visit Note  Maya Ortiz   37 y o    male   MRN: [id number]  [address]  [address]  [place] [address] [facility] [place] [address]ke[place] [address]  [phone]    PCP: Guy Alberto MD  Cardiologist: Dr Marina Arnold        Assessment/plan   Hx Iatrogenic Volume overload secondary to steroids/pts increased fluid intake to 3-4 L , with weight gain of up to 30 lb, in the setting of progressive renal dysfunction, noted May 31st  --discharge weight May 25: 240lb    At home: 245 this am, in his underwear  --discharged on torsemide 20 daily, increased to 20 b i d  May 31st per renal  NOW, only PRN  Last dose about a week and a half ago   Advised to restart torsemide 10 mg every other day  --low-sodium diet  daily weights  If wt increases 3 lb in 1 day or 5 lb in 5 days advised to call us  Chest heaviness secondary to myopericarditis in the past, today due to volume overload  Obstructive sleep apnea, new dx-CPAP study at [place] 8/2/19  Is awaiting his equipment to be delivered on the 28th of August  Chronic kidney disease, stage III-IV, baseline creatinine appears to be 2 2-2 5, per Nephrology, now in mid 3 range  Follows with Nephrology/Amy as an outpatient  Myopericarditis-recent limited echo showed no evidence of pericardial effusion  Stop colchicine  Stop prednisone  NSVT, noted on telemetry in hospital    carvedilol up titrated  Hypertension  /98,    on carvedilol 50 mg b i d , nifedipine 60 mg b i d , and torsemide 10 mg BID PRN  and Doxazosin 4 mg HS   --change torsemide to 10 mg every other day  Hyperlipidemia, familial- on atorvastatin 40 mg daily, with suboptimum response  --fasting lipid profile 5/21/19:  Direct , non , ,   --fasting lipid profile 8/9/18 (done OFF statin)  Direct , non , triglyceride 144, total cholesterol 287  --recheck lipids around mid October  --goal LDL under 100, non    If found to have CV disease, goal LDL will be less than 70  DM, 2  Last hemoglobin A1c  7 1  5/21/19  Cardiac testing  --TTE 5/23/19   EF 65%  No regional motion abnormalities  There was a small free-flowing pericardial effusion was identified circumferential to the heart  There was no evidence of hemodynamic compromise  --TTE limited echo 8/19/19  Ejection fraction 60%  Right ventricle normal   No significant valve disease  No pericardial effusion  Summary of recommendations  Low-sodium diet  avoiding NSAIDs  Torsemide 10 mg every other day  Daily weights and call us his weight is going up  Previously was on torsemide 20 b i d and his renal function was worsening  He has lab work scheduled for Dr Linnette Stewart at September 2nd  He will receive his CPAP equipment on the 28th of August  Recheck lipids mid October  Stop colchicine  Stop prednisone  Otherwise, follow up with Dr Clover Aguirre 6 weeks                HPI  Alfreda Charles is a 36 yo  with a history of hypertension, diabetes, dyslipidemia, CKD with baseline creatinine up to 2 6, who was recenlty admitted with chest pain due to myopericarditis  He does not have known coronary artery disease  An echocardiogram showed a small free-flowing fluid circumferential to the heart without hemodynamic compromise; ejection fraction normal 5/23/19     Given his advanced CKD, he was initiated on steroids and colchicine  He has had significant hypertension despite up titration of several of his medications  A secondary workup has been initiated  He is following closely with Nephrology  As well as PCP  Recently he gained 30 lb since his discharge  He was also noted he was drinking up to 4 L of fluid daily; this was modified by Nephrology  His family physician added spironolactone 50 mg daily  Nephrology uptitrated his diuretics    Labs are being followed very closely by Nephrology    6/5/19 NS       7/3/19 He presents for cardiology follow-up, accompanied by his wife  He reports previously there was an insurance issue which did not allow him to come  Therefore, he stopped the steroids and colchicine as he had no more refills  He was on colchicine for a total of 5 pills as it was ordered every other day  He denies any chest pain  He reports it was chest heaviness before, and that has resolved  Sometimes when he takes a deep breath it somewhat limited though  Unfortunately, since his torsemide was increased and spironolactone was added, he has been dizzy  He has been lightheaded  Today in the office he was near syncopal when he got up off the exam table  His blood pressure today is 104/76 at baseline, in the chair  I asked him to get some stat blood work this morning  I did suggest that he might need to be rehospitalized if it is abnormal     If for some reason his labs are okay, I restarted him on colchicine every other day and low-dose prednisone  I stopped torsemide as well as spironolactone  I ordered a repeat limited echocardiogram as follow-up  He has an appointment next week with his nephrologist     Further, he has familial hyperlipidemia  In the past he was on atorvastatin  He stopped because he had some visual symptoms  When he was hospitalized, this was restarted  The patient has since again stopped it  On Monday he has an appointment with an ophthalmologist   His lipids need to be addressed in the future  His LDL was greater than 200      8/21/19  Follow-up (1 mo )   Dizziness (with activity )   Fatigue (everyday )   Shortness of Breath (with physical activity )     He returns for follow-up, accompanied by his wife  He is fatigued  He has chest heaviness and dyspnea with position changes particularly when he is trying to lie supine  Weight is up about 5 lb  Last time he took torsemide was about a week and half ago  He agrees to start torsemide 10 mg every other day  perform daily weights and call us if his weight is increased    Will likely need to up titrate his torsemide  He has labs scheduled in the near future by Nephrology  His echocardiogram showed no evidence of pericardial effusion  Will stop his colchicine and prednisone  Awaiting CPAP equipment        Assessment  Diagnoses and all orders for this visit:    Myopericarditis    Hyperlipidemia, unspecified hyperlipidemia type    Hypertension, unspecified type    LUIS (obstructive sleep apnea)    Class 1 obesity due to excess calories with serious comorbidity and body mass index (BMI) of 31 0 to 31 9 in adult    Hypervolemia, unspecified hypervolemia type  -     torsemide (DEMADEX) 20 mg tablet; Half a tablet, 10 mg, every other day    Stage 4 chronic kidney disease (HCC)  -     torsemide (DEMADEX) 20 mg tablet; Half a tablet, 10 mg, every other day          Past Medical History:   Diagnosis Date    CKD (chronic kidney disease) 2/21/2019    Class 1 obesity due to excess calories with serious comorbidity and body mass index (BMI) of 31 0 to 31 9 in adult 5/25/2019    Diabetes mellitus ([facility] )     Essential hypertension 10/31/2017    Hyperlipidemia     NSTEMI (non-ST elevated myocardial infarction) ([facility] ) 5/21/2019       Review of Systems   Constitution: Positive for malaise/fatigue  Negative for chills and weight loss  Cardiovascular: Positive for dyspnea on exertion  Negative for chest pain, claudication, cyanosis, irregular heartbeat, leg swelling, near-syncope, orthopnea, palpitations, paroxysmal nocturnal dyspnea and syncope  Respiratory: Positive for shortness of breath  Negative for cough  Gastrointestinal: Negative for heartburn and nausea  Neurological: Positive for dizziness  Negative for focal weakness, headaches, light-headedness and weakness  All other systems reviewed and are negative  No Known Allergies        Current Outpatient Medications:     ADMELOG SOLOSTAR 100 units/mL injection pen, INJECT 4 UNITS 3 TIMES A DAY WITH MEALS, Disp: 5 pen, Rfl: 5   albuterol (PROVENTIL HFA,VENTOLIN HFA) 90 mcg/act inhaler, Inhale 2 puffs every 4 (four) hours as needed for wheezing, Disp: 1 Inhaler, Rfl: 0    aspirin (ECOTRIN LOW STRENGTH) 81 mg EC tablet, Take 1 tablet (81 mg total) by mouth daily, Disp: , Rfl: 0    carvedilol (COREG) 25 mg tablet, Take 2 tablets (50 mg total) by mouth 2 (two) times a day with meals, Disp: 60 tablet, Rfl: 2    D 1000 1000 units capsule, TAKE 1 CAPSULE BY MOUTH EVERY DAY, Disp: 90 capsule, Rfl: 1    doxazosin (CARDURA) 2 mg tablet, Take 2 tablets (4 mg total) by mouth daily at bedtime, Disp: 30 tablet, Rfl: 5    FREESTYLE LITE test strip, 1 EACH BY OTHER ROUTE 3 (THREE) TIMES A DAY DISPENSE FREESTYLE TEST STRIPS, Disp: 100 each, Rfl: 5    glucose blood (ACCU-CHEK HARVEY PLUS) test strip, Use as instructed, Disp: 100 each, Rfl: 0    insulin aspart (NOVOLOG FLEXPEN) 100 Units/mL injection pen, Inject 4 Units under the skin 3 (three) times a day with meals, Disp: 5 pen, Rfl: 0    insulin glargine (BASAGLAR KWIKPEN) 100 units/mL injection pen, Inject 24 Units under the skin daily at bedtime, Disp: 5 pen, Rfl: 0    Insulin Pen Needle (INSUPEN PEN NEEDLES) 31G X 5 MM MISC, by Does not apply route 4 (four) times a day, Disp: 100 each, Rfl: 5    NIFEdipine ER (ADALAT CC) 60 MG 24 hr tablet, Take 1 tablet (60 mg total) by mouth 2 (two) times a day, Disp: 90 tablet, Rfl: 3    sildenafil (REVATIO) 20 mg tablet, 2-5 tablets as needed for sexual activity, Disp: 30 tablet, Rfl: 3    torsemide (DEMADEX) 20 mg tablet, Half a tablet, 10 mg, every other day, Disp: 45 tablet, Rfl: 1        Social History     Socioeconomic History    Marital status: /Civil Union     Spouse name: Not on file    Number of children: Not on file    Years of education: Not on file    Highest education level: Not on file   Occupational History    Not on file   Social Needs    Financial resource strain: Not on file    Food insecurity:     Worry: Not on file Inability: Not on file    Transportation needs:     Medical: Not on file     Non-medical: Not on file   Tobacco Use    Smoking status: Never Smoker    Smokeless tobacco: Never Used   Substance and Sexual Activity    Alcohol use: Yes     Comment: occasionally    Drug use: No    Sexual activity: Not on file   Lifestyle    Physical activity:     Days per week: Not on file     Minutes per session: Not on file    Stress: Not on file   Relationships    Social connections:     Talks on phone: Not on file     Gets together: Not on file     Attends Scientologist service: Not on file     Active member of club or organization: Not on file     Attends meetings of clubs or organizations: Not on file     Relationship status: Not on file    Intimate partner violence:     Fear of current or ex partner: Not on file     Emotionally abused: Not on file     Physically abused: Not on file     Forced sexual activity: Not on file   Other Topics Concern    Not on file   Social History Narrative    Not on file       Family History   Problem Relation Age of Onset    Hypertension Mother     Multiple sclerosis Mother     Diabetes Father        Physical Exam   Constitutional: He is oriented to person, place, and time  No distress  He appears very tired, slow-moving   HENT:   Head: Normocephalic and atraumatic  Eyes: Conjunctivae and EOM are normal    Neck: Normal range of motion  Neck supple  Cardiovascular: Normal rate, regular rhythm, normal heart sounds and intact distal pulses  Pulmonary/Chest: Effort normal and breath sounds normal    Abdominal: Soft  Bowel sounds are normal    Musculoskeletal: Normal range of motion  Neurological: He is oriented to person, place, and time  Skin: Skin is warm and dry  Psychiatric: He has a normal mood and affect  Nursing note and vitals reviewed  Vitals: Blood pressure 132/98, pulse 89, height 6' 1" (1 854 m), weight 111 kg (245 lb), SpO2 97 %     Wt Readings from Last 3 Encounters:   08/21/19 111 kg (245 lb)   08/09/19 114 kg (252 lb)   07/29/19 113 kg (250 lb)         Labs & Results:  Lab Results   Component Value Date    WBC 9 90 05/23/2019    HGB 11 0 (L) 05/23/2019    HCT 32 3 (L) 05/23/2019    MCV 84 05/23/2019     05/23/2019     No results found for: BNP  No components found for: CHEM  Total CK   Date Value Ref Range Status   04/19/2019 192 39 - 308 U/L Final     Troponin I   Date Value Ref Range Status   05/21/2019 0 23 (H) <=0 04 ng/mL Final     Comment:       Siemens Chemistry analyzer 99% cutoff is > 0 04 ng/mL in network labs     o cTnI 99% cutoff is useful only when applied to patients in the clinical setting of myocardial ischemia   o cTnI 99% cutoff should be interpreted in the context of clinical history, ECG findings and possibly cardiac imaging to establish correct diagnosis  o cTnI 99% cutoff may be suggestive but clearly not indicative of a coronary event without the clinical setting of myocardial ischemia  Results indicate test should be repeated on new specimen collected within 4-6 hours of the original   05/21/2019 0 27 (H) <=0 04 ng/mL Final     Comment:       Siemens Chemistry analyzer 99% cutoff is > 0 04 ng/mL in network labs     o cTnI 99% cutoff is useful only when applied to patients in the clinical setting of myocardial ischemia   o cTnI 99% cutoff should be interpreted in the context of clinical history, ECG findings and possibly cardiac imaging to establish correct diagnosis  o cTnI 99% cutoff may be suggestive but clearly not indicative of a coronary event without the clinical setting of myocardial ischemia      Results indicate test should be repeated on new specimen collected within 4-6 hours of the original   05/21/2019 0 22 (H) <=0 04 ng/mL Final     Comment:       Siemens Chemistry analyzer 99% cutoff is > 0 04 ng/mL in network labs     o cTnI 99% cutoff is useful only when applied to patients in the clinical setting of myocardial ischemia   o cTnI 99% cutoff should be interpreted in the context of clinical history, ECG findings and possibly cardiac imaging to establish correct diagnosis  o cTnI 99% cutoff may be suggestive but clearly not indicative of a coronary event without the clinical setting of myocardial ischemia  Results indicate test should be repeated on new specimen collected within 4-6 hours of the original     CK-MB Index   Date Value Ref Range Status   2019 4 5 (H) 0 0 - 2 5 % Final     Results for orders placed during the hospital encounter of 19   Echo complete with contrast if indicated    Narrative [place] [address], [address] [facility]  [phone]    Transthoracic Echocardiogram  2D, M-mode, Doppler, and Color Doppler    Study date:  23-May-2019    Patient: Chary Holbrook  MR number: [id number]  Account number: [de-identified]  : 1985  Age: 37 years  Gender: Male  Status: Inpatient  Location: Bedside  Height: 73 in  Weight: 237 lb  BP: 142/ 82 mmHg    Indications: Renal Hypertensive Disease    Diagnoses: I15 1 - Hypertension secondary to other renal disorders    Sonographer:  NATALIE Rushing  Referring Physician:  CHARLOTTE Fierro  Group:  [facility]  Interpreting Physician:  Leda Stevens DO    SUMMARY    LEFT VENTRICLE:  Systolic function was normal  Ejection fraction was estimated to be 65 %  There were no regional wall motion abnormalities  Wall thickness was moderately increased  Concentric hypertrophy was present  Doppler parameters were consistent with abnormal left ventricular relaxation (grade 1 diastolic dysfunction)  RIGHT VENTRICLE:  The size was normal   Systolic function was normal     PERICARDIUM:  A small, free-flowing pericardial effusion was identified circumferential to the heart  There was no evidence of hemodynamic compromise  HISTORY: PRIOR HISTORY: DM2; HLD; HTN; CAD; NSTEMI      PROCEDURE: The procedure was performed at the bedside  This was a routine study  The transthoracic approach was used  The study included complete 2D imaging, M-mode, complete spectral Doppler, and color Doppler  The heart rate was 96 bpm,  at the start of the study  Images were obtained from the parasternal, apical, subcostal, and suprasternal notch acoustic windows  Image quality was good  LEFT VENTRICLE: Size was normal  Systolic function was normal  Ejection fraction was estimated to be 65 %  There were no regional wall motion abnormalities  Wall thickness was moderately increased  Concentric hypertrophy was present  DOPPLER: Doppler parameters were consistent with abnormal left ventricular relaxation (grade 1 diastolic dysfunction)  There was no evidence of elevated ventricular filling pressure by Doppler parameters  RIGHT VENTRICLE: The size was normal  Systolic function was normal  Wall thickness was normal     LEFT ATRIUM: Size was normal     RIGHT ATRIUM: Size was normal     MITRAL VALVE: Valve structure was normal  There was normal leaflet separation  DOPPLER: The transmitral velocity was within the normal range  There was no evidence for stenosis  There was trace regurgitation  AORTIC VALVE: The valve was trileaflet  Leaflets exhibited normal thickness and normal cuspal separation  DOPPLER: Transaortic velocity was within the normal range  There was no evidence for stenosis  There was no regurgitation  TRICUSPID VALVE: The valve structure was normal  There was normal leaflet separation  DOPPLER: The transtricuspid velocity was within the normal range  There was no evidence for stenosis  There was trace regurgitation  The tricuspid jet  envelope definition was inadequate for estimation of RV systolic pressure  There are no indirect findings (abnormal RV volume or geometry, altered pulmonary flow velocity profile, or leftward septal displacement) which would suggest  moderate or severe pulmonary hypertension      PULMONIC VALVE: Leaflets exhibited normal thickness, no calcification, and normal cuspal separation  DOPPLER: The transpulmonic velocity was within the normal range  There was trace regurgitation  PERICARDIUM: A small, free-flowing pericardial effusion was identified circumferential to the heart  There was no evidence of hemodynamic compromise  AORTA: The root exhibited normal size  SYSTEMIC VEINS: IVC: The inferior vena cava was normal in size and course  Respirophasic changes were normal     SYSTEM MEASUREMENT TABLES    2D  %FS: 35 04 %  Ao Diam: 3 48 cm  EDV(Teich): 104 05 ml  EF(Teich): 64 28 %  ESV(Teich): 37 16 ml  IVSd: 1 47 cm  LA Area: 11 98 cm2  LA Diam: 3 95 cm  LVEDV MOD A4C: 95 08 ml  LVEF MOD A4C: 81 53 %  LVESV MOD A4C: 17 56 ml  LVIDd: 4 73 cm  LVIDs: 3 07 cm  LVLd A4C: 8 96 cm  LVLs A4C: 7 27 cm  LVPWd: 1 51 cm  RA Area: 12 57 cm2  RVIDd: 2 95 cm  SV MOD A4C: 77 52 ml  SV(Teich): 66 89 ml    MM  TAPSE: 1 94 cm    PW  E': 0 09 m/s  MV A Maxx: 0 78 m/s  MV Dec [place]: 3 32 m/s2  MV DecT: 254 9 ms  MV E Maxx: 0 85 m/s  MV PHT: 73 92 ms  MVA By PHT: 2 98 cm2    [facility] Accredited Echocardiography [facility]    Prepared and electronically signed by    Maurice Cameron DO  Signed 23-May-2019 13:55:50       No results found for this or any previous visit  This note was completed in part utilizing m-[place] fluency direct voice recognition software  Grammatical errors, random word insertion, spelling mistakes, and incomplete sentences may be an occasional consequence of the system secondary to software limitations, ambient noise and hardware issues  At the time of dictation, efforts were made to edit, clarify and /or correct errors  Please read the chart carefully and recognize, using context, where substitutions have occurred    If you have any questions or concerns about the context, text or information contained within the body of this dictation, please contact myself, the provider, for further clarification

## 2019-08-21 ENCOUNTER — OFFICE VISIT (OUTPATIENT)
Dept: CARDIOLOGY CLINIC | Facility: CLINIC | Age: 34
End: 2019-08-21
Payer: COMMERCIAL

## 2019-08-21 ENCOUNTER — TELEPHONE (OUTPATIENT)
Dept: CARDIOLOGY CLINIC | Facility: CLINIC | Age: 34
End: 2019-08-21

## 2019-08-21 VITALS
BODY MASS INDEX: 32.47 KG/M2 | SYSTOLIC BLOOD PRESSURE: 132 MMHG | WEIGHT: 245 LBS | DIASTOLIC BLOOD PRESSURE: 98 MMHG | HEART RATE: 89 BPM | OXYGEN SATURATION: 97 % | HEIGHT: 73 IN

## 2019-08-21 DIAGNOSIS — G47.33 OSA (OBSTRUCTIVE SLEEP APNEA): ICD-10-CM

## 2019-08-21 DIAGNOSIS — I31.9 MYOPERICARDITIS: Primary | ICD-10-CM

## 2019-08-21 DIAGNOSIS — E78.5 HYPERLIPIDEMIA, UNSPECIFIED HYPERLIPIDEMIA TYPE: ICD-10-CM

## 2019-08-21 DIAGNOSIS — I10 HYPERTENSION, UNSPECIFIED TYPE: ICD-10-CM

## 2019-08-21 DIAGNOSIS — E87.70 HYPERVOLEMIA, UNSPECIFIED HYPERVOLEMIA TYPE: ICD-10-CM

## 2019-08-21 DIAGNOSIS — N18.4 STAGE 4 CHRONIC KIDNEY DISEASE (HCC): ICD-10-CM

## 2019-08-21 DIAGNOSIS — E66.09 CLASS 1 OBESITY DUE TO EXCESS CALORIES WITH SERIOUS COMORBIDITY AND BODY MASS INDEX (BMI) OF 31.0 TO 31.9 IN ADULT: ICD-10-CM

## 2019-08-21 PROCEDURE — 99214 OFFICE O/P EST MOD 30 MIN: CPT | Performed by: NURSE PRACTITIONER

## 2019-08-21 RX ORDER — TORSEMIDE 20 MG/1
TABLET ORAL
Qty: 45 TABLET | Refills: 1 | Status: SHIPPED | OUTPATIENT
Start: 2019-08-21 | End: 2019-09-25

## 2019-08-21 NOTE — TELEPHONE ENCOUNTER
----- Message from Forest Parr, [address] sent at 8/21/2019  3:11 PM EDT -----  Regarding: Call Pt   Please down to stop his Colchicine

## 2019-08-21 NOTE — LETTER
August 21, 2019     Angelina Cowan, [address]  [address] [place] [address]    Patient: Mitzy Singh   YOB: 1985   Date of Visit: 8/21/2019       Dear Dr Lourdes Conroy: Thank you for referring Mel Cox to me for evaluation  Below are my notes for this consultation  If you have questions, please do not hesitate to call me  I look forward to following your patient along with you  Sincerely,        CHARLOTTE Bradley        CC: MD Ada Todd CRNP  8/21/2019  3:22 PM  Sign at close encounter  Follow Up Office Visit Note  Mitzy Singh   37 y o    male   MRN: [id number]  [address]  [address]  [place] [address] [facility] [place] [address]  [phone]  [phone]    PCP: Angelina Cowan MD  Cardiologist: Dr Nona Quinones        Assessment/plan   Hx Iatrogenic Volume overload secondary to steroids/pts increased fluid intake to 3-4 L , with weight gain of up to 30 lb, in the setting of progressive renal dysfunction, noted May 31st  --discharge weight May 25: 240lb    At home: 245 this am, in his underwear  --discharged on torsemide 20 daily, increased to 20 b i d  May 31st per renal  NOW, only PRN  Last dose about a week and a half ago   Advised to restart torsemide 10 mg every other day  --low-sodium diet  daily weights  If wt increases 3 lb in 1 day or 5 lb in 5 days advised to call us  Chest heaviness secondary to myopericarditis in the past, today due to volume overload  Obstructive sleep apnea, new dx-CPAP study at [place] 8/2/19  Is awaiting his equipment to be delivered on the 28th of August  Chronic kidney disease, stage III-IV, baseline creatinine appears to be 2 2-2 5, per Nephrology, now in mid 3 range  Follows with Nephrology/Amy as an outpatient  Myopericarditis-recent limited echo showed no evidence of pericardial effusion  Stop colchicine    Stop prednisone  NSVT, noted on telemetry in hospital    carvedilol up titrated  Hypertension  /98,    on carvedilol 50 mg b i d , nifedipine 60 mg b i d , and torsemide 10 mg BID PRN  and Doxazosin 4 mg HS   --change torsemide to 10 mg every other day  Hyperlipidemia, familial- on atorvastatin 40 mg daily, with suboptimum response  --fasting lipid profile 5/21/19:  Direct , non , ,   --fasting lipid profile 8/9/18 (done OFF statin)  Direct , non , triglyceride 144, total cholesterol 287  --recheck lipids around mid October  --goal LDL under 100, non   If found to have CV disease, goal LDL will be less than 70  DM, 2  Last hemoglobin A1c  7 1  5/21/19  Cardiac testing  --TTE 5/23/19   EF 65%  No regional motion abnormalities  There was a small free-flowing pericardial effusion was identified circumferential to the heart  There was no evidence of hemodynamic compromise  --TTE limited echo 8/19/19  Ejection fraction 60%  Right ventricle normal   No significant valve disease  No pericardial effusion  Summary of recommendations  Low-sodium diet  avoiding NSAIDs  Torsemide 10 mg every other day  Daily weights and call us his weight is going up  Previously was on torsemide 20 b i d and his renal function was worsening  He has lab work scheduled for Dr Devin Servin at September 2nd  He will receive his CPAP equipment on the 28th of August  Recheck lipids mid October  Stop colchicine  Stop prednisone  Otherwise, follow up with Dr Marina Arnold 6 weeks                HPI  Tin Stafford is a 36 yo  with a history of hypertension, diabetes, dyslipidemia, CKD with baseline creatinine up to 2 6, who was recenlty admitted with chest pain due to myopericarditis  He does not have known coronary artery disease  An echocardiogram showed a small free-flowing fluid circumferential to the heart without hemodynamic compromise; ejection fraction normal 5/23/19     Given his advanced CKD, he was initiated on steroids and colchicine  He has had significant hypertension despite up titration of several of his medications  A secondary workup has been initiated  He is following closely with Nephrology  As well as PCP  Recently he gained 30 lb since his discharge  He was also noted he was drinking up to 4 L of fluid daily; this was modified by Nephrology  His family physician added spironolactone 50 mg daily  Nephrology uptitrated his diuretics  Labs are being followed very closely by Nephrology    6/5/19 NS       7/3/19 He presents for cardiology follow-up, accompanied by his wife  He reports previously there was an insurance issue which did not allow him to come  Therefore, he stopped the steroids and colchicine as he had no more refills  He was on colchicine for a total of 5 pills as it was ordered every other day  He denies any chest pain  He reports it was chest heaviness before, and that has resolved  Sometimes when he takes a deep breath it somewhat limited though  Unfortunately, since his torsemide was increased and spironolactone was added, he has been dizzy  He has been lightheaded  Today in the office he was near syncopal when he got up off the exam table  His blood pressure today is 104/76 at baseline, in the chair  I asked him to get some stat blood work this morning  I did suggest that he might need to be rehospitalized if it is abnormal     If for some reason his labs are okay, I restarted him on colchicine every other day and low-dose prednisone  I stopped torsemide as well as spironolactone  I ordered a repeat limited echocardiogram as follow-up  He has an appointment next week with his nephrologist     Further, he has familial hyperlipidemia  In the past he was on atorvastatin  He stopped because he had some visual symptoms  When he was hospitalized, this was restarted  The patient has since again stopped it    On Monday he has an appointment with an ophthalmologist   His lipids need to be addressed in the future  His LDL was greater than 200      8/21/19  Follow-up (1 mo )   Dizziness (with activity )   Fatigue (everyday )   Shortness of Breath (with physical activity )     He returns for follow-up, accompanied by his wife  He is fatigued  He has chest heaviness and dyspnea with position changes particularly when he is trying to lie supine  Weight is up about 5 lb  Last time he took torsemide was about a week and half ago  He agrees to start torsemide 10 mg every other day  perform daily weights and call us if his weight is increased  Will likely need to up titrate his torsemide  He has labs scheduled in the near future by Nephrology  His echocardiogram showed no evidence of pericardial effusion  Will stop his colchicine and prednisone  Awaiting CPAP equipment        Assessment  Diagnoses and all orders for this visit:    Myopericarditis    Hyperlipidemia, unspecified hyperlipidemia type    Hypertension, unspecified type    LUIS (obstructive sleep apnea)    Class 1 obesity due to excess calories with serious comorbidity and body mass index (BMI) of 31 0 to 31 9 in adult    Hypervolemia, unspecified hypervolemia type  -     torsemide (DEMADEX) 20 mg tablet; Half a tablet, 10 mg, every other day    Stage 4 chronic kidney disease (HCC)  -     torsemide (DEMADEX) 20 mg tablet; Half a tablet, 10 mg, every other day          Past Medical History:   Diagnosis Date    CKD (chronic kidney disease) 2/21/2019    Class 1 obesity due to excess calories with serious comorbidity and body mass index (BMI) of 31 0 to 31 9 in adult 5/25/2019    Diabetes mellitus ([facility] [address] )     Essential hypertension 10/31/2017    Hyperlipidemia     NSTEMI (non-ST elevated myocardial infarction) ([facility] [address] ) 5/21/2019       Review of Systems   Constitution: Positive for malaise/fatigue  Negative for chills and weight loss  Cardiovascular: Positive for dyspnea on exertion   Negative for chest pain, claudication, cyanosis, irregular heartbeat, leg swelling, near-syncope, orthopnea, palpitations, paroxysmal nocturnal dyspnea and syncope  Respiratory: Positive for shortness of breath  Negative for cough  Gastrointestinal: Negative for heartburn and nausea  Neurological: Positive for dizziness  Negative for focal weakness, headaches, light-headedness and weakness  All other systems reviewed and are negative  No Known Allergies        Current Outpatient Medications:     ADMELOG SOLOSTAR 100 units/mL injection pen, INJECT 4 UNITS 3 TIMES A DAY WITH MEALS, Disp: 5 pen, Rfl: 5    albuterol (PROVENTIL HFA,VENTOLIN HFA) 90 mcg/act inhaler, Inhale 2 puffs every 4 (four) hours as needed for wheezing, Disp: 1 Inhaler, Rfl: 0    aspirin (ECOTRIN LOW STRENGTH) 81 mg EC tablet, Take 1 tablet (81 mg total) by mouth daily, Disp: , Rfl: 0    carvedilol (COREG) 25 mg tablet, Take 2 tablets (50 mg total) by mouth 2 (two) times a day with meals, Disp: 60 tablet, Rfl: 2    D 1000 1000 units capsule, TAKE 1 CAPSULE BY MOUTH EVERY DAY, Disp: 90 capsule, Rfl: 1    doxazosin (CARDURA) 2 mg tablet, Take 2 tablets (4 mg total) by mouth daily at bedtime, Disp: 30 tablet, Rfl: 5    FREESTYLE LITE test strip, 1 EACH BY OTHER ROUTE 3 (THREE) TIMES A DAY DISPENSE FREESTYLE TEST STRIPS, Disp: 100 each, Rfl: 5    glucose blood (ACCU-CHEK HARVEY PLUS) test strip, Use as instructed, Disp: 100 each, Rfl: 0    insulin aspart (NOVOLOG FLEXPEN) 100 Units/mL injection pen, Inject 4 Units under the skin 3 (three) times a day with meals, Disp: 5 pen, Rfl: 0    insulin glargine (BASAGLAR KWIKPEN) 100 units/mL injection pen, Inject 24 Units under the skin daily at bedtime, Disp: 5 pen, Rfl: 0    Insulin Pen Needle (INSUPEN PEN NEEDLES) 31G X 5 MM MISC, by Does not apply route 4 (four) times a day, Disp: 100 each, Rfl: 5    NIFEdipine ER (ADALAT CC) 60 MG 24 hr tablet, Take 1 tablet (60 mg total) by mouth 2 (two) times a day, Disp: 90 tablet, Rfl: 3    sildenafil (REVATIO) 20 mg tablet, 2-5 tablets as needed for sexual activity, Disp: 30 tablet, Rfl: 3    torsemide (DEMADEX) 20 mg tablet, Half a tablet, 10 mg, every other day, Disp: 45 tablet, Rfl: 1        Social History     Socioeconomic History    Marital status: /Civil Union     Spouse name: Not on file    Number of children: Not on file    Years of education: Not on file    Highest education level: Not on file   Occupational History    Not on file   Social Needs    Financial resource strain: Not on file    Food insecurity:     Worry: Not on file     Inability: Not on file    Transportation needs:     Medical: Not on file     Non-medical: Not on file   Tobacco Use    Smoking status: Never Smoker    Smokeless tobacco: Never Used   Substance and Sexual Activity    Alcohol use: Yes     Comment: occasionally    Drug use: No    Sexual activity: Not on file   Lifestyle    Physical activity:     Days per week: Not on file     Minutes per session: Not on file    Stress: Not on file   Relationships    Social connections:     Talks on phone: Not on file     Gets together: Not on file     Attends Hinduism service: Not on file     Active member of club or organization: Not on file     Attends meetings of clubs or organizations: Not on file     Relationship status: Not on file    Intimate partner violence:     Fear of current or ex partner: Not on file     Emotionally abused: Not on file     Physically abused: Not on file     Forced sexual activity: Not on file   Other Topics Concern    Not on file   Social History Narrative    Not on file       Family History   Problem Relation Age of Onset    Hypertension Mother     Multiple sclerosis Mother     Diabetes Father        Physical Exam   Constitutional: He is oriented to person, place, and time  No distress  He appears very tired, slow-moving   HENT:   Head: Normocephalic and atraumatic     Eyes: Conjunctivae and EOM are normal    Neck: Normal range of motion  Neck supple  Cardiovascular: Normal rate, regular rhythm, normal heart sounds and intact distal pulses  Pulmonary/Chest: Effort normal and breath sounds normal    Abdominal: Soft  Bowel sounds are normal    Musculoskeletal: Normal range of motion  Neurological: He is oriented to person, place, and time  Skin: Skin is warm and dry  Psychiatric: He has a normal mood and affect  Nursing note and vitals reviewed  Vitals: Blood pressure 132/98, pulse 89, height 6' 1" (1 854 m), weight 111 kg (245 lb), SpO2 97 %  Wt Readings from Last 3 Encounters:   08/21/19 111 kg (245 lb)   08/09/19 114 kg (252 lb)   07/29/19 113 kg (250 lb)         Labs & Results:  Lab Results   Component Value Date    WBC 9 90 05/23/2019    HGB 11 0 (L) 05/23/2019    HCT 32 3 (L) 05/23/2019    MCV 84 05/23/2019     05/23/2019     No results found for: BNP  No components found for: CHEM  Total CK   Date Value Ref Range Status   04/19/2019 192 39 - 308 U/L Final     Troponin I   Date Value Ref Range Status   05/21/2019 0 23 (H) <=0 04 ng/mL Final     Comment:       Siemens Chemistry analyzer 99% cutoff is > 0 04 ng/mL in network labs     o cTnI 99% cutoff is useful only when applied to patients in the clinical setting of myocardial ischemia   o cTnI 99% cutoff should be interpreted in the context of clinical history, ECG findings and possibly cardiac imaging to establish correct diagnosis  o cTnI 99% cutoff may be suggestive but clearly not indicative of a coronary event without the clinical setting of myocardial ischemia      Results indicate test should be repeated on new specimen collected within 4-6 hours of the original   05/21/2019 0 27 (H) <=0 04 ng/mL Final     Comment:       Siemens Chemistry analyzer 99% cutoff is > 0 04 ng/mL in network labs     o cTnI 99% cutoff is useful only when applied to patients in the clinical setting of myocardial ischemia   o cTnI 99% cutoff should be interpreted in the context of clinical history, ECG findings and possibly cardiac imaging to establish correct diagnosis  o cTnI 99% cutoff may be suggestive but clearly not indicative of a coronary event without the clinical setting of myocardial ischemia  Results indicate test should be repeated on new specimen collected within 4-6 hours of the original   2019 0 22 (H) <=0 04 ng/mL Final     Comment:       Siemens Chemistry analyzer 99% cutoff is > 0 04 ng/mL in network labs     o cTnI 99% cutoff is useful only when applied to patients in the clinical setting of myocardial ischemia   o cTnI 99% cutoff should be interpreted in the context of clinical history, ECG findings and possibly cardiac imaging to establish correct diagnosis  o cTnI 99% cutoff may be suggestive but clearly not indicative of a coronary event without the clinical setting of myocardial ischemia      Results indicate test should be repeated on new specimen collected within 4-6 hours of the original     CK-MB Index   Date Value Ref Range Status   2019 4 5 (H) 0 0 - 2 5 % Final     Results for orders placed during the hospital encounter of 19   Echo complete with contrast if indicated    Narrative [place] [address], [address] [place] [address]  [phone]    Transthoracic Echocardiogram  2D, M-mode, Doppler, and Color Doppler    Study date:  23-May-2019    Patient: Nohemy Mark  MR number: [id number]  Account number: [de-identified]  : 1985  Age: 37 years  Gender: Male  Status: Inpatient  Location: Bedside  Height: 73 in  Weight: 237 lb  BP: 142/ 82 mmHg    Indications: Renal Hypertensive Disease    Diagnoses: I15 1 - Hypertension secondary to other renal disorders    Sonographer:  NATALIE Addison  Referring Physician:  CHARLOTTE Granados  Group:  [facility]  Interpreting Physician:  Sloan Kauffman DO    SUMMARY    LEFT VENTRICLE:  Systolic function was normal  Ejection fraction was estimated to be 65 %   There were no regional wall motion abnormalities  Wall thickness was moderately increased  Concentric hypertrophy was present  Doppler parameters were consistent with abnormal left ventricular relaxation (grade 1 diastolic dysfunction)  RIGHT VENTRICLE:  The size was normal   Systolic function was normal     PERICARDIUM:  A small, free-flowing pericardial effusion was identified circumferential to the heart  There was no evidence of hemodynamic compromise  HISTORY: PRIOR HISTORY: DM2; HLD; HTN; CAD; NSTEMI  PROCEDURE: The procedure was performed at the bedside  This was a routine study  The transthoracic approach was used  The study included complete 2D imaging, M-mode, complete spectral Doppler, and color Doppler  The heart rate was 96 bpm,  at the start of the study  Images were obtained from the parasternal, apical, subcostal, and suprasternal notch acoustic windows  Image quality was good  LEFT VENTRICLE: Size was normal  Systolic function was normal  Ejection fraction was estimated to be 65 %  There were no regional wall motion abnormalities  Wall thickness was moderately increased  Concentric hypertrophy was present  DOPPLER: Doppler parameters were consistent with abnormal left ventricular relaxation (grade 1 diastolic dysfunction)  There was no evidence of elevated ventricular filling pressure by Doppler parameters  RIGHT VENTRICLE: The size was normal  Systolic function was normal  Wall thickness was normal     LEFT ATRIUM: Size was normal     RIGHT ATRIUM: Size was normal     MITRAL VALVE: Valve structure was normal  There was normal leaflet separation  DOPPLER: The transmitral velocity was within the normal range  There was no evidence for stenosis  There was trace regurgitation  AORTIC VALVE: The valve was trileaflet  Leaflets exhibited normal thickness and normal cuspal separation  DOPPLER: Transaortic velocity was within the normal range  There was no evidence for stenosis  There was no regurgitation  TRICUSPID VALVE: The valve structure was normal  There was normal leaflet separation  DOPPLER: The transtricuspid velocity was within the normal range  There was no evidence for stenosis  There was trace regurgitation  The tricuspid jet  envelope definition was inadequate for estimation of RV systolic pressure  There are no indirect findings (abnormal RV volume or geometry, altered pulmonary flow velocity profile, or leftward septal displacement) which would suggest  moderate or severe pulmonary hypertension  PULMONIC VALVE: Leaflets exhibited normal thickness, no calcification, and normal cuspal separation  DOPPLER: The transpulmonic velocity was within the normal range  There was trace regurgitation  PERICARDIUM: A small, free-flowing pericardial effusion was identified circumferential to the heart  There was no evidence of hemodynamic compromise  AORTA: The root exhibited normal size  SYSTEMIC VEINS: IVC: The inferior vena cava was normal in size and course  Respirophasic changes were normal     SYSTEM MEASUREMENT TABLES    2D  %FS: 35 04 %  Ao Diam: 3 48 cm  EDV(Teich): 104 05 ml  EF(Teich): 64 28 %  ESV(Teich): 37 16 ml  IVSd: 1 47 cm  LA Area: 11 98 cm2  LA Diam: 3 95 cm  LVEDV MOD A4C: 95 08 ml  LVEF MOD A4C: 81 53 %  LVESV MOD A4C: 17 56 ml  LVIDd: 4 73 cm  LVIDs: 3 07 cm  LVLd A4C: 8 96 cm  LVLs A4C: 7 27 cm  LVPWd: 1 51 cm  RA Area: 12 57 cm2  RVIDd: 2 95 cm  SV MOD A4C: 77 52 ml  SV(Teich): 66 89 ml    MM  TAPSE: 1 94 cm    PW  E': 0 09 m/s  MV A Maxx: 0 78 m/s  MV Dec [place]: 3 32 m/s2  MV DecT: 254 9 ms  MV E Maxx: 0 85 m/s  MV PHT: 73 92 ms  MVA By PHT: 2 98 cm2    [employer] Accredited Echocardi[facility]    Prepared and electronically signed by    Alejo Simental DO  Signed 23-May-2019 13:55:50       No results found for this or any previous visit  This note was completed in part utilizing m-modal fluency direct voice recognition software  Grammatical errors, random word insertion, spelling mistakes, and incomplete sentences may be an occasional consequence of the system secondary to software limitations, ambient noise and hardware issues  At the time of dictation, efforts were made to edit, clarify and /or correct errors  Please read the chart carefully and recognize, using context, where substitutions have occurred    If you have any questions or concerns about the context, text or information contained within the body of this dictation, please contact myself, the provider, for further clarification

## 2019-08-21 NOTE — TELEPHONE ENCOUNTER
----- Message from Eleazar Neil [place] sent at 8/21/2019  3:10 PM EDT -----  Regarding: Call Pt   Please tell him to stop his prednisone

## 2019-08-28 ENCOUNTER — TELEPHONE (OUTPATIENT)
Dept: SLEEP CENTER | Facility: CLINIC | Age: 34
End: 2019-08-28

## 2019-09-05 ENCOUNTER — TELEPHONE (OUTPATIENT)
Dept: NEPHROLOGY | Facility: CLINIC | Age: 34
End: 2019-09-05

## 2019-09-05 NOTE — TELEPHONE ENCOUNTER
Patient called stating that he is taking Torsemide 10 mg every other day and is having swelling in both of his legs and shortness of breath with activity       Stef Mckenzie MA

## 2019-09-05 NOTE — TELEPHONE ENCOUNTER
Increase torsemide to 20 mg twice a day and ask him to do that for few days and then call on Monday and let us know how he is doing

## 2019-09-06 ENCOUNTER — TELEPHONE (OUTPATIENT)
Dept: NEPHROLOGY | Facility: CLINIC | Age: 34
End: 2019-09-06

## 2019-09-06 ENCOUNTER — APPOINTMENT (OUTPATIENT)
Dept: LAB | Facility: CLINIC | Age: 34
End: 2019-09-06
Payer: COMMERCIAL

## 2019-09-06 DIAGNOSIS — N18.30 CKD (CHRONIC KIDNEY DISEASE) STAGE 3, GFR 30-59 ML/MIN (HCC): ICD-10-CM

## 2019-09-06 DIAGNOSIS — N18.4 CKD (CHRONIC KIDNEY DISEASE) STAGE 4, GFR 15-29 ML/MIN (HCC): Primary | ICD-10-CM

## 2019-09-06 LAB
ANION GAP SERPL CALCULATED.3IONS-SCNC: 8 MMOL/L (ref 4–13)
BUN SERPL-MCNC: 46 MG/DL (ref 5–25)
CALCIUM SERPL-MCNC: 8.2 MG/DL (ref 8.3–10.1)
CHLORIDE SERPL-SCNC: 107 MMOL/L (ref 100–108)
CO2 SERPL-SCNC: 24 MMOL/L (ref 21–32)
CREAT SERPL-MCNC: 4.65 MG/DL (ref 0.6–1.3)
GFR SERPL CREATININE-BSD FRML MDRD: 17 ML/MIN/1.73SQ M
GLUCOSE P FAST SERPL-MCNC: 166 MG/DL (ref 65–99)
POTASSIUM SERPL-SCNC: 4.1 MMOL/L (ref 3.5–5.3)
SODIUM SERPL-SCNC: 139 MMOL/L (ref 136–145)

## 2019-09-06 PROCEDURE — 36415 COLL VENOUS BLD VENIPUNCTURE: CPT

## 2019-09-06 PROCEDURE — 80048 BASIC METABOLIC PNL TOTAL CA: CPT

## 2019-09-06 NOTE — TELEPHONE ENCOUNTER
Spoke with the patient and he is aware to increase his Torsemide 20 mg BID for a few days and he is aware to call us on Monday with how he is doing        Josette Nielsen MA

## 2019-09-06 NOTE — TELEPHONE ENCOUNTER
Spoke with the patient and he is aware of his lab test results and to repeat a BMP on Monday and he is aware to repeat the lab work and the [facility] has been placed into the system per patient's request       Skye Delaney MA

## 2019-09-06 NOTE — TELEPHONE ENCOUNTER
----- Message from Poppy Esposito MD sent at 9/6/2019  2:18 PM EDT -----  I did see that his labs are increased  I think you spoke to me yesterday or so about swelling and we had him increase his torsemide in you responded and gave him that message  Just have repeat labs on Monday with a BMP and also will check on his swelling

## 2019-09-12 ENCOUNTER — APPOINTMENT (OUTPATIENT)
Dept: LAB | Facility: CLINIC | Age: 34
End: 2019-09-12
Payer: COMMERCIAL

## 2019-09-12 ENCOUNTER — TRANSCRIBE ORDERS (OUTPATIENT)
Dept: LAB | Facility: CLINIC | Age: 34
End: 2019-09-12

## 2019-09-12 DIAGNOSIS — N18.4 CKD (CHRONIC KIDNEY DISEASE) STAGE 4, GFR 15-29 ML/MIN (HCC): ICD-10-CM

## 2019-09-12 LAB
ANION GAP SERPL CALCULATED.3IONS-SCNC: 8 MMOL/L (ref 4–13)
BUN SERPL-MCNC: 43 MG/DL (ref 5–25)
CALCIUM SERPL-MCNC: 8.4 MG/DL (ref 8.3–10.1)
CHLORIDE SERPL-SCNC: 107 MMOL/L (ref 100–108)
CO2 SERPL-SCNC: 24 MMOL/L (ref 21–32)
CREAT SERPL-MCNC: 5.14 MG/DL (ref 0.6–1.3)
GFR SERPL CREATININE-BSD FRML MDRD: 15 ML/MIN/1.73SQ M
GLUCOSE SERPL-MCNC: 165 MG/DL (ref 65–140)
POTASSIUM SERPL-SCNC: 4.4 MMOL/L (ref 3.5–5.3)
SODIUM SERPL-SCNC: 139 MMOL/L (ref 136–145)

## 2019-09-12 PROCEDURE — 36415 COLL VENOUS BLD VENIPUNCTURE: CPT

## 2019-09-12 PROCEDURE — 80048 BASIC METABOLIC PNL TOTAL CA: CPT

## 2019-09-13 ENCOUNTER — TELEPHONE (OUTPATIENT)
Dept: NEPHROLOGY | Facility: CLINIC | Age: 34
End: 2019-09-13

## 2019-09-13 ENCOUNTER — HOSPITAL ENCOUNTER (INPATIENT)
Facility: HOSPITAL | Age: 34
LOS: 5 days | Discharge: HOME | DRG: 199 | End: 2019-09-18
Attending: EMERGENCY MEDICINE | Admitting: INTERNAL MEDICINE
Payer: COMMERCIAL

## 2019-09-13 DIAGNOSIS — N17.9 ACUTE ON CHRONIC RENAL FAILURE (HCC): ICD-10-CM

## 2019-09-13 DIAGNOSIS — I16.1 HYPERTENSIVE EMERGENCY: Primary | ICD-10-CM

## 2019-09-13 DIAGNOSIS — N17.9 AKI (ACUTE KIDNEY INJURY) (HCC): Chronic | ICD-10-CM

## 2019-09-13 DIAGNOSIS — E11.9 TYPE 2 DIABETES MELLITUS WITHOUT COMPLICATION, WITH LONG-TERM CURRENT USE OF INSULIN (HCC): ICD-10-CM

## 2019-09-13 DIAGNOSIS — Z79.4 TYPE 2 DIABETES MELLITUS WITHOUT COMPLICATION, WITH LONG-TERM CURRENT USE OF INSULIN (HCC): ICD-10-CM

## 2019-09-13 DIAGNOSIS — I16.0 HYPERTENSIVE URGENCY: Chronic | ICD-10-CM

## 2019-09-13 DIAGNOSIS — N18.9 ACUTE ON CHRONIC RENAL FAILURE (HCC): ICD-10-CM

## 2019-09-13 PROBLEM — Z99.89 OSA ON CPAP: Status: ACTIVE | Noted: 2019-05-29

## 2019-09-13 PROBLEM — D63.1 ANEMIA DUE TO CHRONIC KIDNEY DISEASE: Status: ACTIVE | Noted: 2019-09-13

## 2019-09-13 PROBLEM — E87.70 VOLUME OVERLOAD: Status: ACTIVE | Noted: 2019-09-13

## 2019-09-13 LAB
AMPHETAMINES SERPL QL SCN: NEGATIVE
ANION GAP SERPL CALCULATED.3IONS-SCNC: 8 MMOL/L (ref 4–13)
ATRIAL RATE: 76 BPM
BACTERIA UR QL AUTO: ABNORMAL /HPF
BARBITURATES UR QL: NEGATIVE
BASOPHILS # BLD AUTO: 0.03 THOUSANDS/ΜL (ref 0–0.1)
BASOPHILS NFR BLD AUTO: 1 % (ref 0–1)
BENZODIAZ UR QL: NEGATIVE
BILIRUB UR QL STRIP: NEGATIVE
BUN SERPL-MCNC: 40 MG/DL (ref 5–25)
CALCIUM SERPL-MCNC: 8.5 MG/DL (ref 8.3–10.1)
CHLORIDE SERPL-SCNC: 107 MMOL/L (ref 100–108)
CLARITY UR: CLEAR
CO2 SERPL-SCNC: 25 MMOL/L (ref 21–32)
COCAINE UR QL: NEGATIVE
COLOR UR: YELLOW
CREAT SERPL-MCNC: 4.63 MG/DL (ref 0.6–1.3)
EOSINOPHIL # BLD AUTO: 0.09 THOUSAND/ΜL (ref 0–0.61)
EOSINOPHIL NFR BLD AUTO: 2 % (ref 0–6)
ERYTHROCYTE [DISTWIDTH] IN BLOOD BY AUTOMATED COUNT: 12.7 % (ref 11.6–15.1)
GFR SERPL CREATININE-BSD FRML MDRD: 17 ML/MIN/1.73SQ M
GLUCOSE SERPL-MCNC: 120 MG/DL (ref 65–140)
GLUCOSE SERPL-MCNC: 176 MG/DL (ref 65–140)
GLUCOSE SERPL-MCNC: 184 MG/DL (ref 65–140)
GLUCOSE UR STRIP-MCNC: ABNORMAL MG/DL
HCT VFR BLD AUTO: 31.9 % (ref 36.5–49.3)
HGB BLD-MCNC: 10.6 G/DL (ref 12–17)
HGB UR QL STRIP.AUTO: ABNORMAL
IMM GRANULOCYTES # BLD AUTO: 0.01 THOUSAND/UL (ref 0–0.2)
IMM GRANULOCYTES NFR BLD AUTO: 0 % (ref 0–2)
KETONES UR STRIP-MCNC: NEGATIVE MG/DL
LEUKOCYTE ESTERASE UR QL STRIP: NEGATIVE
LYMPHOCYTES # BLD AUTO: 1.53 THOUSANDS/ΜL (ref 0.6–4.47)
LYMPHOCYTES NFR BLD AUTO: 28 % (ref 14–44)
MCH RBC QN AUTO: 28.9 PG (ref 26.8–34.3)
MCHC RBC AUTO-ENTMCNC: 33.2 G/DL (ref 31.4–37.4)
MCV RBC AUTO: 87 FL (ref 82–98)
METHADONE UR QL: NEGATIVE
MONOCYTES # BLD AUTO: 0.43 THOUSAND/ΜL (ref 0.17–1.22)
MONOCYTES NFR BLD AUTO: 8 % (ref 4–12)
NEUTROPHILS # BLD AUTO: 3.36 THOUSANDS/ΜL (ref 1.85–7.62)
NEUTS SEG NFR BLD AUTO: 61 % (ref 43–75)
NITRITE UR QL STRIP: NEGATIVE
NON-SQ EPI CELLS URNS QL MICRO: ABNORMAL /HPF
NRBC BLD AUTO-RTO: 0 /100 WBCS
OPIATES UR QL SCN: NEGATIVE
P AXIS: 40 DEGREES
PCP UR QL: NEGATIVE
PH UR STRIP.AUTO: 6 [PH]
PLATELET # BLD AUTO: 322 THOUSANDS/UL (ref 149–390)
PMV BLD AUTO: 9.7 FL (ref 8.9–12.7)
POTASSIUM SERPL-SCNC: 4 MMOL/L (ref 3.5–5.3)
PR INTERVAL: 152 MS
PROT UR STRIP-MCNC: ABNORMAL MG/DL
QRS AXIS: 12 DEGREES
QRSD INTERVAL: 82 MS
QT INTERVAL: 366 MS
QTC INTERVAL: 411 MS
RBC # BLD AUTO: 3.67 MILLION/UL (ref 3.88–5.62)
RBC #/AREA URNS AUTO: ABNORMAL /HPF
SODIUM SERPL-SCNC: 140 MMOL/L (ref 136–145)
SP GR UR STRIP.AUTO: 1.01 (ref 1–1.03)
T WAVE AXIS: 7 DEGREES
THC UR QL: NEGATIVE
UROBILINOGEN UR QL STRIP.AUTO: 0.2 E.U./DL
VENTRICULAR RATE: 76 BPM
WBC # BLD AUTO: 5.45 THOUSAND/UL (ref 4.31–10.16)
WBC #/AREA URNS AUTO: ABNORMAL /HPF

## 2019-09-13 PROCEDURE — 96374 THER/PROPH/DIAG INJ IV PUSH: CPT

## 2019-09-13 PROCEDURE — 96376 TX/PRO/DX INJ SAME DRUG ADON: CPT

## 2019-09-13 PROCEDURE — 80048 BASIC METABOLIC PNL TOTAL CA: CPT | Performed by: EMERGENCY MEDICINE

## 2019-09-13 PROCEDURE — 94760 N-INVAS EAR/PLS OXIMETRY 1: CPT

## 2019-09-13 PROCEDURE — 99291 CRITICAL CARE FIRST HOUR: CPT | Performed by: EMERGENCY MEDICINE

## 2019-09-13 PROCEDURE — 82948 REAGENT STRIP/BLOOD GLUCOSE: CPT

## 2019-09-13 PROCEDURE — 94660 CPAP INITIATION&MGMT: CPT

## 2019-09-13 PROCEDURE — 99291 CRITICAL CARE FIRST HOUR: CPT | Performed by: NURSE PRACTITIONER

## 2019-09-13 PROCEDURE — 93010 ELECTROCARDIOGRAM REPORT: CPT | Performed by: INTERNAL MEDICINE

## 2019-09-13 PROCEDURE — 99285 EMERGENCY DEPT VISIT HI MDM: CPT

## 2019-09-13 PROCEDURE — 36415 COLL VENOUS BLD VENIPUNCTURE: CPT | Performed by: EMERGENCY MEDICINE

## 2019-09-13 PROCEDURE — 93005 ELECTROCARDIOGRAM TRACING: CPT

## 2019-09-13 PROCEDURE — 80307 DRUG TEST PRSMV CHEM ANLYZR: CPT | Performed by: NURSE PRACTITIONER

## 2019-09-13 PROCEDURE — 85025 COMPLETE CBC W/AUTO DIFF WBC: CPT | Performed by: EMERGENCY MEDICINE

## 2019-09-13 PROCEDURE — 81001 URINALYSIS AUTO W/SCOPE: CPT | Performed by: NURSE PRACTITIONER

## 2019-09-13 RX ORDER — INSULIN GLARGINE 100 [IU]/ML
24 INJECTION, SOLUTION SUBCUTANEOUS
Status: DISCONTINUED | OUTPATIENT
Start: 2019-09-13 | End: 2019-09-17

## 2019-09-13 RX ORDER — CARVEDILOL 12.5 MG/1
50 TABLET ORAL 2 TIMES DAILY WITH MEALS
Status: DISCONTINUED | OUTPATIENT
Start: 2019-09-13 | End: 2019-09-14

## 2019-09-13 RX ORDER — NIFEDIPINE 30 MG/1
60 TABLET, EXTENDED RELEASE ORAL 2 TIMES DAILY
Status: DISCONTINUED | OUTPATIENT
Start: 2019-09-13 | End: 2019-09-14

## 2019-09-13 RX ORDER — DOXAZOSIN MESYLATE 1 MG/1
4 TABLET ORAL
Status: DISCONTINUED | OUTPATIENT
Start: 2019-09-13 | End: 2019-09-14

## 2019-09-13 RX ORDER — TORSEMIDE 10 MG/1
10 TABLET ORAL DAILY
Status: DISCONTINUED | OUTPATIENT
Start: 2019-09-13 | End: 2019-09-14

## 2019-09-13 RX ORDER — LABETALOL 20 MG/4 ML (5 MG/ML) INTRAVENOUS SYRINGE
20 ONCE
Status: COMPLETED | OUTPATIENT
Start: 2019-09-13 | End: 2019-09-13

## 2019-09-13 RX ORDER — LABETALOL 20 MG/4 ML (5 MG/ML) INTRAVENOUS SYRINGE
40 ONCE
Status: COMPLETED | OUTPATIENT
Start: 2019-09-13 | End: 2019-09-13

## 2019-09-13 RX ORDER — MELATONIN
1000 DAILY
Status: DISCONTINUED | OUTPATIENT
Start: 2019-09-14 | End: 2019-09-18 | Stop reason: HOSPADM

## 2019-09-13 RX ORDER — HEPARIN SODIUM 5000 [USP'U]/ML
5000 INJECTION, SOLUTION INTRAVENOUS; SUBCUTANEOUS EVERY 8 HOURS SCHEDULED
Status: DISCONTINUED | OUTPATIENT
Start: 2019-09-13 | End: 2019-09-18 | Stop reason: HOSPADM

## 2019-09-13 RX ORDER — BLOOD-GLUCOSE METER
KIT MISCELLANEOUS
Qty: 200 EACH | Refills: 5 | Status: SHIPPED | OUTPATIENT
Start: 2019-09-13 | End: 2020-04-17 | Stop reason: CLARIF

## 2019-09-13 RX ORDER — ASPIRIN 81 MG/1
81 TABLET ORAL DAILY
Status: DISCONTINUED | OUTPATIENT
Start: 2019-09-14 | End: 2019-09-18 | Stop reason: HOSPADM

## 2019-09-13 RX ORDER — LABETALOL 20 MG/4 ML (5 MG/ML) INTRAVENOUS SYRINGE
10 ONCE
Status: COMPLETED | OUTPATIENT
Start: 2019-09-13 | End: 2019-09-13

## 2019-09-13 RX ADMIN — CARVEDILOL 50 MG: 12.5 TABLET, FILM COATED ORAL at 18:20

## 2019-09-13 RX ADMIN — INSULIN LISPRO 4 UNITS: 100 INJECTION, SOLUTION INTRAVENOUS; SUBCUTANEOUS at 18:56

## 2019-09-13 RX ADMIN — HEPARIN SODIUM 5000 UNITS: 5000 INJECTION INTRAVENOUS; SUBCUTANEOUS at 18:56

## 2019-09-13 RX ADMIN — DOXAZOSIN 4 MG: 1 TABLET ORAL at 21:51

## 2019-09-13 RX ADMIN — LABETALOL 20 MG/4 ML (5 MG/ML) INTRAVENOUS SYRINGE 40 MG: at 15:27

## 2019-09-13 RX ADMIN — LABETALOL 20 MG/4 ML (5 MG/ML) INTRAVENOUS SYRINGE 20 MG: at 14:51

## 2019-09-13 RX ADMIN — LABETALOL 20 MG/4 ML (5 MG/ML) INTRAVENOUS SYRINGE 10 MG: at 14:31

## 2019-09-13 RX ADMIN — SODIUM CHLORIDE 7.5 MG/HR: 0.9 INJECTION, SOLUTION INTRAVENOUS at 22:12

## 2019-09-13 RX ADMIN — INSULIN GLARGINE 24 UNITS: 100 INJECTION, SOLUTION SUBCUTANEOUS at 21:53

## 2019-09-13 RX ADMIN — SODIUM CHLORIDE 5 MG/HR: 0.9 INJECTION, SOLUTION INTRAVENOUS at 16:40

## 2019-09-13 RX ADMIN — TORSEMIDE 10 MG: 10 TABLET ORAL at 18:20

## 2019-09-13 RX ADMIN — INSULIN LISPRO 1 UNITS: 100 INJECTION, SOLUTION INTRAVENOUS; SUBCUTANEOUS at 21:56

## 2019-09-13 NOTE — PLAN OF CARE
Problem: Potential for Falls  Goal: Patient will remain free of falls  Description  INTERVENTIONS:  - Assess patient frequently for physical needs  -  Identify cognitive and physical deficits and behaviors that affect risk of falls    -  [place] fall precautions as indicated by assessment   - Educate patient/family on patient safety including physical limitations  - Instruct patient to call for assistance with activity based on assessment  - Modify environment to reduce risk of injury  - Consider OT/PT consult to assist with strengthening/mobility  Outcome: Progressing

## 2019-09-13 NOTE — PLAN OF CARE
Problem: Potential for Falls  Goal: Patient will remain free of falls  Description  INTERVENTIONS:  - Assess patient frequently for physical needs  -  Identify cognitive and physical deficits and behaviors that affect risk of falls    -  [place] fall precautions as indicated by assessment   - Educate patient/family on patient safety including physical limitations  - Instruct patient to call for assistance with activity based on assessment  - Modify environment to reduce risk of injury  - Consider OT/PT consult to assist with strengthening/mobility  9/13/2019 1953 by Leidy Flores RN  Outcome: Progressing  9/13/2019 1952 by Leidy Flores RN  Outcome: Progressing     Problem: PAIN - ADULT  Goal: Verbalizes/displays adequate comfort level or baseline comfort level  Description  Interventions:  - Encourage patient to monitor pain and request assistance  - Assess pain using appropriate pain scale  - Administer analgesics based on type and severity of pain and evaluate response  - Implement non-pharmacological measures as appropriate and evaluate response  - Consider cultural and social influences on pain and pain management  - Notify physician/advanced practitioner if interventions unsuccessful or patient reports new pain  Outcome: Progressing     Problem: INFECTION - ADULT  Goal: Absence or prevention of progression during hospitalization  Description  INTERVENTIONS:  - Assess and monitor for signs and symptoms of infection  - Monitor lab/diagnostic results  - Monitor all insertion sites, i e  indwelling lines, tubes, and drains  - Monitor endotracheal if appropriate and nasal secretions for changes in amount and color  - [place] appropriate cooling/warming therapies per order  - Administer medications as ordered  - Instruct and encourage patient and family to use good hand hygiene technique  - Identify and instruct in appropriate isolation precautions for identified infection/condition  Outcome: Progressing  Goal: Absence of fever/infection during neutropenic period  Description  INTERVENTIONS:  - Monitor WBC    Outcome: Progressing     Problem: SAFETY ADULT  Goal: Maintain or return to baseline ADL function  Description  INTERVENTIONS:  -  Assess patient's ability to carry out ADLs; assess patient's baseline for ADL function and identify physical deficits which impact ability to perform ADLs (bathing, care of mouth/teeth, toileting, grooming, dressing, etc )  - Assess/evaluate cause of self-care deficits   - Assess range of motion  - Assess patient's mobility; develop plan if impaired  - Assess patient's need for assistive devices and provide as appropriate  - Encourage maximum independence but intervene and supervise when necessary  - Involve family in performance of ADLs  - Assess for home care needs following discharge   - Consider OT consult to assist with ADL evaluation and planning for discharge  - Provide patient education as appropriate  Outcome: Progressing  Goal: Maintain or return mobility status to optimal level  Description  INTERVENTIONS:  - Assess patient's baseline mobility status (ambulation, transfers, stairs, etc )    - Identify cognitive and physical deficits and behaviors that affect mobility  - Identify mobility aids required to assist with transfers and/or ambulation (gait belt, sit-to-stand, lift, walker, cane, etc )  - [place] fall precautions as indicated by assessment  - Record patient progress and toleration of activity level on Mobility SBAR; progress patient to next Phase/Stage  - Instruct patient to call for assistance with activity based on assessment  - Consider rehabilitation consult to assist with strengthening/weightbearing, etc   Outcome: Progressing     Problem: DISCHARGE PLANNING  Goal: Discharge to home or other facility with appropriate resources  Description  INTERVENTIONS:  - Identify barriers to discharge w/patient and caregiver  - Arrange for needed discharge resources and transportation as appropriate  - Identify discharge learning needs (meds, wound care, etc )  - Arrange for interpretive services to assist at discharge as needed  - Refer to Case Management Department for coordinating discharge planning if the patient needs post-hospital services based on physician/advanced practitioner order or complex needs related to functional status, cognitive ability, or social support system  Outcome: Progressing     Problem: Knowledge Deficit  Goal: Patient/family/caregiver demonstrates understanding of disease process, treatment plan, medications, and discharge instructions  Description  Complete learning assessment and assess knowledge base    Interventions:  - Provide teaching at level of understanding  - Provide teaching via preferred learning methods  Outcome: Progressing     Problem: NEUROSENSORY - ADULT  Goal: Achieves stable or improved neurological status  Description  INTERVENTIONS  - Monitor and report changes in neurological status  - Monitor vital signs such as temperature, blood pressure, glucose, and any other labs ordered   - Initiate measures to prevent increased intracranial pressure  - Monitor for seizure activity and implement precautions if appropriate      Outcome: Progressing  Goal: Remains free of injury related to seizures activity  Description  INTERVENTIONS  - Maintain airway, patient safety  and administer oxygen as ordered  - Monitor patient for seizure activity, document and report duration and description of seizure to physician/advanced practitioner  - If seizure occurs,  ensure patient safety during seizure  - Reorient patient post seizure  - Seizure pads on all 4 side rails  - Instruct patient/family to notify RN of any seizure activity including if an aura is experienced  - Instruct patient/family to call for assistance with activity based on nursing assessment  - Administer anti-seizure medications if ordered    Outcome: Progressing  Goal: Achieves maximal functionality and self care  Description  INTERVENTIONS  - Monitor swallowing and airway patency with patient fatigue and changes in neurological status  - Encourage and assist patient to increase activity and self care     - Encourage visually impaired, hearing impaired and aphasic patients to use assistive/communication devices  Outcome: Progressing     Problem: CARDIOVASCULAR - ADULT  Goal: Maintains optimal cardiac output and hemodynamic stability  Description  INTERVENTIONS:  - Monitor I/O, vital signs and rhythm  - Monitor for S/S and trends of decreased cardiac output  - Administer and titrate ordered vasoactive medications to optimize hemodynamic stability  - Assess quality of pulses, skin color and temperature  - Assess for signs of decreased coronary artery perfusion  - Instruct patient to report change in severity of symptoms  Outcome: Progressing  Goal: Absence of cardiac dysrhythmias or at baseline rhythm  Description  INTERVENTIONS:  - Continuous cardiac monitoring, vital signs, obtain 12 lead EKG if ordered  - Administer antiarrhythmic and heart rate control medications as ordered  - Monitor electrolytes and administer replacement therapy as ordered  Outcome: Progressing

## 2019-09-13 NOTE — H&P
History and Physical - Critical Care   Kody Goodwin 37 y o  male MRN: [id number]  Unit/Bed#: ED 16 Encounter: [id number]    Reason for Admission / Chief Complaint: hypertensive urgency    History of Present Illness:  Kody Goodwin is a 37 y o  male with PMHx of CKD stage 3, hypertension, diabetes mellitus type 2, who presents to [facility] ED with c/o tiredness, weakness, loss of appetite, increase in shortness of breath  Patient initially called Nephrology and was advised to go the ED  Patient had outpatient labs on 9/12, revealing creatinine of 5 14  Baseline in July 2019 appears 3 29-3  62  Patient reports feeling short of breath with minimal exertion and states he has been sleeping on first floor (couch) recently due to this  Patient is unable to flight of stairs without feeling dyspneic  Report the shortness of breath, generalized weakness, and feeling tired has been progressive but worse since yesterday  Patient denies numbess, tingling, fever, chills, diaphoresis, chest pain or palpitations, vomiting, diarrhea  Admits to nausea without vomiting on 9/12, denies at present  History obtained from chart review and the patient   ______________________________________________________________________    Assessment:   Principal Problem:    Hypertensive urgency  Active Problems:    MAGNOLIA (acute kidney injury) ([facility] [address] )    CKD (chronic kidney disease) stage 3, GFR 30-59 ml/min ([facility])    Type 2 diabetes mellitus with kidney complication, with long-term current use of insulin ([facility])    LUIS on CPAP    Anemia due to chronic kidney disease    Volume overload  Resolved Problems:    * No resolved hospital problems   *      Plan:  Neuro:   · Serial Neuro Exams, monitor for focal deficit  · Management of PAD  · Denies pain  · Delirium monitoring/management  · Regulate Sleep-wake cycle/CAM-ICU daily    CV:   Hypertensive urgency / volume overload  · Last ECHO 8/19/19: EF 60%, normal size and systolic function  · /215 on arrival to ED, max /110 while in ED  · BP not responsive to labetalol 70 mg total, subsequently started on cardene gtt  · Cardiac infusions: Cardene, 5 mg/hour  · Wean for SBP goal 160-180  · Resume prior to admission medications: carvedilol, doxazosin, nifedipine ER  · Continue home torsedmide-will order daily (takes every other day at home)  · Rhythm: NSR  · Follow rhythm on telemetry    Lung:   History of LUIS with CPAP  · Shortness of breath likey 2/2 volume overload  · Currently on room air  · CPAP 9 cm HS  · Incentive spirometry    GI:   · No acute issues  · Stress ulcer prophylaxis: Protonix IV and No prophylaxis needed  · Renal/CHO controlled diet with fluid restriction of 1 5 L    :   MAGNOLIA on CKD stage 3  · 9/12/19 Outpatient labs drawn revealing creatinine of 5 14    · Baseline in July 2019 appears 3 29-3  62   · UA ordered-pending collection  · Trend UOP and BUN/creat  · Strict I and O  · Continue home torsemide 10 mg-change to daily  · Consult nephrology    FEN:   · Fluid/Diuretic plan: takes torsemide 10 mg every other day, last dose 9/12  Will start daily  · Nutrition/diet plan: Renal/CHO controlled diet, 1500 ml fluid restriction  · Replete electrolytes with goals: K >4 0, Mag >2 0, and Phos >3 0    ID:   · No concern for infection, monitor off abx  · Trend temps and WBC count    Heme:   Anemia of chronic disease  · Hemoglobin at baseline of 10 2-12 1 (since April 2019), currently 10 6  · Trend hgb and plts  · Transfuse as needed for goal hgb >7  · VTE prophylaxis: Heparin and sequential compression device    Endo:   Diabetes mellitus type 2  · Glycemic control plan: Will continue prior to admission insulin with meal time insulin and lantus HS  Add sliding scale insulin coverage QID  · Goal -180mg/dL  · Last HgbA1C results 7 1 on 5/21/19    MSK/Skin:  · Patient reports progressive generalized weakness  No focal deficit   Trend neuro exam   · Early Mobility/Exercise  · PT consult: yes  · OT consult: yes  · Activity as tolerated      Disposition: Admit to critical care service as step down level 1 for hypertensive urgency  Patient will require > 2 midnight stay  Counseling / Coordination of Care  Total Critical Care time spent 30 minutes excluding procedures, teaching and family updates        ______________________________________________________________________  Past Medical History:  Past Medical History:   Diagnosis Date    CKD (chronic kidney disease) 2/21/2019    Class 1 obesity due to excess calories with serious comorbidity and body mass index (BMI) of 31 0 to 31 9 in adult 5/25/2019    Diabetes mellitus ([facility] [address] )     Essential hypertension 10/31/2017    Hyperlipidemia     NSTEMI (non-ST elevated myocardial infarction) ([facility] [address] ) 5/21/2019       Past Surgical History:  Past Surgical History:   Procedure Laterality Date    ABCESS DRAINAGE      tooth    CT KNEE SCOPE,MED/LAT MENISECTOMY Right 9/13/2018    Procedure: RIGHT KNEE ARTHROSCOPIC PARTIAL MEDIAL MENISCECTOMY;  Surgeon: Anjel Delong MD;  Location: [facility]  MAIN [facility];  Service: Orthopedics    WRIST ARTHROPLASTY Right 2017       Past Family History:  Family History   Problem Relation Age of Onset    Hypertension Mother     Multiple sclerosis Mother     Diabetes Father        Social History:  Social History     Tobacco Use   Smoking Status Never Smoker   Smokeless Tobacco Never Used     Social History     Substance and Sexual Activity   Alcohol Use Yes    Comment: occasionally     Social History     Substance and Sexual Activity   Drug Use No     Marital Status: /Civil Union  Exercise History: occupation as  and walks daily    Medications:  Current Facility-Administered Medications   Medication Dose Route Frequency    niCARdipine (CARDENE) 25 mg (STANDARD CONCENTRATION) in sodium chloride 0 9% 250 mL  1-15 mg/hr Intravenous Titrated     Home medications:  Prior to Admission medications    Medication Sig Start Date End Date Taking?  Authorizing Provider   ADMELOG SOLOSTAR 100 units/mL injection pen INJECT 4 UNITS 3 TIMES A DAY WITH MEALS 7/22/19  Yes Josephine Gramajo MD   albuterol (PROVENTIL HFA,VENTOLIN HFA) 90 mcg/act inhaler Inhale 2 puffs every 4 (four) hours as needed for wheezing 5/25/19  Yes CHARLOTTE Montiel   aspirin (ECOTRIN LOW STRENGTH) 81 mg EC tablet Take 1 tablet (81 mg total) by mouth daily 5/26/19  Yes CHARLOTTE Montiel   carvedilol (COREG) 25 mg tablet Take 2 tablets (50 mg total) by mouth 2 (two) times a day with meals 6/28/19  Yes Josephine Gramajo MD   D 1000 1000 units capsule TAKE 1 CAPSULE BY MOUTH EVERY DAY 3/5/19  Yes Josephine Gramajo MD   doxazosin (CARDURA) 2 mg tablet Take 2 tablets (4 mg total) by mouth daily at bedtime 8/1/19  Yes Pete Michael MD   FREESTYLE LITE test strip TEST 3 TIMES DAILY 9/13/19  Yes Josephine Gramajo MD   glucose blood (ACCU-CHEK HARVEY PLUS) test strip Use as instructed 8/17/18  Yes Jason Mills MD   insulin glargine (BASAGLAR KWIKPEN) 100 units/mL injection pen Inject 24 Units under the skin daily at bedtime 4/21/19  Yes Vonnie Laird MD   Insulin Pen Needle (INSUPEN PEN NEEDLES) 31G X 5 MM MISC by Does not apply route 4 (four) times a day 6/28/19  Yes Josephine Gramajo MD   NIFEdipine ER (ADALAT CC) 60 MG 24 hr tablet Take 1 tablet (60 mg total) by mouth 2 (two) times a day 8/19/19  Yes Josephine Gramajo MD   sildenafil (REVATIO) 20 mg tablet 2-5 tablets as needed for sexual activity 1/7/19  Yes Radha Hill PA-C   torsemide (DEMADEX) 20 mg tablet Half a tablet, 10 mg, every other day 8/21/19  Yes CHARLOTTE Chandler   insulin aspart (NOVOLOG FLEXPEN) 100 Units/mL injection pen Inject 4 Units under the skin 3 (three) times a day with meals  Patient not taking: Reported on 9/13/2019 4/21/19   Vonnie Laird MD   FREESTYLE LITE test strip 1 EACH BY OTHER ROUTE 3 (THREE) TIMES A DAY DISPENSE FREESTYLE TEST STRIPS 3/7/19 9/13/19  Josephine Gramajo MD Allergies:  No Known Allergies    ROS:   Review of Systems   Constitutional: Positive for activity change, appetite change and fatigue  Negative for chills, diaphoresis and fever  HENT: Negative  Eyes: Negative  Respiratory: Positive for shortness of breath  Negative for cough, chest tightness and wheezing  Cardiovascular: Positive for leg swelling  Negative for chest pain and palpitations  Gastrointestinal: Positive for nausea  Negative for vomiting  Genitourinary: Negative  Musculoskeletal:        Chronic back pain   Skin: Negative  Neurological: Negative for dizziness, tremors, seizures, syncope, facial asymmetry, speech difficulty, light-headedness, numbness and headaches  Generalized weakness   Psychiatric/Behavioral: Negative  Vitals:  Vitals:    19 1526 19 1529 19 1555 19 1621   BP: (!) 230/110 (!) 215/104 (!) 221/102 (!) 208/120   BP Location:  Right arm Left arm Right arm   Pulse:  82 82 83   Resp:  18 16 18   Temp:       TempSrc:       SpO2:  98% 99% 98%   Weight:   117 kg (257 lb 15 oz)      Temperature:   Temp (24hrs), Av 3 °F (36 8 °C), Min:98 °F (36 7 °C), Max:98 6 °F (37 °C)    Current Temperature: 98 °F (36 7 °C)    Weights:   IBW: -88 kg  Body mass index is 34 03 kg/m²  Hemodynamic Monitoring:  N/A     Non-Invasive/Invasive Ventilation Settings:  Respiratory    Lab Data (Last 4 hours)    None         O2/Vent Data (Last 4 hours)    None              No results found for: PHART, LKM2WAP, PO2ART, RYE2ZIJ, B6DNOOBV, BEART, SOURCE  SpO2 Activity: SpO2 Activity: At Rest, SpO2 Device: O2 Device: None (Room air)     Physical Exam:  Physical Exam   Constitutional: He is oriented to person, place, and time  He appears well-developed and well-nourished  No distress  HENT:   Head: Normocephalic and atraumatic  Mouth/Throat: Oropharynx is clear and moist  No oropharyngeal exudate  Eyes: Pupils are equal, round, and reactive to light  Conjunctivae are normal  No scleral icterus  Neck: Normal range of motion  Neck supple  Cardiovascular: Normal rate, regular rhythm, normal heart sounds and intact distal pulses  Pulmonary/Chest: Effort normal and breath sounds normal  No stridor  No respiratory distress  He has no wheezes  He has no rales  He exhibits no tenderness  Abdominal: Soft  Bowel sounds are normal  He exhibits no distension and no mass  There is no tenderness  There is no guarding  Musculoskeletal: Normal range of motion  He exhibits no tenderness or deformity  +3 Pitting lower leg edema bilaterally   Lymphadenopathy:     He has no cervical adenopathy  Neurological: He is alert and oriented to person, place, and time  No cranial nerve deficit or sensory deficit  Coordination normal    Skin: Skin is warm and dry  Capillary refill takes less than 2 seconds  He is not diaphoretic  No pallor  Psychiatric: He has a normal mood and affect  His behavior is normal        Labs:  Results from last 7 days   Lab Units 09/13/19  1412   WBC Thousand/uL 5 45   HEMOGLOBIN g/dL 10 6*   HEMATOCRIT % 31 9*   PLATELETS Thousands/uL 322   NEUTROS PCT % 61   MONOS PCT % 8     Results from last 7 days   Lab Units 09/13/19  1412 09/12/19  0943   SODIUM mmol/L 140 139   POTASSIUM mmol/L 4 0 4 4   CHLORIDE mmol/L 107 107   CO2 mmol/L 25 24   ANION GAP mmol/L 8 8   BUN mg/dL 40* 43*   CREATININE mg/dL 4 63* 5 14*   CALCIUM mg/dL 8 5 8 4   GLUCOSE RANDOM mg/dL 184* 165*                       ABG:    VBG:            Imaging: no imagining/ I have personally reviewed pertinent reports  and I have personally reviewed pertinent films in PACS  EKG: NSR on telemetry  This was personally reviewed by myself  Micro:          VTE Pharmacologic Prophylaxis: Heparin  VTE Mechanical Prophylaxis: sequential compression device    Invasive lines and devices:   Invasive Devices     Peripheral Intravenous Line            Peripheral IV 09/13/19 Right Forearm less than 1 day                Code Status: Prior  POA:    POLST:      Given critical illness, patient length of stay will require greater than two midnights  Portions of the record may have been created with voice recognition software  Occasional wrong word or "sound a like" substitutions may have occurred due to the inherent limitations of voice recognition software  Read the chart carefully and recognize, using context, where substitutions have occurred        CHARLOTTE Kirk

## 2019-09-13 NOTE — TELEPHONE ENCOUNTER
Patient called in wanting to know his lab test results from yesterday  I pulled results from Epic from 9/12/19 and show Dr Murali Page as Dr Juan Manuel Solo is off today  Patient complains of feeling tired, weak, loss of appetite, and shortness of breath with activity  Per Dr Mac Cam please have the patient report to the emergency room  I spoke with the patient and he is aware to report to the ER  The patient's states that he will be going to [address] [place] [address]  No further questions a this time        Marcial Wall MA

## 2019-09-13 NOTE — ED PROVIDER NOTES
History  Chief Complaint   Patient presents with    Abnormal Lab     pt called by doctor in reference to his creatinine over 5       Malaise - 7 years or greater   Severity:  Moderate  Onset quality:  Gradual  Duration:  4 days  Timing:  Constant  Progression:  Worsening  Chronicity:  New  Context comment:  History of chronic kidney disease, worsening renal function, this +feeling weak and tired, prompting his nephrologist to call him telling him to come to ER for admission  Relieved by:  None tried  Worsened by:  Nothing  Ineffective treatments:  None tried  Associated symptoms: no abdominal pain, no chest pain, no cough, no diarrhea, no dizziness, no dysuria, no fever, no frequency, no headaches, no nausea, no shortness of breath and no vomiting        Prior to Admission Medications   Prescriptions Last Dose Informant Patient Reported? Taking?    ADMELOG SOLOSTAR 100 units/mL injection pen  Self No Yes   Sig: INJECT 4 UNITS 3 TIMES A DAY WITH MEALS   D 1000 1000 units capsule  Self No Yes   Sig: TAKE 1 CAPSULE BY MOUTH EVERY DAY   FREESTYLE LITE test strip   No Yes   Sig: TEST 3 TIMES DAILY   Insulin Pen Needle (INSUPEN PEN NEEDLES) 31G X 5 MM MISC  Self No Yes   Sig: by Does not apply route 4 (four) times a day   NIFEdipine ER (ADALAT CC) 60 MG 24 hr tablet  Self No Yes   Sig: Take 1 tablet (60 mg total) by mouth 2 (two) times a day   albuterol (PROVENTIL HFA,VENTOLIN HFA) 90 mcg/act inhaler  Self No Yes   Sig: Inhale 2 puffs every 4 (four) hours as needed for wheezing   aspirin (ECOTRIN LOW STRENGTH) 81 mg EC tablet  Self No Yes   Sig: Take 1 tablet (81 mg total) by mouth daily   carvedilol (COREG) 25 mg tablet  Self No Yes   Sig: Take 2 tablets (50 mg total) by mouth 2 (two) times a day with meals   doxazosin (CARDURA) 2 mg tablet  Self No Yes   Sig: Take 2 tablets (4 mg total) by mouth daily at bedtime   glucose blood (ACCU-CHEK HARVEY PLUS) test strip  Self No Yes   Sig: Use as instructed   insulin aspart (NOVOLOG FLEXPEN) 100 Units/mL injection pen Not Taking at Unknown time Self No No   Sig: Inject 4 Units under the skin 3 (three) times a day with meals   Patient not taking: Reported on 2019   insulin glargine (BASAGLAR KWIKPEN) 100 units/mL injection pen  Self No Yes   Sig: Inject 24 Units under the skin daily at bedtime   sildenafil (REVATIO) 20 mg tablet  Self No Yes   Si-5 tablets as needed for sexual activity   torsemide (DEMADEX) 20 mg tablet   No Yes   Sig: Half a tablet, 10 mg, every other day      Facility-Administered Medications: None       Past Medical History:   Diagnosis Date    CKD (chronic kidney disease) 2019    Class 1 obesity due to excess calories with serious comorbidity and body mass index (BMI) of 31 0 to 31 9 in adult 2019    Diabetes mellitus ([facility] [address] )     Essential hypertension 10/31/2017    Hyperlipidemia     NSTEMI (non-ST elevated myocardial infarction) ([facility] [address] ) 2019       Past Surgical History:   Procedure Laterality Date    ABCESS DRAINAGE      tooth    RI KNEE SCOPE,MED/LAT MENISECTOMY Right 2018    Procedure: RIGHT KNEE ARTHROSCOPIC PARTIAL MEDIAL MENISCECTOMY;  Surgeon: Ramone Trinh MD;  Location: [facility];  Service: Orthopedics    WRIST ARTHROPLASTY Right        Family History   Problem Relation Age of Onset    Hypertension Mother     Multiple sclerosis Mother     Diabetes Father      I have reviewed and agree with the history as documented  Social History     Tobacco Use    Smoking status: Never Smoker    Smokeless tobacco: Never Used   Substance Use Topics    Alcohol use: Yes     Comment: occasionally    Drug use: No        Review of Systems   Constitutional: Negative for activity change, chills, diaphoresis and fever  HENT: Negative for congestion, sinus pressure and sore throat  Eyes: Negative for pain and visual disturbance  Respiratory: Negative for cough, chest tightness, shortness of breath, wheezing and stridor  Cardiovascular: Negative for chest pain and palpitations  Gastrointestinal: Negative for abdominal distention, abdominal pain, constipation, diarrhea, nausea and vomiting  Genitourinary: Negative for dysuria and frequency  Musculoskeletal: Negative for neck pain and neck stiffness  Skin: Negative for rash  Neurological: Negative for dizziness, speech difficulty, light-headedness, numbness and headaches  Physical Exam  Physical Exam   Constitutional: He is oriented to person, place, and time  He appears well-developed  Vital signs reviewed, patient hypertensive, did not take his home blood pressure medications  HENT:   Head: Normocephalic and atraumatic  Eyes: Pupils are equal, round, and reactive to light  Neck: Normal range of motion  Neck supple  No tracheal deviation present  Cardiovascular: Normal rate, regular rhythm, normal heart sounds and intact distal pulses  No murmur heard  Pulmonary/Chest: Effort normal and breath sounds normal  No stridor  No respiratory distress  Abdominal: Soft  He exhibits no distension  There is no tenderness  There is no rebound and no guarding  Musculoskeletal: Normal range of motion  Neurological: He is alert and oriented to person, place, and time  Skin: Skin is warm and dry  He is not diaphoretic  No erythema  No pallor  Psychiatric: He has a normal mood and affect  Vitals reviewed        Vital Signs  ED Triage Vitals   Temperature Pulse Respirations Blood Pressure SpO2   09/13/19 1352 09/13/19 1353 09/13/19 1353 09/13/19 1353 09/13/19 1353   98 6 °F (37 °C) 80 17 (!) 220/106 99 %      Temp Source Heart Rate Source Patient Position - Orthostatic VS BP Location FiO2 (%)   09/13/19 1352 09/13/19 1353 09/13/19 1353 09/13/19 1353 --   Oral Monitor Lying Right arm       Pain Score       09/13/19 1510       No Pain           Vitals:    09/13/19 1515 09/13/19 1526 09/13/19 1529 09/13/19 1555   BP: (!) 220/112 (!) 230/110 (!) 215/104 (!) 221/102   Pulse: 82  82 82   Patient Position - Orthostatic VS: Lying  Lying Sitting         Visual Acuity      ED Medications  Medications   niCARdipine (CARDENE) 25 mg (STANDARD CONCENTRATION) in sodium chloride 0 9% 250 mL (has no administration in time range)   Labetalol HCl (NORMODYNE) injection 10 mg (10 mg Intravenous Given 9/13/19 1431)   Labetalol HCl (NORMODYNE) injection 20 mg (20 mg Intravenous Given 9/13/19 1451)   Labetalol HCl (NORMODYNE) injection 40 mg (40 mg Intravenous Given 9/13/19 1527)       Diagnostic Studies  Results Reviewed     Procedure Component Value Units Date/Time    Basic metabolic panel [[id number]]  (Abnormal) Collected:  09/13/19 1412    Lab Status:  Final result Specimen:  Blood from Arm, Right Updated:  09/13/19 1427     Sodium 140 mmol/L      Potassium 4 0 mmol/L      Chloride 107 mmol/L      CO2 25 mmol/L      ANION GAP 8 mmol/L      BUN 40 mg/dL      Creatinine 4 63 mg/dL      Glucose 184 mg/dL      Calcium 8 5 mg/dL      eGFR 17 ml/min/1 73sq m     Narrative:       [place] guidelines for Chronic Kidney Disease (CKD):     Stage 1 with normal or high GFR (GFR > 90 mL/min/1 73 square meters)    Stage 2 Mild CKD (GFR = 60-89 mL/min/1 73 square meters)    Stage 3A Moderate CKD (GFR = 45-59 mL/min/1 73 square meters)    Stage 3B Moderate CKD (GFR = 30-44 mL/min/1 73 square meters)    Stage 4 Severe CKD (GFR = 15-29 mL/min/1 73 square meters)    Stage 5 End Stage CKD (GFR <15 mL/min/1 73 square meters)  Note: GFR calculation is accurate only with a steady state creatinine    CBC and differential [[id number]]  (Abnormal) Collected:  09/13/19 1412    Lab Status:  Final result Specimen:  Blood from Arm, Right Updated:  09/13/19 1418     WBC 5 45 Thousand/uL      RBC 3 67 Million/uL      Hemoglobin 10 6 g/dL      Hematocrit 31 9 %      MCV 87 fL      MCH 28 9 pg      MCHC 33 2 g/dL      RDW 12 7 %      MPV 9 7 fL      Platelets 927 Thousands/uL      nRBC 0 /100 WBCs      Neutrophils Relative 61 %      Immat GRANS % 0 %      Lymphocytes Relative 28 %      Monocytes Relative 8 %      Eosinophils Relative 2 %      Basophils Relative 1 %      Neutrophils Absolute 3 36 Thousands/µL      Immature Grans Absolute 0 01 Thousand/uL      Lymphocytes Absolute 1 53 Thousands/µL      Monocytes Absolute 0 43 Thousand/µL      Eosinophils Absolute 0 09 Thousand/µL      Basophils Absolute 0 03 Thousands/µL                  No orders to display              Procedures  CriticalCare Time  Performed by: Pamela Ruzi DO  Authorized by: Pamela Ruiz DO     Critical care provider statement:     Critical care time (minutes):  40    Critical care time was exclusive of:  Separately billable procedures and treating other patients    Critical care was necessary to treat or prevent imminent or life-threatening deterioration of the following conditions: Hypertensive emergency requiring multiple doses of IV antihypertensives, and subsequently a Cardene drip   acute on chronic renal failure  Critical care was time spent personally by me on the following activities:  Obtaining history from patient or surrogate, development of treatment plan with patient or surrogate, discussions with consultants, evaluation of patient's response to treatment, examination of patient, ordering and performing treatments and interventions, ordering and review of laboratory studies, ordering and review of radiographic studies, re-evaluation of patient's condition and review of old charts           ED Course  ED Course as of Sep 13 1605   Fri Sep 13, 2019   1603 Multiple repeat evaluations, patient feels unchanged, patient required multiple boluses labetalol unfortunately this did not help his blood pressure  , will start a Cardene drip will place step-down level 1                                     MDM  Number of Diagnoses or Management Options  Acute on chronic renal failure [facility]): new and requires workup  Hypertensive emergency: new and requires workup  Diagnosis management comments:       Initial ED assessment:  41-year-old male, multiple medical problems, known chronic kidney disease, worsening of renal function and increasing fatigue prompting ED evaluation     Initial ED plan:   Found to be very hypertensive, will give IV labetalol likely need require re-dosing, will recheck kidney function, patient will require admission, will hold off on fluids at this time as patient is having worsening edema  Patient admits to being intermittently noncompliant with his medications  Did not take his medications today  Final ED summary/disposition:   After evaluation and workup in the emergency department, patient requiring multiple doses labetalol and eventually a Cardene drip  Admitted step-down level 1  Amount and/or Complexity of Data Reviewed  Clinical lab tests: ordered and reviewed  Decide to obtain previous medical records or to obtain history from someone other than the patient: yes  Obtain history from someone other than the patient: yes  Review and summarize past medical records: yes  Discuss the patient with other providers: yes  Independent visualization of images, tracings, or specimens: yes        Disposition  Final diagnoses:   Hypertensive emergency   Acute on chronic renal failure ([facility] [address] )     Time reflects when diagnosis was documented in both MDM as applicable and the Disposition within this note     Time User Action Codes Description Comment    9/13/2019  4:02 PM Radha Colvin [I16 1] Hypertensive emergency     9/13/2019  4:02 PM Romana Sanger Add [N17 9,  N18 9] Acute on chronic renal failure [facility])       ED Disposition     ED Disposition Condition Date/Time Comment    Admit Stable Fri Sep 13, 2019  4:02 PM Case was discussed with critical care PA and the patient's admission status was agreed to be Admission Status: inpatient status to the service of Dr Oj Siegel   Follow-up Information    None         Patient's Medications   Discharge Prescriptions    No medications on file     No discharge procedures on file      ED Provider  Electronically Signed by           Stephani Ivory DO  09/13/19 0822

## 2019-09-14 ENCOUNTER — APPOINTMENT (INPATIENT)
Dept: RADIOLOGY | Facility: HOSPITAL | Age: 34
DRG: 199 | End: 2019-09-14
Payer: COMMERCIAL

## 2019-09-14 PROBLEM — N18.30 CKD (CHRONIC KIDNEY DISEASE) STAGE 3, GFR 30-59 ML/MIN (HCC): Chronic | Status: ACTIVE | Noted: 2019-07-09

## 2019-09-14 PROBLEM — I16.0 HYPERTENSIVE URGENCY: Chronic | Status: ACTIVE | Noted: 2019-09-13

## 2019-09-14 PROBLEM — Z99.89 OSA ON CPAP: Chronic | Status: ACTIVE | Noted: 2019-05-29

## 2019-09-14 PROBLEM — E87.70 VOLUME OVERLOAD: Chronic | Status: ACTIVE | Noted: 2019-09-13

## 2019-09-14 PROBLEM — G47.33 OSA ON CPAP: Chronic | Status: ACTIVE | Noted: 2019-05-29

## 2019-09-14 LAB
ALBUMIN SERPL BCP-MCNC: 2.1 G/DL (ref 3.5–5)
ALP SERPL-CCNC: 49 U/L (ref 46–116)
ALT SERPL W P-5'-P-CCNC: 10 U/L (ref 12–78)
ANION GAP SERPL CALCULATED.3IONS-SCNC: 9 MMOL/L (ref 4–13)
AST SERPL W P-5'-P-CCNC: 40 U/L (ref 5–45)
BASOPHILS # BLD AUTO: 0.02 THOUSANDS/ΜL (ref 0–0.1)
BASOPHILS NFR BLD AUTO: 0 % (ref 0–1)
BILIRUB SERPL-MCNC: 0.1 MG/DL (ref 0.2–1)
BUN SERPL-MCNC: 39 MG/DL (ref 5–25)
CA-I BLD-SCNC: 1.12 MMOL/L (ref 1.12–1.32)
CALCIUM SERPL-MCNC: 8.4 MG/DL (ref 8.3–10.1)
CHLORIDE SERPL-SCNC: 107 MMOL/L (ref 100–108)
CO2 SERPL-SCNC: 24 MMOL/L (ref 21–32)
CREAT SERPL-MCNC: 4.58 MG/DL (ref 0.6–1.3)
EOSINOPHIL # BLD AUTO: 0.16 THOUSAND/ΜL (ref 0–0.61)
EOSINOPHIL NFR BLD AUTO: 3 % (ref 0–6)
ERYTHROCYTE [DISTWIDTH] IN BLOOD BY AUTOMATED COUNT: 12.7 % (ref 11.6–15.1)
GFR SERPL CREATININE-BSD FRML MDRD: 18 ML/MIN/1.73SQ M
GLUCOSE SERPL-MCNC: 104 MG/DL (ref 65–140)
GLUCOSE SERPL-MCNC: 115 MG/DL (ref 65–140)
GLUCOSE SERPL-MCNC: 121 MG/DL (ref 65–140)
GLUCOSE SERPL-MCNC: 127 MG/DL (ref 65–140)
GLUCOSE SERPL-MCNC: 138 MG/DL (ref 65–140)
HCT VFR BLD AUTO: 28.9 % (ref 36.5–49.3)
HGB BLD-MCNC: 9.6 G/DL (ref 12–17)
IMM GRANULOCYTES # BLD AUTO: 0.01 THOUSAND/UL (ref 0–0.2)
IMM GRANULOCYTES NFR BLD AUTO: 0 % (ref 0–2)
LYMPHOCYTES # BLD AUTO: 1.94 THOUSANDS/ΜL (ref 0.6–4.47)
LYMPHOCYTES NFR BLD AUTO: 31 % (ref 14–44)
MAGNESIUM SERPL-MCNC: 1.7 MG/DL (ref 1.6–2.6)
MCH RBC QN AUTO: 28.8 PG (ref 26.8–34.3)
MCHC RBC AUTO-ENTMCNC: 33.2 G/DL (ref 31.4–37.4)
MCV RBC AUTO: 87 FL (ref 82–98)
MONOCYTES # BLD AUTO: 0.59 THOUSAND/ΜL (ref 0.17–1.22)
MONOCYTES NFR BLD AUTO: 9 % (ref 4–12)
NEUTROPHILS # BLD AUTO: 3.53 THOUSANDS/ΜL (ref 1.85–7.62)
NEUTS SEG NFR BLD AUTO: 57 % (ref 43–75)
NRBC BLD AUTO-RTO: 0 /100 WBCS
PHOSPHATE SERPL-MCNC: 4.4 MG/DL (ref 2.7–4.5)
PLATELET # BLD AUTO: 301 THOUSANDS/UL (ref 149–390)
PMV BLD AUTO: 9.7 FL (ref 8.9–12.7)
POTASSIUM SERPL-SCNC: 4 MMOL/L (ref 3.5–5.3)
PROT SERPL-MCNC: 5.9 G/DL (ref 6.4–8.2)
RBC # BLD AUTO: 3.33 MILLION/UL (ref 3.88–5.62)
SODIUM SERPL-SCNC: 140 MMOL/L (ref 136–145)
WBC # BLD AUTO: 6.25 THOUSAND/UL (ref 4.31–10.16)

## 2019-09-14 PROCEDURE — 94660 CPAP INITIATION&MGMT: CPT

## 2019-09-14 PROCEDURE — 71045 X-RAY EXAM CHEST 1 VIEW: CPT

## 2019-09-14 PROCEDURE — 99233 SBSQ HOSP IP/OBS HIGH 50: CPT | Performed by: INTERNAL MEDICINE

## 2019-09-14 PROCEDURE — 85025 COMPLETE CBC W/AUTO DIFF WBC: CPT | Performed by: NURSE PRACTITIONER

## 2019-09-14 PROCEDURE — 80053 COMPREHEN METABOLIC PANEL: CPT | Performed by: NURSE PRACTITIONER

## 2019-09-14 PROCEDURE — 82948 REAGENT STRIP/BLOOD GLUCOSE: CPT

## 2019-09-14 PROCEDURE — 82330 ASSAY OF CALCIUM: CPT | Performed by: NURSE PRACTITIONER

## 2019-09-14 PROCEDURE — 83735 ASSAY OF MAGNESIUM: CPT | Performed by: NURSE PRACTITIONER

## 2019-09-14 PROCEDURE — NC001 PR NO CHARGE: Performed by: PHYSICIAN ASSISTANT

## 2019-09-14 PROCEDURE — 84100 ASSAY OF PHOSPHORUS: CPT | Performed by: NURSE PRACTITIONER

## 2019-09-14 PROCEDURE — 99255 IP/OBS CONSLTJ NEW/EST HI 80: CPT | Performed by: INTERNAL MEDICINE

## 2019-09-14 RX ORDER — ACETAMINOPHEN 325 MG/1
650 TABLET ORAL EVERY 6 HOURS PRN
Status: DISCONTINUED | OUTPATIENT
Start: 2019-09-14 | End: 2019-09-18 | Stop reason: HOSPADM

## 2019-09-14 RX ORDER — NIFEDIPINE 30 MG/1
60 TABLET, EXTENDED RELEASE ORAL 2 TIMES DAILY
Status: DISCONTINUED | OUTPATIENT
Start: 2019-09-14 | End: 2019-09-18 | Stop reason: HOSPADM

## 2019-09-14 RX ORDER — CARVEDILOL 12.5 MG/1
50 TABLET ORAL 2 TIMES DAILY WITH MEALS
Status: DISCONTINUED | OUTPATIENT
Start: 2019-09-14 | End: 2019-09-18 | Stop reason: HOSPADM

## 2019-09-14 RX ORDER — DOXAZOSIN MESYLATE 4 MG/1
4 TABLET ORAL
Status: DISCONTINUED | OUTPATIENT
Start: 2019-09-14 | End: 2019-09-18 | Stop reason: HOSPADM

## 2019-09-14 RX ORDER — HYDRALAZINE HYDROCHLORIDE 25 MG/1
25 TABLET, FILM COATED ORAL EVERY 8 HOURS SCHEDULED
Status: DISCONTINUED | OUTPATIENT
Start: 2019-09-14 | End: 2019-09-18 | Stop reason: HOSPADM

## 2019-09-14 RX ORDER — HYDRALAZINE HYDROCHLORIDE 25 MG/1
50 TABLET, FILM COATED ORAL EVERY 8 HOURS SCHEDULED
Status: DISCONTINUED | OUTPATIENT
Start: 2019-09-14 | End: 2019-09-14

## 2019-09-14 RX ORDER — HYDRALAZINE HYDROCHLORIDE 25 MG/1
25 TABLET, FILM COATED ORAL EVERY 8 HOURS SCHEDULED
Status: DISCONTINUED | OUTPATIENT
Start: 2019-09-14 | End: 2019-09-14

## 2019-09-14 RX ORDER — HYDRALAZINE HYDROCHLORIDE 20 MG/ML
10 INJECTION INTRAMUSCULAR; INTRAVENOUS ONCE
Status: COMPLETED | OUTPATIENT
Start: 2019-09-14 | End: 2019-09-14

## 2019-09-14 RX ORDER — HYDRALAZINE HYDROCHLORIDE 25 MG/1
25 TABLET, FILM COATED ORAL ONCE
Status: COMPLETED | OUTPATIENT
Start: 2019-09-14 | End: 2019-09-14

## 2019-09-14 RX ORDER — MAGNESIUM SULFATE HEPTAHYDRATE 40 MG/ML
2 INJECTION, SOLUTION INTRAVENOUS ONCE
Status: COMPLETED | OUTPATIENT
Start: 2019-09-14 | End: 2019-09-14

## 2019-09-14 RX ORDER — FUROSEMIDE 10 MG/ML
80 INJECTION INTRAMUSCULAR; INTRAVENOUS ONCE
Status: COMPLETED | OUTPATIENT
Start: 2019-09-14 | End: 2019-09-14

## 2019-09-14 RX ORDER — NIFEDIPINE 30 MG/1
60 TABLET, EXTENDED RELEASE ORAL ONCE
Status: COMPLETED | OUTPATIENT
Start: 2019-09-14 | End: 2019-09-14

## 2019-09-14 RX ORDER — NIFEDIPINE 30 MG/1
60 TABLET, EXTENDED RELEASE ORAL 2 TIMES DAILY
Status: DISCONTINUED | OUTPATIENT
Start: 2019-09-14 | End: 2019-09-14

## 2019-09-14 RX ADMIN — CARVEDILOL 50 MG: 12.5 TABLET, FILM COATED ORAL at 08:06

## 2019-09-14 RX ADMIN — CARVEDILOL 50 MG: 12.5 TABLET, FILM COATED ORAL at 16:33

## 2019-09-14 RX ADMIN — MAGNESIUM SULFATE HEPTAHYDRATE 2 G: 40 INJECTION, SOLUTION INTRAVENOUS at 05:58

## 2019-09-14 RX ADMIN — HYDRALAZINE HYDROCHLORIDE 25 MG: 25 TABLET ORAL at 19:23

## 2019-09-14 RX ADMIN — NIFEDIPINE 60 MG: 30 TABLET, EXTENDED RELEASE ORAL at 03:27

## 2019-09-14 RX ADMIN — HEPARIN SODIUM 5000 UNITS: 5000 INJECTION INTRAVENOUS; SUBCUTANEOUS at 05:27

## 2019-09-14 RX ADMIN — FUROSEMIDE 80 MG: 10 INJECTION, SOLUTION INTRAMUSCULAR; INTRAVENOUS at 08:05

## 2019-09-14 RX ADMIN — INSULIN LISPRO 4 UNITS: 100 INJECTION, SOLUTION INTRAVENOUS; SUBCUTANEOUS at 14:44

## 2019-09-14 RX ADMIN — HYDRALAZINE HYDROCHLORIDE 25 MG: 25 TABLET ORAL at 21:33

## 2019-09-14 RX ADMIN — INSULIN LISPRO 4 UNITS: 100 INJECTION, SOLUTION INTRAVENOUS; SUBCUTANEOUS at 09:00

## 2019-09-14 RX ADMIN — HYDRALAZINE HYDROCHLORIDE 10 MG: 20 INJECTION INTRAMUSCULAR; INTRAVENOUS at 02:22

## 2019-09-14 RX ADMIN — HEPARIN SODIUM 5000 UNITS: 5000 INJECTION INTRAVENOUS; SUBCUTANEOUS at 13:35

## 2019-09-14 RX ADMIN — INSULIN LISPRO 4 UNITS: 100 INJECTION, SOLUTION INTRAVENOUS; SUBCUTANEOUS at 17:49

## 2019-09-14 RX ADMIN — HYDRALAZINE HYDROCHLORIDE 25 MG: 25 TABLET ORAL at 08:06

## 2019-09-14 RX ADMIN — NIFEDIPINE 60 MG: 30 TABLET, FILM COATED, EXTENDED RELEASE ORAL at 17:14

## 2019-09-14 RX ADMIN — DOXAZOSIN 4 MG: 4 TABLET ORAL at 21:33

## 2019-09-14 RX ADMIN — ACETAMINOPHEN 650 MG: 325 TABLET, FILM COATED ORAL at 09:00

## 2019-09-14 RX ADMIN — SODIUM CHLORIDE 2.5 MG/HR: 0.9 INJECTION, SOLUTION INTRAVENOUS at 05:28

## 2019-09-14 RX ADMIN — ASPIRIN 81 MG: 81 TABLET, COATED ORAL at 08:10

## 2019-09-14 NOTE — UTILIZATION REVIEW
Notification of Inpatient Admission/Inpatient Authorization Request  This is a Notification of Inpatient Admission/Request for Inpatient Authorization for our facility [address] [facility]  Be advised that this patient was admitted to our facility under Inpatient Status  Please contact the Utilization Review Department where the patient is receiving care services for additional admission information  Place of Service Code: 24   Place of Service Name: Inpatient Hospital  Presentation Date & Time: 9/13/2019  1:51 PM  Inpatient Admission Date & Time: 9/13/19 1603  Discharge Date & Time: No discharge date for patient encounter  Discharge Disposition (if discharged): Home/Self Care  Attending Physician and NPI#: Vale Strickland [[id number]]  Admission Orders (From admission, onward)     Ordered        09/13/19 1603  Inpatient Admission (expected length of stay for this patient Order details is greater than two midnights)  Once                 Facility: [facility]  Utilization Review Department  Phone: [phone]; Fax [phone]  [email or web address]  ATTENTION: Please call with any questions or concerns to [phone]  and carefully listen to the prompts so that you are directed to the right person  Send all requests for admission clinical reviews, approved or denied determinations and any other requests to fax [phone]   All voicemails are confidential

## 2019-09-14 NOTE — OCCUPATIONAL THERAPY NOTE
Occupational Therapy Screen        Patient Name: Barry Cancer  EQGKN'S Date: 9/14/2019      OT orders received and chart review completed  Yayo w/ Sulaiman Valentino and RN  Pt completing ADL w/ out assistance, and has no OT needs at this time  Per [facility], consult to be discontinued   D/C OT    [facility], OTR/L

## 2019-09-14 NOTE — ASSESSMENT & PLAN NOTE
· No stroke symptoms  · Recent significant weight gain with suspected component of volume overload  · CXR essentially unremarkable  · CKD III now IV suspect contributing as is most likely component of volume overload  · Previously after discussion with nephrology Demadex contributed to worsening renal function  · Was on cardene gtt which has been weaned off with the addition of hydralazine  · Goal systolic -433UL/RJ

## 2019-09-14 NOTE — ASSESSMENT & PLAN NOTE
· MAGNOLIA on CKD 4  · MAGNOLIA most likely component of perfusion with labile HTN as well as component of ATN  · Component of also progression of disease suspected  · Goal systolic -685CJ/UZ with limited drops  · Known to Dr Harini Pollard  · Avoid nephrotoxic agents  · Monitor I/Os as well as renal function

## 2019-09-14 NOTE — PROGRESS NOTES
Progress Note - Ayse Meredith 1985, 37 y o  male MRN: [id number]    Unit/Bed#:  Encounter: [id number]    Primary Care Provider: Dina Gutiérrez MD   Date and time admitted to hospital: 9/13/2019  1:51 PM        * Hypertensive urgency  Assessment & Plan  · No stroke symptoms  · Recent significant weight gain with suspected component of volume overload  · CXR essentially unremarkable  · CKD III now IV suspect contributing as is most likely component of volume overload  · Previously after discussion with nephrology Demadex contributed to worsening renal function  · Was on cardene gtt which has been weaned off with the addition of hydralazine  · Goal systolic -841JM/HW    MAGNOLIA (acute kidney injury) ([facility] [address] )  Assessment & Plan  · MAGNOLIA on CKD 4  · MAGNOLIA most likely component of perfusion with labile HTN as well as component of ATN  · Component of also progression of disease suspected  · Goal systolic -756KZ/UE with limited drops  · Known to Dr John Mccormick  · Avoid nephrotoxic agents  · Monitor I/Os as well as renal function    CKD (chronic kidney disease) stage 3, GFR 30-59 ml/min (HCC)  Assessment & Plan  · CKD stage III-IV  · Known to Dr John Mccormick  · BP lability most likely contributing to MAGNOLIA / CKD    LUIS on CPAP  Assessment & Plan  · CPAP at home, continue HS  · Encourage continued use HS     Volume overload  Assessment & Plan  · Recent 20# weight gain  · Previous worsening renal function with demadex  · Nephrology following in consult  · Diuresis per nephrology        Code Status: Level 1 - Full Code  POA:    POLST:      Reason for ICU admission:   Hypertensive Urgency on Cardene gtt    Active problems:   Principal Problem:    Hypertensive urgency  Active Problems:    MAGNOLIA (acute kidney injury) ([facility] [address] )    CKD (chronic kidney disease) stage 3, GFR 30-59 ml/min (HCC)    LUIS on CPAP    Volume overload    Type 2 diabetes mellitus with kidney complication, with long-term current use of insulin ([facility] [address] ) Anemia due to chronic kidney disease  Resolved Problems:    * No resolved hospital problems  *      Consultants:   Dr Pat Alcazar - Nephrology    History of Present Illness:   Skyler Ricci is a 37 y o  male with PMHx of CKD stage 3, hypertension, diabetes mellitus type 2, who presents to [facility] ED with c/o tiredness, weakness, loss of appetite, increase in shortness of breath  Patient initially called Nephrology and was advised to go the ED  Patient had outpatient labs on 9/12, revealing creatinine of 5 14  Baseline in July 2019 appears 3 29-3  62  Patient reports feeling short of breath with minimal exertion and states he has been sleeping on first floor (couch) recently due to this  Patient is unable to flight of stairs without feeling dyspneic  Report the shortness of breath, generalized weakness, and feeling tired has been progressive but worse since yesterday  Patient denies numbess, tingling, fever, chills, diaphoresis, chest pain or palpitations, vomiting, diarrhea  Admits to nausea without vomiting on 9/12, denies at present  Summary of clinical course:   Patient remained on Cardene gtt with addition of PO regimen  Nephrology consulted secondary to worsening renal function in setting of suspected component of volume overload as well as perfusion issues 2/2 BP lability  Nephrology saw and evaluated in AM, PO regimen increased with trial of diuresis with 80mg Lasix IV  BP improved with goal systolic [id number]/YULI  Patient weaned off of cardene drip and has remained stable  He remains asymptomatic  Recent or scheduled procedures:   Echo    Outstanding/pending diagnostics:   Echo as history of pericarditis, with component of suspected volume overload will reevaluate EF      Cultures:   None       Mobilization Plan:   Out of bed to chair    Nutrition Plan:   Renal diet as tolerated    VTE Pharmacologic Prophylaxis: Heparin  VTE Mechanical Prophylaxis: sequential compression device    Discharge Plan:   Patient should be ready for discharge to home over next 48-72 hours    Initial Physical Therapy Recommendations: Consult not necessary  Initial Occupational Therapy Recommendations: Not necessary  Initial /Plan: aware of patient and admission    Home medications that are not reordered and reason why:   Reordered      Spoke with Dr Kenneth Moreno to assign to Dr Chasidy Madden; however after 1700 regarding transfer  Please call [phone] with any questions or concerns  Portions of the record may have been created with voice recognition software  Occasional wrong word or "sound a like" substitutions may have occurred due to the inherent limitations of voice recognition software  Read the chart carefully and recognize, using context, where substitutions have occurred

## 2019-09-14 NOTE — ASSESSMENT & PLAN NOTE
· Recent 20# weight gain  · Previous worsening renal function with demadex  · Nephrology following in consult  · Diuresis per nephrology

## 2019-09-14 NOTE — CONSULTS
Consultation - Nephrology   Rose Wilson 37 y o  male MRN: [id number]  Unit/Bed#:  Encounter: [id number]      Assessment/Plan     Assessment:  1  Acute renal failure/acute kidney injury on stage 4 chronic kidney disease present on admission:  Acute kidney injury likely secondary to variable renal perfusion in the setting of labile hypertension  There also was likely an aspect of ATN to this  It seems to be a mild case of suspected ATN his creatinine is decreased to 4 5 prior baseline had been in the midto high 3 range  I also think there has been underlying progression of chronic kidney disease as well  He at this point has no uremic symptoms his creatinine has improved from a creatinine of 5 1-4 5  Avoiding any precipitous drops in blood pressure right now my goal would be 91498 systolic to help maintain renal perfusion and hopefully decrease his creatinine level/improve his kidney function little bit more  The patient is nonoliguric there is no urgent need for dialysis  2  Stage 4 chronic kidney disease:  The patient has stage 4 chronic kidney disease follows in our office with Dr Cb Peacock; this is likely secondary to diabetic nephropathy  Cannot rule out hypertensive nephrosclerosis  He has also had worsening chronic kidney disease and may be various bouts of ATN given labile blood pressure from his prior hospitalizations and labile blood pressures at home which have also or may be contributing to worsening kidney function over time  3  Hypertensive urgency:  Patient denies any chest pressure no headache no blurry vision while he does have shortness of breath did not examine to have any type of least on exam heart failure though he does some degree of fluid overload  I think the labile hypertension is multifactorial including worsening chronic kidney disease which is likely playing a role  There is also likely a component of fluid overload also playing a role as well    When the patient had been placed on higher dose Demadex at that time he did have a worsening kidney function  His normal weight at home was approximately 240 needs proximally running 260 lb here  Will also try to further adjust his oral antihypertensive medications as well  Start oral hydralazine with blood pressure parameters  4  Fluid overload:  Management as above also check protein creatinine ratio    5  Anemia secondary chronic kidney disease: There may be a dilutional component to his hemoglobin level is 9 6  Giving intravenous Lasix as noted above check iron studies ferritin  He has normocytic indices with MCV of 87  Plan:  As above, I spoke with the patient his significant other as well as critical care advanced practitioner this morning  All questions answered to the best my ability        History of Present Illness   Physician Requesting Consult: Estrella Ng DO  Reason for Consult / Principal Problem:  Acute kidney injury on chronic kidney disease stage 4 and hypertensive urgency  Hx and PE limited by:   HPI: Margo Morales is a 37y o  year old male who presents with worsening fatigue weakness decreased appetite increased shortness of breath  Mr Darrick Ramirez has a history of stage 4 chronic kidney disease secondary to diabetic nephropathy he follows with in our office with Dr Dillon Dawson  In late May, he had a baseline creatinine in the mid to high 2 range  In hospitalization at that time secondary to mild pericarditis and was discharged on colchicine and prednisone  His baseline weight at home normally runs approximately 235-240 lb  Since that time he has had worsening weight gain of approximately 30 lb  He saw Cardiology and the prednisone had been discontinued I believe in July  He has had difficulty with labile blood pressures at home  At 1 point he had been started by his primary care physician on spironolactone    This was stopped and has not been restarted; as per the patient's significant other he has not been on this for the past 3 months  He has had worsening creatinine and he presented yesterday to the hospital with creatinine of 5 and elevated blood pressure  He has intermittently been on a Cardene infusion depending on his blood pressure levels  At the time when I saw him this morning he was maintained on a Cardene infusion his systolic blood pressure has been in the 170s  He denies any acute headache he denies any uremic symptoms  We are asked to see the patient for acute kidney injury on chronic kidney disease and fluid management      Inpatient consult to Nephrology  Consult performed by: Chasity Proctor DO  Consult ordered by: CHARLOTTE Kumar          General:  He denies any chest pressure shortness of breath currently  Cardiovascular:  He denies any dizziness or lightheadedness  Respiratory:  He denies any cough hemoptysis or epistaxis  Gastrointestinal:  He denies any nausea vomiting diarrhea constipation  Genitourinary:  He denies any problems with hematuria urgency or problems with initiation of urinary flow or urinary stream   Outside of the above review of systems, all others are essentially negative    Historical Information   Past Medical History:   Diagnosis Date    Anemia due to chronic kidney disease 9/13/2019    CKD (chronic kidney disease) 2/21/2019    Class 1 obesity due to excess calories with serious comorbidity and body mass index (BMI) of 31 0 to 31 9 in adult 5/25/2019    Diabetes mellitus ([facility] [address] )     Essential hypertension 10/31/2017    Hyperlipidemia     NSTEMI (non-ST elevated myocardial infarction) ([facility] [address] ) 5/21/2019     Past Surgical History:   Procedure Laterality Date    ABCESS DRAINAGE      tooth    CT KNEE SCOPE,MED/LAT MENISECTOMY Right 9/13/2018    Procedure: RIGHT KNEE ARTHROSCOPIC PARTIAL MEDIAL MENISCECTOMY;  Surgeon: Yahir Maldonado MD;  Location: [facility];  Service: Orthopedics    WRIST ARTHROPLASTY Right 2017     Social History   Social History Substance and Sexual Activity   Alcohol Use Yes    Comment: occasionally     Social History     Substance and Sexual Activity   Drug Use No     Social History     Tobacco Use   Smoking Status Never Smoker   Smokeless Tobacco Never Used     Family History   Problem Relation Age of Onset    Hypertension Mother     Multiple sclerosis Mother     Diabetes Father        Meds/Allergies   all current active meds have been reviewed, current meds:   Current Facility-Administered Medications   Medication Dose Route Frequency    aspirin (ECOTRIN LOW STRENGTH) EC tablet 81 mg  81 mg Oral Daily    carvedilol (COREG) tablet 50 mg  50 mg Oral BID With Meals    cholecalciferol (VITAMIN D3) tablet 1,000 Units  1,000 Units Oral Daily    doxazosin (CARDURA) tablet 4 mg  4 mg Oral HS    heparin (porcine) subcutaneous injection 5,000 Units  5,000 Units Subcutaneous Q8H [address]    insulin glargine (LANTUS) subcutaneous injection 24 Units 0 24 mL  24 Units Subcutaneous HS    insulin lispro (HumaLOG) 100 units/mL subcutaneous injection 1-6 Units  1-6 Units Subcutaneous HS    insulin lispro (HumaLOG) 100 units/mL subcutaneous injection 2-12 Units  2-12 Units Subcutaneous TID AC    insulin lispro (HumaLOG) 100 units/mL subcutaneous injection 4 Units  4 Units Subcutaneous TID With Meals    magnesium sulfate 2 g/50 mL IVPB (premix) 2 g  2 g Intravenous Once    niCARdipine (CARDENE) 25 mg (STANDARD CONCENTRATION) in sodium chloride 0 9% 250 mL  1-15 mg/hr Intravenous Titrated    NIFEdipine (PROCARDIA XL) 24 hr tablet 60 mg  60 mg Oral BID    torsemide (DEMADEX) tablet 10 mg  10 mg Oral Daily    and PTA meds:    Medications Prior to Admission   Medication    ADMELOG SOLOSTAR 100 units/mL injection pen    albuterol (PROVENTIL HFA,VENTOLIN HFA) 90 mcg/act inhaler    aspirin (ECOTRIN LOW STRENGTH) 81 mg EC tablet    carvedilol (COREG) 25 mg tablet    D 1000 1000 units capsule    doxazosin (CARDURA) 2 mg tablet    FREESTYLE LITE test strip    glucose blood (ACCU-CHEK HARVEY PLUS) test strip    insulin glargine (BASAGLAR KWIKPEN) 100 units/mL injection pen    Insulin Pen Needle (INSUPEN PEN NEEDLES) 31G X 5 MM MISC    NIFEdipine ER (ADALAT CC) 60 MG 24 hr tablet    torsemide (DEMADEX) 20 mg tablet    insulin aspart (NOVOLOG FLEXPEN) 100 Units/mL injection pen    sildenafil (REVATIO) 20 mg tablet       No Known Allergies    Objective     Intake/Output Summary (Last 24 hours) at 9/14/2019 0634  Last data filed at 9/14/2019 0439  Gross per 24 hour   Intake 902 5 ml   Output 625 ml   Net 277 5 ml       Invasive Devices:        General: patient in NAD  Skin:  No new rash  Eyes:  No scleral icterus  Neck:  Supple no adenopathy  Chest:  Coarse breath sounds  CVS:  S1-S2 I do not appreciate a rub  Abdomen:  Soft positive bowel sounds  Extremities: There is lower extremity edema noted  Neuro:  Nonfocal there is no asterixis  Psych:  Patient answers questions appropriately    Current Weight: Weight - Scale: 117 kg (258 lb 2 5 oz)  First Weight: Weight - Scale: 117 kg (257 lb 11 5 oz)    Lab Results:  I have personally reviewed pertinent labs    CBC:   Lab Results   Component Value Date    WBC 6 25 09/14/2019    RBC 3 33 (L) 09/14/2019     CMP:   Lab Results   Component Value Date     09/14/2019    CO2 24 09/14/2019    CO2 23 02/24/2019    BUN 39 (H) 09/14/2019    CREATININE 4 58 (H) 09/14/2019    GLUCOSE 182 (H) 02/24/2019    CALCIUM 8 4 09/14/2019    AST 40 09/14/2019    ALT 10 (L) 09/14/2019    ALKPHOS 49 09/14/2019    EGFR 18 09/14/2019    EGFR 52 02/24/2019     Phosphorus:   Lab Results   Component Value Date    PHOS 4 4 09/14/2019     Magnesium:   Lab Results   Component Value Date    MG 1 7 09/14/2019     Urinalysis:   Lab Results   Component Value Date    COLORU Yellow 09/13/2019    CLARITYU Clear 09/13/2019    SPECGRAV 1 010 09/13/2019    PHUR 6 0 09/13/2019    PHUR 6 0 08/09/2018    LEUKOCYTESUR Negative 09/13/2019    NITRITE Negative 09/13/2019    GLUCOSEU 100 (1/10%) (A) 09/13/2019    KETONESU Negative 09/13/2019    BILIRUBINUR Negative 09/13/2019    BLOODU Small (A) 09/13/2019     BMP:   Lab Results   Component Value Date    GLUCOSE 182 (H) 02/24/2019    SODIUM 140 09/14/2019    CHLORIDE 108 02/24/2019    CO2 24 09/14/2019    CO2 23 02/24/2019    BUN 39 (H) 09/14/2019    CREATININE 4 58 (H) 09/14/2019    CALCIUM 8 4 09/14/2019

## 2019-09-14 NOTE — ASSESSMENT & PLAN NOTE
· CKD stage III-IV  · Known to Dr Cb Peacock  · BP lability most likely contributing to MAGNOLIA / CKD

## 2019-09-14 NOTE — PROGRESS NOTES
Progress Note - Critical Care   Kavitha Ceja 37 y o  male MRN: [id number]  Unit/Bed#:  Encounter: [id number]    Assessment:   Principal Problem:    Hypertensive urgency  Active Problems:    MAGNOLIA (acute kidney injury) ([address] )    CKD (chronic kidney disease) stage 3, GFR 30-59 ml/min ([facility])    Type 2 diabetes mellitus with kidney complication, with long-term current use of insulin ([facility])    LUIS on CPAP    Anemia due to chronic kidney disease    Volume overload  Resolved Problems:    * No resolved hospital problems  *      Plan  Neuro:   · Delirium Precautions  · CAM ICU per protocol  · Regulate sleep/wake cycle+  · Trend neuro exam    CV:   · Hemodynamic infusions: Cardene, held at this time  · Evening dose of Nifedipine held due BP out of goal range  BP elevated while off Cardene  One time dose of hydralazine given  BP did not respond  Held dose of Nifedipine ordered to be given at 0315  · SBP goal 160-180  · Rhythm: NSR  · Follow rhythm on telemetry    Lung:   · LUIS using home CPAP  · Incentive spirometry  GI:   · Stress ulcer prophylaxis: Not Indicated    FEN:   · Fluid/Diuretic plan:  torsemide 10 mg every other day with last dose taken 9/12 per patient  · Nutrition/diet plan:  Renal/CHO controlled diet, 1500 mL fluid restriction  · Replete electrolytes with goals: K >4 0, Mag >2 0, and Phos >3 0    :   · Indwelling Hirsch present: no   · Trend UOP and BUN/creat: Baseline creatinine 3 29-3  62  Currently 4 58 and improving  · Strict I and O  · UDS unremarkable  · Home torsemide 10 mg every other day changed to daily  · Nephrology consult pending for MAGNOLIA on CKD  ID:   · Trend temps and WBC count    Heme:   · Trend hgb and plts  · Transfuse as needed for goal hgb >7 0 (baseline hgb 10 2-12  1)  · H/o anemia of chronic disease    Endo:   · Glycemic control plan: Blood glucose controlled on current regimen  · Goal glucose 140-180 mg/dL  · Previous A1C 7 1 5/21/19     MSK/Skin:  · Mobility goal: Early mobilization protocol  · PT consult: yes  · OT consult: yes    VTE Prophylaxis:  · Pharmacologic Prophylaxis:Heparin  · Mechanical Prophylaxis: sequential compression device    Disposition: Transfer to SD level 2      ______________________________________________________________________  Chief Complaint: No complaints at this time  HPI/24hr events: Cardene held due to BP decrease to 848 systolic  SBP increased to 196  One time dose of hydralazine given  BP remained elevated  Patient remained asymptomatic  Nifedipine dose held on the evening of  ordered to be given  Tolerated home CPAP overnight      ______________________________________________________________________  Temperature:   Temp (24hrs), Av 5 °F (36 9 °C), Min:98 °F (36 7 °C), Max:99 1 °F (37 3 °C)    Current Temperature: 98 6 °F (37 °C)    Vitals:    19 0400 19 0445 19 0527 19 0531   BP: (!) 206/97 (!) 171/92 (!) 190/93    BP Location:   Right arm    Pulse: 80 81 84    Resp:  15 16    Temp:       TempSrc:       SpO2: 95% 98% 98%    Weight:    117 kg (258 lb 2 5 oz)   Height:                  Weights:   IBW: 79 9 kg    Body mass index is 34 06 kg/m²  Weight (last 2 days)     Date/Time   Weight    19 0531   117 (258 16)    19 1744   118 (260)    19 1555   117 (257 94)    19 1352   117 (257 72)            Height: 6' 1" (185 4 cm)  SpO2: SpO2: 98 %  ______________________________________________________________________  Physical Exam:     Physical Exam   Constitutional: He is oriented to person, place, and time  He appears well-developed and well-nourished  No distress  HENT:   Head: Normocephalic and atraumatic  Right Ear: External ear normal    Left Ear: External ear normal    Nose: Nose normal    Eyes: Pupils are equal, round, and reactive to light  Conjunctivae and EOM are normal  Right eye exhibits no discharge  Left eye exhibits no discharge  No scleral icterus  Neck: Normal range of motion  Neck supple  No JVD present  No tracheal deviation present  No thyromegaly present  Cardiovascular: Normal rate, regular rhythm and intact distal pulses  Exam reveals no gallop and no friction rub  No murmur heard  Pulmonary/Chest: Effort normal and breath sounds normal  No stridor  No respiratory distress  He has no wheezes  Abdominal: Soft  He exhibits no distension and no mass  There is no tenderness  There is no guarding  Musculoskeletal: Normal range of motion  He exhibits edema  He exhibits no tenderness or deformity  Neurological: He is alert and oriented to person, place, and time  No cranial nerve deficit  He exhibits normal muscle tone  Skin: Skin is warm and dry  No rash noted  He is not diaphoretic  No erythema  No pallor  ______________________________________________________________________  Intake and Outputs:  I/O       09/12 0701 - 09/13 0700 09/13 0701 - 09/14 0700    P  O   480    I V  (mL/kg)  422 5 (3 6)    Total Intake(mL/kg)  902 5 (7 6)    Net  +902 5          Unmeasured Urine Occurrence  1 x    Unmeasured Stool Occurrence  2 x          Nutrition:        Diet Orders   (From admission, onward)             Start     Ordered    09/13/19 [address]  Room Service  Once     Question:  Type of Service  Answer:  Room Service - Appropriate with Assistance    09/13/19 1803    09/13/19 1742  Diet Renal; Renal [place]; Yes; Fluid Restriction 1500 ML; No; Consistent Carbohydrate Diet Level 3 (6 carb servings/90 grams CHO/meal)  Diet effective now     Question Answer Comment   Diet Type Renal    Renal Renal [place]    Should patient have a fluid restriction? Yes    Fluid Restriction Fluid Restriction 1500 ML    Should patient have a protein modifier? No    Other Restriction(s): Consistent Carbohydrate Diet Level 3 (6 carb servings/90 grams CHO/meal)    RD to adjust diet per protocol?  Yes        09/13/19 1741                Labs:   Results from last 7 days   Lab Units 09/14/19  0427 09/13/19  1412   WBC Thousand/uL 6 25 5 45   HEMOGLOBIN g/dL 9 6* 10 6*   HEMATOCRIT % 28 9* 31 9*   PLATELETS Thousands/uL 301 322   NEUTROS PCT % 57 61   MONOS PCT % 9 8      Results from last 7 days   Lab Units 09/14/19 09/13/19  1412 09/12/19  0943   SODIUM mmol/L 140 140 139   POTASSIUM mmol/L 4 0 4 0 4 4   CHLORIDE mmol/L 107 107 107   CO2 mmol/L 24 25 24   BUN mg/dL 39* 40* 43*   CREATININE mg/dL 4 58* 4 63* 5 14*   CALCIUM mg/dL 8 4 8 5 8 4   ALK PHOS U/L 49  --   --    ALT U/L 10*  --   --    AST U/L 40  --   --    GLUCOSE RANDOM mg/dL 138 184* 165*     Results from last 7 days   Lab Units 09/14/19   MAGNESIUM mg/dL 1 7     Results from last 7 days   Lab Units 09/14/19   PHOSPHORUS mg/dL 4 4              0   Lab Value Date/Time    TROPONINI 0 23 (H) 05/21/2019 2325    TROPONINI 0 27 (H) 05/21/2019 1946    TROPONINI 0 22 (H) 05/21/2019 1639     Allergies: No Known Allergies  Medications:   Scheduled Meds:    Current Facility-Administered Medications:  aspirin 81 mg Oral Daily CHARLOTTE Nascimento    carvedilol 50 mg Oral BID With Meals CHARLOTTE Nascimento    cholecalciferol 1,000 Units Oral Daily CHARLOTTE Nascimento    doxazosin 4 mg Oral HS CHARLOTTE Nascimento    heparin (porcine) 5,000 Units Subcutaneous Q8H [facility] CHARLOTTE Nascimento    insulin glargine 24 Units Subcutaneous HS CHARLOTTE Nascimento    insulin lispro 1-6 Units Subcutaneous HS CHARLOTTE Nascimento    insulin lispro 2-12 Units Subcutaneous TID AC CHARLOTTE Nascimento    insulin lispro 4 Units Subcutaneous TID With Meals CHARLOTTE Nascimento    niCARdipine 1-15 mg/hr Intravenous Titrated CHARLOTTE Nascimento Last Rate: 2 5 mg/hr (09/14/19 0528)   NIFEdipine ER 60 mg Oral BID Sharlene Myers Jr, PA-C    torsemide 10 mg Oral Daily CHARLOTTE Nascimento      Continuous Infusions:    niCARdipine 1-15 mg/hr Last Rate: 2 5 mg/hr (09/14/19 0528)     PRN Meds:       Invasive lines and devices:   Invasive Devices Peripheral Intravenous Line            Peripheral IV 09/13/19 Right Forearm less than 1 day                   Counseling / Coordination of Care  Total Critical Care time spent 0 minutes excluding procedures, teaching and family updates  Code Status: Level 1 - Full Code    Portions of the record may have been created with voice recognition software  Occasional wrong word or "sound a like" substitutions may have occurred due to the inherent limitations of voice recognition software  Read the chart carefully and recognize, using context, where substitutions have occurred      Jerilyn Leon PA-C

## 2019-09-14 NOTE — PHYSICAL THERAPY NOTE
Physical Therapy Screen Note    Referral received for PT eval and tx  Radha Gavin reports pt is mobilizing independently  Dr Dagoberto Donaldson and Jennifer Garcia [facility] report pt is independently and PT consult to be discontinued  D/C PT      Markell Ort, PT

## 2019-09-14 NOTE — UTILIZATION REVIEW
Initial Clinical Review    Admission: Date/Time/Statement: Inpatient Admission Orders (From admission, onward)     Ordered        09/13/19 1603  Inpatient Admission (expected length of stay for this patient Order details is greater than two midnights)  Once                   Orders Placed This Encounter   Procedures    Inpatient Admission (expected length of stay for this patient Order details is greater than two midnights)     Standing Status:   Standing     Number of Occurrences:   1     Order Specific Question:   Admitting Physician     Answer:   Anand Serrano [[id number]]     Order Specific Question:   Level of Care     Answer:   Level 1 Stepdown [13]     Order Specific Question:   Estimated length of stay     Answer:   More than 2 Midnights     Order Specific Question:   Certification     Answer:   I certify that inpatient services are medically necessary for this patient for a duration of greater than two midnights  See H&P and MD Progress Notes for additional information about the patient's course of treatment  ED Arrival Information     Expected Arrival Acuity Means of Arrival Escorted By Service Admission Type    - 9/13/2019 13:38 Urgent [address]  [facility] Urgent    [address] [facility]        Chief Complaint   Patient presents with    Abnormal Lab     pt called by doctor in reference to his creatinine over 5     Assessment/Plan: This is a 37 year male from home to ED per nephrology admitted to inpatient due to  Hypertensive urgency/MAGNOLIA  Patient had outpatient labs on 9/12, revealing creatinine of 5 14  Baseline in July 2019 appears 3 29-3 62  Last ECHO 8/19/19: EF 60%, normal size and systolic function  Presented with worsening shortness of breath, tiredness, weakness and loss of appetite  On exam hypertensive  Bun 40  Creatinine 4 63  Multiple doses of labetalol given in ED without effect and Cardene drip in progress  nephology consulted   Suspect volume overload and torsemide in progress  On 9/14- telemetry NSR  Persistent hypertension - on cardene 2 5 mg/hr overnight  Cardene off since 8:30 this morning  /94   over past 24 hours  Patient reports headache this morning, now improved  Bun 39,  Creatinine 4 58  Oral regimen started for hypertension, did require IV hydralazine and oral medication adjusted  On 9/14/2019 per nephrology  Acute kidney injury likely secondary to variable renal perfusion in the setting of labile hypertension  There also was likely an aspect of ATN to this,underlying progression of chronic kidney disease as well goal would be 168- 055 systolic to help maintain renal perfusion and hopefully decrease his creatinine level/improve his kidney function little bit more  The patient is nonoliguric there is no urgent need for dialysis  Renetta Daigle the labile hypertension is multifactorial including worsening chronic kidney disease which is likely playing a role  There is also likely a component of fluid overload also playing a role as well  When the patient had been placed on higher dose Demadex at that time he did have a worsening kidney function  His normal weight at home was approximately 240 needs proximally running 260 lb here  Will also try to further adjust his oral antihypertensive medications as well    Start oral hydralazine with blood pressure     ED Triage Vitals   Temperature Pulse Respirations Blood Pressure SpO2   09/13/19 1352 09/13/19 1353 09/13/19 1353 09/13/19 1353 09/13/19 1353   98 6 °F (37 °C) 80 17 (!) 220/106 99 %      Temp Source Heart Rate Source Patient Position - Orthostatic VS BP Location FiO2 (%)   09/13/19 1352 09/13/19 1353 09/13/19 1353 09/13/19 1353 09/13/19 2225   Oral Monitor Lying Right arm 21      Pain Score       09/13/19 1510       No Pain        Wt Readings from Last 1 Encounters:   09/14/19 117 kg (258 lb 2 5 oz)     Additional Vital Signs:   09/14/19 0900    81  19  128/79  98  95 %     09/14/19 0800    83  21  137/89  104  94 %       09/14/19 0700    82  16  178/98Abnormal   131  98 %       09/14/19 0600    83  20  172/82Abnormal   118  95 %       09/14/19 0527    84  16  190/93Abnormal   133  98 %    Lying   09/14/19 0400    80  19  206/97Abnormal   139  95 %       09/14/19 0259  98 6 °F (37 °C)  94  18  188/95Abnormal   134  96 %  None (Room air)  Lying   09/14/19 0200    81  18  196/101Abnormal   141  96 %       09/14/19 0100    82  9Abnormal   148/77  105  97 %       09/13/19 2257  99 1 °F (37 3 °C)  84  15  164/82  112  95 %  Other (comment)   Lying   O2 Device: cpap at 09/13/19 2257   09/13/19 2225            99 %  Other (comment)      O2 Device: CPAP at 09/13/19 2225   09/13/19 2200    87  28Abnormal   166/78  112  96 %       09/13/19 2100    86  20  188/89Abnormal   128  95 %       09/13/19 2015    89  15  182/98Abnormal   132  97 %       09/13/19 1900    91  31Abnormal   171/84Abnormal   120  98 %       09/13/19 1805    85  20  149/78  107  97 %       09/13/19 1621    83  18  208/120Abnormal     98 %  None (Room air)  Lying      09/13 0701  09/14 0700 09/14 0701  09/15 0700   P  O  480 480   I V  (mL/kg) 422 5 (3 6) 75 8 (0 6)   IV Piggyback 50    Total Intake(mL/kg) 952 5 (8 1) 555 8 (4 8)   Urine (mL/kg/hr) 625 475 (0 9)   Stool 0    Total Output 625 475   Net +327 5 +80 8        Unmeasured Urine Occurrence 1 x    Unmeasured Stool Occurrence 2 x      09/14/19 0531  117 kg (258 lb 2 5 oz)  Bed scale     09/13/19 1744  118 kg (260 lb)  Bed scale  6' 1" (1 854 m)   09/13/19 1555  117 kg (257 lb 15 oz)  Estimated     09/13/19 1352  117 kg (257 lb 11 5 oz)  Bed scale         Pertinent Labs/Diagnostic Test Results:   Results from last 7 days   Lab Units 09/14/19  0427 09/13/19  1412   WBC Thousand/uL 6 25 5 45   HEMOGLOBIN g/dL 9 6* 10 6*   HEMATOCRIT % 28 9* 31 9*   PLATELETS Thousands/uL 301 322   NEUTROS ABS Thousands/µL 3 53 3 36     Results from last 7 days   Lab Units 09/14/19 09/13/19  1412 09/12/19  0943   SODIUM mmol/L 140 140 139   POTASSIUM mmol/L 4 0 4 0 4 4   CHLORIDE mmol/L 107 107 107   CO2 mmol/L 24 25 24   ANION GAP mmol/L 9 8 8   BUN mg/dL 39* 40* 43*   CREATININE mg/dL 4 58* 4 63* 5 14*   EGFR ml/min/1 73sq m 18 17 15   CALCIUM mg/dL 8 4 8 5 8 4   CALCIUM, IONIZED mmol/L 1 12  --   --    MAGNESIUM mg/dL 1 7  --   --    PHOSPHORUS mg/dL 4 4  --   --      Results from last 7 days   Lab Units 09/14/19   AST U/L 40   ALT U/L 10*   ALK PHOS U/L 49   TOTAL PROTEIN g/dL 5 9*   ALBUMIN g/dL 2 1*   TOTAL BILIRUBIN mg/dL 0 10*     Results from last 7 days   Lab Units 09/14/19  0657 09/13/19  2038 09/13/19  1852   POC GLUCOSE mg/dl 121 176* 120     Results from last 7 days   Lab Units 09/14/19 09/13/19  1412 09/12/19  0943   GLUCOSE RANDOM mg/dL 138 184* 165*     Results from last 7 days   Lab Units 09/13/19  1648   CLARITY UA  Clear   COLOR UA  Yellow   SPEC GRAV UA  1 010   PH UA  6 0   GLUCOSE UA mg/dl 100 (1/10%)*   KETONES UA mg/dl Negative   BLOOD UA  Small*   PROTEIN UA mg/dl 100 (2+)*   NITRITE UA  Negative   BILIRUBIN UA  Negative   UROBILINOGEN UA E U /dl 0 2   LEUKOCYTES UA  Negative   WBC UA /hpf 0-1*   RBC UA /hpf 4-10*   BACTERIA UA /hpf Occasional   EPITHELIAL CELLS WET PREP /hpf None Seen     Results from last 7 days   Lab Units 09/13/19  1956   AMPH/METH  Negative   BARBITURATE UR  Negative   BENZODIAZEPINE UR  Negative   COCAINE UR  Negative   METHADONE URINE  Negative   OPIATE UR  Negative   PCP UR  Negative   THC UR  Negative     ED Treatment:   Medication Administration from 09/13/2019 1338 to 09/13/2019 1740       Date/Time Order Dose Route Action Comments     09/13/2019 1431 Labetalol HCl (NORMODYNE) injection 10 mg 10 mg Intravenous Given      09/13/2019 1451 Labetalol HCl (NORMODYNE) injection 20 mg 20 mg Intravenous Given      09/13/2019 1527 Labetalol HCl (NORMODYNE) injection 40 mg 40 mg Intravenous Given      09/13/2019 1719 niCARdipine (CARDENE) 25 mg (STANDARD CONCENTRATION) in sodium chloride 0 9% 250 mL 5 mg/hr Intravenous Rate/Dose Change 173/82     09/13/2019 1703 niCARdipine (CARDENE) 25 mg (STANDARD CONCENTRATION) in sodium chloride 0 9% 250 mL 7 5 mg/hr Intravenous Rate/Dose Change 205/102     09/13/2019 1640 niCARdipine (CARDENE) 25 mg (STANDARD CONCENTRATION) in sodium chloride 0 9% 250 mL 5 mg/hr Intravenous New Bag         Past Medical History:   Diagnosis Date    Anemia due to chronic kidney disease 9/13/2019    CKD (chronic kidney disease) 2/21/2019    Class 1 obesity due to excess calories with serious comorbidity and body mass index (BMI) of 31 0 to 31 9 in adult 5/25/2019    Diabetes mellitus ([place] [address] )     Essential hypertension 10/31/2017    Hyperlipidemia     NSTEMI (non-ST elevated myocardial infarction) ([place] [address] ) 5/21/2019     Present on Admission:   MAGNOLIA (acute kidney injury) ([place] [address] )   CKD (chronic kidney disease) stage 3, GFR 30-59 ml/min ([facility])   Hypertensive urgency   Anemia due to chronic kidney disease   Volume overload      Admitting Diagnosis: Abnormal laboratory test result [R89 9]  Acute on chronic renal failure (HCC) [N17 9, N18 9]  Hypertensive emergency [I16 1]  Age/Sex: 37 y o  male  Admission Orders: 9/13/2109  1603 INPATIENT     Current Facility-Administered Medications:  Acetaminophen - used x 1  650 mg Oral Q6H PRN    aspirin 81 mg Oral Daily    carvedilol 50 mg Oral BID With Meals    cholecalciferol 1,000 Units Oral Daily    doxazosin 4 mg Oral HS    heparin (porcine) 5,000 Units Subcutaneous Q8H [address]    hydrALAZINE 25 mg Oral Q8H [address]    insulin glargine 24 Units Subcutaneous HS    insulin lispro 1-6 Units Subcutaneous HS    insulin lispro 2-12 Units Subcutaneous TID AC    insulin lispro 4 Units Subcutaneous TID With Meals    niCARdipine 1-15 mg/hr Intravenous Titrated Last Rate: Stopped (09/14/19 0830)   NIFEdipine ER 60 mg Oral BID      furosemide (LASIX) injection 80 mg   Dose: 80 mg  Freq: Once Route: IV  Start: 09/14/19 0700 End: 09/14/19 0805    hydrALAZINE (APRESOLINE) injection 10 mg   Dose: 10 mg  Freq: Once Route: IV  Start: 09/14/19 0230 End: 09/14/19 0222    magnesium sulfate 2 g/50 mL IVPB (premix) 2 g   Dose: 2 g  Freq: Once Route: IV  Last Dose: 2 g (09/14/19 0558)  Start: 09/14/19 0600 End: 09/14/19 0758    NIFEdipine (PROCARDIA XL) 24 hr tablet 60 mg   Dose: 60 mg  Freq: Once Route: PO  Start: 09/14/19 0315 End: 09/14/19 0327    torsemide (DEMADEX) tablet 10 mg   Dose: 10 mg  Freq: Daily Route: PO  Start: 09/13/19 1715 End: 09/14/19 0651    IP CONSULT TO NEPHROLOGY  Cardio pulmonary monitoring   Fluid restriction     Network Utilization Review Department  Phone: [phone]; Fax [phone]  [email or web address]  ATTENTION: Please call with any questions or concerns to [phone]  and carefully listen to the prompts so that you are directed to the right person  Send all requests for admission clinical reviews, approved or denied determinations and any other requests to fax [phone]   All voicemails are confidential

## 2019-09-15 ENCOUNTER — APPOINTMENT (INPATIENT)
Dept: NON INVASIVE DIAGNOSTICS | Facility: HOSPITAL | Age: 34
DRG: 199 | End: 2019-09-15
Payer: COMMERCIAL

## 2019-09-15 PROBLEM — N18.4 CKD (CHRONIC KIDNEY DISEASE) STAGE 4, GFR 15-29 ML/MIN (HCC): Status: ACTIVE | Noted: 2019-07-09

## 2019-09-15 LAB
ANION GAP SERPL CALCULATED.3IONS-SCNC: 9 MMOL/L (ref 4–13)
BUN SERPL-MCNC: 41 MG/DL (ref 5–25)
CALCIUM SERPL-MCNC: 8.5 MG/DL (ref 8.3–10.1)
CHLORIDE SERPL-SCNC: 108 MMOL/L (ref 100–108)
CO2 SERPL-SCNC: 25 MMOL/L (ref 21–32)
CREAT SERPL-MCNC: 4.97 MG/DL (ref 0.6–1.3)
FERRITIN SERPL-MCNC: 229 NG/ML (ref 8–388)
GFR SERPL CREATININE-BSD FRML MDRD: 16 ML/MIN/1.73SQ M
GLUCOSE SERPL-MCNC: 102 MG/DL (ref 65–140)
GLUCOSE SERPL-MCNC: 123 MG/DL (ref 65–140)
GLUCOSE SERPL-MCNC: 153 MG/DL (ref 65–140)
GLUCOSE SERPL-MCNC: 213 MG/DL (ref 65–140)
GLUCOSE SERPL-MCNC: 93 MG/DL (ref 65–140)
IRON SATN MFR SERPL: 32 %
IRON SERPL-MCNC: 52 UG/DL (ref 65–175)
POTASSIUM SERPL-SCNC: 4.3 MMOL/L (ref 3.5–5.3)
SODIUM SERPL-SCNC: 142 MMOL/L (ref 136–145)
TIBC SERPL-MCNC: 165 UG/DL (ref 250–450)

## 2019-09-15 PROCEDURE — 80048 BASIC METABOLIC PNL TOTAL CA: CPT | Performed by: PHYSICIAN ASSISTANT

## 2019-09-15 PROCEDURE — 93321 DOPPLER ECHO F-UP/LMTD STD: CPT | Performed by: INTERNAL MEDICINE

## 2019-09-15 PROCEDURE — 82728 ASSAY OF FERRITIN: CPT | Performed by: PHYSICIAN ASSISTANT

## 2019-09-15 PROCEDURE — 82948 REAGENT STRIP/BLOOD GLUCOSE: CPT

## 2019-09-15 PROCEDURE — 93325 DOPPLER ECHO COLOR FLOW MAPG: CPT | Performed by: INTERNAL MEDICINE

## 2019-09-15 PROCEDURE — 83540 ASSAY OF IRON: CPT | Performed by: PHYSICIAN ASSISTANT

## 2019-09-15 PROCEDURE — 99233 SBSQ HOSP IP/OBS HIGH 50: CPT | Performed by: INTERNAL MEDICINE

## 2019-09-15 PROCEDURE — 93308 TTE F-UP OR LMTD: CPT

## 2019-09-15 PROCEDURE — 83550 IRON BINDING TEST: CPT | Performed by: PHYSICIAN ASSISTANT

## 2019-09-15 PROCEDURE — 99232 SBSQ HOSP IP/OBS MODERATE 35: CPT | Performed by: FAMILY MEDICINE

## 2019-09-15 PROCEDURE — 93308 TTE F-UP OR LMTD: CPT | Performed by: INTERNAL MEDICINE

## 2019-09-15 RX ADMIN — INSULIN LISPRO 2 UNITS: 100 INJECTION, SOLUTION INTRAVENOUS; SUBCUTANEOUS at 21:11

## 2019-09-15 RX ADMIN — ACETAMINOPHEN 650 MG: 325 TABLET, FILM COATED ORAL at 08:06

## 2019-09-15 RX ADMIN — CARVEDILOL 50 MG: 12.5 TABLET, FILM COATED ORAL at 08:06

## 2019-09-15 RX ADMIN — FUROSEMIDE 120 MG: 10 INJECTION, SOLUTION INTRAMUSCULAR; INTRAVENOUS at 16:18

## 2019-09-15 RX ADMIN — INSULIN GLARGINE 24 UNITS: 100 INJECTION, SOLUTION SUBCUTANEOUS at 21:27

## 2019-09-15 RX ADMIN — ACETAMINOPHEN 650 MG: 325 TABLET, FILM COATED ORAL at 22:23

## 2019-09-15 RX ADMIN — HYDRALAZINE HYDROCHLORIDE 25 MG: 25 TABLET ORAL at 14:01

## 2019-09-15 RX ADMIN — INSULIN LISPRO 4 UNITS: 100 INJECTION, SOLUTION INTRAVENOUS; SUBCUTANEOUS at 09:48

## 2019-09-15 RX ADMIN — CARVEDILOL 50 MG: 12.5 TABLET, FILM COATED ORAL at 18:18

## 2019-09-15 RX ADMIN — INSULIN LISPRO 4 UNITS: 100 INJECTION, SOLUTION INTRAVENOUS; SUBCUTANEOUS at 16:18

## 2019-09-15 RX ADMIN — NIFEDIPINE 60 MG: 30 TABLET, FILM COATED, EXTENDED RELEASE ORAL at 08:06

## 2019-09-15 RX ADMIN — FUROSEMIDE 120 MG: 10 INJECTION, SOLUTION INTRAMUSCULAR; INTRAVENOUS at 08:07

## 2019-09-15 RX ADMIN — HYDRALAZINE HYDROCHLORIDE 25 MG: 25 TABLET ORAL at 06:09

## 2019-09-15 RX ADMIN — ASPIRIN 81 MG: 81 TABLET, COATED ORAL at 08:06

## 2019-09-15 RX ADMIN — HEPARIN SODIUM 5000 UNITS: 5000 INJECTION INTRAVENOUS; SUBCUTANEOUS at 14:01

## 2019-09-15 RX ADMIN — HEPARIN SODIUM 5000 UNITS: 5000 INJECTION INTRAVENOUS; SUBCUTANEOUS at 21:27

## 2019-09-15 RX ADMIN — MELATONIN 1000 UNITS: at 08:06

## 2019-09-15 NOTE — PROGRESS NOTES
Progress Note - Nephrology   Eduard Richardson 37 y o  male MRN: [id number]  Unit/Bed#:  Encounter: [id number]    Assessment:  1  Acute kidney injury on stage 4 chronic kidney disease present on admission: Acute kidney injury likely secondary to variable renal perfusion in the setting of labile hypertension  There also was likely an aspect of ATN to this  It seems to be a mild case of suspected ATN his creatinine is decreased to 4 5 prior baseline had been in the midto high 3 range  I also think there has been underlying progression of chronic kidney disease as well  He at this point has no uremic symptoms his creatinine has improved from a creatinine of 5 1-4 5  As of today, September 15th his creatinine is 4 5  Diagnostic considerations include plateau phase of ATN versus new set point again he likely has had underlying progression of his chronic kidney disease  It has been more rapid over the past few months which may be secondary to hypertension recurrent bouts of ATN and variable renal perfusion  2  Stage 4 chronic kidney disease:The patient has stage 4 chronic kidney disease follows in our office with Dr Lynn Husbands; this is likely secondary to diabetic nephropathy  Cannot rule out hypertensive nephrosclerosis  He has also had worsening chronic kidney disease and may be various bouts of ATN given labile blood pressure from his prior hospitalizations and labile blood pressures at home which have also or may be contributing to worsening kidney function over time  As noted above, diagnostic consideration prescribed in 4 6 is plateau phase of ATN versus new set point of his kidney function  He has no uremic symptoms is no urgency for hemodialysis at this point although certainly he is not far from needing dialysis in the future  3  Hypertensive urgency: I think the labile hypertension is multifactorial including worsening chronic kidney disease which is likely playing a role    There is also likely a component of fluid overload also playing a role as well  The patient his weight is 258 lb will give 2 doses of diuretic more of an effective dose of diuretic will give 120 IV b i d  My goal blood pressure for him would be 140-165 range  We had just started hydralazine yesterday  As we decreased of volume component to his hypertension we may need to further adjust blood pressure regimen to avoid hypotension  Planning for intravenous diuretics today  4  Fluid overload:  Plan as discussed above  5  Anemia secondary to chronic kidney disease stage 4:  Hemoglobin is 9 6  There may be a dilutional aspect to this  Check iron studies ferritin       Plan:  As above        Subjective:   Patient seen in follow-up for acute kidney injury on chronic kidney disease stage 4  I spoke with the patient's nurse yesterday as the blood pressure had dropped in the 110 120s in the afternoon  We adjusted hold parameters on his blood pressure medication regimen  He had received most of his medications yesterday  He did receive 1 dose of diuretic  He denies any chest pressure shortness of breath no acute events overnight  Systolic blood pressure overnight has been over 115-460 systolic  Patient's significant other is chair side  He has not required Cardene over the past day    Objective:     Vitals: Blood pressure 166/77, pulse 81, temperature 98 7 °F (37 1 °C), temperature source Oral, resp  rate 15, height 6' 1" (1 854 m), weight 117 kg (258 lb 2 5 oz), SpO2 96 %  ,Body mass index is 34 06 kg/m²      Weight (last 2 days)     Date/Time   Weight    09/14/19 0531   117 (258 16)    09/13/19 1744   118 (260)    09/13/19 1555   117 (257 94)    09/13/19 1352   117 (257 72)                Intake/Output Summary (Last 24 hours) at 9/15/2019 0637  Last data filed at 9/14/2019 1847  Gross per 24 hour   Intake 795 83 ml   Output 975 ml   Net -179 17 ml            Physical Exam: General: patient is in NAD  Skin:  No new rash  Eyes: No scleral icterus  Neck:  Supple no adenopathy  Chest:  Coarse breath sounds  CVS:  S1-S2 I do not detect a rub  Abdomen:  Soft positive bowel sounds  Extremities:   There is leg edema noted  Neuro:  Nonfocal there is no asterixis  Psych:  Patient answers questions appropriately              Medications Prior to Admission   Medication    ADMELOG SOLOSTAR 100 units/mL injection pen    albuterol (PROVENTIL HFA,VENTOLIN HFA) 90 mcg/act inhaler    aspirin (ECOTRIN LOW STRENGTH) 81 mg EC tablet    carvedilol (COREG) 25 mg tablet    D 1000 1000 units capsule    doxazosin (CARDURA) 2 mg tablet    FREESTYLE LITE test strip    glucose blood (ACCU-CHEK HARVEY PLUS) test strip    insulin glargine (BASAGLAR KWIKPEN) 100 units/mL injection pen    Insulin Pen Needle (INSUPEN PEN NEEDLES) 31G X 5 MM MISC    NIFEdipine ER (ADALAT CC) 60 MG 24 hr tablet    torsemide (DEMADEX) 20 mg tablet    insulin aspart (NOVOLOG FLEXPEN) 100 Units/mL injection pen    sildenafil (REVATIO) 20 mg tablet       Current Facility-Administered Medications   Medication Dose Route Frequency    acetaminophen (TYLENOL) tablet 650 mg  650 mg Oral Q6H PRN    aspirin (ECOTRIN LOW STRENGTH) EC tablet 81 mg  81 mg Oral Daily    carvedilol (COREG) tablet 50 mg  50 mg Oral BID With Meals    cholecalciferol (VITAMIN D3) tablet 1,000 Units  1,000 Units Oral Daily    doxazosin (CARDURA) tablet 4 mg  4 mg Oral HS    heparin (porcine) subcutaneous injection 5,000 Units  5,000 Units Subcutaneous Q8H [address]    hydrALAZINE (APRESOLINE) tablet 25 mg  25 mg Oral Q8H [address]    insulin glargine (LANTUS) subcutaneous injection 24 Units 0 24 mL  24 Units Subcutaneous HS    insulin lispro (HumaLOG) 100 units/mL subcutaneous injection 1-6 Units  1-6 Units Subcutaneous HS    insulin lispro (HumaLOG) 100 units/mL subcutaneous injection 2-12 Units  2-12 Units Subcutaneous TID AC    insulin lispro (HumaLOG) 100 units/mL subcutaneous injection 4 Units  4 Units Subcutaneous TID With Meals    niCARdipine (CARDENE) 25 mg (STANDARD CONCENTRATION) in sodium chloride 0 9% 250 mL  1-15 mg/hr Intravenous Titrated    NIFEdipine (PROCARDIA XL) 24 hr tablet 60 mg  60 mg Oral BID        Lab, Imaging and other studies: I have personally reviewed pertinent labs    CBC:   Lab Results   Component Value Date    WBC 6 25 09/14/2019    RBC 3 33 (L) 09/14/2019     CMP:   Lab Results   Component Value Date     09/14/2019    CO2 24 09/14/2019    CO2 23 02/24/2019    BUN 39 (H) 09/14/2019    CREATININE 4 58 (H) 09/14/2019    GLUCOSE 182 (H) 02/24/2019    CALCIUM 8 4 09/14/2019    AST 40 09/14/2019    ALT 10 (L) 09/14/2019    ALKPHOS 49 09/14/2019    EGFR 18 09/14/2019    EGFR 52 02/24/2019     Phosphorus:   Lab Results   Component Value Date    PHOS 4 4 09/14/2019     Magnesium:   Lab Results   Component Value Date    MG 1 7 09/14/2019     Urinalysis:   Lab Results   Component Value Date    COLORU Yellow 09/13/2019    CLARITYU Clear 09/13/2019    SPECGRAV 1 010 09/13/2019    PHUR 6 0 09/13/2019    PHUR 6 0 08/09/2018    LEUKOCYTESUR Negative 09/13/2019    NITRITE Negative 09/13/2019    GLUCOSEU 100 (1/10%) (A) 09/13/2019    KETONESU Negative 09/13/2019    BILIRUBINUR Negative 09/13/2019    BLOODU Small (A) 09/13/2019     BMP:   Lab Results   Component Value Date    GLUCOSE 182 (H) 02/24/2019    SODIUM 140 09/14/2019    CHLORIDE 108 02/24/2019    CO2 24 09/14/2019    CO2 23 02/24/2019    BUN 39 (H) 09/14/2019    CREATININE 4 58 (H) 09/14/2019    CALCIUM 8 4 09/14/2019

## 2019-09-15 NOTE — ASSESSMENT & PLAN NOTE
· MAGNOLIA on CKD 4  · MAGNOLIA most likely component of perfusion with labile HTN as well as component of ATN  · Component of also progression of disease suspected  · Nephrology notes reviewed and appreciate their management  Creatinine was 5 14 when patient 1st came in  Today it is 4 97  His previous baseline was in the mid to high threes  This could actually represent progression of disease  Patient is likely going to need dialysis in near future

## 2019-09-15 NOTE — ASSESSMENT & PLAN NOTE
· CKD stage IV  · Known to Dr Hilda Childress  · Patient will likely need dialysis in the near future  There is no urgent need for dialysis at this point in time

## 2019-09-15 NOTE — ASSESSMENT & PLAN NOTE
· MAGNOLIA on CKD 4  · Initially thought to be due to labile perfusion from labile hypertension  However, creatinine has been stable during hospital stay  It is likely progression of disease  Manage as per Nephrology  Patient getting additional dose of Lasix today for volume overload

## 2019-09-15 NOTE — ASSESSMENT & PLAN NOTE
· No stroke symptoms  · Recent significant weight gain with suspected component of volume overload  · Blood pressure still elevated  Systolic still getting up into the 180s at times and diastolic getting up to the 90s  Most recent blood pressure was okay 163/80 for  Continue Coreg, doxazosin, nifedipine and hydralazine  Patient getting additional IV Lasix today  Nephrology plans of starting torsemide tomorrow

## 2019-09-15 NOTE — ASSESSMENT & PLAN NOTE
· CKD stage IV  · Known to Dr Chery Allred  · Patient will likely need dialysis in the near future  There is no urgent need for dialysis at this point in time

## 2019-09-15 NOTE — PROGRESS NOTES
Progress Note - Malivn Show 1985, 37 y o  male MRN: [id number]    Unit/Bed#:  Encounter: [id number]    Primary Care Provider: Elmer Knox MD   Date and time admitted to hospital: 9/13/2019  1:51 PM        Volume overload  Assessment & Plan  · Recent 20# weight gain  · Previous worsening renal function with demadex  · Nephrology following in consult  · Diuresis per nephrology    Anemia due to chronic kidney disease  Assessment & Plan  Will recheck a CBC tomorrow  Hemoglobin yesterday was 9 6  CKD (chronic kidney disease) stage 4, GFR 15-29 ml/min ([facility])  Assessment & Plan  · CKD stage IV  · Known to Dr Vero Mercado  · Patient will likely need dialysis in the near future  There is no urgent need for dialysis at this point in time  LUIS on CPAP  Assessment & Plan  · CPAP at home, continue HS  · Encourage continued use HS     MAGNOLIA (acute kidney injury) ([facility] [address] )  Assessment & Plan  · MAGNOLIA on CKD 4  · MAGNOLIA most likely component of perfusion with labile HTN as well as component of ATN  · Component of also progression of disease suspected  · Nephrology notes reviewed and appreciate their management  Creatinine was 5 14 when patient 1st came in  Today it is 4 97  His previous baseline was in the mid to high threes  This could actually represent progression of disease  Patient is likely going to need dialysis in near future  Type 2 diabetes mellitus with kidney complication, with long-term current use of insulin [facility])  Assessment & Plan  Lab Results   Component Value Date    HGBA1C 7 1 (H) 05/21/2019       Recent Labs     09/14/19  1631 09/14/19  2021 09/15/19  0711 09/15/19  1106   POCGLU 115 104 93 123       Blood Sugar Average: Last 72 hrs:  (P) 122 375    * Hypertensive urgency  Assessment & Plan  · No stroke symptoms  · Recent significant weight gain with suspected component of volume overload  · Blood pressure is coming under better control    Continue Coreg, doxazosin, nifedipine and hydralazine  Subjective/Objective     Subjective:   Patient was initially admitted on 09/13 to the critical care service for hypertensive urgency  His blood pressure on arrival to the ED was 220/106  It did get as high as 230/110 in the ED  It was also noted that his creatinine was 5 14  His baseline creatinine appears to be around 3 5  He was started on Cardene drip  His home medications were resumed including carvedilol, doxazosin and nifedipine  He was also started on daily torsemide which he was taking every other day at home  Nephrology was consulted for his acute renal failure superimposed on CKD stage 4  Oral hydralazine was started by Nephrology  He also received a trial dose of IV Lasix 80 mg x1 as he appeared to be volume overloaded  Cardene drip was weaned off  Per Nephrology, his acute kidney injury was likely due to variable renal perfusion in the setting of labile hypertension  Attn is being considered, but he could also have progression of disease  Patient was seen and examined this afternoon  He is doing well  He states he does get some lightheadedness and dizziness when he gets up from a sitting or lying down position  His breathing is good  He says the swelling in his lower extremities seems a little bit better  No other issues reported  Objective:  Vitals: Blood pressure 133/79, pulse 78, temperature 98 9 °F (37 2 °C), temperature source Oral, resp  rate 20, height 6' 1" (1 854 m), weight 117 kg (258 lb 2 5 oz), SpO2 97 %  ,Body mass index is 34 06 kg/m²        Intake/Output Summary (Last 24 hours) at 9/15/2019 1342  Last data filed at 9/15/2019 1303  Gross per 24 hour   Intake 970 ml   Output 2200 ml   Net -1230 ml       Invasive Devices     Peripheral Intravenous Line            Peripheral IV 09/13/19 Right Forearm 1 day                Physical Exam: /79   Pulse 78   Temp 98 9 °F (37 2 °C) (Oral)   Resp 20   Ht 6' 1" (1 854 m)   Wt 117 kg (258 lb 2 5 oz) SpO2 97%   BMI 34 06 kg/m²   General appearance: alert and oriented, in no acute distress  Head: Normocephalic, without obvious abnormality, atraumatic  Eyes: No scleral icterus  Lungs: clear to auscultation bilaterally  Heart: regular rate and rhythm, S1, S2 normal, no murmur, click, rub or gallop  Extremities: 4+ pitting edema in lower extremities bilaterally  Neurologic: Grossly normal    Lab, Imaging and other studies: I have personally reviewed pertinent reports      VTE Pharmacologic Prophylaxis: Heparin  VTE Mechanical Prophylaxis: sequential compression device

## 2019-09-16 LAB
ANION GAP SERPL CALCULATED.3IONS-SCNC: 10 MMOL/L (ref 4–13)
BASOPHILS # BLD AUTO: 0.04 THOUSANDS/ΜL (ref 0–0.1)
BASOPHILS NFR BLD AUTO: 1 % (ref 0–1)
BUN SERPL-MCNC: 42 MG/DL (ref 5–25)
CALCIUM SERPL-MCNC: 8.2 MG/DL (ref 8.3–10.1)
CHLORIDE SERPL-SCNC: 107 MMOL/L (ref 100–108)
CO2 SERPL-SCNC: 25 MMOL/L (ref 21–32)
CREAT SERPL-MCNC: 4.77 MG/DL (ref 0.6–1.3)
CREAT UR-MCNC: 61.8 MG/DL
EOSINOPHIL # BLD AUTO: 0.2 THOUSAND/ΜL (ref 0–0.61)
EOSINOPHIL NFR BLD AUTO: 3 % (ref 0–6)
ERYTHROCYTE [DISTWIDTH] IN BLOOD BY AUTOMATED COUNT: 12.7 % (ref 11.6–15.1)
GFR SERPL CREATININE-BSD FRML MDRD: 17 ML/MIN/1.73SQ M
GLUCOSE SERPL-MCNC: 115 MG/DL (ref 65–140)
GLUCOSE SERPL-MCNC: 175 MG/DL (ref 65–140)
GLUCOSE SERPL-MCNC: 55 MG/DL (ref 65–140)
GLUCOSE SERPL-MCNC: 70 MG/DL (ref 65–140)
GLUCOSE SERPL-MCNC: 81 MG/DL (ref 65–140)
HCT VFR BLD AUTO: 30.7 % (ref 36.5–49.3)
HGB BLD-MCNC: 9.9 G/DL (ref 12–17)
IMM GRANULOCYTES # BLD AUTO: 0.02 THOUSAND/UL (ref 0–0.2)
IMM GRANULOCYTES NFR BLD AUTO: 0 % (ref 0–2)
LYMPHOCYTES # BLD AUTO: 2.85 THOUSANDS/ΜL (ref 0.6–4.47)
LYMPHOCYTES NFR BLD AUTO: 38 % (ref 14–44)
MAGNESIUM SERPL-MCNC: 1.7 MG/DL (ref 1.6–2.6)
MCH RBC QN AUTO: 28.1 PG (ref 26.8–34.3)
MCHC RBC AUTO-ENTMCNC: 32.2 G/DL (ref 31.4–37.4)
MCV RBC AUTO: 87 FL (ref 82–98)
MONOCYTES # BLD AUTO: 0.53 THOUSAND/ΜL (ref 0.17–1.22)
MONOCYTES NFR BLD AUTO: 7 % (ref 4–12)
NEUTROPHILS # BLD AUTO: 3.84 THOUSANDS/ΜL (ref 1.85–7.62)
NEUTS SEG NFR BLD AUTO: 51 % (ref 43–75)
NRBC BLD AUTO-RTO: 0 /100 WBCS
PLATELET # BLD AUTO: 351 THOUSANDS/UL (ref 149–390)
PMV BLD AUTO: 10 FL (ref 8.9–12.7)
POTASSIUM SERPL-SCNC: 3.8 MMOL/L (ref 3.5–5.3)
PROT UR-MCNC: 199 MG/DL
PROT/CREAT UR: 3.22 MG/G{CREAT} (ref 0–0.1)
RBC # BLD AUTO: 3.52 MILLION/UL (ref 3.88–5.62)
SODIUM SERPL-SCNC: 142 MMOL/L (ref 136–145)
WBC # BLD AUTO: 7.48 THOUSAND/UL (ref 4.31–10.16)

## 2019-09-16 PROCEDURE — 82570 ASSAY OF URINE CREATININE: CPT | Performed by: PHYSICIAN ASSISTANT

## 2019-09-16 PROCEDURE — 82948 REAGENT STRIP/BLOOD GLUCOSE: CPT

## 2019-09-16 PROCEDURE — 99232 SBSQ HOSP IP/OBS MODERATE 35: CPT | Performed by: FAMILY MEDICINE

## 2019-09-16 PROCEDURE — 99232 SBSQ HOSP IP/OBS MODERATE 35: CPT | Performed by: INTERNAL MEDICINE

## 2019-09-16 PROCEDURE — 84156 ASSAY OF PROTEIN URINE: CPT | Performed by: PHYSICIAN ASSISTANT

## 2019-09-16 PROCEDURE — 80048 BASIC METABOLIC PNL TOTAL CA: CPT | Performed by: FAMILY MEDICINE

## 2019-09-16 PROCEDURE — 83735 ASSAY OF MAGNESIUM: CPT | Performed by: PHYSICIAN ASSISTANT

## 2019-09-16 PROCEDURE — 85025 COMPLETE CBC W/AUTO DIFF WBC: CPT | Performed by: FAMILY MEDICINE

## 2019-09-16 RX ADMIN — NIFEDIPINE 60 MG: 30 TABLET, FILM COATED, EXTENDED RELEASE ORAL at 09:16

## 2019-09-16 RX ADMIN — INSULIN LISPRO 4 UNITS: 100 INJECTION, SOLUTION INTRAVENOUS; SUBCUTANEOUS at 09:17

## 2019-09-16 RX ADMIN — CARVEDILOL 50 MG: 12.5 TABLET, FILM COATED ORAL at 09:15

## 2019-09-16 RX ADMIN — INSULIN GLARGINE 24 UNITS: 100 INJECTION, SOLUTION SUBCUTANEOUS at 21:24

## 2019-09-16 RX ADMIN — HYDRALAZINE HYDROCHLORIDE 25 MG: 25 TABLET ORAL at 00:01

## 2019-09-16 RX ADMIN — DOXAZOSIN 4 MG: 4 TABLET ORAL at 21:23

## 2019-09-16 RX ADMIN — HEPARIN SODIUM 5000 UNITS: 5000 INJECTION INTRAVENOUS; SUBCUTANEOUS at 05:19

## 2019-09-16 RX ADMIN — HYDRALAZINE HYDROCHLORIDE 25 MG: 25 TABLET ORAL at 05:20

## 2019-09-16 RX ADMIN — FUROSEMIDE 120 MG: 10 INJECTION, SOLUTION INTRAMUSCULAR; INTRAVENOUS at 11:46

## 2019-09-16 RX ADMIN — HYDRALAZINE HYDROCHLORIDE 25 MG: 25 TABLET ORAL at 21:24

## 2019-09-16 RX ADMIN — INSULIN LISPRO 2 UNITS: 100 INJECTION, SOLUTION INTRAVENOUS; SUBCUTANEOUS at 21:24

## 2019-09-16 RX ADMIN — HEPARIN SODIUM 5000 UNITS: 5000 INJECTION INTRAVENOUS; SUBCUTANEOUS at 14:53

## 2019-09-16 RX ADMIN — MELATONIN 1000 UNITS: at 09:16

## 2019-09-16 RX ADMIN — ASPIRIN 81 MG: 81 TABLET, COATED ORAL at 09:16

## 2019-09-16 RX ADMIN — FUROSEMIDE 120 MG: 10 INJECTION, SOLUTION INTRAMUSCULAR; INTRAVENOUS at 17:40

## 2019-09-16 RX ADMIN — HEPARIN SODIUM 5000 UNITS: 5000 INJECTION INTRAVENOUS; SUBCUTANEOUS at 21:23

## 2019-09-16 RX ADMIN — HYDRALAZINE HYDROCHLORIDE 25 MG: 25 TABLET ORAL at 14:54

## 2019-09-16 NOTE — PROGRESS NOTES
Progress Note - Cathy Bob 1985, 37 y o  male MRN: [id number]    Unit/Bed#:  Encounter: [id number]    Primary Care Provider: Geoff Young MD   Date and time admitted to hospital: 9/13/2019  1:51 PM      Volume overload  Assessment & Plan  · Recent 20# weight gain  · Previous worsening renal function with demadex  · Nephrology following in consult  · Diuresis per nephrology    Anemia due to chronic kidney disease  Assessment & Plan  Hemoglobin has been stable  CKD (chronic kidney disease) stage 4, GFR 15-29 ml/min ([facility])  Assessment & Plan  · CKD stage IV  · Known to Dr Lianne Crigler  · Patient will likely need dialysis in the near future  There is no urgent need for dialysis at this point in time  LUIS on CPAP  Assessment & Plan  · CPAP at home, continue HS    MAGNOLIA (acute kidney injury) ([facility] [address] )  Assessment & Plan  · MAGNOLIA on CKD 4  · Initially thought to be due to labile perfusion from labile hypertension  However, creatinine has been stable during hospital stay  It is likely progression of disease  Manage as per Nephrology  Patient getting additional dose of Lasix today for volume overload  Type 2 diabetes mellitus with kidney complication, with long-term current use of insulin ([facility])  Assessment & Plan  Blood glucose has been controlled for the most part  Continue sliding scale insulin  * Hypertensive urgency  Assessment & Plan  · No stroke symptoms  · Recent significant weight gain with suspected component of volume overload  · Blood pressure still elevated  Systolic still getting up into the 180s at times and diastolic getting up to the 90s  Most recent blood pressure was okay 163/80 for  Continue Coreg, doxazosin, nifedipine and hydralazine  Patient getting additional IV Lasix today  Nephrology plans of starting torsemide tomorrow  Subjective/Objective     Subjective:   Patient was seen and examined this afternoon with significant other at bedside  He is doing okay  He still gets lightheadedness and dizziness whenever getting up from a sitting position  He does have some back pain which is helped by Tylenol  The swelling in his lower extremities has improved  No other issues reported  Objective:  Vitals: Blood pressure 163/84, pulse 75, temperature 98 7 °F (37 1 °C), temperature source Oral, resp  rate 17, height 6' 1" (1 854 m), weight 113 kg (248 lb 0 3 oz), SpO2 96 %  ,Body mass index is 32 72 kg/m²  Intake/Output Summary (Last 24 hours) at 9/16/2019 1238  Last data filed at 9/16/2019 1056  Gross per 24 hour   Intake 1890 ml   Output 4700 ml   Net -2810 ml       Invasive Devices     Peripheral Intravenous Line            Peripheral IV 09/13/19 Right Forearm 2 days                Physical Exam: /84 (BP Location: Right arm)   Pulse 75   Temp 98 7 °F (37 1 °C) (Oral)   Resp 17   Ht 6' 1" (1 854 m)   Wt 113 kg (248 lb 0 3 oz)   SpO2 96%   BMI 32 72 kg/m²   General appearance: alert and oriented, in no acute distress  Head: Normocephalic, without obvious abnormality, atraumatic  Eyes: No scleral icterus  Lungs: clear to auscultation bilaterally  Heart: regular rate and rhythm, S1, S2 normal, no murmur, click, rub or gallop  Extremities: 4+ pitting edema in lower extremities bilaterally  Appears somewhat improved compared to yesterday  Neurologic: Grossly normal    Lab, Imaging and other studies: I have personally reviewed pertinent reports      VTE Pharmacologic Prophylaxis: Heparin  VTE Mechanical Prophylaxis: sequential compression device

## 2019-09-16 NOTE — PROGRESS NOTES
NEPHROLOGY PROGRESS NOTE   Skyler Ricci 37 y o  male MRN: [id number]  Unit/Bed#:  Encounter: [id number]  Reason for Consult: MAGNOLIA/CKD/volume overload/HTN    ASSESSMENT/PLAN:  1  Acute Kidney Injury, POA- secondary to volume overload with accelerated hypertension + progression of disease  - multiple episodes of MAGNOLIA and creatinine has been progressing for some time now  - avoid nephrotoxins  - avoid hypotension  2  Chronic Kidney Disease stage IV- Baseline creatinine is 3 3-3 6  Follows with Dr Raghavendra Crawford  Secondary to diabetic nephropathy + hypertensive nephrosclerosis  - no uremia, no urgent dialysis needed currently  - will likely have new baseline in the mid 4s  3  Volume Overload- secondary to advanced CKD  - will give another 2 doses of lasix 120mg IV today  - can likely give oral torsemide starting tomorrow (consider torsemide 100mg BID then decrease to 50mg BID)  - good urine output  - weigh with standing scale (248 pounds)  - goal weight is 240 pounds  4  Accelerated Hypertension- BP elevated this am but was improved yesterday  - started on hydralazine 9/14  - will need higher dose of torsemide on discharge  - hold parameters in place for antihypertensives  5  Anemia- secondary to CKD  - iron studies acceptable  - may be partly due to volume overload/dilution  6  Proteinuria- monitor as outpatient  - not on ACEI/ARB due to advanced CKD    Disposition:  Give IV lasix x2 today, am labs, upc ratio pending    SUBJECTIVE:  Patient complaining of chronic mid/low back pain  Denies SOB  Denies difficulty urinating and states the past two urinations, he has put out a lot of urine  Wife at bedside      OBJECTIVE:  Current Weight: Weight - Scale: 113 kg (248 lb 0 3 oz)  Vitals:    09/16/19 0300 09/16/19 0519 09/16/19 0537 09/16/19 0700   BP: (!) 179/92 (!) 188/97  (!) 189/97   BP Location: Right arm   Left arm   Pulse: 78   80   Resp: 18   21   Temp: 99 2 °F (37 3 °C)   99 1 °F (37 3 °C)   TempSrc: Oral   Oral SpO2:       Weight:   113 kg (248 lb 0 3 oz)    Height:           Intake/Output Summary (Last 24 hours) at 9/16/2019 1102  Last data filed at 9/16/2019 1056  Gross per 24 hour   Intake 1890 ml   Output 4700 ml   Net -2810 ml     General: NAD  Skin: no rash  HEENT: mm moist  Neck: supple  Lungs: CTAB  Cardiac: RRR  Extremities: + bilateral edema  GI: soft nt nd  Neuro: alert awake  Psych: mood and affect appropriate    Medications:    Current Facility-Administered Medications:     acetaminophen (TYLENOL) tablet 650 mg, 650 mg, Oral, Q6H PRN, Shannan Small PA-C, 650 mg at 09/15/19 2223    aspirin (ECOTRIN LOW STRENGTH) EC tablet 81 mg, 81 mg, Oral, Daily, Shannan Small PA-C, 81 mg at 09/16/19 0916    carvedilol (COREG) tablet 50 mg, 50 mg, Oral, BID With Meals, Shannan Small PA-C, 50 mg at 09/16/19 0915    cholecalciferol (VITAMIN D3) tablet 1,000 Units, 1,000 Units, Oral, Daily, Shannan Small PA-C, 1,000 Units at 09/16/19 0916    doxazosin (CARDURA) tablet 4 mg, 4 mg, Oral, HS, Shannan Small PA-C, 4 mg at 09/14/19 2133    furosemide (LASIX) 120 mg in dextrose 5 % 50 mL IVPB, 120 mg, Intravenous, BID (diuretic), [facility], MUSTAPHA    heparin (porcine) subcutaneous injection 5,000 Units, 5,000 Units, Subcutaneous, Q8H [facility], Shannan Small PA-C, 5,000 Units at 09/16/19 0519    hydrALAZINE (APRESOLINE) tablet 25 mg, 25 mg, Oral, Q8H [facility], Lawrence Moore PA-C, 25 mg at 09/16/19 0520    insulin glargine (LANTUS) subcutaneous injection 24 Units 0 24 mL, 24 Units, Subcutaneous, HS, Shannan Small PA-C, 24 Units at 09/15/19 2127    insulin lispro (HumaLOG) 100 units/mL subcutaneous injection 1-6 Units, 1-6 Units, Subcutaneous, HS, Shannan Small PA-C, 2 Units at 09/15/19 2111    insulin lispro (HumaLOG) 100 units/mL subcutaneous injection 2-12 Units, 2-12 Units, Subcutaneous, TID AC **AND** Fingerstick Glucose (POCT), , , TID AC, Shannan Small PA-C    insulin lispro (HumaLOG) 100 units/mL subcutaneous injection 4 Units, 4 Units, Subcutaneous, TID With Meals, Geneva Snider PA-C, 4 Units at 09/16/19 0917    NIFEdipine (PROCARDIA XL) 24 hr tablet 60 mg, 60 mg, Oral, BID, Geneva Snider PA-C, 60 mg at 09/16/19 4461    Laboratory Results:  Results from last 7 days   Lab Units 09/16/19  0519 09/15/19  0903 09/14/19  0427 09/14/19 09/13/19  1412 09/12/19  0943   WBC Thousand/uL 7 48  --  6 25  --  5 45  --    HEMOGLOBIN g/dL 9 9*  --  9 6*  --  10 6*  --    HEMATOCRIT % 30 7*  --  28 9*  --  31 9*  --    PLATELETS Thousands/uL 351  --  301  --  322  --    POTASSIUM mmol/L 3 8 4 3  --  4 0 4 0 4 4   CHLORIDE mmol/L 107 108  --  107 107 107   CO2 mmol/L 25 25  --  24 25 24   BUN mg/dL 42* 41*  --  39* 40* 43*   CREATININE mg/dL 4 77* 4 97*  --  4 58* 4 63* 5 14*   CALCIUM mg/dL 8 2* 8 5  --  8 4 8 5 8 4   MAGNESIUM mg/dL 1 7  --   --  1 7  --   --    PHOSPHORUS mg/dL  --   --   --  4 4  --   --

## 2019-09-17 LAB
ANION GAP SERPL CALCULATED.3IONS-SCNC: 10 MMOL/L (ref 4–13)
BUN SERPL-MCNC: 46 MG/DL (ref 5–25)
CALCIUM SERPL-MCNC: 8.3 MG/DL (ref 8.3–10.1)
CHLORIDE SERPL-SCNC: 105 MMOL/L (ref 100–108)
CO2 SERPL-SCNC: 27 MMOL/L (ref 21–32)
CREAT SERPL-MCNC: 5.03 MG/DL (ref 0.6–1.3)
GFR SERPL CREATININE-BSD FRML MDRD: 16 ML/MIN/1.73SQ M
GLUCOSE SERPL-MCNC: 165 MG/DL (ref 65–140)
GLUCOSE SERPL-MCNC: 48 MG/DL (ref 65–140)
GLUCOSE SERPL-MCNC: 68 MG/DL (ref 65–140)
GLUCOSE SERPL-MCNC: 71 MG/DL (ref 65–140)
GLUCOSE SERPL-MCNC: 76 MG/DL (ref 65–140)
GLUCOSE SERPL-MCNC: 84 MG/DL (ref 65–140)
GLUCOSE SERPL-MCNC: 96 MG/DL (ref 65–140)
MAGNESIUM SERPL-MCNC: 1.8 MG/DL (ref 1.6–2.6)
POTASSIUM SERPL-SCNC: 4.1 MMOL/L (ref 3.5–5.3)
SODIUM SERPL-SCNC: 142 MMOL/L (ref 136–145)

## 2019-09-17 PROCEDURE — 80048 BASIC METABOLIC PNL TOTAL CA: CPT | Performed by: INTERNAL MEDICINE

## 2019-09-17 PROCEDURE — 99232 SBSQ HOSP IP/OBS MODERATE 35: CPT | Performed by: INTERNAL MEDICINE

## 2019-09-17 PROCEDURE — 82948 REAGENT STRIP/BLOOD GLUCOSE: CPT

## 2019-09-17 PROCEDURE — 83735 ASSAY OF MAGNESIUM: CPT | Performed by: INTERNAL MEDICINE

## 2019-09-17 RX ORDER — INSULIN GLARGINE 100 [IU]/ML
15 INJECTION, SOLUTION SUBCUTANEOUS
Status: DISCONTINUED | OUTPATIENT
Start: 2019-09-17 | End: 2019-09-18 | Stop reason: HOSPADM

## 2019-09-17 RX ORDER — TORSEMIDE 100 MG/1
100 TABLET ORAL 2 TIMES DAILY
Status: DISCONTINUED | OUTPATIENT
Start: 2019-09-17 | End: 2019-09-18 | Stop reason: HOSPADM

## 2019-09-17 RX ADMIN — HEPARIN SODIUM 5000 UNITS: 5000 INJECTION INTRAVENOUS; SUBCUTANEOUS at 21:55

## 2019-09-17 RX ADMIN — INSULIN GLARGINE 15 UNITS: 100 INJECTION, SOLUTION SUBCUTANEOUS at 21:55

## 2019-09-17 RX ADMIN — HEPARIN SODIUM 5000 UNITS: 5000 INJECTION INTRAVENOUS; SUBCUTANEOUS at 05:20

## 2019-09-17 RX ADMIN — HYDRALAZINE HYDROCHLORIDE 25 MG: 25 TABLET ORAL at 21:55

## 2019-09-17 RX ADMIN — ACETAMINOPHEN 650 MG: 325 TABLET, FILM COATED ORAL at 22:04

## 2019-09-17 RX ADMIN — NIFEDIPINE 60 MG: 30 TABLET, FILM COATED, EXTENDED RELEASE ORAL at 08:48

## 2019-09-17 RX ADMIN — CARVEDILOL 50 MG: 12.5 TABLET, FILM COATED ORAL at 08:48

## 2019-09-17 RX ADMIN — CARVEDILOL 50 MG: 12.5 TABLET, FILM COATED ORAL at 19:43

## 2019-09-17 RX ADMIN — TORSEMIDE 100 MG: 100 TABLET ORAL at 19:43

## 2019-09-17 RX ADMIN — INSULIN LISPRO 4 UNITS: 100 INJECTION, SOLUTION INTRAVENOUS; SUBCUTANEOUS at 09:23

## 2019-09-17 RX ADMIN — HYDRALAZINE HYDROCHLORIDE 25 MG: 25 TABLET ORAL at 05:20

## 2019-09-17 RX ADMIN — ASPIRIN 81 MG: 81 TABLET, COATED ORAL at 08:48

## 2019-09-17 RX ADMIN — MELATONIN 1000 UNITS: at 08:48

## 2019-09-17 RX ADMIN — HEPARIN SODIUM 5000 UNITS: 5000 INJECTION INTRAVENOUS; SUBCUTANEOUS at 13:18

## 2019-09-17 RX ADMIN — ACETAMINOPHEN 650 MG: 325 TABLET, FILM COATED ORAL at 04:09

## 2019-09-17 RX ADMIN — NIFEDIPINE 60 MG: 30 TABLET, FILM COATED, EXTENDED RELEASE ORAL at 19:42

## 2019-09-17 RX ADMIN — DOXAZOSIN 4 MG: 4 TABLET ORAL at 21:55

## 2019-09-17 RX ADMIN — TORSEMIDE 100 MG: 100 TABLET ORAL at 11:53

## 2019-09-17 NOTE — ASSESSMENT & PLAN NOTE
· MAGNOLIA on CKD 4  · Initially thought to be due to labile perfusion from labile hypertension  However, creatinine has been stable during hospital stay  It is likely progression of disease  Manage as per Nephrology

## 2019-09-17 NOTE — PROGRESS NOTES
Progress Note - Eduard Richardson 1985, 37 y o  male MRN: [id number]    Unit/Bed#: -01 Encounter: [id number]    Primary Care Provider: Sarah Ravi MD   Date and time admitted to hospital: 9/13/2019  1:51 PM    * Hypertensive urgency  Assessment & Plan  · Likely due to volume overload  · Blood pressure slowly improved  · Continue current regimen which includes-Coreg 50 b i d , hydralazine 25 mg q 8h, nifedipine XL 60 mg and doxazosin 4 mg  · Nephrology follow-up ongoing    Volume overload  Assessment & Plan  · Recent 20# weight gain  · Continue diuresis-currently on torsemide 100 b i d   · Nephrology following    CKD (chronic kidney disease) stage 4, GFR 15-29 ml/min ([facility])  Assessment & Plan  · CKD stage IV  · Known to Dr Lynn Husbands  · Patient will likely need dialysis in the near future  There is no urgent need for dialysis at this point in time  MAGNOLIA (acute kidney injury) ([facility] [address] )  Assessment & Plan  · MAGNOLIA on CKD 4  · Initially thought to be due to labile perfusion from labile hypertension  However, creatinine has been stable during hospital stay  It is likely progression of disease  Manage as per Nephrology  Type 2 diabetes mellitus with kidney complication, with long-term current use of insulin ([facility])  Assessment & Plan  · Patient having episodes of hypoglycemia, very tight glucose control  · Reduce Lantus to 15 units(home dose 24 units)  · Continue NovoLog with meals    Anemia due to chronic kidney disease  Assessment & Plan  · Hemoglobin has been stable  LUIS on CPAP  Assessment & Plan  · CPAP at home, continue HS    VTE Pharmacologic Prophylaxis:   Pharmacologic: Heparin  Mechanical VTE Prophylaxis in Place: Yes    Patient Centered Rounds: I have performed bedside rounds with nursing staff today  Discussions with Specialists or Other Care Team Provider:     Education and Discussions with Family / Patient:  Wife at bedside    Time Spent for Care: 30 minutes    More than 50% of total time spent on counseling and coordination of care as described above  Current Length of Stay: 4 day(s)    Current Patient Status: Inpatient   Certification Statement: The patient will continue to require additional inpatient hospital stay due to CKD and volume overload    Discharge Plan:  Next 24 hours    Code Status: Level 1 - Full Code      Subjective:   Patient having episodes of low blood sugars  Feels lightheaded and dizzy  Objective:     Vitals:   Temp (24hrs), Av 6 °F (37 °C), Min:98 2 °F (36 8 °C), Max:98 9 °F (37 2 °C)    Temp:  [98 2 °F (36 8 °C)-98 9 °F (37 2 °C)] 98 5 °F (36 9 °C)  HR:  [81-90] 84  Resp:  [18-20] 18  BP: (128-186)/(66-91) 131/66  SpO2:  [97 %-99 %] 97 %  Body mass index is 32 32 kg/m²  Input and Output Summary (last 24 hours): Intake/Output Summary (Last 24 hours) at 2019 1417  Last data filed at 2019 0601  Gross per 24 hour   Intake 680 ml   Output 2540 ml   Net -1860 ml       Physical Exam:     Physical Exam     Gen -Patient comfortable at rest  Neck- Supple  No thyromegaly or lymphadenopathy  Lungs-Clear bilaterally without any wheeze or rales   Heart S1-S2, regular rate and rhythm, no murmurs  Abdomen-soft nontender, no organomegaly   Bowel sounds present  Extremities-no cyanosi,  clubbing ; pitting edema of feet noted  Skin- no rash  Neuro-nonfocal    Additional Data:     Labs:    Results from last 7 days   Lab Units 19  0519   WBC Thousand/uL 7 48   HEMOGLOBIN g/dL 9 9*   HEMATOCRIT % 30 7*   PLATELETS Thousands/uL 351   NEUTROS PCT % 51   LYMPHS PCT % 38   MONOS PCT % 7   EOS PCT % 3     Results from last 7 days   Lab Units 19  0400  19   SODIUM mmol/L 142   < > 140   POTASSIUM mmol/L 4 1   < > 4 0   CHLORIDE mmol/L 105   < > 107   CO2 mmol/L 27   < > 24   BUN mg/dL 46*   < > 39*   CREATININE mg/dL 5 03*   < > 4 58*   ANION GAP mmol/L 10   < > 9   CALCIUM mg/dL 8 3   < > 8 4   ALBUMIN g/dL  --   --  2 1*   TOTAL BILIRUBIN mg/dL  -- --  0 10*   ALK PHOS U/L  --   --  49   ALT U/L  --   --  10*   AST U/L  --   --  40   GLUCOSE RANDOM mg/dL 71   < > 138    < > = values in this interval not displayed  Results from last 7 days   Lab Units 09/17/19  1223 09/17/19  1114 09/17/19  1046 09/17/19  0725 09/16/19  2119 09/16/19  1729 09/16/19  1408 09/16/19  1348 09/15/19  1853 09/15/19  1607 09/15/19  1106 09/15/19  0711   POC GLUCOSE mg/dl 84 76 48* 68 175* 115 70 55* 213* 153* 123 93                   * I Have Reviewed All Lab Data Listed Above  * Additional Pertinent Lab Tests Reviewed: All Labs Within Last 24 Hours Reviewed    Imaging:    Imaging Reports Reviewed Today Include:   Imaging Personally Reviewed by Myself Includes:      Recent Cultures (last 7 days):           Last 24 Hours Medication List:     Current Facility-Administered Medications:  acetaminophen 650 mg Oral Q6H PRN Osvaldo Chun PA-C   aspirin 81 mg Oral Daily Leena Hoang, MUSTAPHA   carvedilol 50 mg Oral BID With Meals Leena Hoang, PA-C   cholecalciferol 1,000 Units Oral Daily Leena Hoang, PA-C   doxazosin 4 mg Oral HS Leena Hoang, PA-C   heparin (porcine) 5,000 Units Subcutaneous Q8H [address] Leena Hoang, PA-C   hydrALAZINE 25 mg Oral Q8H [address] Leena Hoang, PA-C   insulin glargine 15 Units Subcutaneous HS Shaka Heath MD   insulin lispro 1-6 Units Subcutaneous HS Leena Hoang, PA-DALTON   insulin lispro 2-12 Units Subcutaneous TID AC Leena Hoang, PA-C   insulin lispro 4 Units Subcutaneous TID With Meals Leena Hoang, PA-C   NIFEdipine ER 60 mg Oral BID Leena Hoang, PA-C   torsemide 100 mg Oral BID [facility], MUSTAPHA        Today, Patient Was Seen By: Jarrett Gonzalez MD    ** Please Note: Dictation voice to text software may have been used in the creation of this document   **

## 2019-09-17 NOTE — PROGRESS NOTES
NEPHROLOGY PROGRESS NOTE   Magdalena Shukla 37 y o  male MRN: [id number]  Unit/Bed#: -01 Encounter: [id number]  Reason for Consult: MAGNOLIA/CKD    ASSESSMENT/PLAN:  1  Acute Kidney Injury, POA- secondary to volume overload with accelerated hypertension + progression of disease  - multiple episodes of MAGNOLIA and creatinine has been progressing for some time now  - avoid nephrotoxins  - avoid hypotension  - creatinine up to 5 today with diuresis, may need to tolerate higher baseline creatinine  2  Chronic Kidney Disease stage IV- Baseline creatinine is 3 3-3 6  Follows with Dr Eusebio Mason  Secondary to diabetic nephropathy + hypertensive nephrosclerosis  - no uremia, no urgent dialysis needed currently  - will likely have new baseline in the mid 4s to 5  3  Volume Overload- secondary to advanced CKD  - status post 4 doses of IV lasix 120mg past two days  - start oral torsemide 100mg BID but may need to decrease to 50mg BID  - very good urine output  - weigh with standing scale (245 pounds today from 248 pounds yesterday)  - goal weight is 240 pounds or less  4  Accelerated Hypertension- continue to closely monitor  - started on hydralazine 9/14  - will need higher dose of torsemide on discharge  - hold parameters in place for antihypertensives  5  Anemia- secondary to CKD  - iron studies acceptable  - may be partly due to volume overload/dilution  6  Proteinuria- monitor as outpatient  - not on ACEI/ARB due to advanced CKD    Disposition:  Change diuretics to oral today  Discussed dizziness with aide/nurse    SUBJECTIVE:  Patient very dizzy today  States slight improvement in swelling  States sugars have been low for him      OBJECTIVE:  Current Weight: Weight - Scale: 111 kg (245 lb)  Vitals:    09/17/19 0608 09/17/19 0723 09/17/19 1027 09/17/19 1048   BP: (!) 178/90 160/80  128/67   BP Location: Right arm Left arm     Pulse:  81  84   Resp: 18 18     Temp:  98 5 °F (36 9 °C)     TempSrc:  Oral     SpO2:  97% Weight:   111 kg (245 lb)    Height:           Intake/Output Summary (Last 24 hours) at 9/17/2019 1102  Last data filed at 9/17/2019 0601  Gross per 24 hour   Intake 820 ml   Output 3040 ml   Net -2220 ml     General: NAD  Skin: no rash  HEENT: mm moist  Neck: no JVD  Lungs: ctab  Cardiac: rrr  Extremities: + bilateral edema  GI: soft nt nd  Neuro: alert awake  Psych: mood and affect appropriate    Medications:    Current Facility-Administered Medications:     acetaminophen (TYLENOL) tablet 650 mg, 650 mg, Oral, Q6H PRN, APRIL Everett-C, 650 mg at 09/17/19 0409    aspirin (ECOTRIN LOW STRENGTH) EC tablet 81 mg, 81 mg, Oral, Daily, Leena Hoang PA-C, 81 mg at 09/17/19 0848    carvedilol (COREG) tablet 50 mg, 50 mg, Oral, BID With Meals, Leena Hoang PA-C, 50 mg at 09/17/19 0848    cholecalciferol (VITAMIN D3) tablet 1,000 Units, 1,000 Units, Oral, Daily, Nicole Rosen PA-C, 1,000 Units at 09/17/19 0848    doxazosin (CARDURA) tablet 4 mg, 4 mg, Oral, HS, APRIL Everett-DALTON, 4 mg at 09/16/19 2123    heparin (porcine) subcutaneous injection 5,000 Units, 5,000 Units, Subcutaneous, Q8H [address], APRIL Everett-C, 5,000 Units at 09/17/19 0520    hydrALAZINE (APRESOLINE) tablet 25 mg, 25 mg, Oral, Q8H SCOTT, APRIL Cabello-C, 25 mg at 09/17/19 0520    insulin glargine (LANTUS) subcutaneous injection 15 Units 0 15 mL, 15 Units, Subcutaneous, HS, Shaka Heath MD    insulin lispro (HumaLOG) 100 units/mL subcutaneous injection 1-6 Units, 1-6 Units, Subcutaneous, HS, MEHREEN CabelloC, 2 Units at 09/16/19 2124    insulin lispro (HumaLOG) 100 units/mL subcutaneous injection 2-12 Units, 2-12 Units, Subcutaneous, TID AC **AND** Fingerstick Glucose (POCT), , , TID AC, Leena Hoang PA-C    insulin lispro (HumaLOG) 100 units/mL subcutaneous injection 4 Units, 4 Units, Subcutaneous, TID With Meals, Nicole Rosen PA-C, 4 Units at 09/17/19 1700   NIFEdipine (PROCARDIA XL) 24 hr tablet 60 mg, 60 mg, Oral, BID, Leena Hoang PA-C, 60 mg at 09/17/19 0848    torsemide (DEMADEX) tablet 100 mg, 100 mg, Oral, BID, Jermaine Khan, [place]    Laboratory Results:  Results from last 7 days   Lab Units 09/17/19  0400 09/16/19  0519 09/15/19  0903 09/14/19  0427 09/14/19 09/13/19  1412 09/12/19  0943   WBC Thousand/uL  --  7 48  --  6 25  --  5 45  --    HEMOGLOBIN g/dL  --  9 9*  --  9 6*  --  10 6*  --    HEMATOCRIT %  --  30 7*  --  28 9*  --  31 9*  --    PLATELETS Thousands/uL  --  351  --  301  --  322  --    POTASSIUM mmol/L 4 1 3 8 4 3  --  4 0 4 0 4 4   CHLORIDE mmol/L 105 107 108  --  107 107 107   CO2 mmol/L 27 25 25  --  24 25 24   BUN mg/dL 46* 42* 41*  --  39* 40* 43*   CREATININE mg/dL 5 03* 4 77* 4 97*  --  4 58* 4 63* 5 14*   CALCIUM mg/dL 8 3 8 2* 8 5  --  8 4 8 5 8 4   MAGNESIUM mg/dL 1 8 1 7  --   --  1 7  --   --    PHOSPHORUS mg/dL  --   --   --   --  4 4  --   --

## 2019-09-17 NOTE — SOCIAL WORK
LOS 4   Not a bundle; Not a readmission  Pt lives in a [address] [facility] with his wife and 11 yo son  Pt states he needs assistance at times but is able to do ADLs and IADLs independently  Pt states he needs to sit to cook and clean  Pt does not use an AD  Pt states he is able to do stairs however he gets dizzy  Pt's bathroom is on the 2nd floor  Pt states he has been living mainly on the first floor  Pt's wife works and son goes to school so pt is home alone during the day  He states their is no problem with this as he is able to manage on his own  Pt does not have a POA/LW and does not want information  Pt has not had VNA or been to STR  Pt has been having his wife transport him to work as he has not felt safe lately to drive  Pt uses [facility] on [address] [address] in [place]  He is able to afford his medications with his RX coverage  He does not have a MH/D&A hx   Pt's PCP is Isidro De León MD   Pt's wife will transport him home at [place]  Pt currently has no needs  Cm and pt discussed getting a shower chair as he states he needs assistance with this  Cm explained he would be able to obtain a shower chair at a pharmacy or on [address]  CM reviewed discharge planning process including the following: identifying caregivers at home, preference for d/c planning needs, availability of [facility] program, availability of treatment team to discuss questions or concerns patient and/or family may have regarding diagnosis, plan of care, old or new medications and discharge planning   CM will continue to follow for care coordination and update assessment as appropriate

## 2019-09-17 NOTE — ASSESSMENT & PLAN NOTE
· CKD stage IV  · Known to Dr Bond Poster  · Patient will likely need dialysis in the near future  There is no urgent need for dialysis at this point in time

## 2019-09-17 NOTE — ASSESSMENT & PLAN NOTE
· Likely due to volume overload  · Blood pressure slowly improved  · Continue current regimen which includes-Coreg 50 b i d , hydralazine 25 mg q 8h, nifedipine XL 60 mg and doxazosin 4 mg  · Nephrology follow-up ongoing

## 2019-09-17 NOTE — ASSESSMENT & PLAN NOTE
· Recent 20# weight gain  · Continue diuresis-currently on torsemide 100 b i d   · Nephrology following

## 2019-09-17 NOTE — PLAN OF CARE
Problem: Potential for Falls  Goal: Patient will remain free of falls  Description  INTERVENTIONS:  - Assess patient frequently for physical needs  -  Identify cognitive and physical deficits and behaviors that affect risk of falls    -  [place] fall precautions as indicated by assessment   - Educate patient/family on patient safety including physical limitations  - Instruct patient to call for assistance with activity based on assessment  - Modify environment to reduce risk of injury  - Consider OT/PT consult to assist with strengthening/mobility  Outcome: Progressing     Problem: PAIN - ADULT  Goal: Verbalizes/displays adequate comfort level or baseline comfort level  Description  Interventions:  - Encourage patient to monitor pain and request assistance  - Assess pain using appropriate pain scale  - Administer analgesics based on type and severity of pain and evaluate response  - Implement non-pharmacological measures as appropriate and evaluate response  - Consider cultural and social influences on pain and pain management  - Notify physician/advanced practitioner if interventions unsuccessful or patient reports new pain  Outcome: Progressing     Problem: INFECTION - ADULT  Goal: Absence or prevention of progression during hospitalization  Description  INTERVENTIONS:  - Assess and monitor for signs and symptoms of infection  - Monitor lab/diagnostic results  - Monitor all insertion sites, i e  indwelling lines, tubes, and drains  - Monitor endotracheal if appropriate and nasal secretions for changes in amount and color  - [place] appropriate cooling/warming therapies per order  - Administer medications as ordered  - Instruct and encourage patient and family to use good hand hygiene technique  - Identify and instruct in appropriate isolation precautions for identified infection/condition  Outcome: Progressing  Goal: Absence of fever/infection during neutropenic period  Description  INTERVENTIONS:  - Monitor WBC    Outcome: Progressing     Problem: SAFETY ADULT  Goal: Maintain or return to baseline ADL function  Description  INTERVENTIONS:  -  Assess patient's ability to carry out ADLs; assess patient's baseline for ADL function and identify physical deficits which impact ability to perform ADLs (bathing, care of mouth/teeth, toileting, grooming, dressing, etc )  - Assess/evaluate cause of self-care deficits   - Assess range of motion  - Assess patient's mobility; develop plan if impaired  - Assess patient's need for assistive devices and provide as appropriate  - Encourage maximum independence but intervene and supervise when necessary  - Involve family in performance of ADLs  - Assess for home care needs following discharge   - Consider OT consult to assist with ADL evaluation and planning for discharge  - Provide patient education as appropriate  Outcome: Progressing  Goal: Maintain or return mobility status to optimal level  Description  INTERVENTIONS:  - Assess patient's baseline mobility status (ambulation, transfers, stairs, etc )    - Identify cognitive and physical deficits and behaviors that affect mobility  - Identify mobility aids required to assist with transfers and/or ambulation (gait belt, sit-to-stand, lift, walker, cane, etc )  - [place] fall precautions as indicated by assessment  - Record patient progress and toleration of activity level on Mobility SBAR; progress patient to next Phase/Stage  - Instruct patient to call for assistance with activity based on assessment  - Consider rehabilitation consult to assist with strengthening/weightbearing, etc   Outcome: Progressing     Problem: DISCHARGE PLANNING  Goal: Discharge to home or other facility with appropriate resources  Description  INTERVENTIONS:  - Identify barriers to discharge w/patient and caregiver  - Arrange for needed discharge resources and transportation as appropriate  - Identify discharge learning needs (meds, wound care, etc )  - Arrange for interpretive services to assist at discharge as needed  - Refer to Case Management Department for coordinating discharge planning if the patient needs post-hospital services based on physician/advanced practitioner order or complex needs related to functional status, cognitive ability, or social support system  Outcome: Progressing     Problem: Knowledge Deficit  Goal: Patient/family/caregiver demonstrates understanding of disease process, treatment plan, medications, and discharge instructions  Description  Complete learning assessment and assess knowledge base    Interventions:  - Provide teaching at level of understanding  - Provide teaching via preferred learning methods  Outcome: Progressing     Problem: NEUROSENSORY - ADULT  Goal: Achieves stable or improved neurological status  Description  INTERVENTIONS  - Monitor and report changes in neurological status  - Monitor vital signs such as temperature, blood pressure, glucose, and any other labs ordered   - Initiate measures to prevent increased intracranial pressure  - Monitor for seizure activity and implement precautions if appropriate      Outcome: Progressing  Goal: Remains free of injury related to seizures activity  Description  INTERVENTIONS  - Maintain airway, patient safety  and administer oxygen as ordered  - Monitor patient for seizure activity, document and report duration and description of seizure to physician/advanced practitioner  - If seizure occurs,  ensure patient safety during seizure  - Reorient patient post seizure  - Seizure pads on all 4 side rails  - Instruct patient/family to notify RN of any seizure activity including if an aura is experienced  - Instruct patient/family to call for assistance with activity based on nursing assessment  - Administer anti-seizure medications if ordered    Outcome: Progressing  Goal: Achieves maximal functionality and self care  Description  INTERVENTIONS  - Monitor swallowing and airway patency with patient fatigue and changes in neurological status  - Encourage and assist patient to increase activity and self care     - Encourage visually impaired, hearing impaired and aphasic patients to use assistive/communication devices  Outcome: Progressing     Problem: CARDIOVASCULAR - ADULT  Goal: Maintains optimal cardiac output and hemodynamic stability  Description  INTERVENTIONS:  - Monitor I/O, vital signs and rhythm  - Monitor for S/S and trends of decreased cardiac output  - Administer and titrate ordered vasoactive medications to optimize hemodynamic stability  - Assess quality of pulses, skin color and temperature  - Assess for signs of decreased coronary artery perfusion  - Instruct patient to report change in severity of symptoms  Outcome: Progressing  Goal: Absence of cardiac dysrhythmias or at baseline rhythm  Description  INTERVENTIONS:  - Continuous cardiac monitoring, vital signs, obtain 12 lead EKG if ordered  - Administer antiarrhythmic and heart rate control medications as ordered  - Monitor electrolytes and administer replacement therapy as ordered  Outcome: Progressing

## 2019-09-17 NOTE — ASSESSMENT & PLAN NOTE
· Patient having episodes of hypoglycemia, very tight glucose control  · Reduce Lantus to 15 units(home dose 24 units)  · Continue NovoLog with meals

## 2019-09-17 NOTE — PLAN OF CARE
Problem: Potential for Falls  Goal: Patient will remain free of falls  Description  INTERVENTIONS:  - Assess patient frequently for physical needs  -  Identify cognitive and physical deficits and behaviors that affect risk of falls    -  [place] fall precautions as indicated by assessment   - Educate patient/family on patient safety including physical limitations  - Instruct patient to call for assistance with activity based on assessment  - Modify environment to reduce risk of injury  - Consider OT/PT consult to assist with strengthening/mobility  Outcome: Progressing     Problem: PAIN - ADULT  Goal: Verbalizes/displays adequate comfort level or baseline comfort level  Description  Interventions:  - Encourage patient to monitor pain and request assistance  - Assess pain using appropriate pain scale  - Administer analgesics based on type and severity of pain and evaluate response  - Implement non-pharmacological measures as appropriate and evaluate response  - Consider cultural and social influences on pain and pain management  - Notify physician/advanced practitioner if interventions unsuccessful or patient reports new pain  Outcome: Progressing     Problem: INFECTION - ADULT  Goal: Absence or prevention of progression during hospitalization  Description  INTERVENTIONS:  - Assess and monitor for signs and symptoms of infection  - Monitor lab/diagnostic results  - Monitor all insertion sites, i e  indwelling lines, tubes, and drains  - Monitor endotracheal if appropriate and nasal secretions for changes in amount and color  - [place] appropriate cooling/warming therapies per order  - Administer medications as ordered  - Instruct and encourage patient and family to use good hand hygiene technique  - Identify and instruct in appropriate isolation precautions for identified infection/condition  Outcome: Progressing  Goal: Absence of fever/infection during neutropenic period  Description  INTERVENTIONS:  - Monitor WBC    Outcome: Progressing     Problem: SAFETY ADULT  Goal: Maintain or return to baseline ADL function  Description  INTERVENTIONS:  -  Assess patient's ability to carry out ADLs; assess patient's baseline for ADL function and identify physical deficits which impact ability to perform ADLs (bathing, care of mouth/teeth, toileting, grooming, dressing, etc )  - Assess/evaluate cause of self-care deficits   - Assess range of motion  - Assess patient's mobility; develop plan if impaired  - Assess patient's need for assistive devices and provide as appropriate  - Encourage maximum independence but intervene and supervise when necessary  - Involve family in performance of ADLs  - Assess for home care needs following discharge   - Consider OT consult to assist with ADL evaluation and planning for discharge  - Provide patient education as appropriate  Outcome: Progressing  Goal: Maintain or return mobility status to optimal level  Description  INTERVENTIONS:  - Assess patient's baseline mobility status (ambulation, transfers, stairs, etc )    - Identify cognitive and physical deficits and behaviors that affect mobility  - Identify mobility aids required to assist with transfers and/or ambulation (gait belt, sit-to-stand, lift, walker, cane, etc )  - [place] fall precautions as indicated by assessment  - Record patient progress and toleration of activity level on Mobility SBAR; progress patient to next Phase/Stage  - Instruct patient to call for assistance with activity based on assessment  - Consider rehabilitation consult to assist with strengthening/weightbearing, etc   Outcome: Progressing     Problem: DISCHARGE PLANNING  Goal: Discharge to home or other facility with appropriate resources  Description  INTERVENTIONS:  - Identify barriers to discharge w/patient and caregiver  - Arrange for needed discharge resources and transportation as appropriate  - Identify discharge learning needs (meds, wound care, etc )  - Arrange for interpretive services to assist at discharge as needed  - Refer to Case Management Department for coordinating discharge planning if the patient needs post-hospital services based on physician/advanced practitioner order or complex needs related to functional status, cognitive ability, or social support system  Outcome: Progressing     Problem: Knowledge Deficit  Goal: Patient/family/caregiver demonstrates understanding of disease process, treatment plan, medications, and discharge instructions  Description  Complete learning assessment and assess knowledge base    Interventions:  - Provide teaching at level of understanding  - Provide teaching via preferred learning methods  Outcome: Progressing     Problem: NEUROSENSORY - ADULT  Goal: Achieves stable or improved neurological status  Description  INTERVENTIONS  - Monitor and report changes in neurological status  - Monitor vital signs such as temperature, blood pressure, glucose, and any other labs ordered   - Initiate measures to prevent increased intracranial pressure  - Monitor for seizure activity and implement precautions if appropriate      Outcome: Progressing  Goal: Remains free of injury related to seizures activity  Description  INTERVENTIONS  - Maintain airway, patient safety  and administer oxygen as ordered  - Monitor patient for seizure activity, document and report duration and description of seizure to physician/advanced practitioner  - If seizure occurs,  ensure patient safety during seizure  - Reorient patient post seizure  - Seizure pads on all 4 side rails  - Instruct patient/family to notify RN of any seizure activity including if an aura is experienced  - Instruct patient/family to call for assistance with activity based on nursing assessment  - Administer anti-seizure medications if ordered    Outcome: Progressing  Goal: Achieves maximal functionality and self care  Description  INTERVENTIONS  - Monitor swallowing and airway patency with patient fatigue and changes in neurological status  - Encourage and assist patient to increase activity and self care     - Encourage visually impaired, hearing impaired and aphasic patients to use assistive/communication devices  Outcome: Progressing     Problem: CARDIOVASCULAR - ADULT  Goal: Maintains optimal cardiac output and hemodynamic stability  Description  INTERVENTIONS:  - Monitor I/O, vital signs and rhythm  - Monitor for S/S and trends of decreased cardiac output  - Administer and titrate ordered vasoactive medications to optimize hemodynamic stability  - Assess quality of pulses, skin color and temperature  - Assess for signs of decreased coronary artery perfusion  - Instruct patient to report change in severity of symptoms  Outcome: Progressing  Goal: Absence of cardiac dysrhythmias or at baseline rhythm  Description  INTERVENTIONS:  - Continuous cardiac monitoring, vital signs, obtain 12 lead EKG if ordered  - Administer antiarrhythmic and heart rate control medications as ordered  - Monitor electrolytes and administer replacement therapy as ordered  Outcome: Progressing

## 2019-09-18 VITALS
DIASTOLIC BLOOD PRESSURE: 86 MMHG | OXYGEN SATURATION: 97 % | TEMPERATURE: 98.6 F | SYSTOLIC BLOOD PRESSURE: 148 MMHG | HEART RATE: 83 BPM | BODY MASS INDEX: 32.34 KG/M2 | HEIGHT: 73 IN | RESPIRATION RATE: 18 BRPM | WEIGHT: 244 LBS

## 2019-09-18 PROBLEM — I16.0 HYPERTENSIVE URGENCY: Chronic | Status: RESOLVED | Noted: 2019-09-13 | Resolved: 2019-09-18

## 2019-09-18 PROBLEM — E87.70 VOLUME OVERLOAD: Chronic | Status: RESOLVED | Noted: 2019-09-13 | Resolved: 2019-09-18

## 2019-09-18 LAB
ANION GAP SERPL CALCULATED.3IONS-SCNC: 8 MMOL/L (ref 4–13)
BUN SERPL-MCNC: 49 MG/DL (ref 5–25)
CALCIUM SERPL-MCNC: 8.1 MG/DL (ref 8.3–10.1)
CHLORIDE SERPL-SCNC: 105 MMOL/L (ref 100–108)
CO2 SERPL-SCNC: 28 MMOL/L (ref 21–32)
CREAT SERPL-MCNC: 5.21 MG/DL (ref 0.6–1.3)
GFR SERPL CREATININE-BSD FRML MDRD: 15 ML/MIN/1.73SQ M
GLUCOSE SERPL-MCNC: 101 MG/DL (ref 65–140)
GLUCOSE SERPL-MCNC: 118 MG/DL (ref 65–140)
GLUCOSE SERPL-MCNC: 120 MG/DL (ref 65–140)
GLUCOSE SERPL-MCNC: 94 MG/DL (ref 65–140)
POTASSIUM SERPL-SCNC: 3.8 MMOL/L (ref 3.5–5.3)
SODIUM SERPL-SCNC: 141 MMOL/L (ref 136–145)

## 2019-09-18 PROCEDURE — 99233 SBSQ HOSP IP/OBS HIGH 50: CPT | Performed by: INTERNAL MEDICINE

## 2019-09-18 PROCEDURE — 80048 BASIC METABOLIC PNL TOTAL CA: CPT | Performed by: PHYSICIAN ASSISTANT

## 2019-09-18 PROCEDURE — 82948 REAGENT STRIP/BLOOD GLUCOSE: CPT

## 2019-09-18 PROCEDURE — 99239 HOSP IP/OBS DSCHRG MGMT >30: CPT | Performed by: INTERNAL MEDICINE

## 2019-09-18 RX ORDER — TORSEMIDE 100 MG/1
100 TABLET ORAL 2 TIMES DAILY
Qty: 60 TABLET | Refills: 0 | Status: SHIPPED | OUTPATIENT
Start: 2019-09-18 | End: 2020-02-15 | Stop reason: HOSPADM

## 2019-09-18 RX ORDER — HYDRALAZINE HYDROCHLORIDE 25 MG/1
25 TABLET, FILM COATED ORAL EVERY 8 HOURS SCHEDULED
Qty: 90 TABLET | Refills: 0 | Status: SHIPPED | OUTPATIENT
Start: 2019-09-18 | End: 2019-09-25 | Stop reason: SDUPTHER

## 2019-09-18 RX ADMIN — MELATONIN 1000 UNITS: at 08:46

## 2019-09-18 RX ADMIN — CARVEDILOL 50 MG: 12.5 TABLET, FILM COATED ORAL at 08:44

## 2019-09-18 RX ADMIN — ASPIRIN 81 MG: 81 TABLET, COATED ORAL at 08:46

## 2019-09-18 RX ADMIN — NIFEDIPINE 60 MG: 30 TABLET, FILM COATED, EXTENDED RELEASE ORAL at 08:46

## 2019-09-18 RX ADMIN — TORSEMIDE 100 MG: 100 TABLET ORAL at 08:46

## 2019-09-18 NOTE — PLAN OF CARE
Problem: Potential for Falls  Goal: Patient will remain free of falls  Description  INTERVENTIONS:  - Assess patient frequently for physical needs  -  Identify cognitive and physical deficits and behaviors that affect risk of falls    -  [place] fall precautions as indicated by assessment   - Educate patient/family on patient safety including physical limitations  - Instruct patient to call for assistance with activity based on assessment  - Modify environment to reduce risk of injury  - Consider OT/PT consult to assist with strengthening/mobility  Outcome: Progressing     Problem: PAIN - ADULT  Goal: Verbalizes/displays adequate comfort level or baseline comfort level  Description  Interventions:  - Encourage patient to monitor pain and request assistance  - Assess pain using appropriate pain scale  - Administer analgesics based on type and severity of pain and evaluate response  - Implement non-pharmacological measures as appropriate and evaluate response  - Consider cultural and social influences on pain and pain management  - Notify physician/advanced practitioner if interventions unsuccessful or patient reports new pain  Outcome: Progressing     Problem: INFECTION - ADULT  Goal: Absence or prevention of progression during hospitalization  Description  INTERVENTIONS:  - Assess and monitor for signs and symptoms of infection  - Monitor lab/diagnostic results  - Monitor all insertion sites, i e  indwelling lines, tubes, and drains  - Monitor endotracheal if appropriate and nasal secretions for changes in amount and color  - [place] appropriate cooling/warming therapies per order  - Administer medications as ordered  - Instruct and encourage patient and family to use good hand hygiene technique  - Identify and instruct in appropriate isolation precautions for identified infection/condition  Outcome: Progressing  Goal: Absence of fever/infection during neutropenic period  Description  INTERVENTIONS:  - Monitor WBC    Outcome: Progressing     Problem: DISCHARGE PLANNING  Goal: Discharge to home or other facility with appropriate resources  Description  INTERVENTIONS:  - Identify barriers to discharge w/patient and caregiver  - Arrange for needed discharge resources and transportation as appropriate  - Identify discharge learning needs (meds, wound care, etc )  - Arrange for interpretive services to assist at discharge as needed  - Refer to Case Management Department for coordinating discharge planning if the patient needs post-hospital services based on physician/advanced practitioner order or complex needs related to functional status, cognitive ability, or social support system  Outcome: Progressing     Problem: Knowledge Deficit  Goal: Patient/family/caregiver demonstrates understanding of disease process, treatment plan, medications, and discharge instructions  Description  Complete learning assessment and assess knowledge base    Interventions:  - Provide teaching at level of understanding  - Provide teaching via preferred learning methods  Outcome: Progressing     Problem: NEUROSENSORY - ADULT  Goal: Achieves stable or improved neurological status  Description  INTERVENTIONS  - Monitor and report changes in neurological status  - Monitor vital signs such as temperature, blood pressure, glucose, and any other labs ordered   - Initiate measures to prevent increased intracranial pressure  - Monitor for seizure activity and implement precautions if appropriate      Outcome: Progressing  Goal: Remains free of injury related to seizures activity  Description  INTERVENTIONS  - Maintain airway, patient safety  and administer oxygen as ordered  - Monitor patient for seizure activity, document and report duration and description of seizure to physician/advanced practitioner  - If seizure occurs,  ensure patient safety during seizure  - Reorient patient post seizure  - Seizure pads on all 4 side rails  - Instruct patient/family to notify RN of any seizure activity including if an aura is experienced  - Instruct patient/family to call for assistance with activity based on nursing assessment  - Administer anti-seizure medications if ordered    Outcome: Progressing  Goal: Achieves maximal functionality and self care  Description  INTERVENTIONS  - Monitor swallowing and airway patency with patient fatigue and changes in neurological status  - Encourage and assist patient to increase activity and self care     - Encourage visually impaired, hearing impaired and aphasic patients to use assistive/communication devices  Outcome: Progressing     Problem: CARDIOVASCULAR - ADULT  Goal: Maintains optimal cardiac output and hemodynamic stability  Description  INTERVENTIONS:  - Monitor I/O, vital signs and rhythm  - Monitor for S/S and trends of decreased cardiac output  - Administer and titrate ordered vasoactive medications to optimize hemodynamic stability  - Assess quality of pulses, skin color and temperature  - Assess for signs of decreased coronary artery perfusion  - Instruct patient to report change in severity of symptoms  Outcome: Progressing  Goal: Absence of cardiac dysrhythmias or at baseline rhythm  Description  INTERVENTIONS:  - Continuous cardiac monitoring, vital signs, obtain 12 lead EKG if ordered  - Administer antiarrhythmic and heart rate control medications as ordered  - Monitor electrolytes and administer replacement therapy as ordered  Outcome: Progressing

## 2019-09-18 NOTE — DISCHARGE SUMMARY
Discharge Summary - [facility] Internal Medicine    Patient Information: Oliver Huerta 37 y o  male MRN: [id number]  Unit/Bed#: -01 Encounter: [id number]    Discharging Physician / Practitioner: Amaya Gatica MD  PCP: Bernadette Trevino MD  Admission Date: 9/13/2019  Discharge Date: 09/18/19    Disposition:     Home    Reason for Admission:  Elevated blood pressure and fluid overload    Discharge Diagnoses:     Principal Problem:    Hypertensive urgency  Active Problems:    CKD (chronic kidney disease) stage 4, GFR 15-29 ml/min ([facility])    Volume overload    MAGNOLIA (acute kidney injury) ([facility] [address] )    Type 2 diabetes mellitus with kidney complication, with long-term current use of insulin ([facility] )    Anemia due to chronic kidney disease    LUIS on CPAP  Resolved Problems:    * No resolved hospital problems  *      Consultations During Hospital Stay:  · Nephrology    Procedures Performed:     · 2D echocardiogram for ejection fraction of 65%  Left ventricular hypertrophy noted  Small pericardial effusion noted without any tamponade physiology  · Chest x-ray showed no active disease    Significant Findings / Test Results:     · As above      Hospital Course:     Oliver Huerta is a 37 y o  male patient with past medical significant for CKD, hypertension, diabetes mellitus type 2 who originally presented to the hospital on 9/13/2019 due to tiredness, weakness, loss of appetite and increased shortness of breath  Patient initially cart Nephrology team who advised him to go to emergency department for evaluation  He denied any fever chills or rigors  Patient was evaluated and admitted  Upon evaluation patient was noted to be fluid overloaded with hypertensive urgency  He was subsequently admitted to intensive care unit and was started on Cardene drip  His blood pressure slowly improved and fluid overload treated with IV Lasix  Once clinically stabilized patient was transferred to [facility] unit    Was seen by Nephrology  He was switched to p o  Torsemide 100 mg b i d  New baseline of creatinine established around 5 2  Patient is being discharged home in stable condition with hydralazine 25 q 8 hours, in addition to his home antihypertensive regimen  In addition he was also prescribed torsemide 100 mg b i d     An appointment within nephrology has already been made in outpatient setting  Currently not requiring any dialysis, expected to require dialysis in next 6 months to 12 months  Condition at Discharge: good     Discharge Day Visit / Exam:     Subjective: Wishes to go home  Feeling lot better  Vitals: Blood Pressure: 148/86 (09/18/19 0844)  Pulse: 83 (09/18/19 0700)  Temperature: 98 6 °F (37 °C) (09/18/19 0700)  Temp Source: Oral (09/18/19 0700)  Respirations: 18 (09/18/19 0700)  Height: 6' 1" (185 4 cm) (09/13/19 1744)  Weight - Scale: 111 kg (244 lb) (09/18/19 0543)  SpO2: 97 % (09/18/19 0700)  Exam:   Physical Exam     Gen -Patient comfortable at rest  Neck- Supple  No thyromegaly or lymphadenopathy  Lungs-Clear bilaterally without any wheeze or rales   Heart S1-S2, regular rate and rhythm, no murmurs  Abdomen-soft nontender, no organomegaly  Bowel sounds present  Extremities-no cyanosi,  clubbing ;  Pitting edema of legs  Skin- no rash  Neuro-nonfocal       Discussion with Family:  Wife at bedside    Discharge instructions/Information to patient and family:   See after visit summary for information provided to patient and family  Provisions for Follow-Up Care:  See after visit summary for information related to follow-up care and any pertinent home health orders  Planned Readmission: no     Discharge Statement:  I spent 35 minutes discharging the patient  This time was spent on the day of discharge  I had direct contact with the patient on the day of discharge   Greater than 50% of the total time was spent examining patient, answering all patient questions, arranging and discussing plan of care with patient as well as directly providing post-discharge instructions  Additional time then spent on discharge activities  Discharge Medications:  See after visit summary for reconciled discharge medications provided to patient and family        ** Please Note: This note has been constructed using a voice recognition system **

## 2019-09-18 NOTE — PROGRESS NOTES
NEPHROLOGY PROGRESS NOTE   Ian Youngblood 37 y o  male MRN: [id number]  Unit/Bed#: -01 Encounter: [id number]  Reason for Consult: MAGNOLIA/CKD    ASSESSMENT/PLAN:  1  Acute Kidney Injury, POA- secondary to volume overload with accelerated hypertension + progression of disease  - multiple episodes of MAGNOLIA and creatinine has been progressing for some time now  - avoid nephrotoxins  - avoid hypotension  - creatinine up to 5 today with diuresis, will need to tolerate higher baseline creatinine  2  Chronic Kidney Disease stage IV- Baseline creatinine is 3 3-3 6   Follows with Dr Dinesh Lara to diabetic nephropathy + hypertensive nephrosclerosis  - no uremia, no urgent dialysis needed currently  - will likely have new baseline around 5  3  Volume Overload- secondary to advanced CKD  - status post 4 doses of IV lasix 120mg past two days  - start oral torsemide 100mg BID but may need to decrease to 50mg BID  - very good urine output  - weigh with standing scale (244 pounds today from 248 pounds 9/16/19)  - goal weight is 240 pounds or less  4  Accelerated Hypertension- continue to closely monitor, BP improved  - started on hydralazine 9/14  - will need higher dose of torsemide on discharge  - hold parameters in place for antihypertensives  5  Anemia- secondary to CKD  - iron studies acceptable  - may be partly due to volume overload/dilution  6  Proteinuria- monitor as outpatient  - not on ACEI/ARB due to advanced CKD  7  Dizziness- will check orthostatic BPs    Disposition:  Okay for discharge from a renal standpoint  BMP Friday  Follow up with our office in 1 week  SUBJECTIVE:  Patient feeling well  Denies SOB  Denies lightheadedness/dizziness currently  No metallic taste   + fatigue  I had a long discussion with patient and wife about his renal function and prognosis  I was blunt about the fact that he will need dialysis in the near future    I offered to discuss different types of dialysis however, they both declined at this time  We discussed likely reason for increasing creatinine and the role of diuretics with the kidneys  We discussed the balance of volume and creatinine  All questions answered to the best of my ability      OBJECTIVE:  Current Weight: Weight - Scale: 111 kg (244 lb)  Vitals:    09/17/19 2214 09/18/19 0543 09/18/19 0700 09/18/19 0844   BP: 142/65  139/86 148/86   BP Location: Right arm  Right arm Right arm   Pulse: 89  83    Resp: 18  18    Temp: 99 1 °F (37 3 °C)  98 6 °F (37 °C)    TempSrc: Oral  Oral    SpO2: 98%  97%    Weight:  111 kg (244 lb)     Height:           Intake/Output Summary (Last 24 hours) at 9/18/2019 0941  Last data filed at 9/18/2019 0502  Gross per 24 hour   Intake 620 ml   Output 1100 ml   Net -480 ml     General: NAD  Skin: no rash  HEENT: mm moist  Neck: no JVD  Lungs: decreased breath sounds  Cardiac: RRR  Extremities: + bilateral LE edema to knees  GI: soft nt nd  Neuro: alert awake  Psych: mood and affect appropriate    Medications:    Current Facility-Administered Medications:     acetaminophen (TYLENOL) tablet 650 mg, 650 mg, Oral, Q6H PRN, Apurva Moreno PA-C, 650 mg at 09/17/19 2204    aspirin (ECOTRIN LOW STRENGTH) EC tablet 81 mg, 81 mg, Oral, Daily, Leena Hoang PA-C, 81 mg at 09/18/19 0846    carvedilol (COREG) tablet 50 mg, 50 mg, Oral, BID With Meals, Leena Hoang PA-C, 50 mg at 09/18/19 0844    cholecalciferol (VITAMIN D3) tablet 1,000 Units, 1,000 Units, Oral, Daily, Apurva Moreno PA-C, 1,000 Units at 09/18/19 0846    doxazosin (CARDURA) tablet 4 mg, 4 mg, Oral, HS, Apurva Moreno PA-C, 4 mg at 09/17/19 2155    heparin (porcine) subcutaneous injection 5,000 Units, 5,000 Units, Subcutaneous, Q8H [address], Apurva Moreno PA-C, 5,000 Units at 09/17/19 2155    hydrALAZINE (APRESOLINE) tablet 25 mg, 25 mg, Oral, Q8H Leena CHAVEZ PA-C, 25 mg at 09/17/19 3135    insulin glargine (LANTUS) subcutaneous injection 15 Units 0 15 mL, 15 Units, Subcutaneous, HS, Shaka Heath MD, 15 Units at 09/17/19 2155    insulin lispro (HumaLOG) 100 units/mL subcutaneous injection 1-6 Units, 1-6 Units, Subcutaneous, HS, Nicole Rosen PA-C, 2 Units at 09/16/19 2124    insulin lispro (HumaLOG) 100 units/mL subcutaneous injection 2-12 Units, 2-12 Units, Subcutaneous, TID AC **AND** Fingerstick Glucose (POCT), , , TID AC, Leena Hoang PA-C    insulin lispro (HumaLOG) 100 units/mL subcutaneous injection 4 Units, 4 Units, Subcutaneous, TID With Meals, Nicole Rosen PA-C, 4 Units at 09/17/19 0923    NIFEdipine (PROCARDIA XL) 24 hr tablet 60 mg, 60 mg, Oral, BID, Leena Hoang PA-C, 60 mg at 09/18/19 0846    torsemide (DEMADEX) tablet 100 mg, 100 mg, Oral, BID, [facility], MUSTAPHA, 100 mg at 09/18/19 9710    Laboratory Results:  Results from last 7 days   Lab Units 09/18/19  0458 09/17/19  0400 09/16/19  0519 09/15/19  0903 09/14/19  0427 09/14/19 09/13/19  1412 09/12/19  0943   WBC Thousand/uL  --   --  7 48  --  6 25  --  5 45  --    HEMOGLOBIN g/dL  --   --  9 9*  --  9 6*  --  10 6*  --    HEMATOCRIT %  --   --  30 7*  --  28 9*  --  31 9*  --    PLATELETS Thousands/uL  --   --  351  --  301  --  322  --    POTASSIUM mmol/L 3 8 4 1 3 8 4 3  --  4 0 4 0 4 4   CHLORIDE mmol/L 105 105 107 108  --  107 107 107   CO2 mmol/L 28 27 25 25  --  24 25 24   BUN mg/dL 49* 46* 42* 41*  --  39* 40* 43*   CREATININE mg/dL 5 21* 5 03* 4 77* 4 97*  --  4 58* 4 63* 5 14*   CALCIUM mg/dL 8 1* 8 3 8 2* 8 5  --  8 4 8 5 8 4   MAGNESIUM mg/dL  --  1 8 1 7  --   --  1 7  --   --    PHOSPHORUS mg/dL  --   --   --   --   --  4 4  --   --

## 2019-09-19 ENCOUNTER — TRANSITIONAL CARE MANAGEMENT (OUTPATIENT)
Dept: FAMILY MEDICINE CLINIC | Facility: CLINIC | Age: 34
End: 2019-09-19

## 2019-09-20 ENCOUNTER — OFFICE VISIT (OUTPATIENT)
Dept: FAMILY MEDICINE CLINIC | Facility: CLINIC | Age: 34
End: 2019-09-20

## 2019-09-20 ENCOUNTER — APPOINTMENT (OUTPATIENT)
Dept: LAB | Facility: CLINIC | Age: 34
End: 2019-09-20
Payer: COMMERCIAL

## 2019-09-20 VITALS
TEMPERATURE: 99 F | HEART RATE: 78 BPM | BODY MASS INDEX: 31.81 KG/M2 | RESPIRATION RATE: 16 BRPM | DIASTOLIC BLOOD PRESSURE: 80 MMHG | HEIGHT: 73 IN | SYSTOLIC BLOOD PRESSURE: 136 MMHG | WEIGHT: 240 LBS

## 2019-09-20 DIAGNOSIS — I10 HYPERTENSION, UNSPECIFIED TYPE: ICD-10-CM

## 2019-09-20 DIAGNOSIS — E11.9 TYPE 2 DIABETES MELLITUS WITHOUT COMPLICATION, WITHOUT LONG-TERM CURRENT USE OF INSULIN (HCC): ICD-10-CM

## 2019-09-20 DIAGNOSIS — I21.4 NSTEMI (NON-ST ELEVATED MYOCARDIAL INFARCTION) (HCC): ICD-10-CM

## 2019-09-20 DIAGNOSIS — N18.4 CKD (CHRONIC KIDNEY DISEASE) STAGE 4, GFR 15-29 ML/MIN (HCC): ICD-10-CM

## 2019-09-20 DIAGNOSIS — IMO0001 TRANSITION OF CARE PERFORMED WITH SHARING OF CLINICAL SUMMARY: Primary | ICD-10-CM

## 2019-09-20 DIAGNOSIS — N17.9 AKI (ACUTE KIDNEY INJURY) (HCC): Chronic | ICD-10-CM

## 2019-09-20 LAB
ANION GAP SERPL CALCULATED.3IONS-SCNC: 7 MMOL/L (ref 4–13)
BUN SERPL-MCNC: 55 MG/DL (ref 5–25)
CALCIUM SERPL-MCNC: 9 MG/DL (ref 8.3–10.1)
CHLORIDE SERPL-SCNC: 102 MMOL/L (ref 100–108)
CO2 SERPL-SCNC: 28 MMOL/L (ref 21–32)
CREAT SERPL-MCNC: 5.66 MG/DL (ref 0.6–1.3)
GFR SERPL CREATININE-BSD FRML MDRD: 14 ML/MIN/1.73SQ M
GLUCOSE P FAST SERPL-MCNC: 142 MG/DL (ref 65–99)
POTASSIUM SERPL-SCNC: 4.1 MMOL/L (ref 3.5–5.3)
SL AMB POCT HEMOGLOBIN AIC: 7.3 (ref ?–6.5)
SODIUM SERPL-SCNC: 137 MMOL/L (ref 136–145)

## 2019-09-20 PROCEDURE — 83036 HEMOGLOBIN GLYCOSYLATED A1C: CPT | Performed by: FAMILY MEDICINE

## 2019-09-20 PROCEDURE — 3051F HG A1C>EQUAL 7.0%<8.0%: CPT | Performed by: FAMILY MEDICINE

## 2019-09-20 PROCEDURE — 80048 BASIC METABOLIC PNL TOTAL CA: CPT

## 2019-09-20 PROCEDURE — 36415 COLL VENOUS BLD VENIPUNCTURE: CPT

## 2019-09-20 PROCEDURE — 99496 TRANSJ CARE MGMT HIGH F2F 7D: CPT | Performed by: FAMILY MEDICINE

## 2019-09-20 RX ORDER — FLUDROCORTISONE ACETATE 0.1 MG/1
0.1 TABLET ORAL DAILY
Qty: 30 TABLET | Refills: 0 | Status: CANCELLED | OUTPATIENT
Start: 2019-09-20

## 2019-09-20 RX ORDER — CARVEDILOL 25 MG/1
50 TABLET ORAL 2 TIMES DAILY WITH MEALS
Qty: 90 TABLET | Refills: 3 | Status: SHIPPED | OUTPATIENT
Start: 2019-09-20 | End: 2019-09-25 | Stop reason: SDUPTHER

## 2019-09-20 NOTE — PROGRESS NOTES
Assessment/Plan:     Orthostatic hypotension  - he is on currently on Carvedilol 50 mg bid, hydralazine 25 tid, nifedipine 60 mg bid,torsemide 100 mg bid  Orthostatic hypotension likely from these medications however given recent admission for hypertensive emergency and rapidly progressing CKD, BP control outweighs orthostatic hypotension   May benefit from Florinef   Discussed precautions for orthostatic hypotension  He should also not drive if he is feeling faint  Subjective:     Patient ID: Sushil Can is a 37 y o  male  Paul Duffy is a 37year old with diabetes, HTN and CKD stage 4 who presents  For transition of care appointment  He was recently hospitalized with hypertensive emergency  He was sent to ICU and required cardene drip  He was seen by nephrology  as well  He has a new baseline creatine of 5 2  His torsemide was increased to 100mg bid  He was also started on hydralzine 25 mg tid  He is meeting with nephrologist next week to discuss dialysis vs transplant  Today he says he is getting a little lightheaded when standing from seated position  He denies syncope   Review of Systems   Constitutional: Positive for fatigue  Eyes: Negative for visual disturbance  Respiratory: Negative for shortness of breath  Cardiovascular: Negative for chest pain  Gastrointestinal: Negative for nausea and vomiting  Genitourinary: Negative for difficulty urinating  Neurological: Positive for light-headedness  Negative for dizziness and syncope  All other systems reviewed and are negative  Objective:     Physical Exam   Constitutional: He is oriented to person, place, and time  No distress  Cardiovascular: Normal rate, regular rhythm and normal heart sounds  Pulmonary/Chest: Effort normal and breath sounds normal    Neurological: He is alert and oriented to person, place, and time  Skin: He is not diaphoretic  Nursing note and vitals reviewed          Vitals:    09/20/19 1519   BP: 136/80   BP Location: Left arm   Patient Position: Sitting   Cuff Size: Large   Pulse: 78   Resp: 16   Temp: 99 °F (37 2 °C)   TempSrc: Tympanic   Weight: 109 kg (240 lb)   Height: 6' 1" (1 854 m)       Transitional Care Management Review:  Mar Sanchez is a 37 y o  male here for TCM follow up  During the TCM phone call patient stated:    TCM Call (since 8/20/2019)     Date and time call was made  9/19/2019  5:40 PM    Hospital care reviewed  Records reviewed    Patient was hospitialized at  [address] [place] [address]    Date of Admission  09/13/19    Date of discharge  09/18/19    Diagnosis  Acute kidney injury, Hypertensive urgency, Type 2 Diabetes Mellitus    Disposition  Home    Were the patients medications reviewed and updated  Yes    Current Symptoms  Swelling; Dizziness    Dizziness severity  Mild    Quality Character  Lightheadedness    Episode pattern  Intermittent    Cause  No known event    Cause certainty  Reportedly    What makes the symptoms better  Rest    What makes symptoms worse  Standing    Clinical progress  Unchanged    Dizziness- family history  Diabetes    Dizziness pertinent history  Diabetes Mellitis      TCM Call (since 8/20/2019)     Clinical progress swelling  Unchanged    Post hospital issues  Reduced activity    Should patient be enrolled in anticoag monitoring? No    Scheduled for follow up?   Yes    Did you obtain your prescribed medications  Yes    Do you need help managing your prescriptions or medications  No    Is transportation to your appointment needed  No    I have advised the patient to call PCP with any new or worsening symptoms  [address]  Family    Do you have social support  Yes, as much as I need    Are you recieving any outpatient services  No    Are you recieving home care services  No    Are you using any community resources  No    Current waiver services  No    Have you fallen in the last 12 months  No Interperter language line needed  No    Counseling  Patient    Counseling topics  Activities of daily living;  Importance of RX compliance; instructions for management          Dina Gutiérrez MD  Family medicine PGY2

## 2019-09-23 LAB
LEFT EYE DIABETIC RETINOPATHY: NORMAL
RIGHT EYE DIABETIC RETINOPATHY: NORMAL

## 2019-09-23 PROCEDURE — 2022F DILAT RTA XM EVC RTNOPTHY: CPT | Performed by: FAMILY MEDICINE

## 2019-09-24 ENCOUNTER — APPOINTMENT (OUTPATIENT)
Dept: LAB | Facility: CLINIC | Age: 34
End: 2019-09-24
Payer: COMMERCIAL

## 2019-09-24 DIAGNOSIS — N17.9 AKI (ACUTE KIDNEY INJURY) (HCC): Chronic | ICD-10-CM

## 2019-09-24 LAB
ANION GAP SERPL CALCULATED.3IONS-SCNC: 8 MMOL/L (ref 4–13)
BUN SERPL-MCNC: 52 MG/DL (ref 5–25)
CALCIUM SERPL-MCNC: 9.1 MG/DL (ref 8.3–10.1)
CHLORIDE SERPL-SCNC: 106 MMOL/L (ref 100–108)
CO2 SERPL-SCNC: 27 MMOL/L (ref 21–32)
CREAT SERPL-MCNC: 5.49 MG/DL (ref 0.6–1.3)
GFR SERPL CREATININE-BSD FRML MDRD: 14 ML/MIN/1.73SQ M
GLUCOSE P FAST SERPL-MCNC: 148 MG/DL (ref 65–99)
POTASSIUM SERPL-SCNC: 4 MMOL/L (ref 3.5–5.3)
SODIUM SERPL-SCNC: 141 MMOL/L (ref 136–145)

## 2019-09-24 PROCEDURE — 36415 COLL VENOUS BLD VENIPUNCTURE: CPT

## 2019-09-24 PROCEDURE — 80048 BASIC METABOLIC PNL TOTAL CA: CPT

## 2019-09-25 ENCOUNTER — OFFICE VISIT (OUTPATIENT)
Dept: NEPHROLOGY | Facility: CLINIC | Age: 34
End: 2019-09-25
Payer: COMMERCIAL

## 2019-09-25 VITALS
HEIGHT: 73 IN | DIASTOLIC BLOOD PRESSURE: 68 MMHG | SYSTOLIC BLOOD PRESSURE: 114 MMHG | WEIGHT: 238.6 LBS | HEART RATE: 84 BPM | BODY MASS INDEX: 31.62 KG/M2

## 2019-09-25 DIAGNOSIS — N18.5 ANEMIA DUE TO STAGE 5 CHRONIC KIDNEY DISEASE, NOT ON CHRONIC DIALYSIS (HCC): ICD-10-CM

## 2019-09-25 DIAGNOSIS — I12.0 BENIGN HYPERTENSION WITH CKD (CHRONIC KIDNEY DISEASE) STAGE V (HCC): ICD-10-CM

## 2019-09-25 DIAGNOSIS — R80.8 OTHER PROTEINURIA: ICD-10-CM

## 2019-09-25 DIAGNOSIS — N18.5 CKD (CHRONIC KIDNEY DISEASE) STAGE 5, GFR LESS THAN 15 ML/MIN (HCC): Primary | ICD-10-CM

## 2019-09-25 DIAGNOSIS — D63.1 ANEMIA DUE TO STAGE 5 CHRONIC KIDNEY DISEASE, NOT ON CHRONIC DIALYSIS (HCC): ICD-10-CM

## 2019-09-25 DIAGNOSIS — I16.0 HYPERTENSIVE URGENCY: Chronic | ICD-10-CM

## 2019-09-25 DIAGNOSIS — I21.4 NSTEMI (NON-ST ELEVATED MYOCARDIAL INFARCTION) (HCC): ICD-10-CM

## 2019-09-25 DIAGNOSIS — N18.5 BENIGN HYPERTENSION WITH CKD (CHRONIC KIDNEY DISEASE) STAGE V (HCC): ICD-10-CM

## 2019-09-25 PROCEDURE — 99215 OFFICE O/P EST HI 40 MIN: CPT | Performed by: PHYSICIAN ASSISTANT

## 2019-09-25 RX ORDER — HYDRALAZINE HYDROCHLORIDE 25 MG/1
50 TABLET, FILM COATED ORAL EVERY 8 HOURS SCHEDULED
Qty: 180 TABLET | Refills: 3 | Status: SHIPPED | OUTPATIENT
Start: 2019-09-25 | End: 2019-10-08 | Stop reason: ALTCHOICE

## 2019-09-25 RX ORDER — CARVEDILOL 25 MG/1
25 TABLET ORAL 2 TIMES DAILY WITH MEALS
Qty: 90 TABLET | Refills: 3
Start: 2019-09-25 | End: 2020-04-14

## 2019-09-25 NOTE — PROGRESS NOTES
Assessment and Plan:    Car Nielsen was seen today for follow-up, chronic kidney disease and proteinuria  Diagnoses and all orders for this visit:    CKD (chronic kidney disease) stage 5, GFR less than 15 ml/min ([facility])  -     Ambulatory referral to renal transplant evaluation; Future  -     Ambulatory referral to ckd education program; Future  -     Basic metabolic panel; Future  -     CBC; Future  -     Phosphorus; Future  -     Magnesium; Future  -     PTH, intact; Future  -     Protein / creatinine ratio, urine; Future  -     Vitamin D 25 hydroxy; Future    Anemia due to stage 5 chronic kidney disease, not on chronic dialysis ([facility] [address] )    Other proteinuria    Benign hypertension with CKD (chronic kidney disease) stage V ([facility])    NSTEMI (non-ST elevated myocardial infarction) ([facility])  -     carvedilol (COREG) 25 mg tablet; Take 1 tablet (25 mg total) by mouth 2 (two) times a day with meals    Hypertensive urgency  -     hydrALAZINE (APRESOLINE) 25 mg tablet; Take 2 tablets (50 mg total) by mouth every 8 (eight) hours        Chronic Kidney Disease stage 5- Baseline creatinine has progressed to 5 5   Follows with Dr Lianne Crigler [employer] to diabetic nephropathy + hypertensive nephrosclerosis  Kidney Smart: will schedule  Dialysis modalities: discussed, patient and wife like in center hemodialysis as their preferred modality  Dialysis access: will refer to vascular surgery for fistula creation  - patient and wife are nervous about performing dialysis on their own at home  Patient also loves water welch and I am unsure if he would be able to swim as much with a PD catheter  Transplant: will schedule appointment with Dr Nicole Garcia    Hypertension- Antihypertensive regimen includes carvedilol 50mg twice a day, doxazosin 4mg at bedtime, hydralazine 25mg three times a day, nifedipine 60mg twice a day, and torsemide 100mg daily  Avoid salt in your diet  Stay active and exercise  Avoid nonsteroidal medications      Orthostatic symptoms + but no orthostasis noted with only a difference of <36 systolically and <67 diastolically  However, due to symptoms, will decrease coreg to 25mg twice a day and increase hydralazine to 50mg three times a day  In two weeks, bring our office with a list of twice a day blood pressures and pulses  Call for blood pressures >160 or <120 consistently  At home, his blood pressures are 150s/80s before medications  After medications, 120s/high 70s-low 80s  Volume Status- He is taking torsemide 100mg daily  His weight is 234 pounds at home without clothing  Continue to weigh yourself every day  Call if you start to gain weight  Proteinuria- Check UPC ratio before next visit  He is not on an ACEI/ARB due to recent MAGNOLIA and advanced CKD  Anemia- Check CBC before next visit  Iron studies acceptable  BMD- Check studies before next appointment  Follow up with Dr Gilberto Neff on 10/10/19  Please call the office with any questions or concerns  Colonoscopy: age 37    Reason for Visit: Follow-up; Chronic Kidney Disease; and Proteinuria    HPI: Michele Vazquez is a 37 y o  male who is here for follow up after hospitalization for hypertensive urgency and fluid overload  He was treated with a cardene drip in the ICU and placed on IV lasix  His blood pressures improved and he was diuresing after the time of discharge  His elevated creatinine was felt due to uncontrolled hypertension and progression of disease  Today, he presents with his wife  His main complaints are feeling cold and shivering all the time as well as chronic back pain  I instructed him to see his PCP or a pain specialist regarding his back pain  He also states he feels dizzy upon standing and has been since May  At the end of April his carvedilol was increased to 50mg from 25mg  His home blood pressures have been good ranging from 120 after meds to 150 before meds    He denies any problems with his sugars and states his glucose levels pre meal is around 120  He states he is urinating well and his weight/LE edema is decreasing  His home weight today was 234 pounds  During today's visit, we discussed dialysis modalities as well as transplant  The patient and his wife are nervous about performing dialysis at home and would prefer in center hemodialysis  We discussed the next steps including vascular surgery referral and vein mapping which they are agreeable to  ROS: A complete review of systems was performed and was negative unless otherwise noted in the history of present illness  Allergies:   Patient has no known allergies      Medications:     Current Outpatient Medications:     ADMELOG SOLOSTAR 100 units/mL injection pen, INJECT 4 UNITS 3 TIMES A DAY WITH MEALS, Disp: 5 pen, Rfl: 5    albuterol (PROVENTIL HFA,VENTOLIN HFA) 90 mcg/act inhaler, Inhale 2 puffs every 4 (four) hours as needed for wheezing, Disp: 1 Inhaler, Rfl: 0    aspirin (ECOTRIN LOW STRENGTH) 81 mg EC tablet, Take 1 tablet (81 mg total) by mouth daily, Disp: , Rfl: 0    carvedilol (COREG) 25 mg tablet, Take 1 tablet (25 mg total) by mouth 2 (two) times a day with meals, Disp: 90 tablet, Rfl: 3    D 1000 1000 units capsule, TAKE 1 CAPSULE BY MOUTH EVERY DAY, Disp: 90 capsule, Rfl: 1    doxazosin (CARDURA) 2 mg tablet, Take 2 tablets (4 mg total) by mouth daily at bedtime, Disp: 30 tablet, Rfl: 5    FREESTYLE LITE test strip, TEST 3 TIMES DAILY, Disp: 200 each, Rfl: 5    glucose blood (ACCU-CHEK HARVEY PLUS) test strip, Use as instructed, Disp: 100 each, Rfl: 0    hydrALAZINE (APRESOLINE) 25 mg tablet, Take 2 tablets (50 mg total) by mouth every 8 (eight) hours, Disp: 180 tablet, Rfl: 3    insulin glargine (BASAGLAR KWIKPEN) 100 units/mL injection pen, Inject 24 Units under the skin daily at bedtime, Disp: 5 pen, Rfl: 0    Insulin Pen Needle (INSUPEN PEN NEEDLES) 31G X 5 MM MISC, by Does not apply route 4 (four) times a day, Disp: 100 each, Rfl: 5   NIFEdipine ER (ADALAT CC) 60 MG 24 hr tablet, Take 1 tablet (60 mg total) by mouth 2 (two) times a day, Disp: 90 tablet, Rfl: 3    torsemide (DEMADEX) 100 mg tablet, Take 1 tablet (100 mg total) by mouth 2 (two) times a day, Disp: 60 tablet, Rfl: 0    insulin aspart (NOVOLOG FLEXPEN) 100 Units/mL injection pen, Inject 4 Units under the skin 3 (three) times a day with meals (Patient not taking: Reported on 9/25/2019), Disp: 5 pen, Rfl: 0    Past Medical History:   Diagnosis Date    Anemia due to chronic kidney disease 9/13/2019    CKD (chronic kidney disease) 2/21/2019    Class 1 obesity due to excess calories with serious comorbidity and body mass index (BMI) of 31 0 to 31 9 in adult 5/25/2019    Diabetes mellitus ([facility] [address] )     Essential hypertension 10/31/2017    Hyperlipidemia     NSTEMI (non-ST elevated myocardial infarction) ([facility] ) 5/21/2019     Past Surgical History:   Procedure Laterality Date    ABCESS DRAINAGE      tooth    AR KNEE SCOPE,MED/LAT MENISECTOMY Right 9/13/2018    Procedure: RIGHT KNEE ARTHROSCOPIC PARTIAL MEDIAL MENISCECTOMY;  Surgeon: Yahir Maldonado MD;  Location: [facility]  MAIN [facility];  Service: Orthopedics    WRIST ARTHROPLASTY Right 2017     Family History   Problem Relation Age of Onset    Hypertension Mother     Multiple sclerosis Mother     Diabetes Father       reports that he has never smoked  He has never used smokeless tobacco  He reports that he drinks alcohol  He reports that he does not use drugs  Physical Exam:   Vitals:    09/25/19 0930 09/25/19 0943 09/25/19 0944   BP:  126/76 114/68   BP Location:  Left arm Left arm   Patient Position:  Sitting Standing   Cuff Size:  Standard Standard   Pulse:  84 84   Weight: 108 kg (238 lb 9 6 oz)     Height: 6' 1" (1 854 m)       Body mass index is 31 48 kg/m²      General: NAD  Neuro: AAO  Neck: supple  Skin: no rash  Cardiac: RRR  Lungs: CTAB  Abdomen: soft nt nd obese  Extremities: + bilateral edema    Procedure:  No results found for this or any previous visit  Labs reviewed      Lab Results   Component Value Date    GLUCOSE 182 (H) 02/24/2019    CALCIUM 9 1 09/24/2019    K 4 0 09/24/2019    CO2 27 09/24/2019     09/24/2019    BUN 52 (H) 09/24/2019    CREATININE 5 49 (H) 09/24/2019       Results from last 7 days   Lab Units 09/24/19  0915 09/20/19  1048   POTASSIUM mmol/L 4 0 4 1   CHLORIDE mmol/L 106 102   CO2 mmol/L 27 28   BUN mg/dL 52* 55*   CREATININE mg/dL 5 49* 5 66*   CALCIUM mg/dL 9 1 9 0

## 2019-09-25 NOTE — PATIENT INSTRUCTIONS
Chronic Kidney Disease stage 5- Baseline creatinine has progressed to 5 5   Follows with Dr Haim Wen to diabetic nephropathy + hypertensive nephrosclerosis  Kidney Smart: will schedule  Dialysis modalities: discussed  Dialysis access: in center hemodialysis  Transplant: will schedule appointment with Dr Meir Falk    Hypertension- Antihypertensive regimen includes carvedilol 50mg twice a day, doxazosin 4mg at bedtime, hydralazine 25mg three times a day, nifedipine 60mg twice a day, and torsemide 100mg daily  Avoid salt in your diet  Stay active and exercise  Avoid nonsteroidal medications  Orthostatic symptoms + but no orthostasis noted with only a difference of <37 systolically and <72 diastolically  However, due to symptoms, will decrease coreg to 25mg twice a day and increase hydralazine to 50mg three times a day  In two weeks, bring our office with a list of twice a day blood pressures and pulses  Call for blood pressures >160 or <120 consistently  At home, his blood pressures are 150s/80s before medications  After medications, 120s/high 70s-low 80s  Volume Status- He is taking torsemide 100mg daily  His weight is 234 pounds at home without clothing on  Continue to weigh yourself every day  Call if you start to gain weight  Proteinuria- Check UPC ratio before next visit  He is not on an ACEI/ARB due to recent MAGNOLIA and advanced CKD  Anemia- Check CBC before next visit  Iron studies acceptable  Follow up with Dr Haim Flores on 10/10  Please call the office with any questions or concerns

## 2019-10-07 ENCOUNTER — APPOINTMENT (OUTPATIENT)
Dept: LAB | Facility: CLINIC | Age: 34
End: 2019-10-07
Payer: COMMERCIAL

## 2019-10-07 DIAGNOSIS — N18.30 CKD (CHRONIC KIDNEY DISEASE) STAGE 3, GFR 30-59 ML/MIN (HCC): ICD-10-CM

## 2019-10-07 DIAGNOSIS — N18.5 CKD (CHRONIC KIDNEY DISEASE) STAGE 5, GFR LESS THAN 15 ML/MIN (HCC): ICD-10-CM

## 2019-10-07 LAB
25(OH)D3 SERPL-MCNC: 15.2 NG/ML (ref 30–100)
ANION GAP SERPL CALCULATED.3IONS-SCNC: 8 MMOL/L (ref 4–13)
BUN SERPL-MCNC: 49 MG/DL (ref 5–25)
CALCIUM SERPL-MCNC: 9 MG/DL (ref 8.3–10.1)
CHLORIDE SERPL-SCNC: 107 MMOL/L (ref 100–108)
CO2 SERPL-SCNC: 26 MMOL/L (ref 21–32)
CREAT SERPL-MCNC: 5.11 MG/DL (ref 0.6–1.3)
CREAT UR-MCNC: 117 MG/DL
ERYTHROCYTE [DISTWIDTH] IN BLOOD BY AUTOMATED COUNT: 12.1 % (ref 11.6–15.1)
GFR SERPL CREATININE-BSD FRML MDRD: 16 ML/MIN/1.73SQ M
GLUCOSE P FAST SERPL-MCNC: 124 MG/DL (ref 65–99)
HCT VFR BLD AUTO: 33.5 % (ref 36.5–49.3)
HGB BLD-MCNC: 10.7 G/DL (ref 12–17)
MAGNESIUM SERPL-MCNC: 1.9 MG/DL (ref 1.6–2.6)
MCH RBC QN AUTO: 28.2 PG (ref 26.8–34.3)
MCHC RBC AUTO-ENTMCNC: 31.9 G/DL (ref 31.4–37.4)
MCV RBC AUTO: 88 FL (ref 82–98)
PHOSPHATE SERPL-MCNC: 5.2 MG/DL (ref 2.7–4.5)
PLATELET # BLD AUTO: 274 THOUSANDS/UL (ref 149–390)
PMV BLD AUTO: 10.3 FL (ref 8.9–12.7)
POTASSIUM SERPL-SCNC: 4.7 MMOL/L (ref 3.5–5.3)
PROT UR-MCNC: 133 MG/DL
PROT/CREAT UR: 1.14 MG/G{CREAT} (ref 0–0.1)
PTH-INTACT SERPL-MCNC: 183.7 PG/ML (ref 18.4–80.1)
RBC # BLD AUTO: 3.79 MILLION/UL (ref 3.88–5.62)
SODIUM SERPL-SCNC: 141 MMOL/L (ref 136–145)
WBC # BLD AUTO: 7.46 THOUSAND/UL (ref 4.31–10.16)

## 2019-10-07 PROCEDURE — 82306 VITAMIN D 25 HYDROXY: CPT

## 2019-10-07 PROCEDURE — 85027 COMPLETE CBC AUTOMATED: CPT

## 2019-10-07 PROCEDURE — 80048 BASIC METABOLIC PNL TOTAL CA: CPT

## 2019-10-07 PROCEDURE — 36415 COLL VENOUS BLD VENIPUNCTURE: CPT

## 2019-10-07 PROCEDURE — 84156 ASSAY OF PROTEIN URINE: CPT

## 2019-10-07 PROCEDURE — 82570 ASSAY OF URINE CREATININE: CPT

## 2019-10-07 PROCEDURE — 83735 ASSAY OF MAGNESIUM: CPT

## 2019-10-07 PROCEDURE — 83970 ASSAY OF PARATHORMONE: CPT

## 2019-10-07 PROCEDURE — 84100 ASSAY OF PHOSPHORUS: CPT

## 2019-10-08 ENCOUNTER — OFFICE VISIT (OUTPATIENT)
Dept: CARDIOLOGY CLINIC | Facility: CLINIC | Age: 34
End: 2019-10-08
Payer: COMMERCIAL

## 2019-10-08 VITALS
OXYGEN SATURATION: 98 % | DIASTOLIC BLOOD PRESSURE: 54 MMHG | HEIGHT: 73 IN | SYSTOLIC BLOOD PRESSURE: 88 MMHG | WEIGHT: 234 LBS | BODY MASS INDEX: 31.01 KG/M2 | HEART RATE: 91 BPM

## 2019-10-08 DIAGNOSIS — E78.5 HYPERLIPIDEMIA, UNSPECIFIED HYPERLIPIDEMIA TYPE: ICD-10-CM

## 2019-10-08 DIAGNOSIS — N18.5 CKD (CHRONIC KIDNEY DISEASE) STAGE 5, GFR LESS THAN 15 ML/MIN (HCC): Primary | ICD-10-CM

## 2019-10-08 DIAGNOSIS — R42 DIZZINESS: ICD-10-CM

## 2019-10-08 DIAGNOSIS — I95.9 HYPOTENSION, UNSPECIFIED HYPOTENSION TYPE: ICD-10-CM

## 2019-10-08 PROCEDURE — 99214 OFFICE O/P EST MOD 30 MIN: CPT | Performed by: INTERNAL MEDICINE

## 2019-10-08 NOTE — PROGRESS NOTES
Cardiology Follow Up    Ru Agee  1985  [id number]  [facility]  [address] [address] [phone]    No diagnosis found  There are no diagnoses linked to this encounter  I had the pleasure of seeing Ru Agee for a follow up visit  INTERVAL HISTORY: dizziness    History of the presenting illness, Discussion/Summary and My Plan are as follows:::    Ion Hernandez is a very pleasant 27-year-old gentleman with a reported history of hypertension, diabetes and chronic kidney disease in the past, who was admitted with acute myopericarditis in May 2019, treated with steroids and colchicine due to his chronic kidney disease  He remained on steroids till an office visit with our nurse practitioner on 8/21/2019  Subsequent to that visit, he had another admission for volume overload and acute kidney injury and was finally discharged on 9/18/2019 on torsemide 100 mg twice daily  His blood pressures had remained elevated and he is on doxazosin, Nifedipine, hydralazine and carvedilol at this time  He presents for a follow-up visit and does not admit to any chest discomfort reminiscent of his previous myocarditis  However he does feel dizzy, particularly with orthostasis  He did have blood work just yesterday all of which was essentially stable  His new creatinine baseline at discharge was around 5 2, where it remains from his blood work yesterday  Cardiac physical exam is unremarkable except for hypotension, around 31-84 systolic/[id number] on both arms by me on 2 different machines  Plan:    Dizziness:  Associated with hypotension, at least at this visit  No clear etiology at this time  He had stop steroids over a month ago  Blood work from yesterday looked unremarkable  No signs of infection    At this time I will simply discontinue his hydralazine and have him continue to check his blood pressures at home  If they remain hypotensive, will discontinue doxazosin next  I will also review this plan with his nephrologist   EKG shows diffuse T-wave inversions, will check an echo  Volume overload/CKD:  Creatinine from yesterday at 5 1-within his baseline range  No evidence of volume overload at this time  Continue torsemide at 100 mg twice daily which has kept him euvolemic  Diabetes:  Last A1c around 7, would defer this to his primary care physician  Myopericarditis:  Treated in May 2019, steroids were continued till 8/21/2019  No symptoms at this time   had an echo-9/15/2019 without pericardial effusion and with normal LV function  Follow-up in 1 month      Patient Active Problem List   Diagnosis    Erectile dysfunction    Chronic bilateral thoracic back pain    Type 2 diabetes mellitus with kidney complication, with long-term current use of insulin ([facility])    Hyperlipidemia    De Quervain's disease (radial styloid tenosynovitis)    Benign hypertension with CKD (chronic kidney disease) stage V ([facility])    Bucket-handle tear of medial meniscus of right knee as current injury    Other proteinuria    Cough    NSTEMI (non-ST elevated myocardial infarction) ([address] )    MAGNOLIA (acute kidney injury) ([address] )    Myopericarditis    NSVT (nonsustained ventricular tachycardia) ([facility])    Class 1 obesity due to excess calories with serious comorbidity and body mass index (BMI) of 31 0 to 31 9 in adult    LUIS on CPAP    CKD (chronic kidney disease) stage 5, GFR less than 15 ml/min ([facility])    Anemia due to chronic kidney disease     Past Medical History:   Diagnosis Date    Anemia due to chronic kidney disease 9/13/2019    CKD (chronic kidney disease) 2/21/2019    Class 1 obesity due to excess calories with serious comorbidity and body mass index (BMI) of 31 0 to 31 9 in adult 5/25/2019    Diabetes mellitus ([address] )     Essential hypertension 10/31/2017    Hyperlipidemia     NSTEMI (non-ST elevated myocardial infarction) ([facility] ) 5/21/2019     Social History     Socioeconomic History    Marital status: /Civil Union     Spouse name: Not on file    Number of children: Not on file    Years of education: Not on file    Highest education level: Not on file   Occupational History    Not on file   Social Needs    Financial resource strain: Not on file    Food insecurity:     Worry: Not on file     Inability: Not on file    Transportation needs:     Medical: Not on file     Non-medical: Not on file   Tobacco Use    Smoking status: Never Smoker    Smokeless tobacco: Never Used   Substance and Sexual Activity    Alcohol use: Yes     Comment: occasionally    Drug use: No    Sexual activity: Not on file   Lifestyle    Physical activity:     Days per week: Not on file     Minutes per session: Not on file    Stress: Not on file   Relationships    Social connections:     Talks on phone: Not on file     Gets together: Not on file     Attends Yazidism service: Not on file     Active member of club or organization: Not on file     Attends meetings of clubs or organizations: Not on file     Relationship status: Not on file    Intimate partner violence:     Fear of current or ex partner: Not on file     Emotionally abused: Not on file     Physically abused: Not on file     Forced sexual activity: Not on file   Other Topics Concern    Not on file   Social History Narrative    Not on file      Family History   Problem Relation Age of Onset    Hypertension Mother     Multiple sclerosis Mother     Diabetes Father      Past Surgical History:   Procedure Laterality Date    ABCESS DRAINAGE      tooth    TN KNEE SCOPE,MED/LAT MENISECTOMY Right 9/13/2018    Procedure: RIGHT KNEE ARTHROSCOPIC PARTIAL MEDIAL MENISCECTOMY;  Surgeon: Laura Smith MD;  Location: [facility];  Service: Orthopedics    WRIST ARTHROPLASTY Right 2017       Current Outpatient Medications:     ADMELOG SOLOSTAR 100 units/mL injection pen, INJECT 4 UNITS 3 TIMES A DAY WITH MEALS, Disp: 5 pen, Rfl: 5    albuterol (PROVENTIL HFA,VENTOLIN HFA) 90 mcg/act inhaler, Inhale 2 puffs every 4 (four) hours as needed for wheezing, Disp: 1 Inhaler, Rfl: 0    aspirin (ECOTRIN LOW STRENGTH) 81 mg EC tablet, Take 1 tablet (81 mg total) by mouth daily, Disp: , Rfl: 0    carvedilol (COREG) 25 mg tablet, Take 1 tablet (25 mg total) by mouth 2 (two) times a day with meals, Disp: 90 tablet, Rfl: 3    D 1000 1000 units capsule, TAKE 1 CAPSULE BY MOUTH EVERY DAY, Disp: 90 capsule, Rfl: 1    doxazosin (CARDURA) 2 mg tablet, Take 2 tablets (4 mg total) by mouth daily at bedtime, Disp: 30 tablet, Rfl: 5    FREESTYLE LITE test strip, TEST 3 TIMES DAILY, Disp: 200 each, Rfl: 5    glucose blood (ACCU-CHEK HARVEY PLUS) test strip, Use as instructed, Disp: 100 each, Rfl: 0    hydrALAZINE (APRESOLINE) 25 mg tablet, Take 2 tablets (50 mg total) by mouth every 8 (eight) hours, Disp: 180 tablet, Rfl: 3    insulin glargine (BASAGLAR KWIKPEN) 100 units/mL injection pen, Inject 24 Units under the skin daily at bedtime, Disp: 5 pen, Rfl: 0    Insulin Pen Needle (INSUPEN PEN NEEDLES) 31G X 5 MM MISC, by Does not apply route 4 (four) times a day, Disp: 100 each, Rfl: 5    NIFEdipine ER (ADALAT CC) 60 MG 24 hr tablet, Take 1 tablet (60 mg total) by mouth 2 (two) times a day, Disp: 90 tablet, Rfl: 3    torsemide (DEMADEX) 100 mg tablet, Take 1 tablet (100 mg total) by mouth 2 (two) times a day, Disp: 60 tablet, Rfl: 0    insulin aspart (NOVOLOG FLEXPEN) 100 Units/mL injection pen, Inject 4 Units under the skin 3 (three) times a day with meals (Patient not taking: Reported on 9/25/2019), Disp: 5 pen, Rfl: 0  No Known Allergies    Imaging: Xr Chest Portable Icu    Result Date: 9/14/2019  Narrative: CHEST INDICATION:   dyspnea  COMPARISON:  5/21/2019 EXAM PERFORMED/VIEWS:  XR CHEST PORTABLE ICU FINDINGS: Cardiomediastinal silhouette appears unremarkable   The lungs are clear  No pneumothorax or pleural effusion  Osseous structures appear within normal limits for patient age  Impression: No acute cardiopulmonary disease  Workstation performed: [id number]       Review of Systems:  Review of Systems   Constitutional: Negative for activity change, appetite change, chills, diaphoresis and fatigue  HENT: Negative  Eyes: Negative  Respiratory: Negative  Cardiovascular: Negative  Endocrine: Negative  Musculoskeletal: Negative  Neurological: Positive for dizziness  Negative for facial asymmetry, light-headedness, numbness and headaches  Physical Exam:  BP (!) 88/54 (BP Location: Left arm, Patient Position: Sitting, Cuff Size: Standard)   Pulse 91   Ht 6' 1" (1 854 m)   Wt 106 kg (234 lb)   SpO2 98%   BMI 30 87 kg/m²   Physical Exam   Constitutional: He appears well-developed and well-nourished  Non-toxic appearance  He does not appear ill  No distress  HENT:   Head: Normocephalic  Mouth/Throat: Oropharynx is clear and moist  No posterior oropharyngeal edema  Eyes: Pupils are equal, round, and reactive to light  Neck: Normal range of motion  No hepatojugular reflux and no JVD present  No thyromegaly present  Cardiovascular: Normal rate and regular rhythm  Exam reveals no friction rub  No murmur heard  Pulmonary/Chest: Effort normal and breath sounds normal  No accessory muscle usage  No tachypnea and no bradypnea  No respiratory distress  He has no decreased breath sounds  He has no wheezes  He has no rhonchi  He has no rales  Abdominal: Soft  He exhibits no distension and no mass  There is no tenderness  There is no guarding  Musculoskeletal: Normal range of motion  Right lower leg: Normal         Left lower leg: Normal    Neurological: He is alert  He is not disoriented  No cranial nerve deficit  Skin: Skin is warm

## 2019-10-09 ENCOUNTER — HOSPITAL ENCOUNTER (OUTPATIENT)
Dept: NON INVASIVE DIAGNOSTICS | Facility: CLINIC | Age: 34
Discharge: HOME | End: 2019-10-09
Payer: COMMERCIAL

## 2019-10-09 ENCOUNTER — TRANSCRIBE ORDERS (OUTPATIENT)
Dept: LAB | Facility: CLINIC | Age: 34
End: 2019-10-09

## 2019-10-09 DIAGNOSIS — I95.9 HYPOTENSION, UNSPECIFIED HYPOTENSION TYPE: ICD-10-CM

## 2019-10-09 DIAGNOSIS — R42 DIZZINESS: ICD-10-CM

## 2019-10-09 PROCEDURE — 93321 DOPPLER ECHO F-UP/LMTD STD: CPT | Performed by: INTERNAL MEDICINE

## 2019-10-09 PROCEDURE — 93308 TTE F-UP OR LMTD: CPT

## 2019-10-09 PROCEDURE — 93325 DOPPLER ECHO COLOR FLOW MAPG: CPT | Performed by: INTERNAL MEDICINE

## 2019-10-09 PROCEDURE — 93308 TTE F-UP OR LMTD: CPT | Performed by: INTERNAL MEDICINE

## 2019-10-10 ENCOUNTER — OFFICE VISIT (OUTPATIENT)
Dept: NEPHROLOGY | Facility: CLINIC | Age: 34
End: 2019-10-10
Payer: COMMERCIAL

## 2019-10-10 VITALS
HEART RATE: 80 BPM | BODY MASS INDEX: 30.48 KG/M2 | HEIGHT: 73 IN | WEIGHT: 230 LBS | SYSTOLIC BLOOD PRESSURE: 158 MMHG | DIASTOLIC BLOOD PRESSURE: 94 MMHG

## 2019-10-10 DIAGNOSIS — N18.5 CKD (CHRONIC KIDNEY DISEASE) STAGE 5, GFR LESS THAN 15 ML/MIN (HCC): Primary | ICD-10-CM

## 2019-10-10 DIAGNOSIS — N18.9 CHRONIC KIDNEY DISEASE, UNSPECIFIED CKD STAGE: ICD-10-CM

## 2019-10-10 DIAGNOSIS — I10 ESSENTIAL HYPERTENSION: ICD-10-CM

## 2019-10-10 DIAGNOSIS — I10 HYPERTENSION, UNSPECIFIED TYPE: ICD-10-CM

## 2019-10-10 PROBLEM — I12.0 BENIGN HYPERTENSION WITH CKD (CHRONIC KIDNEY DISEASE) STAGE V (HCC): Status: RESOLVED | Noted: 2017-10-31 | Resolved: 2019-10-10

## 2019-10-10 PROCEDURE — 99214 OFFICE O/P EST MOD 30 MIN: CPT | Performed by: INTERNAL MEDICINE

## 2019-10-10 RX ORDER — DOXAZOSIN 2 MG/1
4 TABLET ORAL
Qty: 30 TABLET | Refills: 5 | Status: SHIPPED | OUTPATIENT
Start: 2019-10-10 | End: 2020-01-21

## 2019-10-10 NOTE — PATIENT INSTRUCTIONS
You are here for follow-up and I am sorry to hear that you were in the hospital get recently in you had gained about 30 lb of fluid  Today your fluid seems well controlled or not swollen your breathing comfortably  Unfortunately your creatinine is up to 5 1 as you know it is now increased further over the last couple months  This sort of clinical course is not typical of diabetic kidney disease so we discussed doing a kidney biopsy to see if there was some superimposed her other process that was doing this  I cannot guarantee that will find something to necessary treat and I do not think will be able to cure this as I discussed that there was probably scar tissue already in the kidney but we discussed it your interested in knowing this and I think it is a good idea also especially in light of future transplant potential     I will contact the radiologist to ask him about the kidney biopsy and they will get in touch with you to perform it then I will call you and see when the results come in  We will try to do this in the near future

## 2019-10-10 NOTE — LETTER
October 10, 2019     Amado Perez, [address]  [address] [address]    Patient: Tyna Ganser   YOB: 1985   Date of Visit: 10/10/2019       Dear Dr Johnson Martini: Thank you for referring Cathy Andersen to me for evaluation  Below are my notes for this consultation  If you have questions, please do not hesitate to call me  I look forward to following your patient along with you  Sincerely,        Dejuan Tomlin MD        CC: No Recipients  Dejuan Tomlin MD  10/10/2019 12:53 PM  Sign at close encounter  [address] 37 y o  male MRN: [id number]  Unit/Bed#:  Encounter: [id number]  Reason for Consult:  Acute on chronic kidney disease    The patient is here for routine follow-up unfortunately within the last couple months he has been admitted to the hospital with significant volume overload again he was diuresed and his creatinine now is running around 5  He also states he is feeling a little following is headed times in his blood pressure has been really fluctuated widely between low levels and he has Hydralazine stopped and at times now it is running high  ASSESSMENT/PLAN:  1  Renal    Patient is chronic kidney disease and acute renal failure superimposed  We were treating him in feeling this is likely diabetic nephropathy but his rate at which his renal function is declining is unusual for diabetic nephropathy  I discussed this with the patient and informed him that the only way to really know if there was another superimposed process would be to perform a kidney biopsy and he was interested in pursuing that  I did explain to him there was not a very high chance that will find something to treat and certainly we will be able to reverse the process as I suspect will be a lot of scar tissue from the chronic kidney disease  The best we could do is if we found some other cause may be find treatment to help delay the progression    I have made the consult referral to interventional Radiology  Protein estimation is still a little over 2 g so that is really not atypical for diabetic nephropathy  Consult IR for kidney biopsy  Will follow up with patient upon results  Transplant referral at patient's request was made to [facility]  If it when the patient requires dialysis he has been explained about home modalities and he is interested in just doing in center hemodialysis and he does have vein mapping ordered  I told me should just proceed with that as it is scheduled and the need that information and then based on the biopsy results and her next visit will determine if he needs surgery referral at that point  2  Hypertension    As above his blood pressure fluctuates widely it is not ideally controlled but at this point he has had some medications discontinued so will monitor and await renal biopsy  SUBJECTIVE:  Review of Systems   Constitution: Negative for chills and fever  At times I feel little cloudy in the head  HENT: Negative  Eyes: Negative  Cardiovascular: Negative  Negative for chest pain, dyspnea on exertion, leg swelling and orthopnea  Respiratory: Negative  Negative for cough, hemoptysis, shortness of breath and wheezing  Skin: Negative for rash  Gastrointestinal: Negative for abdominal pain, anorexia, diarrhea, nausea and vomiting  Genitourinary: Negative  Negative for dysuria, hematuria, hesitancy and incomplete emptying  Neurological: Negative for dizziness and focal weakness  At times feels his balance is poor  Psychiatric/Behavioral: Negative for altered mental status  OBJECTIVE:  Current Weight: Weight - Scale: 104 kg (230 lb)  [email or web address]:     Blood pressure 158/94, pulse 80, height 6' 1" (1 854 m), weight 104 kg (230 lb)  , Body mass index is 30 34 kg/m²      [unfilled]    Physical Exam: /94 (BP Location: Right arm, Patient Position: Sitting, Cuff Size: Standard)   Pulse 80   Ht 6' 1" (1 854 m)   Wt 104 kg (230 lb)   BMI 30 34 kg/m²    Physical Exam   Constitutional: He is oriented to person, place, and time  No distress  HENT:   Head: Atraumatic  Eyes: No scleral icterus  Neck: Neck supple  No JVD present  Cardiovascular: Normal rate and regular rhythm  Exam reveals no friction rub  No pretibial edema  Pulmonary/Chest: Effort normal and breath sounds normal  No respiratory distress  He has no wheezes  He has no rales  Abdominal: Soft  Bowel sounds are normal  He exhibits no distension  There is no tenderness  Neurological: He is alert and oriented to person, place, and time  No asterixis  Psychiatric: He has a normal mood and affect         Medications:    Current Outpatient Medications:     ADMELOG SOLOSTAR 100 units/mL injection pen, INJECT 4 UNITS 3 TIMES A DAY WITH MEALS, Disp: 5 pen, Rfl: 5    albuterol (PROVENTIL HFA,VENTOLIN HFA) 90 mcg/act inhaler, Inhale 2 puffs every 4 (four) hours as needed for wheezing, Disp: 1 Inhaler, Rfl: 0    aspirin (ECOTRIN LOW STRENGTH) 81 mg EC tablet, Take 1 tablet (81 mg total) by mouth daily, Disp: , Rfl: 0    carvedilol (COREG) 25 mg tablet, Take 1 tablet (25 mg total) by mouth 2 (two) times a day with meals, Disp: 90 tablet, Rfl: 3    D 1000 1000 units capsule, TAKE 1 CAPSULE BY MOUTH EVERY DAY, Disp: 90 capsule, Rfl: 1    doxazosin (CARDURA) 2 mg tablet, Take 2 tablets (4 mg total) by mouth daily at bedtime, Disp: 30 tablet, Rfl: 5    FREESTYLE LITE test strip, TEST 3 TIMES DAILY, Disp: 200 each, Rfl: 5    glucose blood (ACCU-CHEK HARVEY PLUS) test strip, Use as instructed, Disp: 100 each, Rfl: 0    insulin glargine (BASAGLAR KWIKPEN) 100 units/mL injection pen, Inject 24 Units under the skin daily at bedtime, Disp: 5 pen, Rfl: 0    Insulin Pen Needle (INSUPEN PEN NEEDLES) 31G X 5 MM MISC, by Does not apply route 4 (four) times a day, Disp: 100 each, Rfl: 5    NIFEdipine ER (ADALAT CC) 60 MG 24 hr tablet, Take 1 tablet (60 mg total) by mouth 2 (two) times a day, Disp: 90 tablet, Rfl: 3    torsemide (DEMADEX) 100 mg tablet, Take 1 tablet (100 mg total) by mouth 2 (two) times a day, Disp: 60 tablet, Rfl: 0    insulin aspart (NOVOLOG FLEXPEN) 100 Units/mL injection pen, Inject 4 Units under the skin 3 (three) times a day with meals (Patient not taking: Reported on 9/25/2019), Disp: 5 pen, Rfl: 0    Laboratory Results:  Lab Results   Component Value Date    WBC 7 46 10/07/2019    HGB 10 7 (L) 10/07/2019    HCT 33 5 (L) 10/07/2019    MCV 88 10/07/2019     10/07/2019     Lab Results   Component Value Date    SODIUM 141 10/07/2019    K 4 7 10/07/2019     10/07/2019    CO2 26 10/07/2019    BUN 49 (H) 10/07/2019    CREATININE 5 11 (H) 10/07/2019    GLUC 101 09/18/2019    CALCIUM 9 0 10/07/2019     Lab Results   Component Value Date    CALCIUM 9 0 10/07/2019    PHOS 5 2 (H) 10/07/2019     No results found for: LABPROT

## 2019-10-10 NOTE — PROGRESS NOTES
NEPHROLOGY PROGRESS NOTE    Kadi Failing 37 y o  male MRN: [id number]  Unit/Bed#:  Encounter: [id number]  Reason for Consult:  Acute on chronic kidney disease    The patient is here for routine follow-up unfortunately within the last couple months he has been admitted to the hospital with significant volume overload again he was diuresed and his creatinine now is running around 5  He also states he is feeling a little following is headed times in his blood pressure has been really fluctuated widely between low levels and he has Hydralazine stopped and at times now it is running high  ASSESSMENT/PLAN:  1  Renal    Patient is chronic kidney disease and acute renal failure superimposed  We were treating him in feeling this is likely diabetic nephropathy but his rate at which his renal function is declining is unusual for diabetic nephropathy  I discussed this with the patient and informed him that the only way to really know if there was another superimposed process would be to perform a kidney biopsy and he was interested in pursuing that  I did explain to him there was not a very high chance that will find something to treat and certainly we will be able to reverse the process as I suspect will be a lot of scar tissue from the chronic kidney disease  The best we could do is if we found some other cause may be find treatment to help delay the progression  I have made the consult referral to interventional Radiology  Protein estimation is still a little over 2 g so that is really not atypical for diabetic nephropathy  Consult IR for kidney biopsy  Will follow up with patient upon results  Transplant referral at patient's request was made to [facility]  If it when the patient requires dialysis he has been explained about home modalities and he is interested in just doing in center hemodialysis and he does have vein mapping ordered    I told me should just proceed with that as it is scheduled and the need that information and then based on the biopsy results and her next visit will determine if he needs surgery referral at that point  2  Hypertension    As above his blood pressure fluctuates widely it is not ideally controlled but at this point he has had some medications discontinued so will monitor and await renal biopsy  SUBJECTIVE:  Review of Systems   Constitution: Negative for chills and fever  At times I feel little cloudy in the head  HENT: Negative  Eyes: Negative  Cardiovascular: Negative  Negative for chest pain, dyspnea on exertion, leg swelling and orthopnea  Respiratory: Negative  Negative for cough, hemoptysis, shortness of breath and wheezing  Skin: Negative for rash  Gastrointestinal: Negative for abdominal pain, anorexia, diarrhea, nausea and vomiting  Genitourinary: Negative  Negative for dysuria, hematuria, hesitancy and incomplete emptying  Neurological: Negative for dizziness and focal weakness  At times feels his balance is poor  Psychiatric/Behavioral: Negative for altered mental status  OBJECTIVE:  Current Weight: Weight - Scale: 104 kg (230 lb)  [email or web address]:     Blood pressure 158/94, pulse 80, height 6' 1" (1 854 m), weight 104 kg (230 lb)  , Body mass index is 30 34 kg/m²  [unfilled]    Physical Exam: /94 (BP Location: Right arm, Patient Position: Sitting, Cuff Size: Standard)   Pulse 80   Ht 6' 1" (1 854 m)   Wt 104 kg (230 lb)   BMI 30 34 kg/m²   Physical Exam   Constitutional: He is oriented to person, place, and time  No distress  HENT:   Head: Atraumatic  Eyes: No scleral icterus  Neck: Neck supple  No JVD present  Cardiovascular: Normal rate and regular rhythm  Exam reveals no friction rub  No pretibial edema  Pulmonary/Chest: Effort normal and breath sounds normal  No respiratory distress  He has no wheezes  He has no rales  Abdominal: Soft   Bowel sounds are normal  He exhibits no distension  There is no tenderness  Neurological: He is alert and oriented to person, place, and time  No asterixis  Psychiatric: He has a normal mood and affect         Medications:    Current Outpatient Medications:     ADMELOG SOLOSTAR 100 units/mL injection pen, INJECT 4 UNITS 3 TIMES A DAY WITH MEALS, Disp: 5 pen, Rfl: 5    albuterol (PROVENTIL HFA,VENTOLIN HFA) 90 mcg/act inhaler, Inhale 2 puffs every 4 (four) hours as needed for wheezing, Disp: 1 Inhaler, Rfl: 0    aspirin (ECOTRIN LOW STRENGTH) 81 mg EC tablet, Take 1 tablet (81 mg total) by mouth daily, Disp: , Rfl: 0    carvedilol (COREG) 25 mg tablet, Take 1 tablet (25 mg total) by mouth 2 (two) times a day with meals, Disp: 90 tablet, Rfl: 3    D 1000 1000 units capsule, TAKE 1 CAPSULE BY MOUTH EVERY DAY, Disp: 90 capsule, Rfl: 1    doxazosin (CARDURA) 2 mg tablet, Take 2 tablets (4 mg total) by mouth daily at bedtime, Disp: 30 tablet, Rfl: 5    FREESTYLE LITE test strip, TEST 3 TIMES DAILY, Disp: 200 each, Rfl: 5    glucose blood (ACCU-CHEK HARVEY PLUS) test strip, Use as instructed, Disp: 100 each, Rfl: 0    insulin glargine (BASAGLAR KWIKPEN) 100 units/mL injection pen, Inject 24 Units under the skin daily at bedtime, Disp: 5 pen, Rfl: 0    Insulin Pen Needle (INSUPEN PEN NEEDLES) 31G X 5 MM MISC, by Does not apply route 4 (four) times a day, Disp: 100 each, Rfl: 5    NIFEdipine ER (ADALAT CC) 60 MG 24 hr tablet, Take 1 tablet (60 mg total) by mouth 2 (two) times a day, Disp: 90 tablet, Rfl: 3    torsemide (DEMADEX) 100 mg tablet, Take 1 tablet (100 mg total) by mouth 2 (two) times a day, Disp: 60 tablet, Rfl: 0    insulin aspart (NOVOLOG FLEXPEN) 100 Units/mL injection pen, Inject 4 Units under the skin 3 (three) times a day with meals (Patient not taking: Reported on 9/25/2019), Disp: 5 pen, Rfl: 0    Laboratory Results:  Lab Results   Component Value Date    WBC 7 46 10/07/2019    HGB 10 7 (L) 10/07/2019    HCT 33 5 (L) 10/07/2019 MCV 88 10/07/2019     10/07/2019     Lab Results   Component Value Date    SODIUM 141 10/07/2019    K 4 7 10/07/2019     10/07/2019    CO2 26 10/07/2019    BUN 49 (H) 10/07/2019    CREATININE 5 11 (H) 10/07/2019    GLUC 101 09/18/2019    CALCIUM 9 0 10/07/2019     Lab Results   Component Value Date    CALCIUM 9 0 10/07/2019    PHOS 5 2 (H) 10/07/2019     No results found for: LABPROT

## 2019-10-11 ENCOUNTER — TELEPHONE (OUTPATIENT)
Dept: NEPHROLOGY | Facility: CLINIC | Age: 34
End: 2019-10-11

## 2019-10-11 NOTE — RESULT ENCOUNTER NOTE
I called & kai/Jose  He said his b/p is okay  I asked for a number and he gave me 146/87  He said he is still getting lightheadedness and dizziness  He did stop Hydralazine  Any suggestions?

## 2019-10-11 NOTE — TELEPHONE ENCOUNTER
Spoke with the patient and his biopsy is scheduled for 10/24/19 at 10:00 am at [facility] and nurse telephone consult will be on 10/21/19  Patient is aware of biopsy appointment date, time, and location and he is aware nurse telephone call consult  Fax number for [employer] will be [phone]         Nazaroi Almaraz MA

## 2019-10-11 NOTE — TELEPHONE ENCOUNTER
----- Message from Juan Beard MD sent at 10/10/2019 12:47 PM EDT -----  Please call [facility] interventional Radiology and tell them I ordered a kidney biopsy on this outpatient and I would like it done as soon as possible  The patient is aware and will be awaiting their call  He would preferred [facility] but they could  on their schedule after talking to him  Also asked them where we can fax the paperwork and I will fill it out when you tell me where we can do that  Thank you  I did put the order in the system

## 2019-10-24 ENCOUNTER — HOSPITAL ENCOUNTER (OUTPATIENT)
Dept: RADIOLOGY | Facility: HOSPITAL | Age: 34
Discharge: HOME | End: 2019-10-24
Attending: INTERNAL MEDICINE | Admitting: RADIOLOGY
Payer: COMMERCIAL

## 2019-10-24 VITALS
WEIGHT: 225 LBS | RESPIRATION RATE: 16 BRPM | HEIGHT: 73 IN | OXYGEN SATURATION: 95 % | HEART RATE: 77 BPM | BODY MASS INDEX: 29.82 KG/M2 | SYSTOLIC BLOOD PRESSURE: 121 MMHG | DIASTOLIC BLOOD PRESSURE: 63 MMHG

## 2019-10-24 DIAGNOSIS — N18.5 CKD (CHRONIC KIDNEY DISEASE) STAGE 5, GFR LESS THAN 15 ML/MIN (HCC): ICD-10-CM

## 2019-10-24 LAB
ANION GAP SERPL CALCULATED.3IONS-SCNC: 7 MMOL/L (ref 4–13)
BUN SERPL-MCNC: 43 MG/DL (ref 5–25)
CALCIUM SERPL-MCNC: 8.9 MG/DL (ref 8.3–10.1)
CHLORIDE SERPL-SCNC: 112 MMOL/L (ref 100–108)
CO2 SERPL-SCNC: 23 MMOL/L (ref 21–32)
CREAT SERPL-MCNC: 4.99 MG/DL (ref 0.6–1.3)
GFR SERPL CREATININE-BSD FRML MDRD: 16 ML/MIN/1.73SQ M
GLUCOSE P FAST SERPL-MCNC: 107 MG/DL (ref 65–99)
GLUCOSE SERPL-MCNC: 107 MG/DL (ref 65–140)
INR PPP: 1.07 (ref 0.84–1.19)
POTASSIUM SERPL-SCNC: 4.2 MMOL/L (ref 3.5–5.3)
PROTHROMBIN TIME: 13.5 SECONDS (ref 11.6–14.5)
SODIUM SERPL-SCNC: 142 MMOL/L (ref 136–145)

## 2019-10-24 PROCEDURE — 50200 RENAL BIOPSY PERQ: CPT | Performed by: RADIOLOGY

## 2019-10-24 PROCEDURE — 50200 RENAL BIOPSY PERQ: CPT

## 2019-10-24 PROCEDURE — 88300 SURGICAL PATH GROSS: CPT | Performed by: PATHOLOGY

## 2019-10-24 PROCEDURE — 80048 BASIC METABOLIC PNL TOTAL CA: CPT | Performed by: STUDENT IN AN ORGANIZED HEALTH CARE EDUCATION/TRAINING PROGRAM

## 2019-10-24 PROCEDURE — 77012 CT SCAN FOR NEEDLE BIOPSY: CPT

## 2019-10-24 PROCEDURE — 99153 MOD SED SAME PHYS/QHP EA: CPT

## 2019-10-24 PROCEDURE — 99152 MOD SED SAME PHYS/QHP 5/>YRS: CPT

## 2019-10-24 PROCEDURE — 99024 POSTOP FOLLOW-UP VISIT: CPT | Performed by: RADIOLOGY

## 2019-10-24 PROCEDURE — 77012 CT SCAN FOR NEEDLE BIOPSY: CPT | Performed by: RADIOLOGY

## 2019-10-24 PROCEDURE — 85610 PROTHROMBIN TIME: CPT | Performed by: STUDENT IN AN ORGANIZED HEALTH CARE EDUCATION/TRAINING PROGRAM

## 2019-10-24 PROCEDURE — 99152 MOD SED SAME PHYS/QHP 5/>YRS: CPT | Performed by: RADIOLOGY

## 2019-10-24 RX ORDER — FENTANYL CITRATE 50 UG/ML
INJECTION, SOLUTION INTRAMUSCULAR; INTRAVENOUS CODE/TRAUMA/SEDATION MEDICATION
Status: COMPLETED | OUTPATIENT
Start: 2019-10-24 | End: 2019-10-24

## 2019-10-24 RX ORDER — MIDAZOLAM HYDROCHLORIDE 1 MG/ML
INJECTION INTRAMUSCULAR; INTRAVENOUS CODE/TRAUMA/SEDATION MEDICATION
Status: COMPLETED | OUTPATIENT
Start: 2019-10-24 | End: 2019-10-24

## 2019-10-24 RX ORDER — LIDOCAINE WITH 8.4% SOD BICARB 0.9%(10ML)
SYRINGE (ML) INJECTION CODE/TRAUMA/SEDATION MEDICATION
Status: COMPLETED | OUTPATIENT
Start: 2019-10-24 | End: 2019-10-24

## 2019-10-24 RX ADMIN — MIDAZOLAM 1 MG: 1 INJECTION INTRAMUSCULAR; INTRAVENOUS at 13:09

## 2019-10-24 RX ADMIN — FENTANYL CITRATE 50 MCG: 50 INJECTION, SOLUTION INTRAMUSCULAR; INTRAVENOUS at 13:04

## 2019-10-24 RX ADMIN — FENTANYL CITRATE 50 MCG: 50 INJECTION, SOLUTION INTRAMUSCULAR; INTRAVENOUS at 13:09

## 2019-10-24 RX ADMIN — MIDAZOLAM 1 MG: 1 INJECTION INTRAMUSCULAR; INTRAVENOUS at 13:04

## 2019-10-24 RX ADMIN — MIDAZOLAM 1 MG: 1 INJECTION INTRAMUSCULAR; INTRAVENOUS at 13:17

## 2019-10-24 RX ADMIN — LIDOCAINE HYDROCHLORIDE 8 ML: 10 INJECTION, SOLUTION INFILTRATION; PERINEURAL at 13:17

## 2019-10-24 NOTE — SEDATION DOCUMENTATION
Left kidney biopsy done by Dr Maria Luisa Tineo  Patient tolerated well and denies pain  VSS  Per Dr Satish Naik, bedrest for four hours  Report given to [facility] from short stay and patient to return to room

## 2019-10-24 NOTE — DISCHARGE INSTRUCTIONS
Percutaneous Kidney Biopsy   WHAT YOU NEED TO KNOW:   A percutaneous kidney biopsy is a procedure to remove a small sample of kidney tissue  It may also be done to check for kidney disease or cancer  DISCHARGE INSTRUCTIONS:   Follow up with your healthcare provider as directed:  Write down your questions so you remember to ask them during your visits  Wound care: The Band-Aid may be removed in 24 hours  For more information:   · National Kidney and Urologic Diseases Information Clearinghouse  [place] [address] [address]  Phone: [phone]  Web Address: http://kidney  niddk nih gov/   Care after your procedure:    1  Limit your activities for 36 hours after your biopsy  2  No driving day of biopsy  3  Return to your normal diet  Flat sips of flat soda helps with mild nausea  4  Remove band-aid or dressing 24 hours after procedure  Contact Interventional Radiology at [phone]    Leyla PATIENTS: Contact Interventional Radiology at [phone]) Dagmar Flank PATIENTS: Contact Interventional Radiology at [phone]) if:    1  Difficulty breathing, nausea or vomiting  2  You feel weak or dizzy  3  Chills or fever above 101 degrees F  You have persistent nausea or vomiting    4  You have severe pain in your abdomen or where You feel weak or dizzy  your           procedure was done  5  You have blood in your urine  You urinate small amounts or not at all  4  Develop any redness, swelling, heat, unusual drainage, heavy bruising or bleeding     from biopsy site

## 2019-10-24 NOTE — BRIEF OP NOTE (RAD/CATH)
CT NEEDLE BIOPSY KIDNEY  Procedure Note    PATIENT NAME: Wilbur Blackwood  : 1985  MRN: [id number]     Pre-op Diagnosis:   1  CKD (chronic kidney disease) stage 5, GFR less than 15 ml/min (HCC)      Post-op Diagnosis:   1  CKD (chronic kidney disease) stage 5, GFR less than 15 ml/min [facility])        Surgeon:   Raymon Salinas MD    Estimated Blood Loss: none  Findings: Left kidney biopsy with CT guidance  Adequate       Specimens: 3 passes 51E core     Complications:  none    Anesthesia: Conscious sedation    Raymon Salinas MD     Date: 10/24/2019  Time: 1:37 PM

## 2019-10-24 NOTE — PROGRESS NOTES
H and P reviewed  There have been no interval changes  BP (!) 173/99 (BP Location: Right arm)   Pulse 80   Resp 18   Ht 6' 1" (1 854 m)   Wt 102 kg (225 lb)   SpO2 99%   BMI 29 69 kg/m²     Prior imaging was reviewed  Referred for kidney biopsy for progressive renal failure  Procedure explained and questions answered  Informed written consent was obtained      Myra Avery MD  10/24/19  1:00 PM

## 2019-10-28 ENCOUNTER — TELEPHONE (OUTPATIENT)
Dept: NEPHROLOGY | Facility: CLINIC | Age: 34
End: 2019-10-28

## 2019-10-28 NOTE — TELEPHONE ENCOUNTER
Got a message from a Dr Satish Naik that wanted to speak with you regarding an recent kidney biopsy the patient had   If you could please contact him at your earliest convenience his best contact number is [phone]

## 2019-11-04 LAB
LEFT EYE DIABETIC RETINOPATHY: NORMAL
RIGHT EYE DIABETIC RETINOPATHY: NORMAL

## 2019-11-04 PROCEDURE — 2022F DILAT RTA XM EVC RTNOPTHY: CPT | Performed by: FAMILY MEDICINE

## 2019-11-11 LAB
LEFT EYE DIABETIC RETINOPATHY: NORMAL
RIGHT EYE DIABETIC RETINOPATHY: NORMAL

## 2019-11-11 PROCEDURE — 2022F DILAT RTA XM EVC RTNOPTHY: CPT | Performed by: FAMILY MEDICINE

## 2019-11-14 ENCOUNTER — TRANSCRIBE ORDERS (OUTPATIENT)
Dept: LAB | Facility: CLINIC | Age: 34
End: 2019-11-14

## 2019-11-14 ENCOUNTER — APPOINTMENT (OUTPATIENT)
Dept: LAB | Facility: CLINIC | Age: 34
End: 2019-11-14
Payer: COMMERCIAL

## 2019-11-14 ENCOUNTER — OFFICE VISIT (OUTPATIENT)
Dept: NEPHROLOGY | Facility: CLINIC | Age: 34
End: 2019-11-14
Payer: COMMERCIAL

## 2019-11-14 VITALS
HEIGHT: 73 IN | HEART RATE: 80 BPM | DIASTOLIC BLOOD PRESSURE: 72 MMHG | WEIGHT: 230 LBS | BODY MASS INDEX: 30.48 KG/M2 | SYSTOLIC BLOOD PRESSURE: 122 MMHG

## 2019-11-14 DIAGNOSIS — N18.9 CHRONIC KIDNEY DISEASE, UNSPECIFIED CKD STAGE: ICD-10-CM

## 2019-11-14 DIAGNOSIS — N18.5 CKD (CHRONIC KIDNEY DISEASE) STAGE 5, GFR LESS THAN 15 ML/MIN (HCC): ICD-10-CM

## 2019-11-14 DIAGNOSIS — I10 ESSENTIAL HYPERTENSION: Primary | ICD-10-CM

## 2019-11-14 LAB
ANION GAP SERPL CALCULATED.3IONS-SCNC: 9 MMOL/L (ref 4–13)
BASOPHILS # BLD AUTO: 0.04 THOUSANDS/ΜL (ref 0–0.1)
BASOPHILS NFR BLD AUTO: 1 % (ref 0–1)
BUN SERPL-MCNC: 49 MG/DL (ref 5–25)
CALCIUM SERPL-MCNC: 8.3 MG/DL (ref 8.3–10.1)
CHLORIDE SERPL-SCNC: 107 MMOL/L (ref 100–108)
CO2 SERPL-SCNC: 26 MMOL/L (ref 21–32)
CREAT SERPL-MCNC: 4.99 MG/DL (ref 0.6–1.3)
EOSINOPHIL # BLD AUTO: 0.16 THOUSAND/ΜL (ref 0–0.61)
EOSINOPHIL NFR BLD AUTO: 2 % (ref 0–6)
ERYTHROCYTE [DISTWIDTH] IN BLOOD BY AUTOMATED COUNT: 12.3 % (ref 11.6–15.1)
GFR SERPL CREATININE-BSD FRML MDRD: 16 ML/MIN/1.73SQ M
GLUCOSE P FAST SERPL-MCNC: 118 MG/DL (ref 65–99)
HCT VFR BLD AUTO: 30.5 % (ref 36.5–49.3)
HGB BLD-MCNC: 10 G/DL (ref 12–17)
IMM GRANULOCYTES # BLD AUTO: 0.02 THOUSAND/UL (ref 0–0.2)
IMM GRANULOCYTES NFR BLD AUTO: 0 % (ref 0–2)
LYMPHOCYTES # BLD AUTO: 2.68 THOUSANDS/ΜL (ref 0.6–4.47)
LYMPHOCYTES NFR BLD AUTO: 33 % (ref 14–44)
MCH RBC QN AUTO: 28.5 PG (ref 26.8–34.3)
MCHC RBC AUTO-ENTMCNC: 32.8 G/DL (ref 31.4–37.4)
MCV RBC AUTO: 87 FL (ref 82–98)
MONOCYTES # BLD AUTO: 0.51 THOUSAND/ΜL (ref 0.17–1.22)
MONOCYTES NFR BLD AUTO: 6 % (ref 4–12)
NEUTROPHILS # BLD AUTO: 4.68 THOUSANDS/ΜL (ref 1.85–7.62)
NEUTS SEG NFR BLD AUTO: 58 % (ref 43–75)
NRBC BLD AUTO-RTO: 0 /100 WBCS
PLATELET # BLD AUTO: 308 THOUSANDS/UL (ref 149–390)
PMV BLD AUTO: 10.8 FL (ref 8.9–12.7)
POTASSIUM SERPL-SCNC: 4 MMOL/L (ref 3.5–5.3)
RBC # BLD AUTO: 3.51 MILLION/UL (ref 3.88–5.62)
SODIUM SERPL-SCNC: 142 MMOL/L (ref 136–145)
WBC # BLD AUTO: 8.09 THOUSAND/UL (ref 4.31–10.16)

## 2019-11-14 PROCEDURE — 85025 COMPLETE CBC W/AUTO DIFF WBC: CPT

## 2019-11-14 PROCEDURE — 80048 BASIC METABOLIC PNL TOTAL CA: CPT

## 2019-11-14 PROCEDURE — 99214 OFFICE O/P EST MOD 30 MIN: CPT | Performed by: INTERNAL MEDICINE

## 2019-11-14 PROCEDURE — 36415 COLL VENOUS BLD VENIPUNCTURE: CPT

## 2019-11-14 NOTE — PATIENT INSTRUCTIONS
You are here for follow-up  We reviewed your kidney biopsy that it showed advanced diabetic kidney disease  Your last creatinine was little bit lower at 4 9 in you had done today which I do not have available so I will call you with the results  You look great in terms of your body's not swollen your not requiring her diuretic much your blood pressures come down  Your having problems with dizziness and standing we did demonstrate that your blood pressure does drop from close to 030-914 systolic Marie when he stood up  At the present time were going to try and taper down when your medications had hopefully maintaining good blood pressure control  Continue to hold her torsemide unless you feel swollen but your doing great without it  I will call you with your results of the labs  Reduced doxazosin to 2 mg at bed monitor blood pressure and your symptoms of dizziness  If the dizziness persists in the blood pressure remains about the same as it has been you can try and stop the medications see if that helps that you can always call me for any questions or concerns before your visit  Some point we need to consider the AV fistula placement as you have decided on hemodialysis as your modality of choice when the time comes that you need dialysis  Also you have your transplant evaluation appointment in the near future

## 2019-11-14 NOTE — LETTER
November 14, 2019     Mel Marx, [address] [place] [address]    Patient: Sherryle Barns   YOB: 1985   Date of Visit: 11/14/2019       Dear Dr Sally Giron: Thank you for referring Abundio Pederson to me for evaluation  Below are my notes for this consultation  If you have questions, please do not hesitate to call me  I look forward to following your patient along with you  Sincerely,        Junior Huerta MD        CC: No Recipients  Junior Huerta MD  11/14/2019  8:22 AM  Sign at close encounter  [address] 37 y o  male MRN: [id number]  Unit/Bed#:  Encounter: [id number]  Reason for Consult:  Chronic kidney disease    The patient presents for routine follow-up after his renal biopsy to review this  He actually looks really well he has not been requiring his torsemide and is not swollen  He is watching what he eats  Biggest complaint is that he feels when he walks around a lot whole get dizzy at times  These are not related to low blood sugar events  At 1 point on hydralazine he states his blood pressure was very low in this is been discontinued  ASSESSMENT/PLAN:  1  Renal  The patient has chronic kidney disease due to diabetic nephropathy  He underwent a renal biopsy because his renal function was declining had a pretty rapid rate and it revealed advanced diabetic kidney disease with no other superimposed or underlying nephritis  His last creatinine was actually 4 9 which is little bit lower and he has no uremic symptoms  He had lab work today and those are pending I will call with those results  I suspect the creatinine is little lower cause he is not requiring the diuretic which may have made him a little volume depleted  He has decided he will do hemodialysis in center and we discussed at some point in the near future will have a fistula placed just to have this prepared for his end-stage renal disease management    He also has been referred to transplant center and he has an appointment next month  Continue current medications except below as described and hypertension  2  Hypertension  Patient had history of very difficult to control hypertension  Presently he is on carvedilol, nifedipine and doxazosin  He had orthostatic changes today with systolic dropping from 411 to 122 when he stood  He did not have tremendous symptoms during this but he says when he ambulates he gets dizzy  I am going to have him reduce his doxazosin to 2 mg monitor blood pressure  If he still has symptoms and blood pressure remained stable  It and continue to monitor  With better improvement of blood pressure we may see that it is a little easier to control needs less medication or he could have autonomic neuropathy  Will follow  SUBJECTIVE:  Review of Systems   Constitution: Negative for chills, decreased appetite, fever and weight loss  HENT: Negative  Eyes: Negative  Cardiovascular: Negative  Negative for chest pain, dyspnea on exertion, leg swelling, orthopnea, palpitations and syncope  Respiratory: Negative  Negative for cough, shortness of breath and wheezing  Skin: Negative  Gastrointestinal: Negative  Negative for bloating, abdominal pain, anorexia, diarrhea, nausea and vomiting  Genitourinary: Negative for dysuria, hematuria, hesitancy and incomplete emptying  Neurological: Positive for light-headedness  Negative for headaches, seizures and tremors  Dizziness occurs when he is walking around a lot and he specifically said when he is out shopping  Psychiatric/Behavioral: Negative  OBJECTIVE:  Current Weight: Weight - Scale: 104 kg (230 lb)  [email or web address]     Blood pressure 122/72, pulse 80, height 6' 1" (1 854 m), weight 104 kg (230 lb)  , Body mass index is 30 34 kg/m²      [unfilled]    Physical Exam: /72 (BP Location: Right arm, Patient Position: Standing, Cuff Size: Standard) Pulse 80   Ht 6' 1" (1 854 m)   Wt 104 kg (230 lb)   BMI 30 34 kg/m²    Physical Exam   Constitutional: He is oriented to person, place, and time  He appears well-nourished  No distress  HENT:   Head: Atraumatic  Mouth/Throat: No oropharyngeal exudate  Eyes: EOM are normal  Right eye exhibits no discharge  Left eye exhibits no discharge  No scleral icterus  Neck: Normal range of motion  Neck supple  No JVD present  Cardiovascular: Normal rate and regular rhythm  Exam reveals no gallop and no friction rub  No murmur heard  No pretibial edema  Pulmonary/Chest: Effort normal and breath sounds normal  No respiratory distress  He has no wheezes  He has no rales  Abdominal: Soft  Bowel sounds are normal  He exhibits no distension  There is no tenderness  There is no rebound  Neurological: He is alert and oriented to person, place, and time  No asterixis gait normal    Psychiatric: He has a normal mood and affect         Medications:    Current Outpatient Medications:     ADMELOG SOLOSTAR 100 units/mL injection pen, INJECT 4 UNITS 3 TIMES A DAY WITH MEALS, Disp: 5 pen, Rfl: 5    albuterol (PROVENTIL HFA,VENTOLIN HFA) 90 mcg/act inhaler, Inhale 2 puffs every 4 (four) hours as needed for wheezing, Disp: 1 Inhaler, Rfl: 0    aspirin (ECOTRIN LOW STRENGTH) 81 mg EC tablet, Take 1 tablet (81 mg total) by mouth daily, Disp: , Rfl: 0    carvedilol (COREG) 25 mg tablet, Take 1 tablet (25 mg total) by mouth 2 (two) times a day with meals, Disp: 90 tablet, Rfl: 3    D 1000 1000 units capsule, TAKE 1 CAPSULE BY MOUTH EVERY DAY, Disp: 90 capsule, Rfl: 1    doxazosin (CARDURA) 2 mg tablet, Take 2 tablets (4 mg total) by mouth daily at bedtime, Disp: 30 tablet, Rfl: 5    FREESTYLE LITE test strip, TEST 3 TIMES DAILY, Disp: 200 each, Rfl: 5    glucose blood (ACCU-CHEK HARVEY PLUS) test strip, Use as instructed, Disp: 100 each, Rfl: 0    insulin glargine (BASAGLAR KWIKPEN) 100 units/mL injection pen, Inject 24 Units under the skin daily at bedtime, Disp: 5 pen, Rfl: 0    Insulin Pen Needle (INSUPEN PEN NEEDLES) 31G X 5 MM MISC, by Does not apply route 4 (four) times a day, Disp: 100 each, Rfl: 5    NIFEdipine ER (ADALAT CC) 60 MG 24 hr tablet, Take 1 tablet (60 mg total) by mouth 2 (two) times a day, Disp: 90 tablet, Rfl: 3    torsemide (DEMADEX) 100 mg tablet, Take 1 tablet (100 mg total) by mouth 2 (two) times a day, Disp: 60 tablet, Rfl: 0    insulin aspart (NOVOLOG FLEXPEN) 100 Units/mL injection pen, Inject 4 Units under the skin 3 (three) times a day with meals (Patient not taking: Reported on 9/25/2019), Disp: 5 pen, Rfl: 0    Laboratory Results:  Lab Results   Component Value Date    WBC 7 46 10/07/2019    HGB 10 7 (L) 10/07/2019    HCT 33 5 (L) 10/07/2019    MCV 88 10/07/2019     10/07/2019     Lab Results   Component Value Date    SODIUM 142 10/24/2019    K 4 2 10/24/2019     (H) 10/24/2019    CO2 23 10/24/2019    BUN 43 (H) 10/24/2019    CREATININE 4 99 (H) 10/24/2019    GLUC 107 10/24/2019    CALCIUM 8 9 10/24/2019     Lab Results   Component Value Date    CALCIUM 8 9 10/24/2019    PHOS 5 2 (H) 10/07/2019     No results found for: LABPROT

## 2019-11-14 NOTE — PROGRESS NOTES
NEPHROLOGY PROGRESS NOTE    Monica Otero 37 y o  male MRN: [id number]  Unit/Bed#:  Encounter: [id number]  Reason for Consult:  Chronic kidney disease    The patient presents for routine follow-up after his renal biopsy to review this  He actually looks really well he has not been requiring his torsemide and is not swollen  He is watching what he eats  Biggest complaint is that he feels when he walks around a lot whole get dizzy at times  These are not related to low blood sugar events  At 1 point on hydralazine he states his blood pressure was very low in this is been discontinued  ASSESSMENT/PLAN:  1  Renal  The patient has chronic kidney disease due to diabetic nephropathy  He underwent a renal biopsy because his renal function was declining had a pretty rapid rate and it revealed advanced diabetic kidney disease with no other superimposed or underlying nephritis  His last creatinine was actually 4 9 which is little bit lower and he has no uremic symptoms  He had lab work today and those are pending I will call with those results  I suspect the creatinine is little lower cause he is not requiring the diuretic which may have made him a little volume depleted  He has decided he will do hemodialysis in center and we discussed at some point in the near future will have a fistula placed just to have this prepared for his end-stage renal disease management  He also has been referred to transplant center and he has an appointment next month  Continue current medications except below as described and hypertension  2  Hypertension  Patient had history of very difficult to control hypertension  Presently he is on carvedilol, nifedipine and doxazosin  He had orthostatic changes today with systolic dropping from 495 to 122 when he stood  He did not have tremendous symptoms during this but he says when he ambulates he gets dizzy    I am going to have him reduce his doxazosin to 2 mg monitor blood pressure  If he still has symptoms and blood pressure remained stable  It and continue to monitor  With better improvement of blood pressure we may see that it is a little easier to control needs less medication or he could have autonomic neuropathy  Will follow  SUBJECTIVE:  Review of Systems   Constitution: Negative for chills, decreased appetite, fever and weight loss  HENT: Negative  Eyes: Negative  Cardiovascular: Negative  Negative for chest pain, dyspnea on exertion, leg swelling, orthopnea, palpitations and syncope  Respiratory: Negative  Negative for cough, shortness of breath and wheezing  Skin: Negative  Gastrointestinal: Negative  Negative for bloating, abdominal pain, anorexia, diarrhea, nausea and vomiting  Genitourinary: Negative for dysuria, hematuria, hesitancy and incomplete emptying  Neurological: Positive for light-headedness  Negative for headaches, seizures and tremors  Dizziness occurs when he is walking around a lot and he specifically said when he is out shopping  Psychiatric/Behavioral: Negative  OBJECTIVE:  Current Weight: Weight - Scale: 104 kg (230 lb)  [email or web address]:     Blood pressure 122/72, pulse 80, height 6' 1" (1 854 m), weight 104 kg (230 lb)  , Body mass index is 30 34 kg/m²  [unfilled]    Physical Exam: /72 (BP Location: Right arm, Patient Position: Standing, Cuff Size: Standard)   Pulse 80   Ht 6' 1" (1 854 m)   Wt 104 kg (230 lb)   BMI 30 34 kg/m²   Physical Exam   Constitutional: He is oriented to person, place, and time  He appears well-nourished  No distress  HENT:   Head: Atraumatic  Mouth/Throat: No oropharyngeal exudate  Eyes: EOM are normal  Right eye exhibits no discharge  Left eye exhibits no discharge  No scleral icterus  Neck: Normal range of motion  Neck supple  No JVD present  Cardiovascular: Normal rate and regular rhythm  Exam reveals no gallop and no friction rub     No murmur heard   No pretibial edema  Pulmonary/Chest: Effort normal and breath sounds normal  No respiratory distress  He has no wheezes  He has no rales  Abdominal: Soft  Bowel sounds are normal  He exhibits no distension  There is no tenderness  There is no rebound  Neurological: He is alert and oriented to person, place, and time  No asterixis gait normal    Psychiatric: He has a normal mood and affect         Medications:    Current Outpatient Medications:     ADMELOG SOLOSTAR 100 units/mL injection pen, INJECT 4 UNITS 3 TIMES A DAY WITH MEALS, Disp: 5 pen, Rfl: 5    albuterol (PROVENTIL HFA,VENTOLIN HFA) 90 mcg/act inhaler, Inhale 2 puffs every 4 (four) hours as needed for wheezing, Disp: 1 Inhaler, Rfl: 0    aspirin (ECOTRIN LOW STRENGTH) 81 mg EC tablet, Take 1 tablet (81 mg total) by mouth daily, Disp: , Rfl: 0    carvedilol (COREG) 25 mg tablet, Take 1 tablet (25 mg total) by mouth 2 (two) times a day with meals, Disp: 90 tablet, Rfl: 3    D 1000 1000 units capsule, TAKE 1 CAPSULE BY MOUTH EVERY DAY, Disp: 90 capsule, Rfl: 1    doxazosin (CARDURA) 2 mg tablet, Take 2 tablets (4 mg total) by mouth daily at bedtime, Disp: 30 tablet, Rfl: 5    FREESTYLE LITE test strip, TEST 3 TIMES DAILY, Disp: 200 each, Rfl: 5    glucose blood (ACCU-CHEK HARVEY PLUS) test strip, Use as instructed, Disp: 100 each, Rfl: 0    insulin glargine (BASAGLAR KWIKPEN) 100 units/mL injection pen, Inject 24 Units under the skin daily at bedtime, Disp: 5 pen, Rfl: 0    Insulin Pen Needle (INSUPEN PEN NEEDLES) 31G X 5 MM MISC, by Does not apply route 4 (four) times a day, Disp: 100 each, Rfl: 5    NIFEdipine ER (ADALAT CC) 60 MG 24 hr tablet, Take 1 tablet (60 mg total) by mouth 2 (two) times a day, Disp: 90 tablet, Rfl: 3    torsemide (DEMADEX) 100 mg tablet, Take 1 tablet (100 mg total) by mouth 2 (two) times a day, Disp: 60 tablet, Rfl: 0    insulin aspart (NOVOLOG FLEXPEN) 100 Units/mL injection pen, Inject 4 Units under the skin 3 (three) times a day with meals (Patient not taking: Reported on 9/25/2019), Disp: 5 pen, Rfl: 0    Laboratory Results:  Lab Results   Component Value Date    WBC 7 46 10/07/2019    HGB 10 7 (L) 10/07/2019    HCT 33 5 (L) 10/07/2019    MCV 88 10/07/2019     10/07/2019     Lab Results   Component Value Date    SODIUM 142 10/24/2019    K 4 2 10/24/2019     (H) 10/24/2019    CO2 23 10/24/2019    BUN 43 (H) 10/24/2019    CREATININE 4 99 (H) 10/24/2019    GLUC 107 10/24/2019    CALCIUM 8 9 10/24/2019     Lab Results   Component Value Date    CALCIUM 8 9 10/24/2019    PHOS 5 2 (H) 10/07/2019     No results found for: LABPROT

## 2019-12-13 ENCOUNTER — OFFICE VISIT (OUTPATIENT)
Dept: CARDIOLOGY CLINIC | Facility: CLINIC | Age: 34
End: 2019-12-13
Payer: COMMERCIAL

## 2019-12-13 VITALS
SYSTOLIC BLOOD PRESSURE: 110 MMHG | DIASTOLIC BLOOD PRESSURE: 70 MMHG | HEART RATE: 82 BPM | WEIGHT: 229 LBS | HEIGHT: 73 IN | BODY MASS INDEX: 30.35 KG/M2

## 2019-12-13 DIAGNOSIS — E78.5 HYPERLIPIDEMIA, UNSPECIFIED HYPERLIPIDEMIA TYPE: ICD-10-CM

## 2019-12-13 DIAGNOSIS — I10 ESSENTIAL HYPERTENSION: ICD-10-CM

## 2019-12-13 DIAGNOSIS — I31.9 MYOPERICARDITIS: Primary | ICD-10-CM

## 2019-12-13 DIAGNOSIS — N18.9 CHRONIC KIDNEY DISEASE, UNSPECIFIED CKD STAGE: ICD-10-CM

## 2019-12-13 DIAGNOSIS — I95.1 ORTHOSTATIC HYPOTENSION: ICD-10-CM

## 2019-12-13 PROCEDURE — 3078F DIAST BP <80 MM HG: CPT | Performed by: INTERNAL MEDICINE

## 2019-12-13 PROCEDURE — 3074F SYST BP LT 130 MM HG: CPT | Performed by: INTERNAL MEDICINE

## 2019-12-13 PROCEDURE — 3066F NEPHROPATHY DOC TX: CPT | Performed by: INTERNAL MEDICINE

## 2019-12-13 PROCEDURE — 99214 OFFICE O/P EST MOD 30 MIN: CPT | Performed by: INTERNAL MEDICINE

## 2019-12-13 NOTE — PROGRESS NOTES
Cardiology Follow Up    Obdulio Macedo  1985  [id number]  [facility]  [address] [address] [phone]    No diagnosis found  There are no diagnoses linked to this encounter  I had the pleasure of seeing Obdulio Macedo for a follow up visit  INTERVAL HISTORY:  None    History of the presenting illness, Discussion/Summary and My Plan are as follows:::    Crystal Hassan is a very pleasant 40-year-old gentleman with a reported history of hypertension, longstanding type diabetes and chronic kidney disease, who was admitted with acute myopericarditis in May 2019, treated with steroids and colchicine due to his chronic kidney disease  Subsequently had another admission for volume overload and acute kidney injury, currently on torsemide as needed  I had seen him in October 2019 for a follow-up visit and was found to be markedly hypotensive with dizziness  Hydralazine was discontinued  A repeat echo did not demonstrate a pericardial effusion or tamponade  His only complaint today is dizziness, confirmed as orthostasis by Dr Morris  Lowering his doxazosin rates his blood pressures again      Cardiac physical exam is unremarkable  Plan:     Dizziness:  Secondary to orthostasis, in the setting of diabetic neuropathy  Compression stockings were recommended  Needs blood pressure control in the long-term  Midodrine and Florinef are not good options  Counter pressure maneuvers were also discussed      Volume overload/CKD:  Creatinine seems to have stabilized around 5 0  No evidence of volume overload at this time  Currently on torsemide 100 mg to be taken as needed        Diabetes:  Last A1c around 7, would defer this to his primary care physician      Myopericarditis:  Treated in May 2019, steroids were continued till 8/21/2019    No symptoms at this time   had an echo-9/15/2019 and October 2019 without pericardial effusion and with normal LV function  No more symptoms      Dyslipidemia:  Add statin  Will check a CMP in 6 weeks    Follow-up in  six months     Results for Miguel Madrigal (MRN [id number]) as of 12/13/2019 15:11   Ref   Range 5/22/2019 05:59 9/20/2019 15:56   Hemoglobin A1C Latest Ref Range: 6 5   7 3 (A)   Cholesterol Latest Ref Range: 50 - 200 mg/dL 228 (H)    Triglycerides Latest Ref Range: <=150 mg/dL 122    HDL Latest Ref Range: 40 - 60 mg/dL 42    Non-HDL Cholesterol Latest Units: mg/dl 186    LDL Direct Latest Ref Range: 0 - 100 mg/dL 162 (H)      Patient Active Problem List   Diagnosis    Erectile dysfunction    Chronic bilateral thoracic back pain    Type 2 diabetes mellitus with kidney complication, with long-term current use of insulin ([facility])    Hyperlipidemia    De Quervain's disease (radial styloid tenosynovitis)    Bucket-handle tear of medial meniscus of right knee as current injury    Other proteinuria    Cough    NSTEMI (non-ST elevated myocardial infarction) ([facility])    Myopericarditis    NSVT (nonsustained ventricular tachycardia) ([facility])    Class 1 obesity due to excess calories with serious comorbidity and body mass index (BMI) of 31 0 to 31 9 in adult    LUIS on CPAP    Anemia due to chronic kidney disease    Dizziness    Essential hypertension    CKD (chronic kidney disease)     Past Medical History:   Diagnosis Date    Anemia due to chronic kidney disease 9/13/2019    CKD (chronic kidney disease) 2/21/2019    Class 1 obesity due to excess calories with serious comorbidity and body mass index (BMI) of 31 0 to 31 9 in adult 5/25/2019    Diabetes mellitus ([facility]     Essential hypertension 10/31/2017    Hyperlipidemia     [facility]non-ST elevated [facility] 5/21/2019     Social History     Socioeconomic History    Marital status: /Civil Union     Spouse name: Not on file    Number of children: Not on file    Years of education: Not on file    Highest education level: Not on file   Occupational History    Not on file   Social Needs    Financial resource strain: Not on file    Food insecurity:     Worry: Not on file     Inability: Not on file    Transportation needs:     Medical: Not on file     Non-medical: Not on file   Tobacco Use    Smoking status: Never Smoker    Smokeless tobacco: Never Used   Substance and Sexual Activity    Alcohol use: Yes     Comment: occasionally    Drug use: No    Sexual activity: Not on file   Lifestyle    Physical activity:     Days per week: Not on file     Minutes per session: Not on file    Stress: Not on file   Relationships    Social connections:     Talks on phone: Not on file     Gets together: Not on file     Attends Confucianism service: Not on file     Active member of club or organization: Not on file     Attends meetings of clubs or organizations: Not on file     Relationship status: Not on file    Intimate partner violence:     Fear of current or ex partner: Not on file     Emotionally abused: Not on file     Physically abused: Not on file     Forced sexual activity: Not on file   Other Topics Concern    Not on file   Social History Narrative    Not on file      Family History   Problem Relation Age of Onset    Hypertension Mother     Multiple sclerosis Mother     Diabetes Father      Past Surgical History:   Procedure Laterality Date    ABCESS DRAINAGE      tooth    CT NEEDLE BIOPSY KIDNEY  10/24/2019    SC KNEE SCOPE,MED/LAT MENISECTOMY Right 9/13/2018    Procedure: RIGHT KNEE ARTHROSCOPIC PARTIAL MEDIAL MENISCECTOMY;  Surgeon: Ruthie Bumpers, MD;  Location: [facility];  Service: Orthopedics    WRIST ARTHROPLASTY Right 2017       Current Outpatient Medications:     ADMELOG SOLOSTAR 100 units/mL injection pen, INJECT 4 UNITS 3 TIMES A DAY WITH MEALS (Patient taking differently: as needed ), Disp: 5 pen, Rfl: 5    albuterol (PROVENTIL HFA,VENTOLIN HFA) 90 mcg/act inhaler, Inhale 2 puffs every 4 (four) hours as needed for wheezing, Disp: 1 Inhaler, Rfl: 0    aspirin (ECOTRIN LOW STRENGTH) 81 mg EC tablet, Take 1 tablet (81 mg total) by mouth daily, Disp: , Rfl: 0    carvedilol (COREG) 25 mg tablet, Take 1 tablet (25 mg total) by mouth 2 (two) times a day with meals, Disp: 90 tablet, Rfl: 3    doxazosin (CARDURA) 2 mg tablet, Take 2 tablets (4 mg total) by mouth daily at bedtime, Disp: 30 tablet, Rfl: 5    FREESTYLE LITE test strip, TEST 3 TIMES DAILY, Disp: 200 each, Rfl: 5    glucose blood (ACCU-CHEK HARVEY PLUS) test strip, Use as instructed, Disp: 100 each, Rfl: 0    insulin glargine (BASAGLAR KWIKPEN) 100 units/mL injection pen, Inject 24 Units under the skin daily at bedtime (Patient taking differently: Inject 24 Units under the skin as needed ), Disp: 5 pen, Rfl: 0    Insulin Pen Needle (INSUPEN PEN NEEDLES) 31G X 5 MM MISC, by Does not apply route 4 (four) times a day, Disp: 100 each, Rfl: 5    NIFEdipine ER (ADALAT CC) 60 MG 24 hr tablet, Take 1 tablet (60 mg total) by mouth 2 (two) times a day, Disp: 90 tablet, Rfl: 3    torsemide (DEMADEX) 100 mg tablet, Take 1 tablet (100 mg total) by mouth 2 (two) times a day (Patient taking differently: Take 100 mg by mouth as needed ), Disp: 60 tablet, Rfl: 0    D 1000 1000 units capsule, TAKE 1 CAPSULE BY MOUTH EVERY DAY (Patient not taking: Reported on 12/13/2019), Disp: 90 capsule, Rfl: 1    insulin aspart (NOVOLOG FLEXPEN) 100 Units/mL injection pen, Inject 4 Units under the skin 3 (three) times a day with meals (Patient not taking: Reported on 12/13/2019), Disp: 5 pen, Rfl: 0  No Known Allergies    Imaging: No results found  Review of Systems:  Review of Systems   Constitutional: Negative  HENT: Negative  Eyes: Negative  Respiratory: Negative  Cardiovascular: Negative  Endocrine: Negative  Musculoskeletal: Negative  Neurological: Positive for dizziness         Physical Exam:  /70 (BP Location: Left arm, Patient Position: Sitting, Cuff Size: Large)   Pulse 82   Ht 6' 1" (1 854 m)   Wt 104 kg (229 lb)   BMI 30 21 kg/m²   Physical Exam   Constitutional: He appears well-developed and well-nourished  He appears distressed  HENT:   Head: Normocephalic and atraumatic  Eyes: Pupils are equal, round, and reactive to light  Conjunctivae are normal  Right eye exhibits no discharge  Left eye exhibits no discharge  No scleral icterus  Neck: Normal range of motion  No tracheal deviation present  No thyromegaly present  Cardiovascular: Normal rate and regular rhythm  Exam reveals no friction rub  No murmur heard  Pulmonary/Chest: Effort normal  No stridor  He has wheezes  Abdominal: Soft  Bowel sounds are normal  He exhibits no distension  There is no tenderness  Musculoskeletal: Normal range of motion  He exhibits no edema or deformity  Skin: Skin is warm  No rash noted  He is not diaphoretic  No erythema

## 2019-12-13 NOTE — PATIENT INSTRUCTIONS
To avoid dizzy spells the following precautions should be taken to minimize potential triggers and minimize potential harm  Dizziness is usually due to the brain not getting enough blood supply due to a transient lowering of your blood pressure  Try to lie down or at least sit down at the first sign of dizziness  After lying down or sitting down, elevating your legs will further help to restore blood supply to the brain  Drink a glass of ice cold water in the morning prior to waking up from be  (sleep with a flask of ice cold water at night)  Adequate hydration with water (75 ounces) or Gatorade (upto 50 ounces)  Increase salt intake in the diet (chips, pretzels etc)  Avoid caffeinated beverages  The following Counterpressure maneuvers may stop or delay a passing out spell long enough for you to lie down    Leg pumping and let crossing while tensing the leg, abdominal and buttock muscles  Hand gripping like gripping a rubber ball or similar object as hard as possible  Tensing your arms with clenched fists  Arm Tensing: gripping one hand with the other simultaneously moving arms away from the body  Elevating the head end of the bed at night - 1 brick under each of the head end bed posts might help

## 2019-12-21 DIAGNOSIS — I10 HYPERTENSION, UNSPECIFIED TYPE: ICD-10-CM

## 2019-12-23 RX ORDER — DOXAZOSIN 2 MG/1
2 TABLET ORAL
Qty: 30 TABLET | Refills: 5 | Status: SHIPPED | OUTPATIENT
Start: 2019-12-23 | End: 2020-02-04

## 2020-01-17 ENCOUNTER — TRANSCRIBE ORDERS (OUTPATIENT)
Dept: LAB | Facility: CLINIC | Age: 35
End: 2020-01-17

## 2020-01-17 ENCOUNTER — APPOINTMENT (OUTPATIENT)
Dept: LAB | Facility: CLINIC | Age: 35
End: 2020-01-17
Payer: COMMERCIAL

## 2020-01-17 DIAGNOSIS — N18.9 CHRONIC KIDNEY DISEASE, UNSPECIFIED CKD STAGE: ICD-10-CM

## 2020-01-17 LAB
ANION GAP SERPL CALCULATED.3IONS-SCNC: 9 MMOL/L (ref 4–13)
BASOPHILS # BLD AUTO: 0.03 THOUSANDS/ΜL (ref 0–0.1)
BASOPHILS NFR BLD AUTO: 0 % (ref 0–1)
BUN SERPL-MCNC: 63 MG/DL (ref 5–25)
CALCIUM SERPL-MCNC: 8.1 MG/DL (ref 8.3–10.1)
CHLORIDE SERPL-SCNC: 109 MMOL/L (ref 100–108)
CO2 SERPL-SCNC: 23 MMOL/L (ref 21–32)
CREAT SERPL-MCNC: 7.77 MG/DL (ref 0.6–1.3)
EOSINOPHIL # BLD AUTO: 0.17 THOUSAND/ΜL (ref 0–0.61)
EOSINOPHIL NFR BLD AUTO: 2 % (ref 0–6)
ERYTHROCYTE [DISTWIDTH] IN BLOOD BY AUTOMATED COUNT: 13.3 % (ref 11.6–15.1)
GFR SERPL CREATININE-BSD FRML MDRD: 9 ML/MIN/1.73SQ M
GLUCOSE P FAST SERPL-MCNC: 94 MG/DL (ref 65–99)
HCT VFR BLD AUTO: 27.8 % (ref 36.5–49.3)
HGB BLD-MCNC: 9 G/DL (ref 12–17)
IMM GRANULOCYTES # BLD AUTO: 0.02 THOUSAND/UL (ref 0–0.2)
IMM GRANULOCYTES NFR BLD AUTO: 0 % (ref 0–2)
LYMPHOCYTES # BLD AUTO: 2.86 THOUSANDS/ΜL (ref 0.6–4.47)
LYMPHOCYTES NFR BLD AUTO: 40 % (ref 14–44)
MCH RBC QN AUTO: 28.6 PG (ref 26.8–34.3)
MCHC RBC AUTO-ENTMCNC: 32.4 G/DL (ref 31.4–37.4)
MCV RBC AUTO: 88 FL (ref 82–98)
MONOCYTES # BLD AUTO: 0.35 THOUSAND/ΜL (ref 0.17–1.22)
MONOCYTES NFR BLD AUTO: 5 % (ref 4–12)
NEUTROPHILS # BLD AUTO: 3.72 THOUSANDS/ΜL (ref 1.85–7.62)
NEUTS SEG NFR BLD AUTO: 53 % (ref 43–75)
NRBC BLD AUTO-RTO: 0 /100 WBCS
PLATELET # BLD AUTO: 293 THOUSANDS/UL (ref 149–390)
PMV BLD AUTO: 10.1 FL (ref 8.9–12.7)
POTASSIUM SERPL-SCNC: 4.6 MMOL/L (ref 3.5–5.3)
RBC # BLD AUTO: 3.15 MILLION/UL (ref 3.88–5.62)
SODIUM SERPL-SCNC: 141 MMOL/L (ref 136–145)
WBC # BLD AUTO: 7.15 THOUSAND/UL (ref 4.31–10.16)

## 2020-01-17 PROCEDURE — 80048 BASIC METABOLIC PNL TOTAL CA: CPT

## 2020-01-17 PROCEDURE — 85025 COMPLETE CBC W/AUTO DIFF WBC: CPT

## 2020-01-17 PROCEDURE — 36415 COLL VENOUS BLD VENIPUNCTURE: CPT

## 2020-01-17 PROCEDURE — 3066F NEPHROPATHY DOC TX: CPT | Performed by: FAMILY MEDICINE

## 2020-01-17 PROCEDURE — 3066F NEPHROPATHY DOC TX: CPT | Performed by: SURGERY

## 2020-01-21 ENCOUNTER — OFFICE VISIT (OUTPATIENT)
Dept: NEPHROLOGY | Facility: CLINIC | Age: 35
End: 2020-01-21
Payer: COMMERCIAL

## 2020-01-21 VITALS
BODY MASS INDEX: 31.01 KG/M2 | WEIGHT: 234 LBS | HEART RATE: 80 BPM | HEIGHT: 73 IN | SYSTOLIC BLOOD PRESSURE: 156 MMHG | DIASTOLIC BLOOD PRESSURE: 84 MMHG

## 2020-01-21 DIAGNOSIS — N18.5 ANEMIA DUE TO STAGE 5 CHRONIC KIDNEY DISEASE, NOT ON CHRONIC DIALYSIS (HCC): ICD-10-CM

## 2020-01-21 DIAGNOSIS — I10 HYPERTENSION, UNSPECIFIED TYPE: ICD-10-CM

## 2020-01-21 DIAGNOSIS — N18.5 CKD (CHRONIC KIDNEY DISEASE) STAGE 5, GFR LESS THAN 15 ML/MIN (HCC): Primary | ICD-10-CM

## 2020-01-21 DIAGNOSIS — R80.8 OTHER PROTEINURIA: Primary | ICD-10-CM

## 2020-01-21 DIAGNOSIS — N18.5 CKD STAGE 5 DUE TO TYPE 2 DIABETES MELLITUS (HCC): ICD-10-CM

## 2020-01-21 DIAGNOSIS — D63.1 ANEMIA DUE TO STAGE 5 CHRONIC KIDNEY DISEASE, NOT ON CHRONIC DIALYSIS (HCC): ICD-10-CM

## 2020-01-21 DIAGNOSIS — E11.22 CKD STAGE 5 DUE TO TYPE 2 DIABETES MELLITUS (HCC): ICD-10-CM

## 2020-01-21 PROBLEM — N18.9 CKD (CHRONIC KIDNEY DISEASE): Status: RESOLVED | Noted: 2019-11-14 | Resolved: 2020-01-21

## 2020-01-21 PROCEDURE — 99213 OFFICE O/P EST LOW 20 MIN: CPT | Performed by: INTERNAL MEDICINE

## 2020-01-21 RX ORDER — DOXAZOSIN MESYLATE 4 MG/1
4 TABLET ORAL
Qty: 30 TABLET | Refills: 5 | Status: SHIPPED | OUTPATIENT
Start: 2020-01-21 | End: 2020-07-16

## 2020-01-21 NOTE — LETTER
January 21, 2020     [place], [address]  [address] [address]    Patient: Monica Otero   YOB: 1985   Date of Visit: 1/21/2020       Dear Dr Palma Cantu: Thank you for referring Tracy Samples to me for evaluation  Below are my notes for this consultation  If you have questions, please do not hesitate to call me  I look forward to following your patient along with you  Sincerely,        Vega Sawyer MD        CC: No Recipients  Vega Sawyer MD  1/21/2020  9:16 AM  Sign at close encounter  [address] 37 y o  male MRN: [id number]  Unit/Bed#:  Encounter: [id number]  Reason for Consult:  Chronic kidney disease stage 5    The patient is here for routine follow-up since he has been feeling okay  Reports that he is eating okay with no vomiting  At times will have an active stomach  He has informed me that he has been involved with kidney transplant evaluation and is getting some potential living donors as well  No changes in medications  ASSESSMENT/PLAN:  1  Renal    Patient has chronic kidney disease stage 5 related to diabetic nephropathy  Creatinine had been running around 5 and over the last couple months it has now increased to 7  His volume status and electrolytes are acceptable knee does not have any severe symptoms of uremia  He does have a little cold intolerance and isn't sleeping well but other than that he is eating and his weight is been maintained  The issue is whether not the creatinine is up due to subtle pre renal azotemia but he does tell me is not taking his diuretic all the time  It is possible and likely it is due to progression of his underlying kidney disease  Patient has decided he wants hemodialysis some going to contact vascular surgery to have an AV fistula range    I will see him monthly and hopefully will be able to allow the fistula to mature once it is placed before initiating dialysis when he developed symptoms  He has mild anemia related end-stage renal disease  Hemoglobin is around 9  No severe symptoms so will hold on [facility] referral      Continue with transplant evaluation and I will see the patient in a month and told to call if there is any problems between visits  SUBJECTIVE:  Review of Systems   Constitution: Negative for chills, decreased appetite, fever and malaise/fatigue  HENT: Negative  Eyes: Negative  Cardiovascular: Positive for leg swelling  Negative for chest pain, dyspnea on exertion and orthopnea  Leg swelling is down  Respiratory: Negative  Negative for cough, shortness of breath, sputum production and wheezing  Skin: Negative  Negative for color change, flushing, itching and rash  Gastrointestinal: Negative for bloating, abdominal pain, diarrhea, nausea and vomiting  Occasional heartburn  Genitourinary: Negative for bladder incontinence, dysuria, hematuria and incomplete emptying  Neurological: Negative  Negative for difficulty with concentration, dizziness, focal weakness and headaches  Psychiatric/Behavioral: Negative  Negative for altered mental status  OBJECTIVE:  Current Weight: Weight - Scale: 106 kg (234 lb)  [email or web address]:     Blood pressure 156/84, pulse 80, height 6' 1" (1 854 m), weight 106 kg (234 lb)  , Body mass index is 30 87 kg/m²  @[facility]@    Physical Exam: /84 (BP Location: Right arm, Patient Position: Sitting, Cuff Size: Standard)   Pulse 80   Ht 6' 1" (1 854 m)   Wt 106 kg (234 lb)   BMI 30 87 kg/m²    Physical Exam   Constitutional: He is oriented to person, place, and time  He appears well-nourished  No distress  HENT:   Head: Normocephalic and atraumatic  Mouth/Throat: Oropharynx is clear and moist    Eyes: Conjunctivae and EOM are normal  No scleral icterus  Neck: Neck supple  No JVD present  Cardiovascular: Normal rate and regular rhythm  Exam reveals no gallop and no friction rub  No pericardial rub mild lower extremity edema  Pulmonary/Chest: Effort normal and breath sounds normal  No respiratory distress  He has no wheezes  He has no rales  Abdominal: Soft  Bowel sounds are normal  He exhibits no distension  There is no tenderness  There is no rebound  Neurological: He is alert and oriented to person, place, and time  No asterixis  Skin: He is not diaphoretic  Psychiatric: He has a normal mood and affect         Medications:    Current Outpatient Medications:     ADMELOG SOLOSTAR 100 units/mL injection pen, INJECT 4 UNITS 3 TIMES A DAY WITH MEALS (Patient taking differently: as needed ), Disp: 5 pen, Rfl: 5    albuterol (PROVENTIL HFA,VENTOLIN HFA) 90 mcg/act inhaler, Inhale 2 puffs every 4 (four) hours as needed for wheezing, Disp: 1 Inhaler, Rfl: 0    aspirin (ECOTRIN LOW STRENGTH) 81 mg EC tablet, Take 1 tablet (81 mg total) by mouth daily, Disp: , Rfl: 0    carvedilol (COREG) 25 mg tablet, Take 1 tablet (25 mg total) by mouth 2 (two) times a day with meals, Disp: 90 tablet, Rfl: 3    doxazosin (CARDURA) 4 mg tablet, Take 1 tablet (4 mg total) by mouth daily at bedtime, Disp: 30 tablet, Rfl: 5    FREESTYLE LITE test strip, TEST 3 TIMES DAILY, Disp: 200 each, Rfl: 5    glucose blood (ACCU-CHEK HARVEY PLUS) test strip, Use as instructed, Disp: 100 each, Rfl: 0    insulin glargine (BASAGLAR KWIKPEN) 100 units/mL injection pen, Inject 24 Units under the skin daily at bedtime (Patient taking differently: Inject 24 Units under the skin as needed ), Disp: 5 pen, Rfl: 0    Insulin Pen Needle (INSUPEN PEN NEEDLES) 31G X 5 MM MISC, by Does not apply route 4 (four) times a day, Disp: 100 each, Rfl: 5    NIFEdipine ER (ADALAT CC) 60 MG 24 hr tablet, Take 1 tablet (60 mg total) by mouth 2 (two) times a day, Disp: 90 tablet, Rfl: 3    torsemide (DEMADEX) 100 mg tablet, Take 1 tablet (100 mg total) by mouth 2 (two) times a day (Patient taking differently: Take 100 mg by mouth as needed ), Disp: 60 tablet, Rfl: 0    D 1000 1000 units capsule, TAKE 1 CAPSULE BY MOUTH EVERY DAY (Patient not taking: Reported on 12/13/2019), Disp: 90 capsule, Rfl: 1    doxazosin (CARDURA) 2 mg tablet, TAKE 1 TABLET (2 MG TOTAL) BY MOUTH DAILY AT BEDTIME (Patient not taking: Reported on 1/21/2020), Disp: 30 tablet, Rfl: 5    insulin aspart (NOVOLOG FLEXPEN) 100 Units/mL injection pen, Inject 4 Units under the skin 3 (three) times a day with meals (Patient not taking: Reported on 12/13/2019), Disp: 5 pen, Rfl: 0    Laboratory Results:  Lab Results   Component Value Date    WBC 7 15 01/17/2020    HGB 9 0 (L) 01/17/2020    HCT 27 8 (L) 01/17/2020    MCV 88 01/17/2020     01/17/2020     Lab Results   Component Value Date    SODIUM 141 01/17/2020    K 4 6 01/17/2020     (H) 01/17/2020    CO2 23 01/17/2020    BUN 63 (H) 01/17/2020    CREATININE 7 77 (H) 01/17/2020    GLUC 107 10/24/2019    CALCIUM 8 1 (L) 01/17/2020     Lab Results   Component Value Date    CALCIUM 8 1 (L) 01/17/2020    PHOS 5 2 (H) 10/07/2019     No results found for: LABPROT

## 2020-01-21 NOTE — PROGRESS NOTES
I personally called the vascular Center and ask them to schedule an appointment with 1 of their surgeons and have a fistula placed as soon as possible  They informed me they will contact the patient to set up the appointment

## 2020-01-21 NOTE — PATIENT INSTRUCTIONS
You are here for routine follow-up it sounds like you feeling pretty well although occasionally some upset stomach  I told you that your creatinine now had gone up to 7 and had been running around 5  It does not seem like you have severe symptoms of this so at this point were going to have the vascular surgeon contact you to set up visit to have a fistula placed  You did tell me that your involved with the transplant so you are head of the game in getting involved with that you have good insight into the plan  Please call if there is any concerns before you next visit  Labs and follow-up in 1 month

## 2020-01-21 NOTE — PROGRESS NOTES
NEPHROLOGY PROGRESS NOTE    Erich Cerna 37 y o  male MRN: [id number]  Unit/Bed#:  Encounter: [id number]  Reason for Consult:  Chronic kidney disease stage 5    The patient is here for routine follow-up since he has been feeling okay  Reports that he is eating okay with no vomiting  At times will have an active stomach  He has informed me that he has been involved with kidney transplant evaluation and is getting some potential living donors as well  No changes in medications  ASSESSMENT/PLAN:  1  Renal    Patient has chronic kidney disease stage 5 related to diabetic nephropathy  Creatinine had been running around 5 and over the last couple months it has now increased to 7  His volume status and electrolytes are acceptable knee does not have any severe symptoms of uremia  He does have a little cold intolerance and isn't sleeping well but other than that he is eating and his weight is been maintained  The issue is whether not the creatinine is up due to subtle pre renal azotemia but he does tell me is not taking his diuretic all the time  It is possible and likely it is due to progression of his underlying kidney disease  Patient has decided he wants hemodialysis some going to contact vascular surgery to have an AV fistula range  I will see him monthly and hopefully will be able to allow the fistula to mature once it is placed before initiating dialysis when he developed symptoms  He has mild anemia related end-stage renal disease  Hemoglobin is around 9  No severe symptoms so will hold on [facility] referral      Continue with transplant evaluation and I will see the patient in a month and told to call if there is any problems between visits  SUBJECTIVE:  Review of Systems   Constitution: Negative for chills, decreased appetite, fever and malaise/fatigue  HENT: Negative  Eyes: Negative  Cardiovascular: Positive for leg swelling  Negative for chest pain, dyspnea on exertion and orthopnea  Leg swelling is down  Respiratory: Negative  Negative for cough, shortness of breath, sputum production and wheezing  Skin: Negative  Negative for color change, flushing, itching and rash  Gastrointestinal: Negative for bloating, abdominal pain, diarrhea, nausea and vomiting  Occasional heartburn  Genitourinary: Negative for bladder incontinence, dysuria, hematuria and incomplete emptying  Neurological: Negative  Negative for difficulty with concentration, dizziness, focal weakness and headaches  Psychiatric/Behavioral: Negative  Negative for altered mental status  OBJECTIVE:  Current Weight: Weight - Scale: 106 kg (234 lb)  [email or web address]:     Blood pressure 156/84, pulse 80, height 6' 1" (1 854 m), weight 106 kg (234 lb)  , Body mass index is 30 87 kg/m²  [unfilled]    Physical Exam: /84 (BP Location: Right arm, Patient Position: Sitting, Cuff Size: Standard)   Pulse 80   Ht 6' 1" (1 854 m)   Wt 106 kg (234 lb)   BMI 30 87 kg/m²   Physical Exam   Constitutional: He is oriented to person, place, and time  He appears well-nourished  No distress  HENT:   Head: Normocephalic and atraumatic  Mouth/Throat: Oropharynx is clear and moist    Eyes: Conjunctivae and EOM are normal  No scleral icterus  Neck: Neck supple  No JVD present  Cardiovascular: Normal rate and regular rhythm  Exam reveals no gallop and no friction rub  No pericardial rub mild lower extremity edema  Pulmonary/Chest: Effort normal and breath sounds normal  No respiratory distress  He has no wheezes  He has no rales  Abdominal: Soft  Bowel sounds are normal  He exhibits no distension  There is no tenderness  There is no rebound  Neurological: He is alert and oriented to person, place, and time  No asterixis  Skin: He is not diaphoretic  Psychiatric: He has a normal mood and affect         Medications:    Current Outpatient Medications:     ADMELOG SOLOSTAR 100 units/mL injection pen, INJECT 4 UNITS 3 TIMES A DAY WITH MEALS (Patient taking differently: as needed ), Disp: 5 pen, Rfl: 5    albuterol (PROVENTIL HFA,VENTOLIN HFA) 90 mcg/act inhaler, Inhale 2 puffs every 4 (four) hours as needed for wheezing, Disp: 1 Inhaler, Rfl: 0    aspirin (ECOTRIN LOW STRENGTH) 81 mg EC tablet, Take 1 tablet (81 mg total) by mouth daily, Disp: , Rfl: 0    carvedilol (COREG) 25 mg tablet, Take 1 tablet (25 mg total) by mouth 2 (two) times a day with meals, Disp: 90 tablet, Rfl: 3    doxazosin (CARDURA) 4 mg tablet, Take 1 tablet (4 mg total) by mouth daily at bedtime, Disp: 30 tablet, Rfl: 5    FREESTYLE LITE test strip, TEST 3 TIMES DAILY, Disp: 200 each, Rfl: 5    glucose blood (ACCU-CHEK HARVEY PLUS) test strip, Use as instructed, Disp: 100 each, Rfl: 0    insulin glargine (BASAGLAR KWIKPEN) 100 units/mL injection pen, Inject 24 Units under the skin daily at bedtime (Patient taking differently: Inject 24 Units under the skin as needed ), Disp: 5 pen, Rfl: 0    Insulin Pen Needle (INSUPEN PEN NEEDLES) 31G X 5 MM MISC, by Does not apply route 4 (four) times a day, Disp: 100 each, Rfl: 5    NIFEdipine ER (ADALAT CC) 60 MG 24 hr tablet, Take 1 tablet (60 mg total) by mouth 2 (two) times a day, Disp: 90 tablet, Rfl: 3    torsemide (DEMADEX) 100 mg tablet, Take 1 tablet (100 mg total) by mouth 2 (two) times a day (Patient taking differently: Take 100 mg by mouth as needed ), Disp: 60 tablet, Rfl: 0    D 1000 1000 units capsule, TAKE 1 CAPSULE BY MOUTH EVERY DAY (Patient not taking: Reported on 12/13/2019), Disp: 90 capsule, Rfl: 1    doxazosin (CARDURA) 2 mg tablet, TAKE 1 TABLET (2 MG TOTAL) BY MOUTH DAILY AT BEDTIME (Patient not taking: Reported on 1/21/2020), Disp: 30 tablet, Rfl: 5    insulin aspart (NOVOLOG FLEXPEN) 100 Units/mL injection pen, Inject 4 Units under the skin 3 (three) times a day with meals (Patient not taking: Reported on 12/13/2019), Disp: 5 pen, Rfl: 0    Laboratory Results:  Lab Results   Component Value Date    WBC 7 15 01/17/2020    HGB 9 0 (L) 01/17/2020    HCT 27 8 (L) 01/17/2020    MCV 88 01/17/2020     01/17/2020     Lab Results   Component Value Date    SODIUM 141 01/17/2020    K 4 6 01/17/2020     (H) 01/17/2020    CO2 23 01/17/2020    BUN 63 (H) 01/17/2020    CREATININE 7 77 (H) 01/17/2020    GLUC 107 10/24/2019    CALCIUM 8 1 (L) 01/17/2020     Lab Results   Component Value Date    CALCIUM 8 1 (L) 01/17/2020    PHOS 5 2 (H) 10/07/2019     No results found for: LABPROT

## 2020-01-24 ENCOUNTER — HOSPITAL ENCOUNTER (OUTPATIENT)
Dept: NON INVASIVE DIAGNOSTICS | Facility: CLINIC | Age: 35
Discharge: HOME | End: 2020-01-24
Payer: COMMERCIAL

## 2020-01-24 ENCOUNTER — CONSULT (OUTPATIENT)
Dept: VASCULAR SURGERY | Facility: CLINIC | Age: 35
End: 2020-01-24
Payer: COMMERCIAL

## 2020-01-24 VITALS
HEART RATE: 86 BPM | HEIGHT: 73 IN | WEIGHT: 235 LBS | DIASTOLIC BLOOD PRESSURE: 82 MMHG | BODY MASS INDEX: 31.14 KG/M2 | SYSTOLIC BLOOD PRESSURE: 144 MMHG | TEMPERATURE: 97.5 F

## 2020-01-24 DIAGNOSIS — E11.22 CKD STAGE 5 DUE TO TYPE 2 DIABETES MELLITUS (HCC): ICD-10-CM

## 2020-01-24 DIAGNOSIS — N18.5 CKD (CHRONIC KIDNEY DISEASE) STAGE 5, GFR LESS THAN 15 ML/MIN (HCC): Primary | ICD-10-CM

## 2020-01-24 DIAGNOSIS — N18.5 CKD (CHRONIC KIDNEY DISEASE) STAGE 5, GFR LESS THAN 15 ML/MIN (HCC): ICD-10-CM

## 2020-01-24 DIAGNOSIS — N18.5 CKD STAGE 5 DUE TO TYPE 2 DIABETES MELLITUS (HCC): ICD-10-CM

## 2020-01-24 PROCEDURE — 99243 OFF/OP CNSLTJ NEW/EST LOW 30: CPT | Performed by: SURGERY

## 2020-01-24 PROCEDURE — 93985 DUP-SCAN HEMO COMPL BI STD: CPT | Performed by: SURGERY

## 2020-01-24 PROCEDURE — 93985 DUP-SCAN HEMO COMPL BI STD: CPT

## 2020-01-24 RX ORDER — CEFAZOLIN SODIUM 2 G/50ML
2000 SOLUTION INTRAVENOUS ONCE
Status: CANCELLED | OUTPATIENT
Start: 2020-01-24 | End: 2020-01-24

## 2020-01-24 RX ORDER — CHLORHEXIDINE GLUCONATE 0.12 MG/ML
15 RINSE ORAL ONCE
Status: CANCELLED | OUTPATIENT
Start: 2020-01-24 | End: 2020-01-24

## 2020-01-24 RX ORDER — SPIRONOLACTONE 50 MG/1
TABLET, FILM COATED ORAL
COMMUNITY
Start: 2020-01-22 | End: 2020-02-04

## 2020-01-24 NOTE — PATIENT INSTRUCTIONS
CKD stage 5 due to type 2 diabetes mellitus (HCC)  Chronic kidney disease stage V in setting of DMII  Referred to evaluate for fistula creation  He is right handed  No baseline paresthesias or motor/sensory deficits  Palpable brachial, radial and ulnar pulses bilaterally  Vein mapping reveals adequate cephalic vein along the entire length of the left arm with a significant number of branches  Brachial and radial artery appear adequate size as well  Will plan to proceed with left upper extremity arteriovenous fistula vs  Graft (likely left radiocephalic vs  Brachiocephalic AVF)  All risks and benefits of the procedure were discussed with the patient and informed consent was obtained  Discussed possibility he would require additional procedures to assist with maturation as well in the setting of numerous cephalic vein branches  Advised to avoid any peripheral IV access or needle sticks in the left upper extremity

## 2020-01-24 NOTE — LETTER
January 24, 2020     Enio Yancey, [address]  [address] [address]    Patient: Ny King   YOB: 1985   Date of Visit: 1/24/2020       Dear Dr Alfredo Ozuna: Thank you for referring Rich Manuel to me for evaluation  Below are the relevant portions of my assessment and plan of care  Diagnoses and all orders for this visit:    CKD stage 5 due to type 2 diabetes mellitus ([facility] [address] )  Chronic kidney disease stage V in setting of DMII  Referred to evaluate for fistula creation  He is right handed  No baseline paresthesias or motor/sensory deficits  Palpable brachial, radial and ulnar pulses bilaterally  Vein mapping reveals adequate cephalic vein along the entire length of the left arm with a significant number of branches  Brachial and radial artery appear adequate size as well  Will plan to proceed with left upper extremity arteriovenous fistula vs  Graft (likely left radiocephalic vs  Brachiocephalic AVF)  All risks and benefits of the procedure were discussed with the patient and informed consent was obtained  Discussed possibility he would require additional procedures to assist with maturation as well in the setting of numerous cephalic vein branches  Advised to avoid any peripheral IV access or needle sticks in the left upper extremity  If you have questions, please do not hesitate to call me  I look forward to following [facility] along with you           Sincerely,        Mauro Hager MD        CC: Ramiro De La Cruz MD

## 2020-01-24 NOTE — PROGRESS NOTES
Assessment/Plan:    CKD stage 5 due to type 2 diabetes mellitus (HCC)  Chronic kidney disease stage V in setting of DMII  Referred to evaluate for fistula creation  He is right handed  No baseline paresthesias or motor/sensory deficits  Palpable brachial, radial and ulnar pulses bilaterally  Vein mapping reveals adequate cephalic vein along the entire length of the left arm with a significant number of branches  Brachial and radial artery appear adequate size as well  Will plan to proceed with left upper extremity arteriovenous fistula vs  Graft (likely left radiocephalic vs  Brachiocephalic AVF)  All risks and benefits of the procedure were discussed with the patient and informed consent was obtained  Discussed possibility he would require additional procedures to assist with maturation as well in the setting of numerous cephalic vein branches  Advised to avoid any peripheral IV access or needle sticks in the left upper extremity  Diagnoses and all orders for this visit:    CKD (chronic kidney disease) stage 5, GFR less than 15 ml/min ([facility])  -     Ambulatory referral to Vascular Surgery  -     Case request operating room: CREATION FISTULA  VERSUS GRAFT ARTERIOVENOUS (AV) LEFT; Standing  -     HEMOGLOBIN A1C W/ EAG ESTIMATION; Future  -     Basic metabolic panel; Future  -     CBC and Platelet; Future  -     Protime-INR; Future  -     Type and screen; Future  -     EKG 12 lead; Future  -     Case request operating room: CREATION FISTULA  VERSUS GRAFT ARTERIOVENOUS (AV) LEFT    CKD stage 5 due to type 2 diabetes mellitus ([facility] [address] )    Other orders  -     spironolactone (ALDACTONE) 50 mg tablet  -     Diet NPO; Sips with meds; Standing  -     Void on call to OR; Standing  -     Insert peripheral IV;  Standing  -     Nursing Communication Use 2 CHG cloths, have the patient wash his/her surgical site or have staff wash the site if patient is unable to; Standing  -     Nursing Communication Swab both nares with Povidone-Iodine solution, EXCLUDE if patient has shellfish/Iodine allergy; Standing  -     chlorhexidine (PERIDEX) 0 12 % oral rinse 15 mL  -     Place sequential compression device; Standing  -     ceFAZolin (ANCEF) IVPB (premix) 2,000 mg        Operative Scheduling Information:    Hospital[facility]  [facility]    Physician:  Shila Rivero    Surgery: Left upper extremity arteriovenous fistula vs  graft    Urgency:  Standard    Case Length:  Normal    Post-op Bed:  Outpatient    OR Table:  Standard    Equipment Needs:  None    Medication Instructions:  None    Hydration:  No    I have spent 30 minutes with Patient  today in which greater than 50% of this time was spent in counseling/coordination of care regarding Intructions for management, Importance of tx compliance and Impressions  Subjective:      Patient ID: Kwasi Manuel is a 37 y o  male  Patient is new to our practice and was referred by Dr Joe Donaldson for HD access  Pt had a HD vein mapping on 1/24/20  Pt is right hand dominate  Pt denies any pain, numbness, tingling, or coldness in either arm or hands  Pt had right wrist sx around 2015  Pt denies any long term use of IV products  Pt is not currently on HD  HPI  Mr Keira Pruitt is a 42yo male with DMII, HLD, HTN, CKD stage V, and obesity who presents for evaluation for arteriovenous fistula creation  He is right handed  He has had no history of previous access creation, injury or trauma to the left arm  He had previous right wrist surgery  He has no paresthesias or motor/sensory deficits in his upper extremities  He denies significant cardiac or pulmonary history  He had vein mapping performed today which reveals adequate caliber basilic and cephalic veins bilaterally as well as adequate sized arteries  He has not started dialysis yet and has never required placement of dialysis catheters  He states his nephrologist anticipates needing to start dialysis between 6 months to a year      The following portions of the patient's history were reviewed and updated as appropriate: allergies, current medications, past family history, past medical history, past social history, past surgical history and problem list     I have reviewed and made appropriate changes to the review of systems input by the medical assistant      Vitals:    01/24/20 1512   BP: 144/82   BP Location: Right arm   Patient Position: Sitting   Pulse: 86   Temp: 97 5 °F (36 4 °C)   Weight: 107 kg (235 lb)   Height: 6' 1" (1 854 m)       Patient Active Problem List   Diagnosis    Erectile dysfunction    Chronic bilateral thoracic back pain    Type 2 diabetes mellitus with kidney complication, with long-term current use of insulin ([facility])    Hyperlipidemia    De Quervain's disease (radial styloid tenosynovitis)    Bucket-handle tear of medial meniscus of right knee as current injury    Other proteinuria    Cough    NSTEMI (non-ST elevated myocardial infarction) ([facility])    Myopericarditis    NSVT (nonsustained ventricular tachycardia) ([facility])    Class 1 obesity due to excess calories with serious comorbidity and body mass index (BMI) of 31 0 to 31 9 in adult    LUIS on CPAP    Anemia due to chronic kidney disease    Dizziness    Essential hypertension    CKD stage 5 due to type 2 diabetes mellitus ([facility] [address]       Past Surgical History:   Procedure Laterality Date    ABCESS DRAINAGE      tooth    CT NEEDLE BIOPSY KIDNEY  10/24/2019    TN KNEE SCOPE,MED/LAT MENISECTOMY Right 9/13/2018    Procedure: RIGHT KNEE ARTHROSCOPIC PARTIAL MEDIAL MENISCECTOMY;  Surgeon: Emeterio Isaacs MD;  Location: [facility]  MAIN [facility];  Service: Orthopedics    WRIST ARTHROPLASTY Right 2017       Family History   Problem Relation Age of Onset    Hypertension Mother     Multiple sclerosis Mother     Diabetes Father        Social History     Socioeconomic History    Marital status: /Civil Union     Spouse name: Not on file    Number of children: Not on file    Years of education: Not on file    Highest education level: Not on file   Occupational History    Not on file   Social Needs    Financial resource strain: Not on file    Food insecurity:     Worry: Not on file     Inability: Not on file    Transportation needs:     Medical: Not on file     Non-medical: Not on file   Tobacco Use    Smoking status: Never Smoker    Smokeless tobacco: Never Used   Substance and Sexual Activity    Alcohol use: Yes     Comment: occasionally    Drug use: No    Sexual activity: Not on file   Lifestyle    Physical activity:     Days per week: Not on file     Minutes per session: Not on file    Stress: Not on file   Relationships    Social connections:     Talks on phone: Not on file     Gets together: Not on file     Attends Buddhist service: Not on file     Active member of club or organization: Not on file     Attends meetings of clubs or organizations: Not on file     Relationship status: Not on file    Intimate partner violence:     Fear of current or ex partner: Not on file     Emotionally abused: Not on file     Physically abused: Not on file     Forced sexual activity: Not on file   Other Topics Concern    Not on file   Social History Narrative    Not on file       No Known Allergies      Current Outpatient Medications:     ADMELOG SOLOSTAR 100 units/mL injection pen, INJECT 4 UNITS 3 TIMES A DAY WITH MEALS (Patient taking differently: as needed ), Disp: 5 pen, Rfl: 5    albuterol (PROVENTIL HFA,VENTOLIN HFA) 90 mcg/act inhaler, Inhale 2 puffs every 4 (four) hours as needed for wheezing, Disp: 1 Inhaler, Rfl: 0    aspirin (ECOTRIN LOW STRENGTH) 81 mg EC tablet, Take 1 tablet (81 mg total) by mouth daily, Disp: , Rfl: 0    carvedilol (COREG) 25 mg tablet, Take 1 tablet (25 mg total) by mouth 2 (two) times a day with meals, Disp: 90 tablet, Rfl: 3    D 1000 1000 units capsule, TAKE 1 CAPSULE BY MOUTH EVERY DAY, Disp: 90 capsule, Rfl: 1    doxazosin (CARDURA) 2 mg tablet, TAKE 1 TABLET (2 MG TOTAL) BY MOUTH DAILY AT BEDTIME, Disp: 30 tablet, Rfl: 5    doxazosin (CARDURA) 4 mg tablet, Take 1 tablet (4 mg total) by mouth daily at bedtime, Disp: 30 tablet, Rfl: 5    FREESTYLE LITE test strip, TEST 3 TIMES DAILY, Disp: 200 each, Rfl: 5    glucose blood (ACCU-CHEK HARVEY PLUS) test strip, Use as instructed, Disp: 100 each, Rfl: 0    insulin aspart (NOVOLOG FLEXPEN) 100 Units/mL injection pen, Inject 4 Units under the skin 3 (three) times a day with meals, Disp: 5 pen, Rfl: 0    insulin glargine (BASAGLAR KWIKPEN) 100 units/mL injection pen, Inject 24 Units under the skin daily at bedtime (Patient taking differently: Inject 24 Units under the skin as needed ), Disp: 5 pen, Rfl: 0    Insulin Pen Needle (INSUPEN PEN NEEDLES) 31G X 5 MM MISC, by Does not apply route 4 (four) times a day, Disp: 100 each, Rfl: 5    NIFEdipine ER (ADALAT CC) 60 MG 24 hr tablet, Take 1 tablet (60 mg total) by mouth 2 (two) times a day, Disp: 90 tablet, Rfl: 3    spironolactone (ALDACTONE) 50 mg tablet, , Disp: , Rfl:     torsemide (DEMADEX) 100 mg tablet, Take 1 tablet (100 mg total) by mouth 2 (two) times a day (Patient taking differently: Take 100 mg by mouth as needed ), Disp: 60 tablet, Rfl: 0    Review of Systems   Constitutional: Positive for chills  HENT: Positive for sinus pressure  Eyes: Negative  Respiratory: Negative  Cardiovascular: Negative  Gastrointestinal: Positive for nausea  Negative for constipation, diarrhea and vomiting  Endocrine: Positive for cold intolerance  Genitourinary: Negative  Musculoskeletal: Positive for myalgias  Skin: Negative  Allergic/Immunologic: Negative  Neurological: Positive for dizziness and numbness (feet)  Hematological: Negative  Psychiatric/Behavioral: Negative  I have personally reviewed the ROS entered by MA and agree as documented      Objective:      /82 (BP Location: Right arm, Patient Position: Sitting)   Pulse 86   Temp 97 5 °F (36 4 °C)   Ht 6' 1" (1 854 m)   Wt 107 kg (235 lb)   BMI 31 00 kg/m²          Physical Exam   Constitutional: He is oriented to person, place, and time  He appears well-developed and well-nourished  HENT:   Head: Normocephalic and atraumatic  Eyes: EOM are normal    Neck: Normal range of motion  Neck supple  Cardiovascular: Normal rate and regular rhythm  Pulses:       Radial pulses are 2+ on the right side, and 2+ on the left side  Pulmonary/Chest: Effort normal    Abdominal: Soft  Musculoskeletal: Normal range of motion  Neurological: He is alert and oriented to person, place, and time  Skin: Skin is warm and dry  Capillary refill takes less than 2 seconds  Psychiatric: He has a normal mood and affect  His behavior is normal  Judgment and thought content normal    Nursing note and vitals reviewed

## 2020-01-24 NOTE — ASSESSMENT & PLAN NOTE
Chronic kidney disease stage V in setting of DMII  Referred to evaluate for fistula creation  He is right handed  No baseline paresthesias or motor/sensory deficits  Palpable brachial, radial and ulnar pulses bilaterally  Vein mapping reveals adequate cephalic vein along the entire length of the left arm with a significant number of branches  Brachial and radial artery appear adequate size as well  Will plan to proceed with left upper extremity arteriovenous fistula vs  Graft (likely left radiocephalic vs  Brachiocephalic AVF)  All risks and benefits of the procedure were discussed with the patient and informed consent was obtained  Discussed possibility he would require additional procedures to assist with maturation as well in the setting of numerous cephalic vein branches  Advised to avoid any peripheral IV access or needle sticks in the left upper extremity

## 2020-01-30 ENCOUNTER — TELEPHONE (OUTPATIENT)
Dept: VASCULAR SURGERY | Facility: CLINIC | Age: 35
End: 2020-01-30

## 2020-01-30 NOTE — TELEPHONE ENCOUNTER
Spoke to patient to schedule his surgery for 2-11-20 at [facility] with Dr Jeovany Kahn Patient was told nothing to eat or drink after midnight Patient was told to go and have blood work done and a EKG done before the surgery Patient co;nfirmed and understood the instructions LLF

## 2020-02-03 LAB
LEFT EYE DIABETIC RETINOPATHY: NORMAL
RIGHT EYE DIABETIC RETINOPATHY: NORMAL

## 2020-02-03 PROCEDURE — 2022F DILAT RTA XM EVC RTNOPTHY: CPT | Performed by: FAMILY MEDICINE

## 2020-02-03 PROCEDURE — 2022F DILAT RTA XM EVC RTNOPTHY: CPT | Performed by: SURGERY

## 2020-02-04 ENCOUNTER — OFFICE VISIT (OUTPATIENT)
Dept: FAMILY MEDICINE CLINIC | Facility: CLINIC | Age: 35
End: 2020-02-04

## 2020-02-04 VITALS
SYSTOLIC BLOOD PRESSURE: 152 MMHG | DIASTOLIC BLOOD PRESSURE: 80 MMHG | WEIGHT: 235.6 LBS | BODY MASS INDEX: 31.23 KG/M2 | HEIGHT: 73 IN | HEART RATE: 98 BPM | TEMPERATURE: 97.5 F | RESPIRATION RATE: 18 BRPM

## 2020-02-04 DIAGNOSIS — B34.9 VIRAL ILLNESS: Primary | ICD-10-CM

## 2020-02-04 PROCEDURE — 3079F DIAST BP 80-89 MM HG: CPT | Performed by: FAMILY MEDICINE

## 2020-02-04 PROCEDURE — 99214 OFFICE O/P EST MOD 30 MIN: CPT | Performed by: FAMILY MEDICINE

## 2020-02-04 PROCEDURE — 3008F BODY MASS INDEX DOCD: CPT | Performed by: FAMILY MEDICINE

## 2020-02-04 PROCEDURE — 3066F NEPHROPATHY DOC TX: CPT | Performed by: FAMILY MEDICINE

## 2020-02-04 PROCEDURE — 3077F SYST BP >= 140 MM HG: CPT | Performed by: FAMILY MEDICINE

## 2020-02-04 PROCEDURE — 2026F EYE IMG VALID EVC RTNOPTHY: CPT | Performed by: FAMILY MEDICINE

## 2020-02-04 PROCEDURE — 1036F TOBACCO NON-USER: CPT | Performed by: FAMILY MEDICINE

## 2020-02-04 PROCEDURE — 3008F BODY MASS INDEX DOCD: CPT | Performed by: SURGERY

## 2020-02-04 RX ORDER — AZITHROMYCIN 250 MG/1
TABLET, FILM COATED ORAL
Qty: 6 TABLET | Refills: 0 | Status: SHIPPED | OUTPATIENT
Start: 2020-02-04 | End: 2020-02-15 | Stop reason: HOSPADM

## 2020-02-04 NOTE — PROGRESS NOTES
Assessment/Plan:    Viral illness  I believe his symptoms  are likely due to viral illness  he has remained afebrile so far  On physical exam his lungs are clear and he is moving good air  His RR is 18 and pulse ox is 100%  He has 10 days of symptoms and he does not feel like he is getting better  Will try Z-pack to see if it helps  Did tell him it is not garunteed to work but it may provide some relief  He may  Take tylenol for pain relief for myalgias/sore throat  I reccommended tea with honey as well and cough drops  I did caution OTC cough suppressants due to his history of HTN  F/U as needed  I reccommended ED or calling the office if he feels he is getting sicker in the next few days  Problem List Items Addressed This Visit        Other    Viral illness - Primary     I believe his symptoms  are likely due to viral illness  he has remained afebrile so far  On physical exam his lungs are clear and he is moving good air  His RR is 18 and pulse ox is 100%  He has 10 days of symptoms and he does not feel like he is getting better  Will try Z-pack to see if it helps  Did tell him it is not garunteed to work but it may provide some relief  He may  Take tylenol for pain relief for myalgias/sore throat  I reccommended tea with honey as well and cough drops  I did caution OTC cough suppressants due to his history of HTN  F/U as needed  I reccommended ED or calling the office if he feels he is getting sicker in the next few days  Relevant Medications    azithromycin (ZITHROMAX) 250 mg tablet            Subjective:      Patient ID: Johana Porras is a 37 y o  male  Agustiny Sandhoff is a 37year old  who presents with cough, sore throat, congestion, headache , chills and myalgias for about 10 days now  He denies any fever or SOB but he does mention some chest heaviness  He has a significant hx for CKD stage 5  And is currently being evaluated  for transplant but he is not on HD   He has taken coricidin and some tylenol which have helped a little but he does not feel like he is getting better  He denies any chest pain  He does have some leg swelling which is normal for him but he denies any orthopnea  his cough is productive of brownish yellow sputum  Sore Throat    This is a new problem  Associated symptoms include congestion, headaches and a hoarse voice  Pertinent negatives include no abdominal pain, coughing, diarrhea, ear discharge, ear pain, shortness of breath, swollen glands, trouble swallowing or vomiting  Cough   Associated symptoms include chills, headaches, nasal congestion and a sore throat  Pertinent negatives include no chest pain, ear congestion, ear pain, fever, hemoptysis, postnasal drip, rash, shortness of breath, sweats or wheezing  The following portions of the patient's history were reviewed and updated as appropriate: allergies, current medications, past family history, past medical history, past social history, past surgical history and problem list     Review of Systems   Constitutional: Positive for chills  Negative for fever  HENT: Positive for congestion, hoarse voice and sore throat  Negative for ear discharge, ear pain, postnasal drip and trouble swallowing  Eyes: Negative for visual disturbance  Respiratory: Negative for cough, hemoptysis, shortness of breath and wheezing  Cardiovascular: Negative for chest pain and palpitations  Gastrointestinal: Negative for abdominal pain, diarrhea and vomiting  Skin: Negative for rash  Neurological: Positive for headaches  Negative for dizziness and syncope  All other systems reviewed and are negative  Objective:      /80 (BP Location: Left arm, Patient Position: Sitting, Cuff Size: Large)   Pulse 98   Temp 97 5 °F (36 4 °C) (Tympanic)   Resp 18   Ht 6' 1" (1 854 m)   Wt 107 kg (235 lb 9 6 oz)   BMI 31 08 kg/m²          Physical Exam   Constitutional:   Moderately sick  Not his normal cheerful self      HENT: Mouth/Throat: Oropharynx is clear and moist  No oropharyngeal exudate  Neck: Normal range of motion  Neck supple  Cardiovascular: Normal rate, regular rhythm and normal heart sounds  Pulmonary/Chest: Effort normal and breath sounds normal  No respiratory distress  He has no wheezes  He has no rales  Musculoskeletal: He exhibits edema (1+)  Lymphadenopathy:     He has no cervical adenopathy  Nursing note and vitals reviewed

## 2020-02-05 NOTE — ASSESSMENT & PLAN NOTE
I believe his symptoms  are likely due to viral illness  he has remained afebrile so far  On physical exam his lungs are clear and he is moving good air  His RR is 18 and pulse ox is 100%  He has 10 days of symptoms and he does not feel like he is getting better  Will try Z-pack to see if it helps  Did tell him it is not garunteed to work but it may provide some relief  He may  Take tylenol for pain relief for myalgias/sore throat  I reccommended tea with honey as well and cough drops  I did caution OTC cough suppressants due to his history of HTN  F/U as needed  I reccommended ED or calling the office if he feels he is getting sicker in the next few days

## 2020-02-06 ENCOUNTER — HOSPITAL ENCOUNTER (OUTPATIENT)
Facility: HOSPITAL | Age: 35
Setting detail: OUTPATIENT SURGERY
End: 2020-02-06
Attending: SURGERY | Admitting: SURGERY
Payer: COMMERCIAL

## 2020-02-06 DIAGNOSIS — N18.5 CKD (CHRONIC KIDNEY DISEASE) STAGE 5, GFR LESS THAN 15 ML/MIN (HCC): ICD-10-CM

## 2020-02-06 NOTE — TELEPHONE ENCOUNTER
Spoke to patient to let him know that his surgery is now on 2-13-20 at \[facility]\""/OR with Dr Bernie House  Ask patient when he will ne going to have his blood work done and EKG  he said tomorrow 2-7-20   [facility]

## 2020-02-10 ENCOUNTER — APPOINTMENT (OUTPATIENT)
Dept: LAB | Facility: CLINIC | Age: 35
DRG: 951 | End: 2020-02-10
Payer: COMMERCIAL

## 2020-02-10 ENCOUNTER — LAB (OUTPATIENT)
Dept: LAB | Facility: CLINIC | Age: 35
DRG: 951 | End: 2020-02-10
Payer: COMMERCIAL

## 2020-02-10 ENCOUNTER — APPOINTMENT (EMERGENCY)
Dept: RADIOLOGY | Facility: HOSPITAL | Age: 35
DRG: 951 | End: 2020-02-10
Payer: COMMERCIAL

## 2020-02-10 ENCOUNTER — TRANSCRIBE ORDERS (OUTPATIENT)
Dept: LAB | Facility: CLINIC | Age: 35
End: 2020-02-10

## 2020-02-10 ENCOUNTER — HOSPITAL ENCOUNTER (INPATIENT)
Facility: HOSPITAL | Age: 35
LOS: 5 days | Discharge: HOME | DRG: 951 | End: 2020-02-15
Attending: EMERGENCY MEDICINE | Admitting: FAMILY MEDICINE
Payer: COMMERCIAL

## 2020-02-10 DIAGNOSIS — Z01.818 OTHER SPECIFIED PRE-OPERATIVE EXAMINATION: Primary | ICD-10-CM

## 2020-02-10 DIAGNOSIS — E83.39 HYPERPHOSPHATEMIA: ICD-10-CM

## 2020-02-10 DIAGNOSIS — N18.5 ACUTE RENAL FAILURE SUPERIMPOSED ON STAGE 5 CHRONIC KIDNEY DISEASE, NOT ON CHRONIC DIALYSIS, UNSPECIFIED ACUTE RENAL FAILURE TYPE (HCC): ICD-10-CM

## 2020-02-10 DIAGNOSIS — E87.70 HYPERVOLEMIA ASSOCIATED WITH RENAL INSUFFICIENCY: Chronic | ICD-10-CM

## 2020-02-10 DIAGNOSIS — N18.5 CKD (CHRONIC KIDNEY DISEASE), STAGE V (HCC): ICD-10-CM

## 2020-02-10 DIAGNOSIS — Z99.2 PERITONEAL DIALYSIS CATHETER IN PLACE (HCC): ICD-10-CM

## 2020-02-10 DIAGNOSIS — N28.9 HYPERVOLEMIA ASSOCIATED WITH RENAL INSUFFICIENCY: Chronic | ICD-10-CM

## 2020-02-10 DIAGNOSIS — R06.02 SOB (SHORTNESS OF BREATH): Primary | ICD-10-CM

## 2020-02-10 DIAGNOSIS — Z01.818 OTHER SPECIFIED PRE-OPERATIVE EXAMINATION: ICD-10-CM

## 2020-02-10 DIAGNOSIS — N18.5 CKD (CHRONIC KIDNEY DISEASE) STAGE 5, GFR LESS THAN 15 ML/MIN (HCC): ICD-10-CM

## 2020-02-10 DIAGNOSIS — N17.9 ACUTE RENAL FAILURE SUPERIMPOSED ON STAGE 5 CHRONIC KIDNEY DISEASE, NOT ON CHRONIC DIALYSIS, UNSPECIFIED ACUTE RENAL FAILURE TYPE (HCC): ICD-10-CM

## 2020-02-10 LAB
ABO GROUP BLD: NORMAL
ANION GAP SERPL CALCULATED.3IONS-SCNC: 14 MMOL/L (ref 4–13)
ANION GAP SERPL CALCULATED.3IONS-SCNC: 15 MMOL/L (ref 4–13)
BASOPHILS # BLD AUTO: 0.03 THOUSANDS/ΜL (ref 0–0.1)
BASOPHILS NFR BLD AUTO: 0 % (ref 0–1)
BLD GP AB SCN SERPL QL: NEGATIVE
BUN SERPL-MCNC: 81 MG/DL (ref 5–25)
BUN SERPL-MCNC: 83 MG/DL (ref 5–25)
CALCIUM SERPL-MCNC: 8.7 MG/DL (ref 8.3–10.1)
CALCIUM SERPL-MCNC: 8.7 MG/DL (ref 8.3–10.1)
CHLORIDE SERPL-SCNC: 108 MMOL/L (ref 100–108)
CHLORIDE SERPL-SCNC: 108 MMOL/L (ref 100–108)
CO2 SERPL-SCNC: 18 MMOL/L (ref 21–32)
CO2 SERPL-SCNC: 19 MMOL/L (ref 21–32)
CREAT SERPL-MCNC: 11 MG/DL (ref 0.6–1.3)
CREAT SERPL-MCNC: 11.22 MG/DL (ref 0.6–1.3)
EOSINOPHIL # BLD AUTO: 0.29 THOUSAND/ΜL (ref 0–0.61)
EOSINOPHIL NFR BLD AUTO: 4 % (ref 0–6)
ERYTHROCYTE [DISTWIDTH] IN BLOOD BY AUTOMATED COUNT: 13.4 % (ref 11.6–15.1)
ERYTHROCYTE [DISTWIDTH] IN BLOOD BY AUTOMATED COUNT: 13.5 % (ref 11.6–15.1)
EST. AVERAGE GLUCOSE BLD GHB EST-MCNC: 105 MG/DL
GFR SERPL CREATININE-BSD FRML MDRD: 6 ML/MIN/1.73SQ M
GFR SERPL CREATININE-BSD FRML MDRD: 6 ML/MIN/1.73SQ M
GLUCOSE SERPL-MCNC: 132 MG/DL (ref 65–140)
GLUCOSE SERPL-MCNC: 88 MG/DL (ref 65–140)
GLUCOSE SERPL-MCNC: 96 MG/DL (ref 65–140)
HBA1C MFR BLD: 5.3 % (ref 4.2–6.3)
HCT VFR BLD AUTO: 24.2 % (ref 36.5–49.3)
HCT VFR BLD AUTO: 24.8 % (ref 36.5–49.3)
HGB BLD-MCNC: 7.8 G/DL (ref 12–17)
HGB BLD-MCNC: 8.1 G/DL (ref 12–17)
IMM GRANULOCYTES # BLD AUTO: 0.05 THOUSAND/UL (ref 0–0.2)
IMM GRANULOCYTES NFR BLD AUTO: 1 % (ref 0–2)
INR PPP: 1.04 (ref 0.84–1.19)
LYMPHOCYTES # BLD AUTO: 2.35 THOUSANDS/ΜL (ref 0.6–4.47)
LYMPHOCYTES NFR BLD AUTO: 29 % (ref 14–44)
MAGNESIUM SERPL-MCNC: 1.8 MG/DL (ref 1.6–2.6)
MCH RBC QN AUTO: 28.9 PG (ref 26.8–34.3)
MCH RBC QN AUTO: 29 PG (ref 26.8–34.3)
MCHC RBC AUTO-ENTMCNC: 32.2 G/DL (ref 31.4–37.4)
MCHC RBC AUTO-ENTMCNC: 32.7 G/DL (ref 31.4–37.4)
MCV RBC AUTO: 89 FL (ref 82–98)
MCV RBC AUTO: 90 FL (ref 82–98)
MONOCYTES # BLD AUTO: 0.63 THOUSAND/ΜL (ref 0.17–1.22)
MONOCYTES NFR BLD AUTO: 8 % (ref 4–12)
NEUTROPHILS # BLD AUTO: 4.9 THOUSANDS/ΜL (ref 1.85–7.62)
NEUTS SEG NFR BLD AUTO: 58 % (ref 43–75)
NRBC BLD AUTO-RTO: 0 /100 WBCS
PHOSPHATE SERPL-MCNC: 5.5 MG/DL (ref 2.7–4.5)
PLATELET # BLD AUTO: 339 THOUSANDS/UL (ref 149–390)
PLATELET # BLD AUTO: 372 THOUSANDS/UL (ref 149–390)
PMV BLD AUTO: 10.3 FL (ref 8.9–12.7)
PMV BLD AUTO: 10.4 FL (ref 8.9–12.7)
POTASSIUM SERPL-SCNC: 5.2 MMOL/L (ref 3.5–5.3)
POTASSIUM SERPL-SCNC: 5.3 MMOL/L (ref 3.5–5.3)
PROTHROMBIN TIME: 13 SECONDS (ref 11.6–14.5)
RBC # BLD AUTO: 2.7 MILLION/UL (ref 3.88–5.62)
RBC # BLD AUTO: 2.79 MILLION/UL (ref 3.88–5.62)
RH BLD: POSITIVE
SODIUM SERPL-SCNC: 141 MMOL/L (ref 136–145)
SODIUM SERPL-SCNC: 141 MMOL/L (ref 136–145)
SPECIMEN EXPIRATION DATE: NORMAL
WBC # BLD AUTO: 8.25 THOUSAND/UL (ref 4.31–10.16)
WBC # BLD AUTO: 8.62 THOUSAND/UL (ref 4.31–10.16)

## 2020-02-10 PROCEDURE — 85027 COMPLETE CBC AUTOMATED: CPT

## 2020-02-10 PROCEDURE — 3044F HG A1C LEVEL LT 7.0%: CPT | Performed by: FAMILY MEDICINE

## 2020-02-10 PROCEDURE — 86900 BLOOD TYPING SEROLOGIC ABO: CPT

## 2020-02-10 PROCEDURE — 84100 ASSAY OF PHOSPHORUS: CPT | Performed by: NURSE PRACTITIONER

## 2020-02-10 PROCEDURE — 83036 HEMOGLOBIN GLYCOSYLATED A1C: CPT

## 2020-02-10 PROCEDURE — 83735 ASSAY OF MAGNESIUM: CPT | Performed by: NURSE PRACTITIONER

## 2020-02-10 PROCEDURE — 3044F HG A1C LEVEL LT 7.0%: CPT | Performed by: SURGERY

## 2020-02-10 PROCEDURE — 93005 ELECTROCARDIOGRAM TRACING: CPT

## 2020-02-10 PROCEDURE — 86850 RBC ANTIBODY SCREEN: CPT

## 2020-02-10 PROCEDURE — 71046 X-RAY EXAM CHEST 2 VIEWS: CPT

## 2020-02-10 PROCEDURE — 99285 EMERGENCY DEPT VISIT HI MDM: CPT

## 2020-02-10 PROCEDURE — 82948 REAGENT STRIP/BLOOD GLUCOSE: CPT

## 2020-02-10 PROCEDURE — 80048 BASIC METABOLIC PNL TOTAL CA: CPT | Performed by: NURSE PRACTITIONER

## 2020-02-10 PROCEDURE — 99285 EMERGENCY DEPT VISIT HI MDM: CPT | Performed by: EMERGENCY MEDICINE

## 2020-02-10 PROCEDURE — 85610 PROTHROMBIN TIME: CPT

## 2020-02-10 PROCEDURE — 86901 BLOOD TYPING SEROLOGIC RH(D): CPT

## 2020-02-10 PROCEDURE — 99223 1ST HOSP IP/OBS HIGH 75: CPT | Performed by: INTERNAL MEDICINE

## 2020-02-10 PROCEDURE — 36415 COLL VENOUS BLD VENIPUNCTURE: CPT

## 2020-02-10 PROCEDURE — 94640 AIRWAY INHALATION TREATMENT: CPT

## 2020-02-10 PROCEDURE — 80048 BASIC METABOLIC PNL TOTAL CA: CPT

## 2020-02-10 PROCEDURE — 85025 COMPLETE CBC W/AUTO DIFF WBC: CPT | Performed by: NURSE PRACTITIONER

## 2020-02-10 RX ORDER — ACETAMINOPHEN 325 MG/1
650 TABLET ORAL ONCE
Status: COMPLETED | OUTPATIENT
Start: 2020-02-10 | End: 2020-02-10

## 2020-02-10 RX ORDER — ALBUTEROL SULFATE 90 UG/1
2 AEROSOL, METERED RESPIRATORY (INHALATION) EVERY 4 HOURS PRN
Status: DISCONTINUED | OUTPATIENT
Start: 2020-02-10 | End: 2020-02-15 | Stop reason: HOSPADM

## 2020-02-10 RX ORDER — ACETAMINOPHEN 325 MG/1
650 TABLET ORAL EVERY 6 HOURS PRN
Status: DISCONTINUED | OUTPATIENT
Start: 2020-02-10 | End: 2020-02-15 | Stop reason: HOSPADM

## 2020-02-10 RX ORDER — LIDOCAINE HYDROCHLORIDE 10 MG/ML
0.5 INJECTION, SOLUTION EPIDURAL; INFILTRATION; INTRACAUDAL; PERINEURAL ONCE AS NEEDED
Status: CANCELLED | OUTPATIENT
Start: 2020-02-10

## 2020-02-10 RX ORDER — SODIUM CHLORIDE, SODIUM LACTATE, POTASSIUM CHLORIDE, CALCIUM CHLORIDE 600; 310; 30; 20 MG/100ML; MG/100ML; MG/100ML; MG/100ML
50 INJECTION, SOLUTION INTRAVENOUS CONTINUOUS
Status: CANCELLED | OUTPATIENT
Start: 2020-02-10

## 2020-02-10 RX ORDER — DOXAZOSIN MESYLATE 4 MG/1
4 TABLET ORAL
Status: DISCONTINUED | OUTPATIENT
Start: 2020-02-10 | End: 2020-02-15 | Stop reason: HOSPADM

## 2020-02-10 RX ORDER — FUROSEMIDE 10 MG/ML
20 INJECTION INTRAMUSCULAR; INTRAVENOUS ONCE
Status: COMPLETED | OUTPATIENT
Start: 2020-02-10 | End: 2020-02-10

## 2020-02-10 RX ORDER — ONDANSETRON 2 MG/ML
4 INJECTION INTRAMUSCULAR; INTRAVENOUS EVERY 6 HOURS PRN
Status: DISCONTINUED | OUTPATIENT
Start: 2020-02-10 | End: 2020-02-15 | Stop reason: HOSPADM

## 2020-02-10 RX ORDER — IPRATROPIUM BROMIDE AND ALBUTEROL SULFATE 2.5; .5 MG/3ML; MG/3ML
3 SOLUTION RESPIRATORY (INHALATION) ONCE
Status: COMPLETED | OUTPATIENT
Start: 2020-02-10 | End: 2020-02-10

## 2020-02-10 RX ORDER — ASPIRIN 81 MG/1
81 TABLET ORAL DAILY
Status: DISCONTINUED | OUTPATIENT
Start: 2020-02-11 | End: 2020-02-15 | Stop reason: HOSPADM

## 2020-02-10 RX ORDER — FUROSEMIDE 10 MG/ML
80 INJECTION INTRAMUSCULAR; INTRAVENOUS ONCE
Status: COMPLETED | OUTPATIENT
Start: 2020-02-10 | End: 2020-02-10

## 2020-02-10 RX ORDER — HYDRALAZINE HYDROCHLORIDE 20 MG/ML
10 INJECTION INTRAMUSCULAR; INTRAVENOUS EVERY 6 HOURS PRN
Status: DISCONTINUED | OUTPATIENT
Start: 2020-02-10 | End: 2020-02-15 | Stop reason: HOSPADM

## 2020-02-10 RX ORDER — NIFEDIPINE 30 MG/1
60 TABLET, EXTENDED RELEASE ORAL 2 TIMES DAILY
Status: DISCONTINUED | OUTPATIENT
Start: 2020-02-10 | End: 2020-02-15 | Stop reason: HOSPADM

## 2020-02-10 RX ORDER — CARVEDILOL 12.5 MG/1
25 TABLET ORAL 2 TIMES DAILY WITH MEALS
Status: DISCONTINUED | OUTPATIENT
Start: 2020-02-11 | End: 2020-02-15 | Stop reason: HOSPADM

## 2020-02-10 RX ADMIN — FUROSEMIDE 80 MG: 10 INJECTION, SOLUTION INTRAMUSCULAR; INTRAVENOUS at 16:11

## 2020-02-10 RX ADMIN — DOXAZOSIN 4 MG: 4 TABLET ORAL at 21:54

## 2020-02-10 RX ADMIN — ACETAMINOPHEN 650 MG: 325 TABLET, FILM COATED ORAL at 16:25

## 2020-02-10 RX ADMIN — FUROSEMIDE 20 MG: 10 INJECTION, SOLUTION INTRAMUSCULAR; INTRAVENOUS at 19:23

## 2020-02-10 RX ADMIN — IPRATROPIUM BROMIDE AND ALBUTEROL SULFATE 3 ML: 2.5; .5 SOLUTION RESPIRATORY (INHALATION) at 15:18

## 2020-02-10 RX ADMIN — ACETAMINOPHEN 650 MG: 325 TABLET, FILM COATED ORAL at 22:47

## 2020-02-10 RX ADMIN — HYDRALAZINE HYDROCHLORIDE 10 MG: 20 INJECTION INTRAMUSCULAR; INTRAVENOUS at 20:35

## 2020-02-10 RX ADMIN — NIFEDIPINE 60 MG: 30 TABLET, FILM COATED, EXTENDED RELEASE ORAL at 19:23

## 2020-02-10 NOTE — PLAN OF CARE
Problem: Potential for Falls  Goal: Patient will remain free of falls  Description  INTERVENTIONS:  - Assess patient frequently for physical needs  -  Identify cognitive and physical deficits and behaviors that affect risk of falls    -  [place] fall precautions as indicated by assessment   - Educate patient/family on patient safety including physical limitations  - Instruct patient to call for assistance with activity based on assessment  - Modify environment to reduce risk of injury  - Consider OT/PT consult to assist with strengthening/mobility  Outcome: Progressing     Problem: CARDIOVASCULAR - ADULT  Goal: Maintains optimal cardiac output and hemodynamic stability  Description  INTERVENTIONS:  - Monitor I/O, vital signs and rhythm  - Monitor for S/S and trends of decreased cardiac output  - Administer and titrate ordered vasoactive medications to optimize hemodynamic stability  - Assess quality of pulses, skin color and temperature  - Assess for signs of decreased coronary artery perfusion  - Instruct patient to report change in severity of symptoms  Outcome: Progressing  Goal: Absence of cardiac dysrhythmias or at baseline rhythm  Description  INTERVENTIONS:  - Continuous cardiac monitoring, vital signs, obtain 12 lead EKG if ordered  - Administer antiarrhythmic and heart rate control medications as ordered  - Monitor electrolytes and administer replacement therapy as ordered  Outcome: Progressing     Problem: RESPIRATORY - ADULT  Goal: Achieves optimal ventilation and oxygenation  Description  INTERVENTIONS:  - Assess for changes in respiratory status  - Assess for changes in mentation and behavior  - Position to facilitate oxygenation and minimize respiratory effort  - Oxygen administered by appropriate delivery if ordered  - Initiate smoking cessation education as indicated  - Encourage broncho-pulmonary hygiene including cough, deep breathe, Incentive Spirometry  - Assess the need for suctioning and aspirate as needed  - Assess and instruct to report SOB or any respiratory difficulty  - Respiratory Therapy support as indicated  Outcome: Progressing     Problem: PAIN - ADULT  Goal: Verbalizes/displays adequate comfort level or baseline comfort level  Description  Interventions:  - Encourage patient to monitor pain and request assistance  - Assess pain using appropriate pain scale  - Administer analgesics based on type and severity of pain and evaluate response  - Implement non-pharmacological measures as appropriate and evaluate response  - Consider cultural and social influences on pain and pain management  - Notify physician/advanced practitioner if interventions unsuccessful or patient reports new pain  Outcome: Progressing     Problem: SAFETY ADULT  Goal: Patient will remain free of falls  Description  INTERVENTIONS:  - Assess patient frequently for physical needs  -  Identify cognitive and physical deficits and behaviors that affect risk of falls    -  [place] fall precautions as indicated by assessment   - Educate patient/family on patient safety including physical limitations  - Instruct patient to call for assistance with activity based on assessment  - Modify environment to reduce risk of injury  - Consider OT/PT consult to assist with strengthening/mobility  Outcome: Progressing

## 2020-02-10 NOTE — ED ATTENDING ATTESTATION
2/10/2020  IGeorge DO, saw and evaluated the patient  I have discussed the patient with the resident/non-physician practitioner and agree with the resident's/non-physician practitioner's findings, Plan of Care, and MDM as documented in the resident's/non-physician practitioner's note, except where noted  All available labs and Radiology studies were reviewed  I was present for key portions of any procedure(s) performed by the resident/non-physician practitioner and I was immediately available to provide assistance  At this point I agree with the current assessment done in the Emergency Department  I have conducted an independent evaluation of this patient a history and physical is as follows:    Patient is a 53-year-old male with a history of end-stage renal disease, hypertension and diabetes who presents with shortness of breath  Patient describes increasing shortness of breath and peripheral edema  He has also had a productive cough  He denies fever, chills, chest pain or other complaints  Patient is on torsemide which he takes on an as-needed basis  He took a dose yesterday and did diurese  However he had not been taking it prior to yesterday  He is due to have surgery for a fistula this week  On exam, patient has severe peripheral edema in bilateral lower extremities  He has diffuse rhonchi and expiratory wheeze  Heart is regular rate rhythm  He is in no respiratory distress and speaks in full sentences  Will check chest x-ray, labs and EKG  Anticipate patient needing IV diuresis and hospitalization for possible hemodialysis  Labs indicate worsening renal function and acidosis  Will give a dose of IV Lasix and hospitalize for further workup and treatment  Patient will likely need dialysis during this hospitalization but does not require emergent dialysis      ED Course         Critical Care Time  Procedures

## 2020-02-10 NOTE — ED PROVIDER NOTES
History  Chief Complaint   Patient presents with    Shortness of Breath     c/o SOB, productive cough, HA, chills, "lung pressure" x 2 weeks  Pt seen by PCP last Tues-completed course of ABX for URI  This is a 24-year-old male coming in complaining of shortness of breath with past medical history of diabetic neuropathy and stage 5 kidney disease  Was recently seen in his PCP's office and diagnosed with a URI and treated with Z-[place]  Patient states that he has completed his antibiotics, the shortness of breath continues to get worse  He continues to have associated productive cough bringing up clear-yellow sputum, rhinorrhea, chest pain, "chest crackling", chills, lower extremity swelling  He denies fever, abdominal pain, nausea, vomiting, diarrhea, sore throat, earache, sinus pain  He is taking Torosemide p r n , he took a dose yesterday  He is currently scheduled to receive a dialysis fistula later this week for future dialysis  Prior to Admission Medications   Prescriptions Last Dose Informant Patient Reported? Taking?    ADMELOG SOLOSTAR 100 units/mL injection pen Not Taking at Unknown time Self No No   Sig: INJECT 4 UNITS 3 TIMES A DAY WITH MEALS   Patient not taking: No sig reported   D 1000 1000 units capsule Not Taking at Unknown time Self No No   Sig: TAKE 1 CAPSULE BY MOUTH EVERY DAY   Patient not taking: Reported on 2/4/2020   FREESTYLE LITE test strip  Self No No   Sig: TEST 3 TIMES DAILY   Insulin Pen Needle (INSUPEN PEN NEEDLES) 31G X 5 MM MISC  Self No No   Sig: by Does not apply route 4 (four) times a day   NIFEdipine ER (ADALAT CC) 60 MG 24 hr tablet 2/10/2020 at Unknown time Self No Yes   Sig: Take 1 tablet (60 mg total) by mouth 2 (two) times a day   albuterol (PROVENTIL HFA,VENTOLIN HFA) 90 mcg/act inhaler  Self No No   Sig: Inhale 2 puffs every 4 (four) hours as needed for wheezing   aspirin (ECOTRIN LOW STRENGTH) 81 mg EC tablet 2/10/2020 at Unknown time Self No Yes   Sig: Take 1 tablet (81 mg total) by mouth daily   azithromycin (ZITHROMAX) 250 mg tablet   No No   Sig: Take 2 tablets (500 mg total) by mouth daily for 1 day, THEN 1 tablet (250 mg total) daily for 4 days     carvedilol (COREG) 25 mg tablet 2/10/2020 at Unknown time Self No Yes   Sig: Take 1 tablet (25 mg total) by mouth 2 (two) times a day with meals   doxazosin (CARDURA) 4 mg tablet 2/9/2020 at Unknown time Self No Yes   Sig: Take 1 tablet (4 mg total) by mouth daily at bedtime   insulin aspart (NOVOLOG FLEXPEN) 100 Units/mL injection pen Not Taking at Unknown time Self No No   Sig: Inject 4 Units under the skin 3 (three) times a day with meals   Patient not taking: Reported on 2/4/2020   insulin glargine (BASAGLAR KWIKPEN) 100 units/mL injection pen  Self No No   Sig: Inject 24 Units under the skin daily at bedtime   Patient taking differently: Inject 24 Units under the skin as needed    torsemide (DEMADEX) 100 mg tablet  Self No No   Sig: Take 1 tablet (100 mg total) by mouth 2 (two) times a day   Patient taking differently: Take 100 mg by mouth as needed       Facility-Administered Medications: None       Past Medical History:   Diagnosis Date    Anemia due to chronic kidney disease 9/13/2019    CKD (chronic kidney disease) 2/21/2019    Class 1 obesity due to excess calories with serious comorbidity and body mass index (BMI) of 31 0 to 31 9 in adult 5/25/2019    Diabetes mellitus ([facility] [address] )     Essential hypertension 10/31/2017    Hyperlipidemia     NSTEMI (non-ST elevated myocardial infarction) ([facility] [address] ) 5/21/2019       Past Surgical History:   Procedure Laterality Date    ABCESS DRAINAGE      tooth    CT NEEDLE BIOPSY KIDNEY  10/24/2019    DE KNEE SCOPE,MED/LAT MENISECTOMY Right 9/13/2018    Procedure: RIGHT KNEE ARTHROSCOPIC PARTIAL MEDIAL MENISCECTOMY;  Surgeon: Hannah Coley MD;  Location: [facility];  Service: Orthopedics    WRIST ARTHROPLASTY Right 2017       Family History   Problem Relation Age of Onset    Hypertension Mother     Multiple sclerosis Mother     Diabetes Father      I have reviewed and agree with the history as documented  Social History     Tobacco Use    Smoking status: Never Smoker    Smokeless tobacco: Never Used   Substance Use Topics    Alcohol use: Yes     Comment: occasionally    Drug use: No        Review of Systems   Constitutional: Positive for chills  Negative for activity change, appetite change, diaphoresis, fatigue and fever  HENT: Positive for congestion and rhinorrhea  Negative for ear discharge, ear pain, facial swelling, sinus pressure, sinus pain, sneezing and sore throat  Respiratory: Positive for cough and shortness of breath  Negative for chest tightness and wheezing  Cardiovascular: Positive for leg swelling  Negative for chest pain and palpitations  Gastrointestinal: Negative for abdominal distention, abdominal pain, constipation, diarrhea, nausea and vomiting  Genitourinary: Positive for decreased urine volume  Skin: Negative for color change, pallor and rash  Neurological: Negative for speech difficulty, weakness, numbness and headaches  Physical Exam  Physical Exam   Constitutional: He is oriented to person, place, and time  He appears well-developed and well-nourished  He appears distressed  HENT:   Head: Normocephalic and atraumatic  Neck: Normal range of motion  Neck supple  Cardiovascular: Normal rate and regular rhythm  Pulmonary/Chest: He is in respiratory distress  He has wheezes in the right lower field and the left lower field  He has rhonchi in the right upper field, the right middle field, the right lower field, the left upper field, the left middle field and the left lower field  Abdominal: Soft  Bowel sounds are normal  He exhibits no distension  There is no tenderness  Musculoskeletal:        Right lower leg: He exhibits edema  Left lower leg: He exhibits edema     Lower extremities +4 edema Neurological: He is alert and oriented to person, place, and time  Skin: Skin is warm and dry  Capillary refill takes less than 2 seconds  Psychiatric: He has a normal mood and affect   His behavior is normal        Vital Signs  ED Triage Vitals [02/10/20 1405]   Temperature Pulse Respirations Blood Pressure SpO2   98 8 °F (37 1 °C) 90 20 (!) 179/89 92 %      Temp Source Heart Rate Source Patient Position - Orthostatic VS BP Location FiO2 (%)   Oral Monitor Sitting Left arm --      Pain Score       6           Vitals:    02/10/20 1405 02/10/20 1521 02/10/20 1600 02/10/20 1630   BP: (!) 179/89 (!) 197/98 (!) 202/92 (!) 195/133   Pulse: 90 86 88 86   Patient Position - Orthostatic VS: Sitting Lying Lying Lying         Visual Acuity      ED Medications  Medications   ipratropium-albuterol (DUO-NEB) 0 5-2 5 mg/3 mL inhalation solution 3 mL (3 mL Nebulization Given 2/10/20 1518)   furosemide (LASIX) injection 80 mg (80 mg Intravenous Given 2/10/20 1611)   acetaminophen (TYLENOL) tablet 650 mg (650 mg Oral Given 2/10/20 1625)       Diagnostic Studies  Results Reviewed     Procedure Component Value Units Date/Time    Basic metabolic panel [[id number]]  (Abnormal) Collected:  02/10/20 1513    Lab Status:  Final result Specimen:  Blood from Arm, Right Updated:  02/10/20 1540     Sodium 141 mmol/L      Potassium 5 2 mmol/L      Chloride 108 mmol/L      CO2 18 mmol/L      ANION GAP 15 mmol/L      BUN 81 mg/dL      Creatinine 11 00 mg/dL      Glucose 88 mg/dL      Calcium 8 7 mg/dL      eGFR 6 ml/min/1 73sq m     Narrative:       [place] guidelines for Chronic Kidney Disease (CKD):     Stage 1 with normal or high GFR (GFR > 90 mL/min/1 73 square meters)    Stage 2 Mild CKD (GFR = 60-89 mL/min/1 73 square meters)    Stage 3A Moderate CKD (GFR = 45-59 mL/min/1 73 square meters)    Stage 3B Moderate CKD (GFR = 30-44 mL/min/1 73 square meters)    Stage 4 Severe CKD (GFR = 15-29 mL/min/1 73 square meters)    Stage 5 End Stage CKD (GFR <15 mL/min/1 73 square meters)  Note: GFR calculation is accurate only with a steady state creatinine    Phosphorus [[id number]]  (Abnormal) Collected:  02/10/20 1513    Lab Status:  Final result Specimen:  Blood from Arm, Right Updated:  02/10/20 1540     Phosphorus 5 5 mg/dL     Magnesium [[id number]]  (Normal) Collected:  02/10/20 1513    Lab Status:  Final result Specimen:  Blood from Arm, Right Updated:  02/10/20 1540     Magnesium 1 8 mg/dL     CBC and differential [[id number]]  (Abnormal) Collected:  02/10/20 1513    Lab Status:  Final result Specimen:  Blood from Arm, Right Updated:  02/10/20 1527     WBC 8 25 Thousand/uL      RBC 2 79 Million/uL      Hemoglobin 8 1 g/dL      Hematocrit 24 8 %      MCV 89 fL      MCH 29 0 pg      MCHC 32 7 g/dL      RDW 13 4 %      MPV 10 4 fL      Platelets 219 Thousands/uL      nRBC 0 /100 WBCs      Neutrophils Relative 58 %      Immat GRANS % 1 %      Lymphocytes Relative 29 %      Monocytes Relative 8 %      Eosinophils Relative 4 %      Basophils Relative 0 %      Neutrophils Absolute 4 90 Thousands/µL      Immature Grans Absolute 0 05 Thousand/uL      Lymphocytes Absolute 2 35 Thousands/µL      Monocytes Absolute 0 63 Thousand/µL      Eosinophils Absolute 0 29 Thousand/µL      Basophils Absolute 0 03 Thousands/µL                  XR chest 2 views   ED Interpretation by CHARLOTTE Hernandez (02/10 1517)   Opacities noted, bilaterally  Worse in lower lobes  This is likely indicative of pulmonary edema  Final Result by Anil Nath MD (02/10 1530)      Mild central vascular congestion              Workstation performed: [id number]                    Procedures  ECG 12 Lead Documentation Only  Date/Time: 2/10/2020 3:10 PM  Performed by: CHARLOTTE Hernandez  Authorized by: CHARLOTTE Hernandez     Indications / Diagnosis:  SOB  ECG reviewed by me, the ED Provider: yes    Patient location:  ED  Previous ECG:     Previous ECG: Compared to current    Similarity:  No change    Comparison to cardiac monitor: Yes    Interpretation:     Interpretation: normal    Rate:     ECG rate assessment: normal    Rhythm:     Rhythm: sinus rhythm    Ectopy:     Ectopy: none    QRS:     QRS axis:  Normal  Conduction:     Conduction: normal    ST segments:     ST segments:  Normal             ED Course                               MDM  Number of Diagnoses or Management Options  CKD (chronic kidney disease), stage V ([facility] [address] ): new and requires workup  SOB (shortness of breath): new and requires workup  Diagnosis management comments: This is a 57-year-old male with stage 5 kidney disease who is scheduled for dialysis fistula catheter later this week  He comes in today complaining shortness of breath  Symptoms have been ongoing for the past week, he was seen by his PCP 6 days ago and treated with a Zithromax in for URI  He states that fluid overload/shortness of breath has gotten worse in the past several days  He took a Torosomine yesterday, that he is prescribed on a p r n  basis for fluid overload  Chest x-ray and CBC ordered to rule out infectious process  Patient is been afebrile otherwise  BMP, phosphate, and magnesium order to assess electrolyte status  On exam patient displays rhonchi with expiratory wheezes  DuoNeb ordered to assess for inflammatory/obstructive lung disease  This is likely related to fluid overload, will consider diuresis following results of chest x-ray and lab work  Lab work shows electrolyte imbalances and elevation in creatinine/BUN in comparison to previous  Chest x-ray shows mild pulmonary edema  Discussed patient with slim provider regarding admission, due to electrolyte imbalances/increased work of breathing due to fluid overload  Patient will be challenged with 80 mg of Lasix to attempt diuresis  Depending on his response to the diuresis, will decide how soon patient will require nephrology consultation  Amount and/or Complexity of Data Reviewed  Clinical lab tests: ordered and reviewed  Tests in the radiology section of CPT®: ordered and reviewed    Patient Progress  Patient progress: improved        Disposition  Final diagnoses:   SOB (shortness of breath)   CKD (chronic kidney disease), stage V ([address] )     Time reflects when diagnosis was documented in both MDM as applicable and the Disposition within this note     Time User Action Codes Description Comment    2/10/2020  4:01 PM Bertis Dus Add [R06 02] SOB (shortness of breath)     2/10/2020  4:01 PM Bertis Dus Add [N18 5] CKD (chronic kidney disease), stage V ([address] )     2/10/2020  4:02 PM Bertis Dus Remove [N18 5] CKD (chronic kidney disease), stage V ([facility] [address] )     2/10/2020  4:02 PM Bertis Dus Add [N18 5] CKD (chronic kidney disease), stage V [facility])       ED Disposition     ED Disposition Condition Date/Time Comment    Admit Stable Mon Feb 10, 2020  4:03 PM Case was discussed with Dr Yonatan David and the patient's admission status was agreed to be Admission Status: inpatient status to the service of Dr Yonatan David           Follow-up Information    None         Current Discharge Medication List      CONTINUE these medications which have NOT CHANGED    Details   aspirin (ECOTRIN LOW STRENGTH) 81 mg EC tablet Take 1 tablet (81 mg total) by mouth daily  Refills: 0    Associated Diagnoses: NSTEMI (non-ST elevated myocardial infarction) (HCC)      carvedilol (COREG) 25 mg tablet Take 1 tablet (25 mg total) by mouth 2 (two) times a day with meals  Qty: 90 tablet, Refills: 3    Associated Diagnoses: NSTEMI (non-ST elevated myocardial infarction) (HCC)      doxazosin (CARDURA) 4 mg tablet Take 1 tablet (4 mg total) by mouth daily at bedtime  Qty: 30 tablet, Refills: 5    Associated Diagnoses: Hypertension, unspecified type      NIFEdipine ER (ADALAT CC) 60 MG 24 hr tablet Take 1 tablet (60 mg total) by mouth 2 (two) times a day  Qty: 90 tablet, Refills: 3    Comments: DX Code Needed  PT NEEDS NEW SCRIPT  Associated Diagnoses: Hypertension      ADMELOG SOLOSTAR 100 units/mL injection pen INJECT 4 UNITS 3 TIMES A DAY WITH MEALS  Qty: 5 pen, Refills: 5    Associated Diagnoses: Type 2 diabetes mellitus without complication, without long-term current use of insulin ([facility])      albuterol (PROVENTIL HFA,VENTOLIN HFA) 90 mcg/act inhaler Inhale 2 puffs every 4 (four) hours as needed for wheezing  Qty: 1 Inhaler, Refills: 0    Comments: Substitution to a formulary equivalent within the same pharmaceutical class is authorized    Associated Diagnoses: NSTEMI (non-ST elevated myocardial infarction) ([facility])      D 1000 1000 units capsule TAKE 1 CAPSULE BY MOUTH EVERY DAY  Qty: 90 capsule, Refills: 1    Associated Diagnoses: Vitamin D deficiency      FREESTYLE LITE test strip TEST 3 TIMES DAILY  Qty: 200 each, Refills: 5    Associated Diagnoses: Type 2 diabetes mellitus without complication, with long-term current use of insulin ([facility])      insulin aspart (NOVOLOG FLEXPEN) 100 Units/mL injection pen Inject 4 Units under the skin 3 (three) times a day with meals  Qty: 5 pen, Refills: 0    Associated Diagnoses: Type 2 diabetes mellitus with hyperglycemia, without long-term current use of insulin ([facility])      insulin glargine (BASAGLAR KWIKPEN) 100 units/mL injection pen Inject 24 Units under the skin daily at bedtime  Qty: 5 pen, Refills: 0    Associated Diagnoses: Type 2 diabetes mellitus with hyperglycemia, without long-term current use of insulin ([facility])      Insulin Pen Needle (INSUPEN PEN NEEDLES) 31G X 5 MM MISC by Does not apply route 4 (four) times a day  Qty: 100 each, Refills: 5    Associated Diagnoses: Type 2 diabetes mellitus with stage 3 chronic kidney disease, with long-term current use of insulin ([facility])      torsemide (DEMADEX) 100 mg tablet Take 1 tablet (100 mg total) by mouth 2 (two) times a day  Qty: 60 tablet, Refills: 0    Associated Diagnoses: Hypertensive urgency         STOP taking these medications       azithromycin (ZITHROMAX) 250 mg tablet Comments:   Reason for Stopping:             No discharge procedures on file      ED Provider  Electronically Signed by           CHARLOTTE Sinclair  02/10/20 7212

## 2020-02-11 ENCOUNTER — TELEPHONE (OUTPATIENT)
Dept: VASCULAR SURGERY | Facility: CLINIC | Age: 35
End: 2020-02-11

## 2020-02-11 PROBLEM — N18.5 CKD (CHRONIC KIDNEY DISEASE), STAGE V (HCC): Status: ACTIVE | Noted: 2020-02-10

## 2020-02-11 LAB
ANION GAP SERPL CALCULATED.3IONS-SCNC: 13 MMOL/L (ref 4–13)
ATRIAL RATE: 87 BPM
ATRIAL RATE: 90 BPM
BACTERIA UR QL AUTO: ABNORMAL /HPF
BILIRUB UR QL STRIP: NEGATIVE
BUN SERPL-MCNC: 80 MG/DL (ref 5–25)
CALCIUM SERPL-MCNC: 8.2 MG/DL (ref 8.3–10.1)
CHLORIDE SERPL-SCNC: 108 MMOL/L (ref 100–108)
CLARITY UR: CLEAR
CO2 SERPL-SCNC: 18 MMOL/L (ref 21–32)
COLOR UR: YELLOW
CREAT SERPL-MCNC: 10.94 MG/DL (ref 0.6–1.3)
ERYTHROCYTE [DISTWIDTH] IN BLOOD BY AUTOMATED COUNT: 13.6 % (ref 11.6–15.1)
FERRITIN SERPL-MCNC: 203 NG/ML (ref 8–388)
GFR SERPL CREATININE-BSD FRML MDRD: 6 ML/MIN/1.73SQ M
GLUCOSE SERPL-MCNC: 100 MG/DL (ref 65–140)
GLUCOSE SERPL-MCNC: 158 MG/DL (ref 65–140)
GLUCOSE SERPL-MCNC: 86 MG/DL (ref 65–140)
GLUCOSE SERPL-MCNC: 89 MG/DL (ref 65–140)
GLUCOSE SERPL-MCNC: 90 MG/DL (ref 65–140)
GLUCOSE UR STRIP-MCNC: NEGATIVE MG/DL
HCT VFR BLD AUTO: 22.8 % (ref 36.5–49.3)
HGB BLD-MCNC: 7.3 G/DL (ref 12–17)
HGB UR QL STRIP.AUTO: ABNORMAL
IRON SATN MFR SERPL: 23 %
IRON SERPL-MCNC: 33 UG/DL (ref 65–175)
KETONES UR STRIP-MCNC: NEGATIVE MG/DL
LEUKOCYTE ESTERASE UR QL STRIP: NEGATIVE
MAGNESIUM SERPL-MCNC: 1.7 MG/DL (ref 1.6–2.6)
MCH RBC QN AUTO: 28.5 PG (ref 26.8–34.3)
MCHC RBC AUTO-ENTMCNC: 32 G/DL (ref 31.4–37.4)
MCV RBC AUTO: 89 FL (ref 82–98)
NITRITE UR QL STRIP: NEGATIVE
NON-SQ EPI CELLS URNS QL MICRO: ABNORMAL /HPF
P AXIS: 48 DEGREES
P AXIS: 78 DEGREES
PH UR STRIP.AUTO: 5.5 [PH]
PHOSPHATE SERPL-MCNC: 6 MG/DL (ref 2.7–4.5)
PLATELET # BLD AUTO: 353 THOUSANDS/UL (ref 149–390)
PMV BLD AUTO: 10.1 FL (ref 8.9–12.7)
POTASSIUM SERPL-SCNC: 4.6 MMOL/L (ref 3.5–5.3)
PR INTERVAL: 144 MS
PR INTERVAL: 146 MS
PROT UR STRIP-MCNC: ABNORMAL MG/DL
QRS AXIS: 19 DEGREES
QRS AXIS: 75 DEGREES
QRSD INTERVAL: 72 MS
QRSD INTERVAL: 72 MS
QT INTERVAL: 338 MS
QT INTERVAL: 364 MS
QTC INTERVAL: 413 MS
QTC INTERVAL: 438 MS
RBC # BLD AUTO: 2.56 MILLION/UL (ref 3.88–5.62)
RBC #/AREA URNS AUTO: ABNORMAL /HPF
SODIUM SERPL-SCNC: 139 MMOL/L (ref 136–145)
SP GR UR STRIP.AUTO: 1.02 (ref 1–1.03)
T WAVE AXIS: 47 DEGREES
T WAVE AXIS: 5 DEGREES
TIBC SERPL-MCNC: 144 UG/DL (ref 250–450)
UROBILINOGEN UR QL STRIP.AUTO: 0.2 E.U./DL
VENTRICULAR RATE: 87 BPM
VENTRICULAR RATE: 90 BPM
WBC # BLD AUTO: 7.89 THOUSAND/UL (ref 4.31–10.16)
WBC #/AREA URNS AUTO: ABNORMAL /HPF

## 2020-02-11 PROCEDURE — 82948 REAGENT STRIP/BLOOD GLUCOSE: CPT

## 2020-02-11 PROCEDURE — 83550 IRON BINDING TEST: CPT | Performed by: PHYSICIAN ASSISTANT

## 2020-02-11 PROCEDURE — 93010 ELECTROCARDIOGRAM REPORT: CPT | Performed by: INTERNAL MEDICINE

## 2020-02-11 PROCEDURE — 85027 COMPLETE CBC AUTOMATED: CPT | Performed by: PHYSICIAN ASSISTANT

## 2020-02-11 PROCEDURE — 84166 PROTEIN E-PHORESIS/URINE/CSF: CPT | Performed by: PHYSICIAN ASSISTANT

## 2020-02-11 PROCEDURE — 84166 PROTEIN E-PHORESIS/URINE/CSF: CPT | Performed by: PATHOLOGY

## 2020-02-11 PROCEDURE — 83735 ASSAY OF MAGNESIUM: CPT | Performed by: PHYSICIAN ASSISTANT

## 2020-02-11 PROCEDURE — 84100 ASSAY OF PHOSPHORUS: CPT | Performed by: PHYSICIAN ASSISTANT

## 2020-02-11 PROCEDURE — 99254 IP/OBS CNSLTJ NEW/EST MOD 60: CPT | Performed by: INTERNAL MEDICINE

## 2020-02-11 PROCEDURE — 82728 ASSAY OF FERRITIN: CPT | Performed by: PHYSICIAN ASSISTANT

## 2020-02-11 PROCEDURE — 81001 URINALYSIS AUTO W/SCOPE: CPT | Performed by: INTERNAL MEDICINE

## 2020-02-11 PROCEDURE — 83540 ASSAY OF IRON: CPT | Performed by: PHYSICIAN ASSISTANT

## 2020-02-11 PROCEDURE — 80048 BASIC METABOLIC PNL TOTAL CA: CPT | Performed by: PHYSICIAN ASSISTANT

## 2020-02-11 PROCEDURE — 99232 SBSQ HOSP IP/OBS MODERATE 35: CPT | Performed by: PHYSICIAN ASSISTANT

## 2020-02-11 RX ORDER — TORSEMIDE 100 MG/1
100 TABLET ORAL DAILY
Status: DISCONTINUED | OUTPATIENT
Start: 2020-02-11 | End: 2020-02-15 | Stop reason: HOSPADM

## 2020-02-11 RX ORDER — SODIUM BICARBONATE 650 MG/1
650 TABLET ORAL
Status: DISCONTINUED | OUTPATIENT
Start: 2020-02-11 | End: 2020-02-12

## 2020-02-11 RX ADMIN — TORSEMIDE 100 MG: 100 TABLET ORAL at 13:15

## 2020-02-11 RX ADMIN — CARVEDILOL 25 MG: 12.5 TABLET, FILM COATED ORAL at 08:17

## 2020-02-11 RX ADMIN — INSULIN LISPRO 1 UNITS: 100 INJECTION, SOLUTION INTRAVENOUS; SUBCUTANEOUS at 13:17

## 2020-02-11 RX ADMIN — CALCIUM ACETATE 667 MG: 667 CAPSULE ORAL at 16:53

## 2020-02-11 RX ADMIN — SODIUM BICARBONATE 650 MG TABLET 650 MG: at 16:48

## 2020-02-11 RX ADMIN — DOXAZOSIN 4 MG: 4 TABLET ORAL at 21:09

## 2020-02-11 RX ADMIN — SODIUM BICARBONATE 650 MG TABLET 650 MG: at 13:16

## 2020-02-11 RX ADMIN — CARVEDILOL 25 MG: 12.5 TABLET, FILM COATED ORAL at 16:48

## 2020-02-11 RX ADMIN — CALCIUM ACETATE 667 MG: 667 CAPSULE ORAL at 13:16

## 2020-02-11 RX ADMIN — ASPIRIN 81 MG: 81 TABLET, COATED ORAL at 08:17

## 2020-02-11 RX ADMIN — NIFEDIPINE 60 MG: 30 TABLET, FILM COATED, EXTENDED RELEASE ORAL at 08:17

## 2020-02-11 RX ADMIN — NIFEDIPINE 60 MG: 30 TABLET, FILM COATED, EXTENDED RELEASE ORAL at 16:49

## 2020-02-11 NOTE — ASSESSMENT & PLAN NOTE
· Present on admission given vascular congestion on chest x-ray, bilateral leg swelling and shortness of breath  Recent echo stable with EF of 55%, grade 1 DD  Doubt congestive heart failure     · Will give Lasix 100 mg IV after discussion with on call nephrologist   · Status post 80 mg IV in the ER, ordered another 20 mg for 100 mg total   · I/O and daily weights   · Renal diet ordered  · Patient will ultimately need early start on hemodialysis

## 2020-02-11 NOTE — CONSULTS
Consultation - Nephrology   Ilana Olivas 37 y o  male MRN: [id number]  Unit/Bed#: S -01 Encounter: [id number]    ASSESSMENT:  1  Elevated Creatinine with Progressive Chronic Kidney Disease stage 5- Baseline creatinine was around 5 but in January increased to 7 and now increased to 11  - no urgent/emergent indication for dialysis  - electrolytes stable  - patient modality of choice is peritoneal dialysis (change from office visit)  - consult surgery for PD catheter placement  - consider PD urgent start vs PD catheter placement with outpatient start in 1 month  2  Mild Hypervolemia- given lasix 100mg IV x1 in ER 2/10  - weight stable from 230-235 pounds  - home diuretic: torsemide 100mg as needed (usually takes about once every 2 weeks)  - would increase torsemide to 100mg daily and monitor  3  Hypertension- continue home regimen + increased diuretic and monitor  4  Hyperphosphatemia- low phosphorus diet, start phoslo with meals  5  Metabolic Acidosis- start sodium bicarbonate tablets 650mg BID  6  Anemia- hgb below goal  - will start epogen with dialysis  - transfuse for hgb <7   7  Access- TBD    PLAN:  No urgent indication for dialysis however CKD appears to be progressing  Will discuss options with Dr Essie Hooks to include: PD catheter placement and outpatient PD start, PD catheter placement with urgent start protocol, vs less ideally hemodialysis catheter  Sodium bicarbonate tab 650mg bid  Phoslo 667mg tid with meals  Torsemide 100mg daily    HISTORY OF PRESENT ILLNESS:  Requesting Physician: Keren Salvador DO  Reason for Consult: MAGNOLIA/CKD    Ilana Olivas is a 37y o  year old male who was admitted to [address] [place] [address] after presenting with shortness of breath which started last Monday but worsened yesterday  He was given azithromycin by his PCP for a viral illness initially but it ended on Saturday and he felt his symptoms returned worse on Sunday & Monday    A renal consultation is requested today for assistance in the management of acute kidney injury on advanced chronic kidney disease  The patient is well know to our group as he follows with Dr Brennan Perez  He was seen in January by Dr Brennna Perez and went for AVF/G planning at the end of the month  He actually has a scheduled AVF/G surgery planned for this week  He does not have any significant uremic symptoms  He states his weight is stable and appetite is normal overall  He denies N/V/D  He denies worsening SOB or LE edema  He denies fatigue more than normal     I discussed options of dialysis with him today and he states that he has been talking with many people who do peritoneal dialysis at home and he states that he feels this may be a better option for him  He states if he needs dialysis urgently, he would like to avoid having a dialysis catheter placed if at all possible  He is open to urgent start PD  His wife is also present for this discussion and she is in agreement that now that they know more about PD and have talked to other PD patients, she feels PD will fit with their life better  Mr Kiarra William does not work currently and his wife works for [employer]        PAST MEDICAL HISTORY:  Past Medical History:   Diagnosis Date    Anemia due to chronic kidney disease 9/13/2019    CKD (chronic kidney disease) 2/21/2019    Class 1 obesity due to excess calories with serious comorbidity and body mass index (BMI) of 31 0 to 31 9 in adult 5/25/2019    Diabetes mellitus ([facility], [place] [address] )     Essential hypertension 10/31/2017    Hyperlipidemia     NSTEMI (non-ST elevated myocardial infarction) ([facility], [place] [address] ) 5/21/2019       PAST SURGICAL HISTORY:  Past Surgical History:   Procedure Laterality Date    ABCESS DRAINAGE      tooth    CT NEEDLE BIOPSY KIDNEY  10/24/2019    TX KNEE SCOPE,MED/LAT MENISECTOMY Right 9/13/2018    Procedure: RIGHT KNEE ARTHROSCOPIC PARTIAL MEDIAL MENISCECTOMY;  Surgeon: Riya Coelho MD;  Location: [facility];  Service: Orthopedics    WRIST ARTHROPLASTY Right 2017       ALLERGIES:  No Known Allergies    SOCIAL HISTORY:  Social History     Substance and Sexual Activity   Alcohol Use Yes    Comment: occasionally     Social History     Substance and Sexual Activity   Drug Use No     Social History     Tobacco Use   Smoking Status Never Smoker   Smokeless Tobacco Never Used       FAMILY HISTORY:  Family History   Problem Relation Age of Onset    Hypertension Mother     Multiple sclerosis Mother     Diabetes Father        MEDICATIONS:    Current Facility-Administered Medications:     acetaminophen (TYLENOL) tablet 650 mg, 650 mg, Oral, Q6H PRN, Magnus Herat PA-C, 650 mg at 02/10/20 2247    albuterol (PROVENTIL HFA,VENTOLIN HFA) inhaler 2 puff, 2 puff, Inhalation, Q4H PRN, Reynaldo Bender PA-C    aspirin (ECOTRIN LOW STRENGTH) EC tablet 81 mg, 81 mg, Oral, Daily, Reynaldo Bender PA-C, 81 mg at 02/11/20 0817    calcium acetate (PHOSLO) capsule 667 mg, 667 mg, Oral, TID With Meals, [facility], [place]    carvedilol (COREG) tablet 25 mg, 25 mg, Oral, BID With Meals, Reynaldo Bender PA-C, 25 mg at 02/11/20 0817    doxazosin (CARDURA) tablet 4 mg, 4 mg, Oral, HS, Reynaldo Bender PA-C, 4 mg at 02/10/20 2154    hydrALAZINE (APRESOLINE) injection 10 mg, 10 mg, Intravenous, Q6H PRN, Ba Aceves PA-C, 10 mg at 02/10/20 2035    insulin lispro (HumaLOG) 100 units/mL subcutaneous injection 1-6 Units, 1-6 Units, Subcutaneous, TID AC **AND** Fingerstick Glucose (POCT), , , TID AC, Reynaldo Bender PA-C    insulin lispro (HumaLOG) 100 units/mL subcutaneous injection 1-6 Units, 1-6 Units, Subcutaneous, HS, Reynaldo Bender PA-C    NIFEdipine (PROCARDIA XL) 24 hr tablet 60 mg, 60 mg, Oral, BID, Reynaldo Bender PA-C, 60 mg at 02/11/20 0817    ondansetron (ZOFRAN) injection 4 mg, 4 mg, Intravenous, Q6H PRN, Pegfelipe Escamillaow, PA-C    sodium bicarbonate tablet 650 mg, 650 mg, Oral, BID after meals, [facility], [place] [facility]) tablet 100 mg, 100 mg, Oral, Daily, [place]    REVIEW OF SYSTEMS:  A complete 10 point review of systems was performed and found to be negative unless otherwise noted in the history of present illness  General: No fevers, chills  Cardiovascular:  + chest pressure, No leg edema  Respiratory: No cough, sputum production,  + shortness of breath  Gastrointestinal:  No nausea/vomiting,  No diarrhea,  No abdominal pain  Genitourinary: No hematuria  No foamy urine    No dysuria    PHYSICAL EXAM:  Current Weight: Weight - Scale: 106 kg (233 lb)  First Weight: Weight - Scale: 106 kg (233 lb)  Vitals:    02/10/20 2212 02/11/20 0438 02/11/20 0541 02/11/20 0700   BP: 162/60 142/68  (!) 156/42   BP Location: Right arm Right arm  Right arm   Pulse: 97   91   Resp: 18   18   Temp: 98 1 °F (36 7 °C)   98 2 °F (36 8 °C)   TempSrc: Oral   Oral   SpO2: 94%   94%   Weight:   106 kg (233 lb)        Intake/Output Summary (Last 24 hours) at 2/11/2020 1105  Last data filed at 2/11/2020 0958  Gross per 24 hour   Intake    Output 425 ml   Net -425 ml     General: NAD  Skin: no rash  Eyes: anicteric  ENMT: mm moist  Neck: no masses  Respiratory: CTAB  CVS: RRR  Extremities: + bilateral pedal edema  GI: soft nt nd  Neuro: alert awake  Psych: mood and affect appropriate     Lab Results:   Results from last 7 days   Lab Units 02/11/20  0433 02/10/20  1513 02/10/20  1015   WBC Thousand/uL 7 89 8 25 8 62   HEMOGLOBIN g/dL 7 3* 8 1* 7 8*   HEMATOCRIT % 22 8* 24 8* 24 2*   PLATELETS Thousands/uL 353 372 339   POTASSIUM mmol/L 4 6 5 2 5 3   CHLORIDE mmol/L 108 108 108   CO2 mmol/L 18* 18* 19*   BUN mg/dL 80* 81* 83*   CREATININE mg/dL 10 94* 11 00* 11 22*   CALCIUM mg/dL 8 2* 8 7 8 7   MAGNESIUM mg/dL 1 7 1 8  --    PHOSPHORUS mg/dL 6 0* 5 5*  --

## 2020-02-11 NOTE — ASSESSMENT & PLAN NOTE
Lab Results   Component Value Date    HGBA1C 5 3 02/10/2020       No results for input(s): POCGLU in the last 72 hours  Blood Sugar Average: Last 72 hrs:  · Euglycemic on admission  A1c well controlled   Patient states he would take insulin as needed and actually hasn't needed any in 6 months  · Hold listed home doses of Basaglar and Admelog   · SSI algorithm with meals and bedtime   · QID accuchecks

## 2020-02-11 NOTE — PROGRESS NOTES
[address] Internal Medicine  Progress Note - Aneesh Ceballos 1985, 37 y o  male MRN: [id number]    Unit/Bed#: S -01 Encounter: [id number]    Primary Care Provider: Sanjay Iniguez MD   Date and time admitted to hospital: 2/10/2020  2:08 PM        * Acute renal failure superimposed on stage 5 chronic kidney disease, not on chronic dialysis [facility])  Assessment & Plan  · POA, creatinine up to 11 from baseline of 7 0 (which is up from 5 previously)  Patient has seen vascular and is scheduled for a LEFT AV fistula creation  Likely secondary to volume overload in the setting of renal insufficiency  Patient took 1 Demedex Saturday with extra diuresis  Status post 80 mg IV Lasix in the ER and then 20 mg IV last night  Electrolytes stable  As per nephrology consult today patient would prefer PD over HD  No urgent start for dialysis indicated at this time    · Nephrology input appreciated   · Arranging for PD catheter placement  · Will require General Surgery consult   · Avoid hypotension   · Coreg, Cardura, Nifedipine continued with hold parameters   · Avoid nephrotoxins    · Trend BMP, lytes   · Demedex has been restarted by nephrology   · Phoslo added   · NaHCO3 increased    · LEFT UPPER EXTREMITY LIMB ALERT    Hypervolemia associated with renal insufficiency  Assessment & Plan  · Present on admission given vascular congestion on chest x-ray, bilateral leg swelling and shortness of breath  Recent echo stable with EF of 55%, grade 1 DD  Doubt congestive heart failure   He has noticed improvement with Lasix yesterday  · Continue with Torsemide 100 mg QD as per nephrology recommendations   · I/O and daily weights   · Renal diet ordered  · Patient will ultimately need Peritoneal Dialysis (change from office choice)    Hyperphosphatemia  Assessment & Plan  · Noted at 6 0  · Will monitor  · Phoslo started by nephrology      Type 2 diabetes mellitus with kidney complication, with long-term current use of insulin [facility]  Assessment & Plan  Lab Results   Component Value Date    HGBA1C 5 3 02/10/2020       Recent Labs     02/10/20  2122 02/11/20  0754 02/11/20  1144   POCGLU 132 90 158*       Blood Sugar Average: Last 72 hrs:  · (P) 737 [id number]   · Euglycemic on admission  A1c well controlled  Patient states he would take insulin as needed and actually hasn't needed any in 6 months  · Hold listed home doses of Basaglar and Admelog   · SSI algorithm with meals and bedtime   · QID accuchecks     Myopericarditis  Assessment & Plan  · Treated previously with colchicine and steroids  No further symptoms  · No further workup needed    Essential hypertension  Assessment & Plan  · With prior blood pressure that has been difficult to control with significant orthostasis  BP are better controlled today    · Continue Coreg, Cardura, and Nifedipine with hold parameters   · Torsemide 100 mg QD added by nephrology   · Hydralazine 10 mg IV Q6 PRN     Anemia due to chronic kidney disease  Assessment & Plan  · Baseline appears to be 8 0-9 0  Decreased to 7 3 today  No signs of bleeding   · Check Iron Panel, B12, Folate  · Trend Hgb       VTE Pharmacologic Prophylaxis:   Pharmacologic: None needed as per VTE protocol   Mechanical VTE Prophylaxis in Place: Yes    Patient Centered Rounds: I have performed bedside rounds with nursing staff today  Discussions with Specialists or Other Care Team Provider: Nephrology, RN, CM    Education and Discussions with Family / Patient: Discussed with patient and wife at bedside     Time Spent for Care: 30 minutes  More than 50% of total time spent on counseling and coordination of care as described above      Current Length of Stay: 1 day(s)    Current Patient Status: Inpatient   Certification Statement: The patient will continue to require additional inpatient hospital stay due to on going nephrology work up     Discharge Plan: Pending at this time     Code Status: Level 1 - Full Code      Subjective:   Patient reports that he has noted improvement in his breathing and states that he has diuresed well overnight  Denies chest pain, palpitations  Denies coughing or wheezing  Reports leg swelling, but notes improvement  Denies fevers or chills  Objective:     Vitals:   Temp (24hrs), Av 4 °F (36 9 °C), Min:98 1 °F (36 7 °C), Max:98 8 °F (37 1 °C)    Temp:  [98 1 °F (36 7 °C)-98 8 °F (37 1 °C)] 98 2 °F (36 8 °C)  HR:  [86-97] 91  Resp:  [18-20] 18  BP: (142-202)/() 156/42  SpO2:  [92 %-100 %] 94 %  Body mass index is 30 74 kg/m²  Input and Output Summary (last 24 hours): Intake/Output Summary (Last 24 hours) at 2020 1218  Last data filed at 2020  Gross per 24 hour   Intake    Output 425 ml   Net -425 ml       Physical Exam:     Physical Exam   Constitutional: He is oriented to person, place, and time  Vital signs are normal  He appears well-developed and well-nourished  Non-toxic appearance  No distress  HENT:   Head: Normocephalic and atraumatic  Mouth/Throat: Mucous membranes are not dry  Eyes: Pupils are equal, round, and reactive to light  Conjunctivae and EOM are normal  No scleral icterus  Pupils are equal    Neck: Neck supple  Cardiovascular: Normal rate, regular rhythm, S1 normal, S2 normal, normal heart sounds and intact distal pulses  Exam reveals no S3 and no S4  No murmur heard  2+ bilateral edema improved from yesterday    Pulmonary/Chest: Effort normal  No accessory muscle usage or stridor  No respiratory distress  He has no decreased breath sounds  He has no wheezes  He has no rhonchi  He has rales (Faint) in the right lower field and the left lower field  He exhibits no tenderness  Abdominal: Soft  Bowel sounds are normal  He exhibits no distension and no mass  There is no tenderness  There is no rigidity, no rebound and no guarding  Neurological: He is alert and oriented to person, place, and time  He is not disoriented   He displays no tremor  He displays no seizure activity  Skin: Skin is warm and dry  Additional Data:     Labs:    Results from last 7 days   Lab Units 02/11/20  0433 02/10/20  1513   WBC Thousand/uL 7 89 8 25   HEMOGLOBIN g/dL 7 3* 8 1*   HEMATOCRIT % 22 8* 24 8*   PLATELETS Thousands/uL 353 372   NEUTROS PCT %  --  58   LYMPHS PCT %  --  29   MONOS PCT %  --  8   EOS PCT %  --  4     Results from last 7 days   Lab Units 02/11/20  0433   POTASSIUM mmol/L 4 6   CHLORIDE mmol/L 108   CO2 mmol/L 18*   BUN mg/dL 80*   CREATININE mg/dL 10 94*   CALCIUM mg/dL 8 2*     Results from last 7 days   Lab Units 02/10/20  1015   INR  1 04       * I Have Reviewed All Lab Data Listed Above  * Additional Pertinent Lab Tests Reviewed: [address] Admission Reviewed    Imaging:    Imaging Reports Reviewed Today Include: None  Imaging Personally Reviewed by Myself Includes:  None    Recent Cultures (last 7 days):           Last 24 Hours Medication List:     Current Facility-Administered Medications:  acetaminophen 650 mg Oral Q6H PRN Porsche Gallardo PA-C   albuterol 2 puff Inhalation Q4H PRN Reynaldo Valentino PA-C   aspirin 81 mg Oral Daily Reynaldo Bender PA-C   calcium [facility] 667 [facility]   carvedilol 25 mg Oral BID With Meals Reynaldo Valentino PA-C   doxazosin 4 mg Oral HS Reynaldo Bender PA-C   hydrALAZINE 10 mg Intravenous Q6H PRN Reynaldo Bender PA-C   insulin lispro 1-6 Units Subcutaneous TID AC Reynaldo Bender PA-C   insulin lispro 1-6 Units Subcutaneous HS Reynaldo Bender PA-C   NIFEdipine ER 60 mg Oral BID Reynaldo Bender PA-C   ondansetron 4 mg Intravenous Q6H PRN Reynaldo Bender PA-C   sodium bicarbonate 650 mg Oral BID after meals [facility], [place]   torsemide 100 mg Oral Daily [place], [place]        Today, Patient Was Seen By: Mitch Meredith PA-C    ** Please Note: Dictation voice to text software may have been used in the creation of this document  **

## 2020-02-11 NOTE — ASSESSMENT & PLAN NOTE
· Present on admission given vascular congestion on chest x-ray, bilateral leg swelling and shortness of breath  Recent echo stable with EF of 55%, grade 1 DD  Doubt congestive heart failure   He has noticed improvement with Lasix yesterday  · Continue with Torsemide 100 mg QD as per nephrology recommendations   · I/O and daily weights   · Renal diet ordered  · Patient will ultimately need Peritoneal Dialysis (change from office choice)

## 2020-02-11 NOTE — H&P
[address] [facility]  H&P- Sincere Elviraowen 1985, 37 y o  male MRN: [id number]    Unit/Bed#: S -01 Encounter: [id number]    Primary Care Provider: Catherine Taylor MD   Date and time admitted to hospital: 2/10/2020  2:08 PM        * Acute renal failure superimposed on stage 5 chronic kidney disease, not on chronic dialysis [facility])  Assessment & Plan  · POA, creatinine up to 11 from baseline of 7 0 (which is up from 5 previously)  Patient has seen vascular and is scheduled for a LEFT AV fistula creation  Likely secondary to volume overload in the setting of renal insufficiency  Patient took 1 Demedex Saturday with extra diuresis  Status post 80 mg IV Lasix in the ER  Electrolytes stable    · Admit patient to med/surg under inpatient status  · Consult nephrology   · Avoid hypotension   · Coreg, Cardura, Nifedipine continued with hold parameters   · Avoid nephrotoxins   · On none   · Trend BMP, lytes   · Diuresis as per below problem   · LEFT UPPER EXTREMITY LIMB ALERT    Hypervolemia associated with renal insufficiency  Assessment & Plan  · Present on admission given vascular congestion on chest x-ray, bilateral leg swelling and shortness of breath  Recent echo stable with EF of 55%, grade 1 DD  Doubt congestive heart failure  · Will give Lasix 100 mg IV after discussion with on call nephrologist   · Status post 80 mg IV in the ER, ordered another 20 mg for 100 mg total   · I/O and daily weights   · Renal diet ordered  · Patient will ultimately need early start on hemodialysis     Hyperphosphatemia  Assessment & Plan  · Noted at 5 5  · Will monitor     Type 2 diabetes mellitus with kidney complication, with long-term current use of insulin ([facility])  Assessment & Plan  Lab Results   Component Value Date    HGBA1C 5 3 02/10/2020       No results for input(s): POCGLU in the last 72 hours  Blood Sugar Average: Last 72 hrs:  · Euglycemic on admission  A1c well controlled   Patient states he would take insulin as needed and actually hasn't needed any in 6 months  · Hold listed home doses of Basaglar and Admelog   · SSI algorithm with meals and bedtime   · QID accuchecks     Myopericarditis  Assessment & Plan  · Treated previously with colchicine and steroids  No further symptoms  · No further workup needed    Essential hypertension  Assessment & Plan  · With prior blood pressure that has been difficult to control with significant orthostasis   · Continue Coreg, Cardura, and Nifedipine with hold parameters   · Monitor with IV lasix (100 mg total once)  · Hydralazine 10 mg IV Q6 PRN       VTE Prophylaxis: None needed as per VTE protocol   / reason for no mechanical VTE prophylaxis None needed as per VTE protocol    Code Status: Full Code   POLST: POLST form is not discussed and not completed at this time  Discussion with family: Wife present on phone during patient interview     Anticipated Length of Stay:  Patient will be admitted on an Inpatient basis with an anticipated length of stay of  Greater than 2 midnights  Justification for Hospital Stay: As per above assessment and plan     Total Time for Visit, including Counseling / Coordination of Care: 1 hour  Greater than 50% of this total time spent on direct patient counseling and coordination of care  Chief Complaint:   Shortness of breath     History of Present Illness:    Ilana Olivas is a 37 y o  male with a history of CKD 5, T2DM on insulin, HTN, and prior myopericarditis who presents with shortness of breath  Patient states that this has been going on for roughly 1 week  He reports that he was treated with a [place] and got slightly better, but then worsened again  He reports that he has a "heavy chest" and feels "gurgling" in his chest  He notes that his legs are more swollen than usually  He states that he took a dose of his Torsemide which he uses as needed on Saturday and reported increased urination on Sunday   Typically, without diuresis he urinates 3-4 times per day with what he calls a "normal amount"  Despite his legs being more swollen he denies noticing a change in his weight, but reports that he does not weight himself daily  He denies fevers, chills, chest pain, coughing, nausea, vomiting, or diarrhea  He reports that he saw a vascular surgeon and will be getting a dialysis fistula in the future  He reports that he is a diabetic, but states that he hasn't taken insulin in about 6 months because when he checks his sugar it is not high  He reports compliance with his other medications and otherwise only reports dizziness with standing/positional change  Review of Systems:    Review of Systems   Constitutional: Negative for appetite change, chills, diaphoresis, fatigue, fever and unexpected weight change  HENT: Negative for congestion, rhinorrhea and sore throat  Eyes: Negative for visual disturbance  Respiratory: Positive for shortness of breath  Negative for cough, chest tightness and wheezing  Cardiovascular: Positive for leg swelling  Negative for chest pain and palpitations  Gastrointestinal: Negative for abdominal pain, constipation, diarrhea, nausea and vomiting  Genitourinary: Negative for dysuria  Musculoskeletal: Negative for arthralgias, gait problem and myalgias  Neurological: Negative for dizziness, syncope, weakness, light-headedness, numbness and headaches  All other systems reviewed and are negative        Past Medical and Surgical History:     Past Medical History:   Diagnosis Date    Anemia due to chronic kidney disease 9/13/2019    CKD (chronic kidney disease) 2/21/2019    Class 1 obesity due to excess calories with serious comorbidity and body mass index (BMI) of 31 0 to 31 9 in adult 5/25/2019    Diabetes mellitus [facility] )     Essential hypertension 10/31/2017    Hyperlipidemia     NSTEMI (non-ST elevated myocardial infarction) ([facility] ) 5/21/2019       Past Surgical History:   Procedure Laterality Date  ABCESS DRAINAGE      tooth    CT NEEDLE BIOPSY KIDNEY  10/24/2019    FL KNEE SCOPE,MED/LAT MENISECTOMY Right 9/13/2018    Procedure: RIGHT KNEE ARTHROSCOPIC PARTIAL MEDIAL MENISCECTOMY;  Surgeon: Emi Pulido MD;  Location: [facility]  MAIN OR;  Service: Orthopedics    WRIST ARTHROPLASTY Right 2017       Meds/Allergies:    Prior to Admission medications    Medication Sig Start Date End Date Taking? Authorizing Provider   aspirin (ECOTRIN LOW STRENGTH) 81 mg EC tablet Take 1 tablet (81 mg total) by mouth daily 5/26/19  Yes CHARLOTTE English   carvedilol (COREG) 25 mg tablet Take 1 tablet (25 mg total) by mouth 2 (two) times a day with meals 9/25/19  Yes MUSTAPHA Churchill   doxazosin (CARDURA) 4 mg tablet Take 1 tablet (4 mg total) by mouth daily at bedtime 1/21/20  Yes Ana María Zamarripa MD   NIFEdipine ER (ADALAT CC) 60 MG 24 hr tablet Take 1 tablet (60 mg total) by mouth 2 (two) times a day 8/19/19  Yes Catherine Taylor MD   ADMELOG SOLOSTAR 100 units/mL injection pen INJECT 4 UNITS 3 TIMES A DAY WITH MEALS  Patient not taking: No sig reported 7/22/19   Catherine Taylor MD   albuterol (PROVENTIL HFA,VENTOLIN HFA) 90 mcg/act inhaler Inhale 2 puffs every 4 (four) hours as needed for wheezing 5/25/19   CHARLOTTE English   azithromycin (ZITHROMAX) 250 mg tablet Take 2 tablets (500 mg total) by mouth daily for 1 day, THEN 1 tablet (250 mg total) daily for 4 days   2/4/20 2/9/20  Catherine Taylor MD   D 1000 1000 units capsule TAKE 1 CAPSULE BY MOUTH EVERY DAY  Patient not taking: Reported on 2/4/2020 3/5/19   Catherine Taylor MD   FREESTYLE LITE test strip TEST 3 TIMES DAILY 9/13/19   Catherine Taylor MD   insulin aspart (NOVOLOG FLEXPEN) 100 Units/mL injection pen Inject 4 Units under the skin 3 (three) times a day with meals  Patient not taking: Reported on 2/4/2020 4/21/19   Jamarcus Medrano MD   insulin glargine [facility]) 100 units/mL injection pen Inject 24 Units under the skin daily at bedtime  Patient taking differently: Inject 24 Units under the skin as needed  4/21/19   Yoko Tran MD   Insulin Pen Needle (INSUPEN PEN NEEDLES) 31G X 5 MM MISC by Does not apply route 4 (four) times a day 6/28/19   Hemalatha Lopez MD   torsemide [facility]) 100 mg tablet Take 1 tablet (100 mg total) by mouth 2 (two) times a day  Patient taking differently: Take 100 mg by mouth as needed  9/18/19   Myriam Agustin MD     I have reviewed home medications with patient personally  Allergies: No Known Allergies    Social History:     Marital Status: /Civil Union   Occupation: None  Patient Pre-hospital Living Situation: Home  Patient Pre-hospital Level of Mobility: Full  Patient Pre-hospital Diet Restrictions: None  Substance Use History:   Social History     Substance and Sexual Activity   Alcohol Use Yes    Comment: occasionally     Social History     Tobacco Use   Smoking Status Never Smoker   Smokeless Tobacco Never Used     Social History     Substance and Sexual Activity   Drug Use No       Family History:    Family History   Problem Relation Age of Onset    Hypertension Mother     Multiple sclerosis Mother     Diabetes Father        Physical Exam:     Vitals:   Blood Pressure: (!) 190/80 (02/10/20 1902)  Pulse: 89 (02/10/20 1902)  Temperature: 98 4 °F (36 9 °C) (02/10/20 1902)  Temp Source: Oral (02/10/20 1902)  Respirations: 18 (02/10/20 1902)  SpO2: 95 % (02/10/20 1902)    Physical Exam   Constitutional: He is oriented to person, place, and time  He appears well-developed and well-nourished  Non-toxic appearance  No distress  HENT:   Head: Normocephalic and atraumatic  Mouth/Throat: Mucous membranes are not dry  Eyes: Pupils are equal, round, and reactive to light  Conjunctivae and EOM are normal  No scleral icterus  Pupils are equal    Neck: Neck supple  Cardiovascular: Normal rate, regular rhythm, S1 normal, S2 normal, normal heart sounds and intact distal pulses   Exam reveals no S3 and no S4  No murmur heard  3+ bilateral leg swelling up to knee     Pulmonary/Chest: Effort normal  No accessory muscle usage or stridor  No respiratory distress  He has no decreased breath sounds  He has no wheezes  He has no rhonchi  He has rales (Faint) in the right lower field and the left lower field  He exhibits no tenderness  Abdominal: Soft  Bowel sounds are normal  He exhibits no distension and no mass  There is no tenderness  There is no rigidity, no rebound and no guarding  Neurological: He is alert and oriented to person, place, and time  He is not disoriented  GCS eye subscore is 4  GCS verbal subscore is 5  GCS motor subscore is 6  Skin: Skin is warm and dry  Additional Data:     Lab Results: I have personally reviewed pertinent reports  Results from last 7 days   Lab Units 02/10/20  1513   WBC Thousand/uL 8 25   HEMOGLOBIN g/dL 8 1*   HEMATOCRIT % 24 8*   PLATELETS Thousands/uL 372   NEUTROS PCT % 58   LYMPHS PCT % 29   MONOS PCT % 8   EOS PCT % 4     Results from last 7 days   Lab Units 02/10/20  1513   SODIUM mmol/L 141   POTASSIUM mmol/L 5 2   CHLORIDE mmol/L 108   CO2 mmol/L 18*   BUN mg/dL 81*   CREATININE mg/dL 11 00*   ANION GAP mmol/L 15*   CALCIUM mg/dL 8 7   GLUCOSE RANDOM mg/dL 88     Results from last 7 days   Lab Units 02/10/20  1015   INR  1 04         Results from last 7 days   Lab Units 02/10/20  1015   HEMOGLOBIN A1C % 5 3           Imaging: I have personally reviewed pertinent reports  XR chest 2 views   ED Interpretation by CHARLOTTE Villa (02/10 1517)   Opacities noted, bilaterally  Worse in lower lobes  This is likely indicative of pulmonary edema  Final Result by Radha Katz MD (02/10 1530)      Mild central vascular congestion              Workstation performed: [id number]             EKG, Pathology, and Other Studies Reviewed on Admission:   · EKG: EKG unable to be located   · CXR: Mild central vascular congestion [facility] / [facility] Records Reviewed: Yes     ** Please Note: This note has been constructed using a voice recognition system   **

## 2020-02-11 NOTE — ASSESSMENT & PLAN NOTE
Lab Results   Component Value Date    HGBA1C 5 3 02/10/2020       Recent Labs     02/10/20  2122 02/11/20  0754 02/11/20  1144   POCGLU 132 90 158*       Blood Sugar Average: Last 72 hrs:  · (P) 216 [id number]   · Euglycemic on admission  A1c well controlled   Patient states he would take insulin as needed and actually hasn't needed any in 6 months  · Hold listed home doses of Basaglar and Admelog   · SSI algorithm with meals and bedtime   · QID accuchecks

## 2020-02-11 NOTE — ASSESSMENT & PLAN NOTE
· POA, creatinine up to 11 from baseline of 7 0 (which is up from 5 previously)  Patient has seen vascular and is scheduled for a LEFT AV fistula creation  Likely secondary to volume overload in the setting of renal insufficiency  Patient took 1 Demedex Saturday with extra diuresis  Status post 80 mg IV Lasix in the ER and then 20 mg IV last night  Electrolytes stable  As per nephrology consult today patient would prefer PD over HD   No urgent start for dialysis indicated at this time    · Nephrology input appreciated   · Arranging for PD catheter placement  · Will require General Surgery consult   · Avoid hypotension   · Coreg, Cardura, Nifedipine continued with hold parameters   · Avoid nephrotoxins    · Trend BMP, lytes   · Demedex has been restarted by nephrology   · Phoslo added   · NaHCO3 increased    · LEFT UPPER [address]

## 2020-02-11 NOTE — UTILIZATION REVIEW
Initial Clinical Review    Admission: Date/Time/Statement: Admission Orders (From admission, onward)     Ordered        02/10/20 1605  Inpatient Admission  Once                   Orders Placed This Encounter   Procedures    Inpatient Admission     Standing Status:   Standing     Number of Occurrences:   1     Order Specific Question:   Admitting Physician     Answer:   Sterling Glover     Order Specific Question:   Level of Care     Answer:   Med Surg [16]     Order Specific Question:   Estimated length of stay     Answer:   More than 2 Midnights     Order Specific Question:   Certification     Answer:   I certify that inpatient services are medically necessary for this patient for a duration of greater than two midnights  See H&P and MD Progress Notes for additional information about the patient's course of treatment  ED Arrival Information     Expected Arrival Acuity Means of Arrival Escorted By Service Admission Type    - 2/10/2020 13:59 Urgent Wheelchair Spouse General Medicine Urgent    Arrival Complaint    Chest Pain (stage V renal failure)        Chief Complaint   Patient presents with    Shortness of Breath     c/o SOB, productive cough, HA, chills, "lung pressure" x 2 weeks  Pt seen by PCP last Tues-completed course of ABX for URI  Assessment/Plan:  this is a 37year old male from home to ED admitted to inpatient due to fluid overload due to CKD5/MAGNOLIA on CKD/elevated BP due to volume overload  Presented due to shortness of breath for last week  Recently diagnosed with URI  About 6 days ago and treated with [place] but continues to worsen  On exam is in respiratory distress, wheezes, rhonchi   +4 edema of lower extremities  Bun 81, creatinine 11 00 with baseline of 7  H&H 8 1/24 8  CxR showed pulmonary edema  In ED given duo neb and IV Lasix  Diuresis continues with IV lasix  Nephrology consulted    Anticipate may need dialysis this admission, will need access     On 2/11/2020 per nephrology - has elevated creatinine with progressive chronic kidney disease; baseline creatinine was 5, increased to 7 in January  No urgent need for dialysis, patient's choice is PD  Surgery consulted  Would increased torsemide to 100 mg daily, start sodium bicarbonate  Transfusion if hemoglobin less than 7  ED Triage Vitals [02/10/20 1405]   Temperature Pulse Respirations Blood Pressure SpO2   98 8 °F (37 1 °C) 90 20 (!) 179/89 92 %      Temp Source Heart Rate Source Patient Position - Orthostatic VS BP Location FiO2 (%)   Oral Monitor Sitting Left arm --      Pain Score       6        Wt Readings from Last 1 Encounters:   02/11/20 106 kg (233 lb)     Additional Vital Signs:   02/11/20 0700  98 2 °F (36 8 °C)  91  18  156/42Abnormal     94 %  None (Room air)  Lying   02/11/20 0438        142/68        Sitting   02/10/20 2212  98 1 °F (36 7 °C)  97  18  162/60    94 %  None (Room air)  Lying   02/10/20 2136        158/60        Lying   02/10/20 2033        190/78Abnormal            02/10/20 2032        200/70Abnormal         Lying   02/10/20 1902  98 4 °F (36 9 °C)  89  18  190/80Abnormal     95 %  None (Room air)        02/10 0701 02/11 0700 02/11 0701 02/12 0700   Urine (mL/kg/hr) 275 150 (0 4)   Total Output 275 150   Net -275 -150     Pertinent Labs/Diagnostic Test Results:   2/102020 CxR - Mild central vascular congestion  2/10/2020 EKG - sinus   Normal ST and T  Results from last 7 days   Lab Units 02/11/20  0433 02/10/20  1513 02/10/20  1015   WBC Thousand/uL 7 89 8 25 8 62   HEMOGLOBIN g/dL 7 3* 8 1* 7 8*   HEMATOCRIT % 22 8* 24 8* 24 2*   PLATELETS Thousands/uL 353 372 339   NEUTROS ABS Thousands/µL  --  4 90  --      Results from last 7 days   Lab Units 02/11/20  0433 02/10/20  1513 02/10/20  1015   SODIUM mmol/L 139 141 141   POTASSIUM mmol/L 4 6 5 2 5 3   CHLORIDE mmol/L 108 108 108   CO2 mmol/L 18* 18* 19*   ANION GAP mmol/L 13 15* 14*   BUN mg/dL 80* 81* 83* CREATININE mg/dL 10 94* 11 00* 11 22*   EGFR ml/min/1 73sq m 6 6 6   CALCIUM mg/dL 8 2* 8 7 8 7   MAGNESIUM mg/dL 1 7 1 8  --    PHOSPHORUS mg/dL 6 0* 5 5*  --      Results from last 7 days   Lab Units 02/11/20  0754 02/10/20  2122   POC GLUCOSE mg/dl 90 132     Results from last 7 days   Lab Units 02/11/20  0433 02/10/20  1513 02/10/20  1015   GLUCOSE RANDOM mg/dL 89 88 96     Results from last 7 days   Lab Units 02/10/20  1015   HEMOGLOBIN A1C % 5 3   EAG mg/dl 105     Results from last 7 days   Lab Units 02/10/20  1015   PROTIME seconds 13 0   INR  1 04     ED Treatment:   Medication Administration from 02/10/2020 1359 to 02/10/2020 1704       Date/Time Order Dose Route Action Comments     02/10/2020 1518 ipratropium-albuterol (DUO-NEB) 0 5-2 5 mg/3 mL inhalation solution 3 mL 3 mL Nebulization Given      02/10/2020 1611 furosemide (LASIX) injection 80 mg 80 mg Intravenous Given      02/10/2020 1625 acetaminophen (TYLENOL) tablet 650 mg 650 mg Oral Given         Past Medical History:   Diagnosis Date    Anemia due to chronic kidney disease 9/13/2019    CKD (chronic kidney disease) 2/21/2019    Class 1 obesity due to excess calories with serious comorbidity and body mass index (BMI) of 31 0 to 31 9 in adult 5/25/2019    [facility]     Essential hypertension 10/31/2017    Hyperlipidemia     NSTEMI (non-ST elevated myocardial infarction) ([facility] ) 5/21/2019     Present on Admission:   Essential hypertension   Hyperphosphatemia   Myopericarditis      Admitting Diagnosis: Chest pain [R07 9]  SOB (shortness of breath) [R06 02]  CKD (chronic kidney disease), stage V ([facility] ) [N18 5]  Age/Sex: 37 y o  male  Admission Orders:  2/10/2020 1605 inpatient   Scheduled Medications:  Medications:  aspirin 81 mg Oral Daily   carvedilol 25 mg Oral BID With Meals   doxazosin 4 mg Oral HS   insulin lispro 1-6 Units Subcutaneous TID AC   insulin lispro 1-6 Units Subcutaneous HS   NIFEdipine ER 60 mg Oral BID furosemide (LASIX) injection 20 mg   Dose: 20 mg  Freq: Once Route: IV  Start: 02/10/20 1915 End: 02/10/20 1923    sodium bicarbonate tablet 650 mg   Dose: 650 mg  Freq: 2 times daily after meals Route: PO  Start: 02/11/20 1115    torsemide (DEMADEX) tablet 100 mg   Dose: 100 mg  Freq: Daily Route: PO  Start: 02/11/20 1115    Continuous IV Infusions: none     PRN Meds:  Acetaminophen - used x 1  650 mg Oral Q6H PRN   albuterol 2 puff Inhalation Q4H PRN   hydrALAZINE  - used x 1 (2035) 10 mg Intravenous Q6H PRN   ondansetron 4 mg Intravenous Q6H PRN       IP CONSULT TO NEPHROLOGY    Network Utilization Review Department  [email or web address]  ATTENTION: Please call with any questions or concerns to [phone] and carefully listen to the prompts so that you are directed to the right person  All voicemails are confidential   Suzanna Bee all requests for admission clinical reviews, approved or denied determinations and any other requests to dedicated fax number below belonging to the campus where the patient is receiving treatment   List of dedicated fax numbers for the Facilities:  [address] [place] [address] DENIALS (Administrative/Medical Necessity) [phone]   [address] (Maternity/NICU/Pediatrics) [phone]   Harvinder Aranda [phone]   Kelli Le [phone]   Getachew Littlejohn [phone]   [address] [place] [address]  [phone]   [address] [facility] [address] [place] [address] [phone]   [facility]  [phone]   [address] [facility] [address] [facility]  [address] [facility] [address] [facility] [phone]

## 2020-02-11 NOTE — PLAN OF CARE
Problem: Potential for Falls  Goal: Patient will remain free of falls  Description  INTERVENTIONS:  - Assess patient frequently for physical needs  -  Identify cognitive and physical deficits and behaviors that affect risk of falls    -  [place] fall precautions as indicated by assessment   - Educate patient/family on patient safety including physical limitations  - Instruct patient to call for assistance with activity based on assessment  - Modify environment to reduce risk of injury  - Consider OT/PT consult to assist with strengthening/mobility  Outcome: Progressing     Problem: CARDIOVASCULAR - ADULT  Goal: Maintains optimal cardiac output and hemodynamic stability  Description  INTERVENTIONS:  - Monitor I/O, vital signs and rhythm  - Monitor for S/S and trends of decreased cardiac output  - Administer and titrate ordered vasoactive medications to optimize hemodynamic stability  - Assess quality of pulses, skin color and temperature  - Assess for signs of decreased coronary artery perfusion  - Instruct patient to report change in severity of symptoms  Outcome: Progressing     Problem: RESPIRATORY - ADULT  Goal: Achieves optimal ventilation and oxygenation  Description  INTERVENTIONS:  - Assess for changes in respiratory status  - Assess for changes in mentation and behavior  - Position to facilitate oxygenation and minimize respiratory effort  - Oxygen administered by appropriate delivery if ordered  - Initiate smoking cessation education as indicated  - Encourage broncho-pulmonary hygiene including cough, deep breathe, Incentive Spirometry  - Assess the need for suctioning and aspirate as needed  - Assess and instruct to report SOB or any respiratory difficulty  - Respiratory Therapy support as indicated  Outcome: Progressing     Problem: PAIN - ADULT  Goal: Verbalizes/displays adequate comfort level or baseline comfort level  Description  Interventions:  - Encourage patient to monitor pain and request assistance  - Assess pain using appropriate pain scale  - Administer analgesics based on type and severity of pain and evaluate response  - Implement non-pharmacological measures as appropriate and evaluate response  - Consider cultural and social influences on pain and pain management  - Notify physician/advanced practitioner if interventions unsuccessful or patient reports new pain  Outcome: Progressing     Problem: SAFETY ADULT  Goal: Patient will remain free of falls  Description  INTERVENTIONS:  - Assess patient frequently for physical needs  -  Identify cognitive and physical deficits and behaviors that affect risk of falls    -  [place] fall precautions as indicated by assessment   - Educate patient/family on patient safety including physical limitations  - Instruct patient to call for assistance with activity based on assessment  - Modify environment to reduce risk of injury  - Consider OT/PT consult to assist with strengthening/mobility  Outcome: Progressing

## 2020-02-11 NOTE — ASSESSMENT & PLAN NOTE
· With prior blood pressure that has been difficult to control with significant orthostasis   · Continue Coreg, Cardura, and Nifedipine with hold parameters   · Monitor with IV lasix (100 mg total once)  · Hydralazine 10 mg IV Q6 PRN

## 2020-02-11 NOTE — ASSESSMENT & PLAN NOTE
· POA, creatinine up to 11 from baseline of 7 0 (which is up from 5 previously)  Patient has seen vascular and is scheduled for a LEFT AV fistula creation  Likely secondary to volume overload in the setting of renal insufficiency  Patient took 1 Demedex Saturday with extra diuresis  Status post 80 mg IV Lasix in the ER   Electrolytes stable    · Admit patient to med/surg under inpatient status  · Consult nephrology   · Avoid hypotension   · Coreg, Cardura, Nifedipine continued with hold parameters   · Avoid nephrotoxins   · On none   · Trend BMP, lytes   · Diuresis as per below problem   · LEFT UPPER EXTREMITY LIMB ALERT

## 2020-02-11 NOTE — UTILIZATION REVIEW
Notification of Inpatient Admission/Inpatient Authorization Request   This is a Notification of Inpatient Admission for [address]  Be advised that this patient was admitted to our facility under Inpatient Status  Contact Jayy Hutson at [phone] for additional admission information  [facility]  DEDICATED [phone]  Patient Name:   Bismark Chavez   YOB: 1985       [address]   [address]:   [place] Person  Tax ID: [id number]  NPI: [id number] Attending Provider/NPI: Ira Contreras [[id number]]   TRACY Rico  [facility] Practioner ID- [id number]  Primary Office:  [address] [facility], [address] [place] [address]  Phone 1: [phone]  Phone 2:   Fax: [phone]   Place of Service Code: 24     Place of Service Name:  [address] [place] [address]   Start Date: 2/10/20 1604     Discharge Date & Time: No discharge date for patient encounter  Type of Admission: Inpatient Status Discharge Disposition   (if discharged): Home/Self Care   Patient Diagnoses: Chest pain [R07 9]  SOB (shortness of breath) [R06 02]  CKD (chronic kidney disease), stage V ([facility] [address] ) [N18 5]     Orders: Admission Orders (From admission, onward)     Ordered        02/10/20 1605  Inpatient Admission  Once                    Assigned Utilization Review Contact: Jayy Hutson  Utilization   Network Utilization Review Department  Phone: [phone]; Fax [phone]  Email: [email or web address]   ATTENTION PAYERS: Please call the assigned Utilization  directly with any questions or concerns ALL voicemails in the department are confidential  Send all requests for admission clinical reviews, approved or denied determinations and any other requests to dedicated fax number belonging to the campus where the patient is receiving treatment

## 2020-02-11 NOTE — CONSULTS
Consultation -General Surgical Care   Michelle Patrick 37 y o  male MRN: [id number]  Unit/Bed#: S -01 Encounter: [id number]        Consults    Reason for Consult / Principal Problem: Acute renal failure superimposed on stage 5 chronic kidney disease, not on chronic dialysis [facility])    HPI: Michelle Patrick is a 37y o  year old male who presents with Acute renal failure superimposed on stage 5 chronic kidney disease, not on chronic dialysis ([facility] [address] )  Kidney disease has become worse and Nephrology has asked us to place a PD catheter  They will start urgent peritoneal dialysis once the catheter is in place        Review of Systems    Historical Information   Past Medical History:   Diagnosis Date    Anemia due to chronic kidney disease 9/13/2019    CKD (chronic kidney disease) 2/21/2019    Class 1 obesity due to excess calories with serious comorbidity and body mass index (BMI) of 31 0 to 31 9 in adult 5/25/2019    Diabetes mellitus ([facility] [place] [address] )     Essential hypertension 10/31/2017    Hyperlipidemia     NSTEMI (non-ST elevated myocardial infarction) ([facility] ) 5/21/2019     Past Surgical History:   Procedure Laterality Date    ABCESS DRAINAGE      tooth    CT NEEDLE BIOPSY KIDNEY  10/24/2019    MA KNEE SCOPE,MED/LAT MENISECTOMY Right 9/13/2018    Procedure: RIGHT KNEE ARTHROSCOPIC PARTIAL MEDIAL MENISCECTOMY;  Surgeon: Lukasz Erickson MD;  Location: [facility]  MAIN OR;  Service: Orthopedics    WRIST ARTHROPLASTY Right 2017     Social History   Social History     Substance and Sexual Activity   Alcohol Use Yes    Comment: occasionally     Social History     Substance and Sexual Activity   Drug Use No     Social History     Tobacco Use   Smoking Status Never Smoker   Smokeless Tobacco Never Used     Family History   Problem Relation Age of Onset    Hypertension Mother     Multiple sclerosis Mother     Diabetes Father        Meds/Allergies     Medications Prior to Admission   Medication    aspirin ([address]) 97 mg EC tablet    carvedilol (COREG) 25 mg tablet    doxazosin (CARDURA) 4 mg tablet    NIFEdipine ER (ADALAT CC) 60 MG 24 hr tablet    ADMELOG SOLOSTAR 100 units/mL injection pen    albuterol (PROVENTIL HFA,VENTOLIN HFA) 90 mcg/act inhaler    [] azithromycin (ZITHROMAX) 250 mg tablet    D 1000 1000 units capsule    FREESTYLE LITE test strip    insulin aspart (NOVOLOG FLEXPEN) 100 Units/mL injection pen    insulin glargine (BASAGLAR KWIKPEN) 100 units/mL injection pen    Insulin Pen Needle (INSUPEN PEN NEEDLES) 31G X 5 MM MISC    torsemide (DEMADEX) 100 mg tablet     Current Facility-Administered Medications   Medication Dose Route Frequency    acetaminophen (TYLENOL) tablet 650 mg  650 mg Oral Q6H PRN    albuterol (PROVENTIL HFA,VENTOLIN HFA) inhaler 2 puff  2 puff Inhalation Q4H PRN    aspirin (ECOTRIN LOW STRENGTH) EC tablet 81 mg  81 mg Oral Daily    calcium acetate (PHOSLO) capsule 667 mg  667 mg Oral TID With Meals    carvedilol (COREG) tablet 25 mg  25 mg Oral BID With Meals    doxazosin (CARDURA) tablet 4 mg  4 mg Oral HS    hydrALAZINE (APRESOLINE) injection 10 mg  10 mg Intravenous Q6H PRN    insulin lispro (HumaLOG) 100 units/mL subcutaneous injection 1-6 Units  1-6 Units Subcutaneous TID AC    insulin lispro (HumaLOG) 100 units/mL subcutaneous injection 1-6 Units  1-6 Units Subcutaneous HS    NIFEdipine (PROCARDIA XL) 24 hr tablet 60 mg  60 mg Oral BID    ondansetron (ZOFRAN) injection 4 mg  4 mg Intravenous Q6H PRN    sodium bicarbonate tablet 650 mg  650 mg Oral BID after meals    torsemide (DEMADEX) tablet 100 mg  100 mg Oral Daily       No Known Allergies    Objective     Blood pressure (!) 156/42, pulse 91, temperature 98 2 °F (36 8 °C), temperature source Oral, resp  rate 18, height 6' 1" (1 854 m), weight 106 kg (233 lb), SpO2 94 %        Intake/Output Summary (Last 24 hours) at 2020 1323  Last data filed at 2020 0958  Gross per 24 hour   Intake    Output 425 ml   Net -425 ml       PHYSICAL EXAM  General appearance: alert and oriented, in no acute distress  Lungs: clear to auscultation bilaterally  Heart: regular rate and rhythm, S1, S2 normal, no murmur, click, rub or gallop  Abdomen: soft, non-tender; bowel sounds normal; no masses,  no organomegaly    No signs of umbilical nor inguinal hernias  Rectal: deferred  Skin: Skin color, texture, turgor normal  No rashes or lesions    Lab Results:   Admission on 02/10/2020   Component Date Value    Sodium 02/10/2020 141     Potassium 02/10/2020 5 2     Chloride 02/10/2020 108     CO2 02/10/2020 18*    ANION GAP 02/10/2020 15*    BUN 02/10/2020 81*    Creatinine 02/10/2020 11 00*    Glucose 02/10/2020 88     Calcium 02/10/2020 8 7     eGFR 02/10/2020 6     Phosphorus 02/10/2020 5 5*    Magnesium 02/10/2020 1 8     WBC 02/10/2020 8 25     RBC 02/10/2020 2 79*    Hemoglobin 02/10/2020 8 1*    Hematocrit 02/10/2020 24 8*    MCV 02/10/2020 89     MCH 02/10/2020 29 0     MCHC 02/10/2020 32 7     RDW 02/10/2020 13 4     MPV 02/10/2020 10 4     Platelets 06/92/1218 372     nRBC 02/10/2020 0     Neutrophils Relative 02/10/2020 58     Immat GRANS % 02/10/2020 1     Lymphocytes Relative 02/10/2020 29     Monocytes Relative 02/10/2020 8     Eosinophils Relative 02/10/2020 4     Basophils Relative 02/10/2020 0     Neutrophils Absolute 02/10/2020 4 90     Immature Grans Absolute 02/10/2020 0 05     Lymphocytes Absolute 02/10/2020 2 35     Monocytes Absolute 02/10/2020 0 63     Eosinophils Absolute 02/10/2020 0 29     Basophils Absolute 02/10/2020 0 03     POC Glucose 02/10/2020 132     Sodium 02/11/2020 139     Potassium 02/11/2020 4 6     Chloride 02/11/2020 108     CO2 02/11/2020 18*    ANION GAP 02/11/2020 13     BUN 02/11/2020 80*    Creatinine 02/11/2020 10 94*    Glucose 02/11/2020 89     Calcium 02/11/2020 8 2*    eGFR 02/11/2020 6     Magnesium 02/11/2020 1 7     Phosphorus 02/11/2020 6 0*    WBC 02/11/2020 7 89     RBC 02/11/2020 2 56*    Hemoglobin 02/11/2020 7 3*    Hematocrit 02/11/2020 22 8*    MCV 02/11/2020 89     MCH 02/11/2020 28 5     MCHC 02/11/2020 32 0     RDW 02/11/2020 13 6     Platelets 11/87/0317 353     MPV 02/11/2020 10 1     POC Glucose 02/11/2020 90     Ventricular Rate 02/10/2020 90     Atrial Rate 02/10/2020 90     TX Interval 02/10/2020 144     QRSD Interval 02/10/2020 72     QT Interval 02/10/2020 338     QTC Interval 02/10/2020 413     P Axis 02/10/2020 78     QRS Axis 02/10/2020 75     T Wave Axis 02/10/2020 47     POC Glucose 02/11/2020 158*         ASSESSMENT:  Renal failure with worsening creatinine in need of dialysis    PLAN:  Risks and benefits of a laparoscopic PD catheter placement were discussed with him and he agrees to proceed  We will leave this unroof so they can start urgent PD once placed  This will likely occur on 02/13/2020    Counseling / Coordination of Care  Total time spent today  30 minutes  Greater than 50% of total time was spent with the patient and / or family counseling and / or coordination of care

## 2020-02-11 NOTE — TELEPHONE ENCOUNTER
Received procedure prep from hospital letting us know pt is inpatient at [facility]  I read the notes and it looks like pt wants peritoneal dialysis at home instead of his AVF with Dr Peyman Rodriguez  I called the patient and s/w him and he said he wants the peritoneal dialysis and we can cancel his procedure with Dr Peyman Rodriguez  He said if for some reason he changes his mind he will call us  I cancelled his surgery and post op as well as emailed Dr Peyman Rodriguez to make her aware

## 2020-02-11 NOTE — ASSESSMENT & PLAN NOTE
· With prior blood pressure that has been difficult to control with significant orthostasis   BP are better controlled today    · Continue Coreg, Cardura, and Nifedipine with hold parameters   · Torsemide 100 mg QD added by nephrology   · Hydralazine 10 mg IV Q6 PRN

## 2020-02-11 NOTE — ASSESSMENT & PLAN NOTE
· Baseline appears to be 8 0-9 0  Decreased to 7 3 today   No signs of bleeding   · Check Iron Panel, B12, Folate  · Trend Hgb

## 2020-02-12 ENCOUNTER — TELEPHONE (OUTPATIENT)
Dept: FAMILY MEDICINE CLINIC | Facility: CLINIC | Age: 35
End: 2020-02-12

## 2020-02-12 LAB
ALBUMIN SERPL BCP-MCNC: 2.4 G/DL (ref 3.5–5)
ALP SERPL-CCNC: 44 U/L (ref 46–116)
ALT SERPL W P-5'-P-CCNC: 8 U/L (ref 12–78)
ANION GAP SERPL CALCULATED.3IONS-SCNC: 15 MMOL/L (ref 4–13)
AST SERPL W P-5'-P-CCNC: 49 U/L (ref 5–45)
BASOPHILS # BLD AUTO: 0.03 THOUSANDS/ΜL (ref 0–0.1)
BASOPHILS NFR BLD AUTO: 0 % (ref 0–1)
BILIRUB SERPL-MCNC: 0.14 MG/DL (ref 0.2–1)
BUN SERPL-MCNC: 83 MG/DL (ref 5–25)
CALCIUM SERPL-MCNC: 8.7 MG/DL (ref 8.3–10.1)
CHLORIDE SERPL-SCNC: 106 MMOL/L (ref 100–108)
CO2 SERPL-SCNC: 18 MMOL/L (ref 21–32)
CREAT SERPL-MCNC: 10.92 MG/DL (ref 0.6–1.3)
EOSINOPHIL # BLD AUTO: 0.32 THOUSAND/ΜL (ref 0–0.61)
EOSINOPHIL NFR BLD AUTO: 4 % (ref 0–6)
ERYTHROCYTE [DISTWIDTH] IN BLOOD BY AUTOMATED COUNT: 13.5 % (ref 11.6–15.1)
FERRITIN SERPL-MCNC: 223 NG/ML (ref 8–388)
FOLATE SERPL-MCNC: 6.5 NG/ML (ref 3.1–17.5)
GFR SERPL CREATININE-BSD FRML MDRD: 6 ML/MIN/1.73SQ M
GLUCOSE SERPL-MCNC: 109 MG/DL (ref 65–140)
GLUCOSE SERPL-MCNC: 116 MG/DL (ref 65–140)
GLUCOSE SERPL-MCNC: 122 MG/DL (ref 65–140)
GLUCOSE SERPL-MCNC: 86 MG/DL (ref 65–140)
GLUCOSE SERPL-MCNC: 92 MG/DL (ref 65–140)
HBV CORE AB SER QL: NORMAL
HBV CORE IGM SER QL: NORMAL
HBV SURFACE AB SER-ACNC: <3.1 MIU/ML
HBV SURFACE AG SER QL: NORMAL
HCT VFR BLD AUTO: 23.9 % (ref 36.5–49.3)
HCV AB SER QL: NORMAL
HGB BLD-MCNC: 7.8 G/DL (ref 12–17)
IMM GRANULOCYTES # BLD AUTO: 0.03 THOUSAND/UL (ref 0–0.2)
IMM GRANULOCYTES NFR BLD AUTO: 0 % (ref 0–2)
IRON SATN MFR SERPL: 23 %
IRON SERPL-MCNC: 34 UG/DL (ref 65–175)
LYMPHOCYTES # BLD AUTO: 2.65 THOUSANDS/ΜL (ref 0.6–4.47)
LYMPHOCYTES NFR BLD AUTO: 32 % (ref 14–44)
MAGNESIUM SERPL-MCNC: 1.7 MG/DL (ref 1.6–2.6)
MCH RBC QN AUTO: 29.1 PG (ref 26.8–34.3)
MCHC RBC AUTO-ENTMCNC: 32.6 G/DL (ref 31.4–37.4)
MCV RBC AUTO: 89 FL (ref 82–98)
MONOCYTES # BLD AUTO: 0.65 THOUSAND/ΜL (ref 0.17–1.22)
MONOCYTES NFR BLD AUTO: 8 % (ref 4–12)
NEUTROPHILS # BLD AUTO: 4.62 THOUSANDS/ΜL (ref 1.85–7.62)
NEUTS SEG NFR BLD AUTO: 56 % (ref 43–75)
NRBC BLD AUTO-RTO: 0 /100 WBCS
PHOSPHATE SERPL-MCNC: 5.6 MG/DL (ref 2.7–4.5)
PLATELET # BLD AUTO: 413 THOUSANDS/UL (ref 149–390)
PMV BLD AUTO: 10.4 FL (ref 8.9–12.7)
POTASSIUM SERPL-SCNC: 4.8 MMOL/L (ref 3.5–5.3)
PROT SERPL-MCNC: 6.9 G/DL (ref 6.4–8.2)
PTH-INTACT SERPL-MCNC: 191.4 PG/ML (ref 18.4–80.1)
RBC # BLD AUTO: 2.68 MILLION/UL (ref 3.88–5.62)
SODIUM SERPL-SCNC: 139 MMOL/L (ref 136–145)
TIBC SERPL-MCNC: 150 UG/DL (ref 250–450)
VIT B12 SERPL-MCNC: 772 PG/ML (ref 100–900)
WBC # BLD AUTO: 8.3 THOUSAND/UL (ref 4.31–10.16)

## 2020-02-12 PROCEDURE — 87340 HEPATITIS B SURFACE AG IA: CPT | Performed by: INTERNAL MEDICINE

## 2020-02-12 PROCEDURE — 86706 HEP B SURFACE ANTIBODY: CPT | Performed by: INTERNAL MEDICINE

## 2020-02-12 PROCEDURE — 83550 IRON BINDING TEST: CPT | Performed by: PHYSICIAN ASSISTANT

## 2020-02-12 PROCEDURE — 84100 ASSAY OF PHOSPHORUS: CPT | Performed by: PHYSICIAN ASSISTANT

## 2020-02-12 PROCEDURE — 86705 HEP B CORE ANTIBODY IGM: CPT | Performed by: INTERNAL MEDICINE

## 2020-02-12 PROCEDURE — 80053 COMPREHEN METABOLIC PANEL: CPT | Performed by: PHYSICIAN ASSISTANT

## 2020-02-12 PROCEDURE — 82728 ASSAY OF FERRITIN: CPT | Performed by: PHYSICIAN ASSISTANT

## 2020-02-12 PROCEDURE — 82607 VITAMIN B-12: CPT | Performed by: PHYSICIAN ASSISTANT

## 2020-02-12 PROCEDURE — 86704 HEP B CORE ANTIBODY TOTAL: CPT | Performed by: INTERNAL MEDICINE

## 2020-02-12 PROCEDURE — 82746 ASSAY OF FOLIC ACID SERUM: CPT | Performed by: PHYSICIAN ASSISTANT

## 2020-02-12 PROCEDURE — 83970 ASSAY OF PARATHORMONE: CPT | Performed by: PHYSICIAN ASSISTANT

## 2020-02-12 PROCEDURE — 84165 PROTEIN E-PHORESIS SERUM: CPT | Performed by: PATHOLOGY

## 2020-02-12 PROCEDURE — 84165 PROTEIN E-PHORESIS SERUM: CPT | Performed by: PHYSICIAN ASSISTANT

## 2020-02-12 PROCEDURE — 82948 REAGENT STRIP/BLOOD GLUCOSE: CPT

## 2020-02-12 PROCEDURE — 99232 SBSQ HOSP IP/OBS MODERATE 35: CPT | Performed by: PHYSICIAN ASSISTANT

## 2020-02-12 PROCEDURE — 83735 ASSAY OF MAGNESIUM: CPT | Performed by: PHYSICIAN ASSISTANT

## 2020-02-12 PROCEDURE — 86803 HEPATITIS C AB TEST: CPT | Performed by: INTERNAL MEDICINE

## 2020-02-12 PROCEDURE — 85025 COMPLETE CBC W/AUTO DIFF WBC: CPT | Performed by: PHYSICIAN ASSISTANT

## 2020-02-12 PROCEDURE — 83883 ASSAY NEPHELOMETRY NOT SPEC: CPT | Performed by: PHYSICIAN ASSISTANT

## 2020-02-12 PROCEDURE — 83540 ASSAY OF IRON: CPT | Performed by: PHYSICIAN ASSISTANT

## 2020-02-12 PROCEDURE — 99232 SBSQ HOSP IP/OBS MODERATE 35: CPT | Performed by: INTERNAL MEDICINE

## 2020-02-12 RX ORDER — CEFAZOLIN SODIUM 2 G/50ML
2000 SOLUTION INTRAVENOUS ONCE
Status: COMPLETED | OUTPATIENT
Start: 2020-02-13 | End: 2020-02-13

## 2020-02-12 RX ORDER — SODIUM BICARBONATE 650 MG/1
1300 TABLET ORAL
Status: DISCONTINUED | OUTPATIENT
Start: 2020-02-12 | End: 2020-02-14

## 2020-02-12 RX ORDER — DOCUSATE SODIUM 100 MG/1
100 CAPSULE, LIQUID FILLED ORAL DAILY PRN
Status: DISCONTINUED | OUTPATIENT
Start: 2020-02-13 | End: 2020-02-15 | Stop reason: HOSPADM

## 2020-02-12 RX ADMIN — CALCIUM ACETATE 667 MG: 667 CAPSULE ORAL at 11:42

## 2020-02-12 RX ADMIN — NIFEDIPINE 60 MG: 30 TABLET, FILM COATED, EXTENDED RELEASE ORAL at 16:17

## 2020-02-12 RX ADMIN — CALCIUM ACETATE 667 MG: 667 CAPSULE ORAL at 16:17

## 2020-02-12 RX ADMIN — NIFEDIPINE 60 MG: 30 TABLET, FILM COATED, EXTENDED RELEASE ORAL at 09:00

## 2020-02-12 RX ADMIN — CARVEDILOL 25 MG: 12.5 TABLET, FILM COATED ORAL at 09:00

## 2020-02-12 RX ADMIN — TORSEMIDE 100 MG: 100 TABLET ORAL at 09:00

## 2020-02-12 RX ADMIN — CALCIUM ACETATE 667 MG: 667 CAPSULE ORAL at 09:00

## 2020-02-12 RX ADMIN — SODIUM BICARBONATE 650 MG TABLET 650 MG: at 09:00

## 2020-02-12 RX ADMIN — SODIUM BICARBONATE 650 MG TABLET 1300 MG: at 16:48

## 2020-02-12 RX ADMIN — CARVEDILOL 25 MG: 12.5 TABLET, FILM COATED ORAL at 16:17

## 2020-02-12 RX ADMIN — ASPIRIN 81 MG: 81 TABLET, COATED ORAL at 09:00

## 2020-02-12 RX ADMIN — DOXAZOSIN 4 MG: 4 TABLET ORAL at 21:17

## 2020-02-12 RX ADMIN — IRON SUCROSE 100 MG: 20 INJECTION, SOLUTION INTRAVENOUS at 11:40

## 2020-02-12 NOTE — ASSESSMENT & PLAN NOTE
· With prior blood pressure that has been difficult to control with significant orthostasis   BP are better controlled today, but still above goal     · Continue Coreg, Cardura, and Nifedipine with hold parameters   · Torsemide 100 mg QD added by nephrology   · Hydralazine 10 mg IV Q6 PRN   · Defer further changes to nephrology

## 2020-02-12 NOTE — ASSESSMENT & PLAN NOTE
· Present on admission given vascular congestion on chest x-ray, bilateral leg swelling and shortness of breath  Recent echo stable with EF of 55%, grade 1 DD  Patient has drastically improved   Only has 2+ leg swelling, but patient feels that he is at his baseline   · Continue with Torsemide 100 mg QD as per nephrology recommendations   · I/O and daily weights   · Renal diet ordered  · Patient will ultimately need Peritoneal Dialysis (change from office choice)

## 2020-02-12 NOTE — SOCIAL WORK
Consult received for "New outpatient PD set up"    HUGO spoke with nephrology team who reported that the plan is for patient to get PD catheter placement likely 2/13  Plan will be to start PD on Friday here in the hospital     CM made a referral in [facility] to [facility] for new PD start  Clinical documentation sent included Hep Panel, Chest Xray, and nephrology notes  HUGO also called [facility] and spoke with the PD nurse ARIAS who reported that she is aware of the patient and just waiting on patient to be accepted by [facility] admissions so they can begin their process  BODØ will reach out to CM if any additional information is needed at this time to process the admission  CM department will continue to follow to assist with discharge coordination

## 2020-02-12 NOTE — PROGRESS NOTES
NEPHROLOGY PROGRESS NOTE   Oj Gaytan 37 y o  male MRN: [id number]  Unit/Bed#: S -01 Encounter: [id number]  Reason for Consult: new start ESRD    ASSESSMENT/PLAN:  1  Elevated Creatinine with Progressive Chronic Kidney Disease stage 5- Baseline creatinine was around 5 but in January increased to 7 and now increased to 11  - no urgent/emergent indication for dialysis  - electrolytes stable  - patient modality of choice is peritoneal dialysis (change from office visit)  - consult surgery for PD catheter placement  - plan to set up with Dick Perez PD nurse for outpatient training  - consider PD urgent start vs PD catheter placement with outpatient start in 1 month  2  Mild Hypervolemia- given lasix 100mg IV x1 in ER 2/10  - weight stable from 230-235 pounds  - home diuretic: torsemide 100mg as needed (usually takes about once every 2 weeks)  - increased torsemide to 100mg daily 2/11 and monitor  3  Hypertension- continue home regimen + increased diuretic 2/11 and monitor  4  Hyperphosphatemia- low phosphorus diet, start phoslo with meals  5  Metabolic Acidosis- sodium bicarbonate tablets 650mg BID  6  Anemia- hgb below goal  - will start epogen with dialysis as an outpatient  - transfuse for hgb <7  - iron studies borderline, give IV venofer 100mg x5 doses   7  Access- PD catheter to be placed by Dr Erum Rod on 2/13/20    Disposition:  NPO at midnight  SUBJECTIVE:  Patient understands plan  For surgery tomorrow  For PD after catheter placed  Denies significant SOB  Denies N/V/D      OBJECTIVE:  Current Weight: Weight - Scale: 107 kg (235 lb)  Vitals:    02/11/20 2106 02/11/20 2200 02/12/20 0553 02/12/20 0747   BP: 144/58 140/62  160/80   BP Location: Right arm Right arm  Right arm   Pulse: 86 87  89   Resp:  18  18   Temp:  98 3 °F (36 8 °C)  98 3 °F (36 8 °C)   TempSrc:  Oral  Oral   SpO2:  97%  96%   Weight:   107 kg (235 lb)    Height:           Intake/Output Summary (Last 24 hours) at 2/12/2020 2407 [facility] filed at 2/11/2020 2200  Gross per 24 hour   Intake 660 ml   Output 450 ml   Net 210 ml     General: NAD  Skin: no rash  Eyes: anicteric  ENMT: mm moist  Neck: no masses  Respiratory: CTAB  Cardiac: RRR  Extremities: mild bilateral edema  GI: soft nt nd  Neuro: alert awake  Psych: mood and affect appropriate    Medications:    Current Facility-Administered Medications:     acetaminophen (TYLENOL) tablet 650 mg, 650 mg, Oral, Q6H PRN, APRIL Fernández-DALTON, 650 mg at 02/10/20 2247    albuterol (PROVENTIL HFA,VENTOLIN HFA) inhaler 2 puff, 2 puff, Inhalation, Q4H PRN, Reynaldo Bender PA-C    aspirin (ECOTRIN LOW STRENGTH) EC tablet 81 mg, 81 mg, Oral, Daily, APRIL Garcia-DALTON, 81 mg at 02/12/20 0900    calcium acetate (PHOSLO) capsule 667 mg, 667 mg, Oral, TID With Meals, Tonya Kay [address], PA-C, 667 mg at 02/12/20 0900    carvedilol (COREG) tablet 25 mg, 25 mg, Oral, BID With Meals, Reynaldo Bender PA-C, 25 mg at 02/12/20 0900    doxazosin (CARDURA) tablet 4 mg, 4 mg, Oral, HS, APRIL Garcia-DALTON, 4 mg at 02/11/20 2109    hydrALAZINE (APRESOLINE) injection 10 mg, 10 mg, Intravenous, Q6H PRN, APRIL Marrero-DALTON, 10 mg at 02/10/20 2035    insulin lispro (HumaLOG) 100 units/mL subcutaneous injection 1-6 Units, 1-6 Units, Subcutaneous, TID AC, 1 Units at 02/11/20 1317 **AND** Fingerstick Glucose (POCT), , , TID AC, Reynaldo Bender PA-C    insulin lispro (HumaLOG) 100 units/mL subcutaneous injection 1-6 Units, 1-6 Units, Subcutaneous, HS, Reynaldo Bender PA-C    iron sucrose (VENOFER) 100 mg in sodium chloride 0 9 % 100 mL IVPB, 100 mg, Intravenous, Every Other Day, Reynaldo Bender PA-C    NIFEdipine (PROCARDIA XL) 24 hr tablet 60 mg, 60 mg, Oral, BID, Reynaldo Bender PA-C, 60 mg at 02/12/20 0900    ondansetron (ZOFRAN) injection 4 mg, 4 mg, Intravenous, Q6H PRN, Daya Blanco PA-C    sodium bicarbonate tablet 650 mg, 650 mg, Oral, BID after meals, Tonya Santiago PA-C, 650 mg at 02/12/20 0900   torsemide (DEMADEX) tablet 100 mg, 100 mg, Oral, Daily, [facility], PA-C, 100 mg at 02/12/20 0900    Laboratory Results:  Results from last 7 days   Lab Units 02/12/20  0555 02/12/20  0554 02/11/20  0433 02/10/20  1513 02/10/20  1015   WBC Thousand/uL  --  8 30 7 89 8 25 8 62   HEMOGLOBIN g/dL  --  7 8* 7 3* 8 1* 7 8*   HEMATOCRIT %  --  23 9* 22 8* 24 8* 24 2*   PLATELETS Thousands/uL  --  413* 353 372 339   POTASSIUM mmol/L 4 8  --  4 6 5 2 5 3   CHLORIDE mmol/L 106  --  108 108 108   CO2 mmol/L 18*  --  18* 18* 19*   BUN mg/dL 83*  --  80* 81* 83*   CREATININE mg/dL 10 92*  --  10 94* 11 00* 11 22*   CALCIUM mg/dL 8 7  --  8 2* 8 7 8 7   MAGNESIUM mg/dL 1 7  --  1 7 1 8  --    PHOSPHORUS mg/dL 5 6*  --  6 0* 5 5*  --

## 2020-02-12 NOTE — DISCHARGE INSTRUCTIONS
Diet and activity as tolerated  May shower    May drive when not taking pain medication  Please use milk a magnesia as needed daily in order to help prevent constipation  You may lift 10 lb as much as desired the 1st week, 20 lb through the 2nd week, 30 lb in 3 weeks  Please call [phone] for a follow-up office visit for 2 weeks  [address] [facility], [address], [place], [id number]   [address] between [place] and [employer]

## 2020-02-12 NOTE — PLAN OF CARE
Problem: Potential for Falls  Goal: Patient will remain free of falls  Description  INTERVENTIONS:  - Assess patient frequently for physical needs  -  Identify cognitive and physical deficits and behaviors that affect risk of falls    -  [place] fall precautions as indicated by assessment   - Educate patient/family on patient safety including physical limitations  - Instruct patient to call for assistance with activity based on assessment  - Modify environment to reduce risk of injury  - Consider OT/PT consult to assist with strengthening/mobility  Outcome: Progressing     Problem: CARDIOVASCULAR - ADULT  Goal: Maintains optimal cardiac output and hemodynamic stability  Description  INTERVENTIONS:  - Monitor I/O, vital signs and rhythm  - Monitor for S/S and trends of decreased cardiac output  - Administer and titrate ordered vasoactive medications to optimize hemodynamic stability  - Assess quality of pulses, skin color and temperature  - Assess for signs of decreased coronary artery perfusion  - Instruct patient to report change in severity of symptoms  Outcome: Progressing     Problem: RESPIRATORY - ADULT  Goal: Achieves optimal ventilation and oxygenation  Description  INTERVENTIONS:  - Assess for changes in respiratory status  - Assess for changes in mentation and behavior  - Position to facilitate oxygenation and minimize respiratory effort  - Oxygen administered by appropriate delivery if ordered  - Initiate smoking cessation education as indicated  - Encourage broncho-pulmonary hygiene including cough, deep breathe, Incentive Spirometry  - Assess the need for suctioning and aspirate as needed  - Assess and instruct to report SOB or any respiratory difficulty  - Respiratory Therapy support as indicated  Outcome: Progressing     Problem: PAIN - ADULT  Goal: Verbalizes/displays adequate comfort level or baseline comfort level  Description  Interventions:  - Encourage patient to monitor pain and request assistance  - Assess pain using appropriate pain scale  - Administer analgesics based on type and severity of pain and evaluate response  - Implement non-pharmacological measures as appropriate and evaluate response  - Consider cultural and social influences on pain and pain management  - Notify physician/advanced practitioner if interventions unsuccessful or patient reports new pain  Outcome: Progressing     Problem: SAFETY ADULT  Goal: Patient will remain free of falls  Description  INTERVENTIONS:  - Assess patient frequently for physical needs  -  Identify cognitive and physical deficits and behaviors that affect risk of falls    -  [place] fall precautions as indicated by assessment   - Educate patient/family on patient safety including physical limitations  - Instruct patient to call for assistance with activity based on assessment  - Modify environment to reduce risk of injury  - Consider OT/PT consult to assist with strengthening/mobility  Outcome: Progressing

## 2020-02-12 NOTE — PROGRESS NOTES
[facility]  Progress Note - Stepahnie Fetch 1985, 37 y o  male MRN: [id number]    Unit/Bed#: S -01 Encounter: [id number]    Primary Care Provider: Silver Valencia MD   Date and time admitted to hospital: 2/10/2020  2:08 PM        * Acute renal failure superimposed on stage 5 chronic kidney disease, not on chronic dialysis [facility])  Assessment & Plan  · POA, creatinine up to 11 from baseline of 7 0 (which is up from 5 previously)  Patient has seen vascular and is scheduled for a LEFT AV fistula creation  Likely secondary to progression of underlying renal disease  Electrolytes stable  As per nephrology consult today patient would prefer PD over HD  No urgent start for dialysis indicated at this time  He will have a PD catheter placement tomorrow     · Nephrology input appreciated   · Arranging for PD catheter placement Thursday   · Appreciate General Surgery assistance   · Avoid hypotension   · Coreg, Cardura, Nifedipine continued with hold parameters   · Avoid nephrotoxins    · Trend BMP, lytes   · Demedex has been restarted by nephrology   · Phoslo added   · NaHCO3 increased      Hypervolemia associated with renal insufficiency  Assessment & Plan  · Present on admission given vascular congestion on chest x-ray, bilateral leg swelling and shortness of breath  Recent echo stable with EF of 55%, grade 1 DD  Patient has drastically improved   Only has 2+ leg swelling, but patient feels that he is at his baseline   · Continue with Torsemide 100 mg QD as per nephrology recommendations   · I/O and daily weights   · Renal diet ordered  · Patient will ultimately need Peritoneal Dialysis (change from office choice)    Hyperphosphatemia  Assessment & Plan  · Improved to 5 6 today   · Will monitor  · Phoslo started by nephrology      Type 2 diabetes mellitus with kidney complication, with long-term current use of insulin [facility])  Assessment & Plan  Lab Results   Component Value Date    HGBA1C 5 3 02/10/2020       Recent Labs     02/11/20  1144 02/11/20  1615 02/11/20  2102 02/12/20  0746   POCGLU 158* 86 100 86       Blood Sugar Average: Last 72 hrs:  · (P) 170 [id number]   · Euglycemic on admission  A1c well controlled  Patient states he would take insulin as needed and actually hasn't needed any in 6 months  · Hold listed home doses of Basaglar and Admelog   · SSI algorithm with meals and bedtime   · QID accuchecks     Myopericarditis  Assessment & Plan  · Treated previously with colchicine and steroids  No further symptoms  · No further workup needed    Essential hypertension  Assessment & Plan  · With prior blood pressure that has been difficult to control with significant orthostasis  BP are better controlled today, but still above goal     · Continue Coreg, Cardura, and Nifedipine with hold parameters   · Torsemide 100 mg QD added by nephrology   · Hydralazine 10 mg IV Q6 PRN   · Defer further changes to nephrology     Anemia due to chronic kidney disease  Assessment & Plan  · Baseline appears to be 8 0-9 0  Hgb stable at 7 8  No signs of bleeding  Iron noted to be low    · Give 100 mg IV Venofer QOD for 5 days   · Trend Hgb       VTE Pharmacologic Prophylaxis:   Pharmacologic: None needed as per protocol   Mechanical VTE Prophylaxis in Place: No    Patient Centered Rounds: I have performed bedside rounds with nursing staff today  Discussions with Specialists or Other Care Team Provider: Discussed with nephrology, RN, CM    Education and Discussions with Family / Patient: Discussed with patient, wife at bedside     Time Spent for Care: 30 minutes  More than 50% of total time spent on counseling and coordination of care as described above      Current Length of Stay: 2 day(s)    Current Patient Status: Inpatient   Certification Statement: The patient will continue to require additional inpatient hospital stay due to on going management of CKD 5, needs PD cath tomorrow     Discharge Plan: Pending PD cath and stabilized utilization of peritoneal dialysis     Code Status: Level 1 - Full Code      Subjective:   Patient reports that he is feeling well today  Reports that his breathing is at his baseline  Denies cough/wheezing  Still reports leg swelling, but this is not bothersome  Denies chest pain, fevers, or chills  Objective:     Vitals:   Temp (24hrs), Av 3 °F (36 8 °C), Min:98 3 °F (36 8 °C), Max:98 3 °F (36 8 °C)    Temp:  [98 3 °F (36 8 °C)] 98 3 °F (36 8 °C)  HR:  [86-89] 89  Resp:  [18-19] 18  BP: (140-160)/(58-80) 160/80  SpO2:  [93 %-97 %] 96 %  Body mass index is 31 kg/m²  Input and Output Summary (last 24 hours): Intake/Output Summary (Last 24 hours) at 2020 0956  Last data filed at 2020 2200  Gross per 24 hour   Intake 1020 ml   Output 600 ml   Net 420 ml       Physical Exam:     Physical Exam   Constitutional: He is oriented to person, place, and time  Vital signs are normal  He appears well-developed and well-nourished  Non-toxic appearance  No distress  HENT:   Head: Normocephalic and atraumatic  Mouth/Throat: Mucous membranes are not dry  Eyes: Pupils are equal, round, and reactive to light  Conjunctivae and EOM are normal  No scleral icterus  Pupils are equal    Neck: Neck supple  Cardiovascular: Normal rate, regular rhythm, S1 normal, S2 normal, normal heart sounds and intact distal pulses  Exam reveals no S3 and no S4  No murmur heard  Pulmonary/Chest: Effort normal and breath sounds normal  No accessory muscle usage or stridor  No respiratory distress  He has no decreased breath sounds  He has no wheezes  He has no rhonchi  He has no rales  He exhibits no tenderness  Abdominal: Soft  Bowel sounds are normal  He exhibits no distension and no mass  There is no tenderness  There is no rigidity, no rebound and no guarding  Neurological: He is alert and oriented to person, place, and time  He is not disoriented  He displays no tremor   He displays no seizure activity  Skin: Skin is warm and dry  Additional Data:     Labs:    Results from last 7 days   Lab Units 02/12/20  0554   WBC Thousand/uL 8 30   HEMOGLOBIN g/dL 7 8*   HEMATOCRIT % 23 9*   PLATELETS Thousands/uL 413*   NEUTROS PCT % 56   LYMPHS PCT % 32   MONOS PCT % 8   EOS PCT % 4     Results from last 7 days   Lab Units 02/12/20  0555   POTASSIUM mmol/L 4 8   CHLORIDE mmol/L 106   CO2 mmol/L 18*   BUN mg/dL 83*   CREATININE mg/dL 10 92*   CALCIUM mg/dL 8 7   ALK PHOS U/L 44*   ALT U/L 8*   AST U/L 49*     Results from last 7 days   Lab Units 02/10/20  1015   INR  1 04       * I Have Reviewed All Lab Data Listed Above  * Additional Pertinent Lab Tests Reviewed:  [facility] Admission Reviewed    Imaging:    Imaging Reports Reviewed Today Include: None  Imaging Personally Reviewed by Myself Includes:  None    Recent Cultures (last 7 days):           Last 24 Hours Medication List:     Current Facility-Administered Medications:  acetaminophen 650 mg Oral Q6H PRN Josefina Hernandez PA-C   albuterol 2 puff Inhalation Q4H PRN Reynaldo Engel PA-C   aspirin 81 mg Oral Daily Reynaldo Bender PA-C   calcium acetate 667 mg Oral TID [facility] Meals [facility]   carvedilol 25 mg Oral BID With Meals Reynaldo Engel PA-C   doxazosin 4 mg Oral HS Reynaldo Bender PA-C   hydrALAZINE 10 mg Intravenous Q6H PRN Reynaldo Bender PA-C   insulin lispro 1-6 Units Subcutaneous TID AC Reynaldo Bender PA-C   insulin lispro 1-6 Units Subcutaneous HS Reynaldo Bender PA-C   iron sucrose 100 mg Intravenous Every Other Day Reynaldo Engel PA-C   NIFEdipine ER 60 mg Oral BID Reynaldo Bender PA-C   ondansetron 4 mg Intravenous Q6H PRN Reynaldo Bender PA-C   sodium bicarbonate 650 mg Oral BID after meals [facility]   torsemide 100 mg Oral Daily [facility] [address] [place] [address]        Today, Patient Was Seen By: Brinda Gutierrez PA-C    ** Please Note: Dictation voice to text software may have been used in the creation of this document   **

## 2020-02-12 NOTE — ASSESSMENT & PLAN NOTE
· Baseline appears to be 8 0-9 0  Hgb stable at 7 8  No signs of bleeding   Iron noted to be low    · Give 100 mg IV Venofer QOD for 5 days   · Trend Hgb

## 2020-02-12 NOTE — SOCIAL WORK
CM received message back from [facility] admissions requesting information  CM called and spoke directly to [facility] in admissions regarding their document request       [facility] was able to confirm the received clinical that CM sent  They will follow up with Shirin Pappas and complete the insurance verification  CM department will continue to follow to assist with discharge coordination

## 2020-02-12 NOTE — PROGRESS NOTES
Progress Note -Surgery APRIL Ramires 37 y o  male MRN: [id number]  Unit/Bed#: S -01 Encounter: [id number]    ASSESSMENT/PLAN:  Problem List     * (Principal) Acute renal failure superimposed on stage 5 chronic kidney disease, not on chronic dialysis (HCC)    Type 2 diabetes mellitus with kidney complication, with long-term current use of insulin ([facility])    Lab Results   Component Value Date    HGBA1C 5 3 02/10/2020     Recent Labs     02/11/20  0754 02/11/20  1144 02/11/20  1615 02/11/20  2102   POCGLU 90 158* 86 100     Blood Sugar Average: Last 72 hrs:  (P) 113 2    Hyperlipidemia    NSTEMI (non-ST elevated myocardial infarction) ([facility])    Myopericarditis    NSVT (nonsustained ventricular tachycardia) ([facility])    Class 1 obesity due to excess calories with serious comorbidity and body mass index (BMI) of 31 0 to 31 9 in adult    LUIS on CPAP (Chronic)    Anemia due to chronic kidney disease    Hypervolemia associated with renal insufficiency (Chronic)    Essential hypertension    CKD stage 5 due to type 2 diabetes mellitus ([facility] [address] )    Lab Results   Component Value Date    HGBA1C 5 3 02/10/2020     Recent Labs     02/11/20  0754 02/11/20  1144 02/11/20  1615 02/11/20  2102   POCGLU 90 158* 86 100     Blood Sugar Average: Last 72 hrs:  (P) 113 2   38 yo with ARF superimposed on CKD V scheduled for PD catheter tomorrow  · NPO after MN for PD cath tomorrow  · Care per primary team     VTE Pharmacologic Prophylaxis: Sequential compression device (Venodyne)     Subjective/Objective     Subjective:    No events last PM or complaints    Objective/Physical Exam: Blood pressure 140/62, pulse 87, temperature 98 3 °F (36 8 °C), temperature source Oral, resp  rate 18, height 6' 1" (1 854 m), weight 107 kg (235 lb), SpO2 97 %  ,Body mass index is 31 kg/m²      General appearance: alert and oriented, in no acute distress  Heart: regular rate and rhythm, S1, S2 normal, no murmur, click, rub or gallop  Lungs: clear to auscultation bilaterally  Abdomen: soft, non-tender; bowel sounds normal; no masses,  no organomegaly       Current Facility-Administered Medications:     acetaminophen (TYLENOL) tablet 650 mg, 650 mg, Oral, Q6H PRN, Cristopher Asher PA-C, 650 mg at 02/10/20 2247    albuterol (PROVENTIL HFA,VENTOLIN HFA) inhaler 2 puff, 2 puff, Inhalation, Q4H PRN, Reynaldo Bender PA-C    aspirin (ECOTRIN LOW STRENGTH) EC tablet 81 mg, 81 mg, Oral, Daily, Reynaldo Bender PA-C, 81 mg at 02/11/20 0817    calcium acetate (PHOSLO) capsule 667 mg, 667 mg, Oral, TID With Meals, [facility], PA-C, 667 mg at 02/11/20 1653    carvedilol (COREG) tablet 25 mg, 25 mg, Oral, BID With Meals, Reynaldo Bender PA-C, 25 mg at 02/11/20 1648    doxazosin (CARDURA) tablet 4 mg, 4 mg, Oral, HS, Reynaldo Bender PA-C, 4 mg at 02/11/20 2109    hydrALAZINE (APRESOLINE) injection 10 mg, 10 mg, Intravenous, Q6H PRN, APRIL Garcia-C, 10 mg at 02/10/20 2035    insulin lispro (HumaLOG) 100 units/mL subcutaneous injection 1-6 Units, 1-6 Units, Subcutaneous, TID AC, 1 Units at 02/11/20 1317 **AND** Fingerstick Glucose (POCT), , , TID AC, Reynaldo Bender PA-C    insulin lispro (HumaLOG) 100 units/mL subcutaneous injection 1-6 Units, 1-6 Units, Subcutaneous, HS, APRIL Garcia-C    NIFEdipine (PROCARDIA XL) 24 hr tablet 60 mg, 60 mg, Oral, BID, APRIL Garcia-C, 60 mg at 02/11/20 1649    ondansetron (ZOFRAN) injection 4 mg, 4 mg, Intravenous, Q6H PRN, MEHREEN GarciaC    sodium bicarbonate tablet 650 mg, 650 mg, Oral, BID after meals, [facility], PA-C, 650 mg at 02/11/20 1648    torsemide (DEMADEX) tablet 100 mg, 100 mg, Oral, Daily, [facility], PADONTRELL, 100 mg at 02/11/20 1315       Lab, Imaging and other studies:   WBC 02/12/2020 8 30  4 31 - 10 16 Thousand/uL Final    RBC 02/12/2020 2 68* 3 88 - 5 62 Million/uL Final    Hemoglobin 02/12/2020 7 8* 12 0 - 17 0 g/dL Final    Hematocrit 02/12/2020 23 9* 36 5 - 49 3 % Final    MCV 02/12/2020 89  82 - 98 fL Final    MCH 02/12/2020 29 1  26 8 - 34 3 pg Final    MCHC 02/12/2020 32 6  31 4 - 37 4 g/dL Final    RDW 02/12/2020 13 5  11 6 - 15 1 % Final    MPV 02/12/2020 10 4  8 9 - 12 7 fL Final    Platelets 08/40/2094 413* 149 - 390 Thousands/uL Final    nRBC 02/12/2020 0  /100 WBCs Final    Neutrophils Relative 02/12/2020 56  43 - 75 % Final    Immat GRANS % 02/12/2020 0  0 - 2 % Final    Lymphocytes Relative 02/12/2020 32  14 - 44 % Final    Monocytes Relative 02/12/2020 8  4 - 12 % Final    Eosinophils Relative 02/12/2020 4  0 - 6 % Final    Basophils Relative 02/12/2020 0  0 - 1 % Final    Neutrophils Absolute 02/12/2020 4 62  1 85 - 7 62 Thousands/µL Final    Immature Grans Absolute 02/12/2020 0 03  0 00 - 0 20 Thousand/uL Final    Lymphocytes Absolute 02/12/2020 2 65  0 60 - 4 47 Thousands/µL Final    Monocytes Absolute 02/12/2020 0 65  0 17 - 1 22 Thousand/µL Final    Eosinophils Absolute 02/12/2020 0 32  0 00 - 0 61 Thousand/µL Final    Basophils Absolute 02/12/2020 0 03  0 00 - 0 10 Thousands/µL Final    Sodium 02/12/2020 139  136 - 145 mmol/L Final    Potassium 02/12/2020 4 8  3 5 - 5 3 mmol/L Final    Chloride 02/12/2020 106  100 - 108 mmol/L Final    CO2 02/12/2020 18* 21 - 32 mmol/L Final    ANION GAP 02/12/2020 15* 4 - 13 mmol/L Final    BUN 02/12/2020 83* 5 - 25 mg/dL Final    Creatinine 02/12/2020 10 92* 0 60 - 1 30 mg/dL Final    Glucose 02/12/2020 92  65 - 140 mg/dL Final    Calcium 02/12/2020 8 7  8 3 - 10 1 mg/dL Final    AST 02/12/2020 49* 5 - 45 U/L Final    ALT 02/12/2020 8* 12 - 78 U/L Final    Alkaline Phosphatase 02/12/2020 44* 46 - 116 U/L Final    Total Protein 02/12/2020 6 9  6 4 - 8 2 g/dL Final    Albumin 02/12/2020 2 4* 3 5 - 5 0 g/dL Final    Total Bilirubin 02/12/2020 0 14* 0 20 - 1 00 mg/dL Final    eGFR 02/12/2020 6  ml/min/1 73sq m Final    Magnesium 02/12/2020 1 7  1 6 - 2 6 mg/dL Final    Phosphorus 02/12/2020 5 6* 2 7 - 4 5 mg/dL Final

## 2020-02-12 NOTE — ASSESSMENT & PLAN NOTE
· POA, creatinine up to 11 from baseline of 7 0 (which is up from 5 previously)  Patient has seen vascular and is scheduled for a LEFT AV fistula creation  Likely secondary to progression of underlying renal disease  Electrolytes stable  As per nephrology consult today patient would prefer PD over HD  No urgent start for dialysis indicated at this time   He will have a PD catheter placement tomorrow     · Nephrology input appreciated   · Arranging for PD catheter placement Thursday   · Appreciate General Surgery assistance   · Avoid hypotension   · Coreg, Cardura, Nifedipine continued with hold parameters   · Avoid nephrotoxins    · Trend BMP, lytes   · Demedex has been restarted by nephrology   · Phoslo added   · NaHCO3 increased

## 2020-02-13 ENCOUNTER — ANESTHESIA (INPATIENT)
Dept: PERIOP | Facility: HOSPITAL | Age: 35
DRG: 951 | End: 2020-02-13
Payer: COMMERCIAL

## 2020-02-13 ENCOUNTER — ANESTHESIA EVENT (INPATIENT)
Dept: PERIOP | Facility: HOSPITAL | Age: 35
DRG: 951 | End: 2020-02-13
Payer: COMMERCIAL

## 2020-02-13 DIAGNOSIS — E11.9 TYPE 2 DIABETES MELLITUS WITHOUT COMPLICATION, WITHOUT LONG-TERM CURRENT USE OF INSULIN (HCC): ICD-10-CM

## 2020-02-13 PROBLEM — N28.9 HYPERVOLEMIA ASSOCIATED WITH RENAL INSUFFICIENCY: Chronic | Status: RESOLVED | Noted: 2019-09-13 | Resolved: 2020-02-13

## 2020-02-13 PROBLEM — E87.70 HYPERVOLEMIA ASSOCIATED WITH RENAL INSUFFICIENCY: Chronic | Status: RESOLVED | Noted: 2019-09-13 | Resolved: 2020-02-13

## 2020-02-13 LAB
ABO GROUP BLD: NORMAL
ALBUMIN SERPL ELPH-MCNC: 3.15 G/DL (ref 3.5–5)
ALBUMIN SERPL ELPH-MCNC: 48.5 % (ref 52–65)
ALBUMIN UR ELPH-MCNC: 75.2 %
ALPHA1 GLOB MFR UR ELPH: 3.2 %
ALPHA1 GLOB SERPL ELPH-MCNC: 0.36 G/DL (ref 0.1–0.4)
ALPHA1 GLOB SERPL ELPH-MCNC: 5.5 % (ref 2.5–5)
ALPHA2 GLOB MFR UR ELPH: 3.9 %
ALPHA2 GLOB SERPL ELPH-MCNC: 0.81 G/DL (ref 0.4–1.2)
ALPHA2 GLOB SERPL ELPH-MCNC: 12.4 % (ref 7–13)
ANION GAP SERPL CALCULATED.3IONS-SCNC: 12 MMOL/L (ref 4–13)
B-GLOBULIN MFR UR ELPH: 4.5 %
BETA GLOB ABNORMAL SERPL ELPH-MCNC: 0.29 G/DL (ref 0.4–0.8)
BETA1 GLOB SERPL ELPH-MCNC: 4.4 % (ref 5–13)
BETA2 GLOB SERPL ELPH-MCNC: 6.6 % (ref 2–8)
BETA2+GAMMA GLOB SERPL ELPH-MCNC: 0.43 G/DL (ref 0.2–0.5)
BLD GP AB SCN SERPL QL: NEGATIVE
BUN SERPL-MCNC: 83 MG/DL (ref 5–25)
CALCIUM SERPL-MCNC: 8.1 MG/DL (ref 8.3–10.1)
CHLORIDE SERPL-SCNC: 107 MMOL/L (ref 100–108)
CO2 SERPL-SCNC: 20 MMOL/L (ref 21–32)
CREAT SERPL-MCNC: 10.93 MG/DL (ref 0.6–1.3)
ERYTHROCYTE [DISTWIDTH] IN BLOOD BY AUTOMATED COUNT: 13.7 % (ref 11.6–15.1)
GAMMA GLOB ABNORMAL SERPL ELPH-MCNC: 1.47 G/DL (ref 0.5–1.6)
GAMMA GLOB MFR UR ELPH: 13.2 %
GAMMA GLOB SERPL ELPH-MCNC: 22.6 % (ref 12–22)
GFR SERPL CREATININE-BSD FRML MDRD: 6 ML/MIN/1.73SQ M
GLUCOSE SERPL-MCNC: 102 MG/DL (ref 65–140)
GLUCOSE SERPL-MCNC: 111 MG/DL (ref 65–140)
GLUCOSE SERPL-MCNC: 84 MG/DL (ref 65–140)
GLUCOSE SERPL-MCNC: 92 MG/DL (ref 65–140)
GLUCOSE SERPL-MCNC: 99 MG/DL (ref 65–140)
HCT VFR BLD AUTO: 23.8 % (ref 36.5–49.3)
HGB BLD-MCNC: 7.7 G/DL (ref 12–17)
IGG/ALB SER: 0.94 {RATIO} (ref 1.1–1.8)
KAPPA LC FREE SER-MCNC: 257.6 MG/L (ref 3.3–19.4)
KAPPA LC FREE/LAMBDA FREE SER: 2.99 {RATIO} (ref 0.26–1.65)
LAMBDA LC FREE SERPL-MCNC: 86.1 MG/L (ref 5.7–26.3)
MAGNESIUM SERPL-MCNC: 1.6 MG/DL (ref 1.6–2.6)
MCH RBC QN AUTO: 28.8 PG (ref 26.8–34.3)
MCHC RBC AUTO-ENTMCNC: 32.4 G/DL (ref 31.4–37.4)
MCV RBC AUTO: 89 FL (ref 82–98)
PHOSPHATE SERPL-MCNC: 5.9 MG/DL (ref 2.7–4.5)
PLATELET # BLD AUTO: 420 THOUSANDS/UL (ref 149–390)
PMV BLD AUTO: 9.9 FL (ref 8.9–12.7)
POTASSIUM SERPL-SCNC: 4.7 MMOL/L (ref 3.5–5.3)
PROT PATTERN SERPL ELPH-IMP: ABNORMAL
PROT PATTERN UR ELPH-IMP: ABNORMAL
PROT SERPL-MCNC: 6.5 G/DL (ref 6.4–8.2)
PROT UR-MCNC: 206 MG/DL
RBC # BLD AUTO: 2.67 MILLION/UL (ref 3.88–5.62)
RH BLD: POSITIVE
SODIUM SERPL-SCNC: 139 MMOL/L (ref 136–145)
SPECIMEN EXPIRATION DATE: NORMAL
WBC # BLD AUTO: 9.1 THOUSAND/UL (ref 4.31–10.16)

## 2020-02-13 PROCEDURE — 84100 ASSAY OF PHOSPHORUS: CPT | Performed by: PHYSICIAN ASSISTANT

## 2020-02-13 PROCEDURE — C1750 CATH, HEMODIALYSIS,LONG-TERM: HCPCS | Performed by: SURGERY

## 2020-02-13 PROCEDURE — 86901 BLOOD TYPING SEROLOGIC RH(D): CPT | Performed by: FAMILY MEDICINE

## 2020-02-13 PROCEDURE — 86850 RBC ANTIBODY SCREEN: CPT | Performed by: FAMILY MEDICINE

## 2020-02-13 PROCEDURE — 83735 ASSAY OF MAGNESIUM: CPT | Performed by: PHYSICIAN ASSISTANT

## 2020-02-13 PROCEDURE — 80048 BASIC METABOLIC PNL TOTAL CA: CPT | Performed by: PHYSICIAN ASSISTANT

## 2020-02-13 PROCEDURE — 99232 SBSQ HOSP IP/OBS MODERATE 35: CPT | Performed by: INTERNAL MEDICINE

## 2020-02-13 PROCEDURE — 3060F POS MICROALBUMINURIA REV: CPT | Performed by: FAMILY MEDICINE

## 2020-02-13 PROCEDURE — 49324 LAP INSERT TUNNEL IP CATH: CPT | Performed by: SURGERY

## 2020-02-13 PROCEDURE — 82948 REAGENT STRIP/BLOOD GLUCOSE: CPT

## 2020-02-13 PROCEDURE — 86900 BLOOD TYPING SEROLOGIC ABO: CPT | Performed by: FAMILY MEDICINE

## 2020-02-13 PROCEDURE — 99232 SBSQ HOSP IP/OBS MODERATE 35: CPT | Performed by: PHYSICIAN ASSISTANT

## 2020-02-13 PROCEDURE — 3E1M39Z IRRIGATION OF PERITONEAL CAVITY USING DIALYSATE, PERCUTANEOUS APPROACH: ICD-10-PCS | Performed by: FAMILY MEDICINE

## 2020-02-13 PROCEDURE — 0WHG43Z INSERTION OF INFUSION DEVICE INTO PERITONEAL CAVITY, PERCUTANEOUS ENDOSCOPIC APPROACH: ICD-10-PCS | Performed by: FAMILY MEDICINE

## 2020-02-13 PROCEDURE — 85027 COMPLETE CBC AUTOMATED: CPT | Performed by: PHYSICIAN ASSISTANT

## 2020-02-13 PROCEDURE — 3060F POS MICROALBUMINURIA REV: CPT | Performed by: SURGERY

## 2020-02-13 DEVICE — PERITONEAL DIALYSIS CATHETER,MONCRIEF-POPOVICH SWAN NECK CURL CATH, 2 CUFF,62.5 CM
Type: IMPLANTABLE DEVICE | Site: ABDOMEN | Status: FUNCTIONAL
Brand: ARGYLE

## 2020-02-13 RX ORDER — BUPIVACAINE HYDROCHLORIDE AND EPINEPHRINE 2.5; 5 MG/ML; UG/ML
INJECTION, SOLUTION EPIDURAL; INFILTRATION; INTRACAUDAL; PERINEURAL AS NEEDED
Status: DISCONTINUED | OUTPATIENT
Start: 2020-02-13 | End: 2020-02-13 | Stop reason: HOSPADM

## 2020-02-13 RX ORDER — FENTANYL CITRATE 50 UG/ML
INJECTION, SOLUTION INTRAMUSCULAR; INTRAVENOUS AS NEEDED
Status: DISCONTINUED | OUTPATIENT
Start: 2020-02-13 | End: 2020-02-13 | Stop reason: SURG

## 2020-02-13 RX ORDER — NEOSTIGMINE METHYLSULFATE 1 MG/ML
INJECTION INTRAVENOUS AS NEEDED
Status: DISCONTINUED | OUTPATIENT
Start: 2020-02-13 | End: 2020-02-13 | Stop reason: SURG

## 2020-02-13 RX ORDER — LABETALOL 20 MG/4 ML (5 MG/ML) INTRAVENOUS SYRINGE
10
Status: DISCONTINUED | OUTPATIENT
Start: 2020-02-13 | End: 2020-02-13 | Stop reason: HOSPADM

## 2020-02-13 RX ORDER — ONDANSETRON 2 MG/ML
4 INJECTION INTRAMUSCULAR; INTRAVENOUS ONCE AS NEEDED
Status: DISCONTINUED | OUTPATIENT
Start: 2020-02-13 | End: 2020-02-13 | Stop reason: HOSPADM

## 2020-02-13 RX ORDER — LIDOCAINE HYDROCHLORIDE 10 MG/ML
INJECTION, SOLUTION EPIDURAL; INFILTRATION; INTRACAUDAL; PERINEURAL AS NEEDED
Status: DISCONTINUED | OUTPATIENT
Start: 2020-02-13 | End: 2020-02-13 | Stop reason: SURG

## 2020-02-13 RX ORDER — GLYCOPYRROLATE 0.2 MG/ML
INJECTION INTRAMUSCULAR; INTRAVENOUS AS NEEDED
Status: DISCONTINUED | OUTPATIENT
Start: 2020-02-13 | End: 2020-02-13 | Stop reason: SURG

## 2020-02-13 RX ORDER — LABETALOL 20 MG/4 ML (5 MG/ML) INTRAVENOUS SYRINGE
10 ONCE
Status: COMPLETED | OUTPATIENT
Start: 2020-02-13 | End: 2020-02-13

## 2020-02-13 RX ORDER — HYDROMORPHONE HYDROCHLORIDE 2 MG/1
4 TABLET ORAL EVERY 4 HOURS PRN
Status: DISCONTINUED | OUTPATIENT
Start: 2020-02-13 | End: 2020-02-15 | Stop reason: HOSPADM

## 2020-02-13 RX ORDER — HYDRALAZINE HYDROCHLORIDE 20 MG/ML
10 INJECTION INTRAMUSCULAR; INTRAVENOUS
Status: COMPLETED | OUTPATIENT
Start: 2020-02-13 | End: 2020-02-13

## 2020-02-13 RX ORDER — ONDANSETRON 2 MG/ML
INJECTION INTRAMUSCULAR; INTRAVENOUS AS NEEDED
Status: DISCONTINUED | OUTPATIENT
Start: 2020-02-13 | End: 2020-02-13 | Stop reason: SURG

## 2020-02-13 RX ORDER — ROCURONIUM BROMIDE 10 MG/ML
INJECTION, SOLUTION INTRAVENOUS AS NEEDED
Status: DISCONTINUED | OUTPATIENT
Start: 2020-02-13 | End: 2020-02-13 | Stop reason: SURG

## 2020-02-13 RX ORDER — ROCURONIUM BROMIDE 10 MG/ML
INJECTION, SOLUTION INTRAVENOUS AS NEEDED
Status: DISCONTINUED | OUTPATIENT
Start: 2020-02-13 | End: 2020-02-13

## 2020-02-13 RX ORDER — MAGNESIUM HYDROXIDE 1200 MG/15ML
LIQUID ORAL AS NEEDED
Status: DISCONTINUED | OUTPATIENT
Start: 2020-02-13 | End: 2020-02-13 | Stop reason: HOSPADM

## 2020-02-13 RX ORDER — PROPOFOL 10 MG/ML
INJECTION, EMULSION INTRAVENOUS AS NEEDED
Status: DISCONTINUED | OUTPATIENT
Start: 2020-02-13 | End: 2020-02-13 | Stop reason: SURG

## 2020-02-13 RX ORDER — LIDOCAINE HYDROCHLORIDE 10 MG/ML
INJECTION, SOLUTION EPIDURAL; INFILTRATION; INTRACAUDAL; PERINEURAL
Status: DISPENSED
Start: 2020-02-13 | End: 2020-02-14

## 2020-02-13 RX ORDER — FENTANYL CITRATE/PF 50 MCG/ML
50 SYRINGE (ML) INJECTION
Status: COMPLETED | OUTPATIENT
Start: 2020-02-13 | End: 2020-02-13

## 2020-02-13 RX ORDER — MIDAZOLAM HYDROCHLORIDE 2 MG/2ML
INJECTION, SOLUTION INTRAMUSCULAR; INTRAVENOUS AS NEEDED
Status: DISCONTINUED | OUTPATIENT
Start: 2020-02-13 | End: 2020-02-13 | Stop reason: SURG

## 2020-02-13 RX ORDER — SODIUM CHLORIDE 9 MG/ML
INJECTION, SOLUTION INTRAVENOUS CONTINUOUS PRN
Status: DISCONTINUED | OUTPATIENT
Start: 2020-02-13 | End: 2020-02-13 | Stop reason: SURG

## 2020-02-13 RX ORDER — HYDROMORPHONE HYDROCHLORIDE 2 MG/1
2 TABLET ORAL EVERY 4 HOURS PRN
Status: DISCONTINUED | OUTPATIENT
Start: 2020-02-13 | End: 2020-02-15 | Stop reason: HOSPADM

## 2020-02-13 RX ADMIN — DOXAZOSIN 4 MG: 4 TABLET ORAL at 20:15

## 2020-02-13 RX ADMIN — ONDANSETRON 4 MG: 2 INJECTION INTRAMUSCULAR; INTRAVENOUS at 20:15

## 2020-02-13 RX ADMIN — LABETALOL 20 MG/4 ML (5 MG/ML) INTRAVENOUS SYRINGE 10 MG: at 12:03

## 2020-02-13 RX ADMIN — SODIUM CHLORIDE: 0.9 INJECTION, SOLUTION INTRAVENOUS at 10:32

## 2020-02-13 RX ADMIN — LABETALOL 20 MG/4 ML (5 MG/ML) INTRAVENOUS SYRINGE 10 MG: at 12:49

## 2020-02-13 RX ADMIN — LABETALOL 20 MG/4 ML (5 MG/ML) INTRAVENOUS SYRINGE 10 MG: at 13:35

## 2020-02-13 RX ADMIN — ONDANSETRON 4 MG: 2 INJECTION INTRAMUSCULAR; INTRAVENOUS at 10:39

## 2020-02-13 RX ADMIN — NEOSTIGMINE METHYLSULFATE 3 MG: 1 INJECTION INTRAVENOUS at 11:41

## 2020-02-13 RX ADMIN — HYDRALAZINE HYDROCHLORIDE 10 MG: 20 INJECTION INTRAMUSCULAR; INTRAVENOUS at 13:55

## 2020-02-13 RX ADMIN — MIDAZOLAM HYDROCHLORIDE 1 MG: 1 INJECTION, SOLUTION INTRAMUSCULAR; INTRAVENOUS at 10:39

## 2020-02-13 RX ADMIN — CALCIUM ACETATE 667 MG: 667 CAPSULE ORAL at 16:34

## 2020-02-13 RX ADMIN — FENTANYL CITRATE 50 MCG: 50 INJECTION, SOLUTION INTRAMUSCULAR; INTRAVENOUS at 12:35

## 2020-02-13 RX ADMIN — CEFAZOLIN SODIUM 2000 MG: 2 SOLUTION INTRAVENOUS at 10:47

## 2020-02-13 RX ADMIN — FENTANYL CITRATE 50 MCG: 50 INJECTION, SOLUTION INTRAMUSCULAR; INTRAVENOUS at 12:20

## 2020-02-13 RX ADMIN — CARVEDILOL 25 MG: 12.5 TABLET, FILM COATED ORAL at 15:18

## 2020-02-13 RX ADMIN — ROCURONIUM BROMIDE 10 MG: 10 SOLUTION INTRAVENOUS at 11:20

## 2020-02-13 RX ADMIN — SODIUM BICARBONATE 650 MG TABLET 1300 MG: at 16:35

## 2020-02-13 RX ADMIN — PROPOFOL 200 MG: 10 INJECTION, EMULSION INTRAVENOUS at 10:51

## 2020-02-13 RX ADMIN — GLYCOPYRROLATE 0.6 MG: 0.2 INJECTION, SOLUTION INTRAMUSCULAR; INTRAVENOUS at 11:41

## 2020-02-13 RX ADMIN — HYDRALAZINE HYDROCHLORIDE 10 MG: 20 INJECTION INTRAMUSCULAR; INTRAVENOUS at 13:40

## 2020-02-13 RX ADMIN — HYDROMORPHONE HYDROCHLORIDE 4 MG: 2 TABLET ORAL at 15:17

## 2020-02-13 RX ADMIN — NIFEDIPINE 60 MG: 30 TABLET, FILM COATED, EXTENDED RELEASE ORAL at 16:35

## 2020-02-13 RX ADMIN — ROCURONIUM BROMIDE 30 MG: 10 SOLUTION INTRAVENOUS at 10:53

## 2020-02-13 RX ADMIN — FENTANYL CITRATE 50 MCG: 50 INJECTION INTRAMUSCULAR; INTRAVENOUS at 10:51

## 2020-02-13 RX ADMIN — LIDOCAINE HYDROCHLORIDE 50 MG: 10 INJECTION, SOLUTION EPIDURAL; INFILTRATION; INTRACAUDAL; PERINEURAL at 10:51

## 2020-02-13 RX ADMIN — MIDAZOLAM HYDROCHLORIDE 1 MG: 1 INJECTION, SOLUTION INTRAMUSCULAR; INTRAVENOUS at 10:46

## 2020-02-13 RX ADMIN — LABETALOL 20 MG/4 ML (5 MG/ML) INTRAVENOUS SYRINGE 10 MG: at 13:20

## 2020-02-13 NOTE — ANESTHESIA POSTPROCEDURE EVALUATION
Post-Op Assessment Note    CV Status:  Stable  Pain Score: 0    Pain management: adequate     Mental Status:  Alert and awake   Hydration Status:  Euvolemic   PONV Controlled:  Controlled   Airway Patency:  Patent   Post Op Vitals Reviewed: Yes      Staff: CRNA           BP (!) 205/108 (02/13/20 1154)    Temp (!) 97 4 °F (36 3 °C) (02/13/20 1154)    Pulse 89 (02/13/20 1154)   Resp 16 (02/13/20 1154)    SpO2 99 % (02/13/20 1154)

## 2020-02-13 NOTE — OP NOTE
OPERATIVE REPORT  PATIENT NAME: Fede Noel    :  1985  MRN: [id number]  Pt Location: AN OR ROOM 02    SURGERY DATE: 2020    Surgeon(s) and Role:     Hannah Newell MD - Primary    Preop Diagnosis:  CKD (chronic kidney disease), stage V ([address] ) [N18 5]    Post-Op Diagnosis Codes:     * CKD (chronic kidney disease), stage V ([address] ) [N18 5]    Procedure(s) (LRB):  INSERTION PERITONEAL CATHETER DIALYSIS LAPAROSCOPIC (N/A)    Specimen(s):  * No specimens in log *    Estimated Blood Loss:   Minimal    Drains:  * No LDAs found *    Anesthesia Type:   General    Operative Indications:  CKD (chronic kidney disease), stage V ([address] ) [N18 5]      Operative Findings:  Independent, nonsmoker, ASA 3, wound class 1, BMI 31, weight 240, height 73  Cardiovascular  Exercise tolerance (METS): >4,  Hyperlipidemia, Hypertension , No angina ,     Pulmonary  Not a smoker , Shortness of breath (SOB 2/2 ARF, improved with diuresis), Recent URI (1wk ago for congestion, no symptoms now) , Sleep apnea CPAP,          GI/Hepatic  Negative GI/hepatic ROS   No GERD ,          Kidney disease ARF and CKD,          Endo/Other  Diabetes type 2 ,   Obesity     GYN         Hematology  Anemia anemia of chronic disease,       Musculoskeletal  Negative musculoskeletal ROS          Neurology  Negative neurology ROS        Psychology              Complications:   None    Procedure and Technique:  Patient was identified visually and via armband  Placed in the supine position  After general anesthesia the abdomen was prepped and draped in a sterile fashion  0 25% Marcaine with epinephrine was utilized throughout the procedure  Incision made at the umbilicus  Under direct vision a 5 mm trocar was inserted  This followed by CO2 insufflation with a back pressure 15  Laparoscopic visualization revealed no evidence for left or right inguinal herniations  No adhesions within the abdomen were present      A 12 mm trocar was then introduced in the left hypogastric area into the pelvis  This was tunneled in a ankle at approximately 15°  Thereafter the PD catheter was inserted  The Velcro cuff was pulled back to the level of the peritoneum  The PD catheter was then tunneled from the anterior abdominal wall and out the left lower quadrant the abdomen  It was then secured with a 4 0 nylon suture distal to the exit site  Laparoscopic revisualization revealed was in good position  Was in the pelvis cul-de-sac region  At this point fascia was then closed with 0 Vicryl suture  Four Monocryl sutures were then applied throughout  Patient was awakened anesthesia transfer the recovery room stable condition  Sponge instrument count correct x2       I was present for the entire procedure    Patient Disposition:  PACU     SIGNATURE: Shyam Polanco MD  DATE: February 13, 2020  TIME: 12:02 PM

## 2020-02-13 NOTE — PROGRESS NOTES
[facility]  Progress Note - Basilia Garcia 1985, 37 y o  male MRN: [id number]    Unit/Bed#: FRANCISCO DOSHI Encounter: [id number]    Primary Care Provider: Hemalatha Lopez MD   Date and time admitted to hospital: 2/10/2020  2:08 PM        * Acute renal failure superimposed on stage 5 chronic kidney disease, not on chronic dialysis [facility])  Assessment & Plan  · POA, creatinine up to 11 from baseline of 7 0 (which is up from 5 previously)  Patient has seen vascular and is scheduled for a LEFT AV fistula creation  Likely secondary to progression of underlying renal disease  Electrolytes stable  Hep panel negative  As per nephrology consult today patient would prefer PD over HD  No urgent start for dialysis indicated at this time  He will have a PD catheter placement tomorrow     · Nephrology input appreciated   · PD catheter placed 2/13   · Appreciate General Surgery assistance   · Patient will likely get discharged over the weekend after PD exchanges   · Avoid hypotension   · Coreg, Cardura, Nifedipine continued with hold parameters   · Avoid nephrotoxins    · Trend BMP, lytes   · Demedex has been restarted by nephrology   · Phoslo added   · NaHCO3 increased      Hypervolemia associated with renal insufficiencyresolved as of 2/13/2020  Assessment & Plan  · Present on admission given vascular congestion on chest x-ray, bilateral leg swelling and shortness of breath  Recent echo stable with EF of 55%, grade 1 DD  Patient has drastically improved   Only has 2+ leg swelling, but patient feels that he is at his baseline   · Continue with Torsemide 100 mg QD as per nephrology recommendations   · I/O and daily weights   · Renal diet ordered  · Patient will ultimately need Peritoneal Dialysis     Hyperphosphatemia  Assessment & Plan  · Stable at 5 9 today   · Will monitor  · Phoslo started by nephrology      Type 2 diabetes mellitus with kidney complication, with long-term current use of insulin [facility])  Assessment & Plan  Lab Results   Component Value Date    HGBA1C 5 3 02/10/2020       Recent Labs     02/12/20  1610 02/12/20  2102 02/13/20  0820 02/13/20  1020   POCGLU 109 116 102 84       Blood Sugar Average: Last 72 hrs:  · (P) 107 [id number]   · Euglycemic on admission  A1c well controlled  Patient states he would take insulin as needed and actually hasn't needed any in 6 months  · Hold listed home doses of Basaglar and Admelog   · SSI algorithm with meals and bedtime   · QID accuchecks   · Patient can likely be discharged without insulin     Myopericarditis  Assessment & Plan  · Treated previously with colchicine and steroids  No further symptoms  · No further workup needed    Essential hypertension  Assessment & Plan  · With prior blood pressure that has been difficult to control with significant orthostasis  BP are better controlled today, but still above goal     · Continue Coreg, Cardura, and Nifedipine with hold parameters   · Torsemide 100 mg QD added by nephrology   · Hydralazine 10 mg IV Q6 PRN   · Defer further changes to nephrology     Anemia due to chronic kidney disease  Assessment & Plan  · Baseline appears to be 8 0-9 0  Hgb stable at 7 7  No signs of bleeding  Iron noted to be low    · Give 100 mg IV Venofer QOD for 5 days   · Trend Hgb   · Avoid blood transfusions as patient will attempt to get transplant       VTE Pharmacologic Prophylaxis:   Pharmacologic: None needed as per VTE protocol   Mechanical VTE Prophylaxis in Place: No    Patient Centered Rounds: I have performed bedside rounds with nursing staff today  Discussions with Specialists or Other Care Team Provider: Discussed with RN, CM    Education and Discussions with Family / Patient: Discussed with patient, wife at bedside     Time Spent for Care: 30 minutes  More than 50% of total time spent on counseling and coordination of care as described above      Current Length of Stay: 3 day(s)    Current Patient Status: Inpatient   Certification Statement: The patient will continue to require additional inpatient hospital stay due to PD catheter placement today, needs exchanges done     Discharge Plan: Likely over weekend     Code Status: Level 1 - Full Code      Subjective:   Patient reports that he is tired today, but feels that this is from his cold the prior week  He continues to report that his breathing is stable  Otherwise has no complaints besides for his right arm being sore from needle sticks  Objective:     Vitals:   Temp (24hrs), Av °F (36 7 °C), Min:97 4 °F (36 3 °C), Max:99 °F (37 2 °C)    Temp:  [97 4 °F (36 3 °C)-99 °F (37 2 °C)] 97 4 °F (36 3 °C)  HR:  [76-90] 76  Resp:  [12-18] 14  BP: (132-216)/() 215/119  SpO2:  [91 %-99 %] 98 %  Body mass index is 31 22 kg/m²  Input and Output Summary (last 24 hours): Intake/Output Summary (Last 24 hours) at 2020 1329  Last data filed at 2020 1157  Gross per 24 hour   Intake 150 ml   Output 5 ml   Net 145 ml       Physical Exam:     Physical Exam   Constitutional: He is oriented to person, place, and time  Vital signs are normal  He appears well-developed and well-nourished  Non-toxic appearance  No distress  HENT:   Head: Normocephalic and atraumatic  Mouth/Throat: Mucous membranes are not dry  Eyes: Pupils are equal, round, and reactive to light  Conjunctivae and EOM are normal  No scleral icterus  Pupils are equal    Neck: Neck supple  Cardiovascular: Normal rate, regular rhythm, S1 normal, S2 normal and intact distal pulses  Exam reveals no S3 and no S4  No murmur heard  Pulmonary/Chest: Effort normal and breath sounds normal  No accessory muscle usage or stridor  No respiratory distress  He has no decreased breath sounds  He has no wheezes  He has no rhonchi  He has no rales  He exhibits no tenderness  Abdominal: Soft  Bowel sounds are normal  He exhibits no distension and no mass  There is no tenderness   There is no rigidity, no rebound and no guarding  Neurological: He is alert and oriented to person, place, and time  He is not disoriented  He displays no tremor  He displays no seizure activity  Skin: Skin is warm and dry  Additional Data:     Labs:    Results from last 7 days   Lab Units 02/13/20  0515 02/12/20  0554   WBC Thousand/uL 9 10 8 30   HEMOGLOBIN g/dL 7 7* 7 8*   HEMATOCRIT % 23 8* 23 9*   PLATELETS Thousands/uL 420* 413*   NEUTROS PCT %  --  56   LYMPHS PCT %  --  32   MONOS PCT %  --  8   EOS PCT %  --  4     Results from last 7 days   Lab Units 02/13/20  0515 02/12/20  0555   POTASSIUM mmol/L 4 7 4 8   CHLORIDE mmol/L 107 106   CO2 mmol/L 20* 18*   BUN mg/dL 83* 83*   CREATININE mg/dL 10 93* 10 92*   CALCIUM mg/dL 8 1* 8 7   ALK PHOS U/L  --  44*   ALT U/L  --  8*   AST U/L  --  49*     Results from last 7 days   Lab Units 02/10/20  1015   INR  1 04       * I Have Reviewed All Lab Data Listed Above  * Additional Pertinent Lab Tests Reviewed:  [facility] Admission Reviewed    Imaging:    Imaging Reports Reviewed Today Include: None  Imaging Personally Reviewed by Myself Includes:  None    Recent Cultures (last 7 days):           Last 24 Hours Medication List:     Current Facility-Administered Medications:  acetaminophen 650 mg Oral Q6H PRN Disha Aldana MD    albuterol 2 puff Inhalation Q4H PRN Disha Aldana MD    aspirin 81 mg Oral Daily Disha Aldana MD    calcium acetate 667 mg Oral TID With Meals Disha Aldana MD    carvedilol 25 mg Oral BID With Meals Disha Aldana MD    docusate sodium 100 mg Oral Daily PRN Disha Aldana MD    doxazosin 4 mg Oral HS Disha Aldana MD    hydrALAZINE 10 mg Intravenous Q6H PRN Disha Aldana MD    insulin lispro 1-6 Units Subcutaneous HS Disha Aldana MD    insulin lispro 1-6 Units Subcutaneous Q6H [address] Disha Aldana MD    iron sucrose 100 mg Intravenous Every Other Day Disha Aldana MD Last Rate: 100 mg (02/12/20 1140)   Labetalol HCl 10 mg Intravenous Q15 Min PRN Margoth Santos MD    lidocaine (PF)        NIFEdipine ER 60 mg Oral BID Ella Oabndo MD    ondansetron 4 mg Intravenous Q6H PRN Ella Obando MD    ondansetron 4 mg Intravenous Once PRN Margoth Santos MD    sodium bicarbonate 1,300 mg Oral BID after meals Ella Obando MD    torsemide 100 mg Oral Daily Ella Obando MD         Today, Patient Was Seen By: Roscoe Hernández PA-C    ** Please Note: Dictation voice to text software may have been used in the creation of this document   **

## 2020-02-13 NOTE — ASSESSMENT & PLAN NOTE
· POA, creatinine up to 11 from baseline of 7 0 (which is up from 5 previously)  Patient has seen vascular and is scheduled for a LEFT AV fistula creation  Likely secondary to progression of underlying renal disease  Electrolytes stable  Hep panel negative  As per nephrology consult today patient would prefer PD over HD  No urgent start for dialysis indicated at this time   He will have a PD catheter placement tomorrow     · Nephrology input appreciated   · PD catheter placed 2/13   · Appreciate General Surgery assistance   · Patient will likely get discharged over the weekend after PD exchanges   · Avoid hypotension   · Coreg, Cardura, Nifedipine continued with hold parameters   · Avoid nephrotoxins    · Trend BMP, lytes   · Demedex has been restarted by nephrology   · Phoslo added   · NaHCO3 increased

## 2020-02-13 NOTE — ASSESSMENT & PLAN NOTE
· Present on admission given vascular congestion on chest x-ray, bilateral leg swelling and shortness of breath  Recent echo stable with EF of 55%, grade 1 DD  Patient has drastically improved   Only has 2+ leg swelling, but patient feels that he is at his baseline   · Continue with Torsemide 100 mg QD as per nephrology recommendations   · I/O and daily weights   · Renal diet ordered  · Patient will ultimately need Peritoneal Dialysis

## 2020-02-13 NOTE — ASSESSMENT & PLAN NOTE
· Baseline appears to be 8 0-9 0  Hgb stable at 7 7  No signs of bleeding   Iron noted to be low    · Give 100 mg IV Venofer QOD for 5 days   · Trend Hgb   · Avoid blood transfusions as patient will attempt to get transplant

## 2020-02-13 NOTE — PLAN OF CARE
Problem: Potential for Falls  Goal: Patient will remain free of falls  Description  INTERVENTIONS:  - Assess patient frequently for physical needs  -  Identify cognitive and physical deficits and behaviors that affect risk of falls    -  [place] fall precautions as indicated by assessment   - Educate patient/family on patient safety including physical limitations  - Instruct patient to call for assistance with activity based on assessment  - Modify environment to reduce risk of injury  - Consider OT/PT consult to assist with strengthening/mobility  Outcome: Progressing     Problem: CARDIOVASCULAR - ADULT  Goal: Maintains optimal cardiac output and hemodynamic stability  Description  INTERVENTIONS:  - Monitor I/O, vital signs and rhythm  - Monitor for S/S and trends of decreased cardiac output  - Administer and titrate ordered vasoactive medications to optimize hemodynamic stability  - Assess quality of pulses, skin color and temperature  - Assess for signs of decreased coronary artery perfusion  - Instruct patient to report change in severity of symptoms  Outcome: Progressing     Problem: RESPIRATORY - ADULT  Goal: Achieves optimal ventilation and oxygenation  Description  INTERVENTIONS:  - Assess for changes in respiratory status  - Assess for changes in mentation and behavior  - Position to facilitate oxygenation and minimize respiratory effort  - Oxygen administered by appropriate delivery if ordered  - Initiate smoking cessation education as indicated  - Encourage broncho-pulmonary hygiene including cough, deep breathe, Incentive Spirometry  - Assess the need for suctioning and aspirate as needed  - Assess and instruct to report SOB or any respiratory difficulty  - Respiratory Therapy support as indicated  Outcome: Progressing     Problem: PAIN - ADULT  Goal: Verbalizes/displays adequate comfort level or baseline comfort level  Description  Interventions:  - Encourage patient to monitor pain and request assistance  - Assess pain using appropriate pain scale  - Administer analgesics based on type and severity of pain and evaluate response  - Implement non-pharmacological measures as appropriate and evaluate response  - Consider cultural and social influences on pain and pain management  - Notify physician/advanced practitioner if interventions unsuccessful or patient reports new pain  Outcome: Progressing     Problem: SAFETY ADULT  Goal: Patient will remain free of falls  Description  INTERVENTIONS:  - Assess patient frequently for physical needs  -  Identify cognitive and physical deficits and behaviors that affect risk of falls    -  [place] fall precautions as indicated by assessment   - Educate patient/family on patient safety including physical limitations  - Instruct patient to call for assistance with activity based on assessment  - Modify environment to reduce risk of injury  - Consider OT/PT consult to assist with strengthening/mobility  Outcome: Progressing

## 2020-02-13 NOTE — PROGRESS NOTES
NEPHROLOGY PROGRESS NOTE   Aron Fraga 37 y o  male MRN: [id number]  Unit/Bed#: S -01 Encounter: [id number]  Reason for Consult: new start ESRD    ASSESSMENT/PLAN:  1  Elevated Creatinine with Progressive Chronic Kidney Disease stage 5- Baseline creatinine was around 5 but in January increased to 7 and now increased to 11  - workup: hepatitis negative, UPEP negative, SPEP pending, FLC pending  - electrolytes stable  - patient modality of choice is peritoneal dialysis (change from office visit)  - consult surgery for PD catheter placement  - plan to set up with Mylene Howe PD nurse for outpatient training (discussed with PD nurse)  - for PD catheter placement 2/13 and start PD exchanges 2/14  - first treatment prescription: instill 1L 1 5% for 2hr dwell x3 (between meals and before bed)  - okay to discharge Friday night or Saturday  2  Mild Hypervolemia- given lasix 100mg IV x1 in ER 2/10  - weight stable from 230-235 pounds  - home diuretic: torsemide 100mg as needed (usually takes about once every 2 weeks)  - increased torsemide to 100mg daily 2/11 and monitor  3  Hypertension- continue home regimen + increased diuretic 2/11 and monitor  4  Hyperphosphatemia- low phosphorus diet, start phoslo with meals  5  Metabolic Acidosis- sodium bicarbonate tablets 1300mg BID  6  Anemia- hgb below goal  - will likely give epogen SQ tomorrow  - iron studies borderline, give IV venofer 100mg x5 doses QOD  7  Access- PD catheter to be placed by Dr Chanel Sanders on 2/13/20  8  BMD- PTH acceptable    Disposition: For PD catheter placement today  Start PD exchanges tomorrow      Recommendations from Dr Landon Abid note:  - colace prn added for daily BM  - pt needs to remain supine during treatment  - if pt needs to standing during dwell, needs to be drained first completely  - no eating during treatment  - will start with 1,000 cc dwell 1 5% concentration for 3 dwells per day   - length of dwell will be 2 hours  - consider heparin in dwell  - do not change catheter dressing  - no showering   - acetaminophen prn 500 mg q 6 hour for pain for mild pain  If pain severe, consider tramadol    SUBJECTIVE:  Patient is nervous about surgery today  Denies sob  Denies pain      OBJECTIVE:  Current Weight: Weight - Scale: 107 kg (236 lb 9 6 oz)  Vitals:    02/12/20 2115 02/12/20 2156 02/13/20 0600 02/13/20 0739   BP: 154/60 132/70  160/70   BP Location: Right arm Right arm  Right arm   Pulse: 84 90  85   Resp:    17   Temp:    98 °F (36 7 °C)   TempSrc:    Oral   SpO2:  98%  97%   Weight:   107 kg (236 lb 9 6 oz)    Height:           Intake/Output Summary (Last 24 hours) at 2/13/2020 0945  Last data filed at 2/12/2020 1300  Gross per 24 hour   Intake 240 ml   Output    Net 240 ml     General: NAD  Skin: no rash  Eyes: anicteric  ENMT: mm moist  Neck: no masses  Respiratory: CTAB  Cardiac: RRR  Extremities: mild edema of LE  GI: soft nt nd  Neuro: alert awake  Psych: mood and affect appropriate    Medications:    Current Facility-Administered Medications:     acetaminophen (TYLENOL) tablet 650 mg, 650 mg, Oral, Q6H PRN, Ramila Alvarez PA-C, 650 mg at 02/10/20 2247    albuterol (PROVENTIL HFA,VENTOLIN HFA) inhaler 2 puff, 2 puff, Inhalation, Q4H PRN, Reynaldo Bender PA-C    aspirin (ECOTRIN LOW STRENGTH) EC tablet 81 mg, 81 mg, Oral, Daily, Reynaldo Bender PA-C, 81 mg at 02/12/20 0900    calcium acetate (PHOSLO) capsule 667 mg, 667 mg, Oral, TID With Meals, [facility], MUSTAPHA 667 mg at 02/12/20 1617    carvedilol (COREG) tablet 25 mg, 25 mg, Oral, BID With Meals, Reynaldo Bender PA-C, 25 mg at 02/12/20 1617    ceFAZolin (ANCEF) IVPB (premix) 2,000 mg, 2,000 mg, Intravenous, Once, Rachell Hameed MD    docusate sodium (COLACE) capsule 100 mg, 100 mg, Oral, Daily PRN, Shawn Fernández MD    doxazosin (CARDURA) tablet 4 mg, 4 mg, Oral, HS, Reynaldo Bender PA-C, 4 mg at 02/12/20 2117    hydrALAZINE (APRESOLINE) injection 10 mg, 10 mg, Intravenous, Q6H PRN, Jo Annlinda Lilly, MUSTAPHA, 10 mg at 02/10/20 2035    insulin lispro (HumaLOG) 100 units/mL subcutaneous injection 1-6 Units, 1-6 Units, Subcutaneous, HS, Reynaldo Bender PA-C    insulin lispro (HumaLOG) 100 units/mL subcutaneous injection 1-6 Units, 1-6 Units, Subcutaneous, Q6H [facility] **AND** [CANCELED] Fingerstick Glucose (POCT), , , TID AC, Reynaldo Bender PA-C    iron sucrose (VENOFER) 100 mg in sodium chloride 0 9 % 100 mL IVPB, 100 mg, Intravenous, Every Other Day, Reynaldo Bender PA-C, Last Rate: 105 mL/hr at 02/12/20 1140, 100 mg at 02/12/20 1140    NIFEdipine (PROCARDIA XL) 24 hr tablet 60 mg, 60 mg, Oral, BID, Reynaldo Bender PA-C, 60 mg at 02/12/20 1617    ondansetron (ZOFRAN) injection 4 mg, 4 mg, Intravenous, Q6H PRN, Reynaldo Bender PA-C    sodium bicarbonate tablet 1,300 mg, 1,300 mg, Oral, BID after meals, Lila Deutsch MD, 1,300 mg at 02/12/20 1648    torsemide (DEMADEX) tablet 100 mg, 100 mg, Oral, Daily, [facility], MUSTAPHA, 100 mg at 02/12/20 0900    Laboratory Results:  Results from last 7 days   Lab Units 02/13/20  0515 02/12/20  0555 02/12/20  0554 02/11/20  0433 02/10/20  1513 02/10/20  1015   WBC Thousand/uL 9 10  --  8 30 7 89 8 25 8 62   HEMOGLOBIN g/dL 7 7*  --  7 8* 7 3* 8 1* 7 8*   HEMATOCRIT % 23 8*  --  23 9* 22 8* 24 8* 24 2*   PLATELETS Thousands/uL 420*  --  413* 353 372 339   POTASSIUM mmol/L 4 7 4 8  --  4 6 5 2 5 3   CHLORIDE mmol/L 107 106  --  108 108 108   CO2 mmol/L 20* 18*  --  18* 18* 19*   BUN mg/dL 83* 83*  --  80* 81* 83*   CREATININE mg/dL 10 93* 10 92*  --  10 94* 11 00* 11 22*   CALCIUM mg/dL 8 1* 8 7  --  8 2* 8 7 8 7   MAGNESIUM mg/dL 1 6 1 7  --  1 7 1 8  --    PHOSPHORUS mg/dL 5 9* 5 6*  --  6 0* 5 5*  --

## 2020-02-13 NOTE — SOCIAL WORK
CM received a call from Basia Otero at [facility] who reported that patient was accepted at [facility] for PD  PD coordinator from facility will be in touch with nephrology team and/or CM but they are anticipating patient to start with them next Tuesday 2/18/20  CM department will continue to follow to assist with discharge coordination

## 2020-02-13 NOTE — ASSESSMENT & PLAN NOTE
Lab Results   Component Value Date    HGBA1C 5 3 02/10/2020       Recent Labs     02/12/20  1610 02/12/20  2102 02/13/20  0820 02/13/20  1020   POCGLU 109 116 102 84       Blood Sugar Average: Last 72 hrs:  · (P) 107 [id number]   · Euglycemic on admission  A1c well controlled   Patient states he would take insulin as needed and actually hasn't needed any in 6 months  · Hold listed home doses of Basaglar and Admelog   · SSI algorithm with meals and bedtime   · QID accuchecks   · Patient can likely be discharged without insulin

## 2020-02-13 NOTE — ANESTHESIA PREPROCEDURE EVALUATION
Review of Systems/Medical History  Patient summary reviewed  Chart reviewed  No history of anesthetic complications     Cardiovascular  Exercise tolerance (METS): >4,  Hyperlipidemia, Hypertension , No angina ,    Pulmonary  Not a smoker , Shortness of breath (SOB 2/2 ARF, improved with diuresis), Recent URI (1wk ago for congestion, no symptoms now) , Sleep apnea CPAP,        GI/Hepatic  Negative GI/hepatic ROS   No GERD ,        Kidney disease ARF and CKD,        Endo/Other  Diabetes type 2 ,   Obesity    GYN       Hematology  Anemia anemia of chronic disease,     Musculoskeletal  Negative musculoskeletal ROS        Neurology  Negative neurology ROS      Psychology       TTE 9/2019:  LEFT VENTRICLE:  Systolic function was normal  Ejection fraction was estimated to be 55 %  Although no diagnostic regional wall motion abnormality was identified, this possibility cannot be completely excluded on the basis of this study  Wall thickness was increased  Hypertrophy was noted  Doppler parameters were consistent with abnormal left ventricular relaxation (grade 1 diastolic dysfunction)      PERICARDIUM:  There was a trace to small possible pericardial effusion anterior to right ventricle  Features were not consistent with tamponade physiology        Physical Exam    Airway    Mallampati score: II  TM Distance: >3 FB  Neck ROM: full     Dental   No notable dental hx     Cardiovascular  Rhythm: regular, Rate: normal, Cardiovascular exam normal    Pulmonary  Pulmonary exam normal Breath sounds clear to auscultation,     Other Findings        Anesthesia Plan  ASA Score- 3     Anesthesia Type- general with ASA Monitors  Additional Monitors:   Airway Plan: ETT  Plan Factors-    Induction- intravenous  Postoperative Plan-     Informed Consent- Anesthetic plan and risks discussed with patient  I personally reviewed this patient with the CRNA  Discussed and agreed on the Anesthesia Plan with the CRNA  Israel Wilson

## 2020-02-14 PROBLEM — I31.9 MYOPERICARDITIS: Status: RESOLVED | Noted: 2019-05-25 | Resolved: 2020-02-14

## 2020-02-14 LAB
ANION GAP SERPL CALCULATED.3IONS-SCNC: 15 MMOL/L (ref 4–13)
BUN SERPL-MCNC: 84 MG/DL (ref 5–25)
CALCIUM SERPL-MCNC: 8.5 MG/DL (ref 8.3–10.1)
CHLORIDE SERPL-SCNC: 106 MMOL/L (ref 100–108)
CO2 SERPL-SCNC: 19 MMOL/L (ref 21–32)
CREAT SERPL-MCNC: 10.64 MG/DL (ref 0.6–1.3)
ERYTHROCYTE [DISTWIDTH] IN BLOOD BY AUTOMATED COUNT: 14 % (ref 11.6–15.1)
GFR SERPL CREATININE-BSD FRML MDRD: 6 ML/MIN/1.73SQ M
GLUCOSE SERPL-MCNC: 101 MG/DL (ref 65–140)
GLUCOSE SERPL-MCNC: 118 MG/DL (ref 65–140)
GLUCOSE SERPL-MCNC: 147 MG/DL (ref 65–140)
GLUCOSE SERPL-MCNC: 77 MG/DL (ref 65–140)
GLUCOSE SERPL-MCNC: 94 MG/DL (ref 65–140)
HCT VFR BLD AUTO: 24.7 % (ref 36.5–49.3)
HGB BLD-MCNC: 8 G/DL (ref 12–17)
MCH RBC QN AUTO: 29.2 PG (ref 26.8–34.3)
MCHC RBC AUTO-ENTMCNC: 32.4 G/DL (ref 31.4–37.4)
MCV RBC AUTO: 90 FL (ref 82–98)
PLATELET # BLD AUTO: 447 THOUSANDS/UL (ref 149–390)
PMV BLD AUTO: 9.5 FL (ref 8.9–12.7)
POTASSIUM SERPL-SCNC: 5.2 MMOL/L (ref 3.5–5.3)
RBC # BLD AUTO: 2.74 MILLION/UL (ref 3.88–5.62)
SODIUM SERPL-SCNC: 140 MMOL/L (ref 136–145)
WBC # BLD AUTO: 9.18 THOUSAND/UL (ref 4.31–10.16)

## 2020-02-14 PROCEDURE — 90945 DIALYSIS ONE EVALUATION: CPT | Performed by: INTERNAL MEDICINE

## 2020-02-14 PROCEDURE — 82948 REAGENT STRIP/BLOOD GLUCOSE: CPT

## 2020-02-14 PROCEDURE — 99232 SBSQ HOSP IP/OBS MODERATE 35: CPT | Performed by: PHYSICIAN ASSISTANT

## 2020-02-14 PROCEDURE — 80048 BASIC METABOLIC PNL TOTAL CA: CPT | Performed by: INTERNAL MEDICINE

## 2020-02-14 PROCEDURE — 85027 COMPLETE CBC AUTOMATED: CPT | Performed by: INTERNAL MEDICINE

## 2020-02-14 RX ORDER — SODIUM BICARBONATE 650 MG/1
1300 TABLET ORAL
Status: DISCONTINUED | OUTPATIENT
Start: 2020-02-14 | End: 2020-02-15 | Stop reason: HOSPADM

## 2020-02-14 RX ADMIN — NIFEDIPINE 60 MG: 30 TABLET, FILM COATED, EXTENDED RELEASE ORAL at 19:55

## 2020-02-14 RX ADMIN — CARVEDILOL 25 MG: 12.5 TABLET, FILM COATED ORAL at 19:55

## 2020-02-14 RX ADMIN — ASPIRIN 81 MG: 81 TABLET, COATED ORAL at 08:43

## 2020-02-14 RX ADMIN — CALCIUM ACETATE 667 MG: 667 CAPSULE ORAL at 08:43

## 2020-02-14 RX ADMIN — SODIUM BICARBONATE 650 MG TABLET 1300 MG: at 21:18

## 2020-02-14 RX ADMIN — SODIUM BICARBONATE 650 MG TABLET 1300 MG: at 14:25

## 2020-02-14 RX ADMIN — TORSEMIDE 100 MG: 100 TABLET ORAL at 08:43

## 2020-02-14 RX ADMIN — NIFEDIPINE 60 MG: 30 TABLET, FILM COATED, EXTENDED RELEASE ORAL at 08:43

## 2020-02-14 RX ADMIN — CALCIUM ACETATE 667 MG: 667 CAPSULE ORAL at 19:55

## 2020-02-14 RX ADMIN — SODIUM BICARBONATE 650 MG TABLET 1300 MG: at 08:43

## 2020-02-14 RX ADMIN — CALCIUM ACETATE 667 MG: 667 CAPSULE ORAL at 14:25

## 2020-02-14 RX ADMIN — DOCUSATE SODIUM 100 MG: 100 CAPSULE, LIQUID FILLED ORAL at 09:47

## 2020-02-14 RX ADMIN — CARVEDILOL 25 MG: 12.5 TABLET, FILM COATED ORAL at 08:43

## 2020-02-14 RX ADMIN — IRON SUCROSE 100 MG: 20 INJECTION, SOLUTION INTRAVENOUS at 08:51

## 2020-02-14 NOTE — PROGRESS NOTES
Progress Note -Surgery PA  Sasha Bell 37 y o  male MRN: [id number]  Unit/Bed#: S -01 Encounter: [id number]    ASSESSMENT/PLAN:  Problem List     * (Principal) Acute renal failure superimposed on stage 5 chronic kidney disease, not on chronic dialysis [facility])    Erectile dysfunction    Chronic bilateral thoracic back pain    Type 2 diabetes mellitus with kidney complication, with long-term current use of insulin ([facility])    Lab Results   Component Value Date    HGBA1C 5 3 02/10/2020     Recent Labs     02/13/20  1020 02/13/20  1611 02/13/20 1958 02/14/20  0810   POCGLU 84 92 111 77     Blood Sugar Average: Last 72 hrs:  (P) 172 [id number]      Hyperlipidemia    NSTEMI (non-ST elevated myocardial infarction) ([facility])    Myopericarditis    NSVT (nonsustained ventricular tachycardia) ([facility])    Class 1 obesity due to excess calories with serious comorbidity and body mass index (BMI) of 31 0 to 31 9 in adult    LUIS on CPAP (Chronic)    Anemia due to chronic kidney disease    Essential hypertension    CKD stage 5 due to type 2 diabetes mellitus ([facility] [address] )    Lab Results   Component Value Date    HGBA1C 5 3 02/10/2020       Recent Labs     02/13/20  1020 02/13/20  1611 02/13/20 1958 02/14/20  0810   POCGLU 84 92 111 77   Blood Sugar Average: Last 72 hrs:  (P) 555 [id number]      Hyperphosphatemia   49-year-old male with history of chronic kidney disease postop day 1 status post PD catheter insertion  Complains of pain in left lower quadrant around incision  Earlier had some discomfort on the right however this is resolved  Denies nausea vomiting and tolerating diet  · 500 cc normal saline PD flush in & out per Dr Ari Rodriguez request  · Pain meds PRN  · Routine PD catheter care  · Medical care per primary team and Nephrology         VTE Pharmacologic Prophylaxis: Sequential compression device (Venodyne)     Subjective/Objective     Subjective:  Pain at incision site and surrounding area  No N/V  Tolerating diet     Objective/Physical Exam: Blood pressure 162/80, pulse 87, temperature 98 6 °F (37 °C), temperature source Oral, resp  rate 16, height 6' 1" (1 854 m), weight 107 kg (235 lb), SpO2 98 %  ,Body mass index is 31 kg/m²  General appearance: alert and oriented, in no acute distress   Abdomen: Rounded and soft, positive bowel sounds  Tender left lower quadrant nontender on the right  No rebound or guarding      Current Facility-Administered Medications:     acetaminophen (TYLENOL) tablet 650 mg, 650 mg, Oral, Q6H PRN, Nathalie Orourke MD, 650 mg at 02/10/20 2247    albuterol (PROVENTIL HFA,VENTOLIN HFA) inhaler 2 puff, 2 puff, Inhalation, Q4H PRN, Nathalie Orourke MD    aspirin (ECOTRIN LOW STRENGTH) EC tablet 81 mg, 81 mg, Oral, Daily, Nathalie Orourke MD, 81 mg at 02/14/20 0843    calcium acetate (PHOSLO) capsule 667 mg, 667 mg, Oral, TID With Meals, Nathalie Orourke MD, 667 mg at 02/14/20 0843    carvedilol (COREG) tablet 25 mg, 25 mg, Oral, BID With Meals, Nathalie Orourke MD, 25 mg at 02/14/20 0843    docusate sodium (COLACE) capsule 100 mg, 100 mg, Oral, Daily PRN, Nathalie Orourke MD, 100 mg at 02/14/20 0947    doxazosin (CARDURA) tablet 4 mg, 4 mg, Oral, HS, Nathalie Orourke MD, 4 mg at 02/13/20 2015    hydrALAZINE (APRESOLINE) injection 10 mg, 10 mg, Intravenous, Q6H PRN, Nathalie Orourke MD, 10 mg at 02/10/20 2035    HYDROmorphone (DILAUDID) tablet 2 mg, 2 mg, Oral, Q4H PRN, Carly Reeves PA-C    HYDROmorphone (DILAUDID) tablet 4 mg, 4 mg, Oral, Q4H PRN, Reynaldo Bender PA-C, 4 mg at 02/13/20 1517    insulin lispro (HumaLOG) 100 units/mL subcutaneous injection 1-6 Units, 1-6 Units, Subcutaneous, TID Glynn CLARKE DO    iron sucrose (VENOFER) 100 mg in sodium chloride 0 9 % 100 mL IVPB, 100 mg, Intravenous, Every Other Day, Nathalie Orourke MD, Last Rate: 105 mL/hr at 02/14/20 0851, 100 mg at 02/14/20 0851    NIFEdipine (PROCARDIA XL) 24 hr tablet 60 mg, 60 mg, Oral, BID, Nathalie Orourke MD, 60 mg at 02/14/20 8128   ondansetron (ZOFRAN) injection 4 mg, 4 mg, Intravenous, Q6H PRN, Shyam Polanco MD, 4 mg at 02/13/20 2015    sodium bicarbonate tablet 1,300 mg, 1,300 mg, Oral, TID after meals, [facility], [facility], 1,300 mg at 02/14/20 5658    torsemide [facility]) tablet 100 mg, 100 mg, Oral, Daily, Shyam Polanco MD, 100 mg at 02/14/20 0843       Lab, Imaging and other studies:        Ref Range & Units 2/14/20 0606 2/13/20 0515   WBC 4 31 - 10 16 Thousand/uL 9 18  9 10    RBC 3 88 - 5 62 Million/uL 2 74Low   2 67Low     Hemoglobin 12 0 - 17 0 g/dL 8 0Low   7 7Low     Hematocrit 36 5 - 49 3 % 24 7Low   23 8Low     MCV 82 - 98 fL 90  89    MCH 26 8 - 34 3 pg 29 2  28 8    MCHC 31 4 - 37 4 g/dL 32 4  32 4    RDW 11 6 - 15 1 % 14 0  13 7    Platelets 747 - 391 Thousands/uL 447High   420High     MPV 8 9 - 12 7 fL 9 5  9 9          Specimen Collected: 02/14/20 06:06 Last Resulted: 02/14/20 06:19              Ref Range & Units 2/14/20 0606 2/13/20 0515   Sodium 136 - 145 mmol/L 140  139    Potassium 3 5 - 5 3 mmol/L 5 2  4 7    Chloride 100 - 108 mmol/L 106  107    CO2 21 - 32 mmol/L 19Low   20Low     ANION GAP 4 - 13 mmol/L 15High   12    BUN 5 - 25 mg/dL 84High   83High     Creatinine 0 60 - 1 30 mg/dL 10  64High   10  93High  CM   Comment: Standardized to IDMS reference method   Glucose 65 - 140 mg/dL 94  99 CM   Comment:    If the patient is fasting, the ADA then defines impaired fasting glucose as > 100 mg/dL and diabetes as > or equal to 123 mg/dL  Specimen collection should occur prior to Sulfasalazine administration due to the potential for falsely depressed results  Specimen collection should occur prior to Sulfapyridine administration due to the potential for falsely elevated results     Calcium 8 3 - 10 1 mg/dL 8 5  8 1Low     eGFR ml/min/1 73sq m 6  6

## 2020-02-14 NOTE — TREATMENT PLAN
Renal short note    Pt seen now  Currently filled  Pain improving    Pt agreeable to dialysis inpatient until tomorrow    So we will plan for one more treatment for PD treatment on Saturday after breakfast  (pt due for another treatment tonight)    As long as metabolic state stable and no issues with PD  Can likely plan for d/c on Saturday early afternoon    Have discussed with Primary Team AP    6:30 p m -patient has 500 cc drained after 1 hour  Slightly pink tint   -advised to let drain for another 1/2 hour and if still no further output, to place the head of bed up by 20° and to let drain for another 1/2 hour  Then can clamp and that way patient can move about  -nursing team will update if no further output    742 - pt has output of 900 cc   Drained further after 20 degrees head of bed elevation    Will continue plan for third treatment later today

## 2020-02-14 NOTE — ASSESSMENT & PLAN NOTE
Lab Results   Component Value Date    HGBA1C 5 3 02/10/2020       Recent Labs     02/13/20  1611 02/13/20  1958 02/14/20  0810 02/14/20  1202   POCGLU 92 111 77 101       Blood Sugar Average: Last 72 hrs:  · (P) 102 [id number]   · Euglycemic on admission  A1c well controlled   Patient states he would take insulin as needed and actually hasn't needed any in 6 months  · Hold listed home doses of Basaglar and Admelog   · SSI algorithm with meals and bedtime   · QID accuchecks   · Patient can likely be discharged without insulin

## 2020-02-14 NOTE — TREATMENT PLAN
Renal short note    First 500 cc dwell and drain tolerated  Only 250 cc returned (not unexpected)  One fibrin sheath  Still blood tinged slightly  For now will clamp  Allow pt to eat and sit up    Then plan for 1 L dwell for 2 hours and drain  All while supine       May need hep in dialysate later today

## 2020-02-14 NOTE — PROGRESS NOTES
[facility]  Progress Note - Jimi Bustamanten 1985, 37 y o  male MRN: [id number]    Unit/Bed#: S -01 Encounter: [id number]    Primary Care Provider: Samra Oreilly MD   Date and time admitted to hospital: 2/10/2020  2:08 PM        * Acute renal failure superimposed on stage 5 chronic kidney disease, not on chronic dialysis [facility])  Assessment & Plan  · POA, creatinine up to 11 from baseline of 7 0 (which is up from 5 previously)  Patient has seen vascular and is scheduled for a LEFT AV fistula creation  Likely secondary to progression of underlying renal disease  Electrolytes stable  Hep panel negative  As per nephrology consult today patient would prefer PD over HD  Status post PD catheter placement  Mild pain surrounding  No signs of infection  Tolerating exchanges   · Nephrology input appreciated   · PD catheter placed 2/13   · Appreciate General Surgery assistance   · Patient will likely get discharged over the weekend after PD exchanges   · He will follow up with Dennie Magyar on Tuesday for his first treatment   · Avoid hypotension   · Coreg, Cardura, Nifedipine continued with hold parameters   · Avoid nephrotoxins    · Trend BMP, lytes   · Demedex has been restarted by nephrology   · Phoslo added   · NaHCO3 increased      Hyperphosphatemia  Assessment & Plan  · Stable at 5 9 yesterday  · Will monitor  · Phoslo started by nephrology      Type 2 diabetes mellitus with kidney complication, with long-term current use of insulin [facility])  Assessment & Plan  Lab Results   Component Value Date    HGBA1C 5 3 02/10/2020       Recent Labs     02/13/20  1611 02/13/20  1958 02/14/20  0810 02/14/20  1202   POCGLU 92 111 77 101       Blood Sugar Average: Last 72 hrs:  · (P) 102 [id number]   · Euglycemic on admission  A1c well controlled   Patient states he would take insulin as needed and actually hasn't needed any in 6 months  · Hold listed home doses of Basaglar and Admelog   · SSI algorithm with meals and bedtime   · QID accuchecks   · Patient can likely be discharged without insulin     Myopericarditisresolved as of 2/14/2020  Assessment & Plan  · Treated previously with colchicine and steroids  No further symptoms  · No further workup needed    Essential hypertension  Assessment & Plan  · With prior blood pressure that has been difficult to control with significant orthostasis  BP are better controlled today, but still above goal     · Continue Coreg, Cardura, and Nifedipine with hold parameters   · Torsemide 100 mg QD added by nephrology   · Hydralazine 10 mg IV Q6 PRN   · Defer further changes to nephrology     Anemia due to chronic kidney disease  Assessment & Plan  · Baseline appears to be 8 0-9 0  Hgb stable at 8 0  No signs of bleeding  Iron noted to be low    · Give 100 mg IV Venofer QOD for 5 days   · Trend Hgb   · Avoid blood transfusions as patient will attempt to get transplant       VTE Pharmacologic Prophylaxis:   Pharmacologic: None needed   Mechanical VTE Prophylaxis in Place: No    Patient Centered Rounds: I have performed bedside rounds with nursing staff today  Discussions with Specialists or Other Care Team Provider: Discussed with RN, CM    Education and Discussions with Family / Patient: Discussed with patient, offered to come back when his wife returns     Time Spent for Care: 30 minutes  More than 50% of total time spent on counseling and coordination of care as described above  Current Length of Stay: 4 day(s)    Current Patient Status: Inpatient   Certification Statement: The patient will continue to require additional inpatient hospital stay due to on going PD set up    Discharge Plan: Likely tomorrow if PD exchanges are tolerated     Code Status: Level 1 - Full Code      Subjective:   Patient reports that he is having some pain around his PD site  Denies problems with exchange at this time  Continues to not have difficulties breathing   Reports that he was feeling some constipation with his last BM being Wednesday  He reports that he is going to try going again after his PD exchange  Objective:     Vitals:   Temp (24hrs), Av 1 °F (36 7 °C), Min:97 5 °F (36 4 °C), Max:98 6 °F (37 °C)    Temp:  [97 5 °F (36 4 °C)-98 6 °F (37 °C)] 98 6 °F (37 °C)  HR:  [74-91] 87  Resp:  [11-18] 16  BP: (140-224)/() 162/80  SpO2:  [93 %-99 %] 98 %  Body mass index is 31 kg/m²  Input and Output Summary (last 24 hours): Intake/Output Summary (Last 24 hours) at 2020 1326  Last data filed at 2020 2115  Gross per 24 hour   Intake 620 ml   Output    Net 620 ml       Physical Exam:     Physical Exam   Constitutional: He is oriented to person, place, and time  Vital signs are normal  He appears well-developed and well-nourished  Non-toxic appearance  No distress  HENT:   Head: Normocephalic and atraumatic  Mouth/Throat: Mucous membranes are not dry  Eyes: Pupils are equal, round, and reactive to light  Conjunctivae and EOM are normal  No scleral icterus  Pupils are equal    Neck: Neck supple  Cardiovascular: Normal rate, regular rhythm, S1 normal, S2 normal, normal heart sounds and intact distal pulses  Exam reveals no S3 and no S4  No murmur heard  Pulmonary/Chest: Effort normal and breath sounds normal  No accessory muscle usage or stridor  No respiratory distress  He has no decreased breath sounds  He has no wheezes  He has no rhonchi  He has no rales  He exhibits no tenderness  Abdominal: Soft  Bowel sounds are normal  He exhibits no distension and no mass  There is tenderness (Around PD site ) in the left lower quadrant  There is no rigidity, no rebound and no guarding  Neurological: He is alert and oriented to person, place, and time  He is not disoriented  He displays no tremor  He displays no seizure activity  Skin: Skin is warm and dry         Additional Data:     Labs:    Results from last 7 days   Lab Units 20  0606  20  8814 WBC Thousand/uL 9 18   < > 8 30   HEMOGLOBIN g/dL 8 0*   < > 7 8*   HEMATOCRIT % 24 7*   < > 23 9*   PLATELETS Thousands/uL 447*   < > 413*   NEUTROS PCT %  --   --  56   LYMPHS PCT %  --   --  32   MONOS PCT %  --   --  8   EOS PCT %  --   --  4    < > = values in this interval not displayed  Results from last 7 days   Lab Units 02/14/20  0606  02/12/20  0555   POTASSIUM mmol/L 5 2   < > 4 8   CHLORIDE mmol/L 106   < > 106   CO2 mmol/L 19*   < > 18*   BUN mg/dL 84*   < > 83*   CREATININE mg/dL 10 64*   < > 10 92*   CALCIUM mg/dL 8 5   < > 8 7   ALK PHOS U/L  --   --  44*   ALT U/L  --   --  8*   AST U/L  --   --  49*    < > = values in this interval not displayed  Results from last 7 days   Lab Units 02/10/20  1015   INR  1 04       * I Have Reviewed All Lab Data Listed Above  * Additional Pertinent Lab Tests Reviewed:  Eugene Bojorquez Admission Reviewed    Imaging:    Imaging Reports Reviewed Today Include: None  Imaging Personally Reviewed by Myself Includes:  None    Recent Cultures (last 7 days):           Last 24 Hours Medication List:     Current Facility-Administered Medications:  acetaminophen 650 mg Oral Q6H PRLINNETTE Klein MD    albuterol 2 puff Inhalation Q4H PRN Yasmin Klein MD    aspirin 81 mg Oral Daily Yasmin Klein MD    calcium acetate 667 mg Oral TID With Meals Yasmin Klein MD    carvedilol 25 mg Oral BID With Meals Yasmin Klein MD    docusate sodium 100 mg Oral Daily PRLINNETTE Klein MD    doxazosin 4 mg Oral HS Yasmin Klein MD    hydrALAZINE 10 mg Intravenous Q6H PRN Yasmin Klein MD    HYDROmorphone 2 mg Oral Q4H PRN Reynaldo Cannon PA-C    HYDROmorphone 4 mg Oral Q4H PRN Reynaldo Cannon PA-C    insulin lispro 1-6 Units Subcutaneous TID AC Glynn F Michelle, DO    iron sucrose 100 mg Intravenous Every Other Day Yasmin Klein MD Last Rate: 100 mg (02/14/20 0851)   NIFEdipine ER 60 mg Oral BID Yasmin Klein MD    ondansetron 4 mg Intravenous Q6H PRN Yasmin Klein MD    sodium bicarbonate 1,300 mg Oral TID after meals Tonya Santiago PA-C    torsemide 100 mg Oral Daily Patrica Bishop MD         Today, Patient Was Seen By: Meghna Hurtado PA-C    ** Please Note: Dictation voice to text software may have been used in the creation of this document   **

## 2020-02-14 NOTE — PROGRESS NOTES
NEPHROLOGY PROGRESS NOTE   Aneesh Ceballos 37 y o  male MRN: [id number]  Unit/Bed#: S -01 Encounter: [id number]  Reason for Consult: new start ESRD    ASSESSMENT/PLAN:  1  Elevated Creatinine with Progressive Chronic Kidney Disease stage 5- Baseline creatinine was around 5 but in January increased to 7 and now increased to 11  - workup: hepatitis negative, UPEP no monoclonal bands present, SPEP no monoclonal bands present, FLC ratio elevated at 2 99 (will need hematology follow up as an outpatient)  - electrolytes stable but K starting to rise  - patient modality of choice is peritoneal dialysis  - PD catheter placed on 2/13/2020 by Dr Andreea Nelson  - plan to set up with Elisha Crum PD nurse for outpatient training (discussed with PD nurse)  - for PD catheter placement 2/13 and start PD exchanges 2/14 pending trial exchange due to pain today (see disposition)  - first treatment prescription inpatient will be: instill 1L 1 5% for 2hr dwell (between meals)  - discharge planning pending due to pain management and PD treatment tolerance  2  Mild Hypervolemia- given lasix 100mg IV x1 in ER 2/10  - weight stable from 230-235 pounds  - increased torsemide to 100mg daily 2/11 and monitor  3  Hypertension- continue home regimen + increased diuretic 2/11 and monitor  4  Hyperphosphatemia- low phosphorus diet, start phoslo with meals  5  Metabolic Acidosis- increase sodium bicarbonate tablets 1300mg TID  6  Anemia- hgb below goal  - consider epogen SQ   - iron studies borderline, give IV venofer 100mg x5 doses QOD (second dose 2/14)  7  Access- PD catheter to be placed by Dr nAdreea Nelson on 2/13/20  - patient with pain, unclear gas pain or catheter, will trial 500ml exchange as well as bowel regimen and monitor  8  BMD- PTH acceptable    Disposition:  Hopefully will start PD today  Due to pain, discussion with Dr Shaheen Broderick and we will instill 500ml 1 5% with immediate drain to determine if any abnormalities are seen  Will also give bowel regimen prior to instillation to see if this is the cause  Primary nurse aware  SUBJECTIVE:  Patient seems very anxious  Sitting in bedside chair with wife  Has abdominal pain  Patient openly states he does not tolerate pain well  He also states he feels "gassy"  He is eating a small breakfast   Denies SOB  Wife states LE edema is improving  Last bowl movement was yesterday morning  He did urinate this morning also      OBJECTIVE:  Current Weight: Weight - Scale: 107 kg (235 lb)  Vitals:    02/13/20 1900 02/13/20 2229 02/14/20 0600 02/14/20 0814   BP: 140/64 144/77  162/80   BP Location: Right arm Right arm  Right arm   Pulse: 88 91  87   Resp: 16 17  16   Temp: 98 4 °F (36 9 °C) 98 3 °F (36 8 °C)  98 6 °F (37 °C)   TempSrc: Oral Oral  Oral   SpO2: 97% 93%  98%   Weight:   107 kg (235 lb)    Height:           Intake/Output Summary (Last 24 hours) at 2/14/2020 1004  Last data filed at 2/13/2020 2115  Gross per 24 hour   Intake 770 ml   Output 5 ml   Net 765 ml     General: NAD  Skin: no rash  Eyes: anicteric  ENMT: mm moist  Neck: no masses  Respiratory: CTAB  Cardiac: RRR  Extremities: + bilateral edema (better than prior)  GI: soft tender to palpation L>R  Neuro: alert awake  Psych: mood and affect appropriate    Medications:    Current Facility-Administered Medications:     acetaminophen (TYLENOL) tablet 650 mg, 650 mg, Oral, Q6H PRN, Florecita Curtis MD, 650 mg at 02/10/20 2247    albuterol (PROVENTIL HFA,VENTOLIN HFA) inhaler 2 puff, 2 puff, Inhalation, Q4H PRN, Florecita Curtis MD    aspirin (ECOTRIN LOW STRENGTH) EC tablet 81 mg, 81 mg, Oral, Daily, Florecita Curtis MD, 81 mg at 02/14/20 0843    calcium acetate (PHOSLO) capsule 667 mg, 667 mg, Oral, TID With Meals, Florecita Curtis MD, 667 mg at 02/14/20 0843    carvedilol (COREG) tablet 25 mg, 25 mg, Oral, BID With Meals, Florecita Curtis MD, 25 mg at 02/14/20 0843    docusate sodium (COLACE) capsule 100 mg, 100 mg, Oral, Daily PRN, Joey Grant MD Elias, 100 mg at 02/14/20 0947    doxazosin (CARDURA) tablet 4 mg, 4 mg, Oral, HS, Miguel Damon MD, 4 mg at 02/13/20 2015    hydrALAZINE (APRESOLINE) injection 10 mg, 10 mg, Intravenous, Q6H PRN, Miguel Damon MD, 10 mg at 02/10/20 2035    HYDROmorphone (DILAUDID) tablet 2 mg, 2 mg, Oral, Q4H PRN, Mitch Meredith PA-C    HYDROmorphone (DILAUDID) tablet 4 mg, 4 mg, Oral, Q4H PRN, Reynaldo Bender PA-C, 4 mg at 02/13/20 1517    insulin lispro (HumaLOG) 100 units/mL subcutaneous injection 1-6 Units, 1-6 Units, Subcutaneous, TID AC, Glynn Martinez DO    iron sucrose (VENOFER) 100 mg in sodium chloride 0 9 % 100 mL IVPB, 100 mg, Intravenous, Every Other Day, Miguel Damon MD, Last Rate: 105 mL/hr at 02/14/20 0851, 100 mg at 02/14/20 0851    NIFEdipine (PROCARDIA XL) 24 hr tablet 60 mg, 60 mg, Oral, BID, Miguel Damon MD, 60 mg at 02/14/20 0843    ondansetron (ZOFRAN) injection 4 mg, 4 mg, Intravenous, Q6H PRN, Miguel Damon MD, 4 mg at 02/13/20 2015    sodium bicarbonate tablet 1,300 mg, 1,300 mg, Oral, TID after meals, [facility], MUSTAPHA, 1,300 mg at 02/14/20 6637    torsemide [facility]) tablet 100 mg, 100 mg, Oral, Daily, Miguel Damon MD, 100 mg at 02/14/20 8303    Laboratory Results:  Results from last 7 days   Lab Units 02/14/20  0606 02/13/20  0515 02/12/20  0555 02/12/20  0554 02/11/20  0433 02/10/20  1513 02/10/20  1015   WBC Thousand/uL 9 18 9 10  --  8 30 7 89 8 25 8 62   HEMOGLOBIN g/dL 8 0* 7 7*  --  7 8* 7 3* 8 1* 7 8*   HEMATOCRIT % 24 7* 23 8*  --  23 9* 22 8* 24 8* 24 2*   PLATELETS Thousands/uL 447* 420*  --  413* 353 372 339   POTASSIUM mmol/L 5 2 4 7 4 8  --  4 6 5 2 5 3   CHLORIDE mmol/L 106 107 106  --  108 108 108   CO2 mmol/L 19* 20* 18*  --  18* 18* 19*   BUN mg/dL 84* 83* 83*  --  80* 81* 83*   CREATININE mg/dL 10 64* 10 93* 10 92*  --  10 94* 11 00* 11 22*   CALCIUM mg/dL 8 5 8 1* 8 7  --  8 2* 8 7 8 7   MAGNESIUM mg/dL  --  1 6 1 7  --  1 7 1 8  --    PHOSPHORUS mg/dL  --  5 9* 5 6* --  6 0* 5 5*  --

## 2020-02-14 NOTE — ASSESSMENT & PLAN NOTE
· Baseline appears to be 8 0-9 0  Hgb stable at 8 0  No signs of bleeding   Iron noted to be low    · Give 100 mg IV Venofer QOD for 5 days   · Trend Hgb   · Avoid blood transfusions as patient will attempt to get transplant

## 2020-02-14 NOTE — ASSESSMENT & PLAN NOTE
· POA, creatinine up to 11 from baseline of 7 0 (which is up from 5 previously)  Patient has seen vascular and is scheduled for a LEFT AV fistula creation  Likely secondary to progression of underlying renal disease  Electrolytes stable  Hep panel negative  As per nephrology consult today patient would prefer PD over HD  Status post PD catheter placement  Mild pain surrounding  No signs of infection   Tolerating exchanges   · Nephrology input appreciated   · PD catheter placed 2/13   · Appreciate General Surgery assistance   · Patient will likely get discharged over the weekend after PD exchanges   · He will follow up with Joshua Goldberg on Tuesday for his first treatment   · Avoid hypotension   · Coreg, Cardura, Nifedipine continued with hold parameters   · Avoid nephrotoxins    · Trend BMP, lytes   · Demedex has been restarted by nephrology   · Phoslo added   · NaHCO3 increased

## 2020-02-15 VITALS
TEMPERATURE: 98.2 F | SYSTOLIC BLOOD PRESSURE: 142 MMHG | BODY MASS INDEX: 31.2 KG/M2 | WEIGHT: 235.4 LBS | HEIGHT: 73 IN | HEART RATE: 81 BPM | RESPIRATION RATE: 18 BRPM | DIASTOLIC BLOOD PRESSURE: 86 MMHG | OXYGEN SATURATION: 99 %

## 2020-02-15 LAB
ANION GAP SERPL CALCULATED.3IONS-SCNC: 13 MMOL/L (ref 4–13)
BASOPHILS # BLD AUTO: 0.04 THOUSANDS/ΜL (ref 0–0.1)
BASOPHILS NFR BLD AUTO: 1 % (ref 0–1)
BUN SERPL-MCNC: 87 MG/DL (ref 5–25)
CALCIUM SERPL-MCNC: 8.4 MG/DL (ref 8.3–10.1)
CHLORIDE SERPL-SCNC: 103 MMOL/L (ref 100–108)
CO2 SERPL-SCNC: 20 MMOL/L (ref 21–32)
CREAT SERPL-MCNC: 10.88 MG/DL (ref 0.6–1.3)
EOSINOPHIL # BLD AUTO: 0.38 THOUSAND/ΜL (ref 0–0.61)
EOSINOPHIL NFR BLD AUTO: 4 % (ref 0–6)
ERYTHROCYTE [DISTWIDTH] IN BLOOD BY AUTOMATED COUNT: 14.1 % (ref 11.6–15.1)
GFR SERPL CREATININE-BSD FRML MDRD: 6 ML/MIN/1.73SQ M
GLUCOSE SERPL-MCNC: 117 MG/DL (ref 65–140)
GLUCOSE SERPL-MCNC: 119 MG/DL (ref 65–140)
GLUCOSE SERPL-MCNC: 84 MG/DL (ref 65–140)
HCT VFR BLD AUTO: 22.9 % (ref 36.5–49.3)
HGB BLD-MCNC: 7.4 G/DL (ref 12–17)
IMM GRANULOCYTES # BLD AUTO: 0.04 THOUSAND/UL (ref 0–0.2)
IMM GRANULOCYTES NFR BLD AUTO: 1 % (ref 0–2)
LYMPHOCYTES # BLD AUTO: 2.18 THOUSANDS/ΜL (ref 0.6–4.47)
LYMPHOCYTES NFR BLD AUTO: 26 % (ref 14–44)
MCH RBC QN AUTO: 28.9 PG (ref 26.8–34.3)
MCHC RBC AUTO-ENTMCNC: 32.3 G/DL (ref 31.4–37.4)
MCV RBC AUTO: 90 FL (ref 82–98)
MONOCYTES # BLD AUTO: 0.74 THOUSAND/ΜL (ref 0.17–1.22)
MONOCYTES NFR BLD AUTO: 9 % (ref 4–12)
NEUTROPHILS # BLD AUTO: 5.17 THOUSANDS/ΜL (ref 1.85–7.62)
NEUTS SEG NFR BLD AUTO: 59 % (ref 43–75)
NRBC BLD AUTO-RTO: 0 /100 WBCS
PHOSPHATE SERPL-MCNC: 6.2 MG/DL (ref 2.7–4.5)
PLATELET # BLD AUTO: 416 THOUSANDS/UL (ref 149–390)
PMV BLD AUTO: 9.8 FL (ref 8.9–12.7)
POTASSIUM SERPL-SCNC: 4.9 MMOL/L (ref 3.5–5.3)
RBC # BLD AUTO: 2.56 MILLION/UL (ref 3.88–5.62)
SODIUM SERPL-SCNC: 136 MMOL/L (ref 136–145)
WBC # BLD AUTO: 8.55 THOUSAND/UL (ref 4.31–10.16)

## 2020-02-15 PROCEDURE — 82948 REAGENT STRIP/BLOOD GLUCOSE: CPT

## 2020-02-15 PROCEDURE — 84100 ASSAY OF PHOSPHORUS: CPT | Performed by: PHYSICIAN ASSISTANT

## 2020-02-15 PROCEDURE — 99239 HOSP IP/OBS DSCHRG MGMT >30: CPT | Performed by: PHYSICIAN ASSISTANT

## 2020-02-15 PROCEDURE — 90945 DIALYSIS ONE EVALUATION: CPT | Performed by: INTERNAL MEDICINE

## 2020-02-15 PROCEDURE — 80048 BASIC METABOLIC PNL TOTAL CA: CPT | Performed by: INTERNAL MEDICINE

## 2020-02-15 PROCEDURE — 85025 COMPLETE CBC W/AUTO DIFF WBC: CPT | Performed by: PHYSICIAN ASSISTANT

## 2020-02-15 RX ORDER — TORSEMIDE 100 MG/1
100 TABLET ORAL DAILY
Qty: 30 TABLET | Refills: 0 | Status: SHIPPED | OUTPATIENT
Start: 2020-02-16 | End: 2021-05-19

## 2020-02-15 RX ORDER — HYDROMORPHONE HYDROCHLORIDE 2 MG/1
2 TABLET ORAL EVERY 4 HOURS PRN
Qty: 12 TABLET | Refills: 0 | Status: SHIPPED | OUTPATIENT
Start: 2020-02-15 | End: 2020-02-17

## 2020-02-15 RX ORDER — SODIUM BICARBONATE 650 MG/1
1300 TABLET ORAL
Qty: 180 TABLET | Refills: 0 | Status: SHIPPED | OUTPATIENT
Start: 2020-02-15 | End: 2020-10-01

## 2020-02-15 RX ADMIN — CALCIUM ACETATE 667 MG: 667 CAPSULE ORAL at 08:18

## 2020-02-15 RX ADMIN — SODIUM BICARBONATE 650 MG TABLET 1300 MG: at 08:18

## 2020-02-15 RX ADMIN — ASPIRIN 81 MG: 81 TABLET, COATED ORAL at 08:18

## 2020-02-15 RX ADMIN — TORSEMIDE 100 MG: 100 TABLET ORAL at 08:18

## 2020-02-15 RX ADMIN — NIFEDIPINE 60 MG: 30 TABLET, FILM COATED, EXTENDED RELEASE ORAL at 08:18

## 2020-02-15 RX ADMIN — HYDROMORPHONE HYDROCHLORIDE 2 MG: 2 TABLET ORAL at 01:34

## 2020-02-15 RX ADMIN — CARVEDILOL 25 MG: 12.5 TABLET, FILM COATED ORAL at 08:18

## 2020-02-15 NOTE — PROGRESS NOTES
Progress Note - General Surgery   Lyell Libman 37 y o  male MRN: [id number]  Unit/Bed#: S -James Encounter: [id number]    Assessment:  34M s/p PD cath placement POD2, stable    Plan:  Pain improved  Continue PRN pain regimen  Tolerating diet  Ambulating  OK for d/c from surgical perspective    Subjective:  Patient states abdominal pain is improved from yesterday  PD catheter was flushed and returned  Tolerating diet and passing flatus and BM  OOB  Afebrile  Objective:     Blood pressure 142/86, pulse 81, temperature 98 2 °F (36 8 °C), temperature source Oral, resp  rate 18, height 6' 1" (1 854 m), weight 107 kg (235 lb 6 4 oz), SpO2 99 %  ,Body mass index is 31 06 kg/m²  I/O last 3 completed shifts: In: 5 [P O :420]  Out: -100     Invasive Devices     Peripheral Intravenous Line            Peripheral IV 02/13/20 Right Hand 1 day                Physical Exam:  NAD, Aox3  MMM, NCAT  EOMI, PERRLA  Regular rate and rhythm  Equal chest expansion, unlabored respirations  Abd soft, NTND  PD cath in place with no signs of peritonitis  Ext: No deficits, warm and well perfused  Normal mood and affect       Lab, Imaging and other Studies:    Recent Results (from the past 36 hour(s))   Basic metabolic panel    Collection Time: 02/14/20  6:06 AM   Result Value Ref Range    Sodium 140 136 - 145 mmol/L    Potassium 5 2 3 5 - 5 3 mmol/L    Chloride 106 100 - 108 mmol/L    CO2 19 (L) 21 - 32 mmol/L    ANION GAP 15 (H) 4 - 13 mmol/L    BUN 84 (H) 5 - 25 mg/dL    Creatinine 10 64 (H) 0 60 - 1 30 mg/dL    Glucose 94 65 - 140 mg/dL    Calcium 8 5 8 3 - 10 1 mg/dL    eGFR 6 ml/min/1 73sq m   CBC    Collection Time: 02/14/20  6:06 AM   Result Value Ref Range    WBC 9 18 4 31 - 10 16 Thousand/uL    RBC 2 74 (L) 3 88 - 5 62 Million/uL    Hemoglobin 8 0 (L) 12 0 - 17 0 g/dL    Hematocrit 24 7 (L) 36 5 - 49 3 %    MCV 90 82 - 98 fL    MCH 29 2 26 8 - 34 3 pg    MCHC 32 4 31 4 - 37 4 g/dL    RDW 14 0 11 6 - 15 1 %    Platelets 447 (H) 149 - 390 Thousands/uL    MPV 9 5 8 9 - 12 7 fL   Fingerstick Glucose (POCT)    Collection Time: 02/14/20  8:10 AM   Result Value Ref Range    POC Glucose 77 65 - 140 mg/dl   Fingerstick Glucose (POCT)    Collection Time: 02/14/20 12:02 PM   Result Value Ref Range    POC Glucose 101 65 - 140 mg/dl   Fingerstick Glucose (POCT)    Collection Time: 02/14/20  4:29 PM   Result Value Ref Range    POC Glucose 147 (H) 65 - 140 mg/dl   Fingerstick Glucose (POCT)    Collection Time: 02/14/20  9:25 PM   Result Value Ref Range    POC Glucose 118 65 - 140 mg/dl   Basic metabolic panel    Collection Time: 02/15/20  4:38 AM   Result Value Ref Range    Sodium 136 136 - 145 mmol/L    Potassium 4 9 3 5 - 5 3 mmol/L    Chloride 103 100 - 108 mmol/L    CO2 20 (L) 21 - 32 mmol/L    ANION GAP 13 4 - 13 mmol/L    BUN 87 (H) 5 - 25 mg/dL    Creatinine 10 88 (H) 0 60 - 1 30 mg/dL    Glucose 117 65 - 140 mg/dL    Calcium 8 4 8 3 - 10 1 mg/dL    eGFR 6 ml/min/1 73sq m   CBC and differential    Collection Time: 02/15/20  4:38 AM   Result Value Ref Range    WBC 8 55 4 31 - 10 16 Thousand/uL    RBC 2 56 (L) 3 88 - 5 62 Million/uL    Hemoglobin 7 4 (L) 12 0 - 17 0 g/dL    Hematocrit 22 9 (L) 36 5 - 49 3 %    MCV 90 82 - 98 fL    MCH 28 9 26 8 - 34 3 pg    MCHC 32 3 31 4 - 37 4 g/dL    RDW 14 1 11 6 - 15 1 %    MPV 9 8 8 9 - 12 7 fL    Platelets 107 (H) 391 - 390 Thousands/uL    nRBC 0 /100 WBCs    Neutrophils Relative 59 43 - 75 %    Immat GRANS % 1 0 - 2 %    Lymphocytes Relative 26 14 - 44 %    Monocytes Relative 9 4 - 12 %    Eosinophils Relative 4 0 - 6 %    Basophils Relative 1 0 - 1 %    Neutrophils Absolute 5 17 1 85 - 7 62 Thousands/µL    Immature Grans Absolute 0 04 0 00 - 0 20 Thousand/uL    Lymphocytes Absolute 2 18 0 60 - 4 47 Thousands/µL    Monocytes Absolute 0 74 0 17 - 1 22 Thousand/µL    Eosinophils Absolute 0 38 0 00 - 0 61 Thousand/µL    Basophils Absolute 0 04 0 00 - 0 10 Thousands/µL   Phosphorus    Collection Time: 02/15/20  4:38 AM   Result Value Ref Range    Phosphorus 6 2 (H) 2 7 - 4 5 mg/dL   Fingerstick Glucose (POCT)    Collection Time: 02/15/20  8:26 AM   Result Value Ref Range    POC Glucose 84 65 - 140 mg/dl       Active Medications  No recently discontinued medications to reconcile

## 2020-02-15 NOTE — ASSESSMENT & PLAN NOTE
· Stable at 6 2 yesterday  · Will monitor  · Phoslo started by nephrology    · Will adjust doses as outpatient

## 2020-02-15 NOTE — ASSESSMENT & PLAN NOTE
Lab Results   Component Value Date    HGBA1C 5 3 02/10/2020       Recent Labs     02/14/20  1629 02/14/20  2125 02/15/20  0826 02/15/20  1216   POCGLU 147* 118 84 119       Blood Sugar Average: Last 72 hrs:  · (P) 688 [id number]   · Euglycemic on admission  A1c well controlled   Patient states he would take insulin as needed and actually hasn't needed any in 6 months  · Hold listed home doses of Basaglar and Admelog   · Patient discharged without insulin

## 2020-02-15 NOTE — ASSESSMENT & PLAN NOTE
· With prior blood pressure that has been difficult to control with significant orthostasis   BP are better controlled today  · Continue Coreg, Cardura, and Nifedipine with hold parameters   · Torsemide 100 mg QD added by nephrology

## 2020-02-15 NOTE — ASSESSMENT & PLAN NOTE
· POA, creatinine up to 11 from baseline of 7 0 (which is up from 5 previously)  Patient has seen vascular and is scheduled for a LEFT AV fistula creation  Likely secondary to progression of underlying renal disease  Electrolytes stable  Hep panel negative  As per nephrology consult today patient would prefer PD over HD  Status post PD catheter placement  Mild pain surrounding  No signs of infection  Tolerated exchanges   Lytes acceptable   · Stable for discharge    · Nephrology input appreciated   · PD catheter placed 2/13   · [address] Surgery assistance    · He will follow up with [facility] on Tuesday for his first treatment   · Continue current medication regimen

## 2020-02-15 NOTE — PROGRESS NOTES
[address] [facility] NOTE   Bret Sandhoff 37 y o  male MRN: [id number]  Unit/Bed#: S -01 Encounter: [id number]  Reason for Consult: ESERD    ASSESSMENT and PLAN:    36 yo male with PMHx of CKD V, HTN, anemia, grade I CHF, who is initially presenting with SOB 2/10  Pt recently treated for URI with azithromycin  Also pt had taken torsemide Friday and Saturday due to edema  Pt received IV furosemide initially and today is feeling improved from SOB and edema standpoint       Pt denies nausea now but did have dry heaves 1-2 times past week  No vomiting  No tremors  No confusion per wife       On exam there is no asterixis, pericardial rub, lungs are CTA, no sig edema LE  -160;      1) MAGNOLIA on CKD V now with ESRD     - outpatient Nephrologist Dr Rafal Khoury  - Cr baseline prior was 5 and rising to 7 in January  - admission Cr 11  - pt may have progression of renal dysfunction  Has biopsy proven DM; but rule out acute issues also  - UA 0-1 RBC, no WBC, 2+ protein  - PVR 0 cc  - UPEP unrevealing    - SPEP unrevealing  - FLC 2 99  Likely in setting of ESRD diff to interpret at these levels  - renal hep unrevealing  -peritoneal dialysis started as urgent start on 02/14  Please review short notes from 02/14 for issues during treatment      Plan:  - pt seen and examined on PD    - FLC - repeat as outpatient  - cont torsemide daily  - sodium bicarb tabs  - phos binder  - low K diet  - BMP Monday   -plan for short treatment again today in the morning  -if tolerates, patient can be discharged home as he is set up for [facility] on Tuesday  -patient did have poor drainage and required head of bed up to 20° at the most toward the end of drain  -had small fibrin with the initial drain otherwise no fibrin  -from renal standpoint, patient is stable for discharge after drainage this morning  Reviewed with the patient for no showering, no bathing  Only sponge bath    No heavy lifting   - I have messaged PD Nurse outpatient also     2) HTN     - cont home regimen with carvedilol and nifedipine and doxazosin     3) MBD     - PTH - 191  - continue phos binder  -phosphorus remains elevated and may need to be titrated further as an outpatient     4) anemia     - iron panel - sat 23, low ferritin  Ok for 500 mg total venofer over 10 days (100 mg every other day)  - consider PHOEBE once on dialysis (not yet as HTN)  - avoid transfusion as pt is a transplant evaluation candidate      5) electrolytes     - stable 4 9     6) acid/base     - bicarbonate improving with bicarbonate tablets    SUBJECTIVE / INTERVAL HISTORY:    Blood pressures are overall improving 274 systolic      OBJECTIVE:  Current Weight: Weight - Scale: 107 kg (235 lb 6 4 oz)  Vitals:    02/14/20 2119 02/14/20 2302 02/15/20 0600 02/15/20 0700   BP: 128/68 156/72  142/86   BP Location:  Right arm  Right arm   Pulse:  87  81   Resp:  18  18   Temp:  98 5 °F (36 9 °C)  98 2 °F (36 8 °C)   TempSrc:  Oral  Oral   SpO2:  92%  99%   Weight:   107 kg (235 lb 6 4 oz)    Height:           Intake/Output Summary (Last 24 hours) at 2/15/2020 1027  Last data filed at 2/14/2020 2317  Gross per 24 hour   Intake    Output -100 ml   Net 100 ml     General: NAD  Skin: no rash  Eyes: anicteric sclera  ENT: moist mucous membrane  Neck: supple  Chest: CTA b/l, no ronchii, no wheeze, no rubs, no rales  CVS: s1s2, no murmur, no gallop, no rub  Abdomen: soft, nontender, nl sounds  Extremities: no edema LE b/l  : no gautam  Neuro: AAOX3  Psych: normal affect    Medications:    Current Facility-Administered Medications:     acetaminophen (TYLENOL) tablet 650 mg, 650 mg, Oral, Q6H PRN, Tiffany Aceves MD, 650 mg at 02/10/20 2247    albuterol (PROVENTIL HFA,VENTOLIN HFA) inhaler 2 puff, 2 puff, Inhalation, Q4H PRN, Tiffany Aceves MD    aspirin (ECOTRIN LOW STRENGTH) EC tablet 81 mg, 81 mg, Oral, Daily, Tiffany Aceves MD, 81 mg at 02/15/20 0818    calcium acetate (PHOSLO) capsule 667 mg, 667 mg, Oral, TID With Meals, Devonte Mena MD, 667 mg at 02/15/20 0818    carvedilol (COREG) tablet 25 mg, 25 mg, Oral, BID With Meals, Devonte Mena MD, 25 mg at 02/15/20 0818    docusate sodium (COLACE) capsule 100 mg, 100 mg, Oral, Daily PRN, Devonte Mena MD, 100 mg at 02/14/20 0947    doxazosin (CARDURA) tablet 4 mg, 4 mg, Oral, HS, Devonte Mena MD, 4 mg at 02/13/20 2015    hydrALAZINE (APRESOLINE) injection 10 mg, 10 mg, Intravenous, Q6H PRN, Devonte Mena MD, 10 mg at 02/10/20 2035    HYDROmorphone (DILAUDID) tablet 2 mg, 2 mg, Oral, Q4H PRN, Levy Pompa PA-C, 2 mg at 02/15/20 0134    HYDROmorphone (DILAUDID) tablet 4 mg, 4 mg, Oral, Q4H PRN, Reynaldo Bender PA-C, 4 mg at 02/13/20 1517    insulin lispro (HumaLOG) 100 units/mL subcutaneous injection 1-6 Units, 1-6 Units, Subcutaneous, TID Glynn CLARKE DO, Stopped at 02/15/20 9738    iron sucrose (VENOFER) 100 mg in sodium chloride 0 9 % 100 mL IVPB, 100 mg, Intravenous, Every Other Day, Devonte Mena MD, Last Rate: 105 mL/hr at 02/14/20 0851, 100 mg at 02/14/20 0851    NIFEdipine (PROCARDIA XL) 24 hr tablet 60 mg, 60 mg, Oral, BID, Devonte Mena MD, 60 mg at 02/15/20 0818    [facility]ansetron [facility] injection 4 mg, 4 mg, Intravenous, Q6H PRN, Devonte Mena MD, 4 mg at 02/13/20 2015    sodium bicarbonate tablet 1,300 mg, 1,300 mg, Oral, TID after meals, [facility], [facility], 1,300 mg at 02/15/20 0818    torsemide [facility]) tablet 100 mg, 100 mg, Oral, Daily, Devonte Mena MD, 100 mg at 02/15/20 0818    Laboratory Results:  Results from last 7 days   Lab Units 02/15/20  0438 02/14/20  0606 02/13/20  0515 02/12/20  0555 02/12/20  0554 02/11/20  0433 02/10/20  1513 02/10/20  1015   WBC Thousand/uL 8 55 9 18 9 10  --  8 30 7 89 8 25 8 62   HEMOGLOBIN g/dL 7 4* 8 0* 7 7*  --  7 8* 7 3* 8 1* 7 8*   HEMATOCRIT % 22 9* 24 7* 23 8*  --  23 9* 22 8* 24 8* 24 2*   PLATELETS Thousands/uL 416* 447* 420*  --  413* 353 372 339   POTASSIUM mmol/L 4 9 5 2 4 7 4 8  --  4 6 5 2 5  3   CHLORIDE mmol/L 103 106 107 106  --  108 108 108   CO2 mmol/L 20* 19* 20* 18*  --  18* 18* 19*   BUN mg/dL 87* 84* 83* 83*  --  80* 81* 83*   CREATININE mg/dL 10 88* 10 64* 10 93* 10 92*  --  10 94* 11 00* 11 22*   CALCIUM mg/dL 8 4 8 5 8 1* 8 7  --  8 2* 8 7 8 7   MAGNESIUM mg/dL  --   --  1 6 1 7  --  1 7 1 8  --    PHOSPHORUS mg/dL 6 2*  --  5 9* 5 6*  --  6 0* 5 5*  --

## 2020-02-15 NOTE — DISCHARGE INSTR - AVS FIRST PAGE
Dear Jimi Ramires,     It was our pleasure to care for you here at [facility], [address]  It is our hope that we were always able to exceed the expected standards for your care during your stay  You were hospitalized due to stage 5 kidney disease  You were cared for on the 2nd floor by Meghna Hutrado PA-C under the service of Husam Cuellar, DO with the [facility] who covers for your primary care physician (PCP), Samra Oreilly MD, while you were hospitalized  If you have any questions or concerns related to this hospitalization, you may contact us at [phone]  For follow up as well as any medication refills, we recommend that you follow up with your primary care physician  A registered nurse will reach out to you by phone within a few days after your discharge to answer any additional questions that you may have after going home  However, at this time we provide for you here, the most important instructions / recommendations at discharge:     · Notable Medication Adjustments -   · Torsemide (Demedex) 100 mg Tablet (Water Pill) - Take 1 tablet by mouth once daily (not as needed)  · Calcium Acetate (Phoslo) 667 mg tablets (Phosphate Binder) - Take 1 tablet 3 times daily   · Sodium Bicarbonate 650 mg tablets (Bicarbonate supplement) - Take 2 tablets by mouth three times daily after meals   · Hydromoprhone (Dilaudid) 2 mg Tablets (Pain) - Take 1 tablet every 4 hours as needed for pain for 2 days - use stool softener with this   · Testing Required after Discharge -   · Follow up with Nephrology for further testing   · Attending Hemodialysis at [facility] on Tuesday  Call for time   · Important follow up information -   · No showering, only sponge bath   · Other Instructions -   · Feel better!    · Please review this entire after visit summary as additional general instructions including medication list, appointments, activity, diet, any pertinent wound care, and other additional recommendations from your care team that may be provided for you        Sincerely,     Ema Payne PA-C

## 2020-02-15 NOTE — NURSING NOTE
Manual exchange of PD started dwell time at 2209  Discovered by RN that the clamp was not fastened tight enough at 2300 and the PT drained  1000mL exchanged  Nephrology aware  Per Dr Aurea Kay, pt did not need to restart this cycle  Pt to have another exchange after breakfast in the morning

## 2020-02-15 NOTE — DISCHARGE SUMMARY
[address] Internal Medicine  Discharge- Fede Yu 1985, 37 y o  male MRN: [id number]    Unit/Bed#: S -01 Encounter: [id number]    Primary Care Provider: rFedy Corley MD   Date and time admitted to hospital: 2/10/2020  2:08 PM      Addendum:  Patient has chronic diastolic congestive heart failure-grade 1  However, his hypervolemia was secondary to progression of his renal failure  * Acute renal failure superimposed on stage 5 chronic kidney disease, not on chronic dialysis (HCC)  Assessment & Plan  · POA, creatinine up to 11 from baseline of 7 0 (which is up from 5 previously)  Patient has seen vascular and is scheduled for a LEFT AV fistula creation  Likely secondary to progression of underlying renal disease  Electrolytes stable  Hep panel negative  As per nephrology consult today patient would prefer PD over HD  Status post PD catheter placement  Mild pain surrounding  No signs of infection  Tolerated exchanges  Lytes acceptable   · Stable for discharge    · Nephrology input appreciated   · PD catheter placed 2/13   · Appreciate General Surgery assistance    · He will follow up with [facility] on Tuesday for his first treatment   · Continue current medication regimen     Hyperphosphatemia  Assessment & Plan  · Stable at 6 2 yesterday  · Will monitor  · Phoslo started by nephrology    · Will adjust doses as outpatient     Type 2 diabetes mellitus with kidney complication, with long-term current use of insulin [facility])  Assessment & Plan  Lab Results   Component Value Date    HGBA1C 5 3 02/10/2020       Recent Labs     02/14/20  1629 02/14/20  2125 02/15/20  0826 02/15/20  1216   POCGLU 147* 118 84 119       Blood Sugar Average: Last 72 hrs:  · (P) 049 [id number]   · Euglycemic on admission  A1c well controlled   Patient states he would take insulin as needed and actually hasn't needed any in 6 months  · Hold listed home doses of Basaglar and Admelog   · Patient discharged without insulin     Essential hypertension  Assessment & Plan  · With prior blood pressure that has been difficult to control with significant orthostasis  BP are better controlled today  · Continue Coreg, Cardura, and Nifedipine with hold parameters   · Torsemide 100 mg QD added by nephrology       Anemia due to chronic kidney disease  Assessment & Plan  · Baseline appears to be 8 0-9 0  Hgb stable at 7 4  No signs of bleeding  Iron noted to be low        Discharging Physician / Practitioner: Gabriel Ashford PA-C  PCP: Hemalatha Lopez MD  Admission Date:   Admission Orders (From admission, onward)     Ordered        02/10/20 1605  Inpatient Admission  Once                   Discharge Date: 02/15/20    Resolved Problems  Date Reviewed: 2/15/2020          Resolved    Myopericarditis 2/14/2020     Resolved by  Gabriel Ashford PA-C    Hypervolemia associated with renal insufficiency 2/13/2020     Resolved by  Gabriel Ashford PA-C          Consultations During Hospital Stay:  · Nephrology - Dr Ciaran Horn     Procedures Performed:   · CXR PA and Lateral   · PD Catheter Insertion     Significant Findings / Test Results:   · CXR PA and Lateral - Mild central vascular congestion   · PD Catheter - See op note for further details     Incidental Findings:   · None     Test Results Pending at Discharge (will require follow up): · None     Outpatient Tests Requested:  · Follow up with [facility] for PD    Complications:  None    Reason for Admission: CKD 5    Hospital Course:     Basilia Garcia is a 37 y o  male patient who originally presented to the hospital on 2/10/2020 due to shortness of breath  On admission the patient was found have evidence of volume overload given his check stress ray showed mild central vascular congestion  Additionally, the patient did have significant leg swelling  His creatinine had increased from his baseline of 7-11    He was admitted, and after consultation with the Nephrology team over the phone was given 100 mg of IV Lasix total with good diuresis  The patient's breathing improved overnight and he was continued on 100 mg of oral torsemide throughout the rest of hospitalization  Nephrology was consulted for management of elevated creatinine and was determined that the patient would be a new start dialysis  Despite the patient having an evaluation with the vascular surgery team for creation of a hemodialysis fistula the patient changes decision and elected to pursue peritoneal dialysis  General surgery was consulted and the patient's peritoneal dialysis catheter was placed on February 13th  He tolerated his exchanges well and case management set the patient up with [facility] for his 1st appointment on Tuesday February 18th  The patient was deemed stable for discharge given his lytes lytes remained stable after peritoneal dialysis exchanges and his 1st treatment  Of note the patient was found to be anemic likely related to anemia of chronic kidney disease  Nutritional workup found a mild iron deficiency so he was given dose of IV Venofer throughout his hospitalization and his hemoglobin remained stable  There were no signs of bleeding at discharge  Blood transfusion was avoided all costs as the patient is attempting to get on the kidney transplant list         Please see above list of diagnoses and related plan for additional information  Condition at Discharge: stable     Discharge Day Visit / Exam:     Subjective:  Patient reports he is feeling well  States he has some mild pain at his peritoneal dialysis catheter site, this is improving  He denies any fevers or chills  Denies any difficulty having bowel movements  Denies any chest pain or difficulty breathing    Excited to be discharged home  Vitals: Blood Pressure: 142/86 (02/15/20 0700)  Pulse: 81 (02/15/20 0700)  Temperature: 98 2 °F (36 8 °C) (02/15/20 0700)  Temp Source: Oral (02/15/20 0700)  Respirations: 18 (02/15/20 0700)  Height: 6' 1" (185 4 cm) (02/11/20 1100)  Weight - Scale: 107 kg (235 lb 6 4 oz) (02/15/20 0600)  SpO2: 99 % (02/15/20 0700)  Exam:   Physical Exam   Constitutional: He is oriented to person, place, and time  Vital signs are normal  He appears well-developed and well-nourished  Non-toxic appearance  No distress  HENT:   Head: Normocephalic and atraumatic  Mouth/Throat: Mucous membranes are not dry  Eyes: Pupils are equal, round, and reactive to light  Conjunctivae and EOM are normal  No scleral icterus  Pupils are equal    Neck: Neck supple  Cardiovascular: Normal rate, regular rhythm, S1 normal, S2 normal, normal heart sounds and intact distal pulses  Exam reveals no S3 and no S4  No murmur heard  Bilateral leg swelling, stable      Pulmonary/Chest: Effort normal and breath sounds normal  No accessory muscle usage or stridor  No respiratory distress  He has no decreased breath sounds  He has no wheezes  He has no rhonchi  He has no rales  He exhibits no tenderness  Abdominal: Soft  Bowel sounds are normal  He exhibits no distension and no mass  There is no tenderness  There is no rigidity, no rebound and no guarding  Neurological: He is alert and oriented to person, place, and time  He is not disoriented  He displays no tremor  He displays no seizure activity  Skin: Skin is warm and dry  Discussion with Family: Wife at bedside    Discharge instructions/Information to patient and family:   See after visit summary for information provided to patient and family  Provisions for Follow-Up Care:  See after visit summary for information related to follow-up care and any pertinent home health orders  Disposition:     Home    For Discharges to [facility] SNF:   · Not Applicable to this Patient - Not Applicable to this Patient    Planned Readmission: None     Discharge Statement:  I spent 45 minutes discharging the patient  This time was spent on the day of discharge  I had direct contact with the patient on the day of discharge  Greater than 50% of the total time was spent examining patient, answering all patient questions, arranging and discussing plan of care with patient as well as directly providing post-discharge instructions  Additional time then spent on discharge activities  Discharge Medications:  See after visit summary for reconciled discharge medications provided to patient and family        ** Please Note: This note has been constructed using a voice recognition system **

## 2020-02-17 ENCOUNTER — TRANSITIONAL CARE MANAGEMENT (OUTPATIENT)
Dept: FAMILY MEDICINE CLINIC | Facility: CLINIC | Age: 35
End: 2020-02-17

## 2020-02-17 DIAGNOSIS — Z79.4 TYPE 2 DIABETES MELLITUS WITH CHRONIC KIDNEY DISEASE ON CHRONIC DIALYSIS, WITH LONG-TERM CURRENT USE OF INSULIN (HCC): Primary | ICD-10-CM

## 2020-02-17 DIAGNOSIS — N18.6 TYPE 2 DIABETES MELLITUS WITH CHRONIC KIDNEY DISEASE ON CHRONIC DIALYSIS, WITH LONG-TERM CURRENT USE OF INSULIN (HCC): Primary | ICD-10-CM

## 2020-02-17 DIAGNOSIS — Z99.2 TYPE 2 DIABETES MELLITUS WITH CHRONIC KIDNEY DISEASE ON CHRONIC DIALYSIS, WITH LONG-TERM CURRENT USE OF INSULIN (HCC): Primary | ICD-10-CM

## 2020-02-17 DIAGNOSIS — E11.22 TYPE 2 DIABETES MELLITUS WITH CHRONIC KIDNEY DISEASE ON CHRONIC DIALYSIS, WITH LONG-TERM CURRENT USE OF INSULIN (HCC): Primary | ICD-10-CM

## 2020-02-23 DIAGNOSIS — I10 HYPERTENSION: ICD-10-CM

## 2020-02-24 RX ORDER — NIFEDIPINE 60 MG/1
60 TABLET, FILM COATED, EXTENDED RELEASE ORAL 2 TIMES DAILY
Qty: 60 TABLET | Refills: 5 | Status: SHIPPED | OUTPATIENT
Start: 2020-02-24 | End: 2020-09-24

## 2020-04-14 DIAGNOSIS — I21.4 NSTEMI (NON-ST ELEVATED MYOCARDIAL INFARCTION) (HCC): ICD-10-CM

## 2020-04-14 RX ORDER — CARVEDILOL 25 MG/1
TABLET ORAL
Qty: 360 TABLET | Refills: 0 | Status: SHIPPED | OUTPATIENT
Start: 2020-04-14 | End: 2021-05-03

## 2020-04-17 DIAGNOSIS — N18.5 CKD STAGE 5 DUE TO TYPE 2 DIABETES MELLITUS (HCC): Primary | ICD-10-CM

## 2020-04-17 DIAGNOSIS — E11.22 CKD STAGE 5 DUE TO TYPE 2 DIABETES MELLITUS (HCC): Primary | ICD-10-CM

## 2020-04-17 RX ORDER — BLOOD-GLUCOSE METER
EACH MISCELLANEOUS 3 TIMES DAILY
Qty: 1 KIT | Refills: 0 | Status: SHIPPED | OUTPATIENT
Start: 2020-04-17

## 2020-04-17 RX ORDER — LANCETS
EACH MISCELLANEOUS 3 TIMES DAILY
Qty: 100 EACH | Refills: 5 | Status: SHIPPED | OUTPATIENT
Start: 2020-04-17 | End: 2020-10-01

## 2020-06-11 DIAGNOSIS — I10 HYPERTENSION, UNSPECIFIED TYPE: ICD-10-CM

## 2020-06-15 RX ORDER — ERGOCALCIFEROL 1.25 MG/1
CAPSULE ORAL
Qty: 4 CAPSULE | Refills: 3 | OUTPATIENT
Start: 2020-06-15

## 2020-06-18 RX ORDER — SPIRONOLACTONE 50 MG/1
TABLET, FILM COATED ORAL
Qty: 30 TABLET | Refills: 11 | Status: SHIPPED | OUTPATIENT
Start: 2020-06-18 | End: 2020-10-01

## 2020-07-11 DIAGNOSIS — N18.5 CKD (CHRONIC KIDNEY DISEASE), STAGE V (HCC): ICD-10-CM

## 2020-07-16 DIAGNOSIS — I10 HYPERTENSION, UNSPECIFIED TYPE: ICD-10-CM

## 2020-07-16 RX ORDER — DOXAZOSIN MESYLATE 4 MG/1
TABLET ORAL
Qty: 30 TABLET | Refills: 5 | Status: SHIPPED | OUTPATIENT
Start: 2020-07-16 | End: 2020-10-01

## 2020-07-19 RX ORDER — SODIUM BICARBONATE 650 MG/1
TABLET ORAL
Qty: 180 TABLET | Refills: 0 | OUTPATIENT
Start: 2020-07-19

## 2020-07-23 DIAGNOSIS — I21.4 NSTEMI (NON-ST ELEVATED MYOCARDIAL INFARCTION) (HCC): ICD-10-CM

## 2020-07-28 RX ORDER — CARVEDILOL 25 MG/1
TABLET ORAL
Qty: 360 TABLET | Refills: 0 | OUTPATIENT
Start: 2020-07-28

## 2020-09-14 DIAGNOSIS — I10 HYPERTENSION: ICD-10-CM

## 2020-09-24 RX ORDER — NIFEDIPINE 60 MG/1
TABLET, FILM COATED, EXTENDED RELEASE ORAL
Qty: 60 TABLET | Refills: 5 | Status: SHIPPED | OUTPATIENT
Start: 2020-09-24 | End: 2021-07-19

## 2020-10-01 ENCOUNTER — OFFICE VISIT (OUTPATIENT)
Dept: FAMILY MEDICINE CLINIC | Facility: CLINIC | Age: 35
End: 2020-10-01

## 2020-10-01 VITALS
HEIGHT: 73 IN | WEIGHT: 213.4 LBS | TEMPERATURE: 97.8 F | RESPIRATION RATE: 18 BRPM | HEART RATE: 78 BPM | OXYGEN SATURATION: 99 % | SYSTOLIC BLOOD PRESSURE: 118 MMHG | DIASTOLIC BLOOD PRESSURE: 78 MMHG | BODY MASS INDEX: 28.28 KG/M2

## 2020-10-01 DIAGNOSIS — N18.5 CKD STAGE 5 DUE TO TYPE 2 DIABETES MELLITUS (HCC): ICD-10-CM

## 2020-10-01 DIAGNOSIS — E11.22 TYPE 2 DIABETES MELLITUS WITH CHRONIC KIDNEY DISEASE ON CHRONIC DIALYSIS, WITH LONG-TERM CURRENT USE OF INSULIN (HCC): ICD-10-CM

## 2020-10-01 DIAGNOSIS — N18.6 TYPE 2 DIABETES MELLITUS WITH CHRONIC KIDNEY DISEASE ON CHRONIC DIALYSIS, WITH LONG-TERM CURRENT USE OF INSULIN (HCC): ICD-10-CM

## 2020-10-01 DIAGNOSIS — E11.22 CKD STAGE 5 DUE TO TYPE 2 DIABETES MELLITUS (HCC): ICD-10-CM

## 2020-10-01 DIAGNOSIS — Z79.4 TYPE 2 DIABETES MELLITUS WITHOUT COMPLICATION, WITH LONG-TERM CURRENT USE OF INSULIN (HCC): Primary | ICD-10-CM

## 2020-10-01 DIAGNOSIS — Z99.2 TYPE 2 DIABETES MELLITUS WITH CHRONIC KIDNEY DISEASE ON CHRONIC DIALYSIS, WITH LONG-TERM CURRENT USE OF INSULIN (HCC): ICD-10-CM

## 2020-10-01 DIAGNOSIS — E11.9 TYPE 2 DIABETES MELLITUS WITHOUT COMPLICATION, WITH LONG-TERM CURRENT USE OF INSULIN (HCC): Primary | ICD-10-CM

## 2020-10-01 DIAGNOSIS — Z79.4 TYPE 2 DIABETES MELLITUS WITH CHRONIC KIDNEY DISEASE ON CHRONIC DIALYSIS, WITH LONG-TERM CURRENT USE OF INSULIN (HCC): ICD-10-CM

## 2020-10-01 PROBLEM — N17.9 ACUTE RENAL FAILURE SUPERIMPOSED ON STAGE 5 CHRONIC KIDNEY DISEASE, NOT ON CHRONIC DIALYSIS (HCC): Status: RESOLVED | Noted: 2019-04-26 | Resolved: 2020-10-01

## 2020-10-01 PROBLEM — R05.9 COUGH: Status: RESOLVED | Noted: 2019-05-21 | Resolved: 2020-10-01

## 2020-10-01 PROBLEM — R80.8 OTHER PROTEINURIA: Status: RESOLVED | Noted: 2019-02-21 | Resolved: 2020-10-01

## 2020-10-01 PROBLEM — B34.9 VIRAL ILLNESS: Status: RESOLVED | Noted: 2020-02-04 | Resolved: 2020-10-01

## 2020-10-01 PROCEDURE — 1036F TOBACCO NON-USER: CPT | Performed by: FAMILY MEDICINE

## 2020-10-01 PROCEDURE — 3725F SCREEN DEPRESSION PERFORMED: CPT | Performed by: FAMILY MEDICINE

## 2020-10-01 PROCEDURE — 3008F BODY MASS INDEX DOCD: CPT | Performed by: FAMILY MEDICINE

## 2020-10-01 PROCEDURE — 99213 OFFICE O/P EST LOW 20 MIN: CPT | Performed by: FAMILY MEDICINE

## 2020-10-01 PROCEDURE — 3066F NEPHROPATHY DOC TX: CPT | Performed by: FAMILY MEDICINE

## 2020-10-01 RX ORDER — RENO CAPS 100; 1.5; 1.7; 20; 10; 1; 150; 5; 6 MG/1; MG/1; MG/1; MG/1; MG/1; MG/1; UG/1; MG/1; UG/1
CAPSULE ORAL
COMMUNITY
Start: 2020-09-15

## 2020-10-01 RX ORDER — PANTOPRAZOLE SODIUM 20 MG/1
40 TABLET, DELAYED RELEASE ORAL DAILY
COMMUNITY
End: 2021-06-25 | Stop reason: ALTCHOICE

## 2020-10-01 RX ORDER — MELATONIN
1000 DAILY
COMMUNITY

## 2020-10-01 RX ORDER — ROSUVASTATIN CALCIUM 5 MG/1
TABLET, COATED ORAL
Status: ON HOLD | COMMUNITY
Start: 2020-09-15 | End: 2020-12-11 | Stop reason: SDUPTHER

## 2020-10-01 RX ORDER — LISINOPRIL 10 MG/1
TABLET ORAL
COMMUNITY
Start: 2020-09-15 | End: 2021-05-01

## 2020-10-09 DIAGNOSIS — E11.22 CKD STAGE 5 DUE TO TYPE 2 DIABETES MELLITUS (HCC): ICD-10-CM

## 2020-10-09 DIAGNOSIS — N18.5 CKD STAGE 5 DUE TO TYPE 2 DIABETES MELLITUS (HCC): ICD-10-CM

## 2020-10-09 RX ORDER — BLOOD SUGAR DIAGNOSTIC
STRIP MISCELLANEOUS
Refills: 5 | OUTPATIENT
Start: 2020-10-09

## 2020-10-16 DIAGNOSIS — N18.5 CKD STAGE 5 DUE TO TYPE 2 DIABETES MELLITUS (HCC): ICD-10-CM

## 2020-10-16 DIAGNOSIS — E11.22 CKD STAGE 5 DUE TO TYPE 2 DIABETES MELLITUS (HCC): ICD-10-CM

## 2020-10-22 RX ORDER — BLOOD SUGAR DIAGNOSTIC
1 STRIP MISCELLANEOUS 3 TIMES DAILY
Qty: 100 EACH | Refills: 5 | OUTPATIENT
Start: 2020-10-22

## 2020-12-03 ENCOUNTER — TELEMEDICINE (OUTPATIENT)
Dept: FAMILY MEDICINE CLINIC | Facility: CLINIC | Age: 35
End: 2020-12-03

## 2020-12-03 VITALS — HEIGHT: 73 IN | BODY MASS INDEX: 27.17 KG/M2 | TEMPERATURE: 98.5 F | WEIGHT: 205 LBS

## 2020-12-03 DIAGNOSIS — Z20.822 EXPOSURE TO COVID-19 VIRUS: Primary | ICD-10-CM

## 2020-12-03 DIAGNOSIS — Z20.822 EXPOSURE TO COVID-19 VIRUS: ICD-10-CM

## 2020-12-03 PROCEDURE — 3008F BODY MASS INDEX DOCD: CPT | Performed by: FAMILY MEDICINE

## 2020-12-03 PROCEDURE — 99214 OFFICE O/P EST MOD 30 MIN: CPT | Performed by: FAMILY MEDICINE

## 2020-12-03 PROCEDURE — 87637 SARSCOV2&INF A&B&RSV AMP PRB: CPT | Performed by: FAMILY MEDICINE

## 2020-12-05 LAB
FLUAV RNA NPH QL NAA+PROBE: NOT DETECTED
FLUBV RNA NPH QL NAA+PROBE: NOT DETECTED
RSV RNA NPH QL NAA+PROBE: NOT DETECTED
SARS-COV-2 RNA NPH QL NAA+PROBE: NOT DETECTED

## 2020-12-08 ENCOUNTER — TELEMEDICINE (OUTPATIENT)
Dept: FAMILY MEDICINE CLINIC | Facility: CLINIC | Age: 35
End: 2020-12-08

## 2020-12-08 ENCOUNTER — HOSPITAL ENCOUNTER (INPATIENT)
Facility: HOSPITAL | Age: 35
LOS: 3 days | Discharge: HOME | DRG: 137 | End: 2020-12-11
Attending: EMERGENCY MEDICINE | Admitting: FAMILY MEDICINE
Payer: COMMERCIAL

## 2020-12-08 ENCOUNTER — APPOINTMENT (EMERGENCY)
Dept: RADIOLOGY | Facility: HOSPITAL | Age: 35
DRG: 137 | End: 2020-12-08
Payer: COMMERCIAL

## 2020-12-08 ENCOUNTER — APPOINTMENT (EMERGENCY)
Dept: CT IMAGING | Facility: HOSPITAL | Age: 35
DRG: 137 | End: 2020-12-08
Payer: COMMERCIAL

## 2020-12-08 DIAGNOSIS — E11.22 CKD STAGE 5 DUE TO TYPE 2 DIABETES MELLITUS (HCC): ICD-10-CM

## 2020-12-08 DIAGNOSIS — Z20.822 EXPOSURE TO COVID-19 VIRUS: ICD-10-CM

## 2020-12-08 DIAGNOSIS — U07.1 PNEUMONIA DUE TO COVID-19 VIRUS: Primary | ICD-10-CM

## 2020-12-08 DIAGNOSIS — J12.82 PNEUMONIA DUE TO COVID-19 VIRUS: Primary | ICD-10-CM

## 2020-12-08 DIAGNOSIS — U07.1 COVID-19: ICD-10-CM

## 2020-12-08 DIAGNOSIS — E78.5 HYPERLIPIDEMIA, UNSPECIFIED HYPERLIPIDEMIA TYPE: ICD-10-CM

## 2020-12-08 DIAGNOSIS — Z99.2 ESRD ON PERITONEAL DIALYSIS (HCC): ICD-10-CM

## 2020-12-08 DIAGNOSIS — Z20.822 EXPOSURE TO COVID-19 VIRUS: Primary | ICD-10-CM

## 2020-12-08 DIAGNOSIS — N18.6 ESRD ON PERITONEAL DIALYSIS (HCC): ICD-10-CM

## 2020-12-08 DIAGNOSIS — N18.5 CKD STAGE 5 DUE TO TYPE 2 DIABETES MELLITUS (HCC): ICD-10-CM

## 2020-12-08 PROBLEM — I25.2 HISTORY OF NON-ST ELEVATION MYOCARDIAL INFARCTION (NSTEMI): Status: ACTIVE | Noted: 2019-05-21

## 2020-12-08 LAB
ABO GROUP BLD: NORMAL
ALBUMIN SERPL BCP-MCNC: 3.1 G/DL (ref 3.5–5)
ALP SERPL-CCNC: 25 U/L (ref 46–116)
ALT SERPL W P-5'-P-CCNC: 18 U/L (ref 12–78)
AMORPH URATE CRY URNS QL MICRO: NORMAL /HPF
ANION GAP SERPL CALCULATED.3IONS-SCNC: 18 MMOL/L (ref 4–13)
APPEARANCE FLD: CLEAR
AST SERPL W P-5'-P-CCNC: 27 U/L (ref 5–45)
BACTERIA UR QL AUTO: NORMAL /HPF
BASE EX.OXY STD BLDV CALC-SCNC: 82.1 % (ref 60–80)
BASE EXCESS BLDV CALC-SCNC: -6.7 MMOL/L
BASOPHILS # BLD AUTO: 0.02 THOUSANDS/ΜL (ref 0–0.1)
BASOPHILS NFR BLD AUTO: 0 % (ref 0–1)
BILIRUB SERPL-MCNC: 0.32 MG/DL (ref 0.2–1)
BILIRUB UR QL STRIP: NEGATIVE
BUN SERPL-MCNC: 59 MG/DL (ref 5–25)
CALCIUM ALBUM COR SERPL-MCNC: 8.7 MG/DL (ref 8.3–10.1)
CALCIUM SERPL-MCNC: 8 MG/DL (ref 8.3–10.1)
CHLORIDE SERPL-SCNC: 94 MMOL/L (ref 100–108)
CK MB SERPL-MCNC: 7.6 NG/ML (ref 0–5)
CK MB SERPL-MCNC: <1 % (ref 0–2.5)
CK SERPL-CCNC: 919 U/L (ref 39–308)
CLARITY UR: ABNORMAL
CO2 SERPL-SCNC: 18 MMOL/L (ref 21–32)
COARSE GRAN CASTS URNS QL MICRO: NORMAL /LPF
COLOR FLD: NORMAL
COLOR UR: YELLOW
CREAT SERPL-MCNC: 23 MG/DL (ref 0.6–1.3)
CRP SERPL QL: 113 MG/L
D DIMER PPP FEU-MCNC: 1.06 UG/ML FEU
EOSINOPHIL # BLD AUTO: 0 THOUSAND/ΜL (ref 0–0.61)
EOSINOPHIL NFR BLD AUTO: 0 % (ref 0–6)
ERYTHROCYTE [DISTWIDTH] IN BLOOD BY AUTOMATED COUNT: 13.7 % (ref 11.6–15.1)
FERRITIN SERPL-MCNC: 1159 NG/ML (ref 8–388)
FIBRINOGEN PPP-MCNC: 479 MG/DL (ref 227–495)
FINE GRAN CASTS URNS QL MICRO: NORMAL /LPF
FLUAV RNA RESP QL NAA+PROBE: NEGATIVE
FLUBV RNA RESP QL NAA+PROBE: NEGATIVE
GFR SERPL CREATININE-BSD FRML MDRD: 2 ML/MIN/1.73SQ M
GLUCOSE SERPL-MCNC: 85 MG/DL (ref 65–140)
GLUCOSE UR STRIP-MCNC: NEGATIVE MG/DL
GRAM STN SPEC: NORMAL
HCO3 BLDV-SCNC: 18.5 MMOL/L (ref 24–30)
HCT VFR BLD AUTO: 30.3 % (ref 36.5–49.3)
HGB BLD-MCNC: 10 G/DL (ref 12–17)
HGB UR QL STRIP.AUTO: ABNORMAL
HISTIOCYTES NFR FLD: 1 %
IMM GRANULOCYTES # BLD AUTO: 0.03 THOUSAND/UL (ref 0–0.2)
IMM GRANULOCYTES NFR BLD AUTO: 0 % (ref 0–2)
KETONES UR STRIP-MCNC: NEGATIVE MG/DL
LACTATE SERPL-SCNC: 0.7 MMOL/L (ref 0.5–2)
LDH SERPL-CCNC: 425 U/L (ref 81–234)
LEUKOCYTE ESTERASE UR QL STRIP: NEGATIVE
LIPASE SERPL-CCNC: 168 U/L (ref 73–393)
LYMPHOCYTES # BLD AUTO: 1.5 THOUSANDS/ΜL (ref 0.6–4.47)
LYMPHOCYTES NFR BLD AUTO: 22 % (ref 14–44)
LYMPHOCYTES NFR BLD AUTO: 9 %
MCH RBC QN AUTO: 30.2 PG (ref 26.8–34.3)
MCHC RBC AUTO-ENTMCNC: 33 G/DL (ref 31.4–37.4)
MCV RBC AUTO: 92 FL (ref 82–98)
MONOCYTES # BLD AUTO: 0.45 THOUSAND/ΜL (ref 0.17–1.22)
MONOCYTES NFR BLD AUTO: 6 % (ref 4–12)
MONOCYTES NFR BLD AUTO: 90 %
MONONUC CELLS NFR FLD MANUAL: 0 %
NEUTROPHILS # BLD AUTO: 4.99 THOUSANDS/ΜL (ref 1.85–7.62)
NEUTS SEG NFR BLD AUTO: 72 % (ref 43–75)
NITRITE UR QL STRIP: NEGATIVE
NON-SQ EPI CELLS URNS QL MICRO: NORMAL /HPF
NRBC BLD AUTO-RTO: 0 /100 WBCS
O2 CT BLDV-SCNC: 12.4 ML/DL
OTHER CASTS: NORMAL
PCO2 BLDV: 35.4 MM HG (ref 42–50)
PH BLDV: 7.33 [PH] (ref 7.3–7.4)
PH UR STRIP.AUTO: 5 [PH]
PLATELET # BLD AUTO: 191 THOUSANDS/UL (ref 149–390)
PMV BLD AUTO: 10.9 FL (ref 8.9–12.7)
PO2 BLDV: 49 MM HG (ref 35–45)
POTASSIUM SERPL-SCNC: 4.6 MMOL/L (ref 3.5–5.3)
PROT SERPL-MCNC: 7.1 G/DL (ref 6.4–8.2)
PROT UR STRIP-MCNC: ABNORMAL MG/DL
RBC # BLD AUTO: 3.31 MILLION/UL (ref 3.88–5.62)
RBC #/AREA URNS AUTO: NORMAL /HPF
RH BLD: POSITIVE
RSV RNA RESP QL NAA+PROBE: NEGATIVE
SARS-COV-2 RNA RESP QL NAA+PROBE: POSITIVE
SITE: NORMAL
SODIUM SERPL-SCNC: 130 MMOL/L (ref 136–145)
SP GR UR STRIP.AUTO: >=1.03 (ref 1–1.03)
TOTAL CELLS COUNTED SPEC: 100
UROBILINOGEN UR QL STRIP.AUTO: 0.2 E.U./DL
WBC # BLD AUTO: 6.99 THOUSAND/UL (ref 4.31–10.16)
WBC # FLD MANUAL: 44 /UL
WBC #/AREA URNS AUTO: NORMAL /HPF
WBC OTHER NFR FLD MANUAL: 0 %

## 2020-12-08 PROCEDURE — 89051 BODY FLUID CELL COUNT: CPT | Performed by: EMERGENCY MEDICINE

## 2020-12-08 PROCEDURE — 1036F TOBACCO NON-USER: CPT | Performed by: FAMILY MEDICINE

## 2020-12-08 PROCEDURE — 80053 COMPREHEN METABOLIC PANEL: CPT | Performed by: PHYSICIAN ASSISTANT

## 2020-12-08 PROCEDURE — 83605 ASSAY OF LACTIC ACID: CPT | Performed by: PHYSICIAN ASSISTANT

## 2020-12-08 PROCEDURE — 99285 EMERGENCY DEPT VISIT HI MDM: CPT | Performed by: PHYSICIAN ASSISTANT

## 2020-12-08 PROCEDURE — 82553 CREATINE MB FRACTION: CPT | Performed by: PHYSICIAN ASSISTANT

## 2020-12-08 PROCEDURE — 83615 LACTATE (LD) (LDH) ENZYME: CPT | Performed by: PHYSICIAN ASSISTANT

## 2020-12-08 PROCEDURE — 3066F NEPHROPATHY DOC TX: CPT | Performed by: FAMILY MEDICINE

## 2020-12-08 PROCEDURE — 86901 BLOOD TYPING SEROLOGIC RH(D): CPT | Performed by: PHYSICIAN ASSISTANT

## 2020-12-08 PROCEDURE — 83520 IMMUNOASSAY QUANT NOS NONAB: CPT | Performed by: PHYSICIAN ASSISTANT

## 2020-12-08 PROCEDURE — 82805 BLOOD GASES W/O2 SATURATION: CPT | Performed by: PHYSICIAN ASSISTANT

## 2020-12-08 PROCEDURE — 87040 BLOOD CULTURE FOR BACTERIA: CPT | Performed by: PHYSICIAN ASSISTANT

## 2020-12-08 PROCEDURE — 82728 ASSAY OF FERRITIN: CPT | Performed by: PHYSICIAN ASSISTANT

## 2020-12-08 PROCEDURE — NC001 PR NO CHARGE: Performed by: PHYSICIAN ASSISTANT

## 2020-12-08 PROCEDURE — 87205 SMEAR GRAM STAIN: CPT | Performed by: EMERGENCY MEDICINE

## 2020-12-08 PROCEDURE — 0241U HB NFCT DS VIR RESP RNA 4 TRGT: CPT | Performed by: PHYSICIAN ASSISTANT

## 2020-12-08 PROCEDURE — 84145 PROCALCITONIN (PCT): CPT | Performed by: PHYSICIAN ASSISTANT

## 2020-12-08 PROCEDURE — 81001 URINALYSIS AUTO W/SCOPE: CPT | Performed by: PHYSICIAN ASSISTANT

## 2020-12-08 PROCEDURE — 99213 OFFICE O/P EST LOW 20 MIN: CPT | Performed by: FAMILY MEDICINE

## 2020-12-08 PROCEDURE — 96374 THER/PROPH/DIAG INJ IV PUSH: CPT

## 2020-12-08 PROCEDURE — U0003 INFECTIOUS AGENT DETECTION BY NUCLEIC ACID (DNA OR RNA); SEVERE ACUTE RESPIRATORY SYNDROME CORONAVIRUS 2 (SARS-COV-2) (CORONAVIRUS DISEASE [COVID-19]), AMPLIFIED PROBE TECHNIQUE, MAKING USE OF HIGH THROUGHPUT TECHNOLOGIES AS DESCRIBED BY CMS-2020-01-R: HCPCS | Performed by: FAMILY MEDICINE

## 2020-12-08 PROCEDURE — 87070 CULTURE OTHR SPECIMN AEROBIC: CPT | Performed by: EMERGENCY MEDICINE

## 2020-12-08 PROCEDURE — 86140 C-REACTIVE PROTEIN: CPT | Performed by: PHYSICIAN ASSISTANT

## 2020-12-08 PROCEDURE — 86900 BLOOD TYPING SEROLOGIC ABO: CPT | Performed by: PHYSICIAN ASSISTANT

## 2020-12-08 PROCEDURE — 36415 COLL VENOUS BLD VENIPUNCTURE: CPT | Performed by: PHYSICIAN ASSISTANT

## 2020-12-08 PROCEDURE — 82550 ASSAY OF CK (CPK): CPT | Performed by: PHYSICIAN ASSISTANT

## 2020-12-08 PROCEDURE — 85384 FIBRINOGEN ACTIVITY: CPT | Performed by: PHYSICIAN ASSISTANT

## 2020-12-08 PROCEDURE — 85025 COMPLETE CBC W/AUTO DIFF WBC: CPT | Performed by: PHYSICIAN ASSISTANT

## 2020-12-08 PROCEDURE — 74176 CT ABD & PELVIS W/O CONTRAST: CPT

## 2020-12-08 PROCEDURE — 99285 EMERGENCY DEPT VISIT HI MDM: CPT

## 2020-12-08 PROCEDURE — 85379 FIBRIN DEGRADATION QUANT: CPT | Performed by: PHYSICIAN ASSISTANT

## 2020-12-08 PROCEDURE — 96375 TX/PRO/DX INJ NEW DRUG ADDON: CPT

## 2020-12-08 PROCEDURE — 83690 ASSAY OF LIPASE: CPT | Performed by: PHYSICIAN ASSISTANT

## 2020-12-08 PROCEDURE — 71045 X-RAY EXAM CHEST 1 VIEW: CPT

## 2020-12-08 PROCEDURE — G1004 CDSM NDSC: HCPCS

## 2020-12-08 RX ORDER — ONDANSETRON 2 MG/ML
4 INJECTION INTRAMUSCULAR; INTRAVENOUS ONCE
Status: COMPLETED | OUTPATIENT
Start: 2020-12-08 | End: 2020-12-08

## 2020-12-08 RX ORDER — MULTIVIT WITH MINERALS/LUTEIN
1000 TABLET ORAL ONCE
Status: COMPLETED | OUTPATIENT
Start: 2020-12-08 | End: 2020-12-08

## 2020-12-08 RX ORDER — PANTOPRAZOLE SODIUM 40 MG/1
40 TABLET, DELAYED RELEASE ORAL
Status: DISCONTINUED | OUTPATIENT
Start: 2020-12-09 | End: 2020-12-11 | Stop reason: HOSPADM

## 2020-12-08 RX ORDER — HEPARIN SODIUM 5000 [USP'U]/ML
5000 INJECTION, SOLUTION INTRAVENOUS; SUBCUTANEOUS EVERY 8 HOURS SCHEDULED
Status: DISCONTINUED | OUTPATIENT
Start: 2020-12-08 | End: 2020-12-11 | Stop reason: HOSPADM

## 2020-12-08 RX ORDER — NIFEDIPINE 30 MG/1
60 TABLET, EXTENDED RELEASE ORAL 2 TIMES DAILY
Status: DISCONTINUED | OUTPATIENT
Start: 2020-12-09 | End: 2020-12-11 | Stop reason: HOSPADM

## 2020-12-08 RX ORDER — ZINC SULFATE 50(220)MG
220 CAPSULE ORAL ONCE
Status: COMPLETED | OUTPATIENT
Start: 2020-12-08 | End: 2020-12-08

## 2020-12-08 RX ORDER — PRAVASTATIN SODIUM 40 MG
40 TABLET ORAL
Status: DISCONTINUED | OUTPATIENT
Start: 2020-12-09 | End: 2020-12-11 | Stop reason: HOSPADM

## 2020-12-08 RX ORDER — CALCIUM ACETATE 667 MG/1
667 CAPSULE ORAL
Status: DISCONTINUED | OUTPATIENT
Start: 2020-12-09 | End: 2020-12-11 | Stop reason: HOSPADM

## 2020-12-08 RX ORDER — ASCORBIC ACID 500 MG
1000 TABLET ORAL EVERY 12 HOURS SCHEDULED
Status: DISCONTINUED | OUTPATIENT
Start: 2020-12-08 | End: 2020-12-11 | Stop reason: HOSPADM

## 2020-12-08 RX ORDER — MULTIVITAMIN/IRON/FOLIC ACID 18MG-0.4MG
1 TABLET ORAL DAILY
Status: DISCONTINUED | OUTPATIENT
Start: 2020-12-16 | End: 2020-12-11 | Stop reason: HOSPADM

## 2020-12-08 RX ORDER — HYDROMORPHONE HCL/PF 1 MG/ML
1 SYRINGE (ML) INJECTION ONCE
Status: COMPLETED | OUTPATIENT
Start: 2020-12-08 | End: 2020-12-08

## 2020-12-08 RX ORDER — AZITHROMYCIN 250 MG/1
500 TABLET, FILM COATED ORAL ONCE
Status: COMPLETED | OUTPATIENT
Start: 2020-12-08 | End: 2020-12-08

## 2020-12-08 RX ORDER — ALBUTEROL SULFATE 90 UG/1
2 AEROSOL, METERED RESPIRATORY (INHALATION) EVERY 4 HOURS PRN
Status: DISCONTINUED | OUTPATIENT
Start: 2020-12-08 | End: 2020-12-11 | Stop reason: HOSPADM

## 2020-12-08 RX ORDER — ACETAMINOPHEN 325 MG/1
650 TABLET ORAL ONCE
Status: COMPLETED | OUTPATIENT
Start: 2020-12-08 | End: 2020-12-08

## 2020-12-08 RX ORDER — ASPIRIN 81 MG/1
81 TABLET ORAL DAILY
Status: DISCONTINUED | OUTPATIENT
Start: 2020-12-09 | End: 2020-12-11 | Stop reason: HOSPADM

## 2020-12-08 RX ORDER — MELATONIN
2000 DAILY
Status: DISCONTINUED | OUTPATIENT
Start: 2020-12-09 | End: 2020-12-11 | Stop reason: HOSPADM

## 2020-12-08 RX ORDER — ZINC SULFATE 50(220)MG
220 CAPSULE ORAL DAILY
Status: DISCONTINUED | OUTPATIENT
Start: 2020-12-09 | End: 2020-12-11 | Stop reason: HOSPADM

## 2020-12-08 RX ORDER — CARVEDILOL 12.5 MG/1
25 TABLET ORAL 2 TIMES DAILY WITH MEALS
Status: DISCONTINUED | OUTPATIENT
Start: 2020-12-09 | End: 2020-12-11 | Stop reason: HOSPADM

## 2020-12-08 RX ORDER — SODIUM BICARBONATE 650 MG/1
650 TABLET ORAL
Status: DISCONTINUED | OUTPATIENT
Start: 2020-12-09 | End: 2020-12-11 | Stop reason: HOSPADM

## 2020-12-08 RX ADMIN — CEFTRIAXONE 1000 MG: 10 INJECTION, POWDER, FOR SOLUTION INTRAVENOUS at 21:16

## 2020-12-08 RX ADMIN — AZITHROMYCIN MONOHYDRATE 500 MG: 250 TABLET ORAL at 21:35

## 2020-12-08 RX ADMIN — ZINC SULFATE 220 MG (50 MG) CAPSULE 220 MG: CAPSULE at 21:37

## 2020-12-08 RX ADMIN — ACETAMINOPHEN 650 MG: 325 TABLET, FILM COATED ORAL at 21:36

## 2020-12-08 RX ADMIN — Medication 1000 MG: at 21:36

## 2020-12-08 RX ADMIN — HYDROMORPHONE HYDROCHLORIDE 1 MG: 1 INJECTION, SOLUTION INTRAMUSCULAR; INTRAVENOUS; SUBCUTANEOUS at 19:42

## 2020-12-08 RX ADMIN — ONDANSETRON 4 MG: 2 INJECTION INTRAMUSCULAR; INTRAVENOUS at 19:45

## 2020-12-09 ENCOUNTER — TELEPHONE (OUTPATIENT)
Dept: FAMILY MEDICINE CLINIC | Facility: CLINIC | Age: 35
End: 2020-12-09

## 2020-12-09 PROBLEM — E87.1 HYPONATREMIA: Status: ACTIVE | Noted: 2020-12-09

## 2020-12-09 LAB
ALBUMIN SERPL BCP-MCNC: 3 G/DL (ref 3.5–5)
ALP SERPL-CCNC: 44 U/L (ref 46–116)
ALT SERPL W P-5'-P-CCNC: 19 U/L (ref 12–78)
ANION GAP SERPL CALCULATED.3IONS-SCNC: 18 MMOL/L (ref 4–13)
AST SERPL W P-5'-P-CCNC: 42 U/L (ref 5–45)
BASOPHILS # BLD AUTO: 0.02 THOUSANDS/ΜL (ref 0–0.1)
BASOPHILS NFR BLD AUTO: 0 % (ref 0–1)
BILIRUB SERPL-MCNC: 0.37 MG/DL (ref 0.2–1)
BUN SERPL-MCNC: 60 MG/DL (ref 5–25)
CALCIUM ALBUM COR SERPL-MCNC: 8.8 MG/DL (ref 8.3–10.1)
CALCIUM SERPL-MCNC: 8 MG/DL (ref 8.3–10.1)
CHLORIDE SERPL-SCNC: 94 MMOL/L (ref 100–108)
CO2 SERPL-SCNC: 18 MMOL/L (ref 21–32)
CREAT SERPL-MCNC: 23.96 MG/DL (ref 0.6–1.3)
EOSINOPHIL # BLD AUTO: 0 THOUSAND/ΜL (ref 0–0.61)
EOSINOPHIL NFR BLD AUTO: 0 % (ref 0–6)
ERYTHROCYTE [DISTWIDTH] IN BLOOD BY AUTOMATED COUNT: 13.9 % (ref 11.6–15.1)
GFR SERPL CREATININE-BSD FRML MDRD: 2 ML/MIN/1.73SQ M
GLUCOSE SERPL-MCNC: 86 MG/DL (ref 65–140)
GLUCOSE SERPL-MCNC: 86 MG/DL (ref 65–140)
GLUCOSE SERPL-MCNC: 89 MG/DL (ref 65–140)
GLUCOSE SERPL-MCNC: 94 MG/DL (ref 65–140)
GLUCOSE SERPL-MCNC: 96 MG/DL (ref 65–140)
HCT VFR BLD AUTO: 33.5 % (ref 36.5–49.3)
HGB BLD-MCNC: 10.9 G/DL (ref 12–17)
IMM GRANULOCYTES # BLD AUTO: 0.04 THOUSAND/UL (ref 0–0.2)
IMM GRANULOCYTES NFR BLD AUTO: 1 % (ref 0–2)
LYMPHOCYTES # BLD AUTO: 1.99 THOUSANDS/ΜL (ref 0.6–4.47)
LYMPHOCYTES NFR BLD AUTO: 28 % (ref 14–44)
MCH RBC QN AUTO: 30.4 PG (ref 26.8–34.3)
MCHC RBC AUTO-ENTMCNC: 32.5 G/DL (ref 31.4–37.4)
MCV RBC AUTO: 93 FL (ref 82–98)
MONOCYTES # BLD AUTO: 0.48 THOUSAND/ΜL (ref 0.17–1.22)
MONOCYTES NFR BLD AUTO: 7 % (ref 4–12)
NEUTROPHILS # BLD AUTO: 4.64 THOUSANDS/ΜL (ref 1.85–7.62)
NEUTS SEG NFR BLD AUTO: 64 % (ref 43–75)
NRBC BLD AUTO-RTO: 0 /100 WBCS
PLATELET # BLD AUTO: 201 THOUSANDS/UL (ref 149–390)
PMV BLD AUTO: 11.4 FL (ref 8.9–12.7)
POTASSIUM SERPL-SCNC: 5.3 MMOL/L (ref 3.5–5.3)
PROCALCITONIN SERPL-MCNC: 1.22 NG/ML
PROCALCITONIN SERPL-MCNC: 1.3 NG/ML
PROT SERPL-MCNC: 7.3 G/DL (ref 6.4–8.2)
RBC # BLD AUTO: 3.59 MILLION/UL (ref 3.88–5.62)
SARS-COV-2 RNA SPEC QL NAA+PROBE: NOT DETECTED
SODIUM SERPL-SCNC: 130 MMOL/L (ref 136–145)
TROPONIN I SERPL-MCNC: 1.06 NG/ML
TROPONIN I SERPL-MCNC: 1.08 NG/ML
TROPONIN I SERPL-MCNC: 1.15 NG/ML
WBC # BLD AUTO: 7.17 THOUSAND/UL (ref 4.31–10.16)

## 2020-12-09 PROCEDURE — 99253 IP/OBS CNSLTJ NEW/EST LOW 45: CPT | Performed by: INTERNAL MEDICINE

## 2020-12-09 PROCEDURE — 84145 PROCALCITONIN (PCT): CPT | Performed by: PHYSICIAN ASSISTANT

## 2020-12-09 PROCEDURE — XW033E5 INTRODUCTION OF REMDESIVIR ANTI-INFECTIVE INTO PERIPHERAL VEIN, PERCUTANEOUS APPROACH, NEW TECHNOLOGY GROUP 5: ICD-10-PCS | Performed by: FAMILY MEDICINE

## 2020-12-09 PROCEDURE — 85025 COMPLETE CBC W/AUTO DIFF WBC: CPT | Performed by: INTERNAL MEDICINE

## 2020-12-09 PROCEDURE — 82948 REAGENT STRIP/BLOOD GLUCOSE: CPT

## 2020-12-09 PROCEDURE — 99223 1ST HOSP IP/OBS HIGH 75: CPT | Performed by: FAMILY MEDICINE

## 2020-12-09 PROCEDURE — 80053 COMPREHEN METABOLIC PANEL: CPT | Performed by: INTERNAL MEDICINE

## 2020-12-09 PROCEDURE — 84484 ASSAY OF TROPONIN QUANT: CPT | Performed by: INTERNAL MEDICINE

## 2020-12-09 PROCEDURE — 36415 COLL VENOUS BLD VENIPUNCTURE: CPT | Performed by: INTERNAL MEDICINE

## 2020-12-09 RX ORDER — OXYCODONE HYDROCHLORIDE 5 MG/1
5 TABLET ORAL EVERY 4 HOURS PRN
Status: DISCONTINUED | OUTPATIENT
Start: 2020-12-09 | End: 2020-12-11 | Stop reason: HOSPADM

## 2020-12-09 RX ORDER — ACETAMINOPHEN 325 MG/1
650 TABLET ORAL EVERY 6 HOURS PRN
Status: DISCONTINUED | OUTPATIENT
Start: 2020-12-09 | End: 2020-12-11 | Stop reason: HOSPADM

## 2020-12-09 RX ADMIN — SODIUM BICARBONATE 650 MG: 650 TABLET ORAL at 18:48

## 2020-12-09 RX ADMIN — OXYCODONE HYDROCHLORIDE AND ACETAMINOPHEN 1000 MG: 500 TABLET ORAL at 21:01

## 2020-12-09 RX ADMIN — OXYCODONE HYDROCHLORIDE AND ACETAMINOPHEN 1000 MG: 500 TABLET ORAL at 08:26

## 2020-12-09 RX ADMIN — ACETAMINOPHEN 650 MG: 325 TABLET, FILM COATED ORAL at 08:24

## 2020-12-09 RX ADMIN — HEPARIN SODIUM 5000 UNITS: 5000 INJECTION INTRAVENOUS; SUBCUTANEOUS at 00:35

## 2020-12-09 RX ADMIN — OXYCODONE HYDROCHLORIDE 5 MG: 5 TABLET ORAL at 13:19

## 2020-12-09 RX ADMIN — HEPARIN SODIUM 5000 UNITS: 5000 INJECTION INTRAVENOUS; SUBCUTANEOUS at 13:20

## 2020-12-09 RX ADMIN — OXYCODONE HYDROCHLORIDE 5 MG: 5 TABLET ORAL at 23:19

## 2020-12-09 RX ADMIN — CARVEDILOL 25 MG: 12.5 TABLET, FILM COATED ORAL at 16:21

## 2020-12-09 RX ADMIN — TRIMETHOBENZAMIDE HYDROCHLORIDE 200 MG: 100 INJECTION INTRAMUSCULAR at 21:01

## 2020-12-09 RX ADMIN — ASPIRIN 81 MG: 81 TABLET, COATED ORAL at 08:26

## 2020-12-09 RX ADMIN — SODIUM BICARBONATE 650 MG: 650 TABLET ORAL at 08:25

## 2020-12-09 RX ADMIN — HEPARIN SODIUM 5000 UNITS: 5000 INJECTION INTRAVENOUS; SUBCUTANEOUS at 21:01

## 2020-12-09 RX ADMIN — PANTOPRAZOLE SODIUM 40 MG: 40 TABLET, DELAYED RELEASE ORAL at 05:42

## 2020-12-09 RX ADMIN — PRAVASTATIN SODIUM 40 MG: 40 TABLET ORAL at 16:21

## 2020-12-09 RX ADMIN — Medication 2000 UNITS: at 08:25

## 2020-12-09 RX ADMIN — ZINC SULFATE 220 MG (50 MG) CAPSULE 220 MG: CAPSULE at 08:25

## 2020-12-09 RX ADMIN — REMDESIVIR 200 MG: 100 INJECTION, POWDER, LYOPHILIZED, FOR SOLUTION INTRAVENOUS at 00:35

## 2020-12-09 RX ADMIN — HEPARIN SODIUM 5000 UNITS: 5000 INJECTION INTRAVENOUS; SUBCUTANEOUS at 05:42

## 2020-12-09 RX ADMIN — NIFEDIPINE 60 MG: 30 TABLET, FILM COATED, EXTENDED RELEASE ORAL at 08:26

## 2020-12-09 RX ADMIN — CARVEDILOL 25 MG: 12.5 TABLET, FILM COATED ORAL at 08:25

## 2020-12-09 RX ADMIN — NIFEDIPINE 60 MG: 30 TABLET, FILM COATED, EXTENDED RELEASE ORAL at 18:48

## 2020-12-10 LAB
ANION GAP SERPL CALCULATED.3IONS-SCNC: 21 MMOL/L (ref 4–13)
BUN SERPL-MCNC: 60 MG/DL (ref 5–25)
CALCIUM SERPL-MCNC: 8.4 MG/DL (ref 8.3–10.1)
CHLORIDE SERPL-SCNC: 95 MMOL/L (ref 100–108)
CO2 SERPL-SCNC: 17 MMOL/L (ref 21–32)
CREAT SERPL-MCNC: 25.11 MG/DL (ref 0.6–1.3)
CRP SERPL QL: 158.4 MG/L
D DIMER PPP FEU-MCNC: 1.22 UG/ML FEU
ERYTHROCYTE [DISTWIDTH] IN BLOOD BY AUTOMATED COUNT: 14 % (ref 11.6–15.1)
GFR SERPL CREATININE-BSD FRML MDRD: 2 ML/MIN/1.73SQ M
GLUCOSE SERPL-MCNC: 106 MG/DL (ref 65–140)
GLUCOSE SERPL-MCNC: 117 MG/DL (ref 65–140)
GLUCOSE SERPL-MCNC: 149 MG/DL (ref 65–140)
GLUCOSE SERPL-MCNC: 90 MG/DL (ref 65–140)
GLUCOSE SERPL-MCNC: 95 MG/DL (ref 65–140)
HCT VFR BLD AUTO: 33 % (ref 36.5–49.3)
HGB BLD-MCNC: 10.9 G/DL (ref 12–17)
MCH RBC QN AUTO: 30.2 PG (ref 26.8–34.3)
MCHC RBC AUTO-ENTMCNC: 33 G/DL (ref 31.4–37.4)
MCV RBC AUTO: 91 FL (ref 82–98)
PLATELET # BLD AUTO: 210 THOUSANDS/UL (ref 149–390)
PMV BLD AUTO: 11.3 FL (ref 8.9–12.7)
POTASSIUM SERPL-SCNC: 4.5 MMOL/L (ref 3.5–5.3)
RBC # BLD AUTO: 3.61 MILLION/UL (ref 3.88–5.62)
SODIUM SERPL-SCNC: 133 MMOL/L (ref 136–145)
WBC # BLD AUTO: 8.41 THOUSAND/UL (ref 4.31–10.16)

## 2020-12-10 PROCEDURE — 85027 COMPLETE CBC AUTOMATED: CPT | Performed by: FAMILY MEDICINE

## 2020-12-10 PROCEDURE — 99232 SBSQ HOSP IP/OBS MODERATE 35: CPT | Performed by: FAMILY MEDICINE

## 2020-12-10 PROCEDURE — 82948 REAGENT STRIP/BLOOD GLUCOSE: CPT

## 2020-12-10 PROCEDURE — 86140 C-REACTIVE PROTEIN: CPT | Performed by: FAMILY MEDICINE

## 2020-12-10 PROCEDURE — 80048 BASIC METABOLIC PNL TOTAL CA: CPT | Performed by: FAMILY MEDICINE

## 2020-12-10 PROCEDURE — 99232 SBSQ HOSP IP/OBS MODERATE 35: CPT | Performed by: INTERNAL MEDICINE

## 2020-12-10 PROCEDURE — 85379 FIBRIN DEGRADATION QUANT: CPT | Performed by: FAMILY MEDICINE

## 2020-12-10 RX ORDER — DEXAMETHASONE SODIUM PHOSPHATE 4 MG/ML
6 INJECTION, SOLUTION INTRA-ARTICULAR; INTRALESIONAL; INTRAMUSCULAR; INTRAVENOUS; SOFT TISSUE EVERY 24 HOURS
Status: DISCONTINUED | OUTPATIENT
Start: 2020-12-10 | End: 2020-12-11 | Stop reason: HOSPADM

## 2020-12-10 RX ADMIN — CARVEDILOL 25 MG: 12.5 TABLET, FILM COATED ORAL at 18:31

## 2020-12-10 RX ADMIN — ASPIRIN 81 MG: 81 TABLET, COATED ORAL at 09:59

## 2020-12-10 RX ADMIN — NIFEDIPINE 60 MG: 30 TABLET, FILM COATED, EXTENDED RELEASE ORAL at 18:31

## 2020-12-10 RX ADMIN — HEPARIN SODIUM 5000 UNITS: 5000 INJECTION INTRAVENOUS; SUBCUTANEOUS at 13:56

## 2020-12-10 RX ADMIN — OXYCODONE HYDROCHLORIDE AND ACETAMINOPHEN 1000 MG: 500 TABLET ORAL at 22:12

## 2020-12-10 RX ADMIN — OXYCODONE HYDROCHLORIDE 5 MG: 5 TABLET ORAL at 18:59

## 2020-12-10 RX ADMIN — CALCIUM ACETATE 667 MG: 667 CAPSULE ORAL at 18:30

## 2020-12-10 RX ADMIN — PANTOPRAZOLE SODIUM 40 MG: 40 TABLET, DELAYED RELEASE ORAL at 05:49

## 2020-12-10 RX ADMIN — DEXAMETHASONE SODIUM PHOSPHATE 6 MG: 4 INJECTION, SOLUTION INTRAMUSCULAR; INTRAVENOUS at 18:30

## 2020-12-10 RX ADMIN — PRAVASTATIN SODIUM 40 MG: 40 TABLET ORAL at 18:30

## 2020-12-10 RX ADMIN — Medication 2000 UNITS: at 09:59

## 2020-12-10 RX ADMIN — CALCIUM ACETATE 667 MG: 667 CAPSULE ORAL at 13:56

## 2020-12-10 RX ADMIN — NIFEDIPINE 60 MG: 30 TABLET, FILM COATED, EXTENDED RELEASE ORAL at 10:00

## 2020-12-10 RX ADMIN — ZINC SULFATE 220 MG (50 MG) CAPSULE 220 MG: CAPSULE at 10:01

## 2020-12-10 RX ADMIN — ACETAMINOPHEN 650 MG: 325 TABLET, FILM COATED ORAL at 00:16

## 2020-12-10 RX ADMIN — CARVEDILOL 25 MG: 12.5 TABLET, FILM COATED ORAL at 09:58

## 2020-12-10 RX ADMIN — SODIUM BICARBONATE 650 MG: 650 TABLET ORAL at 10:00

## 2020-12-10 RX ADMIN — SODIUM BICARBONATE 650 MG: 650 TABLET ORAL at 18:31

## 2020-12-10 RX ADMIN — ACETAMINOPHEN 650 MG: 325 TABLET, FILM COATED ORAL at 06:05

## 2020-12-10 RX ADMIN — HEPARIN SODIUM 5000 UNITS: 5000 INJECTION INTRAVENOUS; SUBCUTANEOUS at 22:12

## 2020-12-10 RX ADMIN — OXYCODONE HYDROCHLORIDE 5 MG: 5 TABLET ORAL at 06:05

## 2020-12-10 RX ADMIN — TRIMETHOBENZAMIDE HYDROCHLORIDE 200 MG: 100 INJECTION INTRAMUSCULAR at 19:03

## 2020-12-10 RX ADMIN — OXYCODONE HYDROCHLORIDE AND ACETAMINOPHEN 1000 MG: 500 TABLET ORAL at 09:59

## 2020-12-10 RX ADMIN — HEPARIN SODIUM 5000 UNITS: 5000 INJECTION INTRAVENOUS; SUBCUTANEOUS at 05:49

## 2020-12-11 VITALS
OXYGEN SATURATION: 93 % | RESPIRATION RATE: 19 BRPM | SYSTOLIC BLOOD PRESSURE: 112 MMHG | HEIGHT: 73 IN | DIASTOLIC BLOOD PRESSURE: 73 MMHG | BODY MASS INDEX: 26.88 KG/M2 | HEART RATE: 75 BPM | TEMPERATURE: 98.3 F | WEIGHT: 202.82 LBS

## 2020-12-11 PROBLEM — I25.2 HISTORY OF NON-ST ELEVATION MYOCARDIAL INFARCTION (NSTEMI): Status: RESOLVED | Noted: 2019-05-21 | Resolved: 2020-12-11

## 2020-12-11 LAB
ALBUMIN SERPL BCP-MCNC: 2.6 G/DL (ref 3.5–5)
ALP SERPL-CCNC: 149 U/L (ref 46–116)
ALT SERPL W P-5'-P-CCNC: 46 U/L (ref 12–78)
ANION GAP SERPL CALCULATED.3IONS-SCNC: 19 MMOL/L (ref 4–13)
AST SERPL W P-5'-P-CCNC: 121 U/L (ref 5–45)
BILIRUB SERPL-MCNC: 0.3 MG/DL (ref 0.2–1)
BUN SERPL-MCNC: 62 MG/DL (ref 5–25)
CALCIUM ALBUM COR SERPL-MCNC: 9.8 MG/DL (ref 8.3–10.1)
CALCIUM SERPL-MCNC: 8.7 MG/DL (ref 8.3–10.1)
CHLORIDE SERPL-SCNC: 94 MMOL/L (ref 100–108)
CO2 SERPL-SCNC: 20 MMOL/L (ref 21–32)
CREAT SERPL-MCNC: 25.71 MG/DL (ref 0.6–1.3)
CRP SERPL QL: 13.1 MG/L
D DIMER PPP FEU-MCNC: 0.98 UG/ML FEU
FERRITIN SERPL-MCNC: 3599 NG/ML (ref 8–388)
GFR SERPL CREATININE-BSD FRML MDRD: 2 ML/MIN/1.73SQ M
GLUCOSE SERPL-MCNC: 136 MG/DL (ref 65–140)
GLUCOSE SERPL-MCNC: 152 MG/DL (ref 65–140)
GLUCOSE SERPL-MCNC: 173 MG/DL (ref 65–140)
POTASSIUM SERPL-SCNC: 4.6 MMOL/L (ref 3.5–5.3)
PROCALCITONIN SERPL-MCNC: 3.08 NG/ML
PROT SERPL-MCNC: 7 G/DL (ref 6.4–8.2)
SODIUM SERPL-SCNC: 133 MMOL/L (ref 136–145)

## 2020-12-11 PROCEDURE — 99239 HOSP IP/OBS DSCHRG MGMT >30: CPT | Performed by: FAMILY MEDICINE

## 2020-12-11 PROCEDURE — 82948 REAGENT STRIP/BLOOD GLUCOSE: CPT

## 2020-12-11 PROCEDURE — 80053 COMPREHEN METABOLIC PANEL: CPT | Performed by: FAMILY MEDICINE

## 2020-12-11 PROCEDURE — 99255 IP/OBS CONSLTJ NEW/EST HI 80: CPT | Performed by: INTERNAL MEDICINE

## 2020-12-11 PROCEDURE — 86140 C-REACTIVE PROTEIN: CPT | Performed by: FAMILY MEDICINE

## 2020-12-11 PROCEDURE — 85379 FIBRIN DEGRADATION QUANT: CPT | Performed by: FAMILY MEDICINE

## 2020-12-11 PROCEDURE — 99232 SBSQ HOSP IP/OBS MODERATE 35: CPT | Performed by: INTERNAL MEDICINE

## 2020-12-11 PROCEDURE — 82728 ASSAY OF FERRITIN: CPT | Performed by: FAMILY MEDICINE

## 2020-12-11 PROCEDURE — 84145 PROCALCITONIN (PCT): CPT | Performed by: FAMILY MEDICINE

## 2020-12-11 RX ORDER — SODIUM BICARBONATE 650 MG/1
650 TABLET ORAL
Qty: 60 TABLET | Refills: 0 | Status: SHIPPED | OUTPATIENT
Start: 2020-12-11 | End: 2021-05-19

## 2020-12-11 RX ORDER — ROSUVASTATIN CALCIUM 5 MG/1
5 TABLET, COATED ORAL DAILY
Refills: 0
Start: 2020-12-25

## 2020-12-11 RX ORDER — ACETAMINOPHEN 325 MG/1
650 TABLET ORAL EVERY 6 HOURS PRN
Refills: 0
Start: 2020-12-11

## 2020-12-11 RX ORDER — ATORVASTATIN CALCIUM 20 MG/1
TABLET, FILM COATED ORAL
Qty: 14 TABLET | Refills: 0 | Status: SHIPPED | OUTPATIENT
Start: 2020-12-11 | End: 2021-06-25 | Stop reason: ALTCHOICE

## 2020-12-11 RX ORDER — ZINC SULFATE 50(220)MG
220 CAPSULE ORAL DAILY
Qty: 5 CAPSULE | Refills: 0 | Status: SHIPPED | OUTPATIENT
Start: 2020-12-11 | End: 2020-12-16

## 2020-12-11 RX ORDER — FAMOTIDINE 20 MG/1
10 TABLET, FILM COATED ORAL DAILY
Qty: 8 TABLET | Refills: 0 | Status: SHIPPED | OUTPATIENT
Start: 2020-12-11 | End: 2021-07-19 | Stop reason: ALTCHOICE

## 2020-12-11 RX ORDER — PREDNISONE 10 MG/1
10 TABLET ORAL DAILY
Qty: 32 TABLET | Refills: 0 | Status: SHIPPED | OUTPATIENT
Start: 2020-12-11 | End: 2021-06-25 | Stop reason: ALTCHOICE

## 2020-12-11 RX ORDER — MULTIVITAMIN/IRON/FOLIC ACID 18MG-0.4MG
1 TABLET ORAL DAILY
Qty: 7 TABLET | Refills: 0 | Status: SHIPPED | OUTPATIENT
Start: 2020-12-16 | End: 2021-06-25 | Stop reason: ALTCHOICE

## 2020-12-11 RX ADMIN — ZINC SULFATE 220 MG (50 MG) CAPSULE 220 MG: CAPSULE at 11:36

## 2020-12-11 RX ADMIN — ASPIRIN 81 MG: 81 TABLET, COATED ORAL at 11:31

## 2020-12-11 RX ADMIN — OXYCODONE HYDROCHLORIDE AND ACETAMINOPHEN 1000 MG: 500 TABLET ORAL at 11:30

## 2020-12-11 RX ADMIN — CARVEDILOL 25 MG: 12.5 TABLET, FILM COATED ORAL at 11:23

## 2020-12-11 RX ADMIN — Medication 2000 UNITS: at 11:30

## 2020-12-11 RX ADMIN — HEPARIN SODIUM 5000 UNITS: 5000 INJECTION INTRAVENOUS; SUBCUTANEOUS at 06:07

## 2020-12-11 RX ADMIN — CALCIUM ACETATE 667 MG: 667 CAPSULE ORAL at 11:31

## 2020-12-11 RX ADMIN — NIFEDIPINE 60 MG: 30 TABLET, FILM COATED, EXTENDED RELEASE ORAL at 11:30

## 2020-12-11 RX ADMIN — SODIUM BICARBONATE 650 MG: 650 TABLET ORAL at 11:31

## 2020-12-11 RX ADMIN — PANTOPRAZOLE SODIUM 40 MG: 40 TABLET, DELAYED RELEASE ORAL at 06:07

## 2020-12-12 LAB
BACTERIA SPEC BFLD CULT: NO GROWTH
GRAM STN SPEC: NORMAL

## 2020-12-14 ENCOUNTER — TRANSITIONAL CARE MANAGEMENT (OUTPATIENT)
Dept: FAMILY MEDICINE CLINIC | Facility: CLINIC | Age: 35
End: 2020-12-14

## 2020-12-14 LAB
BACTERIA BLD CULT: NORMAL
BACTERIA BLD CULT: NORMAL

## 2020-12-16 LAB — IL6 SERPL-MCNC: 46.9 PG/ML (ref 0–13)

## 2020-12-28 ENCOUNTER — TELEMEDICINE (OUTPATIENT)
Dept: FAMILY MEDICINE CLINIC | Facility: CLINIC | Age: 35
End: 2020-12-28

## 2020-12-28 DIAGNOSIS — U07.1 COVID-19: ICD-10-CM

## 2020-12-28 DIAGNOSIS — U07.1 COVID-19: Primary | ICD-10-CM

## 2020-12-28 PROCEDURE — 1036F TOBACCO NON-USER: CPT | Performed by: FAMILY MEDICINE

## 2020-12-28 PROCEDURE — U0003 INFECTIOUS AGENT DETECTION BY NUCLEIC ACID (DNA OR RNA); SEVERE ACUTE RESPIRATORY SYNDROME CORONAVIRUS 2 (SARS-COV-2) (CORONAVIRUS DISEASE [COVID-19]), AMPLIFIED PROBE TECHNIQUE, MAKING USE OF HIGH THROUGHPUT TECHNOLOGIES AS DESCRIBED BY CMS-2020-01-R: HCPCS | Performed by: FAMILY MEDICINE

## 2020-12-28 PROCEDURE — 99213 OFFICE O/P EST LOW 20 MIN: CPT | Performed by: FAMILY MEDICINE

## 2020-12-29 ENCOUNTER — TELEPHONE (OUTPATIENT)
Dept: FAMILY MEDICINE CLINIC | Facility: CLINIC | Age: 35
End: 2020-12-29

## 2020-12-29 DIAGNOSIS — R11.0 NAUSEA: Primary | ICD-10-CM

## 2020-12-29 LAB — SARS-COV-2 RNA SPEC QL NAA+PROBE: NOT DETECTED

## 2020-12-29 RX ORDER — ONDANSETRON 4 MG/1
4 TABLET, ORALLY DISINTEGRATING ORAL EVERY 8 HOURS PRN
Qty: 10 TABLET | Refills: 0 | Status: SHIPPED | OUTPATIENT
Start: 2020-12-29

## 2021-01-04 ENCOUNTER — TELEMEDICINE (OUTPATIENT)
Dept: FAMILY MEDICINE CLINIC | Facility: CLINIC | Age: 36
End: 2021-01-04

## 2021-01-04 DIAGNOSIS — R53.81 PHYSICAL DECONDITIONING: ICD-10-CM

## 2021-01-04 DIAGNOSIS — R13.10 DYSPHAGIA, UNSPECIFIED TYPE: Primary | ICD-10-CM

## 2021-01-04 PROCEDURE — 99213 OFFICE O/P EST LOW 20 MIN: CPT | Performed by: FAMILY MEDICINE

## 2021-01-04 PROCEDURE — 1036F TOBACCO NON-USER: CPT | Performed by: FAMILY MEDICINE

## 2021-01-04 NOTE — ASSESSMENT & PLAN NOTE
Etiology unclear at this point   Denies any signs of malignancy   Will order barium swallow with speech   If negative consider referral to ENT for laryngoscopy  reccommended to start food diary

## 2021-01-04 NOTE — PROGRESS NOTES
Virtual Brief Visit    Assessment/Plan:    Problem List Items Addressed This Visit        Digestive    Dysphagia - Primary     Etiology unclear at this point   Denies any signs of malignancy   Will order barium swallow with speech   If negative consider referral to ENT for laryngoscopy  reccommended to start food diary  Relevant Orders    FL barium swallow video w speech      Other Visit Diagnoses     Physical deconditioning        Relevant Orders    Ambulatory referral to Physical Therapy                Reason for visit is   Chief Complaint   Patient presents with    Virtual Brief Visit        Encounter provider Rox Briggs MD    Provider located at [address] [address] [facility]   [phone]    Recent Visits  Date Type Provider Dept   01/04/21  MD Fredy Arana   12/29/20 Telephone Shahriar Maciel   Showing recent visits within past 7 days and meeting all other requirements     Future Appointments  No visits were found meeting these conditions  Showing future appointments within next 150 days and meeting all other requirements        After connecting through telephone, the patient was identified by name and date of birth  Phillip Menendez was informed that this is a telemedicine visit and that the visit is being conducted through telephone  My office door was closed  No one else was in the room  He acknowledged consent and understanding of privacy and security of the platform  The patient has agreed to participate and understands he can discontinue the visit at any time  Patient is aware this is a billable service  Subjective    Phillip Menendez is a 38 y o  male   Trouble swallowing years   Has not had any change  Not getting worse or better  Happens intermittently    No pain with swallowing   Not  specific to solid or liquids or specific food/liquids   No neurological sxs such as weakness or blurry visin   sxs not worse toward end of the day   No cosntituonal symptoms  No voice change  Recently  hospitalized but not been intubated           Past Medical History:   Diagnosis Date    Anemia due to chronic kidney disease 9/13/2019    Chronic kidney disease     CKD (chronic kidney disease) 2/21/2019    Class 1 obesity due to excess calories with serious comorbidity and body mass index (BMI) of 31 0 to 31 9 in adult 5/25/2019    Diabetes mellitus ([facility] [address] )     Essential hypertension 10/31/2017    Hyperlipidemia     NSTEMI (non-ST elevated myocardial infarction) ([facility] [address] ) 5/21/2019       Past Surgical History:   Procedure Laterality Date    ABCESS DRAINAGE      tooth    CT NEEDLE BIOPSY KIDNEY  10/24/2019    PERITONEAL CATHETER INSERTION N/A 2/13/2020    Procedure: INSERTION PERITONEAL CATHETER DIALYSIS LAPAROSCOPIC;  Surgeon: Regan Florence MD;  [facility];  Service: General    ID KNEE SCOPE,MED/LAT MENISECTOMY Right 9/13/2018    Procedure: RIGHT KNEE ARTHROSCOPIC PARTIAL MEDIAL MENISCECTOMY;  Surgeon: Caitlin Fan MD;  [facility];  Service: Orthopedics    WRIST ARTHROPLASTY Right 2017       Current Outpatient Medications   Medication Sig Dispense Refill    acetaminophen (TYLENOL) 325 mg tablet Take 2 tablets (650 mg total) by mouth every 6 (six) hours as needed for mild pain or fever  0    albuterol (PROVENTIL HFA,VENTOLIN HFA) 90 mcg/act inhaler Inhale 2 puffs every 4 (four) hours as needed for wheezing 1 Inhaler 0    ascorbic acid (VITAMIN C) 1000 MG tablet Take 1 tablet (1,000 mg total) by mouth every 12 (twelve) hours for 5 doses 10 tablet 0    aspirin (ECOTRIN LOW STRENGTH) 81 mg EC tablet Take 1 tablet (81 mg total) by mouth daily  0    atorvastatin (LIPITOR) 20 mg tablet Take atorvastatin for 2 weeks after discharge then go back to your Crestor   14 tablet 0    B Complex-C-Folic Acid ([place] Caps) 1 MG CAPS       Blood Glucose Monitoring Suppl (Bozena Vázquez) w/Device KIT by Does not apply route 3 (three) times a day (Patient not taking: Reported on 12/8/2020) 1 kit 0    calcium acetate (PHOSLO) 667 mg capsule Take 1 capsule (667 mg total) by mouth 3 (three) times a day with meals 90 capsule 0    carvedilol (COREG) 25 mg tablet TAKE 2 TABLETS (50 MG TOTAL) BY MOUTH 2 (TWO) TIMES A DAY WITH MEALS 360 tablet 0    cholecalciferol (VITAMIN D3) 1,000 units tablet Take 1,000 Units by mouth daily      famotidine (PEPCID) 20 mg tablet Take 0 5 tablets (10 mg total) by mouth daily for 8 days 8 tablet 0    lisinopril (ZESTRIL) 10 mg tablet       multivitamin-minerals (CENTRUM ADULTS) tablet Take 1 tablet by mouth daily 7 tablet 0    NIFEdipine ER (ADALAT CC) 60 MG 24 hr tablet TAKE 1 TABLET BY MOUTH TWICE A DAY 60 tablet 5    ondansetron (ZOFRAN-ODT) 4 mg disintegrating tablet Take 1 tablet (4 mg total) by mouth every 8 (eight) hours as needed for nausea or vomiting 10 tablet 0    pantoprazole (PROTONIX) 20 mg tablet Take 40 mg by mouth daily       predniSONE 10 mg tablet Take 1 tablet (10 mg total) by mouth daily 40 milligrams daily 8 days 32 tablet 0    rosuvastatin (CRESTOR) 5 mg tablet Take 1 tablet (5 mg total) by mouth daily  0    sodium bicarbonate 650 mg tablet Take 1 tablet (650 mg total) by mouth 2 (two) times daily after meals 60 tablet 0    torsemide (DEMADEX) 100 mg tablet Take 1 tablet (100 mg total) by mouth daily 30 tablet 0    zinc sulfate (ZINCATE) 220 mg capsule Take 1 capsule (220 mg total) by mouth daily for 5 doses 5 capsule 0     No current facility-administered medications for this visit  No Known Allergies    Review of Systems   Constitutional: Negative for activity change, appetite change, chills, diaphoresis, fatigue, fever and unexpected weight change  HENT: Positive for trouble swallowing  Negative for dental problem, ear discharge, ear pain, facial swelling, postnasal drip, sore throat and voice change  Respiratory: Negative for choking and chest tightness  Cardiovascular: Negative for chest pain and palpitations  Gastrointestinal: Negative for abdominal pain, diarrhea, nausea and vomiting  Neurological: Negative for dizziness, tremors, seizures, syncope, facial asymmetry, speech difficulty, weakness, light-headedness, numbness and headaches  All other systems reviewed and are negative  There were no vitals filed for this visit  I spent 14 minutes directly with the patient during this visit    VIRTUAL VISIT DISCLAIMER    Omar Sawant acknowledges that he has consented to an online visit or consultation  He understands that the online visit is based solely on information provided by him, and that, in the absence of a face-to-face physical evaluation by the physician, the diagnosis he receives is both limited and provisional in terms of accuracy and completeness  This is not intended to replace a full medical face-to-face evaluation by the physician  Omar Sawant understands and accepts these terms

## 2021-01-19 ENCOUNTER — HOSPITAL ENCOUNTER (OUTPATIENT)
Dept: RADIOLOGY | Facility: HOSPITAL | Age: 36
Discharge: HOME | End: 2021-01-19
Payer: COMMERCIAL

## 2021-01-19 ENCOUNTER — EVALUATION (OUTPATIENT)
Dept: PHYSICAL THERAPY | Facility: CLINIC | Age: 36
End: 2021-01-19
Payer: COMMERCIAL

## 2021-01-19 VITALS — DIASTOLIC BLOOD PRESSURE: 88 MMHG | SYSTOLIC BLOOD PRESSURE: 162 MMHG | HEART RATE: 80 BPM

## 2021-01-19 DIAGNOSIS — M25.561 CHRONIC PAIN OF RIGHT KNEE: ICD-10-CM

## 2021-01-19 DIAGNOSIS — R13.10 DYSPHAGIA, UNSPECIFIED TYPE: ICD-10-CM

## 2021-01-19 DIAGNOSIS — G89.29 CHRONIC PAIN OF RIGHT KNEE: ICD-10-CM

## 2021-01-19 DIAGNOSIS — R53.81 PHYSICAL DECONDITIONING: Primary | ICD-10-CM

## 2021-01-19 PROCEDURE — 92611 MOTION FLUOROSCOPY/SWALLOW: CPT

## 2021-01-19 PROCEDURE — 74230 X-RAY XM SWLNG FUNCJ C+: CPT

## 2021-01-19 PROCEDURE — 97162 PT EVAL MOD COMPLEX 30 MIN: CPT | Performed by: PHYSICAL THERAPIST

## 2021-01-19 NOTE — PROGRESS NOTES
PT Evaluation    Today's date: 2021   Patient name: Nupur Nieves  : 1985  MRN: [id number]  Referring provider: Doris Mei MD  Dx:   Encounter Diagnosis     ICD-10-CM    1  Physical deconditioning  R53 81 Ambulatory referral to Physical Therapy           Subjective Evaluation     History of Present Illness    Patient presents with c/o muscle weakness in his legs  He presents with his wife Erich Klein  He has wrestless leg L worse than R  He is a dialysis patient and is in need of a kidney for 1 year  He states both kidneys are failing because of DM  He is no longer needing any medications  He states cutting back from soda has made the biggest difference  He states he lost 140lbs >10 years ago  He is getting peritoneal dialysis at home every night  He did have Covid in early December  He states the hardest part was that he couldn't 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  He does have trouble from orthostatic hypotension  He does have trouble with deteriorating vision  He has to stop to catch his breath going up stairs  Neuro signs: Feet numbness B, shins decreased sensation  Bowel and bladder sxs: Urination is little to none and has constant diahhrea  Red flags: none  Occupation: none- disability      Pain  At best pain ratin  At worst pain ratin    Social Support  Lives at home and has trouble getting up steps- 2-3 mins      Patient Goals  Patient goals for therapy: To build up his leg strength    STGs  1  Decrease pain by 20% in 2-4 weeks to improve standing tolerance  2  Improve lumbar ROM s imbalance in 2-4 weeks safely to improve sit to stand  3  Improve hip/knee strength by 1/3 grade in 2-4 weeks to improve stairs performance to doing it s break  LTGs  1  Decrease pain by 60% in 6-8 weeks  2  Improve walking tolerance to >30 minutes in 10 weeks to perform community ambulation     3  Perform dressing/showering activities independently and back to speed without pain in 6-8 weeks     4  Improve stairs tolerance to 2 flight in 10 weeks s fatigue  Objective Measurements:    Observation:  Swelling L 36 5 cm R 36 5  Heel/toe walk:  Balance: imbalance c lumbar flexion when tying shoes due to OH  Gait: slowed pace   Squat: poor depth and knees over toes  Reflexes:none  Sensation: NT but states he is limited  Myotomes:      Slump: Tight 20 deg B SLR:45 deg  Alpa:R pain  Faddir: B knee pain  Scours: -        LULU:  Palpation:    Lumbar ROM L R Strength knee L R   Flex  Imbalance tight HS  Flex 4 4+   Ext LBP mod andrews  Ext 4 4+   Rot        SB        Hip A/PROM        Flex  /  / Flex 4- 4-   ER at 90   ER     IR at 90 Pain in knee Pain in knee IR     Abd   Abd     Ext   Ext             Knee A/PROM   Ankle     Flex   DF 4+ 4+   Ext   PF           Assessment:    Dorie Sawyer is a pleasant 38 y o  male who presents with referring diagnosis of overall deconditioning and LLE>RLE weakness  The patient's greatest concern is getting back to normal physical activity  No further referral appears necessary at this time based upon examination results  The primary movement impairment diagnosis decreased L>R LE weakness and fatigue and limiting his ability to go up and down stairs s fatigue, dress himself and walk longer distances  Impairments include:  1) Decreased fatigue/endurance  2) Decreased LE strength   3) Decreased lumbar ROM    Etiologic factors include kidney failure and then made worse by covid virus  Negative prognostic indicators:poor endurance  Positive prognostic indicators: good attitude  Please contact me if you have any further questions or recommendations   Thank you very much for the kind referral         Plan  Patient would benefit from:Skilled physical therapy  Planned therapy interventions: manual therapy, neuromuscular re-education, stretching, strengthening, therapeutic activities, therapeutic exercise, patient education, home exercise program, and activity modification      Frequency: 2-3x week  Duration in weeks: 8  Treatment plan discussed with: patient             Goals: Patient's goal is  To build up his leg strength    Precautions: CKD- dialysis treatment, poor endurance, fall risk- OH  Dx: Deconditioning and decreased balance- LLE >RLE weakness      Daily Treatment Diary     Manuals 1/19/2021                                            Ther Ex         HS stretch strap         Seated hr         LP SL and HR 25#                                                               Neuro Re-ed         TA c candle blow hep        Bridges 5x        NBOS on foam         NBOS EC                  Ther Activity         Stairs- nv         TG                  Gait Training                           Modalities

## 2021-01-19 NOTE — PROCEDURES
Video Swallow Study      Patient Name: Dorie Sawyer  FGMOG'W Date: 1/19/2021        Past Medical History  Past Medical History:   Diagnosis Date    Anemia due to chronic kidney disease 9/13/2019    Chronic kidney disease     CKD (chronic kidney disease) 2/21/2019    Class 1 obesity due to excess calories with serious comorbidity and body mass index (BMI) of 31 0 to 31 9 in adult 5/25/2019    Diabetes mellitus ([facility] [address] )     Essential hypertension 10/31/2017    Hyperlipidemia     NSTEMI (non-ST elevated myocardial infarction) ([facility] [address] ) 5/21/2019        Past Surgical History  Past Surgical History:   Procedure Laterality Date    ABCESS DRAINAGE      tooth    CT NEEDLE BIOPSY KIDNEY  10/24/2019    PERITONEAL CATHETER INSERTION N/A 2/13/2020    Procedure: INSERTION PERITONEAL CATHETER DIALYSIS LAPAROSCOPIC;  Surgeon: Lafe Gaucher, MD;  [facility];  Service: General    GA KNEE SCOPE,MED/LAT MENISECTOMY Right 9/13/2018    Procedure: RIGHT KNEE ARTHROSCOPIC PARTIAL MEDIAL MENISCECTOMY;  Surgeon: Yanick De Los Santos MD;  [facility];  Service: Orthopedics    WRIST ARTHROPLASTY Right 2017         General Information:    39 yo gentleman referred to [facility]  for a VBS by Dr Randy Chen for dysphagia w/  c/o gagging after eating; occurs intermittently  Dysphagia is not specific to food or liquids  No c/o food sticking in throat or coughing, choking  Cognition:  WNL    Speech/Swallow Mech: Oral motor movements appeared  WNL; Dentition was  Natural ;  Respiratory Status: WNL on RA ;   Current diet: regular w/ thin liquids  Prior VBS none     Pt was seen in radiology for a Video Barium Swallow Study, seated in the upright position and viewed laterally with the following consistencies: puree, soft/solid, hard solid, HTL, NTL, thin liquids, barium pill w/ water by straw         Results are as follows:     **Images are available for review on PACS          Oral Stage: Functional   Bolus retrieval, mastication, manipulation of all consistencies appeared WNL  occas premature spill noted w/ thin liquids  Oral residue noted w/ secondary transfer to clear  Pharyngeal Stage: WNL   swallow initiation was prompt, premature spill of liquids minimally over epiglottis prior to initiation of swallow  Epiglottic inversion, laryngeal elevation and airway closure appeared complete  No pharyngeal retention, laryngeal penetration or aspiration observed  Esophageal Stage:   briefly assessed; intermittent cricopharyngeal hypertrophy noted anteriorly and posteriorly but did not impede bolus passage of any consistency  Barium pill stasis noted was noted mid-esophagus which cleared w/ liquid wash  Otherwise no overt abnormality noted  Assessment Summary:   Oral and pharyngeal stages of swallowing appeared essentially WNL  No pharyngeal retention, laryngeal penetration or aspiration observed  Brief pill stasis in mid-esophagus, cleared w/ liquid wash       Diagnosis/Prognosis:            Recommendations:   cont regular diet with thin liquids  meds as tolerated w/ extra liquids      April Colt Dotson MA CCC-SLP  Speech Patholgist  [place] license # [facility] [id number]  [place] license # [id number]  Available via [employer]

## 2021-01-22 ENCOUNTER — APPOINTMENT (OUTPATIENT)
Dept: PHYSICAL THERAPY | Facility: CLINIC | Age: 36
End: 2021-01-22
Payer: COMMERCIAL

## 2021-01-26 ENCOUNTER — OFFICE VISIT (OUTPATIENT)
Dept: PHYSICAL THERAPY | Facility: CLINIC | Age: 36
End: 2021-01-26
Payer: COMMERCIAL

## 2021-01-26 DIAGNOSIS — R53.81 PHYSICAL DECONDITIONING: Primary | ICD-10-CM

## 2021-01-26 DIAGNOSIS — G89.29 CHRONIC PAIN OF RIGHT KNEE: ICD-10-CM

## 2021-01-26 DIAGNOSIS — M25.561 CHRONIC PAIN OF RIGHT KNEE: ICD-10-CM

## 2021-01-26 PROCEDURE — 97112 NEUROMUSCULAR REEDUCATION: CPT

## 2021-01-26 PROCEDURE — 97530 THERAPEUTIC ACTIVITIES: CPT

## 2021-01-26 PROCEDURE — 97110 THERAPEUTIC EXERCISES: CPT

## 2021-01-26 NOTE — PROGRESS NOTES
Daily Note     Today's date: 2021  Patient name: Ru Agee  : 1985  MRN: [id number]  Referring provider: Adela Still MD  Dx:   Encounter Diagnosis     ICD-10-CM    1  Physical deconditioning  R53 81    2  Chronic pain of right knee  M25 561     G89 29                   Subjective: Pt reports that he is tired from his medication  Denies pain in R knee before session  Objective: See treatment diary below      Assessment: Tolerated treatment well  Required rest breaks after each intervention  Patient demonstrated fatigue post treatment      Plan: Continue per plan of care        Goals: Patient's goal is  To build up his leg strength    Precautions: CKD- dialysis treatment, poor endurance, fall risk- OH  Dx: Deconditioning and decreased balance- LLE >RLE weakness      Daily Treatment Diary     Manuals 2021                                           Ther Ex         HS stretch strap         Seated hr  2x10       LP SL and HR 25#  2x10 ea nv inc #                                                             Neuro Re-ed         TA c candle blow hep Review        Bridges 5x Review        NBOS on foam  5x :10       NBOS EC  5x :10                 Ther Activity         Stairs- nv  8" 10x ea       TG  lvl 16  2x10                Gait Training                           Modalities

## 2021-01-29 ENCOUNTER — OFFICE VISIT (OUTPATIENT)
Dept: PHYSICAL THERAPY | Facility: CLINIC | Age: 36
End: 2021-01-29
Payer: COMMERCIAL

## 2021-01-29 DIAGNOSIS — G89.29 CHRONIC PAIN OF RIGHT KNEE: ICD-10-CM

## 2021-01-29 DIAGNOSIS — R53.81 PHYSICAL DECONDITIONING: Primary | ICD-10-CM

## 2021-01-29 DIAGNOSIS — M25.561 CHRONIC PAIN OF RIGHT KNEE: ICD-10-CM

## 2021-01-29 PROCEDURE — 97110 THERAPEUTIC EXERCISES: CPT

## 2021-01-29 PROCEDURE — 97112 NEUROMUSCULAR REEDUCATION: CPT

## 2021-01-29 NOTE — PROGRESS NOTES
Daily Note     Today's date: 2021  Patient name: Omar Sawant  : 1985  MRN: [id number]  Referring provider: Pratibha Ponce MD  Dx:   Encounter Diagnosis     ICD-10-CM    1  Physical deconditioning  R53 81    2  Chronic pain of right knee  M25 561     G89 29        Start Time: 1406  Stop Time: 1444  Total time in clinic (min): 38 minutes    Subjective: Pt reports no complaints from last visit, he does not report any inc fatigue prior to session  Objective: See treatment diary below      Assessment: Tolerated treatment well    Patient demonstrated fatigue post treatment  Inc rest breaks during stairs due to dizziness  Tolerated all other strength exercises with no c/o  Plan: Continue per plan of care        Goals: Patient's goal is  To build up his leg strength    Precautions: CKD- dialysis treatment, poor endurance, fall risk- OH  Dx: Deconditioning and decreased balance- LLE >RLE weakness      Daily Treatment Diary     Manuals 2021                                          Ther Ex         HS stretch strap         Seated hr  2x10 2x10      LP SL and HR  2x10 ea SL 35#, BL 60# 2x10 ea       Bike   5 min                                                   Neuro Re-ed         OSCAR zambrano blow hep Review        Bridges 5x Review        NBOS on foam  5x :10 5x :10      NBOS EC  5x :10  5x :10               Ther Activity         Stairs- nv  8" 10x ea 8" 20x ea      TG  lvl 16  2x10 lvl 16 2x10               Gait Training                           Modalities

## 2021-02-01 ENCOUNTER — APPOINTMENT (OUTPATIENT)
Dept: PHYSICAL THERAPY | Facility: CLINIC | Age: 36
End: 2021-02-01
Payer: COMMERCIAL

## 2021-02-03 RX ORDER — MULTIVITAMIN,THER AND MINERALS
TABLET ORAL
COMMUNITY
Start: 2020-12-11

## 2021-02-03 RX ORDER — PANTOPRAZOLE SODIUM 40 MG/1
TABLET, DELAYED RELEASE ORAL
COMMUNITY
Start: 2021-01-13 | End: 2021-06-25 | Stop reason: ALTCHOICE

## 2021-02-03 NOTE — PROGRESS NOTES
Assessment/Plan:    Dysphagia  Improved since last visit  Video swallow study within normal limits  Speech pathologist recommended normal diet with thin liquids  Advised patient to keep an eye on symptoms with diary and if they worsen to make follow-up  Oral motor movements appeared  WNL; Dentition was  Natural ;  Respiratory Status: WNL on RA ;   Current diet: regular w/ thin liquids  Problem List Items Addressed This Visit        Digestive    Dysphagia     Improved since last visit  Video swallow study within normal limits  Speech pathologist recommended normal diet with thin liquids  Advised patient to keep an eye on symptoms with diary and if they worsen to make follow-up  Oral motor movements appeared  WNL; Dentition was  Natural ;  Respiratory Status: WNL on RA ;   Current diet: regular w/ thin liquids  Other Visit Diagnoses     Type 2 diabetes mellitus without complication, with long-term current use of insulin ([address] )    -  Primary    Relevant Orders    POCT hemoglobin A1c (Completed)    Diarrhea, unspecified type        Relevant Medications    loperamide (IMODIUM A-D) 2 MG tablet            Subjective:      Patient ID: Germaine Noble is a 38 y o  male  Today's visit follow-up on barium swallow  Study   He is not having as much gagging as he was previously having before  He has been tolerating food well denies any nausea vomiting , pain with swallowing , globulus sensation regurgitating undigested food   He had COVID a few months ago and has had diarrhea since then  He tries Imodium and helps but only last a few hours  No blood in the diarrhea  He is not linked it to any specific kind of food   Gatorade helps with diarrhea          The following portions of the patient's history were reviewed and updated as appropriate: allergies, current medications, past family history, past medical history, past social history, past surgical history and problem list     Review of Systems   Constitutional: Negative for chills and fever  HENT: Negative for dental problem, drooling, sinus pressure, sinus pain, sore throat, trouble swallowing and voice change  Respiratory: Negative for cough and shortness of breath  Gastrointestinal: Positive for diarrhea  Negative for abdominal pain, rectal pain and vomiting  Neurological: Negative for speech difficulty, weakness and light-headedness  Objective:      /80 (BP Location: Left arm, Patient Position: Sitting, Cuff Size: Standard)   Pulse 96   Temp 98 8 °F (37 1 °C) (Tympanic)   Resp 18   Ht 6' 1" (1 854 m)   Wt 80 9 kg (178 lb 6 4 oz)   SpO2 99%   BMI 23 54 kg/m²          Physical Exam  Vitals signs and nursing note reviewed  Constitutional:       General: He is not in acute distress  Appearance: Normal appearance  He is not ill-appearing, toxic-appearing or diaphoretic  Neck:      Musculoskeletal: Normal range of motion and neck supple  No neck rigidity or muscular tenderness  Cardiovascular:      Rate and Rhythm: Normal rate and regular rhythm  Pulses: Normal pulses  Heart sounds: Normal heart sounds  Pulmonary:      Effort: Pulmonary effort is normal       Breath sounds: Normal breath sounds  Abdominal:      General: Abdomen is flat  Bowel sounds are normal  There is no distension  Palpations: There is no mass  Tenderness: There is no abdominal tenderness  There is no guarding or rebound  Hernia: No hernia is present  Lymphadenopathy:      Cervical: No cervical adenopathy  Neurological:      Mental Status: He is alert

## 2021-02-04 ENCOUNTER — OFFICE VISIT (OUTPATIENT)
Dept: FAMILY MEDICINE CLINIC | Facility: CLINIC | Age: 36
End: 2021-02-04

## 2021-02-04 VITALS
OXYGEN SATURATION: 99 % | TEMPERATURE: 98.8 F | BODY MASS INDEX: 23.64 KG/M2 | HEIGHT: 73 IN | HEART RATE: 96 BPM | DIASTOLIC BLOOD PRESSURE: 80 MMHG | WEIGHT: 178.4 LBS | RESPIRATION RATE: 18 BRPM | SYSTOLIC BLOOD PRESSURE: 130 MMHG

## 2021-02-04 DIAGNOSIS — R19.7 DIARRHEA, UNSPECIFIED TYPE: ICD-10-CM

## 2021-02-04 DIAGNOSIS — Z79.4 TYPE 2 DIABETES MELLITUS WITHOUT COMPLICATION, WITH LONG-TERM CURRENT USE OF INSULIN (HCC): Primary | ICD-10-CM

## 2021-02-04 DIAGNOSIS — R13.10 DYSPHAGIA, UNSPECIFIED TYPE: ICD-10-CM

## 2021-02-04 DIAGNOSIS — E11.9 TYPE 2 DIABETES MELLITUS WITHOUT COMPLICATION, WITH LONG-TERM CURRENT USE OF INSULIN (HCC): Primary | ICD-10-CM

## 2021-02-04 LAB — SL AMB POCT HEMOGLOBIN AIC: 5.7 (ref ?–6.5)

## 2021-02-04 PROCEDURE — 3044F HG A1C LEVEL LT 7.0%: CPT | Performed by: FAMILY MEDICINE

## 2021-02-04 PROCEDURE — 83036 HEMOGLOBIN GLYCOSYLATED A1C: CPT | Performed by: FAMILY MEDICINE

## 2021-02-04 PROCEDURE — 99213 OFFICE O/P EST LOW 20 MIN: CPT | Performed by: FAMILY MEDICINE

## 2021-02-04 PROCEDURE — 3008F BODY MASS INDEX DOCD: CPT | Performed by: FAMILY MEDICINE

## 2021-02-04 RX ORDER — LOPERAMIDE HYDROCHLORIDE 2 MG/1
2 TABLET ORAL 3 TIMES DAILY PRN
Qty: 90 TABLET | Refills: 0 | Status: SHIPPED | OUTPATIENT
Start: 2021-02-04 | End: 2021-06-25 | Stop reason: ALTCHOICE

## 2021-02-04 NOTE — ASSESSMENT & PLAN NOTE
Improved since last visit  Video swallow study within normal limits  Speech pathologist recommended normal diet with thin liquids  Advised patient to keep an eye on symptoms with diary and if they worsen to make follow-up  Oral motor movements appeared  WNL; Dentition was  Natural ;  Respiratory Status: WNL on RA ;   Current diet: regular w/ thin liquids

## 2021-02-05 ENCOUNTER — OFFICE VISIT (OUTPATIENT)
Dept: PHYSICAL THERAPY | Facility: CLINIC | Age: 36
End: 2021-02-05
Payer: COMMERCIAL

## 2021-02-05 DIAGNOSIS — G89.29 CHRONIC PAIN OF RIGHT KNEE: ICD-10-CM

## 2021-02-05 DIAGNOSIS — R19.7 DIARRHEA, UNSPECIFIED TYPE: Primary | ICD-10-CM

## 2021-02-05 DIAGNOSIS — R53.81 PHYSICAL DECONDITIONING: Primary | ICD-10-CM

## 2021-02-05 DIAGNOSIS — M25.561 CHRONIC PAIN OF RIGHT KNEE: ICD-10-CM

## 2021-02-05 PROCEDURE — 97112 NEUROMUSCULAR REEDUCATION: CPT | Performed by: PHYSICAL THERAPIST

## 2021-02-05 PROCEDURE — 3066F NEPHROPATHY DOC TX: CPT | Performed by: FAMILY MEDICINE

## 2021-02-05 PROCEDURE — 97110 THERAPEUTIC EXERCISES: CPT | Performed by: PHYSICAL THERAPIST

## 2021-02-05 NOTE — PROGRESS NOTES
Daily Note     Today's date: 2021  Patient name: Judy Hooks  : 1985  MRN: [id number]  Referring provider: David Gutierrez MD  Dx:   Encounter Diagnosis     ICD-10-CM    1  Physical deconditioning  R53 81    2  Chronic pain of right knee  M25 561     G89 29                   Subjective: Pt reports he has been getting lightheadedness due to wearing mask  He states after 30-60 mins his BP will drop after medications  He does feel improvements as now he can make it all the way up 3 flights  Objective: See treatment diary below      BP 93/68 mm Hg    Assessment: Tolerated treatment well    Patient demonstrated fatigue post treatment  HE did have rest breaks during session due to dizziness and was able to progress reps for TG  He wanted to hold off on stairs due to fatigue  Plan: Continue per plan of care    consider UBE nv      Goals: Patient's goal is  To build up his leg strength    Precautions: CKD- dialysis treatment, poor endurance, fall risk- OH  Dx: Deconditioning and decreased balance- LLE >RLE weakness      Daily Treatment Diary     Manuals 2021                                         Ther Ex         HS stretch strap    3x :20     Seated hr  2x10 2x10 20x     LP SL #35 and DL HR #65    2x10 ea SL 35#, BL 60# 2x10 ea  2x10     Bike   5 min 5'     UBE                                             Neuro Re-ed         OSCAR hernandez candle blow hep Review        Bridges 5x Review        NBOS on foam  5x :10 5x :10 5x :10     NBOS EC  5x :10  5x :10 5x :10              Ther Activity         Stairs- nv  8" 10x ea 8" 20x ea hold     TG  lvl 16  2x10 lvl 16 2x10 lvl 16 2x10              Gait Training                           Modalities

## 2021-02-09 ENCOUNTER — APPOINTMENT (OUTPATIENT)
Dept: PHYSICAL THERAPY | Facility: CLINIC | Age: 36
End: 2021-02-09
Payer: COMMERCIAL

## 2021-02-10 ENCOUNTER — LAB (OUTPATIENT)
Dept: LAB | Facility: CLINIC | Age: 36
End: 2021-02-10
Payer: COMMERCIAL

## 2021-02-10 DIAGNOSIS — R19.7 DIARRHEA, UNSPECIFIED TYPE: ICD-10-CM

## 2021-02-10 PROCEDURE — 87505 NFCT AGENT DETECTION GI: CPT

## 2021-02-11 DIAGNOSIS — A04.72 CLOSTRIDIOIDES DIFFICILE DIARRHEA: Primary | ICD-10-CM

## 2021-02-11 LAB
C DIFF TOX A+B STL QL IA: NEGATIVE
C DIFF TOX B TCDB STL QL NAA+PROBE: POSITIVE
CAMPYLOBACTER DNA SPEC NAA+PROBE: NORMAL
SALMONELLA DNA SPEC QL NAA+PROBE: NORMAL
SHIGA TOXIN STX GENE SPEC NAA+PROBE: NORMAL
SHIGELLA DNA SPEC QL NAA+PROBE: NORMAL

## 2021-02-11 RX ORDER — VANCOMYCIN HYDROCHLORIDE 125 MG/1
125 CAPSULE ORAL 4 TIMES DAILY
Qty: 40 CAPSULE | Refills: 0 | Status: SHIPPED | OUTPATIENT
Start: 2021-02-11 | End: 2021-02-21

## 2021-02-12 ENCOUNTER — OFFICE VISIT (OUTPATIENT)
Dept: PHYSICAL THERAPY | Facility: CLINIC | Age: 36
End: 2021-02-12
Payer: COMMERCIAL

## 2021-02-12 DIAGNOSIS — M25.561 CHRONIC PAIN OF RIGHT KNEE: ICD-10-CM

## 2021-02-12 DIAGNOSIS — G89.29 CHRONIC PAIN OF RIGHT KNEE: ICD-10-CM

## 2021-02-12 DIAGNOSIS — R53.81 PHYSICAL DECONDITIONING: Primary | ICD-10-CM

## 2021-02-12 PROCEDURE — 97110 THERAPEUTIC EXERCISES: CPT

## 2021-02-12 PROCEDURE — 97112 NEUROMUSCULAR REEDUCATION: CPT

## 2021-02-12 NOTE — PROGRESS NOTES
Daily Note     Today's date: 2021  Patient name: Omar Sawant  : 1985  MRN: [id number]  Referring provider: Pratibha Ponce MD  Dx:   Encounter Diagnosis     ICD-10-CM    1  Physical deconditioning  R53 81    2  Chronic pain of right knee  M25 561     G89 29        Start Time: 1400  Stop Time: 1445  Total time in clinic (min): 45 minutes    Subjective: Pt reports no major complaints prior to session  Objective: See treatment diary below      BP 93/68 mm Hg    Assessment: Tolerated treatment well    Patient demonstrated fatigue post treatment  Initiated hip abd/ext today and pt performed with good form  Plan: Continue per plan of care    consider UBE nv      Goals: Patient's goal is  To build up his leg strength    Precautions: CKD- dialysis treatment, poor endurance, fall risk- OH  Dx: Deconditioning and decreased balance- LLE >RLE weakness      Daily Treatment Diary     Manuals 2021                                        Ther Ex         HS stretch strap    3x :20 3x :20    Seated hr  2x10 2x10 20x Stand 20x    LP SL #35 and DL HR #65    2x10 ea SL 35#, BL 60# 2x10 ea  2x10 2x10  90# B  60# U    Bike   5 min 5' 6'    UBE         Hip Abd/ext     Standing 20x ea                               Neuro Re-ed         TA c candle blow hep Review        Bridges 5x Review        NBOS on foam  5x :10 5x :10 5x :10 5x :10    NBOS EC  5x :10  5x :10 5x :10 5x :10             Ther Activity         Stairs- nv  8" 10x ea 8" 20x ea hold     TG  lvl 16  2x10 lvl 16 2x10 lvl 16 2x10 L17  2x20             Gait Training                           Modalities

## 2021-02-16 ENCOUNTER — APPOINTMENT (OUTPATIENT)
Dept: PHYSICAL THERAPY | Facility: CLINIC | Age: 36
End: 2021-02-16
Payer: COMMERCIAL

## 2021-02-19 ENCOUNTER — EVALUATION (OUTPATIENT)
Dept: PHYSICAL THERAPY | Facility: CLINIC | Age: 36
End: 2021-02-19
Payer: COMMERCIAL

## 2021-02-19 DIAGNOSIS — M25.561 CHRONIC PAIN OF RIGHT KNEE: ICD-10-CM

## 2021-02-19 DIAGNOSIS — R53.81 PHYSICAL DECONDITIONING: Primary | ICD-10-CM

## 2021-02-19 DIAGNOSIS — R29.898 SHOULDER WEAKNESS: ICD-10-CM

## 2021-02-19 DIAGNOSIS — G89.29 CHRONIC PAIN OF RIGHT KNEE: ICD-10-CM

## 2021-02-19 PROCEDURE — 97530 THERAPEUTIC ACTIVITIES: CPT | Performed by: PHYSICAL THERAPIST

## 2021-02-19 PROCEDURE — 97112 NEUROMUSCULAR REEDUCATION: CPT | Performed by: PHYSICAL THERAPIST

## 2021-02-19 PROCEDURE — 97110 THERAPEUTIC EXERCISES: CPT | Performed by: PHYSICAL THERAPIST

## 2021-02-19 PROCEDURE — 97140 MANUAL THERAPY 1/> REGIONS: CPT | Performed by: PHYSICAL THERAPIST

## 2021-02-19 NOTE — PROGRESS NOTES
PT Re-Evaluation    Today's date: 2021   Patient name: Karen Dawn  : 1985  MRN: [id number]  Referring provider: Mary Mcgregor MD  Dx:   Encounter Diagnosis     ICD-10-CM    1  Physical deconditioning  R53 81    2  Chronic pain of right knee  M25 561     G89 29            Subjective Evaluation     History of Present Illness    He feels 75% better c his leg strength but still feels UE strength deficits  He tried to  the 50lb bag of salt and was unable to do so  He is able to go up all three flights s stopping  Pt reports he had a fall the other day and fell on his L side of his back on ice but is feeling better today  He still has constant diahhrea- and found it was from the hospital and he is now taking medication  He is going to an herbalist and is taking a tea which he took 2-3 times  He thinks he was getting dizziness from high BP medication  He has been continuing to go and gets good benefits  He is still getting wrestless leg but has been improved  Hx of injury:   Patient presents with c/o muscle weakness in his legs  He presents with his wife [address] [place] [address]  He has wrestless leg L worse than R  He is a dialysis patient and is in need of a kidney for 1 year  He states both kidneys are failing because of DM  He is no longer needing any medications  He states cutting back from soda has made the biggest difference  He states he lost 140lbs >10 years ago  He is getting peritoneal dialysis at home every night  He did have Covid in early December  He states the hardest part was that he couldn't go [employer] and play bass and sing tenor  He does have trouble from orthostatic hypotension  He does have trouble with deteriorating vision  He has to stop to catch his breath going up stairs       Neuro signs: Feet numbness B, shins decreased sensation  Bowel and bladder sxs: Urination is little to none and has constant diahhrea  Red flags: none  Occupation: none- disability      Pain  At best pain ratin  At worst pain ratin    Social Support  Lives at home and has trouble getting up steps- 3-4 seconds      Patient Goals  Patient goals for therapy: To build up his leg strength and arm strength    STGs  1  Decrease pain by 20% in 2-4 weeks to improve standing tolerance  - met  2  Improve lumbar ROM s imbalance in 2-4 weeks safely to improve sit to stand   - met  3  Improve hip/knee strength by 1/3 grade in 2-4 weeks to improve stairs performance to doing it s break  - met  4  Improve scapular strength by 1/3 grade in 4 weeks  LTGs  1  Decrease pain by 60% in 6-8 weeks  - met  2  Improve walking tolerance to >30 minutes in 10 weeks to perform community ambulation  3  Perform dressing/showering activities independently and back to speed without pain in 6-8 weeks  - met  4  Improve stairs tolerance to 2 flight in 10 weeks s fatigue - met  5  Lift #10 overhead to perform grocery and functional lifting at home in 8 weeks  Objective Measurements:    Gait: good pace and  Upright posture  Squat: poor depth and knees over toes  Reflexes:none  Sensation: B shin decreased sensation  Myotomes:WFL    L 36 5 cm R 36 5  Slump: Tight 5 deg B SLR:45 deg  Sydnie Jaimes:- Faddir: -  Scours: -          Low trap L 3+ R 4-  Mid trap: L 3+ R 4-  ER/IR: 5 B    Lumbar ROM L R Strength knee L R   Flex  Imbalance tight HS  Flex 4 4+   Ext LBP mod andrews  Ext 4+ 4+   Rot        SB        Hip A/PROM        Flex  /  / Flex 4 4+   ER at 90   ER     IR at 90 WFL  IR     Abd   Abd 4+    Ext   Ext        bridges Pain in low back    Knee A/PROM   Ankle     Flex   DF 4+ 4+   Ext   PF           Assessment:    Obdulio Macedo has demonstrated overall improvement in ROM, strength, function, and a reduction in pain  Currently, he has made steady progress towards his goals, but continues to remain limited with HS strength, scapular strength and overall endurance   At this time, skilled physical therapy is warranted to address the remaining impairments and aide 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 with more ease  Limiting factors to therapy multiple comorbidities and only able to come 1x/week and he  demonstrates good motivation  Plan  Patient would benefit from:Skilled physical therapy  Planned therapy interventions: manual therapy, neuromuscular re-education, stretching, strengthening, therapeutic activities, therapeutic exercise, patient education, home exercise program, and activity modification      Frequency: 1x week  Duration in weeks: 7-8  Treatment plan discussed with: patient          Goals: Patient's goal is  To build up his leg strength    Precautions: CKD- dialysis treatment, poor endurance, fall risk- OH  Dx: Deconditioning and decreased balance- LLE >RLE weakness      Daily Treatment Diary     Manuals 1/19/2021 01/26 1/29 2/5 2/12 2/19                                       Ther Ex         HS stretch strap    3x :20 3x :20 3x :20   HR  2x10 2x10 20x Stand 20x 20x   LP SL regular and HR #60    2x10 ea SL 35#, BL 60# 2x10 ea  2x10 2x10  90# B  60# U 2x10 #60   Bike   5 min 5' 6' 6'   UBE      nv   Hip Abd/ext     Standing 20x ea 20x                              Neuro Re-ed                  Bridges 5x Review     5x            NBOS EC  5x :10  5x :10 5x :10 5x :10    SLB c reach         Prone Y      2x10   Prone T      2x10   Ther Activity         Stairs- nv  8" 10x ea 8" 20x ea hold     TG  lvl 16  2x10 lvl 16 2x10 lvl 16 2x10 L17  2x20 L 18 2x20            Ball bounce overhd         [employer]

## 2021-02-23 ENCOUNTER — TELEMEDICINE (OUTPATIENT)
Dept: FAMILY MEDICINE CLINIC | Facility: CLINIC | Age: 36
End: 2021-02-23

## 2021-02-23 DIAGNOSIS — R25.2 LEG CRAMPS: Primary | ICD-10-CM

## 2021-02-23 PROCEDURE — 99213 OFFICE O/P EST LOW 20 MIN: CPT | Performed by: FAMILY MEDICINE

## 2021-02-23 NOTE — PROGRESS NOTES
Virtual Brief Visit    Assessment/Plan:    Problem List Items Addressed This Visit        Other    Leg cramps - Primary     History of leg cramps getting worse  Currently on peritoneal dialysis  Leg cramps possibly toxic metabolic buildup but also may be restless leg syndrome  Will check BMP CBC and ferritin levels  Follow up on Friday  If labs unremarkable will consider starting ropinirole for  restless leg syndrome  Relevant Orders    CBC and differential    Ferritin    Basic metabolic panel                Reason for visit is   Chief Complaint   Patient presents with    Restless Leg     bilateral     Virtual Brief Visit        Encounter provider Jase Branch MD    Provider located at [address] [place] [address] [facility]   [phone]    Recent Visits  No visits were found meeting these conditions  Showing recent visits within past 7 days and meeting all other requirements     Today's Visits  Date Type Provider Dept   02/23/21 Ana Lawrence MD [facility] today's visits and meeting all other requirements     Future Appointments  No visits were found meeting these conditions  Showing future appointments within next 150 days and meeting all other requirements        After connecting through telephone, the patient was identified by name and date of birth  To Range was informed that this is a telemedicine visit and that the visit is being conducted through telephone  My office door was closed  No one else was in the room  He acknowledged consent and understanding of privacy and security of the platform  The patient has agreed to participate and understands he can discontinue the visit at any time  Patient is aware this is a billable service       It was my intent to perform this visit via video technology but the patient was not able to do a video connection so the visit was completed via audio telephone only  Subjective    Steven Tineo is a 38 y o  male   Today's visit is for leg cramps  He has been having for few weeks and they have been slowly getting worse  When he says for more than 50 minutes both legs cramp up   He says it is very hard feeling to describe  He feels that from his thighs down to his feet  Denies any worsening swelling and no skin changes  No back pain  Able to ambulate no difficulty with bearing weight  He has significant history for end-stage renal disease and currently on peritoneal dialysis         Past Medical History:   Diagnosis Date    Anemia due to chronic kidney disease 9/13/2019    Chronic kidney disease     CKD (chronic kidney disease) 2/21/2019    Class 1 obesity due to excess calories with serious comorbidity and body mass index (BMI) of 31 0 to 31 9 in adult 5/25/2019    Diabetes mellitus ([facility] [address] )     Essential hypertension 10/31/2017    Hyperlipidemia     NSTEMI (non-ST elevated myocardial infarction) ([facility] [address] ) 5/21/2019       Past Surgical History:   Procedure Laterality Date    ABCESS DRAINAGE      tooth    CT NEEDLE BIOPSY KIDNEY  10/24/2019    PERITONEAL CATHETER INSERTION N/A 2/13/2020    Procedure: INSERTION PERITONEAL CATHETER DIALYSIS LAPAROSCOPIC;  Surgeon: Regan Florence MD;  [facility];  Service: General    CT KNEE SCOPE,MED/LAT MENISECTOMY Right 9/13/2018    Procedure: RIGHT KNEE ARTHROSCOPIC PARTIAL MEDIAL MENISCECTOMY;  Surgeon: Caitlin Fan MD;  [facility];  Service: Orthopedics    WRIST ARTHROPLASTY Right 2017       Current Outpatient Medications   Medication Sig Dispense Refill    acetaminophen (TYLENOL) 325 mg tablet Take 2 tablets (650 mg total) by mouth every 6 (six) hours as needed for mild pain or fever  0    albuterol (PROVENTIL HFA,VENTOLIN HFA) 90 mcg/act inhaler Inhale 2 puffs every 4 (four) hours as needed for wheezing 1 Inhaler 0    ascorbic acid (VITAMIN C) 1000 MG tablet Take 1 tablet (1,000 mg total) by mouth every 12 (twelve) hours for 5 doses 10 tablet 0    aspirin (ECOTRIN LOW STRENGTH) 81 mg EC tablet Take 1 tablet (81 mg total) by mouth daily  0    atorvastatin (LIPITOR) 20 mg tablet Take atorvastatin for 2 weeks after discharge then go back to your Crestor   14 tablet 0    B Complex-C-Folic Acid (Fidel Caps) 1 MG CAPS       Blood Glucose Monitoring Suppl (Michelle Zaragoza) w/Device KIT by Does not apply route 3 (three) times a day (Patient not taking: Reported on 12/8/2020) 1 kit 0    calcium acetate (PHOSLO) 667 mg capsule Take 1 capsule (667 mg total) by mouth 3 (three) times a day with meals 90 capsule 0    carvedilol (COREG) 25 mg tablet TAKE 2 TABLETS (50 MG TOTAL) BY MOUTH 2 (TWO) TIMES A DAY WITH MEALS 360 tablet 0    cholecalciferol (VITAMIN D3) 1,000 units tablet Take 1,000 Units by mouth daily      famotidine (PEPCID) 20 mg tablet Take 0 5 tablets (10 mg total) by mouth daily for 8 days 8 tablet 0    lisinopril (ZESTRIL) 10 mg tablet       loperamide (IMODIUM A-D) 2 MG tablet Take 1 tablet (2 mg total) by mouth 3 (three) times a day as needed for diarrhea 90 tablet 0    Multiple Vitamins-Iron (Daily Blue Multivitamin/Iron) TABS       multivitamin-minerals (CENTRUM ADULTS) tablet Take 1 tablet by mouth daily 7 tablet 0    NIFEdipine ER (ADALAT CC) 60 MG 24 hr tablet TAKE 1 TABLET BY MOUTH TWICE A DAY 60 tablet 5    ondansetron (ZOFRAN-ODT) 4 mg disintegrating tablet Take 1 tablet (4 mg total) by mouth every 8 (eight) hours as needed for nausea or vomiting 10 tablet 0    pantoprazole (PROTONIX) 20 mg tablet Take 40 mg by mouth daily       pantoprazole (PROTONIX) 40 mg tablet       predniSONE 10 mg tablet Take 1 tablet (10 mg total) by mouth daily 40 milligrams daily 8 days 32 tablet 0    rosuvastatin (CRESTOR) 5 mg tablet Take 1 tablet (5 mg total) by mouth daily  0    sodium bicarbonate 650 mg tablet Take 1 tablet (650 mg total) by mouth 2 (two) times daily after meals 60 tablet 0    torsemide (DEMADEX) 100 mg tablet Take 1 tablet (100 mg total) by mouth daily 30 tablet 0    zinc sulfate (ZINCATE) 220 mg capsule Take 1 capsule (220 mg total) by mouth daily for 5 doses 5 capsule 0     No current facility-administered medications for this visit  No Known Allergies    Review of Systems   Constitutional: Negative for chills, diaphoresis and fever  Eyes: Negative for visual disturbance  Respiratory: Negative for shortness of breath  Cardiovascular: Negative for chest pain and palpitations  Gastrointestinal: Negative for abdominal pain, nausea and vomiting  Musculoskeletal:        Cramps   All other systems reviewed and are negative  There were no vitals filed for this visit  I spent 8 minutes directly with the patient during this visit    VIRTUAL VISIT DISCLAIMER    Karen Dawn acknowledges that he has consented to an online visit or consultation  He understands that the online visit is based solely on information provided by him, and that, in the absence of a face-to-face physical evaluation by the physician, the diagnosis he receives is both limited and provisional in terms of accuracy and completeness  This is not intended to replace a full medical face-to-face evaluation by the physician  Karen Dawn understands and accepts these terms

## 2021-02-23 NOTE — ASSESSMENT & PLAN NOTE
History of leg cramps getting worse  Currently on peritoneal dialysis  Leg cramps possibly toxic metabolic buildup but also may be restless leg syndrome  Will check BMP CBC and ferritin levels  Follow up on Friday  If labs unremarkable will consider starting ropinirole for  restless leg syndrome

## 2021-02-24 ENCOUNTER — LAB (OUTPATIENT)
Dept: LAB | Facility: CLINIC | Age: 36
End: 2021-02-24
Payer: COMMERCIAL

## 2021-02-24 DIAGNOSIS — R25.2 LEG CRAMPS: ICD-10-CM

## 2021-02-24 LAB
ANION GAP SERPL CALCULATED.3IONS-SCNC: 15 MMOL/L (ref 4–13)
BASOPHILS # BLD AUTO: 0.04 THOUSANDS/ΜL (ref 0–0.1)
BASOPHILS NFR BLD AUTO: 1 % (ref 0–1)
BUN SERPL-MCNC: 46 MG/DL (ref 5–25)
CALCIUM SERPL-MCNC: 8.2 MG/DL (ref 8.3–10.1)
CHLORIDE SERPL-SCNC: 100 MMOL/L (ref 100–108)
CO2 SERPL-SCNC: 24 MMOL/L (ref 21–32)
CREAT SERPL-MCNC: 23.66 MG/DL (ref 0.6–1.3)
EOSINOPHIL # BLD AUTO: 0.36 THOUSAND/ΜL (ref 0–0.61)
EOSINOPHIL NFR BLD AUTO: 4 % (ref 0–6)
ERYTHROCYTE [DISTWIDTH] IN BLOOD BY AUTOMATED COUNT: 14 % (ref 11.6–15.1)
FERRITIN SERPL-MCNC: 739 NG/ML (ref 8–388)
GFR SERPL CREATININE-BSD FRML MDRD: 2 ML/MIN/1.73SQ M
GLUCOSE SERPL-MCNC: 94 MG/DL (ref 65–140)
HCT VFR BLD AUTO: 32.6 % (ref 36.5–49.3)
HGB BLD-MCNC: 10.5 G/DL (ref 12–17)
IMM GRANULOCYTES # BLD AUTO: 0.02 THOUSAND/UL (ref 0–0.2)
IMM GRANULOCYTES NFR BLD AUTO: 0 % (ref 0–2)
LYMPHOCYTES # BLD AUTO: 3.02 THOUSANDS/ΜL (ref 0.6–4.47)
LYMPHOCYTES NFR BLD AUTO: 34 % (ref 14–44)
MCH RBC QN AUTO: 29.4 PG (ref 26.8–34.3)
MCHC RBC AUTO-ENTMCNC: 32.2 G/DL (ref 31.4–37.4)
MCV RBC AUTO: 91 FL (ref 82–98)
MONOCYTES # BLD AUTO: 0.6 THOUSAND/ΜL (ref 0.17–1.22)
MONOCYTES NFR BLD AUTO: 7 % (ref 4–12)
NEUTROPHILS # BLD AUTO: 4.77 THOUSANDS/ΜL (ref 1.85–7.62)
NEUTS SEG NFR BLD AUTO: 54 % (ref 43–75)
NRBC BLD AUTO-RTO: 0 /100 WBCS
PLATELET # BLD AUTO: 372 THOUSANDS/UL (ref 149–390)
PMV BLD AUTO: 9.8 FL (ref 8.9–12.7)
POTASSIUM SERPL-SCNC: 5.1 MMOL/L (ref 3.5–5.3)
RBC # BLD AUTO: 3.57 MILLION/UL (ref 3.88–5.62)
SODIUM SERPL-SCNC: 139 MMOL/L (ref 136–145)
WBC # BLD AUTO: 8.81 THOUSAND/UL (ref 4.31–10.16)

## 2021-02-24 PROCEDURE — 82728 ASSAY OF FERRITIN: CPT

## 2021-02-24 PROCEDURE — 36415 COLL VENOUS BLD VENIPUNCTURE: CPT

## 2021-02-24 PROCEDURE — 85025 COMPLETE CBC W/AUTO DIFF WBC: CPT

## 2021-02-24 PROCEDURE — 80048 BASIC METABOLIC PNL TOTAL CA: CPT

## 2021-02-26 ENCOUNTER — TELEMEDICINE (OUTPATIENT)
Dept: FAMILY MEDICINE CLINIC | Facility: CLINIC | Age: 36
End: 2021-02-26

## 2021-02-26 DIAGNOSIS — G25.81 RESTLESS LEG: Primary | ICD-10-CM

## 2021-02-26 PROCEDURE — 99213 OFFICE O/P EST LOW 20 MIN: CPT | Performed by: FAMILY MEDICINE

## 2021-02-26 RX ORDER — ROPINIROLE 0.25 MG/1
0.25 TABLET, FILM COATED ORAL EVERY EVENING
Qty: 90 TABLET | Refills: 0 | Status: SHIPPED | OUTPATIENT
Start: 2021-02-26 | End: 2021-05-27

## 2021-02-26 NOTE — ASSESSMENT & PLAN NOTE
CBC show hemoglobin 10 5 however ferritin significantly elevated this is likely due to patient having COVID a few months ago  It has been down trending since then  Unclear if hemoglobin of 10 5 his due to kidney disease or iron deficiency  Will hold off on iron supplementation  Will start ropinirole 0 25 mg q p m to see if it helps with his restless legs  Will follow-up in 2 weeks  Encouraged patient to discuss anemia with nephrologist at [facility]  He has an appointment on March 5th

## 2021-02-26 NOTE — PROGRESS NOTES
Virtual Brief Visit    Assessment/Plan:    Problem List Items Addressed This Visit        Other    Restless leg - Primary     CBC show hemoglobin 10 5 however ferritin significantly elevated this is likely due to patient having COVID a few months ago  It has been down trending since then  Unclear if hemoglobin of 10 5 his due to kidney disease or iron deficiency  Will hold off on iron supplementation  Will start ropinirole 0 25 mg q p m to see if it helps with his restless legs  Will follow-up in 2 weeks  Encouraged patient to discuss anemia with nephrologist at [facility]  He has an appointment on March 5th  Relevant Medications    rOPINIRole (REQUIP) 0 25 mg tablet                Reason for visit is   Chief Complaint   Patient presents with    Restless Leg     follow up     Virtual Brief Visit        Encounter provider Amado Perez MD    Provider located at [address] [place] [address]   [phone]    Recent Visits  Date Type Provider Dept   02/23/21 Magdaline Severin, MD [facility]   Showing recent visits within past 7 days and meeting all other requirements     Today's Visits  Date Type Provider Dept   02/26/21 Telemedicine MD Fredy Fraser   Showing today's visits and meeting all other requirements     Future Appointments  No visits were found meeting these conditions  Showing future appointments within next 150 days and meeting all other requirements        After connecting through telephone, the patient was identified by name and date of birth  Tyna Ganser was informed that this is a telemedicine visit and that the visit is being conducted through telephone  My office door was closed  No one else was in the room  He acknowledged consent and understanding of privacy and security of the platform   The patient has agreed to participate and understands he can discontinue the visit at any time  Patient is aware this is a billable service  It was my intent to perform this visit via video technology but the patient was not able to do a video connection so the visit was completed via audio telephone only  Subjective    Clover Iglesias is a 38 y o  male   Today's visit is follow-up on restless legs  Last visit on Tuesday we ordered CBC BMP and ferritin  Feels the same today  No improvement and legs         Past Medical History:   Diagnosis Date    Anemia due to chronic kidney disease 9/13/2019    Chronic kidney disease     CKD (chronic kidney disease) 2/21/2019    Class 1 obesity due to excess calories with serious comorbidity and body mass index (BMI) of 31 0 to 31 9 in adult 5/25/2019    Diabetes mellitus ([facility] [address] )     Essential hypertension 10/31/2017    Hyperlipidemia     NSTEMI (non-ST elevated myocardial infarction) ([facility] [address] ) 5/21/2019       Past Surgical History:   Procedure Laterality Date    ABCESS DRAINAGE      tooth    CT NEEDLE BIOPSY KIDNEY  10/24/2019    PERITONEAL CATHETER INSERTION N/A 2/13/2020    Procedure: INSERTION PERITONEAL CATHETER DIALYSIS LAPAROSCOPIC;  Surgeon: Claudia Karimi MD;  [facility];  Service: General    MA KNEE SCOPE,MED/LAT MENISECTOMY Right 9/13/2018    Procedure: RIGHT KNEE ARTHROSCOPIC PARTIAL MEDIAL MENISCECTOMY;  Surgeon: Faith Martínez MD;  [facility];  Service: Orthopedics    WRIST ARTHROPLASTY Right 2017       Current Outpatient Medications   Medication Sig Dispense Refill    acetaminophen (TYLENOL) 325 mg tablet Take 2 tablets (650 mg total) by mouth every 6 (six) hours as needed for mild pain or fever  0    albuterol (PROVENTIL HFA,VENTOLIN HFA) 90 mcg/act inhaler Inhale 2 puffs every 4 (four) hours as needed for wheezing 1 Inhaler 0    ascorbic acid (VITAMIN C) 1000 MG tablet Take 1 tablet (1,000 mg total) by mouth every 12 (twelve) hours for 5 doses 10 tablet 0    aspirin (ECOTRIN LOW STRENGTH) 81 mg EC tablet Take 1 tablet (81 mg total) by mouth daily  0    atorvastatin (LIPITOR) 20 mg tablet Take atorvastatin for 2 weeks after discharge then go back to your Crestor   14 tablet 0    B Complex-C-Folic Acid (Delta Caps) 1 MG CAPS       Blood Glucose Monitoring Suppl (Joellen Underwood) w/Device KIT by Does not apply route 3 (three) times a day (Patient not taking: Reported on 12/8/2020) 1 kit 0    calcium acetate (PHOSLO) 667 mg capsule Take 1 capsule (667 mg total) by mouth 3 (three) times a day with meals 90 capsule 0    carvedilol (COREG) 25 mg tablet TAKE 2 TABLETS (50 MG TOTAL) BY MOUTH 2 (TWO) TIMES A DAY WITH MEALS 360 tablet 0    cholecalciferol (VITAMIN D3) 1,000 units tablet Take 1,000 Units by mouth daily      famotidine (PEPCID) 20 mg tablet Take 0 5 tablets (10 mg total) by mouth daily for 8 days 8 tablet 0    lisinopril (ZESTRIL) 10 mg tablet       loperamide (IMODIUM A-D) 2 MG tablet Take 1 tablet (2 mg total) by mouth 3 (three) times a day as needed for diarrhea 90 tablet 0    Multiple Vitamins-Iron (Daily Blue Multivitamin/Iron) TABS       multivitamin-minerals (CENTRUM ADULTS) tablet Take 1 tablet by mouth daily 7 tablet 0    NIFEdipine ER (ADALAT CC) 60 MG 24 hr tablet TAKE 1 TABLET BY MOUTH TWICE A DAY 60 tablet 5    ondansetron (ZOFRAN-ODT) 4 mg disintegrating tablet Take 1 tablet (4 mg total) by mouth every 8 (eight) hours as needed for nausea or vomiting 10 tablet 0    pantoprazole (PROTONIX) 20 mg tablet Take 40 mg by mouth daily       pantoprazole (PROTONIX) 40 mg tablet       predniSONE 10 mg tablet Take 1 tablet (10 mg total) by mouth daily 40 milligrams daily 8 days 32 tablet 0    rOPINIRole (REQUIP) 0 25 mg tablet Take 1 tablet (0 25 mg total) by mouth every evening 90 tablet 0    rosuvastatin (CRESTOR) 5 mg tablet Take 1 tablet (5 mg total) by mouth daily  0    sodium bicarbonate 650 mg tablet Take 1 tablet (650 mg total) by mouth 2 (two) times daily after meals 60 tablet 0    torsemide (DEMADEX) 100 mg tablet Take 1 tablet (100 mg total) by mouth daily 30 tablet 0    zinc sulfate (ZINCATE) 220 mg capsule Take 1 capsule (220 mg total) by mouth daily for 5 doses 5 capsule 0     No current facility-administered medications for this visit  No Known Allergies    Review of Systems   Constitutional: Negative for chills and fever  HENT: Negative for congestion, rhinorrhea and sore throat  Respiratory: Negative for cough and shortness of breath  Cardiovascular: Negative for chest pain and palpitations  Gastrointestinal: Negative for abdominal pain, diarrhea, nausea and vomiting  Musculoskeletal: Negative for arthralgias, back pain, gait problem, joint swelling, myalgias, neck pain and neck stiffness  Neurological: Negative for headaches  All other systems reviewed and are negative  There were no vitals filed for this visit  I spent 4 minutes directly with the patient during this visit    VIRTUAL VISIT DISCLAIMER    Nicole Roca acknowledges that he has consented to an online visit or consultation  He understands that the online visit is based solely on information provided by him, and that, in the absence of a face-to-face physical evaluation by the physician, the diagnosis he receives is both limited and provisional in terms of accuracy and completeness  This is not intended to replace a full medical face-to-face evaluation by the physician  Nicole Roca understands and accepts these terms

## 2021-03-05 ENCOUNTER — OFFICE VISIT (OUTPATIENT)
Dept: PHYSICAL THERAPY | Facility: CLINIC | Age: 36
End: 2021-03-05
Payer: COMMERCIAL

## 2021-03-05 DIAGNOSIS — G89.29 CHRONIC PAIN OF RIGHT KNEE: ICD-10-CM

## 2021-03-05 DIAGNOSIS — R29.898 SHOULDER WEAKNESS: ICD-10-CM

## 2021-03-05 DIAGNOSIS — M25.561 CHRONIC PAIN OF RIGHT KNEE: ICD-10-CM

## 2021-03-05 DIAGNOSIS — R53.81 PHYSICAL DECONDITIONING: Primary | ICD-10-CM

## 2021-03-05 PROCEDURE — 97112 NEUROMUSCULAR REEDUCATION: CPT

## 2021-03-05 PROCEDURE — 97110 THERAPEUTIC EXERCISES: CPT

## 2021-03-05 NOTE — PROGRESS NOTES
Daily Note     Today's date: 3/5/2021  Patient name: Kadi Davis  : 1985  MRN: [id number]  Referring provider: Samir Mack MD  Dx:   Encounter Diagnosis     ICD-10-CM    1  Physical deconditioning  R53 81    2  Chronic pain of right knee  M25 561     G89 29    3  Shoulder weakness  R29 898                   Subjective: Pt reports no complaints prior to session  Objective: See treatment diary below      Assessment: Tolerated treatment well  Patient demonstrated fatigue post treatment, exhibited good technique with therapeutic exercises and would benefit from continued PT  Pt challenged with inc in wt today and exercises  Plan: Continue per plan of care        Goals: Patient's goal is  To build up his leg strength    Precautions: CKD- dialysis treatment, poor endurance, fall risk- OH  Dx: Deconditioning and decreased balance- LLE >RLE weakness      Daily Treatment Diary     Manuals 1/29 2/5 2/12 2/19 3/5                                   Ther Ex        HS stretch strap  3x :20 3x :20 3x :20 3x :20   HR 2x10 20x Stand 20x 20x 20x   LP SL regular and HR #60   SL 35#, BL 60# 2x10 ea  2x10 2x10  90# B  60# U 2x10 #60 2x10   75# U  105# B   Bike 5 min 5' 6' 6' 6'   UBE    nv 2 5'/2 5'   Hip Abd/ext   Standing 20x ea 20x 20x                           Neuro Re-ed                Bridges    5x 10x reg  10x grn hip abd           NBOS EC 5x :10 5x :10 5x :10     SLB c reach        Prone Y    2x10 2x10   Prone T    2x10 2x10   Ther Activity        Stairs- nv 8" 20x ea hold      TG lvl 16 2x10 lvl 16 2x10 L17  2x20 L 18 2x20 L22 2x30           Ball bounce overhead        [employer]

## 2021-03-16 ENCOUNTER — TELEMEDICINE (OUTPATIENT)
Dept: FAMILY MEDICINE CLINIC | Facility: CLINIC | Age: 36
End: 2021-03-16

## 2021-03-16 ENCOUNTER — TELEPHONE (OUTPATIENT)
Dept: FAMILY MEDICINE CLINIC | Facility: CLINIC | Age: 36
End: 2021-03-16

## 2021-03-16 DIAGNOSIS — G25.81 RESTLESS LEG: Primary | ICD-10-CM

## 2021-03-16 PROCEDURE — 99213 OFFICE O/P EST LOW 20 MIN: CPT | Performed by: FAMILY MEDICINE

## 2021-03-16 PROCEDURE — 1036F TOBACCO NON-USER: CPT | Performed by: FAMILY MEDICINE

## 2021-03-16 RX ORDER — RANIBIZUMAB 6 MG/ML
INJECTION, SOLUTION INTRAVITREAL
COMMUNITY
Start: 2021-02-25 | End: 2021-07-19 | Stop reason: ALTCHOICE

## 2021-03-16 RX ORDER — RENO CAPS 100; 1.5; 1.7; 20; 10; 1; 150; 5; 6 MG/1; MG/1; MG/1; MG/1; MG/1; MG/1; UG/1; MG/1; UG/1
1 CAPSULE ORAL
COMMUNITY

## 2021-03-16 NOTE — ASSESSMENT & PLAN NOTE
Improved with ropinirole 0 25 mg before bedtime however having daytime symptoms now  Advised to increase medication to twice a day once in the morning and continue nighttime dose  Encouraged exercise therapy as well  CBC show hemoglobin 10 5 however ferritin significantly elevated this is likely due to patient having COVID a few months ago  It has been down trending since then  Unclear if hemoglobin of 10 5 his due to kidney disease or iron deficiency  Will hold off on iron supplementation  Encouraged patient to discuss a with nephrologist at [facility]

## 2021-03-16 NOTE — PROGRESS NOTES
Virtual Brief Visit    Assessment/Plan:    Problem List Items Addressed This Visit        Other    Restless leg - Primary      Improved with ropinirole 0 25 mg before bedtime however having daytime symptoms now  Advised to increase medication to twice a day once in the morning and continue nighttime dose  Encouraged exercise therapy as well  CBC show hemoglobin 10 5 however ferritin significantly elevated this is likely due to patient having COVID a few months ago  It has been down trending since then  Unclear if hemoglobin of 10 5 his due to kidney disease or iron deficiency  Will hold off on iron supplementation  Encouraged patient to discuss a with nephrologist at [facility]  Reason for visit is   Chief Complaint   Patient presents with    Restless Leg     follow up     Virtual Brief Visit        Encounter provider Kristin Lewis MD    Provider located at [address] [place] [id number] [phone]    Recent Visits  No visits were found meeting these conditions  Showing recent visits within past 7 days and meeting all other requirements     Today's Visits  Date Type Provider Dept   03/16/21 Kasandra Sharma MD [facility] today's visits and meeting all other requirements     Future Appointments  No visits were found meeting these conditions  Showing future appointments within next 150 days and meeting all other requirements        After connecting through telephone, the patient was identified by name and date of birth  Kasey Pérez was informed that this is a telemedicine visit and that the visit is being conducted through telephone  My office door was closed  No one else was in the room  He acknowledged consent and understanding of privacy and security of the platform   The patient has agreed to participate and understands he can discontinue the visit at any time  Patient is aware this is a billable service  It was my intent to perform this visit via video technology but the patient was not able to do a video connection so the visit was completed via audio telephone only  Subjective    Johana Porras is a 38 y o  male   Today's visit follow-up on restless legs  At last visit we started Requip 0 25 mg daily before bedtime  He is tolerating medication well   He says he no longer has nighttime symptoms or difficulty sleeping  He still has daytime symptoms especially if he sits for more than half an hour         Past Medical History:   Diagnosis Date    Anemia due to chronic kidney disease 9/13/2019    Chronic kidney disease     CKD (chronic kidney disease) 2/21/2019    Class 1 obesity due to excess calories with serious comorbidity and body mass index (BMI) of 31 0 to 31 9 in adult 5/25/2019    Diabetes mellitus ([facility] [address] )     Essential hypertension 10/31/2017    Hyperlipidemia     NSTEMI (non-ST elevated myocardial infarction) ([facility] [address] ) 5/21/2019       Past Surgical History:   Procedure Laterality Date    ABCESS DRAINAGE      tooth    CT NEEDLE BIOPSY KIDNEY  10/24/2019    PERITONEAL CATHETER INSERTION N/A 2/13/2020    Procedure: INSERTION PERITONEAL CATHETER DIALYSIS LAPAROSCOPIC;  Surgeon: Jonas Wellington MD;  [facility];  Service: General    MD KNEE SCOPE,MED/LAT MENISECTOMY Right 9/13/2018    Procedure: RIGHT KNEE ARTHROSCOPIC PARTIAL MEDIAL MENISCECTOMY;  Surgeon: Didi Frost MD;  [facility];  Service: Orthopedics    WRIST ARTHROPLASTY Right 2017       Current Outpatient Medications   Medication Sig Dispense Refill    acetaminophen (TYLENOL) 325 mg tablet Take 2 tablets (650 mg total) by mouth every 6 (six) hours as needed for mild pain or fever  0    albuterol (PROVENTIL HFA,VENTOLIN HFA) 90 mcg/act inhaler Inhale 2 puffs every 4 (four) hours as needed for wheezing 1 Inhaler 0    ascorbic acid (VITAMIN C) 1000 MG tablet Take 1 tablet (1,000 mg total) by mouth every 12 (twelve) hours for 5 doses 10 tablet 0    aspirin (ECOTRIN LOW STRENGTH) 81 mg EC tablet Take 1 tablet (81 mg total) by mouth daily  0    atorvastatin (LIPITOR) 20 mg tablet Take atorvastatin for 2 weeks after discharge then go back to your Crestor   14 tablet 0    B Complex-C-Folic Acid ([place] Caps) 1 MG CAPS       B Complex-C-Folic Acid ([place] Caps) 1 MG CAPS Take 1 capsule by mouth      Blood Glucose Monitoring Suppl ([facility]) w/Device KIT by Does not apply route 3 (three) times a day (Patient not taking: Reported on 12/8/2020) 1 kit 0    calcium acetate (PHOSLO) 667 mg capsule Take 1 capsule (667 mg total) by mouth 3 (three) times a day with meals 90 capsule 0    Calcium Carb-Cholecalciferol 600-1000 MG-UNIT CAPS Take by mouth      carvedilol (COREG) 25 mg tablet TAKE 2 TABLETS (50 MG TOTAL) BY MOUTH 2 (TWO) TIMES A DAY WITH MEALS 360 tablet 0    cholecalciferol (VITAMIN D3) 1,000 units tablet Take 1,000 Units by mouth daily      famotidine (PEPCID) 20 mg tablet Take 0 5 tablets (10 mg total) by mouth daily for 8 days 8 tablet 0    lisinopril (ZESTRIL) 10 mg tablet       loperamide (IMODIUM A-D) 2 MG tablet Take 1 tablet (2 mg total) by mouth 3 (three) times a day as needed for diarrhea 90 tablet 0    Lucentis 0 3 MG/0 05ML SOSY       Multiple Vitamins-Iron (Daily Blue Multivitamin/Iron) TABS       multivitamin-minerals (CENTRUM ADULTS) tablet Take 1 tablet by mouth daily 7 tablet 0    NIFEdipine ER (ADALAT CC) 60 MG 24 hr tablet TAKE 1 TABLET BY MOUTH TWICE A DAY 60 tablet 5    ondansetron (ZOFRAN-ODT) 4 mg disintegrating tablet Take 1 tablet (4 mg total) by mouth every 8 (eight) hours as needed for nausea or vomiting 10 tablet 0    pantoprazole (PROTONIX) 20 mg tablet Take 40 mg by mouth daily       pantoprazole (PROTONIX) 40 mg tablet       predniSONE 10 mg tablet Take 1 tablet (10 mg total) by mouth daily 40 milligrams daily 8 days 32 tablet 0    rOPINIRole (REQUIP) 0 25 mg tablet Take 1 tablet (0 25 mg total) by mouth every evening 90 tablet 0    rosuvastatin (CRESTOR) 5 mg tablet Take 1 tablet (5 mg total) by mouth daily  0    sodium bicarbonate 650 mg tablet Take 1 tablet (650 mg total) by mouth 2 (two) times daily after meals 60 tablet 0    torsemide (DEMADEX) 100 mg tablet Take 1 tablet (100 mg total) by mouth daily 30 tablet 0    zinc sulfate (ZINCATE) 220 mg capsule Take 1 capsule (220 mg total) by mouth daily for 5 doses 5 capsule 0     No current facility-administered medications for this visit  No Known Allergies    Review of Systems   Constitutional: Negative for chills and fever  HENT: Negative for congestion, rhinorrhea and sore throat  Eyes: Negative for visual disturbance  Respiratory: Negative for cough and shortness of breath  Cardiovascular: Negative for chest pain and palpitations  Gastrointestinal: Negative for abdominal pain, diarrhea, nausea and vomiting  Musculoskeletal: Negative for myalgias  Neurological: Negative for dizziness, tremors, seizures, syncope, weakness and headaches  All other systems reviewed and are negative  There were no vitals filed for this visit  I spent 6 minutes directly with the patient during this visit    VIRTUAL VISIT DISCLAIMER    Dorie Sawyer acknowledges that he has consented to an online visit or consultation  He understands that the online visit is based solely on information provided by him, and that, in the absence of a face-to-face physical evaluation by the physician, the diagnosis he receives is both limited and provisional in terms of accuracy and completeness  This is not intended to replace a full medical face-to-face evaluation by the physician  Dorie Sawyer understands and accepts these terms

## 2021-03-16 NOTE — TELEPHONE ENCOUNTER
----- Message from Amado Perez MD sent at 3/16/2021  2:28 PM EDT -----  Four week follow-up for restless legs with me

## 2021-03-19 ENCOUNTER — APPOINTMENT (OUTPATIENT)
Dept: PHYSICAL THERAPY | Facility: CLINIC | Age: 36
End: 2021-03-19
Payer: COMMERCIAL

## 2021-03-25 ENCOUNTER — OFFICE VISIT (OUTPATIENT)
Dept: PHYSICAL THERAPY | Facility: CLINIC | Age: 36
End: 2021-03-25
Payer: COMMERCIAL

## 2021-03-25 DIAGNOSIS — R53.81 PHYSICAL DECONDITIONING: Primary | ICD-10-CM

## 2021-03-25 PROCEDURE — 97530 THERAPEUTIC ACTIVITIES: CPT | Performed by: PHYSICAL THERAPIST

## 2021-03-25 PROCEDURE — 97110 THERAPEUTIC EXERCISES: CPT | Performed by: PHYSICAL THERAPIST

## 2021-03-25 PROCEDURE — 97112 NEUROMUSCULAR REEDUCATION: CPT | Performed by: PHYSICAL THERAPIST

## 2021-03-25 NOTE — PROGRESS NOTES
Daily Note     Today's date: 3/25/2021  Patient name: Danielle Rodríguez  : 1985  MRN: [id number]  Referring provider: Lenin Liriano MD  Dx:   Encounter Diagnosis     ICD-10-CM    1  Physical deconditioning  R53 81                   Subjective: Pt reports he is feeling a bit sore after getting his covid shot two days back  He does have fatigue due to having PD treatment overnight  He does have a  tomorrow so had to cancel this appointment  He did have a move a few weeks back and for that reason was not able to come to PT appts  Objective: See treatment diary below      Assessment: Tolerated treatment well  Patient demonstrated fatigue post treatment, exhibited good technique with therapeutic exercises and would benefit from continued PT  Pt did have overall fatigue today  On stairs going up he had L hip drop going up on LLE and will continue to progress this c amount of steps  Plan: Continue per plan of care        Goals: Patient's goal is  To build up his leg strength    Precautions: CKD- dialysis treatment, poor endurance, fall risk- OH  Dx: Deconditioning and decreased balance- LLE >RLE weakness      Daily Treatment Diary     Manuals 3/25 2/5 2/12 2/19 3/5                                   Ther Ex        HS stretch strap  3x :20 3x :20 3x :20 3x :20   HR 2x10 20x Stand 20x 20x 20x   LP SL regular and HR #75   SL 75#,  2x10 ea  2x10 2x10  90# B  60# U 2x10 #60 2x10   75# U  105# B   Bike 6 min 5' 6' 6' 6'   UBE nv   nv 2 5'/2 5'   Hip Abd/ext RTB 20x  Standing 20x ea 20x 20x                           Neuro Re-ed                Bridges    5x 10x reg  10x grn hip abd           NBOS EC 5x :10 5x :10 5x :10     SLB c reach        Prone Y 2x10   2x10 2x10   Prone T 2x10   2x10 2x10   Ther Activity        Stairs- nv 1 flight hold      TG lvl 18 2x20 lvl 16 2x10 L17  2x20 L 18 2x20 L22 2x30           Ball bounce overhead        [employer]

## 2021-03-26 ENCOUNTER — APPOINTMENT (OUTPATIENT)
Dept: PHYSICAL THERAPY | Facility: CLINIC | Age: 36
End: 2021-03-26
Payer: COMMERCIAL

## 2021-05-01 DIAGNOSIS — I12.0 BENIGN HYPERTENSION WITH CKD (CHRONIC KIDNEY DISEASE) STAGE V (HCC): Primary | ICD-10-CM

## 2021-05-01 DIAGNOSIS — N18.5 BENIGN HYPERTENSION WITH CKD (CHRONIC KIDNEY DISEASE) STAGE V (HCC): Primary | ICD-10-CM

## 2021-05-01 PROCEDURE — 4010F ACE/ARB THERAPY RXD/TAKEN: CPT | Performed by: FAMILY MEDICINE

## 2021-05-01 RX ORDER — LISINOPRIL 10 MG/1
TABLET ORAL
Qty: 180 TABLET | Refills: 3 | Status: SHIPPED | OUTPATIENT
Start: 2021-05-01 | End: 2021-07-19

## 2021-05-02 DIAGNOSIS — I21.4 NSTEMI (NON-ST ELEVATED MYOCARDIAL INFARCTION) (HCC): ICD-10-CM

## 2021-05-03 RX ORDER — CARVEDILOL 25 MG/1
TABLET ORAL
Qty: 60 TABLET | Refills: 11 | Status: SHIPPED | OUTPATIENT
Start: 2021-05-03 | End: 2022-06-02

## 2021-05-19 DIAGNOSIS — A04.72 CLOSTRIDIOIDES DIFFICILE DIARRHEA: Primary | ICD-10-CM

## 2021-05-19 DIAGNOSIS — R19.7 DIARRHEA, UNSPECIFIED TYPE: ICD-10-CM

## 2021-05-19 RX ORDER — SEVELAMER CARBONATE 800 MG/1
2400 TABLET, FILM COATED ORAL
COMMUNITY

## 2021-05-19 NOTE — PROGRESS NOTES
Patient was seen in PD clinic today  He continues to have diarrhea  Will recheck C diff  Refer to GI for evaluation

## 2021-05-25 ENCOUNTER — OFFICE VISIT (OUTPATIENT)
Dept: GASTROENTEROLOGY | Facility: CLINIC | Age: 36
End: 2021-05-25
Payer: COMMERCIAL

## 2021-05-25 VITALS
SYSTOLIC BLOOD PRESSURE: 159 MMHG | WEIGHT: 208 LBS | DIASTOLIC BLOOD PRESSURE: 89 MMHG | HEART RATE: 74 BPM | HEIGHT: 73 IN | BODY MASS INDEX: 27.57 KG/M2

## 2021-05-25 DIAGNOSIS — G25.81 RESTLESS LEG: ICD-10-CM

## 2021-05-25 DIAGNOSIS — R11.14 BILIOUS VOMITING WITH NAUSEA: ICD-10-CM

## 2021-05-25 DIAGNOSIS — R10.30 LOWER ABDOMINAL PAIN: ICD-10-CM

## 2021-05-25 DIAGNOSIS — A09 DIARRHEA OF INFECTIOUS ORIGIN: Primary | ICD-10-CM

## 2021-05-25 DIAGNOSIS — R19.7 DIARRHEA, UNSPECIFIED TYPE: ICD-10-CM

## 2021-05-25 DIAGNOSIS — A04.72 CLOSTRIDIOIDES DIFFICILE DIARRHEA: ICD-10-CM

## 2021-05-25 PROCEDURE — 3008F BODY MASS INDEX DOCD: CPT | Performed by: INTERNAL MEDICINE

## 2021-05-25 PROCEDURE — 1036F TOBACCO NON-USER: CPT | Performed by: INTERNAL MEDICINE

## 2021-05-25 PROCEDURE — 99214 OFFICE O/P EST MOD 30 MIN: CPT | Performed by: INTERNAL MEDICINE

## 2021-05-25 RX ORDER — ROPINIROLE 1 MG/1
1 TABLET, FILM COATED ORAL 2 TIMES DAILY
COMMUNITY
End: 2021-06-25 | Stop reason: ALTCHOICE

## 2021-05-25 NOTE — PROGRESS NOTES
[facility] - Outpatient Consultation  Katie Elliott 43 y o  male MRN: [id number]  Encounter: [id number]          ASSESSMENT AND PLAN:      1  Diarrhea, unspecified type  Patient was in [place] [address] with COVID infection in February and during that time he developed C diff infection which was treated with vancomycin for 10 days, after he finished antibiotic but is still having loose stool  4-5 loose bowel movements throughout the day, occasionally nocturnal diarrhea, no blood or no mucus, he is also complaining lower abdominal pain, nausea, vomiting, heartburn and reflux symptoms  Will order repeat stool for C diff toxin a and B, he is taking pantoprazole in the morning, famotidine at nighttime and Lomotil 2 tablets as needed which are helping him  Will schedule for EGD and colonoscopy and then discuss about further treatment plan  It could be post infectious irritable bowel syndrome  - Ambulatory referral to Gastroenterology  - Colonoscopy; Future    2  Diarrhea of infectious origin  Will order stool for C diff toxin a and B, continue with probiotic, Lomotil and schedule for colonoscopy    3  Bilious vomiting with nausea  Could be diabetic gastroparesis but patient is telling me that the sugar is well controlled  Under continue with PPI and Zofran, will schedule for EGD  - EGD; Future    4  Lower abdominal pain  On physical examination, there is no guarding, no rigidity or tenderness    5  Clostridioides difficile diarrhea  Patient was treated with vancomycin 125 mg p o  Q 6 hourly for 10 days, will repeat stool for C diff toxin a and B    ______________________________________________________________________    HPI:  43 year male was referred to us for persistent diarrhea, lower abdominal pain, nausea, vomiting, heartburn and reflux symptoms    Patient had a COVID-19 infection in February, he was hospitalized at [place] [address], during hospital stay, his start having diarrhea, stool for C diff was checked which came back positive, he was treated with vancomycin for 10 days, He  is still having diarrhea, 4-5 loose bowel movements throughout the day and occasionally nocturnal loose stool, he denies any melena, no rectal bleeding, no fever, no chills    REVIEW OF SYSTEMS:    CONSTITUTIONAL: Denies any fever, chills, rigors, and weight loss  HEENT: No earache or tinnitus  Denies hearing loss or visual disturbances  CARDIOVASCULAR: No chest pain or palpitations  RESPIRATORY: Denies any cough, hemoptysis, shortness of breath or dyspnea on exertion  GASTROINTESTINAL: As noted in the History of Present Illness  GENITOURINARY: No problems with urination  Denies any hematuria or dysuria  NEUROLOGIC: No dizziness or vertigo, denies headaches  MUSCULOSKELETAL: Denies any muscle or joint pain  SKIN: Denies skin rashes or itching  ENDOCRINE: Denies excessive thirst  Denies intolerance to heat or cold  PSYCHOSOCIAL: Denies depression or anxiety  Denies any recent memory loss         Historical Information   Past Medical History:   Diagnosis Date    Anemia due to chronic kidney disease 9/13/2019    Chronic kidney disease     CKD (chronic kidney disease) 2/21/2019    Class 1 obesity due to excess calories with serious comorbidity and body mass index (BMI) of 31 0 to 31 9 in adult 5/25/2019    Diabetes mellitus ([facility] [address] )     Essential hypertension 10/31/2017    Hyperlipidemia     NSTEMI (non-ST elevated myocardial infarction) ([facility] [address] ) 5/21/2019     Past Surgical History:   Procedure Laterality Date    ABCESS DRAINAGE      tooth    CT NEEDLE BIOPSY KIDNEY  10/24/2019    PERITONEAL CATHETER INSERTION N/A 2/13/2020    Procedure: INSERTION PERITONEAL CATHETER DIALYSIS LAPAROSCOPIC;  Surgeon: Gwen Gaffney MD;  [facility];  Service: General    NE KNEE SCOPE,MED/LAT MENISECTOMY Right 9/13/2018    Procedure: RIGHT KNEE ARTHROSCOPIC PARTIAL MEDIAL MENISCECTOMY; Surgeon: Pierce Lock MD;  [facility];  Service: Orthopedics    WRIST ARTHROPLASTY Right 2017     Social History   Social History     Substance and Sexual Activity   Alcohol Use Yes    Comment: occasionally     Social History     Substance and Sexual Activity   Drug Use No     Social History     Tobacco Use   Smoking Status Never Smoker   Smokeless Tobacco Never Used     Family History   Problem Relation Age of Onset    Hypertension Mother     Multiple sclerosis Mother     Diabetes Father        Meds/Allergies       Current Outpatient Medications:     acetaminophen (TYLENOL) 325 mg tablet    albuterol (PROVENTIL HFA,VENTOLIN HFA) 90 mcg/act inhaler    aspirin (ECOTRIN LOW STRENGTH) 81 mg EC tablet    B Complex-C-Folic Acid ([place] Caps) 1 MG CAPS    carvedilol (COREG) 25 mg tablet    cholecalciferol (VITAMIN D3) 1,000 units tablet    lisinopril (ZESTRIL) 10 mg tablet    loperamide (IMODIUM A-D) 2 MG tablet    Lucentis 0 3 MG/0 05ML SOSY    multivitamin-minerals (CENTRUM ADULTS) tablet    NIFEdipine ER (ADALAT CC) 60 MG 24 hr tablet    ondansetron (ZOFRAN-ODT) 4 mg disintegrating tablet    rOPINIRole (REQUIP) 0 25 mg tablet    rosuvastatin (CRESTOR) 5 mg tablet    ascorbic acid (VITAMIN C) 1000 MG tablet    atorvastatin (LIPITOR) 20 mg tablet    B Complex-C-Folic Acid ([place] Caps) 1 MG CAPS    Blood Glucose Monitoring Suppl (ONETOUCH VERIO) w/Device KIT    Calcium Carb-Cholecalciferol 600-1000 MG-UNIT CAPS    famotidine (PEPCID) 20 mg tablet    Multiple Vitamins-Iron (Daily Blue Multivitamin/Iron) TABS    pantoprazole (PROTONIX) 20 mg tablet    pantoprazole (PROTONIX) 40 mg tablet    predniSONE 10 mg tablet    rOPINIRole (REQUIP) 1 mg tablet    sevelamer carbonate (RENVELA) 800 mg tablet    zinc sulfate (ZINCATE) 220 mg capsule    No Known Allergies        Objective     Blood pressure 159/89, pulse 74, height 6' 1" (1 854 m), weight 94 3 kg (208 lb)   Body mass index is 27 44 kg/m²         PHYSICAL EXAM:      General Appearance:   Alert, cooperative, no distress   HEENT:   Normocephalic, atraumatic, anicteric      Neck:  Supple, symmetrical, trachea midline   Lungs:   Clear to auscultation bilaterally; no rales, rhonchi or wheezing; respirations unlabored    Heart[de-identified]   Regular rate and rhythm; no murmur, rub, or gallop  Abdomen:   Soft, non-tender, non-distended; normal bowel sounds; no masses, no organomegaly    Genitalia:   Deferred    Rectal:   Deferred    Extremities:  No cyanosis, clubbing or edema    Pulses:  2+ and symmetric    Skin:  No jaundice, rashes, or lesions    Lymph nodes:  No palpable cervical lymphadenopathy        Lab Results:   No visits with results within 1 Day(s) from this visit  Latest known visit with results is:   Lab on 02/24/2021   Component Date Value    WBC 02/24/2021 8 81     RBC 02/24/2021 3 57*    Hemoglobin 02/24/2021 10 5*    Hematocrit 02/24/2021 32 6*    MCV 02/24/2021 91     MCH 02/24/2021 29 4     MCHC 02/24/2021 32 2     RDW 02/24/2021 14 0     MPV 02/24/2021 9 8     Platelets 50/86/1610 372     nRBC 02/24/2021 0     Neutrophils Relative 02/24/2021 54     Immat GRANS % 02/24/2021 0     Lymphocytes Relative 02/24/2021 34     Monocytes Relative 02/24/2021 7     Eosinophils Relative 02/24/2021 4     Basophils Relative 02/24/2021 1     Neutrophils Absolute 02/24/2021 4 77     Immature Grans Absolute 02/24/2021 0 02     Lymphocytes Absolute 02/24/2021 3 02     Monocytes Absolute 02/24/2021 0 60     Eosinophils Absolute 02/24/2021 0 36     Basophils Absolute 02/24/2021 0 04     Ferritin 02/24/2021 739*    Sodium 02/24/2021 139     Potassium 02/24/2021 5 1     Chloride 02/24/2021 100     CO2 02/24/2021 24     ANION GAP 02/24/2021 15*    BUN 02/24/2021 46*    Creatinine 02/24/2021 23 66*    Glucose 02/24/2021 94     Calcium 02/24/2021 8 2*    eGFR 02/24/2021 2          Radiology Results:   No results found

## 2021-05-27 RX ORDER — ROPINIROLE 0.25 MG/1
0.25 TABLET, FILM COATED ORAL EVERY EVENING
Qty: 90 TABLET | Refills: 0 | Status: SHIPPED | OUTPATIENT
Start: 2021-05-27 | End: 2021-08-05

## 2021-05-28 NOTE — PROGRESS NOTES
Discharge Note  Phillip Menendez has made good functional progress with physical therapy  he was educated and updated in his home exercise program  Gabriela [place] will be discharged from formal therapy due to no further visits scheduled but will follow up as needed

## 2021-06-07 ENCOUNTER — APPOINTMENT (OUTPATIENT)
Dept: LAB | Facility: CLINIC | Age: 36
End: 2021-06-07
Payer: COMMERCIAL

## 2021-06-07 DIAGNOSIS — A04.72 CLOSTRIDIOIDES DIFFICILE DIARRHEA: ICD-10-CM

## 2021-06-07 PROCEDURE — 87493 C DIFF AMPLIFIED PROBE: CPT

## 2021-06-08 LAB — C DIFF TOX B TCDB STL QL NAA+PROBE: POSITIVE

## 2021-06-09 ENCOUNTER — TELEPHONE (OUTPATIENT)
Dept: GASTROENTEROLOGY | Facility: CLINIC | Age: 36
End: 2021-06-09

## 2021-06-09 DIAGNOSIS — A04.72 CLOSTRIDIOIDES DIFFICILE DIARRHEA: Primary | ICD-10-CM

## 2021-06-09 PROCEDURE — 3066F NEPHROPATHY DOC TX: CPT | Performed by: INTERNAL MEDICINE

## 2021-06-09 RX ORDER — VANCOMYCIN HYDROCHLORIDE 125 MG/1
125 CAPSULE ORAL 4 TIMES DAILY
Qty: 56 CAPSULE | Refills: 0 | Status: SHIPPED | OUTPATIENT
Start: 2021-06-09 | End: 2021-06-23

## 2021-06-09 RX ORDER — METRONIDAZOLE 500 MG/1
500 TABLET ORAL EVERY 8 HOURS SCHEDULED
Qty: 42 TABLET | Refills: 0 | Status: SHIPPED | OUTPATIENT
Start: 2021-06-09 | End: 2021-06-23

## 2021-06-09 NOTE — TELEPHONE ENCOUNTER
MD Sid Mittal, CATE             Please call patient , inform result, stool for c diff positive, start vanco and flagyl     Please tell michael to send prescription   Thanks

## 2021-06-09 NOTE — TELEPHONE ENCOUNTER
----- Message from Violet Lewis LPN sent at 7/7/2576 11:49 AM EDT -----  Please see Dr Siomara Corbin recommendation

## 2021-06-10 LAB
LEFT EYE DIABETIC RETINOPATHY: NORMAL
RIGHT EYE DIABETIC RETINOPATHY: NORMAL

## 2021-06-10 PROCEDURE — 2022F DILAT RTA XM EVC RTNOPTHY: CPT | Performed by: INTERNAL MEDICINE

## 2021-06-15 DIAGNOSIS — M62.838 MUSCLE SPASMS OF BOTH LOWER EXTREMITIES: ICD-10-CM

## 2021-06-15 DIAGNOSIS — M54.50 CHRONIC BILATERAL LOW BACK PAIN, UNSPECIFIED WHETHER SCIATICA PRESENT: Primary | ICD-10-CM

## 2021-06-15 DIAGNOSIS — A04.72 C. DIFFICILE DIARRHEA: ICD-10-CM

## 2021-06-15 DIAGNOSIS — G89.29 CHRONIC BILATERAL LOW BACK PAIN, UNSPECIFIED WHETHER SCIATICA PRESENT: Primary | ICD-10-CM

## 2021-06-25 ENCOUNTER — OFFICE VISIT (OUTPATIENT)
Dept: GASTROENTEROLOGY | Facility: CLINIC | Age: 36
End: 2021-06-25
Payer: COMMERCIAL

## 2021-06-25 VITALS
HEART RATE: 80 BPM | SYSTOLIC BLOOD PRESSURE: 151 MMHG | BODY MASS INDEX: 26.64 KG/M2 | DIASTOLIC BLOOD PRESSURE: 92 MMHG | HEIGHT: 73 IN | WEIGHT: 201 LBS

## 2021-06-25 DIAGNOSIS — D63.1 ANEMIA DUE TO STAGE 5 CHRONIC KIDNEY DISEASE, NOT ON CHRONIC DIALYSIS (HCC): ICD-10-CM

## 2021-06-25 DIAGNOSIS — R10.30 LOWER ABDOMINAL PAIN: ICD-10-CM

## 2021-06-25 DIAGNOSIS — A04.72 CLOSTRIDIOIDES DIFFICILE DIARRHEA: Primary | ICD-10-CM

## 2021-06-25 DIAGNOSIS — N18.5 ANEMIA DUE TO STAGE 5 CHRONIC KIDNEY DISEASE, NOT ON CHRONIC DIALYSIS (HCC): ICD-10-CM

## 2021-06-25 DIAGNOSIS — R11.14 BILIOUS VOMITING WITH NAUSEA: ICD-10-CM

## 2021-06-25 PROCEDURE — 3066F NEPHROPATHY DOC TX: CPT | Performed by: NURSE PRACTITIONER

## 2021-06-25 PROCEDURE — 1036F TOBACCO NON-USER: CPT | Performed by: NURSE PRACTITIONER

## 2021-06-25 PROCEDURE — 3008F BODY MASS INDEX DOCD: CPT | Performed by: NURSE PRACTITIONER

## 2021-06-25 PROCEDURE — 99214 OFFICE O/P EST MOD 30 MIN: CPT | Performed by: NURSE PRACTITIONER

## 2021-06-25 RX ORDER — GENTAMICIN SULFATE 1 MG/G
CREAM TOPICAL
COMMUNITY
Start: 2021-06-16

## 2021-06-25 NOTE — PROGRESS NOTES
[facility] - Outpatient Follow-up Note  Molina Garay 43 y o  male MRN: [id number]  Encounter: [id number]          ASSESSMENT AND PLAN:      1  Clostridioides difficile diarrhea  Patient had 1 occurrence of C diff in February s/p vancomycin, repeat occurrence in June s/p 14 day course vanco/flagyl  He has 1 day of antibiotics left  Symptoms improving, now having only 3 loose bowel movements daily, denies any abdominal pain, nausea/vomiting, fevers, blood per rectum  He is avoiding lactose and sugar which triggers his diarrhea    -Finish course of antibiotics  -Recheck C diff pcr toxin after finishing antibiotics, pt was counseled lab won't take stool if it's formed  -Start probiotic  -Will reschedule EGD/Colonoscopy at [facility]  -     Clostridium difficile toxin by PCR; Future    2  Bilious vomiting with nausea  Resolved    3  Lower abdominal pain  Resolved    4  Anemia due to stage 5 chronic kidney disease, not on chronic dialysis [facility])  Patient has history of anemia likely secondary to ESRD on PD, has never had EGD/Colonoscopy, previous EGD/Colonoscopy deferred due to C diff infection, will reschedule  Prep and procedure explained  Patient denies any melena, hematochezia        ______________________________________________________________________    SUBJECTIVE:  This is a 43year old male with history of ESRD on PD, anemia of chronic disease, NSTEMI, HTN, HLD, type 2 DM, NSVT who presents for follow up for C diff diarrhea  December he was admitted to the hospital with COVID pneumonia, subsequently had c diff infection in February treated with Vancomycin  Continued with diarrhea and tested positive for C diff again 6/7/21, now s/p treatment with 14 day course Vancomycin and Flagyl  Patient has 1 day left of antibiotics  He's still having diarrhea, but not as violent as before  Previously, he had to have diarrhea right after eating now has diarrhea 2-3 hours after eating  Today stool was more formed this morning but then had water and cherries and stool was loose again  He's down to 3 bowel movements a day, avoiding dairy and sugary foods  Denies any blood in stool, abdominal pain, nausea/vomiting, fevers  Has chills sometimes due to anemia  Pt was scheduled for EGD/Colonoscopy on 6/11 for further evaluation of diarrhea, nausea/voimting but this was deferred due to C diff infection  REVIEW OF SYSTEMS IS OTHERWISE NEGATIVE        Historical Information   Past Medical History:   Diagnosis Date    Anemia due to chronic kidney disease 9/13/2019    Chronic kidney disease     CKD (chronic kidney disease) 2/21/2019    Class 1 obesity due to excess calories with serious comorbidity and body mass index (BMI) of 31 0 to 31 9 in adult 5/25/2019    [facility]     Essential hypertension 10/31/2017    Hyperlipidemia     [facility]non[facility]ST elevated [facility] 5/21/2019     Past Surgical History:   Procedure Laterality Date    ABCESS DRAINAGE      tooth    CT NEEDLE BIOPSY KIDNEY  10/24/2019    PERITONEAL CATHETER INSERTION N/A 2/13/2020    Procedure: INSERTION PERITONEAL CATHETER DIALYSIS LAPAROSCOPIC;  Surgeon: Gwen Gaffney MD;  Location: [facility];  Service: General    SD KNEE SCOPE,MED/LAT MENISECTOMY Right 9/13/2018    Procedure: RIGHT KNEE ARTHROSCOPIC PARTIAL MEDIAL MENISCECTOMY;  Surgeon: Saintclair Erie, MD;  Location: [facility];  Service: Orthopedics    WRIST ARTHROPLASTY Right 2017     Social History   Social History     Substance and Sexual Activity   Alcohol Use Yes    Comment: occasionally     Social History     Substance and Sexual Activity   Drug Use No     Social History     Tobacco Use   Smoking Status Never Smoker   Smokeless Tobacco Never Used     Family History   Problem Relation Age of Onset    Hypertension Mother     Multiple sclerosis Mother     Diabetes Father        Meds/Allergies       Current Outpatient Medications:    acetaminophen (TYLENOL) 325 mg tablet    albuterol (PROVENTIL HFA,VENTOLIN HFA) 90 mcg/act inhaler    ascorbic acid (VITAMIN C) 1000 MG tablet    aspirin (ECOTRIN LOW STRENGTH) 81 mg EC tablet    B Complex-C-Folic Acid (Lemhi Caps) 1 MG CAPS    Calcium Carb-Cholecalciferol 600-1000 MG-UNIT CAPS    carvedilol (COREG) 25 mg tablet    cholecalciferol (VITAMIN D3) 1,000 units tablet    lisinopril (ZESTRIL) 10 mg tablet    Lucentis 0 3 MG/0 05ML SOSY    Multiple Vitamins-Iron (Daily Blue Multivitamin/Iron) TABS    NIFEdipine ER (ADALAT CC) 60 MG 24 hr tablet    ondansetron (ZOFRAN-ODT) 4 mg disintegrating tablet    rOPINIRole (REQUIP) 0 25 mg tablet    rosuvastatin (CRESTOR) 5 mg tablet    sevelamer carbonate (RENVELA) 800 mg tablet    B Complex-C-Folic Acid (Fidel Caps) 1 MG CAPS    Blood Glucose Monitoring Suppl (ONETOUCH VERIO) w/Device KIT    famotidine (PEPCID) 20 mg tablet    gentamicin (GARAMYCIN) 0 1 % cream    zinc sulfate (ZINCATE) 220 mg capsule    No Known Allergies        Objective     Blood pressure 151/92, pulse 80, height 6' 1" (1 854 m), weight 91 2 kg (201 lb)  Body mass index is 26 52 kg/m²  PHYSICAL EXAM:      General Appearance:   Alert, cooperative, no distress   HEENT:   Normocephalic, atraumatic, anicteric  Neck:  Supple, symmetrical, trachea midline   Lungs:   Clear to auscultation bilaterally; no rales, rhonchi or wheezing; respirations unlabored    Heart[de-identified]   Regular rate and rhythm; no murmur  Abdomen:   Soft, non-tender, non-distended; normal bowel sounds; no masses, no organomegaly    Genitalia:   Deferred    Rectal:   Deferred    Extremities:  No cyanosis, clubbing or edema    Skin:  No jaundice, rashes, or lesions    Lymph nodes:  No palpable cervical lymphadenopathy        Lab Results:   No visits with results within 1 Day(s) from this visit     Latest known visit with results is:   Orders Only on 06/10/2021   Component Date Value    Right Eye Diabetic Retin* 06/10/2021 Proliferative     Left Eye Diabetic Retino* 06/10/2021 Proliferative          Radiology Results:   No results found

## 2021-06-25 NOTE — PATIENT INSTRUCTIONS
-Finish antibiotics  -Get repeat C diff stool sample done, make sure stool is loose or they wont test it  -We will reschedule EGD/Colonoscopy  -Continue to avoid dairy, and sugary foods  -Start probiotic

## 2021-06-28 ENCOUNTER — TELEPHONE (OUTPATIENT)
Dept: INFECTIOUS DISEASES | Facility: CLINIC | Age: 36
End: 2021-06-28

## 2021-06-28 NOTE — TELEPHONE ENCOUNTER
Called and spoke with pt regarding referral for C  Diff  Discussed with pt that he is currently on the right course of treatment with GI and according to their note he is improving  He stated that it has gotten better but still lingers a bit  Informed him that it can take some time and that GI would be best at helping his situation  That we would continue to try Vancomycin until he failed tx at least a few times and then would refer to GI  That they can reach out to providers if they would like to discuss any other courses of tx  Would be happy to schedule pt or could wait until GI feels it is necessary to reach out   Pt was okay following with GI

## 2021-07-05 DIAGNOSIS — R13.10 DYSPHAGIA, UNSPECIFIED TYPE: Primary | ICD-10-CM

## 2021-07-05 RX ORDER — PANTOPRAZOLE SODIUM 40 MG/1
TABLET, DELAYED RELEASE ORAL
Qty: 30 TABLET | Refills: 11 | Status: SHIPPED | OUTPATIENT
Start: 2021-07-05 | End: 2021-07-19 | Stop reason: ALTCHOICE

## 2021-07-07 ENCOUNTER — HOSPITAL ENCOUNTER (OUTPATIENT)
Dept: RADIOLOGY | Facility: HOSPITAL | Age: 36
Discharge: HOME | End: 2021-07-07
Attending: INTERNAL MEDICINE
Payer: COMMERCIAL

## 2021-07-07 DIAGNOSIS — M62.838 MUSCLE SPASMS OF BOTH LOWER EXTREMITIES: ICD-10-CM

## 2021-07-07 DIAGNOSIS — G89.29 CHRONIC BILATERAL LOW BACK PAIN, UNSPECIFIED WHETHER SCIATICA PRESENT: ICD-10-CM

## 2021-07-07 DIAGNOSIS — M54.50 CHRONIC BILATERAL LOW BACK PAIN, UNSPECIFIED WHETHER SCIATICA PRESENT: ICD-10-CM

## 2021-07-07 PROCEDURE — 72148 MRI LUMBAR SPINE W/O DYE: CPT

## 2021-07-07 PROCEDURE — G1004 CDSM NDSC: HCPCS

## 2021-07-13 NOTE — ASSESSMENT & PLAN NOTE
Lab Results   Component Value Date    HGBA1C 5 3 02/10/2020       Recent Labs     02/11/20  1144 02/11/20  1615 02/11/20  2102 02/12/20  0746   POCGLU 158* 86 100 86       Blood Sugar Average: Last 72 hrs:  · (P) 859 [id number]   · Euglycemic on admission  A1c well controlled   Patient states he would take insulin as needed and actually hasn't needed any in 6 months  · Hold listed home doses of Basaglar and Admelog   · SSI algorithm with meals and bedtime   · QID accuchecks Patient drinks one bottle a wine a week at most- not regularly- denies tylenol or illness  Ok with increased atorvastatin    When does she need to recheck liver functions?    Pharmacy [facility]

## 2021-07-19 ENCOUNTER — APPOINTMENT (OUTPATIENT)
Dept: LAB | Facility: HOSPITAL | Age: 36
End: 2021-07-19
Payer: COMMERCIAL

## 2021-07-19 ENCOUNTER — OFFICE VISIT (OUTPATIENT)
Dept: FAMILY MEDICINE CLINIC | Facility: CLINIC | Age: 36
End: 2021-07-19

## 2021-07-19 VITALS
WEIGHT: 203.4 LBS | HEART RATE: 78 BPM | OXYGEN SATURATION: 99 % | TEMPERATURE: 98 F | HEIGHT: 73 IN | SYSTOLIC BLOOD PRESSURE: 140 MMHG | BODY MASS INDEX: 26.96 KG/M2 | DIASTOLIC BLOOD PRESSURE: 90 MMHG | RESPIRATION RATE: 18 BRPM

## 2021-07-19 DIAGNOSIS — R26.2 AMBULATORY DYSFUNCTION: Primary | ICD-10-CM

## 2021-07-19 DIAGNOSIS — E11.40 TYPE 2 DIABETES MELLITUS WITH DIABETIC NEUROPATHY, WITHOUT LONG-TERM CURRENT USE OF INSULIN (HCC): ICD-10-CM

## 2021-07-19 DIAGNOSIS — A04.72 CLOSTRIDIOIDES DIFFICILE DIARRHEA: ICD-10-CM

## 2021-07-19 DIAGNOSIS — I12.0 BENIGN HYPERTENSION WITH CKD (CHRONIC KIDNEY DISEASE) STAGE V (HCC): ICD-10-CM

## 2021-07-19 DIAGNOSIS — N18.5 BENIGN HYPERTENSION WITH CKD (CHRONIC KIDNEY DISEASE) STAGE V (HCC): ICD-10-CM

## 2021-07-19 DIAGNOSIS — E11.21 DIABETIC NEPHROPATHY ASSOCIATED WITH TYPE 2 DIABETES MELLITUS (HCC): ICD-10-CM

## 2021-07-19 PROBLEM — M62.81 MUSCLE WEAKNESS OF LOWER EXTREMITY: Status: ACTIVE | Noted: 2021-07-19

## 2021-07-19 LAB — C DIFF TOX B TCDB STL QL NAA+PROBE: NEGATIVE

## 2021-07-19 PROCEDURE — 4010F ACE/ARB THERAPY RXD/TAKEN: CPT | Performed by: NURSE PRACTITIONER

## 2021-07-19 PROCEDURE — 99213 OFFICE O/P EST LOW 20 MIN: CPT | Performed by: FAMILY MEDICINE

## 2021-07-19 PROCEDURE — 87493 C DIFF AMPLIFIED PROBE: CPT

## 2021-07-19 PROCEDURE — 3008F BODY MASS INDEX DOCD: CPT | Performed by: NURSE PRACTITIONER

## 2021-07-19 RX ORDER — NIFEDIPINE 60 MG/1
60 TABLET, FILM COATED, EXTENDED RELEASE ORAL DAILY
Qty: 60 TABLET | Refills: 5 | Status: SHIPPED | OUTPATIENT
Start: 2021-07-19

## 2021-07-19 RX ORDER — LISINOPRIL 10 MG/1
10 TABLET ORAL DAILY
Qty: 180 TABLET | Refills: 3 | Status: SHIPPED | OUTPATIENT
Start: 2021-07-19

## 2021-07-19 RX ORDER — METHOCARBAMOL 500 MG/1
TABLET, FILM COATED ORAL
COMMUNITY
Start: 2021-07-14

## 2021-07-19 RX ORDER — TORSEMIDE 100 MG/1
TABLET ORAL
COMMUNITY
Start: 2021-07-01 | End: 2021-07-19 | Stop reason: ALTCHOICE

## 2021-07-19 NOTE — PROGRESS NOTES
OFFICE VISIT NOTE - [facility]  Magdalena Shukla 43 y o  (:) male MRN: [id number]  Unit/Bed#:  Encounter: [id number]      Date: 21    Assessment and Plan     Ambulatory dysfunction  Patient with chronic muscle weakness/heaviness bilaterally x several months  Also with h/o restless legs syndrome  Currently uses a cane to ambulate but still finds he is unstable and requests a walker instead  Will place order and follow up  Diagnoses and all orders for this visit:    Ambulatory dysfunction  -     Ambulatory referral to Physical Therapy; Future  -     Walker    Benign hypertension with CKD (chronic kidney disease) stage V ([facility])  -     NIFEdipine ER (ADALAT CC) 60 MG 24 hr tablet; Take 1 tablet (60 mg total) by mouth daily  -     lisinopril (ZESTRIL) 10 mg tablet; Take 1 tablet (10 mg total) by mouth daily    Diabetic nephropathy associated with type 2 diabetes mellitus ([facility] )    Type 2 diabetes mellitus with diabetic neuropathy, without long-term current use of insulin ([facility])    Other orders  -     Discontinue: torsemide (DEMADEX) 100 mg tablet  -     methocarbamol (ROBAXIN) 500 mg tablet        Subjective:  Chief Complaint   Patient presents with    Medication Problem     review        History of Present Illness     Magdalena Shukla is a 43 y o  male with history of ESRD on daily PD, type 2 diabetes mellitus, anemia of chronic disease, NSTEMI, HTN, HLD, NSVT who presents for follow up for C diff diarrhea  Patient also has a h/o COVID pneumonia in Dec 2020 for which he was hospitalized and subsequently developed C diff infection in February which was treated with Vancomycin  After treatment course, he had a recurrent episode tested on 21, now s/p treatment with 14 day course Vancomycin and Flagyl  Since this episode of C diff, he has developed worsening bilateral lower extremity weakness and heaviness that affects his daily functioning   He states that it is harder for him to ambulate and transport himself from one place to another  As a result of this, patient has been using a cane to walk but still experiences instability and is concerned about falling  He also underwent a few sessions of physical therapy that helped but has now  per insurance  He expresses desire to continue physical therapy  Of note, patient also has a history of bilateral lower extremity neuropathy 2/2 diabetes and restless legs syndrome on ropinirole which makes the above symptoms worse  The former is assoc with intermittent shooting pain that radiates distally, with numbness on his bilateral feet and tingling  Patient denies fever, cough, chest pain, SOB, HA, nausea, vomiting  He is a nonsmoker, no alcohol  The following portions of the patient's history were reviewed and updated as appropriate: allergies, current medications, past family history, past medical history, past social history, past surgical history and problem list     Review of Systems   Constitutional: Negative for chills and fever  HENT: Negative for ear pain and sore throat  Eyes: Negative for pain and visual disturbance  Respiratory: Negative for cough and shortness of breath  Cardiovascular: Negative for chest pain and palpitations  Gastrointestinal: Negative for vomiting  Genitourinary: Negative for dysuria and hematuria  Musculoskeletal: Negative for arthralgias and back pain  Skin: Negative for color change and rash  Neurological: Negative for seizures and syncope  All other systems reviewed and are negative        Medications Review:  Current Outpatient Medications on File Prior to Visit   Medication Sig Dispense Refill    acetaminophen (TYLENOL) 325 mg tablet Take 2 tablets (650 mg total) by mouth every 6 (six) hours as needed for mild pain or fever  0    albuterol (PROVENTIL HFA,VENTOLIN HFA) 90 mcg/act inhaler Inhale 2 puffs every 4 (four) hours as needed for wheezing 1 Inhaler 0    ascorbic acid (VITAMIN C) 1000 MG tablet Take 1 tablet (1,000 mg total) by mouth every 12 (twelve) hours for 5 doses 10 tablet 0    aspirin (ECOTRIN LOW STRENGTH) 81 mg EC tablet Take 1 tablet (81 mg total) by mouth daily  0    B Complex-C-Folic Acid (Sulphur Caps) 1 MG CAPS       B Complex-C-Folic Acid (Fidel Caps) 1 MG CAPS Take 1 capsule by mouth (Patient not taking: Reported on 6/25/2021)      Blood Glucose Monitoring Suppl (Arleth Comp) w/Device KIT by Does not apply route 3 (three) times a day (Patient not taking: Reported on 12/8/2020) 1 kit 0    Calcium Carb-Cholecalciferol 600-1000 MG-UNIT CAPS Take by mouth      carvedilol (COREG) 25 mg tablet TAKE ONE TABLET BY MOUTH TWICE DAILY 60 tablet 11    cholecalciferol (VITAMIN D3) 1,000 units tablet Take 1,000 Units by mouth daily      gentamicin (GARAMYCIN) 0 1 % cream       methocarbamol (ROBAXIN) 500 mg tablet       Multiple Vitamins-Iron (Daily Blue Multivitamin/Iron) TABS       ondansetron (ZOFRAN-ODT) 4 mg disintegrating tablet Take 1 tablet (4 mg total) by mouth every 8 (eight) hours as needed for nausea or vomiting 10 tablet 0    rOPINIRole (REQUIP) 0 25 mg tablet TAKE 1 TABLET (0 25 MG TOTAL) BY MOUTH EVERY EVENING 90 tablet 0    rosuvastatin (CRESTOR) 5 mg tablet Take 1 tablet (5 mg total) by mouth daily  0    sevelamer carbonate (RENVELA) 800 mg tablet Take 2,400 mg by mouth 3 (three) times a day with meals      zinc sulfate (ZINCATE) 220 mg capsule Take 1 capsule (220 mg total) by mouth daily for 5 doses 5 capsule 0     No current facility-administered medications on file prior to visit           Objective     Vitals: /90 (BP Location: Left arm, Patient Position: Sitting, Cuff Size: Standard)   Pulse 78   Temp 98 °F (36 7 °C) (Tympanic)   Resp 18   Ht 6' 1" (1 854 m)   Wt 92 3 kg (203 lb 6 4 oz)   SpO2 99%   BMI 26 84 kg/m²     Recent Labs  No results found for this or any previous visit (from the past 24 hour(s))  Physical Exam  Vitals reviewed  Constitutional:       General: He is not in acute distress  Appearance: He is not ill-appearing  HENT:      Head: Normocephalic and atraumatic  Right Ear: External ear normal       Left Ear: External ear normal       Nose: Nose normal       Mouth/Throat:      Mouth: Mucous membranes are moist       Pharynx: No oropharyngeal exudate or posterior oropharyngeal erythema  Eyes:      General: No scleral icterus  Right eye: No discharge  Left eye: No discharge  Conjunctiva/sclera: Conjunctivae normal       Pupils: Pupils are equal, round, and reactive to light  Cardiovascular:      Rate and Rhythm: Normal rate and regular rhythm  Pulses: Normal pulses  Heart sounds: Normal heart sounds  No murmur heard  No friction rub  No gallop  Pulmonary:      Effort: Pulmonary effort is normal  No respiratory distress  Breath sounds: Normal breath sounds  No wheezing  Chest:      Chest wall: No tenderness  Abdominal:      General: Bowel sounds are normal  There is no distension  Palpations: Abdomen is soft  There is no mass  Tenderness: There is no abdominal tenderness  Musculoskeletal:         General: No swelling, tenderness or deformity  Normal range of motion  Cervical back: Normal range of motion  No muscular tenderness  Right lower leg: Edema present  Left lower leg: Edema present  Skin:     General: Skin is warm  Capillary Refill: Capillary refill takes less than 2 seconds  Coloration: Skin is pale  Findings: No bruising, erythema or rash  Neurological:      Mental Status: He is alert and oriented to person, place, and time     Psychiatric:         Behavior: Behavior normal            Deo Tavarez MD  Family Medicine PGY-2  07/20/21

## 2021-07-20 PROBLEM — R26.2 AMBULATORY DYSFUNCTION: Status: ACTIVE | Noted: 2021-07-19

## 2021-07-20 NOTE — ASSESSMENT & PLAN NOTE
Patient with chronic muscle weakness/heaviness bilaterally x several months  Also with h/o restless legs syndrome  Currently uses a cane to ambulate but still finds he is unstable and requests a walker instead  Will place order and follow up

## 2021-07-28 ENCOUNTER — PREP FOR PROCEDURE (OUTPATIENT)
Dept: GASTROENTEROLOGY | Facility: CLINIC | Age: 36
End: 2021-07-28

## 2021-07-28 DIAGNOSIS — R19.7 DIARRHEA, UNSPECIFIED TYPE: Primary | ICD-10-CM

## 2021-07-28 DIAGNOSIS — R11.14 BILIOUS VOMITING, PRESENCE OF NAUSEA NOT SPECIFIED: ICD-10-CM

## 2021-08-03 ENCOUNTER — EVALUATION (OUTPATIENT)
Dept: PHYSICAL THERAPY | Facility: REHABILITATION | Age: 36
End: 2021-08-03
Payer: COMMERCIAL

## 2021-08-03 DIAGNOSIS — R26.2 AMBULATORY DYSFUNCTION: Primary | ICD-10-CM

## 2021-08-03 PROCEDURE — 97162 PT EVAL MOD COMPLEX 30 MIN: CPT | Performed by: PHYSICAL THERAPIST

## 2021-08-03 NOTE — PROGRESS NOTES
PT Evaluation     Today's date: 8/3/2021  Patient name: Georgi Almodovar  : 1985  MRN: [id number]  Referring provider: Pavel Mccullough MD  Dx:   Encounter Diagnosis     ICD-10-CM    1  Ambulatory dysfunction  R26 2 Ambulatory referral to Physical Therapy       Start Time:   Stop Time:   Total time in clinic (min): 55 minutes    Assessment  Assessment details: Georgi Almodovar is a 43y o  year old male who presents with chronic impaired balance and gait  Current deficits include decreased LE strength, impaired balance, decreased activity and positional tolerance, difficulty performing functional transfers, and patient is at an increased risk for falls  These deficits impair his ability to perform all ADLs such as dressing, bathing, and toileting in addition to household and recreational activities  Recommend skilled PT services to address previously stated deficits to promote progress towards PLOF  Impairments: abnormal gait, abnormal or restricted ROM, activity intolerance, impaired balance, impaired physical strength, lacks appropriate home exercise program, weight-bearing intolerance, poor posture  and poor body mechanics  Understanding of Dx/Px/POC: good   Prognosis: good    Goals  STG (4 weeks):  1  Improve hip flexion strength to 4/5 to promote improved activity tolerance  2  Increase hip extension strength to at least 3/5 for improved activity tolerance  3  Decrease pain at worst from 8/10 to 6/10 or less for improved QoL  LTG (8 weeks):  1  Increased global hip girdle strength to 4+/5 to promote improved activity tolerance  2  Increase ankle PF strength to 4/5 for improved push off during gait  3  Able to walk for at least 15 minutes with to promote improved community ambulation  4  Able to dress independently for improved ADL performance  5  Independent with HEP  Plan  Plan details:  Thank you for referring Georgi Almodovar to Physical Therapy at [facility] and for the opportunity to coordinate care  Patient would benefit from: skilled physical therapy and PT eval  Planned modality interventions: cryotherapy, electrical stimulation/Russian stimulation, TENS, thermotherapy: hydrocollator packs and unattended electrical stimulation  Planned therapy interventions: abdominal trunk stabilization, activity modification, ADL retraining, balance, balance/weight bearing training, body mechanics training, breathing training, fine motor coordination training, flexibility, functional ROM exercises, gait training, graded activity, graded exercise, graded motor, home exercise program, work reintegration, transfer training, therapeutic training, therapeutic exercise, therapeutic activities, stretching, strengthening, self care, postural training, patient education, neuromuscular re-education, muscle pump exercises, motor coordination training, Connelly taping, manual therapy, joint mobilization and IADL retraining  Frequency: 2x week  Duration in weeks: 8  Plan of Care beginning date: 8/3/2021  Plan of Care expiration date: 9/28/2021  Treatment plan discussed with: patient        Subjective Evaluation    History of Present Illness  Mechanism of injury: Reports his balance and walking started to decline the day after getting his second COVID vaccine in April 2021  States he's unsure if it's a side effect of the vaccine or a side effect of some of his medications  Currently has difficulty getting in/out of the car, in/out of the bed, lifting, sitting unsupported, walking, going up/down stairs, dressing, cooking, washing/bathing, toileting and getting up/down from the toilet  Currently goes up/down stairs non-reciprocally  States he has a shower chair but does not have any grab bars or a raised toilet seat  Uses an SPC to ambulate but got his RW in the mail today  Has numbness/tingling in bilateral legs which has remained unchanged since symptom onset    Reports he does dialysis every night at home    Also notes spasms in the legs  Pain  Current pain ratin  At best pain ratin  At worst pain ratin  Location: bilateral knees and ankles  Quality: dull ache and sharp    Social Support  Steps to enter house: yes (back door 3 steps, front door 4 steps)  Stairs in house: yes   Lives in: multiple-level home  Lives with: spouse and adult children    Employment status: not working    Diagnostic Tests  Abnormal MRI: 21: Mild noncompressive lumbar degenerative change  Treatments  Current treatment: physical therapy  Patient Goals  Patient goal: "To be able to walk without an AD "        Objective     Active Range of Motion     Additional Active Range of Motion Details  Lumbar AROM grossly WFL but limited secondary to balance deficits  Hip ROM grossly WFL bilaterally  Knee ROM grossly WFL bilaterally  Strength/Myotome Testing     Left Hip   Planes of Motion   Flexion: 3+  Extension: 3-  Abduction: 3-  Adduction: 3-  External rotation: 4-  Internal rotation: 4-    Right Hip   Planes of Motion   Flexion: 3+  Extension: 3-  Abduction: 3-  Adduction: 3-  External rotation: 4-  Internal rotation: 4-    Left Knee   Flexion: 3+  Extension: 4-    Right Knee   Flexion: 3+  Extension: 4-    Left Ankle/Foot   Dorsiflexion: 4-  Plantar flexion: 3-    Right Ankle/Foot   Dorsiflexion: 4-  Plantar flexion: 3-    Additional Strength Details  Ankle AROM grossly WFL bilaterally  Ambulation     Comments   TU" with SPC, 35" without SPC       Gait:   Decreased stance time on R>L while ambulating, more notably without SPC  Unsteady gait with LOB towards R more frequently than L with delayed stepping reaction to correct balance  Decreased heel strike and toe off bilaterally      mCTSIB:  Phase 1: 60"  Phase 2: 60"  Phase 3: 60"   Phase 4: 60"    30" sit to stand: 3x           Precautions: Fall Risk, CKD - awaiting kidney transplant, HTN, NSTEMI 2019, HLD, R knee menisectomy 2018    *indicates included in HEP     8/3            Neuro Re-Ed             Tandem balance             SL balance             Tandem walking             Walking with head turns             [employer] LOS                          Ther Exercise             Bike nv - monitor vitals            Bridges nv            Clamshells             Flexion SLR             Abduction SLR             Adduction SLR             Seated marches nv            Heel raises nv            Toe raises nv                                                   Ther Activity             Forward step ups             Lateral step ups             Stairs             Sit to stand             Leg press             Goblet squat

## 2021-08-05 ENCOUNTER — OFFICE VISIT (OUTPATIENT)
Dept: PHYSICAL THERAPY | Facility: REHABILITATION | Age: 36
End: 2021-08-05
Payer: COMMERCIAL

## 2021-08-05 DIAGNOSIS — G25.81 RESTLESS LEG: ICD-10-CM

## 2021-08-05 DIAGNOSIS — R26.2 AMBULATORY DYSFUNCTION: Primary | ICD-10-CM

## 2021-08-05 PROCEDURE — 97110 THERAPEUTIC EXERCISES: CPT

## 2021-08-05 RX ORDER — ROPINIROLE 0.25 MG/1
TABLET, FILM COATED ORAL
Qty: 90 TABLET | Refills: 0 | Status: SHIPPED | OUTPATIENT
Start: 2021-08-05 | End: 2021-11-03

## 2021-08-05 NOTE — PROGRESS NOTES
Daily Note     Today's date: 2021  Patient name: Barrera Mcintyre  : 1985  MRN: [id number]  Referring provider: Carly Weeks MD  Dx:   Encounter Diagnosis     ICD-10-CM    1  Ambulatory dysfunction  R26 2        Start Time:   Stop Time:   Total time in clinic (min): 45 minutes    Subjective: Patient reports feeling "okay, tired" at start of session  Objective: See treatment diary below      Assessment: Tolerated treatment fair  Patient demonstrated fatigue post treatment, exhibited good technique with therapeutic exercises and would benefit from continued PT Patient fatigues very quickly with all TE performed, with consistent rest breaks required  Vitals monitored throughout session,   BP at start of session 142/82, 02 99%, HR 80    BP after bike 150/90, 02 98%, HR 84  BP at end of session 152/92, 02 99%, HR 84      Plan: Continue per plan of care  Progress treatment as tolerated         Precautions: Fall Risk, CKD - awaiting kidney transplant, HTN, NSTEMI , HLD, R knee menisectomy     *indicates included in HEP     8/3 8/5           Neuro Re-Ed             Tandem balance             SL balance             Tandem walking             Walking with head turns             [employer] LOS                          Ther Exercise             Bike nv - monitor vitals 3 min no resis           Bridges nv nv           Clamshells  Seated PTB 3x5           Flexion SLR             Abduction SLR             Adduction SLR             Seated marches nv 3x5            Heel raises nv 2x10 seated           Toe raises nv 2x10 seated           Seated hip adduction  20x5'' ball           LAQ  2x5 ea                        Ther Activity             Forward step ups             Lateral step ups             Stairs             Sit to stand             Leg press             Goblet squat

## 2021-08-09 ENCOUNTER — OFFICE VISIT (OUTPATIENT)
Dept: PHYSICAL THERAPY | Facility: REHABILITATION | Age: 36
End: 2021-08-09
Payer: COMMERCIAL

## 2021-08-09 DIAGNOSIS — R26.2 AMBULATORY DYSFUNCTION: Primary | ICD-10-CM

## 2021-08-09 PROCEDURE — 97140 MANUAL THERAPY 1/> REGIONS: CPT | Performed by: PHYSICAL THERAPIST

## 2021-08-09 PROCEDURE — 97110 THERAPEUTIC EXERCISES: CPT

## 2021-08-09 NOTE — PROGRESS NOTES
Daily Note     Today's date: 2021  Patient name: Guicho Marroquin  : 1985  MRN: [id number]  Referring provider: Fe Fuentes MD  Dx:   Encounter Diagnosis     ICD-10-CM    1  Ambulatory dysfunction  R26 2        Start Time: 1700  Stop Time: 1800  Total time in clinic (min): 60 minutes    Subjective: Patient reporting increased RLE discomfort at start of session, pointing to calf/back of knee describing it as a burning sensation  He reports discomfort as constant  He reports no complaints after previous session  Patient states he would like to practice ambulation with the use of SPC  Objective: See treatment diary below      Assessment: Tolerated treatment fair  Patient demonstrated fatigue post treatment, exhibited good technique with therapeutic exercises and would benefit from continued PT  BP at start of session 148/90, HR 72, 02 99%  BP at end of session 152/92, HR 80, 02 99%  Patient continues to fatigue quickly with most TE, however able to tolerate increased reps with certain TE  Patient reporting some relief after manuals today  Trial possible standing/supine TE next session if able  Plan: Continue per plan of care  Progress treatment as tolerated         Precautions: Fall Risk, CKD - awaiting kidney transplant, HTN, NSTEMI , HLD, R knee menisectomy     *indicates included in HEP     8/3 8          Manuals             STM gastroc/seated sciatic nerve glide   Done LR PT           Neuro Re-Ed             Tandem balance             SL balance             Tandem walking             Walking with head turns             [employer] LOS                          Ther Exercise             Bike nv - monitor vitals 3 min no resis 4' no resis          Bridges nv nv           Clamshells  Seated PTB 3x5 Seated PTB 3x8           Flexion SLR             Abduction SLR             Adduction SLR             Seated marches nv 3x5  3x5           Heel raises nv 2x10 seated 3x10 seated Toe raises nv 2x10 seated 3x10 seated          Seated hip adduction  20x5'' ball 20x5'' ball          LAQ  2x5 ea 3x5 ea          Seated hamstring curl with TB   PTB 2x5 ea          Ther Activity             Forward step ups             Lateral step ups             Stairs             Sit to stand             Leg press             Goblet squat

## 2021-08-11 ENCOUNTER — OFFICE VISIT (OUTPATIENT)
Dept: PHYSICAL THERAPY | Facility: REHABILITATION | Age: 36
End: 2021-08-11
Payer: COMMERCIAL

## 2021-08-11 DIAGNOSIS — R26.2 AMBULATORY DYSFUNCTION: Primary | ICD-10-CM

## 2021-08-11 PROCEDURE — 97110 THERAPEUTIC EXERCISES: CPT | Performed by: PHYSICAL THERAPIST

## 2021-08-11 NOTE — PROGRESS NOTES
Daily Note     Today's date: 2021  Patient name: Eduard Richardson  : 1985  MRN: [id number]  Referring provider: Antonio Davis MD  Dx:   Encounter Diagnosis     ICD-10-CM    1  Ambulatory dysfunction  R26 2        Start Time:   Stop Time:   Total time in clinic (min): 55 minutes    Subjective: Reports feeling tired at start of today's session  States the burning pain in the calf is much better today  Objective: See treatment diary below    21 Treatment Vitals - Beginning of Session  162/90 mmHg  79 BPM  98% O2 sat     21 Treatment Vitals - End of Session  162/94 mmHg  78 BPM  99% O2 sat  Assessment: Tolerated treatment well  Introduced bridges this session for improved hip extensor strengthening with good tolerance  Short therapeutic rest breaks required throughout session secondary to fatigue  Monitored BP and symptoms throughout session with consistent but elevated BP from start to end of session and no reports of dizziness  Patient demonstrated appropriate level of challenge and muscular fatigue throughout session and noted good status at end of visit  Patient demonstrated fatigue post treatment, exhibited good technique with therapeutic exercises and would benefit from continued PT  Plan: Continue per plan of care  Progress treatment as tolerated         Precautions: Fall Risk, CKD - awaiting kidney transplant, HTN, NSTEMI 2019, HLD, R knee menisectomy     *indicates included in HEP     8/3 8/5 8/9 8/11       Manuals           STM gastroc/seated sciatic nerve glide   Done LR PT         Neuro Re-Ed           Tandem balance           SL balance           Tandem walking           Walking with head turns           [employer] LOS                      Ther Exercise           Bike nv - monitor vitals 3 min no resis 4' no resis 4' no resis       Bridges nv nv  3x8       Clamshells  Seated PTB 3x5 Seated PTB 3x8  Seated PTB 3x10       Flexion SLR           Abduction SLR           Adduction SLR           Seated marches nv 3x5  3x5  3x6       Heel raises nv 2x10 seated 3x10 seated 3x10 seated        Toe raises nv 2x10 seated 3x10 seated 3x10 seated       Seated hip adduction  20x5'' ball 20x5'' ball 3x10x5" ball       LAQ  2x5 ea 3x5 ea 3x8 ea       Seated hamstring curl with TB   PTB 2x5 ea PTB 2x8 ea       Ther Activity           Forward step ups           Lateral step ups           Stairs           Sit to stand           Leg press           Goblet squat

## 2021-08-17 ENCOUNTER — OFFICE VISIT (OUTPATIENT)
Dept: PHYSICAL THERAPY | Facility: REHABILITATION | Age: 36
End: 2021-08-17
Payer: COMMERCIAL

## 2021-08-17 DIAGNOSIS — R26.2 AMBULATORY DYSFUNCTION: Primary | ICD-10-CM

## 2021-08-17 PROCEDURE — 97110 THERAPEUTIC EXERCISES: CPT | Performed by: PHYSICAL THERAPIST

## 2021-08-17 NOTE — PROGRESS NOTES
Daily Note     Today's date: 2021  Patient name: Shannan Stack  : 1985  MRN: [id number]  Referring provider: Lawson Owens MD  Dx:   Encounter Diagnosis     ICD-10-CM    1  Ambulatory dysfunction  R26 2        Start Time: 1705  Stop Time: 1720  Total time in clinic (min): 15 minutes    Subjective: Reports he's doing well at start of session  Objective: See treatment diary below    21   Treatment Vitals - start of session  166/104 mmHg  82 BPM  97% O2 sat  Treatment Vitals - after 5 min  rest  172/100 mmHg  80 BPM  100% O2 sat  Assessment: Held PT this visit secondary to high BP at start of session and upon recheck after 5 minutes of seated rest  Patient denied any HA, blurred vision, dizziness and stated his blood pressure was high when he checked it early in the day; patient attributing to high blood pressure to a sandwich that he had eaten today  Provided education regarding safe range for vitals for exercise in addition to monitoring BP leading up to his planned kidney transplant  Instructed patient to follow up with his PCP regarding elevated levels today  Additionally, will notify MD and follow up with patient as appropriate  Patient reported good status at end of session  Plan: Will follow up as appropriate and continue POC with safe BP levels       Precautions: Fall Risk, CKD - awaiting kidney transplant, HTN, NSTEMI 2019, HLD, R knee menisectomy     *indicates included in HEP     8/3 8/5 8/9 8/11 8/17      Manuals           STM gastroc/seated sciatic nerve glide   Done LR PT         Neuro Re-Ed           Tandem balance           SL balance           Tandem walking           Walking with head turns           [employer] Exercise           Bike nv - monitor vitals 3 min no resis 4' no resis 4' no resis       Bridges nv nv  3x8       Clamshells  Seated PTB 3x5 Seated PTB 3x8  Seated PTB 3x10       Flexion SLR           Abduction SLR Adduction SLR           Seated marches nv 3x5  3x5  3x6       Heel raises nv 2x10 seated 3x10 seated 3x10 seated        Toe raises nv 2x10 seated 3x10 seated 3x10 seated       Seated hip adduction  20x5'' ball 20x5'' ball 3x10x5" ball       LAQ  2x5 ea 3x5 ea 3x8 ea       Seated hamstring curl with TB   PTB 2x5 ea PTB 2x8 ea                             Patient education     done      Ther Activity           Forward step ups           Lateral step ups           Stairs           Sit to stand           Leg press           Goblet squat

## 2021-08-18 ENCOUNTER — TELEPHONE (OUTPATIENT)
Dept: GASTROENTEROLOGY | Facility: HOSPITAL | Age: 36
End: 2021-08-18

## 2021-08-18 ENCOUNTER — APPOINTMENT (OUTPATIENT)
Dept: LAB | Facility: HOSPITAL | Age: 36
End: 2021-08-18
Payer: COMMERCIAL

## 2021-08-18 DIAGNOSIS — E11.9 DIABETES MELLITUS WITHOUT COMPLICATION (HCC): ICD-10-CM

## 2021-08-18 LAB
ANION GAP SERPL CALCULATED.3IONS-SCNC: 19 MMOL/L (ref 4–13)
BUN SERPL-MCNC: 76 MG/DL (ref 6–20)
CALCIUM SERPL-MCNC: 8.2 MG/DL (ref 8.4–10.2)
CHLORIDE SERPL-SCNC: 101 MMOL/L (ref 96–108)
CO2 SERPL-SCNC: 20 MMOL/L (ref 22–33)
CREAT SERPL-MCNC: 23.14 MG/DL (ref 0.5–1.2)
EST. AVERAGE GLUCOSE BLD GHB EST-MCNC: 111 MG/DL
GFR SERPL CREATININE-BSD FRML MDRD: 2 ML/MIN/1.73SQ M
GLUCOSE P FAST SERPL-MCNC: 78 MG/DL (ref 70–105)
HBA1C MFR BLD: 5.5 %
POTASSIUM SERPL-SCNC: 4.7 MMOL/L (ref 3.5–5)
SODIUM SERPL-SCNC: 140 MMOL/L (ref 133–145)

## 2021-08-18 PROCEDURE — 83036 HEMOGLOBIN GLYCOSYLATED A1C: CPT

## 2021-08-18 PROCEDURE — 36415 COLL VENOUS BLD VENIPUNCTURE: CPT

## 2021-08-18 PROCEDURE — 80048 BASIC METABOLIC PNL TOTAL CA: CPT

## 2021-08-18 PROCEDURE — 84681 ASSAY OF C-PEPTIDE: CPT

## 2021-08-18 PROCEDURE — 83519 RIA NONANTIBODY: CPT

## 2021-08-19 ENCOUNTER — OFFICE VISIT (OUTPATIENT)
Dept: PHYSICAL THERAPY | Facility: REHABILITATION | Age: 36
End: 2021-08-19
Payer: COMMERCIAL

## 2021-08-19 DIAGNOSIS — R26.2 AMBULATORY DYSFUNCTION: Primary | ICD-10-CM

## 2021-08-19 LAB — C PEPTIDE SERPL-MCNC: 4.9 NG/ML (ref 1.1–4.4)

## 2021-08-19 PROCEDURE — 97110 THERAPEUTIC EXERCISES: CPT | Performed by: PHYSICAL THERAPIST

## 2021-08-19 NOTE — PROGRESS NOTES
Daily Note     Today's date: 2021  Patient name: Malvin Layton  : 1985  MRN: [id number]  Referring provider: Ortiz Gonzalez MD  Dx:   Encounter Diagnosis     ICD-10-CM    1  Ambulatory dysfunction  R26 2        Start Time: 1700  Stop Time: 1750  Total time in clinic (min): 50 minutes    Subjective: Reports he is scheduled to get his kidney transplant on 9/15/21  States he followed up with his cardiologist who is also monitoring his BP; reports his BP was high earlier today but has since taken his medication  Objective: See treatment diary below  Treatment Vitals - start of visit  154/88 mmHg  74 BPM  99% O2 sat  Treatment Vitals - end of visit  164/82 mmHg  76 BPM  99% O2 sat  Assessment: Tolerated treatment well  Continued elevated BP at start of session but improved since previous visit  All exercises remained in sitting with appropriate level of challenge and muscular fatigue  Good tolerance to increased reps/sets for TE this visit with short therapeutic rest breaks required due to fatigue  No reports of pain or discomfort throughout session and noted good status at end of visit  Patient demonstrated fatigue post treatment, exhibited good technique with therapeutic exercises and would benefit from continued PT  Plan: Continue per plan of care  Progress treatment as tolerated         Precautions: Fall Risk, CKD - awaiting kidney transplant, HTN, NSTEMI 2019, HLD, R knee menisectomy     *indicates included in HEP     8/3 8/5 8/9 8/11 8/17 8/19     Manuals           STM gastroc/seated sciatic nerve glide   Done LR PT         Neuro Re-Ed           Tandem balance           SL balance           Tandem walking           Walking with head turns           [employer] LOS                      Ther Exercise           Bike nv - monitor vitals 3 min no resis 4' no resis 4' no resis  5' no resis     Bridges nv nv  3x8       Clamshells  Seated PTB 3x5 Seated PTB 3x8  Seated PTB 3x10  Seated PTB 3x10     Flexion SLR           Abduction SLR           Adduction SLR           Seated marches nv 3x5  3x5  3x6  3x8     Heel raises nv 2x10 seated 3x10 seated 3x10 seated   3x12 seated     Toe raises nv 2x10 seated 3x10 seated 3x10 seated  3x12 seated     Seated hip adduction  20x5'' ball 20x5'' ball 3x10x5" ball  3x10x5" ball     LAQ  2x5 ea 3x5 ea 3x8 ea  3x8 ea     Seated hamstring curl with TB   PTB 2x5 ea PTB 2x8 ea                             Patient education     done      Ther Activity           Forward step ups           Lateral step ups           Stairs           Sit to stand           Leg press           Goblet squat

## 2021-08-20 LAB — GAD65 AB SER-ACNC: <5 U/ML (ref 0–5)

## 2021-08-24 ENCOUNTER — APPOINTMENT (OUTPATIENT)
Dept: PHYSICAL THERAPY | Facility: REHABILITATION | Age: 36
End: 2021-08-24
Payer: COMMERCIAL

## 2021-08-24 ENCOUNTER — TELEPHONE (OUTPATIENT)
Dept: GASTROENTEROLOGY | Facility: HOSPITAL | Age: 36
End: 2021-08-24

## 2021-08-31 ENCOUNTER — OFFICE VISIT (OUTPATIENT)
Dept: PHYSICAL THERAPY | Facility: REHABILITATION | Age: 36
End: 2021-08-31
Payer: COMMERCIAL

## 2021-08-31 DIAGNOSIS — R26.2 AMBULATORY DYSFUNCTION: Primary | ICD-10-CM

## 2021-08-31 PROCEDURE — 97110 THERAPEUTIC EXERCISES: CPT | Performed by: PHYSICAL THERAPIST

## 2021-08-31 NOTE — PROGRESS NOTES
Daily Note     Today's date: 2021  Patient name: Catherne Favre  : 1985  MRN: [id number]  Referring provider: Yesica Padilla MD  Dx:   Encounter Diagnosis     ICD-10-CM    1  Ambulatory dysfunction  R26 2        Start Time: 1705  Stop Time: 1755  Total time in clinic (min): 50 minutes    Subjective: Reports he's feeling tired at start of session  Notes some LOB since previous session but denies falls  Objective: See treatment diary below      Assessment: Tolerated treatment well  Trialed heel and toe raises standing this session however, unable to lift heels/toes with body weight due to decreased strength and regressed back to seated  Additionally, trialed heel raise on leg press at 55# and 35# but was unable to complete; will trial TB PF next session  Patient demonstrated appropriate level of challenge and muscular fatigue throughout session and noted good status at end of visit  Patient demonstrated fatigue post treatment, exhibited good technique with therapeutic exercises and would benefit from continued PT  Plan: Continue per plan of care  Progress treatment as tolerated         Precautions: Fall Risk, CKD - awaiting kidney transplant, HTN, NSTEMI , HLD, R knee menisectomy     *indicates included in HEP     8/3 8/5 8/9 8/11 8/17 8/19 8/31    Manuals           STM gastroc/seated sciatic nerve glide   Done LR PT         Neuro Re-Ed           Tandem balance           SL balance           Tandem walking           Walking with head turns           [employer]                      Ther Exercise           Bike nv - monitor vitals 3 min no resis 4' no resis 4' no resis  5' no resis 5' no resis    Bridges nv nv  3x8       Clamshells  Seated PTB 3x5 Seated PTB 3x8  Seated PTB 3x10  Seated PTB 3x10     Flexion SLR           Abduction SLR           Adduction SLR           Seated marches nv 3x5  3x5  3x6  3x8 2x8, 2x8 w/ hurdles    Heel raises nv 2x10 seated 3x10 seated 3x10 seated   3x12 seated 3x12 seated    Toe raises nv 2x10 seated 3x10 seated 3x10 seated  3x12 seated 3x12 seated    Seated hip adduction  20x5'' ball 20x5'' ball 3x10x5" ball  3x10x5" ball     LAQ  2x5 ea 3x5 ea 3x8 ea  3x8 ea 3x10 ea    Seated hamstring curl with TB   PTB 2x5 ea PTB 2x8 ea       TB PF       nv               Patient education     done      Ther Activity           Forward step ups           Lateral step ups           Stairs           Sit to stand           Leg press           Goblet squat

## 2021-09-02 ENCOUNTER — OFFICE VISIT (OUTPATIENT)
Dept: PHYSICAL THERAPY | Facility: REHABILITATION | Age: 36
End: 2021-09-02
Payer: COMMERCIAL

## 2021-09-02 DIAGNOSIS — R26.2 AMBULATORY DYSFUNCTION: Primary | ICD-10-CM

## 2021-09-02 PROCEDURE — 97110 THERAPEUTIC EXERCISES: CPT | Performed by: PHYSICAL THERAPIST

## 2021-09-02 NOTE — PROGRESS NOTES
Daily Note     Today's date: 2021  Patient name: Digna Cervantes  : 1985  MRN: [id number]  Referring provider: Bobby Chris MD  Dx:   Encounter Diagnosis     ICD-10-CM    1  Ambulatory dysfunction  R26 2        Start Time: 1705  Stop Time: 1755  Total time in clinic (min): 50 minutes    Subjective: Reports he's feeling very heavy at start of session since he ate a big meal prior to today's session  Objective: See treatment diary below    BP at start of session: 146/84 R arm seated       Assessment: Tolerated treatment well  Able to perform theraband PF for improved gastroc strengthening this session  Challenged with progression of seated marches to standing marches and addition of weight to LAQs  Required short therapeutic rest breaks throughout session  Patient demonstrated appropriate level of challenge and muscular fatigue throughout session and noted good status at end of visit  Patient demonstrated fatigue post treatment, exhibited good technique with therapeutic exercises and would benefit from continued PT  Plan: Continue per plan of care  Progress treatment as tolerated         Precautions: Fall Risk, CKD - awaiting kidney transplant, HTN, NSTEMI , HLD, R knee menisectomy     *indicates included in HEP     8/3 8/5 8/9 8/11 8/17 8/19 8/31 9/2   Manuals           STM gastroc/seated sciatic nerve glide   Done LR PT         Neuro Re-Ed           Tandem balance           SL balance           Tandem walking           Walking with head turns           [employer] Exercise           Bike nv - monitor vitals 3 min no resis 4' no resis 4' no resis  5' no resis 5' no resis 5' no res   Bridges nv nv  3x8       Clamshells  Seated PTB 3x5 Seated PTB 3x8  Seated PTB 3x10  Seated PTB 3x10  Seated OTB 3x10   Flexion SLR           Abduction SLR           Adduction SLR           Seated marches nv 3x5  3x5  3x6  3x8 2x8, 2x8 w/ hurdles    Standing marches        2x8 with UE support   Heel raises nv 2x10 seated 3x10 seated 3x10 seated   3x12 seated 3x12 seated 3x12 seated   Toe raises nv 2x10 seated 3x10 seated 3x10 seated  3x12 seated 3x12 seated Seated 3x12   Seated hip adduction  20x5'' ball 20x5'' ball 3x10x5" ball  3x10x5" ball     LAQ  2x5 ea 3x5 ea 3x8 ea  3x8 ea 3x10 ea 3x10 ea 1#   Seated hamstring curl with TB   PTB 2x5 ea PTB 2x8 ea       TB PF       nv OTB 3x10 ea              Patient education     done      Ther Activity           Forward step ups           Lateral step ups           Stairs           Sit to stand           Leg press           Goblet squat

## 2021-09-07 ENCOUNTER — OFFICE VISIT (OUTPATIENT)
Dept: PHYSICAL THERAPY | Facility: REHABILITATION | Age: 36
End: 2021-09-07
Payer: COMMERCIAL

## 2021-09-07 DIAGNOSIS — R26.2 AMBULATORY DYSFUNCTION: Primary | ICD-10-CM

## 2021-09-07 PROCEDURE — 97110 THERAPEUTIC EXERCISES: CPT | Performed by: PHYSICAL THERAPIST

## 2021-09-07 PROCEDURE — 97530 THERAPEUTIC ACTIVITIES: CPT | Performed by: PHYSICAL THERAPIST

## 2021-09-07 NOTE — PROGRESS NOTES
Daily Note     Today's date: 2021  Patient name: Georgi Almodovar  : 1985  MRN: [id number]  Referring provider: Pavel Mccullough MD  Dx:   Encounter Diagnosis     ICD-10-CM    1  Ambulatory dysfunction  R26 2        Start Time: 1700  Stop Time: 1745  Total time in clinic (min): 45 minutes    Subjective: Cammie Brown reports that he's feeling okay today, looking forward to his surgery next week  Objective: See treatment diary below  BP seated at rest 130/78mmhg beginning of session  136m/76mmhg after recumbent bike  138mmhg seated at end of session  Assessment: Tolerated treatment well  Patient demonstrated fatigue post treatment, exhibited good technique with therapeutic exercises and would benefit from continued PT  Rest required after 4' of bicycling, as well as after 1st set of step ups  Plan: Continue per plan of care           Precautions: Fall Risk, CKD - awaiting kidney transplant, HTN, NSTEMI , HLD, R knee menisectomy     *indicates included in HEP        Manuals           STM gastroc/seated sciatic nerve glide   Done LR PT         Neuro Re-Ed           Tandem balance           SL balance           Tandem walking           Walking with head turns           [employer] LOS                      Ther Exercise           Bike 5' w rest PRN  4' no resis 4' no resis  5' no resis 5' no resis 5' no res   Bridges    3x8       Clamshells seated 3x12 OTB  Seated PTB 3x8  Seated PTB 3x10  Seated PTB 3x10  Seated OTB 3x10   Flexion SLR           Abduction SLR           Adduction SLR           Seated marches   3x5  3x6  3x8 2x8, 2x8 w/ hurdles    Standing marches 2x8 with UE support       2x8 with UE support   Heel raises 3x15 seated  3x10 seated 3x10 seated   3x12 seated 3x12 seated 3x12 seated   Toe raises 3x15 seated  3x10 seated 3x10 seated  3x12 seated 3x12 seated Seated 3x12   Seated hip adduction   20x5'' ball 3x10x5" ball  3x10x5" ball     LAQ 3x10 ea 1# 3x5 ea 3x8 ea  3x8 ea 3x10 ea 3x10 ea 1#   Seated hamstring curl with TB   PTB 2x5 ea PTB 2x8 ea       TB PF OTB 3x12 ea      nv OTB 3x10 ea              Patient education     done      Ther Activity           Forward step ups 2x8 4"          Lateral step ups           Stairs           Sit to stand           Leg press           Goblet squat

## 2021-09-09 ENCOUNTER — OFFICE VISIT (OUTPATIENT)
Dept: PHYSICAL THERAPY | Facility: REHABILITATION | Age: 36
End: 2021-09-09
Payer: COMMERCIAL

## 2021-09-09 DIAGNOSIS — R26.2 AMBULATORY DYSFUNCTION: Primary | ICD-10-CM

## 2021-09-09 PROCEDURE — 97110 THERAPEUTIC EXERCISES: CPT | Performed by: PHYSICAL THERAPIST

## 2021-09-09 PROCEDURE — 97530 THERAPEUTIC ACTIVITIES: CPT | Performed by: PHYSICAL THERAPIST

## 2021-09-09 NOTE — PROGRESS NOTES
PT Discharge    Today's date: 2021  Patient name: Kavitha Ceja  : 1985  MRN: [id number]  Referring provider: Ad Sharma MD  Dx:   Encounter Diagnosis     ICD-10-CM    1  Ambulatory dysfunction  R26 2        Start Time: 1700  Stop Time: 1745  Total time in clinic (min): 45 minutes    Assessment  Assessment details: Per patient report and clinical findings, Kavitha Ceja has made fair progress since starting skilled physical therapy services  To this date, Kavitha Ceja has completed 10 visits of skilled physical therapy  Noted improvements include slight improvements in LE strength and slight improvement in activity tolerance  Recommend discharge to independent HEP at this time secondary to patient's upcoming kidney transplant surgery currently scheduled for 9/15/2021  Updated and reviewed HEP with patient; patient verbalized and demonstrated understanding of all exercises  Impairments: abnormal gait, abnormal or restricted ROM, activity intolerance, impaired balance, impaired physical strength, lacks appropriate home exercise program, weight-bearing intolerance, poor posture  and poor body mechanics  Understanding of Dx/Px/POC: good   Prognosis: good    Goals  STG (4 weeks):  1  Improve hip flexion strength to 4/5 to promote improved activity tolerance  - met  2  Increase hip extension strength to at least 3/5 for improved activity tolerance  - progressing  3  Decrease pain at worst from 8/10 to 6/10 or less for improved QoL  - progressing    LTG (8 weeks):  1  Increased global hip girdle strength to 4+/5 to promote improved activity tolerance  - progressing  2  Increase ankle PF strength to 4/5 for improved push off during gait  - progressing  3  Able to walk for at least 15 minutes with to promote improved community ambulation  - progressing  4  Able to dress independently for improved ADL performance  - progressing  5  Independent with HEP  - met    Plan  Plan details:  Thank you for referring Alvera Alpers to Physical Therapy at [place] [address] and for the opportunity to coordinate care  Patient would benefit from: skilled physical therapy and PT eval  Planned modality interventions: cryotherapy, electrical stimulation/Russian stimulation, TENS, thermotherapy: hydrocollator packs and unattended electrical stimulation  Planned therapy interventions: abdominal trunk stabilization, activity modification, ADL retraining, balance, balance/weight bearing training, body mechanics training, breathing training, fine motor coordination training, flexibility, functional ROM exercises, gait training, graded activity, graded exercise, graded motor, home exercise program, work reintegration, transfer training, therapeutic training, therapeutic exercise, therapeutic activities, stretching, strengthening, self care, postural training, patient education, neuromuscular re-education, muscle pump exercises, motor coordination training, Connelly taping, manual therapy, joint mobilization and IADL retraining  Frequency: 2x week  Duration in weeks: 8  Plan of Care beginning date: 8/3/2021  Plan of Care expiration date: 9/28/2021  Treatment plan discussed with: patient        Subjective Evaluation    History of Present Illness  Mechanism of injury:   9/9/21:  Patient reports feeling he has made 20% progress since IE  States he feels that some of his medications have hindered his progress thus far  Notes slight improvement in ease of going up the stairs  Patient is currently scheduled for a kidney replacement on 9/15/21      8/3/21:  Reports his balance and walking started to decline the day after getting his second COVID vaccine in April 2021  States he's unsure if it's a side effect of the vaccine or a side effect of some of his medications   Currently has difficulty getting in/out of the car, in/out of the bed, lifting, sitting unsupported, walking, going up/down stairs, dressing, cooking, washing/bathing, toileting and getting up/down from the toilet  Currently goes up/down stairs non-reciprocally  States he has a shower chair but does not have any grab bars or a raised toilet seat  Uses an SPC to ambulate but got his RW in the mail today  Has numbness/tingling in bilateral legs which has remained unchanged since symptom onset  Reports he does dialysis every night at home  Also notes spasms in the legs  Pain  Current pain ratin  At best pain ratin  At worst pain ratin  Location: bilateral knees and ankles  Quality: dull ache and sharp    Social Support  Steps to enter house: yes (back door 3 steps, front door 4 steps)  Stairs in house: yes   Lives in: multiple-level home  Lives with: spouse and adult children    Employment status: not working    Diagnostic Tests  Abnormal MRI: 21: Mild noncompressive lumbar degenerative change  Treatments  Current treatment: physical therapy  Patient Goals  Patient goal: "To be able to walk without an AD "        Objective     Active Range of Motion     Additional Active Range of Motion Details  Lumbar AROM grossly WFL but limited secondary to balance deficits  Hip ROM grossly WFL bilaterally  Knee ROM grossly WFL bilaterally  Strength/Myotome Testing     Left Hip   Planes of Motion   Flexion: 4-  Extension: 3-  Abduction: 3-  Adduction: 3-  External rotation: 4  Internal rotation: 4-    Right Hip   Planes of Motion   Flexion: 4-  Extension: 3-  Abduction: 3-  Adduction: 3-  External rotation: 4  Internal rotation: 4-    Left Knee   Flexion: 4-  Extension: 4    Right Knee   Flexion: 4-  Extension: 4    Left Ankle/Foot   Dorsiflexion: 4-  Plantar flexion: 3-    Right Ankle/Foot   Dorsiflexion: 4-  Plantar flexion: 3-    Additional Strength Details  Ankle AROM grossly WFL bilaterally  Ambulation     Comments   TU" with SPC, 35" without SPC       Gait:   Decreased stance time on R>L while ambulating, more notably without SPC     Unsteady gait with LOB towards R more frequently than L with delayed stepping reaction to correct balance  Decreased heel strike and toe off bilaterally      mCTSIB:  Phase 1: 60"  Phase 2: 60"  Phase 3: 60"   Phase 4: 60"    30" sit to stand: 3x      Flowsheet Rows      Most Recent Value   PT/OT G-Codes   Current Score  19   Projected Score  0           Precautions: Fall Risk, CKD - awaiting kidney transplant, HTN, NSTEMI 2019, HLD, R knee menisectomy 2018    *indicates included in HEP     9/7 9/9 8/11 8/17 8/19 8/31 9/2   Manuals           STM gastroc/seated sciatic nerve glide           Neuro Re-Ed           Tandem balance           SL balance           Tandem walking           Walking with head turns           [employer] LOS                      Ther Exercise           Bike 5' w rest PRN 6' no res  4' no resis  5' no resis 5' no resis 5' no res   Bridges    3x8       Clamshells* seated 3x12 OTB Seated 3x12 OTB  Seated PTB 3x10  Seated PTB 3x10  Seated OTB 3x10   Flexion SLR           Abduction SLR           Adduction SLR           Seated marches*    3x6  3x8 2x8, 2x8 w/ hurdles    Standing marches 2x8 with UE support 3x8 w/ UE support      2x8 with UE support   Heel raises* 3x15 seated 3x15 seated  3x10 seated   3x12 seated 3x12 seated 3x12 seated   Toe raises* 3x15 seated 3x15 seated  3x10 seated  3x12 seated 3x12 seated Seated 3x12   Seated hip adduction    3x10x5" ball  3x10x5" ball     LAQ* 3x10 ea 1#   3x8 ea  3x8 ea 3x10 ea 3x10 ea 1#   Seated hamstring curl with TB    PTB 2x8 ea       TB PF OTB 3x12 ea GTB 3x10 ea     nv OTB 3x10 ea              Patient education     done      Ther Activity           Forward step ups 2x8 4" 2x8 4" step         Lateral step ups           Stairs           Sit to stand           Leg press           Goblet squat

## 2021-09-14 ENCOUNTER — APPOINTMENT (OUTPATIENT)
Dept: PHYSICAL THERAPY | Facility: REHABILITATION | Age: 36
End: 2021-09-14
Payer: COMMERCIAL

## 2021-09-16 ENCOUNTER — APPOINTMENT (OUTPATIENT)
Dept: PHYSICAL THERAPY | Facility: REHABILITATION | Age: 36
End: 2021-09-16
Payer: COMMERCIAL

## 2021-09-16 ENCOUNTER — TELEPHONE (OUTPATIENT)
Dept: NEPHROLOGY | Facility: CLINIC | Age: 36
End: 2021-09-16

## 2021-09-16 NOTE — TELEPHONE ENCOUNTER
Patient's wife called to make you aware that the patient received a kidney yesterday from their son and they are very grateful for all you have done for them

## 2021-09-21 ENCOUNTER — APPOINTMENT (OUTPATIENT)
Dept: PHYSICAL THERAPY | Facility: REHABILITATION | Age: 36
End: 2021-09-21
Payer: COMMERCIAL

## 2021-09-22 ENCOUNTER — APPOINTMENT (OUTPATIENT)
Dept: LAB | Facility: HOSPITAL | Age: 36
End: 2021-09-22
Payer: COMMERCIAL

## 2021-09-22 DIAGNOSIS — Z29.8 NEED FOR PROPHYLACTIC IMMUNOTHERAPY: ICD-10-CM

## 2021-09-22 DIAGNOSIS — Z94.0 KIDNEY REPLACED BY TRANSPLANT: ICD-10-CM

## 2021-09-22 DIAGNOSIS — T86.10 COMPLICATION OF TRANSPLANTED KIDNEY, UNSPECIFIED COMPLICATION: ICD-10-CM

## 2021-09-22 DIAGNOSIS — Z79.899 ENCOUNTER FOR LONG-TERM (CURRENT) USE OF OTHER MEDICATIONS: ICD-10-CM

## 2021-09-22 LAB
ALBUMIN SERPL BCP-MCNC: 3.4 G/DL (ref 3.4–4.8)
ALP SERPL-CCNC: 39.3 U/L (ref 10–129)
ALT SERPL W P-5'-P-CCNC: 20 U/L (ref 5–63)
ANION GAP SERPL CALCULATED.3IONS-SCNC: 1 MMOL/L (ref 4–13)
AST SERPL W P-5'-P-CCNC: 51 U/L (ref 15–41)
BASOPHILS # BLD AUTO: 0 THOUSANDS/ΜL (ref 0–0.1)
BASOPHILS NFR BLD AUTO: 0 % (ref 0–1)
BILIRUB SERPL-MCNC: 0.27 MG/DL (ref 0.3–1.2)
BUN SERPL-MCNC: 18 MG/DL (ref 6–20)
CALCIUM ALBUM COR SERPL-MCNC: 8.8 MG/DL (ref 8.3–10.1)
CALCIUM SERPL-MCNC: 8.3 MG/DL (ref 8.4–10.2)
CHLORIDE SERPL-SCNC: 112 MMOL/L (ref 96–108)
CO2 SERPL-SCNC: 24 MMOL/L (ref 22–33)
CREAT SERPL-MCNC: 0.92 MG/DL (ref 0.5–1.2)
EOSINOPHIL # BLD AUTO: 0.13 THOUSAND/ΜL (ref 0–0.61)
EOSINOPHIL NFR BLD AUTO: 2 % (ref 0–6)
ERYTHROCYTE [DISTWIDTH] IN BLOOD BY AUTOMATED COUNT: 16.6 % (ref 11.6–15.1)
GFR SERPL CREATININE-BSD FRML MDRD: 122 ML/MIN/1.73SQ M
GLUCOSE P FAST SERPL-MCNC: 116 MG/DL (ref 70–105)
HCT VFR BLD AUTO: 32.4 % (ref 36.5–49.3)
HGB BLD-MCNC: 10.1 G/DL (ref 12–17)
IMM GRANULOCYTES # BLD AUTO: 0.01 THOUSAND/UL (ref 0–0.2)
IMM GRANULOCYTES NFR BLD AUTO: 0 % (ref 0–2)
LYMPHOCYTES # BLD AUTO: 0.42 THOUSANDS/ΜL (ref 0.6–4.47)
LYMPHOCYTES NFR BLD AUTO: 6 % (ref 14–44)
MAGNESIUM SERPL-MCNC: 1.8 MG/DL (ref 1.6–2.6)
MCH RBC QN AUTO: 28.9 PG (ref 26.8–34.3)
MCHC RBC AUTO-ENTMCNC: 31.2 G/DL (ref 31.4–37.4)
MCV RBC AUTO: 93 FL (ref 82–98)
MONOCYTES # BLD AUTO: 0.72 THOUSAND/ΜL (ref 0.17–1.22)
MONOCYTES NFR BLD AUTO: 11 % (ref 4–12)
NEUTROPHILS # BLD AUTO: 5.5 THOUSANDS/ΜL (ref 1.85–7.62)
NEUTS SEG NFR BLD AUTO: 81 % (ref 43–75)
PHOSPHATE SERPL-MCNC: 1.1 MG/DL (ref 2.5–5)
PLATELET # BLD AUTO: 290 THOUSANDS/UL (ref 149–390)
PMV BLD AUTO: 11.3 FL (ref 8.9–12.7)
POTASSIUM SERPL-SCNC: 5.2 MMOL/L (ref 3.5–5)
PROT SERPL-MCNC: 6 G/DL (ref 6.4–8.3)
RBC # BLD AUTO: 3.5 MILLION/UL (ref 3.88–5.62)
SODIUM SERPL-SCNC: 137 MMOL/L (ref 133–145)
TACROLIMUS BLD-MCNC: 5.4 NG/ML (ref 2–20)
WBC # BLD AUTO: 6.78 THOUSAND/UL (ref 4.31–10.16)

## 2021-09-22 PROCEDURE — 87799 DETECT AGENT NOS DNA QUANT: CPT

## 2021-09-22 PROCEDURE — 80197 ASSAY OF TACROLIMUS: CPT

## 2021-09-22 PROCEDURE — 84100 ASSAY OF PHOSPHORUS: CPT

## 2021-09-22 PROCEDURE — 83735 ASSAY OF MAGNESIUM: CPT

## 2021-09-22 PROCEDURE — 80053 COMPREHEN METABOLIC PANEL: CPT

## 2021-09-22 PROCEDURE — 36415 COLL VENOUS BLD VENIPUNCTURE: CPT

## 2021-09-22 PROCEDURE — 85025 COMPLETE CBC W/AUTO DIFF WBC: CPT

## 2021-09-23 ENCOUNTER — APPOINTMENT (OUTPATIENT)
Dept: PHYSICAL THERAPY | Facility: REHABILITATION | Age: 36
End: 2021-09-23
Payer: COMMERCIAL

## 2021-09-28 ENCOUNTER — APPOINTMENT (OUTPATIENT)
Dept: PHYSICAL THERAPY | Facility: REHABILITATION | Age: 36
End: 2021-09-28
Payer: COMMERCIAL

## 2021-09-29 ENCOUNTER — APPOINTMENT (OUTPATIENT)
Dept: LAB | Facility: HOSPITAL | Age: 36
End: 2021-09-29
Payer: COMMERCIAL

## 2021-09-29 DIAGNOSIS — Z29.8 NEED FOR PROPHYLACTIC IMMUNOTHERAPY: ICD-10-CM

## 2021-09-29 DIAGNOSIS — T86.10 COMPLICATION OF TRANSPLANTED KIDNEY, UNSPECIFIED COMPLICATION: ICD-10-CM

## 2021-09-29 DIAGNOSIS — Z79.899 ENCOUNTER FOR LONG-TERM (CURRENT) USE OF OTHER MEDICATIONS: ICD-10-CM

## 2021-09-29 DIAGNOSIS — Z94.0 KIDNEY REPLACED BY TRANSPLANT: ICD-10-CM

## 2021-09-29 LAB
ALBUMIN SERPL BCP-MCNC: 3.8 G/DL (ref 3.4–4.8)
ALP SERPL-CCNC: 58.6 U/L (ref 10–129)
ALT SERPL W P-5'-P-CCNC: 13 U/L (ref 5–63)
ANION GAP SERPL CALCULATED.3IONS-SCNC: 5 MMOL/L (ref 4–13)
AST SERPL W P-5'-P-CCNC: 42 U/L (ref 15–41)
BASOPHILS # BLD AUTO: 0.06 THOUSANDS/ΜL (ref 0–0.1)
BASOPHILS NFR BLD AUTO: 1 % (ref 0–1)
BILIRUB SERPL-MCNC: 0.36 MG/DL (ref 0.3–1.2)
BUN SERPL-MCNC: 25 MG/DL (ref 6–20)
CALCIUM SERPL-MCNC: 9 MG/DL (ref 8.4–10.2)
CHLORIDE SERPL-SCNC: 110 MMOL/L (ref 96–108)
CO2 SERPL-SCNC: 24 MMOL/L (ref 22–33)
CREAT SERPL-MCNC: 1.35 MG/DL (ref 0.5–1.2)
EOSINOPHIL # BLD AUTO: 0.35 THOUSAND/ΜL (ref 0–0.61)
EOSINOPHIL NFR BLD AUTO: 4 % (ref 0–6)
ERYTHROCYTE [DISTWIDTH] IN BLOOD BY AUTOMATED COUNT: 16.1 % (ref 11.6–15.1)
GFR SERPL CREATININE-BSD FRML MDRD: 76 ML/MIN/1.73SQ M
GLUCOSE P FAST SERPL-MCNC: 106 MG/DL (ref 70–105)
HCT VFR BLD AUTO: 33.2 % (ref 36.5–49.3)
HGB BLD-MCNC: 10.5 G/DL (ref 12–17)
IMM GRANULOCYTES # BLD AUTO: 0.01 THOUSAND/UL (ref 0–0.2)
IMM GRANULOCYTES NFR BLD AUTO: 0 % (ref 0–2)
LYMPHOCYTES # BLD AUTO: 0.46 THOUSANDS/ΜL (ref 0.6–4.47)
LYMPHOCYTES NFR BLD AUTO: 5 % (ref 14–44)
MAGNESIUM SERPL-MCNC: 1.5 MG/DL (ref 1.6–2.6)
MCH RBC QN AUTO: 29.2 PG (ref 26.8–34.3)
MCHC RBC AUTO-ENTMCNC: 31.6 G/DL (ref 31.4–37.4)
MCV RBC AUTO: 93 FL (ref 82–98)
MONOCYTES # BLD AUTO: 0.76 THOUSAND/ΜL (ref 0.17–1.22)
MONOCYTES NFR BLD AUTO: 9 % (ref 4–12)
NEUTROPHILS # BLD AUTO: 6.94 THOUSANDS/ΜL (ref 1.85–7.62)
NEUTS SEG NFR BLD AUTO: 81 % (ref 43–75)
PHOSPHATE SERPL-MCNC: 2.9 MG/DL (ref 2.5–5)
PLATELET # BLD AUTO: 354 THOUSANDS/UL (ref 149–390)
PMV BLD AUTO: 9.8 FL (ref 8.9–12.7)
POTASSIUM SERPL-SCNC: 4.9 MMOL/L (ref 3.5–5)
PROT SERPL-MCNC: 6.6 G/DL (ref 6.4–8.3)
RBC # BLD AUTO: 3.59 MILLION/UL (ref 3.88–5.62)
SODIUM SERPL-SCNC: 139 MMOL/L (ref 133–145)
WBC # BLD AUTO: 8.58 THOUSAND/UL (ref 4.31–10.16)

## 2021-09-29 PROCEDURE — 36415 COLL VENOUS BLD VENIPUNCTURE: CPT

## 2021-09-29 PROCEDURE — 87799 DETECT AGENT NOS DNA QUANT: CPT

## 2021-09-29 PROCEDURE — 80053 COMPREHEN METABOLIC PANEL: CPT

## 2021-09-29 PROCEDURE — 83735 ASSAY OF MAGNESIUM: CPT

## 2021-09-29 PROCEDURE — 84100 ASSAY OF PHOSPHORUS: CPT

## 2021-09-29 PROCEDURE — 85025 COMPLETE CBC W/AUTO DIFF WBC: CPT

## 2021-09-29 PROCEDURE — 80197 ASSAY OF TACROLIMUS: CPT

## 2021-09-30 ENCOUNTER — APPOINTMENT (OUTPATIENT)
Dept: PHYSICAL THERAPY | Facility: REHABILITATION | Age: 36
End: 2021-09-30
Payer: COMMERCIAL

## 2021-09-30 LAB — TACROLIMUS BLD-MCNC: 5.5 NG/ML (ref 2–20)

## 2021-10-04 ENCOUNTER — APPOINTMENT (OUTPATIENT)
Dept: LAB | Facility: HOSPITAL | Age: 36
End: 2021-10-04
Payer: MEDICARE

## 2021-10-04 DIAGNOSIS — T86.10 COMPLICATION OF TRANSPLANTED KIDNEY, UNSPECIFIED COMPLICATION: ICD-10-CM

## 2021-10-04 DIAGNOSIS — Z94.0 KIDNEY TRANSPLANTED: ICD-10-CM

## 2021-10-04 DIAGNOSIS — Z79.899 ENCOUNTER FOR LONG-TERM (CURRENT) USE OF OTHER MEDICATIONS: ICD-10-CM

## 2021-10-04 DIAGNOSIS — Z29.8 NEED FOR PROPHYLACTIC IMMUNOTHERAPY: ICD-10-CM

## 2021-10-04 LAB
ALBUMIN SERPL BCP-MCNC: 3.7 G/DL (ref 3.4–4.8)
ALP SERPL-CCNC: 84.8 U/L (ref 10–129)
ALT SERPL W P-5'-P-CCNC: 19 U/L (ref 5–63)
ANION GAP SERPL CALCULATED.3IONS-SCNC: 3 MMOL/L (ref 4–13)
AST SERPL W P-5'-P-CCNC: 53 U/L (ref 15–41)
BASOPHILS # BLD AUTO: 0.11 THOUSANDS/ΜL (ref 0–0.1)
BASOPHILS NFR BLD AUTO: 3 % (ref 0–1)
BILIRUB SERPL-MCNC: 0.35 MG/DL (ref 0.3–1.2)
BKV DNA # SERPL NAA+PROBE: NEGATIVE COPIES/ML
BKV DNA SERPL NAA+PROBE-LOG#: NORMAL LOG10COPY/ML
BUN SERPL-MCNC: 22 MG/DL (ref 6–20)
CALCIUM SERPL-MCNC: 9.3 MG/DL (ref 8.4–10.2)
CHLORIDE SERPL-SCNC: 111 MMOL/L (ref 96–108)
CO2 SERPL-SCNC: 25 MMOL/L (ref 22–33)
CREAT SERPL-MCNC: 1.18 MG/DL (ref 0.5–1.2)
EOSINOPHIL # BLD AUTO: 0.26 THOUSAND/ΜL (ref 0–0.61)
EOSINOPHIL NFR BLD AUTO: 6 % (ref 0–6)
ERYTHROCYTE [DISTWIDTH] IN BLOOD BY AUTOMATED COUNT: 15.7 % (ref 11.6–15.1)
GFR SERPL CREATININE-BSD FRML MDRD: 90 ML/MIN/1.73SQ M
GLUCOSE P FAST SERPL-MCNC: 91 MG/DL (ref 70–105)
HCT VFR BLD AUTO: 31.4 % (ref 36.5–49.3)
HGB BLD-MCNC: 10 G/DL (ref 12–17)
IMM GRANULOCYTES # BLD AUTO: 0 THOUSAND/UL (ref 0–0.2)
IMM GRANULOCYTES NFR BLD AUTO: 0 % (ref 0–2)
LYMPHOCYTES # BLD AUTO: 0.47 THOUSANDS/ΜL (ref 0.6–4.47)
LYMPHOCYTES NFR BLD AUTO: 11 % (ref 14–44)
MAGNESIUM SERPL-MCNC: 1.6 MG/DL (ref 1.6–2.6)
MCH RBC QN AUTO: 29.5 PG (ref 26.8–34.3)
MCHC RBC AUTO-ENTMCNC: 31.8 G/DL (ref 31.4–37.4)
MCV RBC AUTO: 93 FL (ref 82–98)
MONOCYTES # BLD AUTO: 0.42 THOUSAND/ΜL (ref 0.17–1.22)
MONOCYTES NFR BLD AUTO: 10 % (ref 4–12)
NEUTROPHILS # BLD AUTO: 3.12 THOUSANDS/ΜL (ref 1.85–7.62)
NEUTS SEG NFR BLD AUTO: 70 % (ref 43–75)
PHOSPHATE SERPL-MCNC: 1.7 MG/DL (ref 2.5–5)
PLATELET # BLD AUTO: 321 THOUSANDS/UL (ref 149–390)
PMV BLD AUTO: 10.5 FL (ref 8.9–12.7)
POTASSIUM SERPL-SCNC: 4.9 MMOL/L (ref 3.5–5)
PROT SERPL-MCNC: 6.4 G/DL (ref 6.4–8.3)
RBC # BLD AUTO: 3.39 MILLION/UL (ref 3.88–5.62)
SODIUM SERPL-SCNC: 139 MMOL/L (ref 133–145)
TACROLIMUS BLD-MCNC: 7.3 NG/ML (ref 2–20)
WBC # BLD AUTO: 4.38 THOUSAND/UL (ref 4.31–10.16)

## 2021-10-04 PROCEDURE — 85025 COMPLETE CBC W/AUTO DIFF WBC: CPT

## 2021-10-04 PROCEDURE — 83735 ASSAY OF MAGNESIUM: CPT

## 2021-10-04 PROCEDURE — 36415 COLL VENOUS BLD VENIPUNCTURE: CPT

## 2021-10-04 PROCEDURE — 80053 COMPREHEN METABOLIC PANEL: CPT

## 2021-10-04 PROCEDURE — 84100 ASSAY OF PHOSPHORUS: CPT

## 2021-10-04 PROCEDURE — 80197 ASSAY OF TACROLIMUS: CPT

## 2021-10-04 PROCEDURE — 87799 DETECT AGENT NOS DNA QUANT: CPT

## 2021-10-06 ENCOUNTER — TELEPHONE (OUTPATIENT)
Dept: GASTROENTEROLOGY | Facility: HOSPITAL | Age: 36
End: 2021-10-06

## 2021-10-07 LAB
BKV DNA # SERPL NAA+PROBE: NEGATIVE COPIES/ML
BKV DNA SERPL NAA+PROBE-LOG#: NORMAL LOG10COPY/ML

## 2021-10-08 LAB — BKV DNA # SERPL NAA+PROBE: <500

## 2021-11-01 DIAGNOSIS — G25.81 RESTLESS LEG: ICD-10-CM

## 2021-11-03 RX ORDER — ROPINIROLE 0.25 MG/1
TABLET, FILM COATED ORAL
Qty: 90 TABLET | Refills: 0 | Status: SHIPPED | OUTPATIENT
Start: 2021-11-03

## 2021-12-08 ENCOUNTER — OFFICE VISIT (OUTPATIENT)
Dept: NEPHROLOGY | Facility: CLINIC | Age: 36
End: 2021-12-08
Payer: MEDICARE

## 2021-12-08 VITALS
WEIGHT: 193 LBS | SYSTOLIC BLOOD PRESSURE: 128 MMHG | HEIGHT: 73 IN | HEART RATE: 70 BPM | DIASTOLIC BLOOD PRESSURE: 78 MMHG | BODY MASS INDEX: 25.58 KG/M2

## 2021-12-08 DIAGNOSIS — Z94.0 RENAL TRANSPLANT RECIPIENT: Primary | ICD-10-CM

## 2021-12-08 PROBLEM — E83.39 HYPERPHOSPHATEMIA: Status: RESOLVED | Noted: 2020-02-10 | Resolved: 2021-12-08

## 2021-12-08 PROBLEM — E87.1 HYPONATREMIA: Status: RESOLVED | Noted: 2020-12-09 | Resolved: 2021-12-08

## 2021-12-08 PROCEDURE — 99214 OFFICE O/P EST MOD 30 MIN: CPT | Performed by: INTERNAL MEDICINE

## 2021-12-08 RX ORDER — ACYCLOVIR 200 MG/1
200 CAPSULE ORAL 2 TIMES DAILY
COMMUNITY

## 2021-12-08 RX ORDER — MYCOPHENOLIC ACID 180 MG/1
720 TABLET, DELAYED RELEASE ORAL 2 TIMES DAILY
COMMUNITY

## 2021-12-08 RX ORDER — INSULIN GLARGINE 100 [IU]/ML
12 INJECTION, SOLUTION SUBCUTANEOUS DAILY
COMMUNITY

## 2021-12-08 RX ORDER — TACROLIMUS 1 MG/1
4 CAPSULE ORAL EVERY 12 HOURS SCHEDULED
COMMUNITY

## 2022-02-22 PROBLEM — N18.6 ESRD (END STAGE RENAL DISEASE) (HCC): Status: ACTIVE | Noted: 2020-03-30

## 2022-02-28 ENCOUNTER — RA CDI HCC (OUTPATIENT)
Dept: OTHER | Facility: HOSPITAL | Age: 37
End: 2022-02-28

## 2022-02-28 NOTE — PROGRESS NOTES
[facility]  coding opportunities        [id number] and E11 [id number]     Chart Reviewed * (Number of) Inbasket suggestions sent to Provider: 2                  Patients insurance company: [employer]

## 2022-03-04 ENCOUNTER — OFFICE VISIT (OUTPATIENT)
Dept: FAMILY MEDICINE CLINIC | Facility: CLINIC | Age: 37
End: 2022-03-04

## 2022-03-04 VITALS
TEMPERATURE: 97.8 F | WEIGHT: 213.8 LBS | BODY MASS INDEX: 28.34 KG/M2 | DIASTOLIC BLOOD PRESSURE: 79 MMHG | RESPIRATION RATE: 16 BRPM | OXYGEN SATURATION: 100 % | HEART RATE: 93 BPM | SYSTOLIC BLOOD PRESSURE: 120 MMHG | HEIGHT: 73 IN

## 2022-03-04 DIAGNOSIS — M25.50 GENERALIZED JOINT PAIN: ICD-10-CM

## 2022-03-04 DIAGNOSIS — Z00.00 ANNUAL PHYSICAL EXAM: Primary | ICD-10-CM

## 2022-03-04 DIAGNOSIS — N18.6 TYPE 2 DIABETES MELLITUS WITH END-STAGE RENAL DISEASE (HCC): ICD-10-CM

## 2022-03-04 DIAGNOSIS — E11.22 TYPE 2 DIABETES MELLITUS WITH END-STAGE RENAL DISEASE (HCC): ICD-10-CM

## 2022-03-04 DIAGNOSIS — Z00.00 HEALTHCARE MAINTENANCE: ICD-10-CM

## 2022-03-04 PROCEDURE — 99213 OFFICE O/P EST LOW 20 MIN: CPT | Performed by: FAMILY MEDICINE

## 2022-03-04 RX ORDER — PEN NEEDLE, DIABETIC 32GX 5/32"
NEEDLE, DISPOSABLE MISCELLANEOUS
COMMUNITY
Start: 2022-02-17

## 2022-03-04 RX ORDER — LANCETS 30 GAUGE
EACH MISCELLANEOUS DAILY
COMMUNITY
Start: 2022-02-17

## 2022-03-04 RX ORDER — ENALAPRIL MALEATE 5 MG/1
5 TABLET ORAL DAILY
COMMUNITY
Start: 2022-02-22

## 2022-03-04 NOTE — ASSESSMENT & PLAN NOTE
- Patient is a 43 y o  male with h/o D7BY complicated by diabetic nephropathy now S/P renal transplant  - BP today 120/79, at goal  - Screening for cardiovascular disease: Recent BMP and Lipid panel reviewed today  - Screening for depression: PHQ-2 score 0  - Immunizations: up to date

## 2022-03-04 NOTE — PROGRESS NOTES
[address] [facility]    NAME: Tyler Willson  AGE: 43 y o  SEX: male  : 1985     DATE: 3/4/2022     Assessment and Plan:     Problem List Items Addressed This Visit        Endocrine    Type 2 diabetes mellitus with end-stage renal disease ([address] )    Relevant Orders    Hemoglobin A1C       Other    Healthcare maintenance     - Patient is a 43 y o  male with h/o N2PP complicated by diabetic nephropathy now S/P renal transplant  - BP today 120/79, at goal  - Screening for cardiovascular disease: Recent BMP and Lipid panel reviewed today  - Screening for depression: PHQ-2 score 0  - Immunizations: up to date           Other Visit Diagnoses     Annual physical exam    -  Primary    Generalized joint pain        Relevant Orders    Ambulatory Referral to Rheumatology          Immunizations and preventive care screenings were discussed with patient today  Appropriate education was printed on patient's after visit summary  Counseling:  Dental Health: discussed importance of regular tooth brushing, flossing, and dental visits  · Exercise: the importance of regular exercise/physical activity was discussed  Recommend exercise 3-5 times per week for at least 30 minutes  BMI Counseling: Body mass index is 28 21 kg/m²  The BMI is above normal  Nutrition recommendations include encouraging healthy choices of fruits and vegetables, limiting drinks that contain sugar, moderation in carbohydrate intake and increasing intake of lean protein  Exercise recommendations include moderate physical activity 150 minutes/week and strength training exercises  Rationale for BMI follow-up plan is due to patient being overweight or obese  Depression Screening and Follow-up Plan: Patient was screened for depression during today's encounter  They screened negative with a PHQ-2 score of 0  Return in 3 months (on 2022)       Chief Ms Aparna Padgett is a current smoker. She told me she is interested in quitting. Complaint:     Chief Complaint   Patient presents with    Annual Exam     muscle weakness, joint pain, pt needs handicap plaque      History of Present Illness:     Adult Annual Physical   Patient here for a comprehensive physical exam  The patient reports no problems  Of note, patient recently had renal transplant in sept 2021 due to ESRD 2/2 diabetic nephropathy  Tolerated procedure well  Kidney function has improved significantly from GFR of 2, now in the 80s  Now on tacrolimus and follows closely with his nephrologist and Endocrinology from [facility]  Has upcoming endocrinology appointment for diabetes on 3/31/2022  No new complaints today  He request referral to rheumatologist for generalized joint pain  Diet and Physical Activity  · Diet/Nutrition: well balanced diet, diabetic diet and consuming 3-5 servings of fruits/vegetables daily  · Exercise: no formal exercise  Depression Screening  PHQ-2/9 Depression Screening    Little interest or pleasure in doing things: 0 - not at all  Feeling down, depressed, or hopeless: 0 - not at all  PHQ-2 Score: 0  PHQ-2 Interpretation: Negative depression screen       General Health  · Sleep: sleeps well and gets 7-8 hours of sleep on average  · Hearing: normal - bilateral   · Vision: no vision problems  · Dental: no dental visits for >1 year, brushes teeth twice daily and flosses teeth occasionally  [facility]  · History of STDs?: no      Review of Systems:     Review of Systems   Constitutional: Negative for chills and fever  HENT: Negative for ear pain and sore throat  Eyes: Negative for visual disturbance  Respiratory: Negative for cough and shortness of breath  Cardiovascular: Negative for chest pain and palpitations  Gastrointestinal: Negative for abdominal pain, diarrhea, nausea and vomiting  Skin: Negative for rash  All other systems reviewed and are negative       Past Medical History:     Past Medical History:   Diagnosis Date    Anemia due to chronic kidney disease 9/13/2019    Chronic kidney disease     CKD (chronic kidney disease) 2/21/2019    Class 1 obesity due to excess calories with serious comorbidity and body mass index (BMI) of 31 0 to 31 9 in adult 5/25/2019    Diabetes mellitus ([facility] )     Essential hypertension 10/31/2017    Hyperlipidemia     NSTEMI (non-ST elevated myocardial infarction) ([facility] ) 5/21/2019      Past Surgical History:     Past Surgical History:   Procedure Laterality Date    ABCESS DRAINAGE      tooth    CT NEEDLE BIOPSY KIDNEY  10/24/2019    PERITONEAL CATHETER INSERTION N/A 2/13/2020    Procedure: INSERTION PERITONEAL CATHETER DIALYSIS LAPAROSCOPIC;  Surgeon: Nathalie Orourke MD;  [facility];  Service: General    FL KNEE SCOPE,MED/LAT MENISECTOMY Right 9/13/2018    Procedure: RIGHT KNEE ARTHROSCOPIC PARTIAL MEDIAL MENISCECTOMY;  Surgeon: Nahid Salgado MD;  Location: [facility];  Service: Orthopedics    WRIST ARTHROPLASTY Right 2017      Social History:     Social History     Socioeconomic History    Marital status: /Civil Union     Spouse name: None    Number of children: None    Years of education: None    Highest education level: None   Occupational History    None   Tobacco Use    Smoking status: Never Smoker    Smokeless tobacco: Never Used   Vaping Use    Vaping Use: Never used   Substance and Sexual Activity    Alcohol use: Yes     Comment: occasionally    Drug use: No    Sexual activity: Yes     Partners: Female   Other Topics Concern    None   Social History Narrative    None     Social Determinants of Health     Financial Resource Strain: Not on file   Food Insecurity: Not on file   Transportation Needs: Not on file   Physical Activity: Not on file   Stress: Not on file   Social Connections: Not on file   Intimate Partner Violence: Not on file   Housing Stability: Not on file      Family History:     Family History   Problem Relation Age of Onset    Hypertension Mother     Multiple sclerosis Mother     Diabetes Father       Current Medications:     Current Outpatient Medications   Medication Sig Dispense Refill    acetaminophen (TYLENOL) 325 mg tablet Take 2 tablets (650 mg total) by mouth every 6 (six) hours as needed for mild pain or fever  0    acyclovir (ZOVIRAX) 200 mg capsule Take 200 mg by mouth 2 (two) times a day      albuterol (PROVENTIL HFA,VENTOLIN HFA) 90 mcg/act inhaler Inhale 2 puffs every 4 (four) hours as needed for wheezing 1 Inhaler 0    aspirin (ECOTRIN LOW STRENGTH) 81 mg EC tablet Take 1 tablet (81 mg total) by mouth daily  0    BD Pen Needle Yocasta 2nd Gen 32G X 4 MM MISC 1 STICK BY MISCELLANEOUS ROUTE DAILY        Blood Glucose Monitoring Suppl (Jairo Shepard) w/Device KIT by Does not apply route 3 (three) times a day 1 kit 0    carvedilol (COREG) 25 mg tablet TAKE ONE TABLET BY MOUTH TWICE DAILY (Patient taking differently: Take 12 5 mg by mouth 2 (two) times a day with meals  ) 60 tablet 11    insulin glargine (LANTUS) 100 units/mL subcutaneous injection Inject 12 Units under the skin daily      insulin lispro (HumaLOG) 100 units/mL injection Inject under the skin SLIDING SCALE      Lancets (OneTouch Delica Plus HIGAFV95A) MISC daily Test      tacrolimus (PROGRAF) 1 mg capsule Take 5 mg by mouth every 12 (twelve) hours      ascorbic acid (VITAMIN C) 1000 MG tablet Take 1 tablet (1,000 mg total) by mouth every 12 (twelve) hours for 5 doses (Patient not taking: Reported on 3/4/2022 ) 10 tablet 0    B Complex-C-Folic Acid (Fidel Caps) 1 MG CAPS  (Patient not taking: Reported on 12/8/2021 )      B Complex-C-Folic Acid (Semmes Caps) 1 MG CAPS Take 1 capsule by mouth (Patient not taking: Reported on 6/25/2021)      Calcium Carb-Cholecalciferol 600-1000 MG-UNIT CAPS Take by mouth (Patient not taking: Reported on 3/4/2022 )      cholecalciferol (VITAMIN D3) 1,000 units tablet Take 1,000 Units by mouth daily (Patient not taking: Reported on 12/8/2021 )      enalapril (VASOTEC) 5 mg tablet       gentamicin (GARAMYCIN) 0 1 % cream  (Patient not taking: Reported on 12/8/2021 )      lisinopril (ZESTRIL) 10 mg tablet Take 1 tablet (10 mg total) by mouth daily (Patient not taking: Reported on 12/8/2021 ) 180 tablet 3    methocarbamol (ROBAXIN) 500 mg tablet  (Patient not taking: Reported on 12/8/2021 )      Multiple Vitamins-Iron (Daily Blue Multivitamin/Iron) TABS  (Patient not taking: Reported on 12/8/2021 )      mycophenolic acid (MYFORTIC) 456 mg EC tablet Take 720 mg by mouth 2 (two) times a day (Patient not taking: Reported on 3/4/2022 )      NIFEdipine ER (ADALAT CC) 60 MG 24 hr tablet Take 1 tablet (60 mg total) by mouth daily (Patient not taking: Reported on 12/8/2021 ) 60 tablet 5    NYSTATIN PO Take 5 mg by mouth 2 (two) times a day (Patient not taking: Reported on 3/4/2022 )      ondansetron (ZOFRAN-ODT) 4 mg disintegrating tablet Take 1 tablet (4 mg total) by mouth every 8 (eight) hours as needed for nausea or vomiting (Patient not taking: Reported on 8/3/2021) 10 tablet 0    rOPINIRole (REQUIP) 0 25 mg tablet TAKE 1 TABLET BY MOUTH EVERY DAY IN THE EVENING (Patient not taking: Reported on 12/8/2021) 90 tablet 0    rosuvastatin (CRESTOR) 5 mg tablet Take 1 tablet (5 mg total) by mouth daily (Patient not taking: Reported on 3/4/2022 )  0    sevelamer carbonate (RENVELA) 800 mg tablet Take 2,400 mg by mouth 3 (three) times a day with meals (Patient not taking: Reported on 12/8/2021 )      Sulfamethoxazole-Trimethoprim (BACTRIM PO) Take 80 mg by mouth 3 (three) times a week (Patient not taking: Reported on 3/4/2022 )      zinc sulfate (ZINCATE) 220 mg capsule Take 1 capsule (220 mg total) by mouth daily for 5 doses 5 capsule 0     No current facility-administered medications for this visit        Allergies:     No Known Allergies   Physical Exam:     /79 (BP Location: Left arm, Patient Position: Sitting, Cuff Size: Large) Pulse 93   Temp 97 8 °F (36 6 °C) (Temporal)   Resp 16   Ht 6' 1" (1 854 m)   Wt 97 kg (213 lb 12 8 oz)   SpO2 100%   BMI 28 21 kg/m²     Physical Exam  Vitals and nursing note reviewed  Constitutional:       General: He is not in acute distress  Appearance: Normal appearance  He is well-developed  He is not ill-appearing, toxic-appearing or diaphoretic  HENT:      Head: Normocephalic and atraumatic  Right Ear: Tympanic membrane, ear canal and external ear normal       Left Ear: Tympanic membrane, ear canal and external ear normal       Nose: Nose normal  No congestion or rhinorrhea  Mouth/Throat:      Mouth: Mucous membranes are moist    Eyes:      General:         Right eye: No discharge  Left eye: No discharge  Extraocular Movements: Extraocular movements intact  Conjunctiva/sclera: Conjunctivae normal       Pupils: Pupils are equal, round, and reactive to light  Cardiovascular:      Rate and Rhythm: Normal rate and regular rhythm  Heart sounds: No murmur heard  Pulmonary:      Effort: Pulmonary effort is normal  No respiratory distress  Breath sounds: Normal breath sounds  Abdominal:      Palpations: Abdomen is soft  Tenderness: There is no abdominal tenderness  Musculoskeletal:      Cervical back: Neck supple  Right lower leg: No edema  Left lower leg: No edema  Skin:     General: Skin is warm and dry  Neurological:      Mental Status: He is alert and oriented to person, place, and time            Sascha Romo MD   [address]

## 2022-03-04 NOTE — PATIENT INSTRUCTIONS

## 2022-03-09 ENCOUNTER — TELEPHONE (OUTPATIENT)
Dept: NEPHROLOGY | Facility: CLINIC | Age: 37
End: 2022-03-09

## 2022-03-09 ENCOUNTER — TELEPHONE (OUTPATIENT)
Dept: FAMILY MEDICINE CLINIC | Facility: CLINIC | Age: 37
End: 2022-03-09

## 2022-03-09 NOTE — TELEPHONE ENCOUNTER
Folder (To be completed by) -   Dr Eun Kumar  Name of [facility]  Form to be Faxed (Fax #), Mailed (Address), or Picked up (By whom) -   fax and address on form  Patient was made aware of the 7 business day form policy     brought in at office visit 03/04/21

## 2022-05-17 ENCOUNTER — TELEPHONE (OUTPATIENT)
Dept: NEPHROLOGY | Facility: CLINIC | Age: 37
End: 2022-05-17

## 2022-05-17 NOTE — TELEPHONE ENCOUNTER
Cary Mendoza from Dr Jerl Merlin office called patient needs clearance for an electric heart catheretization with possible PCI  The office number is [phone]

## 2022-05-24 ENCOUNTER — OFFICE VISIT (OUTPATIENT)
Dept: NEPHROLOGY | Facility: CLINIC | Age: 37
End: 2022-05-24
Payer: MEDICARE

## 2022-05-24 VITALS
DIASTOLIC BLOOD PRESSURE: 70 MMHG | SYSTOLIC BLOOD PRESSURE: 120 MMHG | BODY MASS INDEX: 29.82 KG/M2 | WEIGHT: 225 LBS | HEIGHT: 73 IN | HEART RATE: 80 BPM

## 2022-05-24 DIAGNOSIS — Z94.0 RENAL TRANSPLANT RECIPIENT: Primary | ICD-10-CM

## 2022-05-24 PROBLEM — D63.1 ANEMIA DUE TO CHRONIC KIDNEY DISEASE: Status: RESOLVED | Noted: 2019-09-13 | Resolved: 2022-05-24

## 2022-05-24 PROBLEM — N18.6 ESRD (END STAGE RENAL DISEASE) (HCC): Status: RESOLVED | Noted: 2020-03-30 | Resolved: 2022-05-24

## 2022-05-24 PROBLEM — Z99.2 ESRD ON PERITONEAL DIALYSIS (HCC): Status: RESOLVED | Noted: 2020-01-21 | Resolved: 2022-05-24

## 2022-05-24 PROBLEM — N18.6 ESRD ON PERITONEAL DIALYSIS (HCC): Status: RESOLVED | Noted: 2020-01-21 | Resolved: 2022-05-24

## 2022-05-24 PROBLEM — N18.9 ANEMIA DUE TO CHRONIC KIDNEY DISEASE: Status: RESOLVED | Noted: 2019-09-13 | Resolved: 2022-05-24

## 2022-05-24 PROCEDURE — 99213 OFFICE O/P EST LOW 20 MIN: CPT | Performed by: INTERNAL MEDICINE

## 2022-05-24 RX ORDER — EZETIMIBE 10 MG/1
10 TABLET ORAL DAILY
COMMUNITY

## 2022-05-24 NOTE — PROGRESS NOTES
NEPHROLOGY PROGRESS NOTE    Wilbur Blackwood 42 y o  male MRN: [id number]  Unit/Bed#:  Encounter: [id number]  Reason for Consult:  Renal transplant recipient    Patient is awake and alert looks great says he is feeling well  He wants to start working out a little bit to get in better shape  Overall kidney functions doing great he has no acute complaints for me  He did tell me that he has been on low-dose insulin his blood sugars have been doing well  We reviewed his medications including immunosuppressives  ASSESSMENT/PLAN:  1  Renal    Patient is status post renal transplantation and is approaching his yearly anniversary  Latest creatinine is 1 1 he has excellent hemoglobin and all his labs are doing amazing  Lipid profile is excellent as well and blood pressure is excellent  We reviewed his immunosuppression in the told me he was recently dropped to tacrolimus 4 mg every 12 hours and he is on Myfortic 720 mg twice a day  He has excellent allograft function no complaints today will continue with his current immunosuppression  He is getting labs done monthly and has his transplant labs  He seeing the transplant team for next visit and then I will be seeing him back after that  2  Hypertension    Blood pressure is excellent no changes  SUBJECTIVE:  Review of Systems   Constitutional: Negative for chills, fever, malaise/fatigue and night sweats  HENT: Negative  Eyes: Negative  Cardiovascular: Negative  Negative for chest pain, dyspnea on exertion, leg swelling and orthopnea  Respiratory: Negative  Negative for cough, shortness of breath, sputum production and wheezing  Gastrointestinal: Negative for bloating, abdominal pain, diarrhea, nausea and vomiting  Genitourinary: Negative for dysuria, flank pain, hematuria and incomplete emptying  Neurological: Negative for dizziness, focal weakness, headaches and weakness     Psychiatric/Behavioral: Negative for altered mental status, depression, hallucinations and hypervigilance  OBJECTIVE:  Current Weight: Weight - Scale: 102 kg (225 lb)  [email or web address] com:     Height 6' 1" (1 854 m), weight 102 kg (225 lb)  , Body mass index is 29 69 kg/m²  [unfilled]    Physical Exam: Ht 6' 1" (1 854 m)   Wt 102 kg (225 lb)   BMI 29 69 kg/m²   Physical Exam  Constitutional:       General: He is not in acute distress  Appearance: He is not toxic-appearing or diaphoretic  HENT:      Head: Normocephalic and atraumatic  Nose:      Comments: Wearing mask  Mouth/Throat:      Comments: Wearing mask  Eyes:      General: No scleral icterus  Extraocular Movements: Extraocular movements intact  Cardiovascular:      Rate and Rhythm: Normal rate and regular rhythm  Heart sounds: No friction rub  No gallop  Comments: No edema  Pulmonary:      Effort: Pulmonary effort is normal  No respiratory distress  Breath sounds: Normal breath sounds  No wheezing, rhonchi or rales  Abdominal:      General: Bowel sounds are normal  There is no distension  Palpations: Abdomen is soft  Tenderness: There is no abdominal tenderness  There is no rebound  Musculoskeletal:      Cervical back: Normal range of motion and neck supple  Neurological:      General: No focal deficit present  Mental Status: He is alert and oriented to person, place, and time  Mental status is at baseline  Psychiatric:         Mood and Affect: Mood normal          Behavior: Behavior normal          Thought Content:  Thought content normal          Judgment: Judgment normal          Medications:    Current Outpatient Medications:     acetaminophen (TYLENOL) 325 mg tablet, Take 2 tablets (650 mg total) by mouth every 6 (six) hours as needed for mild pain or fever, Disp:  , Rfl: 0    acyclovir (ZOVIRAX) 200 mg capsule, Take 200 mg by mouth 2 (two) times a day, Disp: , Rfl:     albuterol (PROVENTIL HFA,VENTOLIN HFA) 90 mcg/act inhaler, Inhale 2 puffs every 4 (four) hours as needed for wheezing, Disp: 1 Inhaler, Rfl: 0    ascorbic acid (VITAMIN C) 1000 MG tablet, Take 1 tablet (1,000 mg total) by mouth every 12 (twelve) hours for 5 doses (Patient not taking: Reported on 3/4/2022 ), Disp: 10 tablet, Rfl: 0    aspirin (ECOTRIN LOW STRENGTH) 81 mg EC tablet, Take 1 tablet (81 mg total) by mouth daily, Disp: , Rfl: 0    B Complex-C-Folic Acid ([place] Caps) 1 MG CAPS, , Disp: , Rfl:     B Complex-C-Folic Acid ([place] Caps) 1 MG CAPS, Take 1 capsule by mouth (Patient not taking: Reported on 6/25/2021), Disp: , Rfl:     BD Pen Needle Yocasta 2nd Gen 32G X 4 MM MISC, 1 STICK BY MISCELLANEOUS ROUTE DAILY  , Disp: , Rfl:     Blood Glucose Monitoring Suppl ([place]ce [place]) w/Device KIT, by Does not apply route 3 (three) times a day, Disp: 1 kit, Rfl: 0    Calcium Carb-Cholecalciferol 600-1000 MG-UNIT CAPS, Take by mouth (Patient not taking: Reported on 3/4/2022 ), Disp: , Rfl:     carvedilol (COREG) 25 mg tablet, TAKE ONE TABLET BY MOUTH TWICE DAILY (Patient taking differently: Take 12 5 mg by mouth 2 (two) times a day with meals  ), Disp: 60 tablet, Rfl: 11    cholecalciferol (VITAMIN D3) 1,000 units tablet, Take 1,000 Units by mouth daily (Patient not taking: Reported on 12/8/2021 ), Disp: , Rfl:     enalapril (VASOTEC) 5 mg tablet, , Disp: , Rfl:     gentamicin (GARAMYCIN) 0 1 % cream, , Disp: , Rfl:     insulin glargine (LANTUS) 100 units/mL subcutaneous injection, Inject 12 Units under the skin daily, Disp: , Rfl:     insulin lispro (HumaLOG) 100 units/mL injection, Inject under the skin SLIDING SCALE, Disp: , Rfl:     Lancets (OneTouch Delica Plus [id number]) MISC, daily Test, Disp: , Rfl:     lisinopril (ZESTRIL) 10 mg tablet, Take 1 tablet (10 mg total) by mouth daily (Patient not taking: Reported on 12/8/2021 ), Disp: 180 tablet, Rfl: 3    methocarbamol (ROBAXIN) 500 mg tablet, , Disp: , Rfl:     Multiple Vitamins-Iron (Daily Blue Multivitamin/Iron) TABS, , Disp: , Rfl:     mycophenolic acid (MYFORTIC) 138 mg EC tablet, Take 720 mg by mouth 2 (two) times a day (Patient not taking: Reported on 3/4/2022 ), Disp: , Rfl:     NIFEdipine ER (ADALAT CC) 60 MG 24 hr tablet, Take 1 tablet (60 mg total) by mouth daily (Patient not taking: Reported on 12/8/2021 ), Disp: 60 tablet, Rfl: 5    NYSTATIN PO, Take 5 mg by mouth 2 (two) times a day (Patient not taking: Reported on 3/4/2022 ), Disp: , Rfl:     ondansetron (ZOFRAN-ODT) 4 mg disintegrating tablet, Take 1 tablet (4 mg total) by mouth every 8 (eight) hours as needed for nausea or vomiting (Patient not taking: Reported on 8/3/2021), Disp: 10 tablet, Rfl: 0    rOPINIRole (REQUIP) 0 25 mg tablet, TAKE 1 TABLET BY MOUTH EVERY DAY IN THE EVENING (Patient not taking: Reported on 12/8/2021), Disp: 90 tablet, Rfl: 0    rosuvastatin (CRESTOR) 5 mg tablet, Take 1 tablet (5 mg total) by mouth daily (Patient not taking: Reported on 3/4/2022 ), Disp: , Rfl: 0    sevelamer carbonate (RENVELA) 800 mg tablet, Take 2,400 mg by mouth 3 (three) times a day with meals (Patient not taking: Reported on 12/8/2021 ), Disp: , Rfl:     Sulfamethoxazole-Trimethoprim (BACTRIM PO), Take 80 mg by mouth 3 (three) times a week (Patient not taking: Reported on 3/4/2022 ), Disp: , Rfl:     tacrolimus (PROGRAF) 1 mg capsule, Take 5 mg by mouth every 12 (twelve) hours, Disp: , Rfl:     zinc sulfate (ZINCATE) 220 mg capsule, Take 1 capsule (220 mg total) by mouth daily for 5 doses, Disp: 5 capsule, Rfl: 0    Laboratory Results:  Lab Results   Component Value Date    WBC 4 38 10/04/2021    HGB 10 0 (L) 10/04/2021    HCT 31 4 (L) 10/04/2021    MCV 93 10/04/2021     10/04/2021     Lab Results   Component Value Date    SODIUM 139 10/04/2021    K 4 9 10/04/2021     (H) 10/04/2021    CO2 25 10/04/2021    BUN 22 (H) 10/04/2021    CREATININE 1 18 10/04/2021    GLUC 94 02/24/2021    CALCIUM 9 3 10/04/2021     Lab Results Component Value Date    CALCIUM 9 3 10/04/2021    PHOS 1 7 (L) 10/04/2021     No results found for: LABPROT

## 2022-05-24 NOTE — LETTER
May 24, 2022     Margot Peacock MD  [address] [address]    Patient: Karen Dawn   YOB: 1985   Date of Visit: 5/24/2022       Dear Dr Yamileth Rodriguez:    Thank you for referring Yamilka Morrison to me for evaluation  Below are my notes for this consultation  If you have questions, please do not hesitate to call me  I look forward to following your patient along with you  Sincerely,        Alvin Knowles MD        CC: MD Alvin Bhandari MD  5/24/2022  5:08 PM  Sign when Signing Visit  NEPHROLOGY PROGRESS NOTE    Karen Dawn 42 y o  male MRN: [id number]  Unit/Bed#:  Encounter: [id number]  Reason for Consult:  Renal transplant recipient    Patient is awake and alert looks great says he is feeling well  He wants to start working out a little bit to get in better shape  Overall kidney functions doing great he has no acute complaints for me  He did tell me that he has been on low-dose insulin his blood sugars have been doing well  We reviewed his medications including immunosuppressives  ASSESSMENT/PLAN:  1  Renal    Patient is status post renal transplantation and is approaching his yearly anniversary  Latest creatinine is 1 1 he has excellent hemoglobin and all his labs are doing amazing  Lipid profile is excellent as well and blood pressure is excellent  We reviewed his immunosuppression in the told me he was recently dropped to tacrolimus 4 mg every 12 hours and he is on Myfortic 720 mg twice a day  He has excellent allograft function no complaints today will continue with his current immunosuppression  He is getting labs done monthly and has his transplant labs  He seeing the transplant team for next visit and then I will be seeing him back after that  2  Hypertension    Blood pressure is excellent no changes  SUBJECTIVE:  Review of Systems   Constitutional: Negative for chills, fever, malaise/fatigue and night sweats  HENT: Negative  Eyes: Negative  Cardiovascular: Negative  Negative for chest pain, dyspnea on exertion, leg swelling and orthopnea  Respiratory: Negative  Negative for cough, shortness of breath, sputum production and wheezing  Gastrointestinal: Negative for bloating, abdominal pain, diarrhea, nausea and vomiting  Genitourinary: Negative for dysuria, flank pain, hematuria and incomplete emptying  Neurological: Negative for dizziness, focal weakness, headaches and weakness  Psychiatric/Behavioral: Negative for altered mental status, depression, hallucinations and hypervigilance  OBJECTIVE:  Current Weight: Weight - Scale: 102 kg (225 lb)  [email or web address] com:     Height 6' 1" (1 854 m), weight 102 kg (225 lb)  , Body mass index is 29 69 kg/m²  [unfilled]    Physical Exam: Ht 6' 1" (1 854 m)   Wt 102 kg (225 lb)   BMI 29 69 kg/m²   Physical Exam  Constitutional:       General: He is not in acute distress  Appearance: He is not toxic-appearing or diaphoretic  HENT:      Head: Normocephalic and atraumatic  Nose:      Comments: Wearing mask  Mouth/Throat:      Comments: Wearing mask  Eyes:      General: No scleral icterus  Extraocular Movements: Extraocular movements intact  Cardiovascular:      Rate and Rhythm: Normal rate and regular rhythm  Heart sounds: No friction rub  No gallop  Comments: No edema  Pulmonary:      Effort: Pulmonary effort is normal  No respiratory distress  Breath sounds: Normal breath sounds  No wheezing, rhonchi or rales  Abdominal:      General: Bowel sounds are normal  There is no distension  Palpations: Abdomen is soft  Tenderness: There is no abdominal tenderness  There is no rebound  Musculoskeletal:      Cervical back: Normal range of motion and neck supple  Neurological:      General: No focal deficit present  Mental Status: He is alert and oriented to person, place, and time  Mental status is at baseline  Psychiatric:         Mood and Affect: Mood normal          Behavior: Behavior normal          Thought Content: Thought content normal          Judgment: Judgment normal          Medications:    Current Outpatient Medications:     acetaminophen (TYLENOL) 325 mg tablet, Take 2 tablets (650 mg total) by mouth every 6 (six) hours as needed for mild pain or fever, Disp:  , Rfl: 0    acyclovir (ZOVIRAX) 200 mg capsule, Take 200 mg by mouth 2 (two) times a day, Disp: , Rfl:     albuterol (PROVENTIL HFA,VENTOLIN HFA) 90 mcg/act inhaler, Inhale 2 puffs every 4 (four) hours as needed for wheezing, Disp: 1 Inhaler, Rfl: 0    ascorbic acid (VITAMIN C) 1000 MG tablet, Take 1 tablet (1,000 mg total) by mouth every 12 (twelve) hours for 5 doses (Patient not taking: Reported on 3/4/2022 ), Disp: 10 tablet, Rfl: 0    aspirin (ECOTRIN LOW STRENGTH) 81 mg EC tablet, Take 1 tablet (81 mg total) by mouth daily, Disp: , Rfl: 0    B Complex-C-Folic Acid ([place] Caps) 1 MG CAPS, , Disp: , Rfl:     B Complex-C-Folic Acid ([place] Caps) 1 MG CAPS, Take 1 capsule by mouth (Patient not taking: Reported on 6/25/2021), Disp: , Rfl:     BD Pen Needle Yocasta 2nd Gen 32G X 4 MM MISC, 1 STICK BY MISCELLANEOUS ROUTE DAILY  , Disp: , Rfl:     Blood Glucose Monitoring Suppl Jose Kunz) w/Device KIT, by Does not apply route 3 (three) times a day, Disp: 1 kit, Rfl: 0    Calcium Carb-Cholecalciferol 600-1000 MG-UNIT CAPS, Take by mouth (Patient not taking: Reported on 3/4/2022 ), Disp: , Rfl:     carvedilol (COREG) 25 mg tablet, TAKE ONE TABLET BY MOUTH TWICE DAILY (Patient taking differently: Take 12 5 mg by mouth 2 (two) times a day with meals  ), Disp: 60 tablet, Rfl: 11    cholecalciferol (VITAMIN D3) 1,000 units tablet, Take 1,000 Units by mouth daily (Patient not taking: Reported on 12/8/2021 ), Disp: , Rfl:     enalapril (VASOTEC) 5 mg tablet, , Disp: , Rfl:     gentamicin (GARAMYCIN) 0 1 % cream, , Disp: , Rfl:     insulin glargine (LANTUS) 100 units/mL subcutaneous injection, Inject 12 Units under the skin daily, Disp: , Rfl:     insulin lispro (HumaLOG) 100 units/mL injection, Inject under the skin SLIDING SCALE, Disp: , Rfl:     Lancets (OneTouch Delica Plus [id number]) MISC, daily Test, Disp: , Rfl:     lisinopril (ZESTRIL) 10 mg tablet, Take 1 tablet (10 mg total) by mouth daily (Patient not taking: Reported on 12/8/2021 ), Disp: 180 tablet, Rfl: 3    methocarbamol (ROBAXIN) 500 mg tablet, , Disp: , Rfl:     Multiple Vitamins-Iron (Daily Blue Multivitamin/Iron) TABS, , Disp: , Rfl:     mycophenolic acid (MYFORTIC) 291 mg EC tablet, Take 720 mg by mouth 2 (two) times a day (Patient not taking: Reported on 3/4/2022 ), Disp: , Rfl:     NIFEdipine ER (ADALAT CC) 60 MG 24 hr tablet, Take 1 tablet (60 mg total) by mouth daily (Patient not taking: Reported on 12/8/2021 ), Disp: 60 tablet, Rfl: 5    NYSTATIN PO, Take 5 mg by mouth 2 (two) times a day (Patient not taking: Reported on 3/4/2022 ), Disp: , Rfl:     ondansetron (ZOFRAN-ODT) 4 mg disintegrating tablet, Take 1 tablet (4 mg total) by mouth every 8 (eight) hours as needed for nausea or vomiting (Patient not taking: Reported on 8/3/2021), Disp: 10 tablet, Rfl: 0    rOPINIRole (REQUIP) 0 25 mg tablet, TAKE 1 TABLET BY MOUTH EVERY DAY IN THE EVENING (Patient not taking: Reported on 12/8/2021), Disp: 90 tablet, Rfl: 0    rosuvastatin (CRESTOR) 5 mg tablet, Take 1 tablet (5 mg total) by mouth daily (Patient not taking: Reported on 3/4/2022 ), Disp: , Rfl: 0    sevelamer carbonate (RENVELA) 800 mg tablet, Take 2,400 mg by mouth 3 (three) times a day with meals (Patient not taking: Reported on 12/8/2021 ), Disp: , Rfl:     Sulfamethoxazole-Trimethoprim (BACTRIM PO), Take 80 mg by mouth 3 (three) times a week (Patient not taking: Reported on 3/4/2022 ), Disp: , Rfl:     tacrolimus (PROGRAF) 1 mg capsule, Take 5 mg by mouth every 12 (twelve) hours, Disp: , Rfl:     zinc sulfate (ZINCATE) 220 mg capsule, Take 1 capsule (220 mg total) by mouth daily for 5 doses, Disp: 5 capsule, Rfl: 0    Laboratory Results:  Lab Results   Component Value Date    WBC 4 38 10/04/2021    HGB 10 0 (L) 10/04/2021    HCT 31 4 (L) 10/04/2021    MCV 93 10/04/2021     10/04/2021     Lab Results   Component Value Date    SODIUM 139 10/04/2021    K 4 9 10/04/2021     (H) 10/04/2021    CO2 25 10/04/2021    BUN 22 (H) 10/04/2021    CREATININE 1 18 10/04/2021    GLUC 94 02/24/2021    CALCIUM 9 3 10/04/2021     Lab Results   Component Value Date    CALCIUM 9 3 10/04/2021    PHOS 1 7 (L) 10/04/2021     No results found for: LABPROT

## 2022-06-02 DIAGNOSIS — I21.4 NSTEMI (NON-ST ELEVATED MYOCARDIAL INFARCTION) (HCC): ICD-10-CM

## 2022-06-02 RX ORDER — CARVEDILOL 25 MG/1
12.5 TABLET ORAL 2 TIMES DAILY WITH MEALS
Qty: 60 TABLET | Refills: 5 | Status: SHIPPED | OUTPATIENT
Start: 2022-06-02 | End: 2022-06-16

## 2022-06-10 NOTE — ASSESSMENT & PLAN NOTE
I called and spoke with Ann Jeanne Valverde and also with Abdiaziz Hernandez.  He is agreeable to the Prednisone and voiced understanding. I explained to let us know and if he is not improving then Dr. Naveed Wheeler will order IVIG. · MAGNOLIA on CKD in the setting of diabetic nephropathy and hypertensive nephrosclerosis  · Baseline creatinine 2 2-2 5  · Nephrology following:  · Renal ultrasound without hydronephrosis  Very low PVR  Not on Ace or Arb    · Diuretics resumed: demadex 20mg daily   · Pt was advised to weigh himself daily and call with a weight gain or loss of 3lbs in one day or 5lbs in one week   · Avoid nephrotoxins and hypotension  · BMP in one week   · Outpatient f/u with Dr Linda Nina after discharge

## 2022-06-16 DIAGNOSIS — I21.4 NSTEMI (NON-ST ELEVATED MYOCARDIAL INFARCTION) (HCC): ICD-10-CM

## 2022-06-16 RX ORDER — CARVEDILOL 25 MG/1
TABLET ORAL
Qty: 90 TABLET | Refills: 4 | Status: SHIPPED | OUTPATIENT
Start: 2022-06-16

## 2022-09-16 ENCOUNTER — RA CDI HCC (OUTPATIENT)
Dept: OTHER | Facility: HOSPITAL | Age: 37
End: 2022-09-16

## 2022-09-16 NOTE — PROGRESS NOTES
[facility] coding opportunities        [id number] and [id number]  Chart Reviewed number of suggestions sent to Provider: 2     Patients Insurance     Medicare Insurance: Medicare

## 2022-09-21 DIAGNOSIS — I12.0 BENIGN HYPERTENSION WITH CKD (CHRONIC KIDNEY DISEASE) STAGE V (HCC): ICD-10-CM

## 2022-09-21 DIAGNOSIS — N18.5 BENIGN HYPERTENSION WITH CKD (CHRONIC KIDNEY DISEASE) STAGE V (HCC): ICD-10-CM

## 2022-09-22 RX ORDER — LISINOPRIL 10 MG/1
TABLET ORAL
Qty: 90 TABLET | Refills: 1 | Status: SHIPPED | OUTPATIENT
Start: 2022-09-22

## 2022-10-11 PROBLEM — Z00.00 HEALTHCARE MAINTENANCE: Status: RESOLVED | Noted: 2018-07-17 | Resolved: 2022-10-11

## 2022-10-12 PROBLEM — J12.82 PNEUMONIA DUE TO COVID-19 VIRUS: Status: RESOLVED | Noted: 2020-12-08 | Resolved: 2022-10-12

## 2022-10-12 PROBLEM — A04.72 CLOSTRIDIOIDES DIFFICILE DIARRHEA: Status: RESOLVED | Noted: 2021-02-11 | Resolved: 2022-10-12

## 2022-10-12 PROBLEM — U07.1 PNEUMONIA DUE TO COVID-19 VIRUS: Status: RESOLVED | Noted: 2020-12-08 | Resolved: 2022-10-12

## 2022-12-07 ENCOUNTER — TELEPHONE (OUTPATIENT)
Dept: NEPHROLOGY | Facility: CLINIC | Age: 37
End: 2022-12-07

## 2022-12-07 ENCOUNTER — TELEPHONE (OUTPATIENT)
Dept: FAMILY MEDICINE CLINIC | Facility: CLINIC | Age: 37
End: 2022-12-07

## 2022-12-07 DIAGNOSIS — N18.6 TYPE 2 DIABETES MELLITUS WITH END-STAGE RENAL DISEASE (HCC): Primary | ICD-10-CM

## 2022-12-07 DIAGNOSIS — I10 ESSENTIAL HYPERTENSION: ICD-10-CM

## 2022-12-07 DIAGNOSIS — E11.22 TYPE 2 DIABETES MELLITUS WITH END-STAGE RENAL DISEASE (HCC): Primary | ICD-10-CM

## 2022-12-07 NOTE — TELEPHONE ENCOUNTER
----- Message from Martita James MD sent at 12/7/2022  3:44 PM EST -----  Cmp,cbc, tacrolimus trough level   ----- Message -----  From: Mirna Johnson  Sent: 12/7/2022   3:12 PM EST  To: Martita James MD    What labs would you like before the follow up appointment?

## 2023-01-04 NOTE — PROGRESS NOTES
Pt transferred to [address] [place] [address] with staff members and telemetry working  Pt did not report any discomfort or pain at this time  Family at bedside  Blood pressure rechecked after Previous RN administered early medication  Blood pressure is trending downward  No questions or concerns reported at this time  Pt asymptomatic  [de-identified] : \par Indication:\par Biceps tendinitis of left shoulder\par SLAP lesion of left shoulder\par \par Consent: \par At this time, I have recommended an injection to the left shoulder.  The risks and benefits of the procedure were discussed with the patient in detail.  Upon verbal consent of the patient, we proceeded with the injection as noted below.  \par \par Description of Procedure:  \par After a sterile prep, the patient underwent an injection of approximately 9 mL of 1% Lidocaine 10 mg/mL without epinephrine and 1 mL of Kenalog (40 mg/mL) into the left shoulder.  The patient tolerated the procedure well.  There were no complications. \par \par : [employer]par Drug Name: Triamcinolone Acetonide Injectable Suspension USP\par NDC#: 0143-9577-10\par Lot#:   [id number]\par Expiration Date: 01/2024\par \par \par

## 2023-01-13 ENCOUNTER — TELEPHONE (OUTPATIENT)
Dept: NEPHROLOGY | Facility: CLINIC | Age: 38
End: 2023-01-13

## 2023-01-19 ENCOUNTER — APPOINTMENT (OUTPATIENT)
Dept: LAB | Facility: HOSPITAL | Age: 38
End: 2023-01-19
Attending: INTERNAL MEDICINE

## 2023-01-19 DIAGNOSIS — E11.22 TYPE 2 DIABETES MELLITUS WITH END-STAGE RENAL DISEASE (HCC): ICD-10-CM

## 2023-01-19 DIAGNOSIS — N18.6 TYPE 2 DIABETES MELLITUS WITH END-STAGE RENAL DISEASE (HCC): ICD-10-CM

## 2023-01-19 DIAGNOSIS — I10 ESSENTIAL HYPERTENSION: ICD-10-CM

## 2023-01-19 LAB
ALBUMIN SERPL BCP-MCNC: 3.9 G/DL (ref 3.5–5)
ALP SERPL-CCNC: 59 U/L (ref 34–104)
ALT SERPL W P-5'-P-CCNC: 20 U/L (ref 7–52)
ANION GAP SERPL CALCULATED.3IONS-SCNC: 7 MMOL/L (ref 4–13)
AST SERPL W P-5'-P-CCNC: 61 U/L (ref 13–39)
BASOPHILS # BLD AUTO: 0.05 THOUSANDS/ÂΜL (ref 0–0.1)
BASOPHILS NFR BLD AUTO: 1 % (ref 0–1)
BILIRUB SERPL-MCNC: 0.41 MG/DL (ref 0.2–1)
BUN SERPL-MCNC: 18 MG/DL (ref 5–25)
CALCIUM SERPL-MCNC: 10.4 MG/DL (ref 8.4–10.2)
CHLORIDE SERPL-SCNC: 103 MMOL/L (ref 96–108)
CO2 SERPL-SCNC: 27 MMOL/L (ref 21–32)
CREAT SERPL-MCNC: 1.26 MG/DL (ref 0.6–1.3)
EOSINOPHIL # BLD AUTO: 0.12 THOUSAND/ÂΜL (ref 0–0.61)
EOSINOPHIL NFR BLD AUTO: 1 % (ref 0–6)
ERYTHROCYTE [DISTWIDTH] IN BLOOD BY AUTOMATED COUNT: 14 % (ref 11.6–15.1)
GFR SERPL CREATININE-BSD FRML MDRD: 71 ML/MIN/1.73SQ M
GLUCOSE SERPL-MCNC: 69 MG/DL (ref 65–140)
HCT VFR BLD AUTO: 54 % (ref 36.5–49.3)
HGB BLD-MCNC: 17.8 G/DL (ref 12–17)
IMM GRANULOCYTES # BLD AUTO: 0.04 THOUSAND/UL (ref 0–0.2)
IMM GRANULOCYTES NFR BLD AUTO: 0 % (ref 0–2)
LYMPHOCYTES # BLD AUTO: 2.59 THOUSANDS/ÂΜL (ref 0.6–4.47)
LYMPHOCYTES NFR BLD AUTO: 28 % (ref 14–44)
MCH RBC QN AUTO: 28.9 PG (ref 26.8–34.3)
MCHC RBC AUTO-ENTMCNC: 33 G/DL (ref 31.4–37.4)
MCV RBC AUTO: 88 FL (ref 82–98)
MONOCYTES # BLD AUTO: 0.85 THOUSAND/ÂΜL (ref 0.17–1.22)
MONOCYTES NFR BLD AUTO: 9 % (ref 4–12)
NEUTROPHILS # BLD AUTO: 5.78 THOUSANDS/ÂΜL (ref 1.85–7.62)
NEUTS SEG NFR BLD AUTO: 61 % (ref 43–75)
NRBC BLD AUTO-RTO: 0 /100 WBCS
PLATELET # BLD AUTO: 333 THOUSANDS/UL (ref 149–390)
PMV BLD AUTO: 10.2 FL (ref 8.9–12.7)
POTASSIUM SERPL-SCNC: 4.3 MMOL/L (ref 3.5–5.3)
PROT SERPL-MCNC: 7.4 G/DL (ref 6.4–8.4)
RBC # BLD AUTO: 6.15 MILLION/UL (ref 3.88–5.62)
SODIUM SERPL-SCNC: 137 MMOL/L (ref 135–147)
WBC # BLD AUTO: 9.43 THOUSAND/UL (ref 4.31–10.16)

## 2023-01-20 ENCOUNTER — OFFICE VISIT (OUTPATIENT)
Dept: NEPHROLOGY | Facility: CLINIC | Age: 38
End: 2023-01-20

## 2023-01-20 VITALS
DIASTOLIC BLOOD PRESSURE: 90 MMHG | WEIGHT: 258 LBS | BODY MASS INDEX: 34.19 KG/M2 | SYSTOLIC BLOOD PRESSURE: 140 MMHG | OXYGEN SATURATION: 99 % | HEART RATE: 70 BPM | HEIGHT: 73 IN

## 2023-01-20 DIAGNOSIS — E78.5 HYPERLIPIDEMIA, UNSPECIFIED HYPERLIPIDEMIA TYPE: ICD-10-CM

## 2023-01-20 DIAGNOSIS — N18.5 BENIGN HYPERTENSION WITH CKD (CHRONIC KIDNEY DISEASE) STAGE V (HCC): ICD-10-CM

## 2023-01-20 DIAGNOSIS — I12.0 BENIGN HYPERTENSION WITH CKD (CHRONIC KIDNEY DISEASE) STAGE V (HCC): ICD-10-CM

## 2023-01-20 DIAGNOSIS — D75.1 ERYTHROPOIETIN POLYCYTHEMIA: ICD-10-CM

## 2023-01-20 DIAGNOSIS — I10 ESSENTIAL HYPERTENSION: ICD-10-CM

## 2023-01-20 DIAGNOSIS — Z94.0 RENAL TRANSPLANT RECIPIENT: Primary | ICD-10-CM

## 2023-01-20 PROBLEM — N18.6 TYPE 2 DIABETES MELLITUS WITH END-STAGE RENAL DISEASE (HCC): Status: RESOLVED | Noted: 2018-07-17 | Resolved: 2023-01-20

## 2023-01-20 PROBLEM — E11.22 TYPE 2 DIABETES MELLITUS WITH END-STAGE RENAL DISEASE (HCC): Status: RESOLVED | Noted: 2018-07-17 | Resolved: 2023-01-20

## 2023-01-20 RX ORDER — LISINOPRIL 10 MG/1
10 TABLET ORAL DAILY
Qty: 30 TABLET | Refills: 5 | Status: SHIPPED | OUTPATIENT
Start: 2023-01-20

## 2023-01-20 RX ORDER — ROSUVASTATIN CALCIUM 5 MG/1
5 TABLET, COATED ORAL DAILY
Qty: 30 TABLET | Refills: 5 | Status: SHIPPED | OUTPATIENT
Start: 2023-01-20

## 2023-01-20 NOTE — LETTER
January 20, 2023     [facility]ohan Negron, [address] [facility] [address] [address]    Patient: Audrey Calderon   YOB: 1985   Date of Visit: 1/20/2023       Dear Dr Ara Rivers:    Thank you for referring Rufina Gallegos to me for evaluation  Below are my notes for this consultation  If you have questions, please do not hesitate to call me  I look forward to following your patient along with you  Sincerely,        Srikanth Lee MD        CC: Akshat Ross, MD Srikanth Hall MD  1/20/2023  5:12 PM  Sign when Signing Visit  NEPHROLOGY PROGRESS NOTE    Audrey Calderon 42 y o  male MRN: [id number]  Unit/Bed#:  Encounter: [id number]  Reason for Consult: Renal transplant recipient    The patient is here with his wife he looks great he did state he gained a little bit of weight  He is walking with a cane because he says he is having balance issues and he is seeing physical therapy for this  But overall he is doing well looks great feels great and is enjoying life  ASSESSMENT/PLAN:  1  Renal    The patient is status post renal transplant I think a little over a year ago  He did have a bout of rejection and was treated with steroids and at this point his immunosuppression is mycophenolate and tacrolimus  His latest trough level for tacrolimus was pending at the time of the visit but I will follow-up on that and contact him with results I told him just continue these medications to prevent rejection with no changes and he was aware of that  Latest creatinine shows excellent allograft function with a creatinine of 1 2  Continue current immunosuppression  Monitor blood pressure  Continue with monthly labs per protocol  Of note his calcium level was a little elevated this visit and it had been normal we will follow this if it is persistently elevated will evaluate with parathyroid hormone    From review of his medications he does not appear to be on any calcium supplements or vitamin D at this time  Will monitor  LDL cholesterol is elevated will resume Crestor at 5 mg a day  2   Post transplant polycythemia    The patient's hemoglobin has increased to 17  He did tell me after last visit that he actually had gone to phlebotomy once due to the high red blood cell count  In the past he had been on lisinopril and lisinopril can be used medically to treat polycythemia post transplant at times  Given that his blood pressure is a little elevated and this is going on I am going to resume lisinopril at 10 mg a day  Will monitor renal function electrolytes  Monitor hemoglobin to make sure comes down    If it is persistently elevated may need periodic phlebotomy  This has been arranged through transplant  With respect to diabetes he passed on from his endocrinologist as to whether or not he could use different types of insulin for better sugar control as his A1c is increased significantly  It is okay to use any type of insulin and I will send this to the endocrinologist as well  SUBJECTIVE:  Review of Systems   Constitutional: Positive for weight gain  Negative for chills, decreased appetite, diaphoresis and fever  HENT: Negative  Eyes: Negative  Cardiovascular: Negative for chest pain, dyspnea on exertion, leg swelling and orthopnea  Respiratory: Negative  Negative for cough, shortness of breath, sputum production and wheezing  Musculoskeletal:        At times feels some muscle weakness in his legs  Gastrointestinal: Negative for abdominal pain, diarrhea, nausea and vomiting  Genitourinary: Negative for dysuria, flank pain, hematuria and incomplete emptying  Neurological: Negative for dizziness, focal weakness, headaches and weakness  Balance problems at times due to neuropathy  Psychiatric/Behavioral: Negative for altered mental status, depression, hallucinations and hypervigilance         OBJECTIVE:  Current Weight: Weight - Scale: 117 kg (258 lb)  [email or web address]:     Blood pressure 140/90, pulse 70, height 6' 1" (1 854 m), weight 117 kg (258 lb), SpO2 99 %  , Body mass index is 34 04 kg/m²  [unfilled]    Physical Exam: /90 (BP Location: Right arm, Patient Position: Sitting, Cuff Size: Standard)   Pulse 70   Ht 6' 1" (1 854 m)   Wt 117 kg (258 lb)   SpO2 99%   BMI 34 04 kg/m²   Physical Exam  Constitutional:       General: He is not in acute distress  Appearance: Normal appearance  He is not ill-appearing or toxic-appearing  HENT:      Head: Normocephalic and atraumatic  Nose: Nose normal       Mouth/Throat:      Mouth: Mucous membranes are moist    Eyes:      General: No scleral icterus  Extraocular Movements: Extraocular movements intact  Conjunctiva/sclera: Conjunctivae normal    Cardiovascular:      Rate and Rhythm: Normal rate and regular rhythm  Heart sounds: No friction rub  No gallop  Comments: No edema  Pulmonary:      Effort: Pulmonary effort is normal  No respiratory distress  Breath sounds: No wheezing, rhonchi or rales  Abdominal:      General: Bowel sounds are normal  There is no distension  Palpations: Abdomen is soft  Tenderness: There is no abdominal tenderness  There is no rebound  Musculoskeletal:      Cervical back: Normal range of motion and neck supple  Neurological:      General: No focal deficit present  Mental Status: He is alert and oriented to person, place, and time  Mental status is at baseline  Psychiatric:         Mood and Affect: Mood normal          Behavior: Behavior normal          Thought Content:  Thought content normal          Judgment: Judgment normal          Medications:    Current Outpatient Medications:   •  acetaminophen (TYLENOL) 325 mg tablet, Take 2 tablets (650 mg total) by mouth every 6 (six) hours as needed for mild pain or fever, Disp:  , Rfl: 0  •  aspirin (ECOTRIN LOW STRENGTH) 81 mg EC tablet, Take 1 tablet (81 mg total) by mouth daily, Disp: , Rfl: 0  •  BD Pen Needle Yocasta 2nd Gen 32G X 4 MM MISC, 1 STICK BY MISCELLANEOUS ROUTE DAILY  , Disp: , Rfl:   •  Blood Glucose Monitoring Suppl (Heather Gonzales) w/Device KIT, by Does not apply route 3 (three) times a day, Disp: 1 kit, Rfl: 0  •  insulin glargine (LANTUS) 100 units/mL subcutaneous injection, Inject 12 Units under the skin daily, Disp: , Rfl:   •  insulin lispro (HumaLOG) 100 units/mL injection, Inject under the skin SLIDING SCALE, Disp: , Rfl:   •  Lancets (OneTouch Delica Plus [id number]) MISC, daily Test, Disp: , Rfl:   •  lisinopril (ZESTRIL) 10 mg tablet, Take 1 tablet (10 mg total) by mouth daily, Disp: 30 tablet, Rfl: 5  •  mycophenolic acid (MYFORTIC) 121 mg EC tablet, Take 720 mg by mouth in the morning and 720 mg in the evening , Disp: , Rfl:   •  rosuvastatin (CRESTOR) 5 mg tablet, Take 1 tablet (5 mg total) by mouth daily, Disp: 30 tablet, Rfl: 5  •  tacrolimus (PROGRAF) 1 mg capsule, Take 4 mg by mouth every 12 (twelve) hours, Disp: , Rfl:   •  albuterol (PROVENTIL HFA,VENTOLIN HFA) 90 mcg/act inhaler, Inhale 2 puffs every 4 (four) hours as needed for wheezing (Patient not taking: Reported on 1/20/2023), Disp: 1 Inhaler, Rfl: 0  •  ascorbic acid (VITAMIN C) 1000 MG tablet, Take 1 tablet (1,000 mg total) by mouth every 12 (twelve) hours for 5 doses (Patient not taking: Reported on 3/4/2022), Disp: 10 tablet, Rfl: 0  •  B Complex-C-Folic Acid (Miner Caps) 1 MG CAPS, , Disp: , Rfl:   •  B Complex-C-Folic Acid (Miner Caps) 1 MG CAPS, Take 1 capsule by mouth (Patient not taking: Reported on 6/25/2021), Disp: , Rfl:   •  Calcium Carb-Cholecalciferol 600-1000 MG-UNIT CAPS, Take by mouth (Patient not taking: Reported on 3/4/2022), Disp: , Rfl:   •  carvedilol (COREG) 25 mg tablet, TAKE 0 5 TABLETS BY MOUTH 2 TIMES A DAY WITH MEALS   (Patient not taking: Reported on 1/20/2023), Disp: 90 tablet, Rfl: 4  •  cholecalciferol (VITAMIN D3) 1,000 units tablet, Take 1,000 Units by mouth daily (Patient not taking: Reported on 12/8/2021), Disp: , Rfl:   •  enalapril (VASOTEC) 5 mg tablet, Take 5 mg by mouth in the morning  (Patient not taking: Reported on 1/20/2023), Disp: , Rfl:   •  ezetimibe (ZETIA) 10 mg tablet, Take 10 mg by mouth in the morning   (Patient not taking: Reported on 1/20/2023), Disp: , Rfl:   •  gentamicin (GARAMYCIN) 0 1 % cream, , Disp: , Rfl:   •  methocarbamol (ROBAXIN) 500 mg tablet, , Disp: , Rfl:   •  Multiple Vitamins-Iron (Daily Blue Multivitamin/Iron) TABS, , Disp: , Rfl:   •  NIFEdipine ER (ADALAT CC) 60 MG 24 hr tablet, Take 1 tablet (60 mg total) by mouth daily (Patient not taking: Reported on 12/8/2021), Disp: 60 tablet, Rfl: 5  •  NYSTATIN PO, Take 5 mg by mouth 2 (two) times a day (Patient not taking: Reported on 3/4/2022), Disp: , Rfl:   •  ondansetron (ZOFRAN-ODT) 4 mg disintegrating tablet, Take 1 tablet (4 mg total) by mouth every 8 (eight) hours as needed for nausea or vomiting (Patient not taking: Reported on 8/3/2021), Disp: 10 tablet, Rfl: 0  •  rOPINIRole (REQUIP) 0 25 mg tablet, TAKE 1 TABLET BY MOUTH EVERY DAY IN THE EVENING (Patient not taking: No sig reported), Disp: 90 tablet, Rfl: 0  •  sevelamer carbonate (RENVELA) 800 mg tablet, Take 2,400 mg by mouth 3 (three) times a day with meals (Patient not taking: Reported on 12/8/2021), Disp: , Rfl:   •  zinc sulfate (ZINCATE) 220 mg capsule, Take 1 capsule (220 mg total) by mouth daily for 5 doses (Patient not taking: Reported on 5/24/2022), Disp: 5 capsule, Rfl: 0    Laboratory Results:  Lab Results   Component Value Date    WBC 9 43 01/19/2023    HGB 17 8 (H) 01/19/2023    HCT 54 0 (H) 01/19/2023    MCV 88 01/19/2023     01/19/2023     Lab Results   Component Value Date    SODIUM 137 01/19/2023    K 4 3 01/19/2023     01/19/2023    CO2 27 01/19/2023    BUN 18 01/19/2023    CREATININE 1 26 01/19/2023    GLUC 69 01/19/2023    CALCIUM 10 4 (H) 01/19/2023     Lab Results   Component Value Date    CALCIUM 10 4 (H) 01/19/2023    PHOS 1 7 (L) 10/04/2021     No results found for: LABPROT

## 2023-01-20 NOTE — PATIENT INSTRUCTIONS
You are here for follow-up you look great glad you are doing well I hope that your ambulation issues improved  With respect to your kidney function creatinine is 1 2 so that is normal   You are on tacrolimus and mycophenolate to prevent rejection the level of tacrolimus was pending at the time of our visit but I will contact you with those results  Blood pressure is borderline  The other issues that have come up is that it seems that you have post transplant polycythemia which is the doctor way of saying a high blood count  You have already had phlebotomy once but the blood count is up again  I am going to start you on lisinopril which is a medication that sometimes helps post transplant increases in hemoglobin dropped to normal and it may also help your blood pressure a little bit  Also your LDL or bad cholesterol was a little high so were going back on the Crestor  Continue medicines at the same dosages  Start lisinopril 10 mg a day  Start Crestor 5 mg a day    Continue with labs per protocol and we will monitor the response

## 2023-01-20 NOTE — PROGRESS NOTES
NEPHROLOGY PROGRESS NOTE    Clista Hamman 42 y o  male MRN: [id number]  Unit/Bed#:  Encounter: [id number]  Reason for Consult: Renal transplant recipient    The patient is here with his wife he looks great he did state he gained a little bit of weight  He is walking with a cane because he says he is having balance issues and he is seeing physical therapy for this  But overall he is doing well looks great feels great and is enjoying life  ASSESSMENT/PLAN:  1  Renal    The patient is status post renal transplant I think a little over a year ago  He did have a bout of rejection and was treated with steroids and at this point his immunosuppression is mycophenolate and tacrolimus  His latest trough level for tacrolimus was pending at the time of the visit but I will follow-up on that and contact him with results I told him just continue these medications to prevent rejection with no changes and he was aware of that  Latest creatinine shows excellent allograft function with a creatinine of 1 2  Continue current immunosuppression  Monitor blood pressure  Continue with monthly labs per protocol  Of note his calcium level was a little elevated this visit and it had been normal we will follow this if it is persistently elevated will evaluate with parathyroid hormone  From review of his medications he does not appear to be on any calcium supplements or vitamin D at this time  Will monitor  LDL cholesterol is elevated will resume Crestor at 5 mg a day  2   Post transplant polycythemia    The patient's hemoglobin has increased to 17  He did tell me after last visit that he actually had gone to phlebotomy once due to the high red blood cell count  In the past he had been on lisinopril and lisinopril can be used medically to treat polycythemia post transplant at times  Given that his blood pressure is a little elevated and this is going on I am going to resume lisinopril at 10 mg a day      Will monitor renal function electrolytes  Monitor hemoglobin to make sure comes down    If it is persistently elevated may need periodic phlebotomy  This has been arranged through transplant  With respect to diabetes he passed on from his endocrinologist as to whether or not he could use different types of insulin for better sugar control as his A1c is increased significantly  It is okay to use any type of insulin and I will send this to the endocrinologist as well  SUBJECTIVE:  Review of Systems   Constitutional: Positive for weight gain  Negative for chills, decreased appetite, diaphoresis and fever  HENT: Negative  Eyes: Negative  Cardiovascular: Negative for chest pain, dyspnea on exertion, leg swelling and orthopnea  Respiratory: Negative  Negative for cough, shortness of breath, sputum production and wheezing  Musculoskeletal:        At times feels some muscle weakness in his legs  Gastrointestinal: Negative for abdominal pain, diarrhea, nausea and vomiting  Genitourinary: Negative for dysuria, flank pain, hematuria and incomplete emptying  Neurological: Negative for dizziness, focal weakness, headaches and weakness  Balance problems at times due to neuropathy  Psychiatric/Behavioral: Negative for altered mental status, depression, hallucinations and hypervigilance  OBJECTIVE:  Current Weight: Weight - Scale: 117 kg (258 lb)  [email or web address]:     Blood pressure 140/90, pulse 70, height 6' 1" (1 854 m), weight 117 kg (258 lb), SpO2 99 %  , Body mass index is 34 04 kg/m²  [unfilled]    Physical Exam: /90 (BP Location: Right arm, Patient Position: Sitting, Cuff Size: Standard)   Pulse 70   Ht 6' 1" (1 854 m)   Wt 117 kg (258 lb)   SpO2 99%   BMI 34 04 kg/m²   Physical Exam  Constitutional:       General: He is not in acute distress  Appearance: Normal appearance  He is not ill-appearing or toxic-appearing     HENT:      Head: Normocephalic and atraumatic  Nose: Nose normal       Mouth/Throat:      Mouth: Mucous membranes are moist    Eyes:      General: No scleral icterus  Extraocular Movements: Extraocular movements intact  Conjunctiva/sclera: Conjunctivae normal    Cardiovascular:      Rate and Rhythm: Normal rate and regular rhythm  Heart sounds: No friction rub  No gallop  Comments: No edema  Pulmonary:      Effort: Pulmonary effort is normal  No respiratory distress  Breath sounds: No wheezing, rhonchi or rales  Abdominal:      General: Bowel sounds are normal  There is no distension  Palpations: Abdomen is soft  Tenderness: There is no abdominal tenderness  There is no rebound  Musculoskeletal:      Cervical back: Normal range of motion and neck supple  Neurological:      General: No focal deficit present  Mental Status: He is alert and oriented to person, place, and time  Mental status is at baseline  Psychiatric:         Mood and Affect: Mood normal          Behavior: Behavior normal          Thought Content: Thought content normal          Judgment: Judgment normal          Medications:    Current Outpatient Medications:   •  acetaminophen (TYLENOL) 325 mg tablet, Take 2 tablets (650 mg total) by mouth every 6 (six) hours as needed for mild pain or fever, Disp:  , Rfl: 0  •  aspirin (ECOTRIN LOW STRENGTH) 81 mg EC tablet, Take 1 tablet (81 mg total) by mouth daily, Disp: , Rfl: 0  •  BD Pen Needle Yocasta 2nd Gen 32G X 4 MM MISC, 1 STICK BY MISCELLANEOUS ROUTE DAILY  , Disp: , Rfl:   •  Blood Glucose Monitoring Suppl (Radha Lion) w/Device KIT, by Does not apply route 3 (three) times a day, Disp: 1 kit, Rfl: 0  •  insulin glargine (LANTUS) 100 units/mL subcutaneous injection, Inject 12 Units under the skin daily, Disp: , Rfl:   •  insulin lispro (HumaLOG) 100 units/mL injection, Inject under the skin SLIDING SCALE, Disp: , Rfl:   •  Lancets (OneTouch Delica Plus [id number]) MISC, daily Test, Disp: , Rfl:   •  lisinopril (ZESTRIL) 10 mg tablet, Take 1 tablet (10 mg total) by mouth daily, Disp: 30 tablet, Rfl: 5  •  mycophenolic acid (MYFORTIC) 011 mg EC tablet, Take 720 mg by mouth in the morning and 720 mg in the evening , Disp: , Rfl:   •  rosuvastatin (CRESTOR) 5 mg tablet, Take 1 tablet (5 mg total) by mouth daily, Disp: 30 tablet, Rfl: 5  •  tacrolimus (PROGRAF) 1 mg capsule, Take 4 mg by mouth every 12 (twelve) hours, Disp: , Rfl:   •  albuterol (PROVENTIL HFA,VENTOLIN HFA) 90 mcg/act inhaler, Inhale 2 puffs every 4 (four) hours as needed for wheezing (Patient not taking: Reported on 1/20/2023), Disp: 1 Inhaler, Rfl: 0  •  ascorbic acid (VITAMIN C) 1000 MG tablet, Take 1 tablet (1,000 mg total) by mouth every 12 (twelve) hours for 5 doses (Patient not taking: Reported on 3/4/2022), Disp: 10 tablet, Rfl: 0  •  B Complex-C-Folic Acid (Fidel Caps) 1 MG CAPS, , Disp: , Rfl:   •  B Complex-C-Folic Acid (Fidel Caps) 1 MG CAPS, Take 1 capsule by mouth (Patient not taking: Reported on 6/25/2021), Disp: , Rfl:   •  Calcium Carb-Cholecalciferol 600-1000 MG-UNIT CAPS, Take by mouth (Patient not taking: Reported on 3/4/2022), Disp: , Rfl:   •  carvedilol (COREG) 25 mg tablet, TAKE 0 5 TABLETS BY MOUTH 2 TIMES A DAY WITH MEALS  (Patient not taking: Reported on 1/20/2023), Disp: 90 tablet, Rfl: 4  •  cholecalciferol (VITAMIN D3) 1,000 units tablet, Take 1,000 Units by mouth daily (Patient not taking: Reported on 12/8/2021), Disp: , Rfl:   •  enalapril (VASOTEC) 5 mg tablet, Take 5 mg by mouth in the morning  (Patient not taking: Reported on 1/20/2023), Disp: , Rfl:   •  ezetimibe (ZETIA) 10 mg tablet, Take 10 mg by mouth in the morning   (Patient not taking: Reported on 1/20/2023), Disp: , Rfl:   •  gentamicin (GARAMYCIN) 0 1 % cream, , Disp: , Rfl:   •  methocarbamol (ROBAXIN) 500 mg tablet, , Disp: , Rfl:   •  Multiple Vitamins-Iron (Daily Blue Multivitamin/Iron) TABS, , Disp: , Rfl:   •  NIFEdipine ER (ADALAT CC) 60 MG 24 hr tablet, Take 1 tablet (60 mg total) by mouth daily (Patient not taking: Reported on 12/8/2021), Disp: 60 tablet, Rfl: 5  •  NYSTATIN PO, Take 5 mg by mouth 2 (two) times a day (Patient not taking: Reported on 3/4/2022), Disp: , Rfl:   •  ondansetron (ZOFRAN-ODT) 4 mg disintegrating tablet, Take 1 tablet (4 mg total) by mouth every 8 (eight) hours as needed for nausea or vomiting (Patient not taking: Reported on 8/3/2021), Disp: 10 tablet, Rfl: 0  •  rOPINIRole (REQUIP) 0 25 mg tablet, TAKE 1 TABLET BY MOUTH EVERY DAY IN THE EVENING (Patient not taking: No sig reported), Disp: 90 tablet, Rfl: 0  •  sevelamer carbonate (RENVELA) 800 mg tablet, Take 2,400 mg by mouth 3 (three) times a day with meals (Patient not taking: Reported on 12/8/2021), Disp: , Rfl:   •  zinc sulfate (ZINCATE) 220 mg capsule, Take 1 capsule (220 mg total) by mouth daily for 5 doses (Patient not taking: Reported on 5/24/2022), Disp: 5 capsule, Rfl: 0    Laboratory Results:  Lab Results   Component Value Date    WBC 9 43 01/19/2023    HGB 17 8 (H) 01/19/2023    HCT 54 0 (H) 01/19/2023    MCV 88 01/19/2023     01/19/2023     Lab Results   Component Value Date    SODIUM 137 01/19/2023    K 4 3 01/19/2023     01/19/2023    CO2 27 01/19/2023    BUN 18 01/19/2023    CREATININE 1 26 01/19/2023    GLUC 69 01/19/2023    CALCIUM 10 4 (H) 01/19/2023     Lab Results   Component Value Date    CALCIUM 10 4 (H) 01/19/2023    PHOS 1 7 (L) 10/04/2021     No results found for: LABPROT

## 2023-01-21 LAB — TACROLIMUS BLD-MCNC: 9.6 NG/ML (ref 3–15)

## 2023-01-23 ENCOUNTER — TELEPHONE (OUTPATIENT)
Dept: NEPHROLOGY | Facility: CLINIC | Age: 38
End: 2023-01-23

## 2023-01-23 NOTE — PROGRESS NOTES
Tacrolimus level nota true trough as drawn at 3 PM   Will let pt know and remind him to do trough level  No changes for now

## 2023-01-23 NOTE — TELEPHONE ENCOUNTER
----- Message from Bartolo Moore MD sent at 1/23/2023  6:52 AM EST -----  Let him know I saw tacrolimus level and it was a little high  Tell him that is ok but it was not drwn at correct time as should be in AM before he takes his morning dose tomake it what we call a trough level  Tell him Feb labs do it at correct time   Thanks, let me know he got message

## 2023-01-23 NOTE — TELEPHONE ENCOUNTER
Called patient and left a voicemail stating the following:    Dr Mai Rosen saw tacrolimus level and it was a little high  Dr Mai Rosen says that is ok but it was not drwn at correct time as should be in AM before he takes his morning dose tomake it what we call a trough level  For Feb labs, do it at correct time  I will call the patient again tomorrow to ensure he received this message

## 2023-01-25 NOTE — TELEPHONE ENCOUNTER
Called patient and left a voicemail stating the following:     Dr Mai Rosen saw tacrolimus level and it was a little high  Dr Mai Rosen says that is ok but it was not drwn at correct time as should be in AM before he takes his morning dose tomake it what we call a trough level  For Feb labs, do it at correct time

## 2023-02-06 NOTE — UTILIZATION REVIEW
Notification of Discharge  This is a Notification of Discharge from our facility [address]  Please be advised that this patient has been discharge from our facility  Below you will find the admission and discharge date and time including the patients disposition  PRESENTATION DATE: 5/21/2019  3:56 PM  IP ADMISSION DATE: 5/21/19 1727  DISCHARGE DATE: 5/25/2019  2:43 PM  DISPOSITION: [id number] Utilization Review Department  Phone: [phone]; Fax [phone]  [email or web address]  ATTENTION: Please call with any questions or concerns to [phone]  and carefully listen to the prompts so that you are directed to the right person  Send all requests for admission clinical reviews, approved or denied determinations and any other requests to fax [phone]   All voicemails are confidential  Subjective   Caleb Wood is a 79 y.o. male.   Chief Complaint   Patient presents with   • Cough     X2 weeks with sore throat, congestion, cough and green mucus.         History of Present Illness   Caleb presents here today with complaints of continued cough.  He states that his symptoms began 2 Saturdays ago, initially with a sore throat which then developed into a cough and then he started having thick green snot and respiratory secretions, reports that he continues to have constant production from both his lungs and his nares, states his condition has not necessarily gotten worse but most certainly has not improved.  He did receive a steroid injection when he was here back on 1/17 and states he did feel better for couple days.  He has been taking over-the-counter Mucinex, is unsure if it is Mucinex DM or just plain Mucinex, reports his blood pressures have been running little higher, is 154/84 today.  No reports of chest pain or shortness of breath, he states nothing hurts and every time he checks he does not have a fever, he does feel that his energy levels are lower.  He is also been taking NyQuil and Zicam.  He reports having difficulty sleeping and is wondering he could have something else for this, he reports that Dr. Galeana talked him about decreasing his intake of Xanax and because of this he really has not been sleeping very well at all at night, has been taking Tylenol PM which helps a little but he wakes up early and cannot go back to sleep.    The following portions of the patient's history were reviewed and updated as appropriate: allergies, current medications, past family history, past medical history, past social history, past surgical history and problem list.    Review of Systems    Objective   Past Medical History:   Diagnosis Date   • Diabetes mellitus (HCC)     Type 2   • Disease of thyroid gland    • GERD (gastroesophageal reflux disease)    • Hemorrhoids    • Hx of colonic polyps    •  Hypercholesteremia       Past Surgical History:   Procedure Laterality Date   • CATARACT EXTRACTION Bilateral    • CHOLECYSTECTOMY     • COLONOSCOPY  01/17/2012    Colon polyp tissue not retrieved repeat exam in 5 years   • COLONOSCOPY  01/06/2009    adenomatous polyp x1   • COLONOSCOPY N/A 06/01/2017    Tubular adenoma Transvere colon, Diverticulosis repeat exam in 5 years   • COLONOSCOPY N/A 07/20/2022    Fair prep, 3 Tubular adenomas ascending colon and at 60 cm, tics repeat exam in 1 year   • CYSTOSCOPY TRANSURETHRAL RESECTION OF PROSTATE N/A 08/02/2022    Procedure: CYSTOSCOPY TRANSURETHRAL RESECTION OF PROSTATE;  Surgeon: López Post MD;  Location: [facility] OR;  Service: Urology;  Laterality: N/A;   • ENDOSCOPY  06/25/2013    [facility]   • ENDOSCOPY N/A 01/29/2021    Multiple fundic gland polyps    • KNEE SURGERY     • SHOULDER SURGERY Right    • TONSILLECTOMY          Current Outpatient Medications:   •  acetaminophen (TYLENOL) 500 MG tablet, Take 500 mg by mouth Every 6 (Six) Hours As Needed for Mild Pain ., Disp: , Rfl:   •  [START ON 2/22/2023] ALPRAZolam (XANAX) 0.5 MG tablet, Take 1 tablet by mouth At Night As Needed for Anxiety., Disp: 30 tablet, Rfl: 2  •  aluminum hydroxide-mag carbonate (GAVISCON EXTRA RELIEF) 160-105 MG chewable tablet chewable tablet, Chew Daily As Needed., Disp: , Rfl:   •  aspirin 81 MG chewable tablet, Chew 81 mg Daily. Takes every other day., Disp: , Rfl:   •  coenzyme Q10 100 MG capsule, Take 200 mg by mouth Every Evening., Disp: , Rfl:   •  cyclobenzaprine (FLEXERIL) 10 MG tablet, Take 1 tablet by mouth 3 (Three) Times a Day As Needed for Muscle Spasms., Disp: 30 tablet, Rfl: 1  •  diclofenac (VOLTAREN) 1 % gel gel, Apply 4 g topically to the appropriate area as directed 4 (Four) Times a Day As Needed. 1-3 times daily, Disp: , Rfl:   •  esomeprazole (nexIUM) 40 MG capsule, Take 1 capsule by mouth Daily., Disp: 90 capsule, Rfl: 4  •  glipizide (Glucotrol XL) 2.5 MG 24 hr tablet,  "Take 1 tablet by mouth Daily., Disp: 30 tablet, Rfl: 11  •  glucose blood (Contour Test) test strip, 1 each by Other route Daily. Use as instructed, Disp: 100 each, Rfl: 3  •  ibuprofen (ADVIL,MOTRIN) 200 MG tablet, Take 200 mg by mouth Every 8 (Eight) Hours As Needed for Mild Pain ., Disp: , Rfl:   •  levothyroxine (SYNTHROID, LEVOTHROID) 50 MCG tablet, Take 1 tablet by mouth Daily., Disp: 90 tablet, Rfl: 1  •  metFORMIN ER (GLUCOPHAGE-XR) 750 MG 24 hr tablet, Take 1 tablet by mouth 2 (Two) Times a Day., Disp: 180 tablet, Rfl: 3  •  rosuvastatin (CRESTOR) 5 MG tablet, Take 1 tablet by mouth Every Other Day., Disp: 45 tablet, Rfl: 3  •  traMADol (ULTRAM) 50 MG tablet, Take 1 tablet by mouth Daily As Needed for Moderate Pain ., Disp: 30 tablet, Rfl: 0  •  azithromycin (Zithromax Z-Daniel) 250 MG tablet, Take 2 tablets the first day, then 1 tablet daily for 4 days., Disp: 6 tablet, Rfl: 0  •  guaiFENesin (Mucinex) 600 MG 12 hr tablet, Take 2 tablets by mouth 2 (Two) Times a Day for 14 days., Disp: 56 tablet, Rfl: 0  •  traZODone (DESYREL) 50 MG tablet, Take 0.5 tablets by mouth Every Night., Disp: 15 tablet, Rfl: 0      /84 (BP Location: Left arm, Patient Position: Sitting, Cuff Size: Adult)   Pulse 65   Temp 98.2 °F (36.8 °C) (Temporal)   Resp 18   Ht 193 cm (76\")   Wt 97.5 kg (215 lb)   SpO2 97%   BMI 26.17 kg/m²      Body mass index is 26.17 kg/m².         Physical Exam  Vitals and nursing note reviewed.   Constitutional:       Appearance: Normal appearance.   HENT:      Head: Normocephalic and atraumatic.      Right Ear: Tympanic membrane, ear canal and external ear normal. There is no impacted cerumen.      Left Ear: Tympanic membrane, ear canal and external ear normal. There is no impacted cerumen.      Nose: Congestion and rhinorrhea present.      Mouth/Throat:      Mouth: Mucous membranes are moist.      Pharynx: Oropharynx is clear. Posterior oropharyngeal erythema present. No oropharyngeal exudate. "   Eyes:      General:         Right eye: No discharge.         Left eye: No discharge.      Extraocular Movements: Extraocular movements intact.      Conjunctiva/sclera: Conjunctivae normal.      Pupils: Pupils are equal, round, and reactive to light.   Cardiovascular:      Rate and Rhythm: Normal rate and regular rhythm.      Pulses: Normal pulses.      Heart sounds: Normal heart sounds.   Pulmonary:      Effort: Pulmonary effort is normal.      Breath sounds: Rales present.   Abdominal:      General: Bowel sounds are normal.      Palpations: Abdomen is soft.   Musculoskeletal:         General: Normal range of motion.      Cervical back: Normal range of motion and neck supple.   Lymphadenopathy:      Cervical: No cervical adenopathy.   Skin:     General: Skin is warm and dry.   Neurological:      General: No focal deficit present.      Mental Status: He is alert and oriented to person, place, and time. Mental status is at baseline.   Psychiatric:         Mood and Affect: Mood normal.         Behavior: Behavior normal.         Thought Content: Thought content normal.         Judgment: Judgment normal.               Assessment & Plan   Diagnoses and all orders for this visit:    1. Upper respiratory tract infection, unspecified type (Primary)  -     azithromycin (Zithromax Z-Daniel) 250 MG tablet; Take 2 tablets the first day, then 1 tablet daily for 4 days.  Dispense: 6 tablet; Refill: 0  -     guaiFENesin (Mucinex) 600 MG 12 hr tablet; Take 2 tablets by mouth 2 (Two) Times a Day for 14 days.  Dispense: 56 tablet; Refill: 0    2. Insomnia, unspecified type  -     traZODone (DESYREL) 50 MG tablet; Take 0.5 tablets by mouth Every Night.  Dispense: 15 tablet; Refill: 0               Plan of care reviewed with Caleb  We will proceed with treatment as above, asked him to please let me know if he is not feeling any better within 48 hours, he reports he has a big weekend planned with his high [employer].  I have went  ahead and sent in plain Mucinex, concern that he may be taking Mucinex DM with the elevation in his blood pressure.  We discussed the importance of drinking adequate amounts of water to help thin secretions, I recommended using a air humidifier at night while he is asleep, may also find benefit from hot steamy showers.  He reports that his blood sugar this morning was 121, I have also asked him to please make sure to check his blood pressures, report readings consistently greater than 150/90.  Blood pressure is noted to not be well controlled at his previous visit, however will discuss what his blood pressures have been running when I call him next week to assess the effect of the trazodone for insomnia, I advised taking half tablet.  He reports never tried this medication before.  For now keep next scheduled appointment, can be seen prior to this as needed

## 2023-02-15 DIAGNOSIS — E78.5 HYPERLIPIDEMIA, UNSPECIFIED HYPERLIPIDEMIA TYPE: ICD-10-CM

## 2023-02-15 RX ORDER — ROSUVASTATIN CALCIUM 5 MG/1
5 TABLET, COATED ORAL DAILY
Qty: 90 TABLET | Refills: 2 | Status: SHIPPED | OUTPATIENT
Start: 2023-02-15

## 2023-02-20 ENCOUNTER — TELEPHONE (OUTPATIENT)
Dept: NEPHROLOGY | Facility: CLINIC | Age: 38
End: 2023-02-20

## 2023-02-20 NOTE — TELEPHONE ENCOUNTER
Called patiet and went over the following information from Dr Jocelyn Correa:    Dr Jocelyn Correa saw your labs, creatinine normal at 1, potassium also normal, so great  Hemoglobin a little lower but just started med so will follow up at a later time  I asked how BP doing  Patient stated his BP has been in the range of   120-130/ 80-90  Patient verbally understood and had no further questions for me at this time

## 2023-02-20 NOTE — TELEPHONE ENCOUNTER
----- Message from Shira Pinto MD sent at 2/19/2023  7:47 AM EST -----  Let him know I saw labs  Creatinine normal at 1, potassium normal   So great  Hemoglobin a little lower but just started med so will follow  Ask how BP doing  Thanks and when is he on call back?

## 2023-02-24 LAB
LEFT EYE DIABETIC RETINOPATHY: POSITIVE
RIGHT EYE DIABETIC RETINOPATHY: POSITIVE

## 2023-05-01 LAB
LEFT EYE DIABETIC RETINOPATHY: POSITIVE
RIGHT EYE DIABETIC RETINOPATHY: POSITIVE

## 2023-06-23 LAB
LEFT EYE DIABETIC RETINOPATHY: POSITIVE
RIGHT EYE DIABETIC RETINOPATHY: POSITIVE

## 2023-06-27 ENCOUNTER — TELEPHONE (OUTPATIENT)
Dept: NEPHROLOGY | Facility: CLINIC | Age: 38
End: 2023-06-27

## 2023-06-30 ENCOUNTER — TELEPHONE (OUTPATIENT)
Dept: NEPHROLOGY | Facility: CLINIC | Age: 38
End: 2023-06-30

## 2023-06-30 NOTE — PROGRESS NOTES
I was contacted by the transplant office that the patient had a hemoglobin of 18  I spoke to them and we had noted the patient had polycythemia post transplant I started low-dose ACE inhibitor  It really has not made a difference so the dose is going to be increased to 20 mg a day and they will communicate that to him  They have also set him up for phlebotomy and I think that is a good idea as well potentially to refer to hematology so they can monitor and manage this  I did speak personally with the transplant nurse

## 2023-06-30 NOTE — TELEPHONE ENCOUNTER
Amanda Fraser from [facility] Transplant office called in regards to this patient  She noted that the patients hemoglobin is at 18 1 and they are wondering if they should increase the patients lisinopril but they would like to speak with Dr Lex Mayorga before making any changes to medications   Please call Amanda Fraser back at [phone]

## 2023-07-10 ENCOUNTER — TELEPHONE (OUTPATIENT)
Dept: NEPHROLOGY | Facility: CLINIC | Age: 38
End: 2023-07-10

## 2023-07-10 NOTE — TELEPHONE ENCOUNTER
Called pt to see if they can come in sooner for their f/u appt. Unable to lvm. Patient requesting refill: amlodipine   Last OV: 6/14/22  Next OV: no upcoming appointment specific   Last refill: 1/23/23  Disp: 90 tablet Refill: 0       Can be refilled per protocol.

## 2023-11-13 ENCOUNTER — TELEPHONE (OUTPATIENT)
Dept: NEPHROLOGY | Facility: CLINIC | Age: 38
End: 2023-11-13

## 2023-11-13 NOTE — TELEPHONE ENCOUNTER
Pt called to cancel appt today 11/13/23 with Dr Jorge Alberto Starkey in the [address] [place] [address], due to an issue with insurance. He has rescheduled on 12/21 in the EO with Dr Jorge Alberto Starkey and is aware of the location.

## 2024-01-02 DIAGNOSIS — I12.0 BENIGN HYPERTENSION WITH CKD (CHRONIC KIDNEY DISEASE) STAGE V (HCC): ICD-10-CM

## 2024-01-02 DIAGNOSIS — N18.5 BENIGN HYPERTENSION WITH CKD (CHRONIC KIDNEY DISEASE) STAGE V (HCC): ICD-10-CM

## 2024-01-02 RX ORDER — LISINOPRIL 10 MG/1
10 TABLET ORAL DAILY
Qty: 30 TABLET | Refills: 5 | Status: SHIPPED | OUTPATIENT
Start: 2024-01-02

## 2024-01-15 DIAGNOSIS — E78.5 HYPERLIPIDEMIA, UNSPECIFIED HYPERLIPIDEMIA TYPE: ICD-10-CM

## 2024-01-15 RX ORDER — ROSUVASTATIN CALCIUM 5 MG/1
5 TABLET, COATED ORAL DAILY
Qty: 90 TABLET | Refills: 2 | Status: SHIPPED | OUTPATIENT
Start: 2024-01-15

## 2024-03-14 DIAGNOSIS — E78.5 HYPERLIPIDEMIA, UNSPECIFIED HYPERLIPIDEMIA TYPE: ICD-10-CM

## 2024-03-14 RX ORDER — ROSUVASTATIN CALCIUM 5 MG/1
5 TABLET, COATED ORAL DAILY
Qty: 90 TABLET | Refills: 1 | Status: SHIPPED | OUTPATIENT
Start: 2024-03-14

## 2024-09-16 DIAGNOSIS — Z94.0 RENAL TRANSPLANT RECIPIENT: Primary | ICD-10-CM

## 2024-09-16 RX ORDER — MYCOPHENOLIC ACID 180 MG/1
720 TABLET, DELAYED RELEASE ORAL 2 TIMES DAILY
Qty: 240 TABLET | Refills: 5 | Status: SHIPPED | OUTPATIENT
Start: 2024-09-16

## 2024-09-16 NOTE — TELEPHONE ENCOUNTER
Patient called in, states that his transplant team will no longer be refilling meds and they told him to follow up with his nephrologist.  He did schedule for soonest available appointment, but it is not until Nov.  Also added to the wait list.  In the meantime, he said he only has 1 day left of mycophenolic acid-takes 180 mg 4 tabs BID.  He asks if this can please be sent to [facility] [address], and please romulo him once sent.

## 2024-11-08 ENCOUNTER — TELEPHONE (OUTPATIENT)
Dept: NEPHROLOGY | Facility: CLINIC | Age: 39
End: 2024-11-08

## 2024-11-08 DIAGNOSIS — E78.5 HYPERLIPIDEMIA, UNSPECIFIED HYPERLIPIDEMIA TYPE: Primary | ICD-10-CM

## 2024-11-08 DIAGNOSIS — I10 ESSENTIAL HYPERTENSION: ICD-10-CM

## 2024-11-08 DIAGNOSIS — I12.0 BENIGN HYPERTENSION WITH CKD (CHRONIC KIDNEY DISEASE) STAGE V (HCC): ICD-10-CM

## 2024-11-08 DIAGNOSIS — N18.5 BENIGN HYPERTENSION WITH CKD (CHRONIC KIDNEY DISEASE) STAGE V (HCC): ICD-10-CM

## 2024-11-13 ENCOUNTER — TELEPHONE (OUTPATIENT)
Dept: NEPHROLOGY | Facility: CLINIC | Age: 39
End: 2024-11-13

## 2024-11-13 NOTE — TELEPHONE ENCOUNTER
Left voicemail for the patient reminding to please complete labwork prior to 11/19 appointment with Penny. Advised patient to call back with any questions or  Concerns.

## 2024-11-19 ENCOUNTER — OFFICE VISIT (OUTPATIENT)
Dept: NEPHROLOGY | Facility: CLINIC | Age: 39
End: 2024-11-19
Payer: MEDICARE

## 2024-11-19 VITALS
WEIGHT: 248 LBS | BODY MASS INDEX: 32.72 KG/M2 | OXYGEN SATURATION: 98 % | DIASTOLIC BLOOD PRESSURE: 76 MMHG | SYSTOLIC BLOOD PRESSURE: 120 MMHG | HEART RATE: 94 BPM

## 2024-11-19 DIAGNOSIS — Z94.0 RENAL TRANSPLANT RECIPIENT: Primary | ICD-10-CM

## 2024-11-19 DIAGNOSIS — I10 ESSENTIAL HYPERTENSION: ICD-10-CM

## 2024-11-19 DIAGNOSIS — D75.1 ERYTHROPOIETIN POLYCYTHEMIA: ICD-10-CM

## 2024-11-19 DIAGNOSIS — E11.65 TYPE 2 DIABETES MELLITUS WITH HYPERGLYCEMIA, WITH LONG-TERM CURRENT USE OF INSULIN (HCC): ICD-10-CM

## 2024-11-19 DIAGNOSIS — Z79.4 TYPE 2 DIABETES MELLITUS WITH HYPERGLYCEMIA, WITH LONG-TERM CURRENT USE OF INSULIN (HCC): ICD-10-CM

## 2024-11-19 DIAGNOSIS — E83.52 HYPERCALCEMIA: ICD-10-CM

## 2024-11-19 PROCEDURE — 99214 OFFICE O/P EST MOD 30 MIN: CPT | Performed by: PHYSICIAN ASSISTANT

## 2024-11-19 RX ORDER — AMLODIPINE BESYLATE 10 MG/1
10 TABLET ORAL DAILY
COMMUNITY

## 2024-11-19 RX ORDER — METOPROLOL SUCCINATE 25 MG/1
25 TABLET, EXTENDED RELEASE ORAL 2 TIMES DAILY
COMMUNITY

## 2024-11-19 RX ORDER — PREDNISONE 10 MG/1
10 TABLET ORAL DAILY
COMMUNITY

## 2024-11-19 NOTE — PROGRESS NOTES
OFFICE FOLLOW UP - Nephrology   Jose Vega 39 y.o. male MRN: [id number]       Assessment & Plan  Renal transplant recipient  S/p LRRT at [facility] on Sept 15, 2021  Baseline creatinine appears to be around 1.5-1.8 this year   Last creatinine 1.56  Immunosuppression: Tacrolimus 2 mg every 12 hours, Myfortic 720 mg every 12 hours, prednisone 10 mg daily  Last tacrolimus level 10.3 9/26/2024 (dose was decreased after this) -- repeat pending from yesterday   Erythropoietin polycythemia  Last hemoglobin 17.9  Has been undergoing therapeutic phlebotomy with next treatment on Friday per transplant team  Essential hypertension  Blood pressure well-controlled  Continue amlodipine 10 mg daily and metoprolol 25 mg every 12 hours  Type 2 diabetes mellitus with hyperglycemia, with long-term current use of insulin ([facility])  Currently on insulin  Continue to monitor  Hypercalcemia  Calcium borderline high in the low 10's  Will check PTH with his next lab work      Plan:   Continue current medications  Follow up in 4 months with Dr Morris with repeat labs prior     HPI: Jose Vega is a 39 y.o. male who is here for renal transplant follow-up.    Overall doing okay recently.  He is undergoing therapeutic phlebotomy as ordered by transplant.  He has had no recent issues with rejection.  No chest pain, shortness of breath, nausea, vomiting or diarrhea.    ROS:   A complete 10 point review of systems was done. Pertinent positives and negatives as noted in the HPI, otherwise the review of systems is negative.    Allergies: Patient has no known allergies.    Medications:   Current Outpatient Medications:     acetaminophen (TYLENOL) 325 mg tablet, Take 2 tablets (650 mg total) by mouth every 6 (six) hours as needed for mild pain or fever, Disp:  , Rfl: 0    aspirin (ECOTRIN LOW STRENGTH) 81 mg EC tablet, Take 1 tablet (81 mg total) by mouth daily, Disp: , Rfl: 0    BD Pen Needle Yocasta 2nd Gen 32G X 4 MM MISC, 1 STICK BY MISCELLANEOUS ROUTE  DAILY., Disp: , Rfl:     Blood Glucose Monitoring Suppl (ONETOUCH VERIO) w/Device KIT, by Does not apply route 3 (three) times a day, Disp: 1 kit, Rfl: 0    insulin glargine (LANTUS) 100 units/mL subcutaneous injection, Inject 12 Units under the skin daily, Disp: , Rfl:     insulin lispro (HumaLOG) 100 units/mL injection, Inject under the skin SLIDING SCALE, Disp: , Rfl:     Lancets (OneTouch Delica Plus [id number]) MISC, daily Test, Disp: , Rfl:     lisinopril (ZESTRIL) 10 mg tablet, Take 1 tablet (10 mg total) by mouth daily, Disp: 30 tablet, Rfl: 5    mycophenolic acid (MYFORTIC) 180 mg EC tablet, Take 4 tablets (720 mg total) by mouth 2 (two) times a day, Disp: 240 tablet, Rfl: 5    rosuvastatin (CRESTOR) 5 mg tablet, TAKE 1 TABLET (5 MG TOTAL) BY MOUTH DAILY., Disp: 90 tablet, Rfl: 1    tacrolimus (PROGRAF) 1 mg capsule, Take 4 mg by mouth every 12 (twelve) hours, Disp: , Rfl:     albuterol (PROVENTIL HFA,VENTOLIN HFA) 90 mcg/act inhaler, Inhale 2 puffs every 4 (four) hours as needed for wheezing (Patient not taking: Reported on 1/20/2023), Disp: 1 Inhaler, Rfl: 0    ascorbic acid (VITAMIN C) 1000 MG tablet, Take 1 tablet (1,000 mg total) by mouth every 12 (twelve) hours for 5 doses (Patient not taking: Reported on 11/19/2024), Disp: 10 tablet, Rfl: 0    B Complex-C-Folic Acid ([place] Caps) 1 MG CAPS, , Disp: , Rfl:     B Complex-C-Folic Acid ([place] Caps) 1 MG CAPS, Take 1 capsule by mouth (Patient not taking: Reported on 6/25/2021), Disp: , Rfl:     Calcium Carb-Cholecalciferol 600-1000 MG-UNIT CAPS, Take by mouth (Patient not taking: Reported on 3/4/2022), Disp: , Rfl:     carvedilol (COREG) 25 mg tablet, TAKE 0.5 TABLETS BY MOUTH 2 TIMES A DAY WITH MEALS. (Patient not taking: Reported on 11/19/2024), Disp: 90 tablet, Rfl: 4    cholecalciferol (VITAMIN D3) 1,000 units tablet, Take 1,000 Units by mouth daily (Patient not taking: Reported on 12/8/2021), Disp: , Rfl:     enalapril (VASOTEC) 5 mg tablet, Take 5 mg by  mouth in the morning. (Patient not taking: Reported on 11/19/2024), Disp: , Rfl:     ezetimibe (ZETIA) 10 mg tablet, Take 10 mg by mouth in the morning. (Patient not taking: Reported on 11/19/2024), Disp: , Rfl:     gentamicin (GARAMYCIN) 0.1 % cream, , Disp: , Rfl:     methocarbamol (ROBAXIN) 500 mg tablet, , Disp: , Rfl:     Multiple Vitamins-Iron (Daily Blue Multivitamin/Iron) TABS, , Disp: , Rfl:     NIFEdipine ER (ADALAT CC) 60 MG 24 hr tablet, Take 1 tablet (60 mg total) by mouth daily (Patient not taking: Reported on 11/19/2024), Disp: 60 tablet, Rfl: 5    NYSTATIN PO, Take 5 mg by mouth 2 (two) times a day (Patient not taking: Reported on 11/19/2024), Disp: , Rfl:     ondansetron (ZOFRAN-ODT) 4 mg disintegrating tablet, Take 1 tablet (4 mg total) by mouth every 8 (eight) hours as needed for nausea or vomiting (Patient not taking: Reported on 8/3/2021), Disp: 10 tablet, Rfl: 0    rOPINIRole (REQUIP) 0.25 mg tablet, TAKE 1 TABLET BY MOUTH EVERY DAY IN THE EVENING (Patient not taking: Reported on 11/19/2024), Disp: 90 tablet, Rfl: 0    sevelamer carbonate (RENVELA) 800 mg tablet, Take 2,400 mg by mouth 3 (three) times a day with meals (Patient not taking: Reported on 12/8/2021), Disp: , Rfl:     zinc sulfate (ZINCATE) 220 mg capsule, Take 1 capsule (220 mg total) by mouth daily for 5 doses (Patient not taking: Reported on 11/19/2024), Disp: 5 capsule, Rfl: 0    Past Medical History:   Diagnosis Date    Anemia due to chronic kidney disease 9/13/2019    Chronic kidney disease     CKD (chronic kidney disease) 2/21/2019    Class 1 obesity due to excess calories with serious comorbidity and body mass index (BMI) of 31.0 to 31.9 in adult 5/25/2019    Diabetes mellitus (HCC)     Essential hypertension 10/31/2017    Hyperlipidemia     NSTEMI (non-ST elevated myocardial infarction) (HCC) 5/21/2019     Past Surgical History:   Procedure Laterality Date    ABCESS DRAINAGE      tooth    CT NEEDLE BIOPSY KIDNEY  10/24/2019  "   PERITONEAL CATHETER INSERTION N/A 2/13/2020    Procedure: INSERTION PERITONEAL CATHETER DIALYSIS LAPAROSCOPIC;  Surgeon: Flavio Griffin MD;  Location: [facility];  Service: General    NE ARTHRS KNE SURG W/MENISCECTOMY MED/LAT W/SHVG Right 9/13/2018    Procedure: RIGHT KNEE ARTHROSCOPIC PARTIAL MEDIAL MENISCECTOMY;  Surgeon: Joby Cortez MD;  Location: [facility];  Service: Orthopedics    WRIST ARTHROPLASTY Right 2017     Family History   Problem Relation Age of Onset    Hypertension Mother     Multiple sclerosis Mother     Diabetes Father       reports that he has never smoked. He has never used smokeless tobacco. He reports current alcohol use. He reports that he does not use drugs.      Physical Exam:   Vitals:    11/19/24 1355   BP: 120/76   BP Location: Left arm   Patient Position: Sitting   Cuff Size: Adult   Pulse: 94   SpO2: 98%   Weight: 112 kg (248 lb)     Body mass index is 32.72 kg/m².    General: no acute distress   Eyes: conjunctivae pink, anicteric sclerae  ENT: mucous membranes moist  Neck: supple, no JVD  Chest: clear to auscultation bilaterally with no wheezes, rale or rhochi  CVS: regular rate and rhythm   Abdomen: soft, non-tender, non-distended  Extremities: no lower extremity edema   Skin: no rash  Neuro: awake and alert       Lab Results:  Results for orders placed or performed in visit on 06/23/23    Diabetes Eye Exam    Collection Time: 06/23/23 12:00 AM   Result Value Ref Range    Right Eye Diabetic Retinopathy Positive     Left Eye Diabetic Retinopathy Positive        Results from last 7 days   Lab Units 11/18/24  0855   SODIUM mmol/L 143   POTASSIUM mmol/L 4.5   CHLORIDE mmol/L 108   CO2 mmol/L 22   BUN mg/dL 20   CREATININE mg/dL 1.56*   CALCIUM mg/dL 10.0         Portions of the record may have been created with voice recognition software. Occasional wrong word or \"sound a like\" substitutions may have occurred due to the inherent limitations of voice recognition software. Read the " chart carefully and recognize, using context, where substitutions have occurred.If you have any questions, please contact the dictating provider.

## 2024-11-19 NOTE — ASSESSMENT & PLAN NOTE
Last hemoglobin 17.9  Has been undergoing therapeutic phlebotomy with next treatment on Friday per transplant team

## 2024-11-19 NOTE — ASSESSMENT & PLAN NOTE
S/p LRRT at [facility] on Sept 15, 2021  Baseline creatinine appears to be around 1.5-1.8 this year   Last creatinine 1.56  Immunosuppression: Tacrolimus 2 mg every 12 hours, Myfortic 720 mg every 12 hours, prednisone 10 mg daily  Last tacrolimus level 10.3 9/26/2024 (dose was decreased after this) -- repeat pending from yesterday

## 2024-11-19 NOTE — ASSESSMENT & PLAN NOTE
Blood pressure well-controlled  Continue amlodipine 10 mg daily and metoprolol 25 mg every 12 hours

## 2025-01-01 ENCOUNTER — TELEPHONE (OUTPATIENT)
Dept: NEPHROLOGY | Facility: CLINIC | Age: 40
End: 2025-01-01

## 2025-01-01 ENCOUNTER — OFFICE VISIT (OUTPATIENT)
Dept: NEPHROLOGY | Facility: CLINIC | Age: 40
End: 2025-01-01

## 2025-01-01 ENCOUNTER — OFFICE VISIT (OUTPATIENT)
Dept: OBGYN CLINIC | Facility: CLINIC | Age: 40
End: 2025-01-01

## 2025-01-01 VITALS — BODY MASS INDEX: 35.92 KG/M2 | WEIGHT: 271 LBS | HEIGHT: 73 IN

## 2025-01-01 VITALS
WEIGHT: 271 LBS | HEIGHT: 73 IN | BODY MASS INDEX: 35.92 KG/M2 | HEART RATE: 80 BPM | SYSTOLIC BLOOD PRESSURE: 138 MMHG | DIASTOLIC BLOOD PRESSURE: 80 MMHG

## 2025-01-01 DIAGNOSIS — S83.252D BUCKET HANDLE TEAR OF LATERAL MENISCUS OF LEFT KNEE, SUBSEQUENT ENCOUNTER: Primary | ICD-10-CM

## 2025-01-01 DIAGNOSIS — I10 ESSENTIAL HYPERTENSION: ICD-10-CM

## 2025-01-01 DIAGNOSIS — Z94.0 RENAL TRANSPLANT RECIPIENT: Primary | ICD-10-CM

## 2025-01-01 PROCEDURE — 99214 OFFICE O/P EST MOD 30 MIN: CPT | Performed by: INTERNAL MEDICINE

## 2025-01-01 PROCEDURE — 99024 POSTOP FOLLOW-UP VISIT: CPT | Performed by: STUDENT IN AN ORGANIZED HEALTH CARE EDUCATION/TRAINING PROGRAM

## 2025-01-16 ENCOUNTER — TELEPHONE (OUTPATIENT)
Age: 40
End: 2025-01-16

## 2025-01-17 NOTE — TELEPHONE ENCOUNTER
Called patient and let him know that either Dayquill or nightquill is ok to take. Patient verbally understood and had no further question.

## 2025-03-14 DIAGNOSIS — Z94.0 RENAL TRANSPLANT RECIPIENT: ICD-10-CM

## 2025-03-14 RX ORDER — MYCOPHENOLIC ACID 180 MG/1
720 TABLET, DELAYED RELEASE ORAL 2 TIMES DAILY
Qty: 240 TABLET | Refills: 5 | Status: SHIPPED | OUTPATIENT
Start: 2025-03-14

## 2025-03-19 ENCOUNTER — TELEPHONE (OUTPATIENT)
Age: 40
End: 2025-03-19

## 2025-03-19 DIAGNOSIS — Z94.0 RENAL TRANSPLANT RECIPIENT: Primary | ICD-10-CM

## 2025-03-19 RX ORDER — PREDNISONE 10 MG/1
10 TABLET ORAL DAILY
Qty: 90 TABLET | Refills: 3 | Status: SHIPPED | OUTPATIENT
Start: 2025-03-19

## 2025-03-19 NOTE — TELEPHONE ENCOUNTER
Patient calling asking if he is to continue the prednisone 10mg tablet? If so he needs a refill sent to the [facility] in [place]. Please contact patient with update. Thank you.

## 2025-03-20 NOTE — TELEPHONE ENCOUNTER
Called patient and left a voicemail stating the following information:     Yes please continue the Prednisone, Dr. Morris has sent it to your pharmacy.      I asked the patient please call back with further questions.

## 2025-04-22 ENCOUNTER — TELEPHONE (OUTPATIENT)
Dept: NEPHROLOGY | Facility: CLINIC | Age: 40
End: 2025-04-22

## 2025-04-26 ENCOUNTER — APPOINTMENT (OUTPATIENT)
Dept: LAB | Facility: HOSPITAL | Age: 40
End: 2025-04-26

## 2025-04-26 DIAGNOSIS — E78.5 HYPERLIPIDEMIA, UNSPECIFIED HYPERLIPIDEMIA TYPE: ICD-10-CM

## 2025-04-26 DIAGNOSIS — Z94.0 RENAL TRANSPLANT RECIPIENT: ICD-10-CM

## 2025-04-26 DIAGNOSIS — N18.5 BENIGN HYPERTENSION WITH CKD (CHRONIC KIDNEY DISEASE) STAGE V (HCC): ICD-10-CM

## 2025-04-26 DIAGNOSIS — I10 ESSENTIAL HYPERTENSION: ICD-10-CM

## 2025-04-26 DIAGNOSIS — I12.0 BENIGN HYPERTENSION WITH CKD (CHRONIC KIDNEY DISEASE) STAGE V (HCC): ICD-10-CM

## 2025-04-26 LAB
ANION GAP SERPL CALCULATED.3IONS-SCNC: 8 MMOL/L (ref 4–13)
BACTERIA UR QL AUTO: NORMAL /HPF
BASOPHILS # BLD AUTO: 0.04 THOUSANDS/ÂΜL (ref 0–0.1)
BASOPHILS NFR BLD AUTO: 0 % (ref 0–1)
BILIRUB UR QL STRIP: NEGATIVE
BUN SERPL-MCNC: 24 MG/DL (ref 5–25)
CALCIUM SERPL-MCNC: 10.3 MG/DL (ref 8.4–10.2)
CHLORIDE SERPL-SCNC: 104 MMOL/L (ref 96–108)
CLARITY UR: CLEAR
CO2 SERPL-SCNC: 28 MMOL/L (ref 21–32)
COLOR UR: YELLOW
CREAT SERPL-MCNC: 1.71 MG/DL (ref 0.6–1.3)
CREAT UR-MCNC: 118.4 MG/DL
CREAT UR-MCNC: 121.3 MG/DL
EOSINOPHIL # BLD AUTO: 0.13 THOUSAND/ÂΜL (ref 0–0.61)
EOSINOPHIL NFR BLD AUTO: 1 % (ref 0–6)
ERYTHROCYTE [DISTWIDTH] IN BLOOD BY AUTOMATED COUNT: 15.7 % (ref 11.6–15.1)
GFR SERPL CREATININE-BSD FRML MDRD: 48 ML/MIN/1.73SQ M
GLUCOSE P FAST SERPL-MCNC: 78 MG/DL (ref 65–99)
GLUCOSE UR STRIP-MCNC: NEGATIVE MG/DL
HCT VFR BLD AUTO: 60.7 % (ref 36.5–49.3)
HGB BLD-MCNC: 18.8 G/DL (ref 12–17)
HGB UR QL STRIP.AUTO: ABNORMAL
IMM GRANULOCYTES # BLD AUTO: 0.08 THOUSAND/UL (ref 0–0.2)
IMM GRANULOCYTES NFR BLD AUTO: 1 % (ref 0–2)
KETONES UR STRIP-MCNC: NEGATIVE MG/DL
LEUKOCYTE ESTERASE UR QL STRIP: NEGATIVE
LYMPHOCYTES # BLD AUTO: 0.9 THOUSANDS/ÂΜL (ref 0.6–4.47)
LYMPHOCYTES NFR BLD AUTO: 8 % (ref 14–44)
MCH RBC QN AUTO: 28.3 PG (ref 26.8–34.3)
MCHC RBC AUTO-ENTMCNC: 31 G/DL (ref 31.4–37.4)
MCV RBC AUTO: 91 FL (ref 82–98)
MICROALBUMIN UR-MCNC: 329.4 MG/L
MICROALBUMIN/CREAT 24H UR: 272 MG/G CREATININE (ref 0–30)
MONOCYTES # BLD AUTO: 0.98 THOUSAND/ÂΜL (ref 0.17–1.22)
MONOCYTES NFR BLD AUTO: 9 % (ref 4–12)
NEUTROPHILS # BLD AUTO: 9.35 THOUSANDS/ÂΜL (ref 1.85–7.62)
NEUTS SEG NFR BLD AUTO: 81 % (ref 43–75)
NITRITE UR QL STRIP: NEGATIVE
NON-SQ EPI CELLS URNS QL MICRO: NORMAL /HPF
NRBC BLD AUTO-RTO: 0 /100 WBCS
PH UR STRIP.AUTO: 6 [PH]
PLATELET # BLD AUTO: 258 THOUSANDS/UL (ref 149–390)
PMV BLD AUTO: 11.5 FL (ref 8.9–12.7)
POTASSIUM SERPL-SCNC: 4.4 MMOL/L (ref 3.5–5.3)
PROT UR STRIP-MCNC: ABNORMAL MG/DL
PROT UR-MCNC: 55 MG/DL
PROT/CREAT UR: 0.5 MG/G{CREAT}
PTH-INTACT SERPL-MCNC: 170.2 PG/ML (ref 12–88)
RBC # BLD AUTO: 6.65 MILLION/UL (ref 3.88–5.62)
RBC #/AREA URNS AUTO: NORMAL /HPF
SODIUM SERPL-SCNC: 140 MMOL/L (ref 135–147)
SP GR UR STRIP.AUTO: 1.02 (ref 1–1.03)
UROBILINOGEN UR QL STRIP.AUTO: 1 E.U./DL
WBC # BLD AUTO: 11.48 THOUSAND/UL (ref 4.31–10.16)
WBC #/AREA URNS AUTO: NORMAL /HPF

## 2025-04-26 PROCEDURE — 81001 URINALYSIS AUTO W/SCOPE: CPT

## 2025-04-26 PROCEDURE — 82043 UR ALBUMIN QUANTITATIVE: CPT

## 2025-04-26 PROCEDURE — 85025 COMPLETE CBC W/AUTO DIFF WBC: CPT

## 2025-04-26 PROCEDURE — 80197 ASSAY OF TACROLIMUS: CPT

## 2025-04-26 PROCEDURE — 80048 BASIC METABOLIC PNL TOTAL CA: CPT

## 2025-04-26 PROCEDURE — 84156 ASSAY OF PROTEIN URINE: CPT

## 2025-04-26 PROCEDURE — 82570 ASSAY OF URINE CREATININE: CPT

## 2025-04-26 PROCEDURE — 83970 ASSAY OF PARATHORMONE: CPT

## 2025-04-26 PROCEDURE — 36415 COLL VENOUS BLD VENIPUNCTURE: CPT

## 2025-04-27 LAB — TACROLIMUS BLD-MCNC: 7.5 NG/ML (ref 3–15)

## 2025-04-29 ENCOUNTER — OFFICE VISIT (OUTPATIENT)
Dept: NEPHROLOGY | Facility: CLINIC | Age: 40
End: 2025-04-29

## 2025-04-29 VITALS
DIASTOLIC BLOOD PRESSURE: 90 MMHG | HEIGHT: 73 IN | HEART RATE: 80 BPM | WEIGHT: 270 LBS | BODY MASS INDEX: 35.78 KG/M2 | SYSTOLIC BLOOD PRESSURE: 150 MMHG

## 2025-04-29 DIAGNOSIS — Z94.0 RENAL TRANSPLANT RECIPIENT: Primary | ICD-10-CM

## 2025-04-29 DIAGNOSIS — D75.1 ERYTHROPOIETIN POLYCYTHEMIA: ICD-10-CM

## 2025-04-29 PROCEDURE — 99214 OFFICE O/P EST MOD 30 MIN: CPT | Performed by: INTERNAL MEDICINE

## 2025-04-29 NOTE — PROGRESS NOTES
Name: Jose Vega      : 1985      MRN: [id number]  Encounter Provider: Damian Morris MD  Encounter Date: 2025   Encounter department: [facility] [place]  :  Assessment & Plan  Renal transplant recipient    The patient has history of renal transplant with a rejection episode but has been doing very well and his creatinine is 1.7 and stable at his baseline.  It has been sometime since I have seen him but he was seen by an advanced practitioner about 6 months ago in our practice.  Tacrolimus trough level is in the appropriate range he is on mycophenolate tacrolimus and prednisone for immunosuppression.  He does have donor specific antibodies so I suspect he will need to continue on prednisone indefinitely but he was asking whether or not this could be discontinued or reduced as he feels it makes him gain weight.  I told him for the above reasons he will need to probably stay on it but I will ask the transplant center their opinion and get back to him.    In the meantime continue his current immunosuppression  His blood pressure is elevated today but he did not take his medications so I told them to monitor and if it does not come down to contact me as it is important for good blood pressure control    Continue with labs every 2-3 months orders have been placed and I will follow-up in 4 months with me in the office.    Important for glycemic control and blood pressure control and immunosuppression.       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  He is no longer following with them his hematocrit is elevated at 60.  I am going to make a referral to hematology for management of the polycythemia.  Orders were placed for the referral.    I have spent a total time of 35 minutes in caring for this patient on the day of the  visit/encounter including Diagnostic results, Instructions for management, Patient and family education, Impressions, Reviewing/placing orders in the medical record (including tests, medications, and/or procedures), and Obtaining or reviewing history  .              History of Present Illness   HPI  Jose Vega is a 39 y.o. male who presents for routine follow-up.  I have not seen him in a while but he was seen by an advanced practitioner in our practice last visit.  He recently did a week and in the [facility] had a wonderful vacation.  Says he is feeling a little fatigued with a lot of walking and concern to gain weight so was asking if he would be able to get off the prednisone.  Otherwise he feels well no hospitalizations or changes.  History obtained from: patient and his wife who was present through video during the visit.    Review of Systems   Constitutional:  Positive for fatigue. Negative for chills and fever.   HENT: Negative.     Eyes: Negative.    Respiratory:  Negative for cough, chest tightness and shortness of breath.    Cardiovascular:  Negative for chest pain and leg swelling.   Gastrointestinal:  Negative for abdominal pain, diarrhea, nausea and vomiting.   Genitourinary: Negative.    Neurological:  Negative for dizziness and headaches.   Psychiatric/Behavioral:  Negative for agitation, behavioral problems, confusion and decreased concentration.           Objective   There were no vitals taken for this visit.     Physical Exam  Constitutional:       General: He is not in acute distress.     Appearance: He is not toxic-appearing.   HENT:      Head: Normocephalic and atraumatic.      Nose: Nose normal.      Mouth/Throat:      Mouth: Mucous membranes are moist.   Eyes:      Extraocular Movements: Extraocular movements intact.   Cardiovascular:      Rate and Rhythm: Normal rate and regular rhythm.      Heart sounds:      No gallop.   Pulmonary:      Effort: Pulmonary effort is normal. No  respiratory distress.      Breath sounds: No wheezing or rales.   Abdominal:      General: Bowel sounds are normal. There is no distension.      Palpations: Abdomen is soft.      Tenderness: There is no abdominal tenderness.   Neurological:      General: No focal deficit present.      Mental Status: He is alert and oriented to person, place, and time.   Psychiatric:         Mood and Affect: Mood normal.         Behavior: Behavior normal.

## 2025-04-29 NOTE — LETTER
2025     Ewa Barrett MD  [address] [facility]  [address] [place] [id number]    Patient: Jose Vega   YOB: 1985   Date of Visit: 2025       Dear Dr. Ewa Barrett MD:    Thank you for referring Jose Vega to me for evaluation. Below are my notes for this consultation.    If you have questions, please do not hesitate to call me. I look forward to following your patient along with you.         Sincerely,        Damian Morris MD        CC: No Recipients    Damian Morris MD  2025  8:44 AM  Sign when Signing Visit  Name: Jose Vega      : 1985      MRN: [id number]  Encounter Provider: Damian Morris MD  Encounter Date: 2025   Encounter department: [facility] [place]  :  Assessment & Plan  Renal transplant recipient    The patient has history of renal transplant with a rejection episode but has been doing very well and his creatinine is 1.7 and stable at his baseline.  It has been sometime since I have seen him but he was seen by an advanced practitioner about 6 months ago in our practice.  Tacrolimus trough level is in the appropriate range he is on mycophenolate tacrolimus and prednisone for immunosuppression.  He does have donor specific antibodies so I suspect he will need to continue on prednisone indefinitely but he was asking whether or not this could be discontinued or reduced as he feels it makes him gain weight.  I told him for the above reasons he will need to probably stay on it but I will ask the transplant center their opinion and get back to him.    In the meantime continue his current immunosuppression  His blood pressure is elevated today but he did not take his medications so I told them to monitor and if it does not come down to contact me as it is important for good blood pressure control    Continue with labs every 2-3 months orders have been placed and I will follow-up in 4 months with me in the  office.    Important for glycemic control and blood pressure control and immunosuppression.       Erythropoietin polycythemia    The patient has posttransplant polycythemia and he had been seeing hematology and getting periodic phlebotomies through the transplant center at [facility] and hematology.  He is no longer following with them his hematocrit is elevated at 60.  I am going to make a referral to hematology for management of the polycythemia.  Orders were placed for the referral.    I have spent a total time of 35 minutes in caring for this patient on the day of the visit/encounter including Diagnostic results, Instructions for management, Patient and family education, Impressions, Reviewing/placing orders in the medical record (including tests, medications, and/or procedures), and Obtaining or reviewing history  .              History of Present Illness  HPI  Jose Vega is a 39 y.o. male who presents for routine follow-up.  I have not seen him in a while but he was seen by an advanced practitioner in our practice last visit.  He recently did a week and in the [facility] had a wonderful vacation.  Says he is feeling a little fatigued with a lot of walking and concern to gain weight so was asking if he would be able to get off the prednisone.  Otherwise he feels well no hospitalizations or changes.  History obtained from: patient and his wife who was present through video during the visit.    Review of Systems   Constitutional:  Positive for fatigue. Negative for chills and fever.   HENT: Negative.     Eyes: Negative.    Respiratory:  Negative for cough, chest tightness and shortness of breath.    Cardiovascular:  Negative for chest pain and leg swelling.   Gastrointestinal:  Negative for abdominal pain, diarrhea, nausea and vomiting.   Genitourinary: Negative.    Neurological:  Negative for dizziness and headaches.   Psychiatric/Behavioral:  Negative for agitation, behavioral problems, confusion and  decreased concentration.           Objective  There were no vitals taken for this visit.     Physical Exam  Constitutional:       General: He is not in acute distress.     Appearance: He is not toxic-appearing.   HENT:      Head: Normocephalic and atraumatic.      Nose: Nose normal.      Mouth/Throat:      Mouth: Mucous membranes are moist.   Eyes:      Extraocular Movements: Extraocular movements intact.   Cardiovascular:      Rate and Rhythm: Normal rate and regular rhythm.      Heart sounds:      No gallop.   Pulmonary:      Effort: Pulmonary effort is normal. No respiratory distress.      Breath sounds: No wheezing or rales.   Abdominal:      General: Bowel sounds are normal. There is no distension.      Palpations: Abdomen is soft.      Tenderness: There is no abdominal tenderness.   Neurological:      General: No focal deficit present.      Mental Status: He is alert and oriented to person, place, and time.   Psychiatric:         Mood and Affect: Mood normal.         Behavior: Behavior normal.

## 2025-04-29 NOTE — PATIENT INSTRUCTIONS
You are here for follow-up it is good to see you sounds like your health is been doing well except your fatigue and you have gained weight you are concerned about that.  As we discussed I will ask the transplant program about prednisone but the reason you may be on it is because of the rejection and the presence of antibodies to help prevent rejection of your kidney which is doing well.    Your creatinine is 1.7 which is stable.  You are on mycophenolate, tacrolimus and prednisone for your immunosuppression to prevent rejection.  Your tacrolimus level was in the right range so no changes.    Monitor your blood pressure as you did not take the medications today and it is a little elevated so we got a make sure it is under good control.    I will contact you when I hear results of some of the questions we had and I will also refer you to hematology for the increased hemoglobin or polycythemia that can happen post transplant.    Contact me if you do not hear from them in the near future or if you have any other questions.

## 2025-04-29 NOTE — ASSESSMENT & PLAN NOTE
The patient has posttransplant polycythemia and he had been seeing hematology and getting periodic phlebotomies through the transplant center at [facility] and hematology.  He is no longer following with them his hematocrit is elevated at 60.  I am going to make a referral to hematology for management of the polycythemia.  Orders were placed for the referral.    I have spent a total time of 35 minutes in caring for this patient on the day of the visit/encounter including Diagnostic results, Instructions for management, Patient and family education, Impressions, Reviewing/placing orders in the medical record (including tests, medications, and/or procedures), and Obtaining or reviewing history  .

## 2025-04-29 NOTE — ASSESSMENT & PLAN NOTE
The patient has history of renal transplant with a rejection episode but has been doing very well and his creatinine is 1.7 and stable at his baseline.  It has been sometime since I have seen him but he was seen by an advanced practitioner about 6 months ago in our practice.  Tacrolimus trough level is in the appropriate range he is on mycophenolate tacrolimus and prednisone for immunosuppression.  He does have donor specific antibodies so I suspect he will need to continue on prednisone indefinitely but he was asking whether or not this could be discontinued or reduced as he feels it makes him gain weight.  I told him for the above reasons he will need to probably stay on it but I will ask the transplant center their opinion and get back to him.    In the meantime continue his current immunosuppression  His blood pressure is elevated today but he did not take his medications so I told them to monitor and if it does not come down to contact me as it is important for good blood pressure control    Continue with labs every 2-3 months orders have been placed and I will follow-up in 4 months with me in the office.    Important for glycemic control and blood pressure control and immunosuppression.

## 2025-05-05 ENCOUNTER — TELEPHONE (OUTPATIENT)
Dept: HEMATOLOGY ONCOLOGY | Facility: CLINIC | Age: 40
End: 2025-05-05

## 2025-05-05 NOTE — TELEPHONE ENCOUNTER
Writer spoke with PT on 5/5/25 @ 12:40 PM for introductions and to open dialogue regarding insurance status. PT confirmed that he does not have any active insurance coverage at this time and did voice he is open to guidance/ support in obtaining active coverage. PT stated he did have insurance in the past (believes [employer]) but upon trying to re-apply was denied due to exceeding income guidelines. PT stated he does currently work and could not obtain insurance through employer due to it being too expensive. Writer began to provide options of support and guidance but PT stated the writer will need to call back at a later time due to his wife calling. PT asked if writer could call back anytime after 4 pm. Writer informed PT that he could at that time but informed he added himself to his care team on [employer]. Writer informed PT he could call him at a better time and will be messaging him on [employer] to confirm contact information. PT grateful for the call and looks to reconnect with writer in the near future. Writer will relay this encounter to designated advocated upon return to office.              Raheem Salazar  Phone:[phone]  Email:[email or web address]

## 2025-05-14 ENCOUNTER — TELEPHONE (OUTPATIENT)
Dept: HEMATOLOGY ONCOLOGY | Facility: MEDICAL CENTER | Age: 40
End: 2025-05-14

## 2025-05-14 ENCOUNTER — OFFICE VISIT (OUTPATIENT)
Dept: HEMATOLOGY ONCOLOGY | Facility: MEDICAL CENTER | Age: 40
End: 2025-05-14
Attending: INTERNAL MEDICINE

## 2025-05-14 VITALS
DIASTOLIC BLOOD PRESSURE: 96 MMHG | BODY MASS INDEX: 37.11 KG/M2 | HEART RATE: 89 BPM | RESPIRATION RATE: 18 BRPM | WEIGHT: 280 LBS | HEIGHT: 73 IN | OXYGEN SATURATION: 96 % | SYSTOLIC BLOOD PRESSURE: 158 MMHG | TEMPERATURE: 98.5 F

## 2025-05-14 DIAGNOSIS — D75.1 ERYTHROPOIETIN POLYCYTHEMIA: Primary | ICD-10-CM

## 2025-05-14 PROCEDURE — 99243 OFF/OP CNSLTJ NEW/EST LOW 30: CPT | Performed by: PHYSICIAN ASSISTANT

## 2025-05-14 NOTE — ASSESSMENT & PLAN NOTE
Patient is a 39-year-old with history of living donor kidney transplant for end-stage renal disease from type 2 diabetes.  This was completed in September 2021.    Around 1 year after his surgery he was noted to have persistent erythrocytosis.  He previously followed with hematology at [facility].  He was undergoing phlebotomy every 6 to 7 weeks.    He underwent testing on 9/19/2023 to rule out myeloproliferative disorder.  He tested negative for JAK2 V6 17F, ROBINSON R, JAK2 exon 12, MPL, CSF3R.    The diagnosis of PTE should be suspected in any kidney transplant recipient who is found to have a hemoglobin >17 g/dL and/or hematocrit >51 percent for more than two to three months. An elevated hemoglobin/hematocrit level lasting more than six months confirms the presence of persistent erythrocytosis.     Treatment for posttransplant polycythemia is listed below:  Therapeutic phlebotomy can be used for patients with PTE who have a contraindication to receiving an ACE inhibitor or ARB (eg, due to intolerance or known allergy) or in whom an ACE inhibitor or ARB is ineffective at reducing the hemoglobin concentration. (See 'Overview of treatment' above.)    A standard one unit phlebotomy (500 mL) should reduce the hematocrit by 3 percentage points in a normal-sized adult (eg, from 46 to 43 percent). Phlebotomy of 500 mL of blood should be performed at intervals appropriate for patient size and tolerability to achieve and maintain a target hemoglobin of <17 g/dL (hematocrit <51 percent).    Once a hemoglobin level <17 g/dL has been stably maintained, we attempt to withdraw phlebotomy while continuing treatment with an ACE inhibitor or ARB.  Kidney transplantation in adults: Posttransplant erythrocytosis - UpToDate      Last lab work was completed on 4/26/2025.  WBC 11.48, hemoglobin 18.8, hematocrit 60.7, platelets 258.    Goal as above will be hemoglobin less than 17, hematocrit less than 51%.  Will start him on phlebotomies every 2  weeks for now.  He should discuss initiating ACE inhibitor or ARB with his transplant team.     He will proceed with phlebotomy every 2 weeks for now. He will RTC in 3 months time.    Discussed with Dr. Ceballos.

## 2025-05-14 NOTE — TELEPHONE ENCOUNTER
Confirmed phlebotomy appts dates and time with patient. Patient aware of self pay procedure as he has no insurance.

## 2025-05-14 NOTE — PROGRESS NOTES
Name: Jose Vega      : 1985      MRN: [id number]  Encounter Provider: Lucie Macedo PA-C  Encounter Date: 2025   Encounter department: [facility] [place]  :  Assessment & Plan  Erythropoietin polycythemia  Patient is a 39-year-old with history of living donor kidney transplant for end-stage renal disease from type 2 diabetes.  This was completed in 2021.    Around 1 year after his surgery he was noted to have persistent erythrocytosis.  He previously followed with hematology at [facility].  He was undergoing phlebotomy every 6 to 7 weeks.    He underwent testing on 2023 to rule out myeloproliferative disorder.  He tested negative for JAK2 V6 17F, ROBINSON R, JAK2 exon 12, MPL, CSF3R.    The diagnosis of PTE should be suspected in any kidney transplant recipient who is found to have a hemoglobin >17 g/dL and/or hematocrit >51 percent for more than two to three months. An elevated hemoglobin/hematocrit level lasting more than six months confirms the presence of persistent erythrocytosis.     Treatment for posttransplant polycythemia is listed below:  Therapeutic phlebotomy can be used for patients with PTE who have a contraindication to receiving an ACE inhibitor or ARB (eg, due to intolerance or known allergy) or in whom an ACE inhibitor or ARB is ineffective at reducing the hemoglobin concentration. (See 'Overview of treatment' above.)    A standard one unit phlebotomy (500 mL) should reduce the hematocrit by 3 percentage points in a normal-sized adult (eg, from 46 to 43 percent). Phlebotomy of 500 mL of blood should be performed at intervals appropriate for patient size and tolerability to achieve and maintain a target hemoglobin of <17 g/dL (hematocrit <51 percent).    Once a hemoglobin level <17 g/dL has been stably maintained, we attempt to withdraw phlebotomy while continuing treatment with an ACE inhibitor or ARB.  Kidney transplantation in adults:  Posttransplant erythrocytosis - UpToDate      Last lab work was completed on 4/26/2025.  WBC 11.48, hemoglobin 18.8, hematocrit 60.7, platelets 258.    Goal as above will be hemoglobin less than 17, hematocrit less than 51%.  Will start him on phlebotomies every 2 weeks for now.  He should discuss initiating ACE inhibitor or ARB with his transplant team.     He will proceed with phlebotomy every 2 weeks for now. He will RTC in 3 months time.    Discussed with Dr. Ceballos.      History of Present Illness   Chief Complaint   Patient presents with    Consult   Patient presents for evaluation of posttransplant erythrocytosis.    In September 2021 patient underwent a living donor kidney transplant for history of end-stage renal disease from type 2 diabetes.    He previously followed with hematology at [facility]. He was noted to have persistent erythrocytosis since November 2022.    He was initiated on phlebotomy starting every 6 to 7 weeks in December 2022.  He had JAK2 testing with reflex in 2023 which was negative.    In the past he had significant decrease in hemoglobin and hematocrit with consistently taking lisinopril.  He says he previously took losartan as well.  He says that he was hospitalized last year and these medications were discontinued and not restarted.    He says he is now following with his primary nephrologist locally. He is following with his transplant physician every year (due next     He says he stopped going for phlebotomies due to insurance issues. Last phlebotomy was around 6 months ago (November).    He is on prograf, myfortic, and prednisone. He has had persistent weight gain on prednisone.    No nausea, vomiting, bowel changes, chest pain. He has SOB with exertion which has worsened with recent weight gain.    He says he tolerates the phlebotomies without any issues.     Review of Systems   Constitutional:  Positive for appetite change and fatigue. Negative for activity  "change and fever.   HENT:  Negative for nosebleeds.    Respiratory:  Positive for shortness of breath (on exertion.). Negative for cough and choking.    Cardiovascular:  Negative for chest pain, palpitations and leg swelling.   Gastrointestinal:  Negative for abdominal distention, abdominal pain, anal bleeding, blood in stool, constipation, diarrhea, nausea and vomiting.   Endocrine: Negative for cold intolerance.   Genitourinary:  Negative for hematuria.   Musculoskeletal:  Negative for myalgias.   Skin:  Negative for color change, pallor and rash.   Allergic/Immunologic: Negative for immunocompromised state.   Neurological:  Negative for headaches.   Hematological:  Negative for adenopathy. Does not bruise/bleed easily.   All other systems reviewed and are negative.      Objective   /96 (BP Location: Left arm, Patient Position: Sitting, Cuff Size: Adult)   Pulse 89   Temp 98.5 °F (36.9 °C) (Temporal)   Resp 18   Ht 6' 1\" (1.854 m)   Wt 127 kg (280 lb)   SpO2 96%   BMI 36.94 kg/m²     Physical Exam  Constitutional:       General: He is not in acute distress.     Appearance: He is well-developed. He is obese.   HENT:      Head: Normocephalic and atraumatic.      Right Ear: External ear normal.      Left Ear: External ear normal.      Nose: Nose normal.     Eyes:      General: No scleral icterus.     Conjunctiva/sclera: Conjunctivae normal.       Cardiovascular:      Rate and Rhythm: Normal rate.   Pulmonary:      Effort: No respiratory distress.   Abdominal:      General: There is no distension.      Palpations: Abdomen is soft.      Tenderness: There is no abdominal tenderness.     Musculoskeletal:      Right lower leg: No edema.      Left lower leg: No edema.     Skin:     Findings: No rash (on exposed skin.).     Neurological:      Mental Status: He is alert and oriented to person, place, and time.     Psychiatric:         Mood and Affect: Mood normal.         Thought Content: Thought content normal. "

## 2025-05-15 ENCOUNTER — TELEPHONE (OUTPATIENT)
Dept: NEPHROLOGY | Facility: HOSPITAL | Age: 40
End: 2025-05-15

## 2025-05-15 NOTE — TELEPHONE ENCOUNTER
----- Message from Damian Morris MD sent at 5/15/2025  7:36 AM EDT -----  Call and tell him I would like to start lisinopril for high hemoglobin and I know hematology will follow too.  If ok let me know.  Tell him to do labs monthly .  Let me know if he is ok and I will send it in.  Ask when he will  be doing his next labs.  ----- Message -----  From: Lucie Macedo PA-C  Sent: 5/14/2025   3:22 PM EDT  To: Damian Morris MD    Hi Dr. Morris,I saw this patient today for posttransplant erythrocytosis.  I am not sure if you have any involvement in his antihypertensive medication regimen but it looks like the mainstay of treatment for posttransplant erythrocytosis is ACE/ARB.It looks like patient was on these medications in the past (specifically lisinopril/losartan) with good control of his hematocrit.He is obviously complicated and I am not familiar with his full history but I think he would likely benefit from one of these medications. Wondered what your thoughts were.Lucie

## 2025-05-15 NOTE — TELEPHONE ENCOUNTER
Called patient and went over the following information:    Dr. Morris would like to start lisinopril for high hemoglobin and I know hematology will follow too.  If ok let me know. Tell him to do labs monthly.    Patient verbally understood and had no further questions for me at this time.  Patient states he is due for his next set of labs sometime around the end of this month.

## 2025-05-16 DIAGNOSIS — D75.1 POST-RENAL TRANSPLANT ERYTHROCYTOSIS: Primary | ICD-10-CM

## 2025-05-16 RX ORDER — LISINOPRIL 10 MG/1
10 TABLET ORAL DAILY
Qty: 30 TABLET | Refills: 5 | Status: SHIPPED | OUTPATIENT
Start: 2025-05-16

## 2025-05-16 NOTE — PROGRESS NOTES
:  Reviewed Hematology note and to start lisinopril 10 mg a day for post transplant erythrocytosis.  To repeat labs this month.

## 2025-05-22 ENCOUNTER — DOCUMENTATION (OUTPATIENT)
Dept: HEMATOLOGY ONCOLOGY | Facility: CLINIC | Age: 40
End: 2025-05-22

## 2025-05-22 NOTE — PROGRESS NOTES
An introductory outreach call was made to the patient. No response LVM. Purpose of call is to introduce  myself and explain the scope of the Oncology  role. Left message with contact information for return call and future inquiries.   Patient was previously contacted by price checkers, and GFE was obtained. FA Application will be mailed to patient today.   Once connection has been made with patient we will discuss insurance options.

## 2025-05-29 ENCOUNTER — HOSPITAL ENCOUNTER (OUTPATIENT)
Dept: INFUSION CENTER | Facility: HOSPITAL | Age: 40
Discharge: HOME | End: 2025-05-29
Attending: INTERNAL MEDICINE

## 2025-05-29 VITALS
OXYGEN SATURATION: 97 % | RESPIRATION RATE: 19 BRPM | DIASTOLIC BLOOD PRESSURE: 89 MMHG | TEMPERATURE: 98 F | HEART RATE: 81 BPM | SYSTOLIC BLOOD PRESSURE: 150 MMHG

## 2025-05-29 DIAGNOSIS — D75.1 ERYTHROPOIETIN POLYCYTHEMIA: Primary | ICD-10-CM

## 2025-05-29 PROCEDURE — 99195 PHLEBOTOMY: CPT

## 2025-05-29 NOTE — PROGRESS NOTES
Therapeutic Phlebotomy  [facility], [address] [place] [id number]        Date: 5/29/25     Pre-Phlebotomy:  Vital Signs: 1410  Temp: 97.4  Pulse: 84  Resp: 18  BP: 129/80     First Phlebotomy:  Site: LAC  Amount Drawn: 60 mL  Time Started: 1415  Volume Replaced: N/A  Time Completed: 1420  Replacement Fluid: N/A  Volume collected was lower than prescribed volume: Yes. Collected 60 mL on first attempt.  Performed by: Anita Jaime RN   Date: 5/29/25   Time: 1520    Second Phlebotomy:  Site: RAC  Amount Drawn: 5 mL  Time Started: 1420  Volume Replaced: N/A  Time Completed: 1421  Replacement Fluid: N/A  Volume collected was lower than prescribed volume: Yes. Collected 5 mL on second attempt.  Performed by: Karo Ortiz RN   Date: 5/29/25  Time: 1520    Third Phlebotomy:  Site: Right forearm  Amount Drawn: 130 mL  Time Started: 1430  Volume Replaced: N/A  Time Completed: 1440  Replacement Fluid: N/A  Volume collected was lower than prescribed volume: Yes. Collected 130 mL on third attempt. Collected a total amount of 195 mL over three separate attempts.  Performed by: Anita Jaime RN   Date: 5/29/25  Time: 1520    Post Phlebotomy:  Vital Signs: 1508  Temp: 98.0         Pulse: 81  Resp: 19  BP: 150/89     Donor Reaction: None         Final Disposition: Destroyed   Patient Education completed: Yes        Reason: DISCHARGE INSTRUCTIONS

## 2025-05-29 NOTE — PROGRESS NOTES
"Pt presents to clinic for therapeutic phlebotomy. Pt meets parameters for treatment, confirmed parameters with Cami Aldridge RN. Informed consent obtained. Pt stated he used to complete phlebotomies via [facility]. Pt stated that [facility] \"used an ultrasound and butterfly PIV to perform the phlebotomies\". RN explained to pt that we have a specific protocol here and we do not perform phlebotomies via PIV and do not have an ultrasound. Pt is very anxious and stated \"the phlebotomy needle is never successful\". First attempt at therapeutic phlebotomy removed 60 mL, but the vein eventually ruptured. Pressure dressing applied. The site is clean, dry, and intact. Pt denies pain at site. Second attempt by different RN was also unsuccessful and collected 5 mL total before it stopped flowing. Third attempt by this RN yielded removal of 130 mL before the vein eventually ruptured. Pressure dressing applied to this site and is clean, dry, and intact. Pt denies pain at site. A total of 195 mL was removed over three attempts. Pt does not wish to proceed with another attempt. Message sent to Samira Cook RN. Samira Cook RN to discuss with Lucie Macedo PA-C tomorrow 5/30/25. Samira Cook RN will reach out to pt tomorrow 5/30/25 after discussing with Lucie Macedo PA-C. Pt made aware and agrees with plan at this time. Vitals stable at discharge.   "

## 2025-05-30 ENCOUNTER — TELEPHONE (OUTPATIENT)
Dept: HEMATOLOGY ONCOLOGY | Facility: MEDICAL CENTER | Age: 40
End: 2025-05-30

## 2025-05-30 NOTE — TELEPHONE ENCOUNTER
Spoke with patient  Patient had issues with phlebotomies on 5/29/2025 (See RN note)  PA Aware  PA will discuss with MD and we will get back to patient next week  Patient aware of plan

## 2025-06-06 ENCOUNTER — DOCUMENTATION (OUTPATIENT)
Dept: HEMATOLOGY ONCOLOGY | Facility: CLINIC | Age: 40
End: 2025-06-06

## 2025-06-06 NOTE — PROGRESS NOTES
Follow up call to patient regarding FA Application / Insurance. No response. M for return call. FA Application was mailed to patient

## 2025-06-07 DIAGNOSIS — D75.1 POST-RENAL TRANSPLANT ERYTHROCYTOSIS: ICD-10-CM

## 2025-06-09 ENCOUNTER — APPOINTMENT (OUTPATIENT)
Dept: LAB | Facility: HOSPITAL | Age: 40
End: 2025-06-09
Attending: INTERNAL MEDICINE

## 2025-06-09 DIAGNOSIS — D75.1 ERYTHROPOIETIN POLYCYTHEMIA: ICD-10-CM

## 2025-06-09 LAB
BASOPHILS # BLD AUTO: 0.03 THOUSANDS/ÂΜL (ref 0–0.1)
BASOPHILS NFR BLD AUTO: 0 % (ref 0–1)
EOSINOPHIL # BLD AUTO: 0.02 THOUSAND/ÂΜL (ref 0–0.61)
EOSINOPHIL NFR BLD AUTO: 0 % (ref 0–6)
ERYTHROCYTE [DISTWIDTH] IN BLOOD BY AUTOMATED COUNT: 13.9 % (ref 11.6–15.1)
HCT VFR BLD AUTO: 53.8 % (ref 36.5–49.3)
HGB BLD-MCNC: 17.1 G/DL (ref 12–17)
IMM GRANULOCYTES # BLD AUTO: 0.05 THOUSAND/UL (ref 0–0.2)
IMM GRANULOCYTES NFR BLD AUTO: 0 % (ref 0–2)
LYMPHOCYTES # BLD AUTO: 0.49 THOUSANDS/ÂΜL (ref 0.6–4.47)
LYMPHOCYTES NFR BLD AUTO: 4 % (ref 14–44)
MCH RBC QN AUTO: 28.2 PG (ref 26.8–34.3)
MCHC RBC AUTO-ENTMCNC: 31.8 G/DL (ref 31.4–37.4)
MCV RBC AUTO: 89 FL (ref 82–98)
MONOCYTES # BLD AUTO: 0.55 THOUSAND/ÂΜL (ref 0.17–1.22)
MONOCYTES NFR BLD AUTO: 4 % (ref 4–12)
NEUTROPHILS # BLD AUTO: 11.37 THOUSANDS/ÂΜL (ref 1.85–7.62)
NEUTS SEG NFR BLD AUTO: 92 % (ref 43–75)
NRBC BLD AUTO-RTO: 0 /100 WBCS
PLATELET # BLD AUTO: 310 THOUSANDS/UL (ref 149–390)
PMV BLD AUTO: 10.4 FL (ref 8.9–12.7)
RBC # BLD AUTO: 6.06 MILLION/UL (ref 3.88–5.62)
WBC # BLD AUTO: 12.51 THOUSAND/UL (ref 4.31–10.16)

## 2025-06-09 PROCEDURE — 36415 COLL VENOUS BLD VENIPUNCTURE: CPT

## 2025-06-09 PROCEDURE — 85025 COMPLETE CBC W/AUTO DIFF WBC: CPT

## 2025-06-09 RX ORDER — LISINOPRIL 10 MG/1
10 TABLET ORAL DAILY
Qty: 90 TABLET | Refills: 1 | Status: SHIPPED | OUTPATIENT
Start: 2025-06-09

## 2025-06-10 ENCOUNTER — TELEPHONE (OUTPATIENT)
Dept: HEMATOLOGY ONCOLOGY | Facility: MEDICAL CENTER | Age: 40
End: 2025-06-10

## 2025-06-10 NOTE — TELEPHONE ENCOUNTER
Patient will be transferring care back to [facility] for phlebotomy  Orders and appts cancelled

## 2025-06-12 ENCOUNTER — HOSPITAL ENCOUNTER (OUTPATIENT)
Dept: INFUSION CENTER | Facility: HOSPITAL | Age: 40
Discharge: HOME | End: 2025-06-12
Attending: INTERNAL MEDICINE

## 2025-06-12 ENCOUNTER — TELEPHONE (OUTPATIENT)
Dept: HEMATOLOGY ONCOLOGY | Facility: MEDICAL CENTER | Age: 40
End: 2025-06-12

## 2025-06-12 VITALS
RESPIRATION RATE: 18 BRPM | HEART RATE: 84 BPM | DIASTOLIC BLOOD PRESSURE: 85 MMHG | SYSTOLIC BLOOD PRESSURE: 154 MMHG | TEMPERATURE: 98 F

## 2025-06-12 DIAGNOSIS — D75.1 ERYTHROPOIETIN POLYCYTHEMIA: Primary | ICD-10-CM

## 2025-06-12 PROCEDURE — 99195 PHLEBOTOMY: CPT

## 2025-06-12 NOTE — PROGRESS NOTES
Jose Vega  tolerated treatment well with no complications.      Plan is for f/u with [facility].    AVS printed and given to Jose Vega:    No (Declined by Jose Vega)

## 2025-06-12 NOTE — TELEPHONE ENCOUNTER
Spoke to patient  Patient is at infusion center for appt  CBC reviewed by Dr Ceballos   Patient will receive one more phlebotomy at [facility] then transfer to hematology at [facility]  Patient agreeable to plan

## 2025-06-12 NOTE — PROGRESS NOTES
Therapeutic Phlebotomy  [facility], [address] [place] [id number]      Date:   6/12/2025      Pre-Phlebotomy    Vital Signs:    Temp: 98    Pulse: 82    Resp: 18    BP: 154/85    Phlebotomy:    Site: LAC  Amount Drawn: 350  Time Started:    1510  Volume Replaced: n/a  Time Completed:   1522  Replacement Fluid: n/a  Amount less than what was ordered. Pt refused additional attempt. Dr Ceballos made aware.  Performed by:        Delia Carlos RN    Date: 6/12 Time: 1540      Post Phlebotomy:    Vital Signs:    Temp: 98   Pulse: 82  Resp: 16  BP:     152/75         Donor Reaction:      None           Final Disposition      Destroyed     Patient Education completed:        No      Reason: Discharge instructions

## 2025-07-02 DIAGNOSIS — I10 ESSENTIAL HYPERTENSION: Primary | ICD-10-CM

## 2025-07-02 RX ORDER — METOPROLOL SUCCINATE 25 MG/1
25 TABLET, EXTENDED RELEASE ORAL 2 TIMES DAILY
Qty: 180 TABLET | Refills: 3 | Status: SHIPPED | OUTPATIENT
Start: 2025-07-02

## 2025-07-02 NOTE — TELEPHONE ENCOUNTER
Med refill  Metoprolol -- previously filled by another provider  [facility] in [place]

## 2025-07-16 ENCOUNTER — APPOINTMENT (EMERGENCY)
Dept: RADIOLOGY | Facility: HOSPITAL | Age: 40
End: 2025-07-16

## 2025-07-16 ENCOUNTER — HOSPITAL ENCOUNTER (EMERGENCY)
Facility: HOSPITAL | Age: 40
Discharge: HOME | End: 2025-07-16
Attending: EMERGENCY MEDICINE

## 2025-07-16 VITALS
RESPIRATION RATE: 20 BRPM | OXYGEN SATURATION: 98 % | TEMPERATURE: 98.7 F | HEART RATE: 97 BPM | SYSTOLIC BLOOD PRESSURE: 148 MMHG | DIASTOLIC BLOOD PRESSURE: 89 MMHG

## 2025-07-16 DIAGNOSIS — S83.90XA KNEE SPRAIN: Primary | ICD-10-CM

## 2025-07-16 DIAGNOSIS — U07.1 COVID-19: ICD-10-CM

## 2025-07-16 PROCEDURE — 99283 EMERGENCY DEPT VISIT LOW MDM: CPT

## 2025-07-16 PROCEDURE — 99284 EMERGENCY DEPT VISIT MOD MDM: CPT | Performed by: EMERGENCY MEDICINE

## 2025-07-16 PROCEDURE — 73564 X-RAY EXAM KNEE 4 OR MORE: CPT

## 2025-07-16 RX ORDER — OXYCODONE HYDROCHLORIDE 5 MG/1
2.5 TABLET ORAL EVERY 6 HOURS PRN
Qty: 6 TABLET | Refills: 0 | Status: SHIPPED | OUTPATIENT
Start: 2025-07-16 | End: 2025-07-26

## 2025-07-16 RX ORDER — ACETAMINOPHEN 325 MG/1
650 TABLET ORAL EVERY 6 HOURS PRN
Qty: 30 TABLET | Refills: 0 | Status: SHIPPED | OUTPATIENT
Start: 2025-07-16

## 2025-07-16 RX ADMIN — DICLOFENAC SODIUM 2 G: 10 GEL TOPICAL at 17:18

## 2025-07-16 NOTE — ED PROVIDER NOTES
Handled in refill encounter   Time reflects when diagnosis was documented in both MDM as applicable and the Disposition within this note       Time User Action Codes Description Comment    7/16/2025  5:10 PM Mary Art Add [S83.90XA] Knee sprain     7/16/2025  5:10 PM Federico Artie ROSELIA Add [U07.1] COVID-19           ED Disposition       ED Disposition   Discharge    Condition   Stable    Date/Time   Wed Jul 16, 2025  5:10 PM    Comment   Jsoe Vega Jr. discharge to home/self care.                   Assessment & Plan       Medical Decision Making  Differential diagnosis includes sprain strain fracture dislocation    Problems Addressed:  Knee sprain: acute illness or injury    Amount and/or Complexity of Data Reviewed  Radiology: ordered and independent interpretation performed.     Details: No fracture or dislocation appreciated glimmer shows an area that I believe is arthritis.    Risk  OTC drugs.  Prescription drug management.  Risk Details: Discussed with patient following up with orthopedics offered crutches however patient did not want them.             Medications   Diclofenac Sodium (VOLTAREN) 1 % topical gel 2 g (2 g Topical Given 7/16/25 1718)       ED Risk Strat Scores                    No data recorded                            History of Present Illness       Chief Complaint   Patient presents with    Knee Pain     Felt pop in L knee when bending under fence last Wednesday. Tylenol this morning for pain.        Past Medical History[1]   Past Surgical History[2]   Family History[3]   Social History[4]   E-Cigarette/Vaping    E-Cigarette Use Never User       E-Cigarette/Vaping Substances    Nicotine No     THC No     CBD No     Flavoring No     Other No     Unknown No       I have reviewed and agree with the history as documented.     40-year-old male comes in for evaluation of knee pain.  Patient states he was trying to climb under a fence when he injured his knee.  Patient has been resting at home taking Tylenol but  still continues to have pain.  Patient is concerned he may have a torn meniscus.      History provided by:  Patient   used: No    Knee Pain  Location:  Knee  Time since incident:  1 week  Injury: yes    Knee location:  L knee  Pain details:     Quality:  Pressure and shooting    Radiates to:  Does not radiate    Severity:  Moderate    Onset quality:  Sudden    Timing:  Constant    Progression:  Worsening  Chronicity:  New  Dislocation: no    Ineffective treatments:  Acetaminophen and rest  Associated symptoms: stiffness and swelling    Associated symptoms: no back pain and no fever    Risk factors: obesity    Risk factors: no concern for non-accidental trauma and no recent illness        Review of Systems   Constitutional:  Negative for chills and fever.   HENT:  Negative for ear pain and sore throat.    Eyes:  Negative for pain and visual disturbance.   Respiratory:  Negative for cough and shortness of breath.    Cardiovascular:  Negative for chest pain and palpitations.   Gastrointestinal:  Negative for abdominal pain and vomiting.   Genitourinary:  Negative for dysuria and hematuria.   Musculoskeletal:  Positive for stiffness. Negative for arthralgias and back pain.   Skin:  Negative for color change and rash.   Neurological:  Negative for seizures and syncope.   All other systems reviewed and are negative.          Objective       ED Triage Vitals   Temperature Pulse Blood Pressure Respirations SpO2 Patient Position - Orthostatic VS   07/16/25 1641 07/16/25 1638 07/16/25 1638 07/16/25 1638 07/16/25 1638 --   98.7 °F (37.1 °C) 97 148/89 20 98 %       Temp Source Heart Rate Source BP Location FiO2 (%) Pain Score    07/16/25 1641 07/16/25 1638 -- -- 07/16/25 1638    Oral Monitor   8      Vitals      Date and Time Temp Pulse SpO2 Resp BP Pain Score FACES Pain Rating User   07/16/25 1641 98.7 °F (37.1 °C) -- -- -- -- -- -- KH   07/16/25 1638 -- 97 98 % 20 148/89 8 --             Physical  Exam  Vitals and nursing note reviewed.   Constitutional:       General: He is not in acute distress.     Appearance: He is well-developed.   HENT:      Head: Normocephalic and atraumatic.     Eyes:      Conjunctiva/sclera: Conjunctivae normal.       Cardiovascular:      Rate and Rhythm: Normal rate and regular rhythm.      Heart sounds: No murmur heard.  Pulmonary:      Effort: Pulmonary effort is normal. No respiratory distress.      Breath sounds: Normal breath sounds.   Abdominal:      Palpations: Abdomen is soft.      Tenderness: There is no abdominal tenderness.     Musculoskeletal:         General: No swelling.      Cervical back: Neck supple.      Left knee: Swelling (Mild diffuse) present. No bony tenderness. Decreased range of motion. Tenderness present over the medial joint line, lateral joint line and patellar tendon. Normal alignment.     Skin:     General: Skin is warm and dry.      Capillary Refill: Capillary refill takes less than 2 seconds.     Neurological:      Mental Status: He is alert.     Psychiatric:         Mood and Affect: Mood normal.         Results Reviewed       None            XR knee 4+ views left injury    (Results Pending)       Procedures    ED Medication and Procedure Management   Prior to Admission Medications   Prescriptions Last Dose Informant Patient Reported? Taking?   BD Pen Needle Yocasta 2nd Gen 32G X 4 MM MISC   Yes No   Si STICK BY MISCELLANEOUS ROUTE DAILY.   Blood Glucose Monitoring Suppl (ONETOUCH VERIO) w/Device KIT  Self No No   Sig: by Does not apply route 3 (three) times a day   Lancets (OneTouch Delica Plus [id number]) MISC   Yes No   Sig: daily Test   acetaminophen (TYLENOL) 325 mg tablet  Self No No   Sig: Take 2 tablets (650 mg total) by mouth every 6 (six) hours as needed for mild pain or fever   acetaminophen (TYLENOL) 325 mg tablet   No Yes   Sig: Take 2 tablets (650 mg total) by mouth every 6 (six) hours as needed for mild pain or fever   albuterol  (PROVENTIL HFA,VENTOLIN HFA) 90 mcg/act inhaler  Self No No   Sig: Inhale 2 puffs every 4 (four) hours as needed for wheezing   amLODIPine (NORVASC) 10 mg tablet   Yes No   Sig: Take 10 mg by mouth in the morning.   aspirin (ECOTRIN LOW STRENGTH) 81 mg EC tablet  Self No No   Sig: Take 1 tablet (81 mg total) by mouth daily   insulin glargine (LANTUS) 100 units/mL subcutaneous injection   Yes No   Sig: Inject 12 Units under the skin in the morning.   insulin lispro (HumaLOG) 100 units/mL injection   Yes No   Sig: Inject under the skin SLIDING SCALE   lisinopril (ZESTRIL) 10 mg tablet   No No   Sig: TAKE 1 TABLET BY MOUTH EVERY DAY   metoprolol succinate (TOPROL-XL) 25 mg 24 hr tablet   No No   Sig: Take 1 tablet (25 mg total) by mouth in the morning and 1 tablet (25 mg total) before bedtime.   mycophenolic acid (MYFORTIC) 180 mg EC tablet   No No   Sig: Take 4 tablets (720 mg total) by mouth 2 (two) times a day   predniSONE 10 mg tablet   No No   Sig: Take 1 tablet (10 mg total) by mouth daily   rosuvastatin (CRESTOR) 5 mg tablet   No No   Sig: TAKE 1 TABLET (5 MG TOTAL) BY MOUTH DAILY.   tacrolimus (PROGRAF) 1 mg capsule   Yes No   Sig: Take 4 mg by mouth every 12 (twelve) hours   zinc sulfate (ZINCATE) 220 mg capsule   No No   Sig: Take 1 capsule (220 mg total) by mouth daily for 5 doses   Patient not taking: Reported on 5/24/2022      Facility-Administered Medications: None     Patient's Medications   Discharge Prescriptions    DICLOFENAC SODIUM (VOLTAREN) 1 %    Apply 2 g topically 4 (four) times a day       Start Date: 7/16/2025 End Date: --       Order Dose: 2 g       Quantity: 50 g    Refills: 0    OXYCODONE (ROXICODONE) 5 IMMEDIATE RELEASE TABLET    Take 0.5 tablets (2.5 mg total) by mouth every 6 (six) hours as needed for severe pain for up to 10 days Max Daily Amount: 10 mg       Start Date: 7/16/2025 End Date: 7/26/2025       Order Dose: 2.5 mg       Quantity: 6 tablet    Refills: 0       ED SEPSIS  DOCUMENTATION   Time reflects when diagnosis was documented in both MDM as applicable and the Disposition within this note       Time User Action Codes Description Comment    7/16/2025  5:10 PM Mary Art [S83.90XA] Knee sprain     7/16/2025  5:10 PM Mary Art Add [U07.1] COVID-19                      [1]   Past Medical History:  Diagnosis Date    Anemia due to chronic kidney disease 9/13/2019    Chronic kidney disease     CKD (chronic kidney disease) 2/21/2019    Class 1 obesity due to excess calories with serious comorbidity and body mass index (BMI) of 31.0 to 31.9 in adult 5/25/2019    Diabetes mellitus (HCC)     Essential hypertension 10/31/2017    Hyperlipidemia     NSTEMI (non-ST elevated myocardial infarction) (HCC) 5/21/2019   [2]   Past Surgical History:  Procedure Laterality Date    ABCESS DRAINAGE      tooth    CT NEEDLE BIOPSY KIDNEY  10/24/2019    PERITONEAL CATHETER INSERTION N/A 2/13/2020    Procedure: INSERTION PERITONEAL CATHETER DIALYSIS LAPAROSCOPIC;  Surgeon: Flavio Griffin MD;  Location: [facility];  Service: General    MS ARTHRS KNE SURG W/MENISCECTOMY MED/LAT W/SHVG Right 9/13/2018    Procedure: RIGHT KNEE ARTHROSCOPIC PARTIAL MEDIAL MENISCECTOMY;  Surgeon: Joby Cortez MD;  Location: [facility];  Service: Orthopedics    WRIST ARTHROPLASTY Right 2017   [3]   Family History  Problem Relation Name Age of Onset    Hypertension Mother      Multiple sclerosis Mother      Diabetes Father     [4]   Social History  Tobacco Use    Smoking status: Never    Smokeless tobacco: Never   Vaping Use    Vaping status: Never Used   Substance Use Topics    Alcohol use: Not Currently     Comment: occasionally    Drug use: No        Mary Art DO  07/16/25 1743

## 2025-07-28 ENCOUNTER — OFFICE VISIT (OUTPATIENT)
Dept: OBGYN CLINIC | Facility: CLINIC | Age: 40
End: 2025-07-28

## 2025-07-28 ENCOUNTER — HOSPITAL ENCOUNTER (OUTPATIENT)
Dept: MRI IMAGING | Facility: HOSPITAL | Age: 40
Discharge: HOME | End: 2025-07-28
Attending: STUDENT IN AN ORGANIZED HEALTH CARE EDUCATION/TRAINING PROGRAM

## 2025-07-28 VITALS — HEIGHT: 73 IN | WEIGHT: 281.2 LBS | BODY MASS INDEX: 37.27 KG/M2

## 2025-07-28 DIAGNOSIS — S83.212A BUCKET HANDLE TEAR OF MEDIAL MENISCUS OF LEFT KNEE, INITIAL ENCOUNTER: Primary | ICD-10-CM

## 2025-07-28 DIAGNOSIS — S83.212A BUCKET HANDLE TEAR OF MEDIAL MENISCUS OF LEFT KNEE, INITIAL ENCOUNTER: ICD-10-CM

## 2025-07-28 DIAGNOSIS — E66.811 CLASS 1 OBESITY DUE TO EXCESS CALORIES WITH SERIOUS COMORBIDITY AND BODY MASS INDEX (BMI) OF 31.0 TO 31.9 IN ADULT: ICD-10-CM

## 2025-07-28 DIAGNOSIS — E66.09 CLASS 1 OBESITY DUE TO EXCESS CALORIES WITH SERIOUS COMORBIDITY AND BODY MASS INDEX (BMI) OF 31.0 TO 31.9 IN ADULT: ICD-10-CM

## 2025-07-28 PROBLEM — M23.92 ACUTE INTERNAL DERANGEMENT OF LEFT KNEE: Status: ACTIVE | Noted: 2025-01-01

## 2025-07-28 PROCEDURE — 99244 OFF/OP CNSLTJ NEW/EST MOD 40: CPT | Performed by: STUDENT IN AN ORGANIZED HEALTH CARE EDUCATION/TRAINING PROGRAM

## 2025-07-28 PROCEDURE — 73721 MRI JNT OF LWR EXTRE W/O DYE: CPT

## 2025-07-29 ENCOUNTER — TELEPHONE (OUTPATIENT)
Age: 40
End: 2025-07-29

## 2025-07-29 ENCOUNTER — TELEPHONE (OUTPATIENT)
Dept: OBGYN CLINIC | Facility: CLINIC | Age: 40
End: 2025-07-29

## 2025-07-30 ENCOUNTER — TELEPHONE (OUTPATIENT)
Age: 40
End: 2025-07-30

## 2025-07-30 DIAGNOSIS — Z79.4 TYPE 2 DIABETES MELLITUS WITH HYPERGLYCEMIA, WITH LONG-TERM CURRENT USE OF INSULIN (HCC): ICD-10-CM

## 2025-07-30 DIAGNOSIS — Z01.818 PREOPERATIVE CLEARANCE: Primary | ICD-10-CM

## 2025-07-30 DIAGNOSIS — E11.65 TYPE 2 DIABETES MELLITUS WITH HYPERGLYCEMIA, WITH LONG-TERM CURRENT USE OF INSULIN (HCC): ICD-10-CM

## 2025-07-30 PROBLEM — B34.8 BK VIREMIA: Status: RESOLVED | Noted: 2022-03-07 | Resolved: 2025-07-30

## 2025-07-30 PROBLEM — T86.11: Status: RESOLVED | Noted: 2024-02-16 | Resolved: 2025-07-30

## 2025-07-30 PROBLEM — U07.1 COVID-19: Status: RESOLVED | Noted: 2020-12-28 | Resolved: 2025-01-01

## 2025-07-30 PROBLEM — E66.812 CLASS 2 OBESITY DUE TO EXCESS CALORIES WITHOUT SERIOUS COMORBIDITY WITH BODY MASS INDEX (BMI) OF 37.0 TO 37.9 IN ADULT: Status: ACTIVE | Noted: 2025-01-01

## 2025-07-30 PROBLEM — E66.09 CLASS 2 OBESITY DUE TO EXCESS CALORIES WITHOUT SERIOUS COMORBIDITY WITH BODY MASS INDEX (BMI) OF 37.0 TO 37.9 IN ADULT: Status: ACTIVE | Noted: 2025-01-01

## 2025-07-30 PROBLEM — T86.91 TRANSPLANT REJECTION: Status: ACTIVE | Noted: 2024-04-26

## 2025-07-30 PROBLEM — E83.52 HYPERCALCEMIA: Status: ACTIVE | Noted: 2022-04-03

## 2025-07-30 PROBLEM — Z20.822 EXPOSURE TO COVID-19 VIRUS: Status: RESOLVED | Noted: 2020-12-03 | Resolved: 2025-07-30

## 2025-07-30 PROBLEM — T86.11 ANTIBODY MEDIATED REJECTION OF KIDNEY TRANSPLANT: Status: ACTIVE | Noted: 2024-02-16

## 2025-07-30 RX ORDER — INSULIN DEGLUDEC 100 U/ML
46 INJECTION, SOLUTION SUBCUTANEOUS DAILY
Status: ON HOLD | COMMUNITY
Start: 2025-02-27 | End: 2025-08-07 | Stop reason: ALTCHOICE

## 2025-08-01 ENCOUNTER — APPOINTMENT (OUTPATIENT)
Dept: LAB | Facility: HOSPITAL | Age: 40
End: 2025-08-01

## 2025-08-01 ENCOUNTER — OFFICE VISIT (OUTPATIENT)
Dept: OBGYN CLINIC | Facility: CLINIC | Age: 40
End: 2025-08-01

## 2025-08-01 VITALS — HEIGHT: 73 IN | BODY MASS INDEX: 37.24 KG/M2 | WEIGHT: 281 LBS

## 2025-08-01 DIAGNOSIS — Z79.4 TYPE 2 DIABETES MELLITUS WITH HYPERGLYCEMIA, WITH LONG-TERM CURRENT USE OF INSULIN (HCC): ICD-10-CM

## 2025-08-01 DIAGNOSIS — S83.212A BUCKET HANDLE TEAR OF MEDIAL MENISCUS OF LEFT KNEE, INITIAL ENCOUNTER: ICD-10-CM

## 2025-08-01 DIAGNOSIS — E11.65 TYPE 2 DIABETES MELLITUS WITH HYPERGLYCEMIA, WITH LONG-TERM CURRENT USE OF INSULIN (HCC): ICD-10-CM

## 2025-08-01 DIAGNOSIS — I47.29 NSVT (NONSUSTAINED VENTRICULAR TACHYCARDIA) (HCC): ICD-10-CM

## 2025-08-01 DIAGNOSIS — E66.812 CLASS 2 OBESITY DUE TO EXCESS CALORIES WITHOUT SERIOUS COMORBIDITY WITH BODY MASS INDEX (BMI) OF 37.0 TO 37.9 IN ADULT: ICD-10-CM

## 2025-08-01 DIAGNOSIS — Z01.818 PREOPERATIVE CLEARANCE: ICD-10-CM

## 2025-08-01 DIAGNOSIS — D75.1 ERYTHROPOIETIN POLYCYTHEMIA: ICD-10-CM

## 2025-08-01 DIAGNOSIS — E66.09 CLASS 2 OBESITY DUE TO EXCESS CALORIES WITHOUT SERIOUS COMORBIDITY WITH BODY MASS INDEX (BMI) OF 37.0 TO 37.9 IN ADULT: ICD-10-CM

## 2025-08-01 DIAGNOSIS — S83.212A BUCKET HANDLE TEAR OF MEDIAL MENISCUS OF LEFT KNEE, INITIAL ENCOUNTER: Primary | ICD-10-CM

## 2025-08-01 DIAGNOSIS — Z94.0 RENAL TRANSPLANT RECIPIENT: ICD-10-CM

## 2025-08-01 LAB
ALBUMIN SERPL BCG-MCNC: 4.4 G/DL (ref 3.5–5)
ALP SERPL-CCNC: 51 U/L (ref 34–104)
ALT SERPL W P-5'-P-CCNC: 12 U/L (ref 7–52)
ANION GAP SERPL CALCULATED.3IONS-SCNC: 7 MMOL/L (ref 4–13)
APTT PPP: 29 SECONDS (ref 23–34)
AST SERPL W P-5'-P-CCNC: 37 U/L (ref 13–39)
BASOPHILS # BLD AUTO: 0.04 THOUSANDS/ÂΜL (ref 0–0.1)
BASOPHILS NFR BLD AUTO: 0 % (ref 0–1)
BILIRUB SERPL-MCNC: 0.38 MG/DL (ref 0.2–1)
BUN SERPL-MCNC: 27 MG/DL (ref 5–25)
CALCIUM SERPL-MCNC: 9.7 MG/DL (ref 8.4–10.2)
CHLORIDE SERPL-SCNC: 108 MMOL/L (ref 96–108)
CO2 SERPL-SCNC: 26 MMOL/L (ref 21–32)
CREAT SERPL-MCNC: 1.74 MG/DL (ref 0.6–1.3)
EOSINOPHIL # BLD AUTO: 0.12 THOUSAND/ÂΜL (ref 0–0.61)
EOSINOPHIL NFR BLD AUTO: 1 % (ref 0–6)
ERYTHROCYTE [DISTWIDTH] IN BLOOD BY AUTOMATED COUNT: 15 % (ref 11.6–15.1)
EST. AVERAGE GLUCOSE BLD GHB EST-MCNC: 209 MG/DL
GFR SERPL CREATININE-BSD FRML MDRD: 47 ML/MIN/1.73SQ M
GLUCOSE SERPL-MCNC: 100 MG/DL (ref 65–140)
HBA1C MFR BLD: 8.9 %
HCT VFR BLD AUTO: 42.1 % (ref 36.5–49.3)
HGB BLD-MCNC: 13.4 G/DL (ref 12–17)
IMM GRANULOCYTES # BLD AUTO: 0.08 THOUSAND/UL (ref 0–0.2)
IMM GRANULOCYTES NFR BLD AUTO: 1 % (ref 0–2)
LYMPHOCYTES # BLD AUTO: 0.78 THOUSANDS/ÂΜL (ref 0.6–4.47)
LYMPHOCYTES NFR BLD AUTO: 7 % (ref 14–44)
MCH RBC QN AUTO: 28.6 PG (ref 26.8–34.3)
MCHC RBC AUTO-ENTMCNC: 31.8 G/DL (ref 31.4–37.4)
MCV RBC AUTO: 90 FL (ref 82–98)
MONOCYTES # BLD AUTO: 1.19 THOUSAND/ÂΜL (ref 0.17–1.22)
MONOCYTES NFR BLD AUTO: 10 % (ref 4–12)
NEUTROPHILS # BLD AUTO: 9.76 THOUSANDS/ÂΜL (ref 1.85–7.62)
NEUTS SEG NFR BLD AUTO: 81 % (ref 43–75)
NRBC BLD AUTO-RTO: 0 /100 WBCS
PHOSPHATE SERPL-MCNC: 3.1 MG/DL (ref 2.7–4.5)
PLATELET # BLD AUTO: 328 THOUSANDS/UL (ref 149–390)
PMV BLD AUTO: 10.5 FL (ref 8.9–12.7)
POTASSIUM SERPL-SCNC: 4.6 MMOL/L (ref 3.5–5.3)
PROT SERPL-MCNC: 7.1 G/DL (ref 6.4–8.4)
RBC # BLD AUTO: 4.69 MILLION/UL (ref 3.88–5.62)
SODIUM SERPL-SCNC: 141 MMOL/L (ref 135–147)
TACROLIMUS BLD-MCNC: 7.9 NG/ML (ref 3–15)
WBC # BLD AUTO: 11.97 THOUSAND/UL (ref 4.31–10.16)

## 2025-08-01 PROCEDURE — 83036 HEMOGLOBIN GLYCOSYLATED A1C: CPT

## 2025-08-01 PROCEDURE — 80197 ASSAY OF TACROLIMUS: CPT

## 2025-08-01 PROCEDURE — 80053 COMPREHEN METABOLIC PANEL: CPT

## 2025-08-01 PROCEDURE — 36415 COLL VENOUS BLD VENIPUNCTURE: CPT

## 2025-08-01 PROCEDURE — 85025 COMPLETE CBC W/AUTO DIFF WBC: CPT

## 2025-08-01 PROCEDURE — 85730 THROMBOPLASTIN TIME PARTIAL: CPT

## 2025-08-01 PROCEDURE — 84100 ASSAY OF PHOSPHORUS: CPT

## 2025-08-01 RX ORDER — CHLORHEXIDINE GLUCONATE ORAL RINSE 1.2 MG/ML
15 SOLUTION DENTAL ONCE
Status: CANCELLED | OUTPATIENT
Start: 2025-08-01 | End: 2025-08-01

## 2025-08-01 RX ORDER — SODIUM CHLORIDE, SODIUM LACTATE, POTASSIUM CHLORIDE, CALCIUM CHLORIDE 600; 310; 30; 20 MG/100ML; MG/100ML; MG/100ML; MG/100ML
20 INJECTION, SOLUTION INTRAVENOUS CONTINUOUS
Status: CANCELLED | OUTPATIENT
Start: 2025-08-07

## 2025-08-01 RX ORDER — CHLORHEXIDINE GLUCONATE 40 MG/ML
SOLUTION TOPICAL DAILY PRN
Status: CANCELLED | OUTPATIENT
Start: 2025-08-01

## 2025-08-01 RX ORDER — CEFAZOLIN SODIUM 3 G/50ML
3000 SOLUTION INTRAVENOUS ONCE
Status: CANCELLED | OUTPATIENT
Start: 2025-08-07 | End: 2025-08-01

## 2025-08-02 ENCOUNTER — RESULTS FOLLOW-UP (OUTPATIENT)
Dept: NEPHROLOGY | Facility: CLINIC | Age: 40
End: 2025-08-02

## 2025-08-04 ENCOUNTER — ANESTHESIA EVENT (OUTPATIENT)
Dept: PERIOP | Facility: HOSPITAL | Age: 40
End: 2025-08-04
Payer: COMMERCIAL

## 2025-08-05 ENCOUNTER — OFFICE VISIT (OUTPATIENT)
Age: 40
End: 2025-08-05

## 2025-08-05 VITALS
HEIGHT: 73 IN | BODY MASS INDEX: 36.84 KG/M2 | DIASTOLIC BLOOD PRESSURE: 72 MMHG | WEIGHT: 278 LBS | SYSTOLIC BLOOD PRESSURE: 118 MMHG

## 2025-08-05 DIAGNOSIS — I10 ESSENTIAL HYPERTENSION: ICD-10-CM

## 2025-08-05 DIAGNOSIS — Z79.4 TYPE 2 DIABETES MELLITUS WITH HYPERGLYCEMIA, WITH LONG-TERM CURRENT USE OF INSULIN (HCC): ICD-10-CM

## 2025-08-05 DIAGNOSIS — S83.211D BUCKET-HANDLE TEAR OF MEDIAL MENISCUS OF RIGHT KNEE AS CURRENT INJURY, SUBSEQUENT ENCOUNTER: Primary | ICD-10-CM

## 2025-08-05 DIAGNOSIS — E66.812 CLASS 2 OBESITY DUE TO EXCESS CALORIES WITHOUT SERIOUS COMORBIDITY WITH BODY MASS INDEX (BMI) OF 37.0 TO 37.9 IN ADULT: ICD-10-CM

## 2025-08-05 DIAGNOSIS — D75.1 ERYTHROPOIETIN POLYCYTHEMIA: ICD-10-CM

## 2025-08-05 DIAGNOSIS — T86.91 TRANSPLANT REJECTION: ICD-10-CM

## 2025-08-05 DIAGNOSIS — Z94.0 RENAL TRANSPLANT RECIPIENT: ICD-10-CM

## 2025-08-05 DIAGNOSIS — E11.65 TYPE 2 DIABETES MELLITUS WITH HYPERGLYCEMIA, WITH LONG-TERM CURRENT USE OF INSULIN (HCC): ICD-10-CM

## 2025-08-05 DIAGNOSIS — E66.09 CLASS 2 OBESITY DUE TO EXCESS CALORIES WITHOUT SERIOUS COMORBIDITY WITH BODY MASS INDEX (BMI) OF 37.0 TO 37.9 IN ADULT: ICD-10-CM

## 2025-08-05 PROCEDURE — 99214 OFFICE O/P EST MOD 30 MIN: CPT | Performed by: NURSE PRACTITIONER

## 2025-08-06 LAB
ATRIAL RATE: 79 BPM
P AXIS: 29 DEGREES
PR INTERVAL: 152 MS
QRS AXIS: -51 DEGREES
QRSD INTERVAL: 148 MS
QT INTERVAL: 374 MS
QTC INTERVAL: 429 MS
T WAVE AXIS: -2 DEGREES
VENTRICULAR RATE: 79 BPM

## 2025-08-07 ENCOUNTER — HOSPITAL ENCOUNTER (OUTPATIENT)
Facility: HOSPITAL | Age: 40
Setting detail: OUTPATIENT SURGERY
Discharge: HOME | End: 2025-08-07
Attending: STUDENT IN AN ORGANIZED HEALTH CARE EDUCATION/TRAINING PROGRAM | Admitting: STUDENT IN AN ORGANIZED HEALTH CARE EDUCATION/TRAINING PROGRAM
Payer: COMMERCIAL

## 2025-08-07 ENCOUNTER — ANESTHESIA (OUTPATIENT)
Dept: PERIOP | Facility: HOSPITAL | Age: 40
End: 2025-08-07
Payer: COMMERCIAL

## 2025-08-07 VITALS
TEMPERATURE: 98.2 F | OXYGEN SATURATION: 97 % | WEIGHT: 271 LBS | HEIGHT: 73 IN | SYSTOLIC BLOOD PRESSURE: 168 MMHG | RESPIRATION RATE: 16 BRPM | DIASTOLIC BLOOD PRESSURE: 80 MMHG | HEART RATE: 82 BPM | BODY MASS INDEX: 35.92 KG/M2

## 2025-08-07 DIAGNOSIS — M23.92 ACUTE INTERNAL DERANGEMENT OF LEFT KNEE: Primary | ICD-10-CM

## 2025-08-07 LAB
GLUCOSE SERPL-MCNC: 125 MG/DL (ref 65–140)
GLUCOSE SERPL-MCNC: 174 MG/DL (ref 65–140)

## 2025-08-07 PROCEDURE — 82948 REAGENT STRIP/BLOOD GLUCOSE: CPT

## 2025-08-07 RX ORDER — ASPIRIN 81 MG/1
81 TABLET ORAL 2 TIMES DAILY
Qty: 28 TABLET | Refills: 0 | Status: SHIPPED | OUTPATIENT
Start: 2025-08-07 | End: 2025-08-21

## 2025-08-07 RX ORDER — HYDROMORPHONE HCL/PF 1 MG/ML
SYRINGE (ML) INJECTION AS NEEDED
Status: DISCONTINUED | OUTPATIENT
Start: 2025-08-07 | End: 2025-08-07

## 2025-08-07 RX ORDER — SODIUM CHLORIDE, SODIUM LACTATE, POTASSIUM CHLORIDE, CALCIUM CHLORIDE 600; 310; 30; 20 MG/100ML; MG/100ML; MG/100ML; MG/100ML
20 INJECTION, SOLUTION INTRAVENOUS CONTINUOUS
Status: DISCONTINUED | OUTPATIENT
Start: 2025-08-07 | End: 2025-08-07 | Stop reason: HOSPADM

## 2025-08-07 RX ORDER — CEPHALEXIN 500 MG/1
500 CAPSULE ORAL EVERY 6 HOURS SCHEDULED
Qty: 8 CAPSULE | Refills: 0 | Status: SHIPPED | OUTPATIENT
Start: 2025-08-07 | End: 2025-08-09

## 2025-08-07 RX ORDER — FENTANYL CITRATE 50 UG/ML
INJECTION, SOLUTION INTRAMUSCULAR; INTRAVENOUS AS NEEDED
Status: DISCONTINUED | OUTPATIENT
Start: 2025-08-07 | End: 2025-08-07

## 2025-08-07 RX ORDER — ONDANSETRON 2 MG/ML
4 INJECTION INTRAMUSCULAR; INTRAVENOUS ONCE AS NEEDED
Status: DISCONTINUED | OUTPATIENT
Start: 2025-08-07 | End: 2025-08-07 | Stop reason: HOSPADM

## 2025-08-07 RX ORDER — ONDANSETRON 2 MG/ML
INJECTION INTRAMUSCULAR; INTRAVENOUS AS NEEDED
Status: DISCONTINUED | OUTPATIENT
Start: 2025-08-07 | End: 2025-08-07

## 2025-08-07 RX ORDER — FENTANYL CITRATE/PF 50 MCG/ML
50 SYRINGE (ML) INJECTION
Status: DISCONTINUED | OUTPATIENT
Start: 2025-08-07 | End: 2025-08-07 | Stop reason: HOSPADM

## 2025-08-07 RX ORDER — ONDANSETRON 4 MG/1
4 TABLET, FILM COATED ORAL EVERY 8 HOURS PRN
Qty: 4 TABLET | Refills: 0 | Status: SHIPPED | OUTPATIENT
Start: 2025-08-07

## 2025-08-07 RX ORDER — MELOXICAM 15 MG/1
15 TABLET ORAL DAILY
Qty: 15 TABLET | Refills: 0 | Status: SHIPPED | OUTPATIENT
Start: 2025-08-07 | End: 2025-08-22

## 2025-08-07 RX ORDER — BUPIVACAINE HYDROCHLORIDE 2.5 MG/ML
INJECTION, SOLUTION EPIDURAL; INFILTRATION; INTRACAUDAL; PERINEURAL AS NEEDED
Status: DISCONTINUED | OUTPATIENT
Start: 2025-08-07 | End: 2025-08-07 | Stop reason: HOSPADM

## 2025-08-07 RX ORDER — MIDAZOLAM HYDROCHLORIDE 2 MG/2ML
INJECTION, SOLUTION INTRAMUSCULAR; INTRAVENOUS AS NEEDED
Status: DISCONTINUED | OUTPATIENT
Start: 2025-08-07 | End: 2025-08-07

## 2025-08-07 RX ORDER — CHLORHEXIDINE GLUCONATE 40 MG/ML
SOLUTION TOPICAL DAILY PRN
Status: DISCONTINUED | OUTPATIENT
Start: 2025-08-07 | End: 2025-08-07 | Stop reason: HOSPADM

## 2025-08-07 RX ORDER — OXYCODONE HYDROCHLORIDE 5 MG/1
5 TABLET ORAL EVERY 6 HOURS PRN
Qty: 20 TABLET | Refills: 0 | Status: SHIPPED | OUTPATIENT
Start: 2025-08-07 | End: 2025-08-17

## 2025-08-07 RX ORDER — CEFAZOLIN SODIUM 3 G/50ML
3000 SOLUTION INTRAVENOUS ONCE
Status: COMPLETED | OUTPATIENT
Start: 2025-08-07 | End: 2025-08-07

## 2025-08-07 RX ORDER — LIDOCAINE HYDROCHLORIDE 20 MG/ML
INJECTION, SOLUTION EPIDURAL; INFILTRATION; INTRACAUDAL; PERINEURAL AS NEEDED
Status: DISCONTINUED | OUTPATIENT
Start: 2025-08-07 | End: 2025-08-07

## 2025-08-07 RX ORDER — CHLORHEXIDINE GLUCONATE ORAL RINSE 1.2 MG/ML
15 SOLUTION DENTAL ONCE
Status: COMPLETED | OUTPATIENT
Start: 2025-08-07 | End: 2025-08-07

## 2025-08-07 RX ORDER — OXYCODONE HYDROCHLORIDE 5 MG/1
5 TABLET ORAL EVERY 4 HOURS PRN
Refills: 0 | Status: DISCONTINUED | OUTPATIENT
Start: 2025-08-07 | End: 2025-08-07 | Stop reason: HOSPADM

## 2025-08-07 RX ORDER — PROPOFOL 10 MG/ML
INJECTION, EMULSION INTRAVENOUS AS NEEDED
Status: DISCONTINUED | OUTPATIENT
Start: 2025-08-07 | End: 2025-08-07

## 2025-08-07 RX ADMIN — DEXMEDETOMIDINE HYDROCHLORIDE 8 MCG: 100 INJECTION, SOLUTION INTRAVENOUS at 12:30

## 2025-08-07 RX ADMIN — DEXMEDETOMIDINE HYDROCHLORIDE 8 MCG: 100 INJECTION, SOLUTION INTRAVENOUS at 12:45

## 2025-08-07 RX ADMIN — DEXMEDETOMIDINE HYDROCHLORIDE 12 MCG: 100 INJECTION, SOLUTION INTRAVENOUS at 11:54

## 2025-08-07 RX ADMIN — FENTANYL CITRATE 50 MCG: 50 INJECTION INTRAMUSCULAR; INTRAVENOUS at 11:55

## 2025-08-07 RX ADMIN — MIDAZOLAM 2 MG: 1 INJECTION INTRAMUSCULAR; INTRAVENOUS at 11:45

## 2025-08-07 RX ADMIN — PROPOFOL 250 MG: 10 INJECTION, EMULSION INTRAVENOUS at 11:52

## 2025-08-07 RX ADMIN — SODIUM CHLORIDE, SODIUM LACTATE, POTASSIUM CHLORIDE, AND CALCIUM CHLORIDE: .6; .31; .03; .02 INJECTION, SOLUTION INTRAVENOUS at 13:02

## 2025-08-07 RX ADMIN — ONDANSETRON 4 MG: 2 INJECTION INTRAMUSCULAR; INTRAVENOUS at 12:35

## 2025-08-07 RX ADMIN — HYDROMORPHONE HYDROCHLORIDE 0.5 MG: 1 INJECTION, SOLUTION INTRAMUSCULAR; INTRAVENOUS; SUBCUTANEOUS at 12:54

## 2025-08-07 RX ADMIN — LIDOCAINE HYDROCHLORIDE 100 MG: 20 INJECTION, SOLUTION EPIDURAL; INFILTRATION; INTRACAUDAL; PERINEURAL at 11:52

## 2025-08-07 RX ADMIN — CEFAZOLIN SODIUM 3000 MG: 3 SOLUTION INTRAVENOUS at 11:55

## 2025-08-07 RX ADMIN — FENTANYL CITRATE 50 MCG: 50 INJECTION INTRAMUSCULAR; INTRAVENOUS at 13:30

## 2025-08-07 RX ADMIN — FENTANYL CITRATE 50 MCG: 50 INJECTION INTRAMUSCULAR; INTRAVENOUS at 11:59

## 2025-08-07 RX ADMIN — SODIUM CHLORIDE, SODIUM LACTATE, POTASSIUM CHLORIDE, AND CALCIUM CHLORIDE: .6; .31; .03; .02 INJECTION, SOLUTION INTRAVENOUS at 11:00

## 2025-08-07 RX ADMIN — CHLORHEXIDINE GLUCONATE 0.12% ORAL RINSE 15 ML: 1.2 LIQUID ORAL at 10:48

## 2025-08-21 ENCOUNTER — TELEPHONE (OUTPATIENT)
Age: 40
End: 2025-08-21

## 2025-08-21 ENCOUNTER — TELEPHONE (OUTPATIENT)
Dept: OBGYN CLINIC | Facility: CLINIC | Age: 40
End: 2025-08-21

## (undated) DEVICE — Device

## (undated) DEVICE — SUT MONOCRYL 3-0 PS-2 18 IN Y497G

## (undated) DEVICE — THIS ADAPTER IS A DOUBLE SEALING FEMALE LUER LOCK ADAPTER WITH A 2-PIECE, COMBINATION COMPRESSION FIT/BARBED CATHETER CONNECTOR. THE ADAPTER IS USED TO CONNECT THE PD CATHETER TO A SOLUTION TRANSFER SET WITH LOCKING CONNECTOR.: Brand: LOCKING TITANIUM ADAPTER FOR PERITONEAL DIALYSIS CATHETER

## (undated) DEVICE — FALLER TUNNELING TROCAR: Brand: ARGYLE

## (undated) DEVICE — ALLENTOWN LAP CHOLE APP PACK: Brand: CARDINAL HEALTH

## (undated) DEVICE — GLOVE SRG BIOGEL 8.5

## (undated) DEVICE — DRAPE IOBAN 2-23 X 17 LGE

## (undated) DEVICE — 3M™ STERI-STRIP™ REINFORCED ADHESIVE SKIN CLOSURES, R1547, 1/2 IN X 4 IN (12 MM X 100 MM), 6 STRIPS/ENVELOPE: Brand: 3M™ STERI-STRIP™

## (undated) DEVICE — PACK PBDS UNIVERSAL ARTHROSCOPY RF

## (undated) DEVICE — GLOVE SRG BIOGEL ECLIPSE 7

## (undated) DEVICE — SUT VICRYL 3-0 SH 27 IN J416H

## (undated) DEVICE — BLADE SHAVER DISSECTOR  3.5MM 13CM CRV COOLCUT

## (undated) DEVICE — VIAL DECANTER

## (undated) DEVICE — COVER STAND W23 IN REINFORCE PLASTIC

## (undated) DEVICE — CHLORAPREP HI-LITE 26ML ORANGE

## (undated) DEVICE — TROCAR: Brand: KII FIOS FIRST ENTRY

## (undated) DEVICE — BULB SYRINGE,IRRIGATION WITH PROTECTIVE CAP: Brand: DOVER

## (undated) DEVICE — GAUZE SPONGES,16 PLY: Brand: CURITY

## (undated) DEVICE — THIS SET CONSISTS OF A FEMALE LOCKING CONNECTOR/ON-OFF CLAMP ASSEMBLY, TUBING AND DOUBLE SEALING MALE LUER LOCK CONNECTOR. THIS SET IS TO BE USED WITH THE BAXTER LOCKING TITANIUM ADAPTER FOR PERITONEAL DIALYSIS CATHETER IN DISCONNECT APPLICATIONS AND IN CYCLER APPLICATIONS WHERE ASEPTIC CONNECTIONS AND DISCONNECTIONS ARE PERFORMED AT THE TRANSFER SET/CYCLER SET JUNCTURE.: Brand: MINICAP

## (undated) DEVICE — LIGHT GLOVE GREEN

## (undated) DEVICE — BETHLEHEM UNIVERSAL  ARTHRO PK: Brand: CARDINAL HEALTH

## (undated) DEVICE — THIS BAG IS INTENDED TO COLLECT SAMPLE DRAINAGE OF PERITONEAL DIALYSIS EFFLUENT FROM BAXTER PERITONEAL DIALYSIS TUBING SETS AND IS INTENDED FOR USE WITH LUER CONNECTOR PRODUCTS.: Brand: EFFLUENT SAMPLE BAG

## (undated) DEVICE — ADHESIVE SKIN HIGH VISCOSITY EXOFIN 1ML

## (undated) DEVICE — TUBING ARTHROSCOPY REDUCE PATIENT

## (undated) DEVICE — SUT VICRYL 1 CT-1 27 IN J261H

## (undated) DEVICE — TUBING SUCTION 5MM X 12 FT

## (undated) DEVICE — TOWEL SURG XR DETECT GREEN STRL RFD

## (undated) DEVICE — SCD SEQUENTIAL COMPRESSION COMFORT SLEEVE MEDIUM KNEE LENGTH: Brand: KENDALL SCD

## (undated) DEVICE — IMPERVIOUS STOCKINETTE: Brand: DEROYAL

## (undated) DEVICE — SYRINGE 20ML LL

## (undated) DEVICE — TROCAR: Brand: KII® SLEEVE

## (undated) DEVICE — CUFF TOURNIQUET 30 X 4 IN QUICK CONNECT DISP 1BLA

## (undated) DEVICE — DRAPE SHEET THREE QUARTER

## (undated) DEVICE — NEEDLE 22 G X 1 1/2 SAFETY

## (undated) DEVICE — DRAIN SPONGES,6 PLY: Brand: EXCILON

## (undated) DEVICE — BLADE SHAVER DISSECTOR 3.5MM 13CM COOLCUT

## (undated) DEVICE — UNDYED BRAIDED (POLYGLACTIN 910), SYNTHETIC ABSORBABLE SUTURE: Brand: COATED VICRYL

## (undated) DEVICE — ACE WRAP 6 IN UNSTERILE

## (undated) DEVICE — GLOVE INDICATOR PI UNDERGLOVE SZ 8.5 BLUE

## (undated) DEVICE — INTENDED FOR TISSUE SEPARATION, AND OTHER PROCEDURES THAT REQUIRE A SHARP SURGICAL BLADE TO PUNCTURE OR CUT.: Brand: BARD-PARKER ® CARBON RIB-BACK BLADES

## (undated) DEVICE — BLADE SHAVER EXCALIBUR 4MM 13CM COOLCUT

## (undated) DEVICE — 3M™ TEGADERM™ TRANSPARENT FILM DRESSING FRAME STYLE, 1628, 6 IN X 8 IN (15 CM X 20 CM), 10/CT 8CT/CASE: Brand: 3M™ TEGADERM™

## (undated) DEVICE — INTENDED FOR TISSUE SEPARATION, AND OTHER PROCEDURES THAT REQUIRE A SHARP SURGICAL BLADE TO PUNCTURE OR CUT.: Brand: BARD-PARKER SAFETY BLADES SIZE 11, STERILE

## (undated) DEVICE — SUT MONOCRYL 4-0 PS-2 18 IN Y496G

## (undated) DEVICE — DRAPE EQUIPMENT RF WAND

## (undated) DEVICE — LIGHT HANDLE COVER SLEEVE DISP BLUE STELLAR

## (undated) DEVICE — PADDING CAST 4 IN  COTTON STRL

## (undated) DEVICE — INTENDED FOR TISSUE SEPARATION, AND OTHER PROCEDURES THAT REQUIRE A SHARP SURGICAL BLADE TO PUNCTURE OR CUT.: Brand: BARD-PARKER SAFETY BLADES SIZE 15, STERILE